# Patient Record
Sex: FEMALE | Race: WHITE | NOT HISPANIC OR LATINO | Employment: OTHER | URBAN - METROPOLITAN AREA
[De-identification: names, ages, dates, MRNs, and addresses within clinical notes are randomized per-mention and may not be internally consistent; named-entity substitution may affect disease eponyms.]

---

## 2020-11-11 ENCOUNTER — HOSPITAL ENCOUNTER (EMERGENCY)
Facility: HOSPITAL | Age: 55
Discharge: HOME | End: 2020-11-11
Attending: EMERGENCY MEDICINE
Payer: MEDICAID

## 2020-11-11 VITALS
TEMPERATURE: 99 F | WEIGHT: 160 LBS | HEART RATE: 94 BPM | HEIGHT: 67 IN | OXYGEN SATURATION: 96 % | SYSTOLIC BLOOD PRESSURE: 117 MMHG | DIASTOLIC BLOOD PRESSURE: 65 MMHG | RESPIRATION RATE: 18 BRPM | BODY MASS INDEX: 25.11 KG/M2

## 2020-11-11 DIAGNOSIS — K26.9 DUODENAL ULCER: Primary | ICD-10-CM

## 2020-11-11 DIAGNOSIS — R11.2 NON-INTRACTABLE VOMITING WITH NAUSEA, UNSPECIFIED VOMITING TYPE: ICD-10-CM

## 2020-11-11 LAB
ALBUMIN SERPL BCP-MCNC: 3.7 G/DL (ref 3.5–5.2)
ALP SERPL-CCNC: 117 U/L (ref 55–135)
ALT SERPL W/O P-5'-P-CCNC: 7 U/L (ref 10–44)
ANION GAP SERPL CALC-SCNC: 12 MMOL/L (ref 8–16)
ANISOCYTOSIS BLD QL SMEAR: SLIGHT
AST SERPL-CCNC: 11 U/L (ref 10–40)
BASOPHILS NFR BLD: 0 % (ref 0–1.9)
BILIRUB SERPL-MCNC: 0.5 MG/DL (ref 0.1–1)
BILIRUB UR QL STRIP: NEGATIVE
BUN SERPL-MCNC: 11 MG/DL (ref 6–20)
CALCIUM SERPL-MCNC: 8.9 MG/DL (ref 8.7–10.5)
CHLORIDE SERPL-SCNC: 98 MMOL/L (ref 95–110)
CLARITY UR: CLEAR
CO2 SERPL-SCNC: 27 MMOL/L (ref 23–29)
COLOR UR: YELLOW
CREAT SERPL-MCNC: 1.4 MG/DL (ref 0.5–1.4)
DIFFERENTIAL METHOD: ABNORMAL
EOSINOPHIL NFR BLD: 0 % (ref 0–8)
ERYTHROCYTE [DISTWIDTH] IN BLOOD BY AUTOMATED COUNT: 15.5 % (ref 11.5–14.5)
EST. GFR  (AFRICAN AMERICAN): 49 ML/MIN/1.73 M^2
EST. GFR  (NON AFRICAN AMERICAN): 43 ML/MIN/1.73 M^2
GIANT PLATELETS BLD QL SMEAR: PRESENT
GLUCOSE SERPL-MCNC: 110 MG/DL (ref 70–110)
GLUCOSE UR QL STRIP: NEGATIVE
HCT VFR BLD AUTO: 41.7 % (ref 37–48.5)
HGB BLD-MCNC: 13.2 G/DL (ref 12–16)
HGB UR QL STRIP: NEGATIVE
HYPOCHROMIA BLD QL SMEAR: ABNORMAL
IMM GRANULOCYTES # BLD AUTO: ABNORMAL K/UL (ref 0–0.04)
IMM GRANULOCYTES NFR BLD AUTO: ABNORMAL % (ref 0–0.5)
KETONES UR QL STRIP: NEGATIVE
LEUKOCYTE ESTERASE UR QL STRIP: NEGATIVE
LIPASE SERPL-CCNC: 9 U/L (ref 4–60)
LYMPHOCYTES NFR BLD: 42 % (ref 18–48)
MCH RBC QN AUTO: 26.7 PG (ref 27–31)
MCHC RBC AUTO-ENTMCNC: 31.7 G/DL (ref 32–36)
MCV RBC AUTO: 84 FL (ref 82–98)
MONOCYTES NFR BLD: 5 % (ref 4–15)
NEUTROPHILS NFR BLD: 53 % (ref 38–73)
NITRITE UR QL STRIP: NEGATIVE
NRBC BLD-RTO: 0 /100 WBC
OVALOCYTES BLD QL SMEAR: ABNORMAL
PH UR STRIP: 8 [PH] (ref 5–8)
PLATELET # BLD AUTO: 439 K/UL (ref 150–350)
PLATELET BLD QL SMEAR: ABNORMAL
PMV BLD AUTO: 8.7 FL (ref 9.2–12.9)
POIKILOCYTOSIS BLD QL SMEAR: SLIGHT
POTASSIUM SERPL-SCNC: 3.7 MMOL/L (ref 3.5–5.1)
PROT SERPL-MCNC: 6.4 G/DL (ref 6–8.4)
PROT UR QL STRIP: NEGATIVE
RBC # BLD AUTO: 4.95 M/UL (ref 4–5.4)
SODIUM SERPL-SCNC: 137 MMOL/L (ref 136–145)
SP GR UR STRIP: 1.01 (ref 1–1.03)
URN SPEC COLLECT METH UR: NORMAL
UROBILINOGEN UR STRIP-ACNC: NEGATIVE EU/DL
WBC # BLD AUTO: 10.06 K/UL (ref 3.9–12.7)

## 2020-11-11 PROCEDURE — 96375 TX/PRO/DX INJ NEW DRUG ADDON: CPT

## 2020-11-11 PROCEDURE — 80053 COMPREHEN METABOLIC PANEL: CPT

## 2020-11-11 PROCEDURE — 96374 THER/PROPH/DIAG INJ IV PUSH: CPT

## 2020-11-11 PROCEDURE — 25000003 PHARM REV CODE 250: Performed by: EMERGENCY MEDICINE

## 2020-11-11 PROCEDURE — 99284 EMERGENCY DEPT VISIT MOD MDM: CPT | Mod: 25

## 2020-11-11 PROCEDURE — 81003 URINALYSIS AUTO W/O SCOPE: CPT

## 2020-11-11 PROCEDURE — 63600175 PHARM REV CODE 636 W HCPCS: Performed by: EMERGENCY MEDICINE

## 2020-11-11 PROCEDURE — 36415 COLL VENOUS BLD VENIPUNCTURE: CPT

## 2020-11-11 PROCEDURE — 83690 ASSAY OF LIPASE: CPT

## 2020-11-11 PROCEDURE — 85027 COMPLETE CBC AUTOMATED: CPT

## 2020-11-11 PROCEDURE — 85007 BL SMEAR W/DIFF WBC COUNT: CPT

## 2020-11-11 RX ORDER — INSULIN LISPRO 100 [IU]/ML
INJECTION, SOLUTION INTRAVENOUS; SUBCUTANEOUS
Status: ON HOLD | COMMUNITY
Start: 2020-05-21 | End: 2020-11-24 | Stop reason: HOSPADM

## 2020-11-11 RX ORDER — PANTOPRAZOLE SODIUM 40 MG/1
40 TABLET, DELAYED RELEASE ORAL DAILY
Qty: 30 TABLET | Refills: 3 | Status: ON HOLD | OUTPATIENT
Start: 2020-11-11 | End: 2020-11-24 | Stop reason: SDUPTHER

## 2020-11-11 RX ORDER — PANTOPRAZOLE SODIUM 40 MG/1
40 TABLET, DELAYED RELEASE ORAL
Status: COMPLETED | OUTPATIENT
Start: 2020-11-11 | End: 2020-11-11

## 2020-11-11 RX ORDER — INSULIN GLARGINE 100 [IU]/ML
INJECTION, SOLUTION SUBCUTANEOUS DAILY
Status: ON HOLD | COMMUNITY
End: 2020-11-24 | Stop reason: HOSPADM

## 2020-11-11 RX ORDER — SUCRALFATE 1 G/10ML
1 SUSPENSION ORAL EVERY 6 HOURS
Status: DISCONTINUED | OUTPATIENT
Start: 2020-11-11 | End: 2020-11-11

## 2020-11-11 RX ORDER — MAG HYDROX/ALUMINUM HYD/SIMETH 200-200-20
30 SUSPENSION, ORAL (FINAL DOSE FORM) ORAL
Status: DISCONTINUED | OUTPATIENT
Start: 2020-11-11 | End: 2020-11-11

## 2020-11-11 RX ORDER — KETOROLAC TROMETHAMINE 30 MG/ML
30 INJECTION, SOLUTION INTRAMUSCULAR; INTRAVENOUS
Status: COMPLETED | OUTPATIENT
Start: 2020-11-11 | End: 2020-11-11

## 2020-11-11 RX ORDER — TALC
3 POWDER (GRAM) TOPICAL NIGHTLY
COMMUNITY
Start: 2020-05-21 | End: 2022-02-24

## 2020-11-11 RX ORDER — FLUTICASONE FUROATE AND VILANTEROL 100; 25 UG/1; UG/1
1 POWDER RESPIRATORY (INHALATION)
COMMUNITY
Start: 2020-05-22 | End: 2022-03-07 | Stop reason: ALTCHOICE

## 2020-11-11 RX ORDER — GLIMEPIRIDE 4 MG/1
4 TABLET ORAL
COMMUNITY
Start: 2020-08-26 | End: 2020-12-09

## 2020-11-11 RX ORDER — INSULIN GLARGINE 100 [IU]/ML
22 INJECTION, SOLUTION SUBCUTANEOUS
COMMUNITY
Start: 2020-05-21 | End: 2020-11-17

## 2020-11-11 RX ORDER — LINAGLIPTIN 5 MG/1
5 TABLET, FILM COATED ORAL DAILY
COMMUNITY
Start: 2020-06-29 | End: 2022-02-23 | Stop reason: SDUPTHER

## 2020-11-11 RX ORDER — ONDANSETRON HYDROCHLORIDE 8 MG/1
8 TABLET, FILM COATED ORAL EVERY 12 HOURS PRN
Qty: 8 TABLET | Refills: 1 | Status: SHIPPED | OUTPATIENT
Start: 2020-11-11 | End: 2020-12-31

## 2020-11-11 RX ORDER — HALOPERIDOL 5 MG/ML
5 INJECTION INTRAMUSCULAR
Status: COMPLETED | OUTPATIENT
Start: 2020-11-11 | End: 2020-11-11

## 2020-11-11 RX ORDER — ATORVASTATIN CALCIUM 40 MG/1
40 TABLET, FILM COATED ORAL DAILY
COMMUNITY
Start: 2020-05-21 | End: 2021-02-08 | Stop reason: SDUPTHER

## 2020-11-11 RX ADMIN — LIDOCAINE HYDROCHLORIDE: 20 SOLUTION ORAL; TOPICAL at 02:11

## 2020-11-11 RX ADMIN — HALOPERIDOL LACTATE 5 MG: 5 INJECTION, SOLUTION INTRAMUSCULAR at 02:11

## 2020-11-11 RX ADMIN — KETOROLAC TROMETHAMINE 30 MG: 30 INJECTION, SOLUTION INTRAMUSCULAR at 02:11

## 2020-11-11 RX ADMIN — PANTOPRAZOLE SODIUM 40 MG: 40 TABLET, DELAYED RELEASE ORAL at 02:11

## 2020-11-11 NOTE — ED NOTES
Pt. Resting with eyes closed, chest rise and fall symmetrical, respirations even and unlabored, NADN, VSS, awakens easily, denies pain, no needs at this time, will continue to monitor.

## 2020-11-11 NOTE — ED PROVIDER NOTES
Encounter Date: 11/11/2020    SCRIBE #1 NOTE: I, Coco Harrell, am scribing for, and in the presence of, Austen Durant MD.       History     Chief Complaint   Patient presents with    Abdominal Pain     Presents with mid abdominal pain x 1 week; Also reports emesis x 4 episodes since this AM       Time seen by provider: 2:05 PM on 11/11/2020    Delores Fox is a 55 y.o. female who presents to the ED with an onset of mid abdominal pain for 1 month worsening 3 days ago.  Patient was recently seen at [facility] on 10/12/2020 for similar Sx and diagnosed with duodenitis with no ulcer noted and findings of intrarenal stones via CT.  She reports that since that encounter she has been nauseous and vomiting every week.  Patient confirms a decrease in oral intake secondary to pain.  She is not on an aspirin regimen and is a smoker of 0.5 ppd.  The patient denies fever, constipation, bloody / tarry stools, an increase in frequency, painful urination or any other symptoms at this time.  Allergies include sulfa drugs and morphine.  She has PMHx of HTN, DM, CAD and HLD.  No PSHx.      The history is provided by the patient.     Review of patient's allergies indicates:   Allergen Reactions    Morphine Nausea And Vomiting    Sulfa (sulfonamide antibiotics) Nausea And Vomiting     Past Medical History:   Diagnosis Date    Coronary artery disease     Diabetes mellitus     High cholesterol     Hypertension      No past surgical history on file.  No family history on file.  Social History     Tobacco Use    Smoking status: Not on file   Substance Use Topics    Alcohol use: Not on file    Drug use: Not on file     Review of Systems   Constitutional: Positive for appetite change. Negative for activity change, chills, fatigue and fever.   Eyes: Negative for visual disturbance.   Respiratory: Negative for apnea and shortness of breath.    Cardiovascular: Negative for chest pain and  palpitations.   Gastrointestinal: Positive for abdominal pain, nausea and vomiting. Negative for abdominal distention, blood in stool and constipation.   Genitourinary: Negative for difficulty urinating, dysuria and frequency.   Musculoskeletal: Negative for neck pain.   Skin: Negative for pallor and rash.   Neurological: Negative for headaches.   Hematological: Does not bruise/bleed easily.   Psychiatric/Behavioral: Negative for agitation.       Physical Exam     Initial Vitals [11/11/20 1228]   BP Pulse Resp Temp SpO2   (!) 171/70 (!) 129 (!) 22 99.3 °F (37.4 °C) 96 %      MAP       --         Physical Exam    Nursing note and vitals reviewed.  Constitutional: She appears well-developed and well-nourished.   HENT:   Head: Normocephalic and atraumatic.   Eyes: Conjunctivae are normal.   Neck: Normal range of motion. Neck supple.   Cardiovascular: Normal rate, regular rhythm and normal heart sounds. Exam reveals no gallop and no friction rub.    No murmur heard.  Pulmonary/Chest: Effort normal and breath sounds normal. No respiratory distress. She has no wheezes. She has no rhonchi. She has no rales.   Abdominal: Soft. She exhibits no distension. There is abdominal tenderness in the epigastric area. There is no rebound and no guarding.   Musculoskeletal: Normal range of motion.   Neurological: She is alert and oriented to person, place, and time.   Skin: Skin is warm and dry. No erythema.   Psychiatric: She has a normal mood and affect.         ED Course   Procedures  Labs Reviewed   CBC W/ AUTO DIFFERENTIAL - Abnormal; Notable for the following components:       Result Value    MCH 26.7 (*)     MCHC 31.7 (*)     RDW 15.5 (*)     Platelets 439 (*)     MPV 8.7 (*)     All other components within normal limits   COMPREHENSIVE METABOLIC PANEL - Abnormal; Notable for the following components:    ALT 7 (*)     eGFR if  49 (*)     eGFR if non  43 (*)     All other components within normal  limits   LIPASE   URINALYSIS, REFLEX TO URINE CULTURE    Narrative:     Specimen Source->Urine          Imaging Results    None          Medical Decision Making:   History:   Old Medical Records: I decided to obtain old medical records.  Clinical Tests:   Lab Tests: Ordered and Reviewed  ED Management:  55-year-old female presents with a 1 month history of epigastric pain.  CT perform 1 month ago suggested duodenitis which most likely is secondary to a duodenal ulcer.  She is complete pain relief with a GI cocktail.  She is placed on Protonix and encouraged to return for worsening symptoms.  There is no evidence of intestinal perforation.            Scribe Attestation:   Scribe #1: I performed the above scribed service and the documentation accurately describes the services I performed. I attest to the accuracy of the note.                      Clinical Impression:     ICD-10-CM ICD-9-CM   1. Duodenal ulcer  K26.9 532.90   2. Non-intractable vomiting with nausea, unspecified vomiting type  R11.2 787.01                          ED Disposition Condition    Discharge Stable        ED Prescriptions     Medication Sig Dispense Start Date End Date Auth. Provider    ondansetron (ZOFRAN) 8 MG tablet Take 1 tablet (8 mg total) by mouth every 12 (twelve) hours as needed for Nausea. 8 tablet 11/11/2020  Austen Durant III, MD    pantoprazole (PROTONIX) 40 MG tablet Take 1 tablet (40 mg total) by mouth once daily. 30 tablet 11/11/2020 11/11/2021 Austen Durant III, MD        Follow-up Information     Follow up With Specialties Details Why Contact Info    Carey Liu MD Internal Medicine In 3 days  [address] [facility] [address]  [phone]                                         Austen Durant III, MD  11/11/20 0270

## 2020-11-21 ENCOUNTER — HOSPITAL ENCOUNTER (INPATIENT)
Facility: HOSPITAL | Age: 55
LOS: 3 days | Discharge: HOME | DRG: 287 | End: 2020-11-24
Attending: EMERGENCY MEDICINE | Admitting: INTERNAL MEDICINE
Payer: MEDICAID

## 2020-11-21 DIAGNOSIS — I42.9 CARDIOMYOPATHY: ICD-10-CM

## 2020-11-21 DIAGNOSIS — E78.2 MIXED HYPERLIPIDEMIA: ICD-10-CM

## 2020-11-21 DIAGNOSIS — I20.89 OTHER FORMS OF ANGINA PECTORIS: ICD-10-CM

## 2020-11-21 DIAGNOSIS — K59.00 CONSTIPATION: ICD-10-CM

## 2020-11-21 DIAGNOSIS — R07.89 OTHER CHEST PAIN: ICD-10-CM

## 2020-11-21 DIAGNOSIS — R10.9 ABDOMINAL PAIN: ICD-10-CM

## 2020-11-21 DIAGNOSIS — E11.00 TYPE 2 DIABETES MELLITUS WITH HYPEROSMOLARITY WITHOUT COMA, WITH LONG-TERM CURRENT USE OF INSULIN: ICD-10-CM

## 2020-11-21 DIAGNOSIS — Z79.4 TYPE 2 DIABETES MELLITUS WITH HYPEROSMOLARITY WITHOUT COMA, WITH LONG-TERM CURRENT USE OF INSULIN: ICD-10-CM

## 2020-11-21 DIAGNOSIS — I42.0 DILATED CARDIOMYOPATHY: ICD-10-CM

## 2020-11-21 DIAGNOSIS — I25.5 ISCHEMIC CARDIOMYOPATHY: Chronic | ICD-10-CM

## 2020-11-21 DIAGNOSIS — R07.9 CHEST PAIN, UNSPECIFIED TYPE: Primary | ICD-10-CM

## 2020-11-21 DIAGNOSIS — I25.10 CAD (CORONARY ARTERY DISEASE): ICD-10-CM

## 2020-11-21 DIAGNOSIS — R07.9 CHEST PAIN: ICD-10-CM

## 2020-11-21 PROBLEM — E11.9 TYPE 2 DIABETES MELLITUS, WITH LONG-TERM CURRENT USE OF INSULIN: Status: ACTIVE | Noted: 2020-11-21

## 2020-11-21 PROBLEM — E87.6 HYPOKALEMIA: Status: ACTIVE | Noted: 2020-11-21

## 2020-11-21 PROBLEM — E78.5 HLD (HYPERLIPIDEMIA): Status: ACTIVE | Noted: 2020-11-21

## 2020-11-21 PROBLEM — K20.90 ESOPHAGITIS: Status: ACTIVE | Noted: 2020-11-21

## 2020-11-21 PROBLEM — I10 ESSENTIAL HYPERTENSION: Status: ACTIVE | Noted: 2020-11-21

## 2020-11-21 LAB
ALBUMIN SERPL BCP-MCNC: 3.8 G/DL (ref 3.5–5.2)
ALP SERPL-CCNC: 103 U/L (ref 55–135)
ALT SERPL W/O P-5'-P-CCNC: 11 U/L (ref 10–44)
AMPHET+METHAMPHET UR QL: NEGATIVE
ANION GAP SERPL CALC-SCNC: 10 MMOL/L (ref 8–16)
APTT PPP: 35.9 SEC (ref 23.6–33.3)
AST SERPL-CCNC: 14 U/L (ref 10–40)
BACTERIA #/AREA URNS HPF: NEGATIVE /HPF
BARBITURATES UR QL SCN>200 NG/ML: NEGATIVE
BASOPHILS # BLD AUTO: 0.03 K/UL (ref 0–0.2)
BASOPHILS NFR BLD: 0.3 % (ref 0–1.9)
BENZODIAZ UR QL SCN>200 NG/ML: NEGATIVE
BILIRUB SERPL-MCNC: 0.7 MG/DL (ref 0.1–1)
BILIRUB UR QL STRIP: NEGATIVE
BNP SERPL-MCNC: 191 PG/ML (ref 0–99)
BUN SERPL-MCNC: 14 MG/DL (ref 6–20)
BZE UR QL SCN: NEGATIVE
CALCIUM SERPL-MCNC: 9 MG/DL (ref 8.7–10.5)
CANNABINOIDS UR QL SCN: NEGATIVE
CHLORIDE SERPL-SCNC: 101 MMOL/L (ref 95–110)
CK SERPL-CCNC: 31 U/L (ref 20–180)
CLARITY UR: CLEAR
CO2 SERPL-SCNC: 28 MMOL/L (ref 23–29)
COLOR UR: YELLOW
CREAT SERPL-MCNC: 1.1 MG/DL (ref 0.5–1.4)
CREAT UR-MCNC: 200 MG/DL (ref 15–325)
DIFFERENTIAL METHOD: ABNORMAL
EOSINOPHIL # BLD AUTO: 0 K/UL (ref 0–0.5)
EOSINOPHIL NFR BLD: 0.2 % (ref 0–8)
ERYTHROCYTE [DISTWIDTH] IN BLOOD BY AUTOMATED COUNT: 15.7 % (ref 11.5–14.5)
EST. GFR  (AFRICAN AMERICAN): >60 ML/MIN/1.73 M^2
EST. GFR  (NON AFRICAN AMERICAN): 57.5 ML/MIN/1.73 M^2
GLUCOSE SERPL-MCNC: 136 MG/DL (ref 70–110)
GLUCOSE SERPL-MCNC: 145 MG/DL (ref 70–110)
GLUCOSE UR QL STRIP: NEGATIVE
HCT VFR BLD AUTO: 43.5 % (ref 37–48.5)
HGB BLD-MCNC: 14 G/DL (ref 12–16)
HGB UR QL STRIP: NEGATIVE
HYALINE CASTS #/AREA URNS LPF: 11 /LPF
IMM GRANULOCYTES # BLD AUTO: 0.03 K/UL (ref 0–0.04)
IMM GRANULOCYTES NFR BLD AUTO: 0.3 % (ref 0–0.5)
INR PPP: 1.3
KETONES UR QL STRIP: NEGATIVE
LEUKOCYTE ESTERASE UR QL STRIP: NEGATIVE
LIPASE SERPL-CCNC: 23 U/L (ref 4–60)
LYMPHOCYTES # BLD AUTO: 4.2 K/UL (ref 1–4.8)
LYMPHOCYTES NFR BLD: 37.8 % (ref 18–48)
MAGNESIUM SERPL-MCNC: 2.1 MG/DL (ref 1.6–2.6)
MCH RBC QN AUTO: 26.6 PG (ref 27–31)
MCHC RBC AUTO-ENTMCNC: 32.2 G/DL (ref 32–36)
MCV RBC AUTO: 83 FL (ref 82–98)
MICROSCOPIC COMMENT: ABNORMAL
MONOCYTES # BLD AUTO: 1 K/UL (ref 0.3–1)
MONOCYTES NFR BLD: 8.5 % (ref 4–15)
NEUTROPHILS # BLD AUTO: 5.9 K/UL (ref 1.8–7.7)
NEUTROPHILS NFR BLD: 52.9 % (ref 38–73)
NITRITE UR QL STRIP: NEGATIVE
NRBC BLD-RTO: 0 /100 WBC
OPIATES UR QL SCN: NEGATIVE
PCP UR QL SCN>25 NG/ML: NEGATIVE
PH UR STRIP: 7 [PH] (ref 5–8)
PLATELET # BLD AUTO: 455 K/UL (ref 150–350)
PMV BLD AUTO: 9.2 FL (ref 9.2–12.9)
POTASSIUM SERPL-SCNC: 3.3 MMOL/L (ref 3.5–5.1)
PROT SERPL-MCNC: 6.4 G/DL (ref 6–8.4)
PROT UR QL STRIP: ABNORMAL
PROTHROMBIN TIME: 15.4 SEC (ref 10.6–14.8)
RBC # BLD AUTO: 5.26 M/UL (ref 4–5.4)
RBC #/AREA URNS HPF: 1 /HPF (ref 0–4)
SARS-COV-2 RDRP RESP QL NAA+PROBE: NEGATIVE
SODIUM SERPL-SCNC: 139 MMOL/L (ref 136–145)
SP GR UR STRIP: 1.02 (ref 1–1.03)
SQUAMOUS #/AREA URNS HPF: 4 /HPF
TOXICOLOGY INFORMATION: NORMAL
TROPONIN I SERPL DL<=0.01 NG/ML-MCNC: <0.03 NG/ML
TROPONIN I SERPL DL<=0.01 NG/ML-MCNC: <0.03 NG/ML
TSH SERPL DL<=0.005 MIU/L-ACNC: 0.96 UIU/ML (ref 0.34–5.6)
URN SPEC COLLECT METH UR: ABNORMAL
UROBILINOGEN UR STRIP-ACNC: ABNORMAL EU/DL
WBC # BLD AUTO: 11.15 K/UL (ref 3.9–12.7)
WBC #/AREA URNS HPF: 2 /HPF (ref 0–5)

## 2020-11-21 PROCEDURE — 93010 ELECTROCARDIOGRAM REPORT: CPT | Mod: ,,, | Performed by: INTERNAL MEDICINE

## 2020-11-21 PROCEDURE — 84484 ASSAY OF TROPONIN QUANT: CPT

## 2020-11-21 PROCEDURE — 25000003 PHARM REV CODE 250: Performed by: INTERNAL MEDICINE

## 2020-11-21 PROCEDURE — G0378 HOSPITAL OBSERVATION PER HR: HCPCS

## 2020-11-21 PROCEDURE — 83735 ASSAY OF MAGNESIUM: CPT

## 2020-11-21 PROCEDURE — C9113 INJ PANTOPRAZOLE SODIUM, VIA: HCPCS | Performed by: EMERGENCY MEDICINE

## 2020-11-21 PROCEDURE — 82550 ASSAY OF CK (CPK): CPT

## 2020-11-21 PROCEDURE — 81001 URINALYSIS AUTO W/SCOPE: CPT

## 2020-11-21 PROCEDURE — 99285 EMERGENCY DEPT VISIT HI MDM: CPT | Mod: 25

## 2020-11-21 PROCEDURE — 96374 THER/PROPH/DIAG INJ IV PUSH: CPT

## 2020-11-21 PROCEDURE — 85730 THROMBOPLASTIN TIME PARTIAL: CPT

## 2020-11-21 PROCEDURE — 63600175 PHARM REV CODE 636 W HCPCS: Performed by: EMERGENCY MEDICINE

## 2020-11-21 PROCEDURE — 93010 EKG 12-LEAD: ICD-10-PCS | Mod: ,,, | Performed by: INTERNAL MEDICINE

## 2020-11-21 PROCEDURE — 80053 COMPREHEN METABOLIC PANEL: CPT

## 2020-11-21 PROCEDURE — 84443 ASSAY THYROID STIM HORMONE: CPT

## 2020-11-21 PROCEDURE — 25000003 PHARM REV CODE 250: Performed by: EMERGENCY MEDICINE

## 2020-11-21 PROCEDURE — 93005 ELECTROCARDIOGRAM TRACING: CPT | Performed by: INTERNAL MEDICINE

## 2020-11-21 PROCEDURE — U0002 COVID-19 LAB TEST NON-CDC: HCPCS

## 2020-11-21 PROCEDURE — 83880 ASSAY OF NATRIURETIC PEPTIDE: CPT

## 2020-11-21 PROCEDURE — 96375 TX/PRO/DX INJ NEW DRUG ADDON: CPT

## 2020-11-21 PROCEDURE — 80307 DRUG TEST PRSMV CHEM ANLYZR: CPT

## 2020-11-21 PROCEDURE — 25000003 PHARM REV CODE 250: Performed by: NURSE PRACTITIONER

## 2020-11-21 PROCEDURE — 36415 COLL VENOUS BLD VENIPUNCTURE: CPT

## 2020-11-21 PROCEDURE — 96361 HYDRATE IV INFUSION ADD-ON: CPT

## 2020-11-21 PROCEDURE — 83690 ASSAY OF LIPASE: CPT

## 2020-11-21 PROCEDURE — 21400001 HC TELEMETRY ROOM

## 2020-11-21 PROCEDURE — 85025 COMPLETE CBC W/AUTO DIFF WBC: CPT

## 2020-11-21 PROCEDURE — 84484 ASSAY OF TROPONIN QUANT: CPT | Mod: 91

## 2020-11-21 PROCEDURE — 25500020 PHARM REV CODE 255: Performed by: EMERGENCY MEDICINE

## 2020-11-21 PROCEDURE — 85610 PROTHROMBIN TIME: CPT

## 2020-11-21 RX ORDER — TALC
6 POWDER (GRAM) TOPICAL NIGHTLY PRN
Status: DISCONTINUED | OUTPATIENT
Start: 2020-11-21 | End: 2020-11-24 | Stop reason: HOSPADM

## 2020-11-21 RX ORDER — FLUTICASONE FUROATE AND VILANTEROL 200; 25 UG/1; UG/1
1 POWDER RESPIRATORY (INHALATION) DAILY
Status: DISCONTINUED | OUTPATIENT
Start: 2020-11-22 | End: 2020-11-21

## 2020-11-21 RX ORDER — MAGNESIUM SULFATE HEPTAHYDRATE 40 MG/ML
2 INJECTION, SOLUTION INTRAVENOUS
Status: DISCONTINUED | OUTPATIENT
Start: 2020-11-21 | End: 2020-11-24 | Stop reason: HOSPADM

## 2020-11-21 RX ORDER — ALBUTEROL SULFATE 0.83 MG/ML
2.5 SOLUTION RESPIRATORY (INHALATION) EVERY 4 HOURS PRN
Status: DISCONTINUED | OUTPATIENT
Start: 2020-11-22 | End: 2020-11-24 | Stop reason: HOSPADM

## 2020-11-21 RX ORDER — POTASSIUM CHLORIDE 7.45 MG/ML
40 INJECTION INTRAVENOUS
Status: DISCONTINUED | OUTPATIENT
Start: 2020-11-21 | End: 2020-11-24 | Stop reason: HOSPADM

## 2020-11-21 RX ORDER — METOPROLOL SUCCINATE 50 MG/1
50 TABLET, EXTENDED RELEASE ORAL DAILY
Status: DISCONTINUED | OUTPATIENT
Start: 2020-11-22 | End: 2020-11-24 | Stop reason: HOSPADM

## 2020-11-21 RX ORDER — MOMETASONE FUROATE AND FORMOTEROL FUMARATE DIHYDRATE 200; 5 UG/1; UG/1
2 AEROSOL RESPIRATORY (INHALATION) 2 TIMES DAILY
COMMUNITY
Start: 2020-08-26 | End: 2020-12-10

## 2020-11-21 RX ORDER — OXYCODONE AND ACETAMINOPHEN 5; 325 MG/1; MG/1
1 TABLET ORAL EVERY 8 HOURS PRN
Status: DISCONTINUED | OUTPATIENT
Start: 2020-11-21 | End: 2020-11-24 | Stop reason: HOSPADM

## 2020-11-21 RX ORDER — POTASSIUM CHLORIDE 20 MEQ/1
40 TABLET, EXTENDED RELEASE ORAL ONCE
Status: COMPLETED | OUTPATIENT
Start: 2020-11-21 | End: 2020-11-21

## 2020-11-21 RX ORDER — PREGABALIN 75 MG/1
75 CAPSULE ORAL 2 TIMES DAILY
Status: DISCONTINUED | OUTPATIENT
Start: 2020-11-21 | End: 2020-11-24 | Stop reason: HOSPADM

## 2020-11-21 RX ORDER — MAGNESIUM SULFATE 1 G/100ML
1 INJECTION INTRAVENOUS
Status: DISCONTINUED | OUTPATIENT
Start: 2020-11-21 | End: 2020-11-24 | Stop reason: HOSPADM

## 2020-11-21 RX ORDER — POTASSIUM CHLORIDE 7.45 MG/ML
20 INJECTION INTRAVENOUS
Status: DISCONTINUED | OUTPATIENT
Start: 2020-11-21 | End: 2020-11-24 | Stop reason: HOSPADM

## 2020-11-21 RX ORDER — PANTOPRAZOLE SODIUM 40 MG/1
40 TABLET, DELAYED RELEASE ORAL DAILY
Status: DISCONTINUED | OUTPATIENT
Start: 2020-11-22 | End: 2020-11-22

## 2020-11-21 RX ORDER — POTASSIUM CHLORIDE 20 MEQ/1
20 TABLET, EXTENDED RELEASE ORAL
Status: DISCONTINUED | OUTPATIENT
Start: 2020-11-21 | End: 2020-11-24 | Stop reason: HOSPADM

## 2020-11-21 RX ORDER — PANTOPRAZOLE SODIUM 40 MG/10ML
40 INJECTION, POWDER, LYOPHILIZED, FOR SOLUTION INTRAVENOUS
Status: COMPLETED | OUTPATIENT
Start: 2020-11-21 | End: 2020-11-21

## 2020-11-21 RX ORDER — ONDANSETRON 2 MG/ML
4 INJECTION INTRAMUSCULAR; INTRAVENOUS EVERY 8 HOURS PRN
Status: DISCONTINUED | OUTPATIENT
Start: 2020-11-21 | End: 2020-11-24 | Stop reason: HOSPADM

## 2020-11-21 RX ORDER — IBUPROFEN 200 MG
16 TABLET ORAL
Status: DISCONTINUED | OUTPATIENT
Start: 2020-11-21 | End: 2020-11-24 | Stop reason: HOSPADM

## 2020-11-21 RX ORDER — POTASSIUM CHLORIDE 20 MEQ/1
40 TABLET, EXTENDED RELEASE ORAL
Status: DISCONTINUED | OUTPATIENT
Start: 2020-11-21 | End: 2020-11-24 | Stop reason: HOSPADM

## 2020-11-21 RX ORDER — ZOLPIDEM TARTRATE 5 MG/1
10 TABLET ORAL NIGHTLY
Status: DISCONTINUED | OUTPATIENT
Start: 2020-11-21 | End: 2020-11-24 | Stop reason: HOSPADM

## 2020-11-21 RX ORDER — PROMETHAZINE HYDROCHLORIDE 25 MG/1
TABLET ORAL
COMMUNITY
Start: 2020-07-20 | End: 2020-12-09

## 2020-11-21 RX ORDER — LANOLIN ALCOHOL/MO/W.PET/CERES
800 CREAM (GRAM) TOPICAL
Status: DISCONTINUED | OUTPATIENT
Start: 2020-11-21 | End: 2020-11-24 | Stop reason: HOSPADM

## 2020-11-21 RX ORDER — PREGABALIN 75 MG/1
75 CAPSULE ORAL 2 TIMES DAILY
COMMUNITY
Start: 2020-05-21 | End: 2022-03-31 | Stop reason: SDUPTHER

## 2020-11-21 RX ORDER — ZOLPIDEM TARTRATE 10 MG/1
10 TABLET ORAL NIGHTLY
COMMUNITY
End: 2022-02-23 | Stop reason: ALTCHOICE

## 2020-11-21 RX ORDER — ATORVASTATIN CALCIUM 40 MG/1
40 TABLET, FILM COATED ORAL DAILY
Status: DISCONTINUED | OUTPATIENT
Start: 2020-11-21 | End: 2020-11-24 | Stop reason: HOSPADM

## 2020-11-21 RX ORDER — AMOXICILLIN 250 MG
1 CAPSULE ORAL 2 TIMES DAILY
Status: DISCONTINUED | OUTPATIENT
Start: 2020-11-21 | End: 2020-11-22

## 2020-11-21 RX ORDER — INSULIN ASPART 100 [IU]/ML
0-5 INJECTION, SOLUTION INTRAVENOUS; SUBCUTANEOUS
Status: DISCONTINUED | OUTPATIENT
Start: 2020-11-21 | End: 2020-11-24 | Stop reason: HOSPADM

## 2020-11-21 RX ORDER — METFORMIN HYDROCHLORIDE 1000 MG/1
1000 TABLET ORAL 2 TIMES DAILY WITH MEALS
COMMUNITY
Start: 2020-09-02 | End: 2020-12-09

## 2020-11-21 RX ORDER — IBUPROFEN 200 MG
24 TABLET ORAL
Status: DISCONTINUED | OUTPATIENT
Start: 2020-11-21 | End: 2020-11-24 | Stop reason: HOSPADM

## 2020-11-21 RX ORDER — ONDANSETRON 2 MG/ML
4 INJECTION INTRAMUSCULAR; INTRAVENOUS
Status: COMPLETED | OUTPATIENT
Start: 2020-11-21 | End: 2020-11-21

## 2020-11-21 RX ORDER — LACTULOSE 10 G/15ML
20 SOLUTION ORAL EVERY 4 HOURS
Status: COMPLETED | OUTPATIENT
Start: 2020-11-21 | End: 2020-11-22

## 2020-11-21 RX ORDER — SODIUM CHLORIDE 0.9 % (FLUSH) 0.9 %
10 SYRINGE (ML) INJECTION
Status: DISCONTINUED | OUTPATIENT
Start: 2020-11-21 | End: 2020-11-24 | Stop reason: HOSPADM

## 2020-11-21 RX ORDER — OXYCODONE AND ACETAMINOPHEN 5; 325 MG/1; MG/1
1 TABLET ORAL EVERY 4 HOURS PRN
Status: ON HOLD | COMMUNITY
End: 2020-11-24 | Stop reason: HOSPADM

## 2020-11-21 RX ORDER — GLUCAGON 1 MG
1 KIT INJECTION
Status: DISCONTINUED | OUTPATIENT
Start: 2020-11-21 | End: 2020-11-24 | Stop reason: HOSPADM

## 2020-11-21 RX ORDER — MAGNESIUM SULFATE HEPTAHYDRATE 40 MG/ML
4 INJECTION, SOLUTION INTRAVENOUS
Status: DISCONTINUED | OUTPATIENT
Start: 2020-11-21 | End: 2020-11-24 | Stop reason: HOSPADM

## 2020-11-21 RX ORDER — ACETAMINOPHEN 500 MG
1000 TABLET ORAL
Status: COMPLETED | OUTPATIENT
Start: 2020-11-21 | End: 2020-11-21

## 2020-11-21 RX ORDER — FLUTICASONE FUROATE AND VILANTEROL 200; 25 UG/1; UG/1
1 POWDER RESPIRATORY (INHALATION) DAILY
Status: DISCONTINUED | OUTPATIENT
Start: 2020-11-22 | End: 2020-11-24 | Stop reason: HOSPADM

## 2020-11-21 RX ORDER — LABETALOL HYDROCHLORIDE 5 MG/ML
10 INJECTION, SOLUTION INTRAVENOUS EVERY 4 HOURS PRN
Status: DISCONTINUED | OUTPATIENT
Start: 2020-11-21 | End: 2020-11-24 | Stop reason: HOSPADM

## 2020-11-21 RX ORDER — AMOXICILLIN 250 MG
1 CAPSULE ORAL
COMMUNITY
Start: 2020-05-21 | End: 2020-12-09

## 2020-11-21 RX ORDER — METOPROLOL SUCCINATE 50 MG/1
50 TABLET, EXTENDED RELEASE ORAL DAILY
Status: ON HOLD | COMMUNITY
End: 2020-11-24 | Stop reason: SDUPTHER

## 2020-11-21 RX ORDER — ACETAMINOPHEN 325 MG/1
650 TABLET ORAL EVERY 4 HOURS PRN
Status: DISCONTINUED | OUTPATIENT
Start: 2020-11-21 | End: 2020-11-24 | Stop reason: HOSPADM

## 2020-11-21 RX ORDER — OXYCODONE AND ACETAMINOPHEN 7.5; 325 MG/1; MG/1
1 TABLET ORAL EVERY 4 HOURS PRN
COMMUNITY
Start: 2020-10-12 | End: 2020-12-09

## 2020-11-21 RX ADMIN — ACETAMINOPHEN 1000 MG: 500 TABLET, FILM COATED ORAL at 01:11

## 2020-11-21 RX ADMIN — ATORVASTATIN CALCIUM 40 MG: 40 TABLET, FILM COATED ORAL at 08:11

## 2020-11-21 RX ADMIN — SENNOSIDES AND DOCUSATE SODIUM 1 TABLET: 8.6; 5 TABLET ORAL at 08:11

## 2020-11-21 RX ADMIN — IOHEXOL 100 ML: 350 INJECTION, SOLUTION INTRAVENOUS at 02:11

## 2020-11-21 RX ADMIN — SODIUM CHLORIDE 500 ML: 0.9 INJECTION, SOLUTION INTRAVENOUS at 01:11

## 2020-11-21 RX ADMIN — OXYCODONE AND ACETAMINOPHEN 1 TABLET: 5; 325 TABLET ORAL at 08:11

## 2020-11-21 RX ADMIN — ZOLPIDEM TARTRATE 10 MG: 5 TABLET, COATED ORAL at 08:11

## 2020-11-21 RX ADMIN — NITROGLYCERIN 0.5 INCH: 20 OINTMENT TOPICAL at 05:11

## 2020-11-21 RX ADMIN — POTASSIUM CHLORIDE 40 MEQ: 20 TABLET, EXTENDED RELEASE ORAL at 05:11

## 2020-11-21 RX ADMIN — PANTOPRAZOLE SODIUM 40 MG: 40 INJECTION, POWDER, FOR SOLUTION INTRAVENOUS at 05:11

## 2020-11-21 RX ADMIN — LACTULOSE 20 G: 20 SOLUTION ORAL at 09:11

## 2020-11-21 RX ADMIN — PREGABALIN 75 MG: 75 CAPSULE ORAL at 08:11

## 2020-11-21 RX ADMIN — ONDANSETRON 4 MG: 2 INJECTION INTRAMUSCULAR; INTRAVENOUS at 01:11

## 2020-11-21 NOTE — Clinical Note
The catheter is inserted in the left ventricle. Angiography performed of the LV. Angiography performed via power injection. Injection was 10 mL contrast at 10 mL/s. Power injection PSI was 700.. 5FR PIGTAIL

## 2020-11-21 NOTE — HPI
Ms. Fox is a 55 year old female with a history of HTN, HLD, chronic hip pain with chronic opioid use, nicotine dependence, DM, and esophagitis who presents today with complaints of epigastric pain. It is moderate. It is associated with N/V and constipation. She denies fever, chills, cough, dizziness, sweats, SOB, or LOC. She describes the pain in her upper abd and radiates to her chest. It is worse with eating and laying flat. It is associated with bilious, nonbloody vomit. She had an EGD for this on Thursday with Dr. Valencia and states she is awaiting the results, but was told she had ulcers. She's had multiple ED visits for this pain, but this is the first time she's complained that it is radiating into her chest. She has never had a stress test.

## 2020-11-21 NOTE — Clinical Note
60 ml injected throughout the case. 30 mL total wasted during the case. 90 mL total used in the case.

## 2020-11-21 NOTE — Clinical Note
The catheter is inserted ostium   right coronary artery. Angiography performed of the right coronary arteries. Angiography performed via power injection. Injection was 6 mL contrast at 3 mL/s. Power injection PSI was 300.. 5FR JR4;       CALCIUM SEEN IN AORTA

## 2020-11-21 NOTE — Clinical Note
The catheter is inserted ostium   left main. Angiography performed of the left coronary arteries in multiple views. Angiography performed via power injection. Injection was 6 mL contrast at 3 mL/s. Power injection PSI was 300.. 5FR JL4

## 2020-11-21 NOTE — ED NOTES
Pt reports to ed with c/o abd pain and constipation x 1 week. nad noted. Denies chest pain and sob. Call light in reach, will monitor.

## 2020-11-21 NOTE — ED PROVIDER NOTES
Encounter Date: 11/21/2020       History     Chief Complaint   Patient presents with    Abdominal Pain     NVx 2 days    Constipation     x 1week     This is a 55-year-old female who presents with multiple complaints.  The patient has been having recurrent issues with abdominal pain over the past several months.  She has had several ED presentations and has undergone a recent EGD with her gastroenterologist Dr. Donaldson in [place] and has been diagnosed with gastritis and stomach ulcers.  She presents complaining of epigastric abdominal pain that has been recurrence and in Calcitrate and in nature.  She states she has not had a bowel movement over the past week.  She is passing gas.  She states she has had multiple episodes of nausea and vomiting.  She denies melena hematochezia or hematemesis.  She has also complained of midsternal nonradiating chest discomfort which has at times been associated with the abdominal pain however at other times has just been a burning discomfort.  She reports shortness of breath but she states that this may be occurring secondary to the vomiting as it does seem to happen prior to the episodes of emesis.  She occasionally has dyspnea with exertion.  She denies lower extremity pain orthopnea or PND.  She denies any urinary problems or changes.  She is on daily narcotics for chronic hip pain.  She denies palpitations syncope or near-syncope.  She denies fever but has had some body aches.  She denies coughing.  She is a smoker.  She has a history of hypertension hyperlipidemia and diabetes.  She denies any other problems or complaints.        Review of patient's allergies indicates:   Allergen Reactions    Morphine Nausea And Vomiting    Sulfa (sulfonamide antibiotics) Nausea And Vomiting     Past Medical History:   Diagnosis Date    Coronary artery disease     Diabetes mellitus     High cholesterol     Hypertension      No past surgical history on file.  No family history on  file.  Social History     Tobacco Use    Smoking status: Not on file   Substance Use Topics    Alcohol use: Not on file    Drug use: Not on file     Review of Systems   Constitutional: Positive for activity change, appetite change and fatigue. Negative for chills and fever.   HENT: Negative.  Negative for congestion, dental problem, ear pain, rhinorrhea, sinus pressure, sinus pain, sore throat and trouble swallowing.    Eyes: Negative.  Negative for photophobia, pain, redness and visual disturbance.   Respiratory: Negative.  Negative for cough, chest tightness, shortness of breath and wheezing.    Cardiovascular: Positive for chest pain. Negative for palpitations and leg swelling.   Gastrointestinal: Positive for abdominal pain, diarrhea, nausea and vomiting. Negative for abdominal distention, anal bleeding, blood in stool and constipation.   Endocrine: Negative.    Genitourinary: Negative.  Negative for decreased urine volume, difficulty urinating, dysuria, flank pain, frequency, hematuria, pelvic pain and urgency.   Musculoskeletal: Positive for arthralgias and myalgias. Negative for back pain, gait problem, joint swelling, neck pain and neck stiffness.        Chronic hip pain on opioid therapy   Skin: Negative.  Negative for color change, pallor and rash.   Neurological: Negative.  Negative for dizziness, tremors, seizures, syncope, facial asymmetry, speech difficulty, weakness, light-headedness, numbness and headaches.   Hematological: Negative.  Does not bruise/bleed easily.   Psychiatric/Behavioral: Negative.  Negative for confusion, dysphoric mood and suicidal ideas. The patient is not nervous/anxious.    All other systems reviewed and are negative.      Physical Exam     Initial Vitals [11/21/20 1154]   BP Pulse Resp Temp SpO2   (!) 153/84 (!) 116 20 97.8 °F (36.6 °C) 96 %      MAP       --         Physical Exam    Nursing note and vitals reviewed.  Constitutional: She is active and cooperative.   Non-toxic appearance. She does not have a sickly appearance. She does not appear ill. No distress.   HENT:   Head: Normocephalic and atraumatic.   Right Ear: Tympanic membrane normal.   Left Ear: Tympanic membrane normal.   Nose: Nose normal.   Mouth/Throat: Uvula is midline, oropharynx is clear and moist and mucous membranes are normal. No oral lesions. No uvula swelling. No oropharyngeal exudate, posterior oropharyngeal edema or posterior oropharyngeal erythema.   Eyes: Conjunctivae, EOM and lids are normal. Pupils are equal, round, and reactive to light. No scleral icterus.   Neck: Trachea normal, normal range of motion, full passive range of motion without pain and phonation normal. Neck supple. No thyroid mass and no thyromegaly present. No stridor present. No spinous process tenderness and no muscular tenderness present. No tracheal deviation, no edema, no erythema and normal range of motion present. No neck rigidity. No JVD present.   Cardiovascular: Normal rate, regular rhythm, normal heart sounds, intact distal pulses and normal pulses. Exam reveals no gallop and no friction rub.    No murmur heard.  Pulmonary/Chest: Effort normal and breath sounds normal. No accessory muscle usage or stridor. No tachypnea. No respiratory distress. She has no wheezes. She has no rhonchi. She has no rales.   Abdominal: Soft. Normal appearance and bowel sounds are normal. She exhibits no distension, no pulsatile midline mass and no mass. There is no hepatosplenomegaly. There is abdominal tenderness in the right upper quadrant, epigastric area and left upper quadrant. There is no rigidity, no guarding and no CVA tenderness. No hernia.   Musculoskeletal: Normal range of motion. No tenderness or edema.      Comments: Pulses are 2+ throughout, cap refill is less than 2 sec throughout, extremities are nontender throughout with full range of motion. There is no spinal tenderness to palpation.   Neurological: She is alert and  oriented to person, place, and time. She has normal strength. She displays normal reflexes. No cranial nerve deficit or sensory deficit.   No focal deficits.   Skin: Skin is warm, dry and intact. Capillary refill takes less than 2 seconds. No ecchymosis, no petechiae and no rash noted. No erythema. No pallor.   Psychiatric: She has a normal mood and affect. Her speech is normal and behavior is normal. Judgment and thought content normal. Cognition and memory are normal.         ED Course   Procedures  Labs Reviewed   CBC W/ AUTO DIFFERENTIAL - Abnormal; Notable for the following components:       Result Value    MCH 26.6 (*)     RDW 15.7 (*)     Platelets 455 (*)     All other components within normal limits    Narrative:     For upper or mid abdominal pain.   COMPREHENSIVE METABOLIC PANEL - Abnormal; Notable for the following components:    Potassium 3.3 (*)     Glucose 136 (*)     eGFR if non  57.5 (*)     All other components within normal limits    Narrative:     For upper or mid abdominal pain.   URINALYSIS, REFLEX TO URINE CULTURE - Abnormal; Notable for the following components:    Protein, UA 1+ (*)     Urobilinogen, UA 2.0-3.0 (*)     All other components within normal limits    Narrative:     In and Out Cath as needed it patient unable to void  Specimen Source->Urine   B-TYPE NATRIURETIC PEPTIDE - Abnormal; Notable for the following components:     (*)     All other components within normal limits   APTT - Abnormal; Notable for the following components:    aPTT 35.9 (*)     All other components within normal limits   PROTIME-INR - Abnormal; Notable for the following components:    PT 15.4 (*)     All other components within normal limits   URINALYSIS MICROSCOPIC - Abnormal; Notable for the following components:    Hyaline Casts, UA 11 (*)     All other components within normal limits    Narrative:     In and Out Cath as needed it patient unable to void  Specimen Source->Urine    LIPASE    Narrative:     For upper or mid abdominal pain.   B-TYPE NATRIURETIC PEPTIDE   DRUG SCREEN PANEL, URINE EMERGENCY    Narrative:     In and Out Cath as needed it patient unable to void  Specimen Source->Urine   LIPASE   MAGNESIUM   TROPONIN I   CK   SARS-COV-2 RNA AMPLIFICATION, QUAL   TSH   APTT   PROTIME-INR   MAGNESIUM   CK   TROPONIN I   TSH        ECG Results          EKG 12-lead (In process)  Result time 11/21/20 13:22:07    In process by Interface, Lab In [facility] (11/21/20 13:22:07)                 Narrative:    Test Reason : R10.9,    Vent. Rate : 099 BPM     Atrial Rate : 099 BPM     P-R Int : 152 ms          QRS Dur : 092 ms      QT Int : 386 ms       P-R-T Axes : 072 019 071 degrees     QTc Int : 495 ms    Normal sinus rhythm  Possible Left atrial enlargement  Prolonged QT  Abnormal ECG  No previous ECGs available    Referred By: [email or web address]   SELF           Confirmed By:                             Imaging Results          CT Abdomen Pelvis With Contrast (Final result)  Result time 11/21/20 14:35:29    Final result by Julián Zee Jr., MD (11/21/20 14:35:29)                 Narrative:    Examination: CT scan of the abdomen and pelvis with contrast    CLINICAL HISTORY: Mid abdominal pain    COMPARISON: None.    Technical factors: Standard cross-sectional evaluation of the abdomen  and pelvis was performed following the bolus infusion of intravenous  contrast 100 cc provided without enteric contrast administered for the  examination. Both coronal and sagittal reconstructions were also  included along with standard data set provided.    FINDINGS: The lung base images are clear. Liver maintains normal size  and overall CT density probably elongation left hepatic lobe coursing  behind the spleen. Postop changes of previous gallbladder resection  with evidence of mild prominence of the central biliary ducts and the  common duct as expected. The stomach is well distended with moderate  thickening  the antrum prepyloric region duodenal sweep shows evidence  of normal enhancement and caliber. Moderate atherosclerotic throughout  the descending abdominal aorta. Scattered nodes throughout the  retroperitoneum all is maintain normal size parameters. Small bowel  loops appear normal caliber. Normal renal perfusion with the left  kidney mild cortical renal scarring about the lateral cortex. The  mesenteric fat and vasculature are normal. No secondary findings of  appendicitis. No free fluid in the abdominal cavity. There is some  strandy changes about the anterior margin the rectus of uncertain  significance. No significant mass or abscess. The pelvic structures  are noted for normal appearance of the urinary bladder although  decompressed slightly. There is beam Spencer artifact arising from the  left hip prosthesis causing image degradation. Right hip shows  advanced osteophytic changes with narrowing of the articular cartilage  space. Diverticular changes throughout the sigmoid portion colon with  moderate pericolonic scarring. Atrophy of the left gluteus is noted.    IMPRESSION:  1. No evidence of acute intra-abdominal findings.  2. Prominent left cortical renal scarring.  3. Nonspecific thickening of the antrum prepyloric region secondary to  duodenitis/antritis.    Electronically Signed by Julián RASHEED on 11/21/2020 3:47 PM                             X-Ray Chest AP Portable (In process)    Procedure changed from X-Ray Chest 1 View                  Medical Decision Making:   Clinical Tests:   Lab Tests: Reviewed  Radiological Study: Reviewed  Medical Tests: Reviewed  ED Management:  Patient presents with recurrent intermittent epigastric discomfort nausea vomiting and constipation.  CT does not demonstrate evidence of bowel obstruction or colitis.  There findings to suggest gastritis with antral thickening noted.  Patient has had a recent diagnosis of likely gastritis by EGD as well.  Patient has also  endorsed chest pain and shortness of breath.  Symptoms could be GI in nature.  She does have cardiac risk factors however and has not had a previous cardiac evaluation.  By review of old charts that does not appear that she has complained of this previously.  As patient does have cardiac risk factors and is endorsing chest pain new in onset will discuss with hospitalist for evaluation and possible admission.  I have deferred aspirin therapy as patient has been recently diagnosed with duodenal ulcer disease.                             Clinical Impression:       ICD-10-CM ICD-9-CM   1. Chest pain, unspecified type  R07.9 786.50   2. Constipation  K59.00 564.00   3. Abdominal pain  R10.9 789.00                          ED Disposition Condition    Admit                             Alfreda Gallegos MD  11/21/20 5781

## 2020-11-22 ENCOUNTER — HOSPITAL ENCOUNTER (OUTPATIENT)
Dept: RADIOLOGY | Facility: HOSPITAL | Age: 55
Discharge: HOME | DRG: 287 | End: 2020-11-22
Payer: MEDICAID

## 2020-11-22 ENCOUNTER — HOSPITAL ENCOUNTER (OUTPATIENT)
Dept: RADIOLOGY | Facility: HOSPITAL | Age: 55
Discharge: HOME | DRG: 287 | End: 2020-11-22
Attending: INTERNAL MEDICINE
Payer: MEDICAID

## 2020-11-22 ENCOUNTER — CLINICAL SUPPORT (OUTPATIENT)
Dept: CARDIOLOGY | Facility: HOSPITAL | Age: 55
DRG: 287 | End: 2020-11-22
Payer: MEDICAID

## 2020-11-22 VITALS — HEIGHT: 67 IN | WEIGHT: 171.06 LBS | BODY MASS INDEX: 26.85 KG/M2

## 2020-11-22 PROBLEM — J44.9 COPD (CHRONIC OBSTRUCTIVE PULMONARY DISEASE): Chronic | Status: ACTIVE | Noted: 2020-11-22

## 2020-11-22 PROBLEM — E87.6 HYPOKALEMIA: Status: RESOLVED | Noted: 2020-11-21 | Resolved: 2020-11-22

## 2020-11-22 PROBLEM — I10 ESSENTIAL HYPERTENSION: Chronic | Status: ACTIVE | Noted: 2020-11-21

## 2020-11-22 PROBLEM — R94.31 QT PROLONGATION: Status: ACTIVE | Noted: 2020-11-22

## 2020-11-22 PROBLEM — I42.9 CARDIOMYOPATHY: Chronic | Status: ACTIVE | Noted: 2020-11-22

## 2020-11-22 PROBLEM — K20.90 ESOPHAGITIS: Chronic | Status: ACTIVE | Noted: 2020-11-21

## 2020-11-22 PROBLEM — Z72.0 TOBACCO ABUSE: Chronic | Status: ACTIVE | Noted: 2020-11-22

## 2020-11-22 LAB
ANION GAP SERPL CALC-SCNC: 8 MMOL/L (ref 8–16)
BASOPHILS # BLD AUTO: 0.02 K/UL (ref 0–0.2)
BASOPHILS NFR BLD: 0.2 % (ref 0–1.9)
BUN SERPL-MCNC: 14 MG/DL (ref 6–20)
CALCIUM SERPL-MCNC: 8.9 MG/DL (ref 8.7–10.5)
CHLORIDE SERPL-SCNC: 103 MMOL/L (ref 95–110)
CO2 SERPL-SCNC: 27 MMOL/L (ref 23–29)
CREAT SERPL-MCNC: 1.2 MG/DL (ref 0.5–1.4)
CV PHARM DOSE: 0.4 MG
CV STRESS BASE HR: 87 BPM
DIASTOLIC BLOOD PRESSURE: 88 MMHG
DIFFERENTIAL METHOD: ABNORMAL
EOSINOPHIL # BLD AUTO: 0.1 K/UL (ref 0–0.5)
EOSINOPHIL NFR BLD: 0.7 % (ref 0–8)
ERYTHROCYTE [DISTWIDTH] IN BLOOD BY AUTOMATED COUNT: 15.9 % (ref 11.5–14.5)
EST. GFR  (AFRICAN AMERICAN): 59.6 ML/MIN/1.73 M^2
EST. GFR  (NON AFRICAN AMERICAN): 51.7 ML/MIN/1.73 M^2
GLUCOSE SERPL-MCNC: 124 MG/DL (ref 70–110)
GLUCOSE SERPL-MCNC: 133 MG/DL (ref 70–110)
GLUCOSE SERPL-MCNC: 135 MG/DL (ref 70–110)
GLUCOSE SERPL-MCNC: 146 MG/DL (ref 70–110)
GLUCOSE SERPL-MCNC: 226 MG/DL (ref 70–110)
HCT VFR BLD AUTO: 39.4 % (ref 37–48.5)
HGB BLD-MCNC: 12.1 G/DL (ref 12–16)
IMM GRANULOCYTES # BLD AUTO: 0.02 K/UL (ref 0–0.04)
IMM GRANULOCYTES NFR BLD AUTO: 0.2 % (ref 0–0.5)
LYMPHOCYTES # BLD AUTO: 3.7 K/UL (ref 1–4.8)
LYMPHOCYTES NFR BLD: 44.8 % (ref 18–48)
MAGNESIUM SERPL-MCNC: 2.1 MG/DL (ref 1.6–2.6)
MCH RBC QN AUTO: 26.2 PG (ref 27–31)
MCHC RBC AUTO-ENTMCNC: 30.7 G/DL (ref 32–36)
MCV RBC AUTO: 85 FL (ref 82–98)
MONOCYTES # BLD AUTO: 0.8 K/UL (ref 0.3–1)
MONOCYTES NFR BLD: 9.1 % (ref 4–15)
NEUTROPHILS # BLD AUTO: 3.7 K/UL (ref 1.8–7.7)
NEUTROPHILS NFR BLD: 45 % (ref 38–73)
NRBC BLD-RTO: 0 /100 WBC
OHS CV CPX 85 PERCENT MAX PREDICTED HEART RATE MALE: 135
OHS CV CPX MAX PREDICTED HEART RATE: 159
OHS CV CPX PATIENT IS FEMALE: 1
OHS CV CPX PATIENT IS MALE: 0
OHS CV CPX PEAK DIASTOLIC BLOOD PRESSURE: 70 MMHG
OHS CV CPX PEAK HEAR RATE: 116 BPM
OHS CV CPX PEAK RATE PRESSURE PRODUCT: 7540
OHS CV CPX PEAK SYSTOLIC BLOOD PRESSURE: 65 MMHG
OHS CV CPX PERCENT MAX PREDICTED HEART RATE ACHIEVED: 73
OHS CV CPX RATE PRESSURE PRODUCT PRESENTING: NORMAL
OHS CV PHARM TIME: 947 MIN
PLATELET # BLD AUTO: 441 K/UL (ref 150–350)
PMV BLD AUTO: 9.5 FL (ref 9.2–12.9)
POTASSIUM SERPL-SCNC: 3.7 MMOL/L (ref 3.5–5.1)
RBC # BLD AUTO: 4.62 M/UL (ref 4–5.4)
SODIUM SERPL-SCNC: 138 MMOL/L (ref 136–145)
SYSTOLIC BLOOD PRESSURE: 181 MMHG
TROPONIN I SERPL DL<=0.01 NG/ML-MCNC: 0.03 NG/ML
WBC # BLD AUTO: 8.2 K/UL (ref 3.9–12.7)

## 2020-11-22 PROCEDURE — 93016 NUCLEAR STRESS TEST (CUPID ONLY): ICD-10-PCS | Mod: ,,, | Performed by: INTERNAL MEDICINE

## 2020-11-22 PROCEDURE — 94761 N-INVAS EAR/PLS OXIMETRY MLT: CPT

## 2020-11-22 PROCEDURE — C9113 INJ PANTOPRAZOLE SODIUM, VIA: HCPCS | Performed by: INTERNAL MEDICINE

## 2020-11-22 PROCEDURE — 93017 CV STRESS TEST TRACING ONLY: CPT

## 2020-11-22 PROCEDURE — 99900035 HC TECH TIME PER 15 MIN (STAT)

## 2020-11-22 PROCEDURE — 93018 NUCLEAR STRESS TEST (CUPID ONLY): ICD-10-PCS | Mod: ,,, | Performed by: INTERNAL MEDICINE

## 2020-11-22 PROCEDURE — S4991 NICOTINE PATCH NONLEGEND: HCPCS | Performed by: INTERNAL MEDICINE

## 2020-11-22 PROCEDURE — 36415 COLL VENOUS BLD VENIPUNCTURE: CPT

## 2020-11-22 PROCEDURE — 99233 SBSQ HOSP IP/OBS HIGH 50: CPT | Mod: 25,,, | Performed by: INTERNAL MEDICINE

## 2020-11-22 PROCEDURE — 80048 BASIC METABOLIC PNL TOTAL CA: CPT

## 2020-11-22 PROCEDURE — G0378 HOSPITAL OBSERVATION PER HR: HCPCS

## 2020-11-22 PROCEDURE — 94640 AIRWAY INHALATION TREATMENT: CPT

## 2020-11-22 PROCEDURE — A9502 TC99M TETROFOSMIN: HCPCS

## 2020-11-22 PROCEDURE — 85025 COMPLETE CBC W/AUTO DIFF WBC: CPT

## 2020-11-22 PROCEDURE — 25000242 PHARM REV CODE 250 ALT 637 W/ HCPCS: Performed by: INTERNAL MEDICINE

## 2020-11-22 PROCEDURE — 63600175 PHARM REV CODE 636 W HCPCS: Performed by: NURSE PRACTITIONER

## 2020-11-22 PROCEDURE — 63600175 PHARM REV CODE 636 W HCPCS: Performed by: INTERNAL MEDICINE

## 2020-11-22 PROCEDURE — 25000003 PHARM REV CODE 250: Performed by: NURSE PRACTITIONER

## 2020-11-22 PROCEDURE — 21400001 HC TELEMETRY ROOM

## 2020-11-22 PROCEDURE — 25000003 PHARM REV CODE 250: Performed by: INTERNAL MEDICINE

## 2020-11-22 PROCEDURE — 99233 PR SUBSEQUENT HOSPITAL CARE,LEVL III: ICD-10-PCS | Mod: 25,,, | Performed by: INTERNAL MEDICINE

## 2020-11-22 PROCEDURE — 93016 CV STRESS TEST SUPVJ ONLY: CPT | Mod: ,,, | Performed by: INTERNAL MEDICINE

## 2020-11-22 PROCEDURE — 93018 CV STRESS TEST I&R ONLY: CPT | Mod: ,,, | Performed by: INTERNAL MEDICINE

## 2020-11-22 PROCEDURE — 82962 GLUCOSE BLOOD TEST: CPT

## 2020-11-22 PROCEDURE — 83735 ASSAY OF MAGNESIUM: CPT

## 2020-11-22 RX ORDER — NAPROXEN SODIUM 220 MG/1
81 TABLET, FILM COATED ORAL DAILY
Status: DISCONTINUED | OUTPATIENT
Start: 2020-11-23 | End: 2020-11-24 | Stop reason: HOSPADM

## 2020-11-22 RX ORDER — DIPHENHYDRAMINE HCL 25 MG
50 CAPSULE ORAL
Status: DISCONTINUED | OUTPATIENT
Start: 2020-11-23 | End: 2020-11-23 | Stop reason: HOSPADM

## 2020-11-22 RX ORDER — SODIUM CHLORIDE 9 MG/ML
INJECTION, SOLUTION INTRAVENOUS ONCE
Status: DISCONTINUED | OUTPATIENT
Start: 2020-11-22 | End: 2020-11-22

## 2020-11-22 RX ORDER — REGADENOSON 0.08 MG/ML
0.4 INJECTION, SOLUTION INTRAVENOUS
Status: DISCONTINUED | OUTPATIENT
Start: 2020-11-22 | End: 2020-11-22

## 2020-11-22 RX ORDER — REGADENOSON 0.08 MG/ML
0.4 INJECTION, SOLUTION INTRAVENOUS
Status: COMPLETED | OUTPATIENT
Start: 2020-11-22 | End: 2020-11-22

## 2020-11-22 RX ORDER — DIPHENHYDRAMINE HCL 25 MG
50 CAPSULE ORAL
Status: DISCONTINUED | OUTPATIENT
Start: 2020-11-22 | End: 2020-11-22

## 2020-11-22 RX ORDER — SODIUM CHLORIDE 9 MG/ML
INJECTION, SOLUTION INTRAVENOUS ONCE
Status: COMPLETED | OUTPATIENT
Start: 2020-11-23 | End: 2020-11-23

## 2020-11-22 RX ORDER — IBUPROFEN 200 MG
1 TABLET ORAL
Status: DISCONTINUED | OUTPATIENT
Start: 2020-11-22 | End: 2020-11-24 | Stop reason: HOSPADM

## 2020-11-22 RX ORDER — SUCRALFATE 1 G/1
1 TABLET ORAL
Status: DISCONTINUED | OUTPATIENT
Start: 2020-11-22 | End: 2020-11-24 | Stop reason: HOSPADM

## 2020-11-22 RX ORDER — PANTOPRAZOLE SODIUM 40 MG/10ML
40 INJECTION, POWDER, LYOPHILIZED, FOR SOLUTION INTRAVENOUS 2 TIMES DAILY
Status: DISCONTINUED | OUTPATIENT
Start: 2020-11-22 | End: 2020-11-24 | Stop reason: HOSPADM

## 2020-11-22 RX ORDER — MAGNESIUM SULFATE 1 G/100ML
1 INJECTION INTRAVENOUS ONCE
Status: COMPLETED | OUTPATIENT
Start: 2020-11-22 | End: 2020-11-22

## 2020-11-22 RX ORDER — FLUTICASONE FUROATE AND VILANTEROL 100; 25 UG/1; UG/1
1 POWDER RESPIRATORY (INHALATION) DAILY
Status: DISCONTINUED | OUTPATIENT
Start: 2020-11-22 | End: 2020-11-22

## 2020-11-22 RX ORDER — SODIUM CHLORIDE 450 MG/100ML
INJECTION, SOLUTION INTRAVENOUS CONTINUOUS
Status: DISCONTINUED | OUTPATIENT
Start: 2020-11-23 | End: 2020-11-22

## 2020-11-22 RX ORDER — SODIUM CHLORIDE 450 MG/100ML
INJECTION, SOLUTION INTRAVENOUS CONTINUOUS
Status: DISCONTINUED | OUTPATIENT
Start: 2020-11-22 | End: 2020-11-23

## 2020-11-22 RX ADMIN — MELATONIN 6 MG: at 08:11

## 2020-11-22 RX ADMIN — REGADENOSON 0.4 MG: 0.08 INJECTION, SOLUTION INTRAVENOUS at 09:11

## 2020-11-22 RX ADMIN — METOPROLOL SUCCINATE 50 MG: 50 TABLET, FILM COATED, EXTENDED RELEASE ORAL at 10:11

## 2020-11-22 RX ADMIN — SUCRALFATE 1 G: 1 TABLET ORAL at 10:11

## 2020-11-22 RX ADMIN — PREGABALIN 75 MG: 75 CAPSULE ORAL at 10:11

## 2020-11-22 RX ADMIN — ATORVASTATIN CALCIUM 40 MG: 40 TABLET, FILM COATED ORAL at 08:11

## 2020-11-22 RX ADMIN — PREGABALIN 75 MG: 75 CAPSULE ORAL at 08:11

## 2020-11-22 RX ADMIN — OXYCODONE AND ACETAMINOPHEN 1 TABLET: 5; 325 TABLET ORAL at 08:11

## 2020-11-22 RX ADMIN — ONDANSETRON 4 MG: 2 INJECTION INTRAMUSCULAR; INTRAVENOUS at 10:11

## 2020-11-22 RX ADMIN — LACTULOSE 20 G: 20 SOLUTION ORAL at 02:11

## 2020-11-22 RX ADMIN — PANTOPRAZOLE SODIUM 40 MG: 40 INJECTION, POWDER, FOR SOLUTION INTRAVENOUS at 09:11

## 2020-11-22 RX ADMIN — MAGNESIUM SULFATE 1 G: 1 INJECTION INTRAVENOUS at 06:11

## 2020-11-22 RX ADMIN — SODIUM CHLORIDE: 0.45 INJECTION, SOLUTION INTRAVENOUS at 08:11

## 2020-11-22 RX ADMIN — POTASSIUM CHLORIDE 20 MEQ: 20 TABLET, EXTENDED RELEASE ORAL at 06:11

## 2020-11-22 RX ADMIN — NITROGLYCERIN 0.5 INCH: 20 OINTMENT TOPICAL at 06:11

## 2020-11-22 RX ADMIN — ZOLPIDEM TARTRATE 10 MG: 5 TABLET, COATED ORAL at 08:11

## 2020-11-22 RX ADMIN — SUCRALFATE 1 G: 1 TABLET ORAL at 06:11

## 2020-11-22 RX ADMIN — SUCRALFATE 1 G: 1 TABLET ORAL at 08:11

## 2020-11-22 RX ADMIN — PANTOPRAZOLE SODIUM 40 MG: 40 TABLET, DELAYED RELEASE ORAL at 06:11

## 2020-11-22 RX ADMIN — FLUTICASONE FUROATE AND VILANTEROL TRIFENATATE 1 PUFF: 200; 25 POWDER RESPIRATORY (INHALATION) at 07:11

## 2020-11-22 RX ADMIN — NICOTINE 1 PATCH: 14 PATCH, EXTENDED RELEASE TRANSDERMAL at 06:11

## 2020-11-22 NOTE — SUBJECTIVE & OBJECTIVE
Past Medical History:   Diagnosis Date    Coronary artery disease     Diabetes mellitus     High cholesterol     Hypertension        No past surgical history on file.    Review of patient's allergies indicates:   Allergen Reactions    Morphine Nausea And Vomiting    Sulfa (sulfonamide antibiotics) Nausea And Vomiting       No current facility-administered medications on file prior to encounter.      Current Outpatient Medications on File Prior to Encounter   Medication Sig    melatonin (MELATIN) 3 mg tablet Take 3 mg by mouth nightly.     mometasone-formoterol (DULERA) 200-5 mcg/actuation inhaler Inhale 2 puffs into the lungs 2 (two) times daily.     ondansetron (ZOFRAN) 8 MG tablet Take 1 tablet (8 mg total) by mouth every 12 (twelve) hours as needed for Nausea.    oxyCODONE-acetaminophen (PERCOCET) 5-325 mg per tablet Take 1 tablet by mouth every 4 (four) hours as needed for Pain.    pantoprazole (PROTONIX) 40 MG tablet Take 1 tablet (40 mg total) by mouth once daily.    pregabalin (LYRICA) 75 MG capsule Take 75 mg by mouth 2 (two) times daily.     promethazine (PHENERGAN) 25 MG tablet TK 1 T PO QD PRN    senna-docusate 8.6-50 mg (PERICOLACE) 8.6-50 mg per tablet Take 1 tablet by mouth.    zolpidem (AMBIEN) 10 mg Tab Take 10 mg by mouth every evening.     atorvastatin (LIPITOR) 40 MG tablet Take 40 mg by mouth once daily.     fluticasone furoate-vilanteroL (BREO) 100-25 mcg/dose diskus inhaler Inhale 1 puff into the lungs.    glimepiride (AMARYL) 4 MG tablet Take 4 mg by mouth daily with breakfast.     insulin glargine (LANTUS) 100 unit/mL injection Inject into the skin once daily.     insulin lispro 100 unit/mL injection Inject into the skin.    linaGLIPtin (TRADJENTA) 5 mg Tab tablet Take 5 mg by mouth once daily.     metFORMIN (GLUCOPHAGE) 1000 MG tablet Take 1,000 mg by mouth 2 (two) times daily with meals.     metoprolol succinate (TOPROL-XL) 50 MG 24 hr tablet Take 50 mg by mouth once  daily.     oxyCODONE-acetaminophen (PERCOCET) 7.5-325 mg per tablet Take 1 tablet by mouth every 4 (four) hours as needed.     Family History     None        Tobacco Use    Smoking status: Not on file   Substance and Sexual Activity    Alcohol use: Not on file    Drug use: Not on file    Sexual activity: Not on file     Review of Systems   Constitutional: Negative for activity change, appetite change, chills, diaphoresis, fatigue, fever and unexpected weight change.   HENT: Negative for congestion, ear pain, facial swelling, hearing loss, sore throat and trouble swallowing.    Eyes: Negative for pain and discharge.   Respiratory: Negative for cough, chest tightness, shortness of breath and wheezing.    Cardiovascular: Positive for chest pain. Negative for palpitations and leg swelling.   Gastrointestinal: Positive for abdominal pain, nausea and vomiting. Negative for blood in stool and diarrhea.   Endocrine: Negative for polydipsia, polyphagia and polyuria.   Genitourinary: Negative for difficulty urinating, dysuria, flank pain, frequency and urgency.   Musculoskeletal: Negative for arthralgias, back pain, joint swelling, neck pain and neck stiffness.   Skin: Negative for rash and wound.   Allergic/Immunologic: Negative for environmental allergies and immunocompromised state.   Neurological: Negative for dizziness, seizures, syncope, speech difficulty, weakness, light-headedness, numbness and headaches.   Hematological: Negative for adenopathy.   Psychiatric/Behavioral: Negative for sleep disturbance and suicidal ideas. The patient is not nervous/anxious.    All other systems reviewed and are negative.    Objective:     Vital Signs (Most Recent):  Temp: 97.8 °F (36.6 °C) (11/21/20 1154)  Pulse: 101 (11/21/20 1845)  Resp: 20 (11/21/20 1154)  BP: (!) 162/82 (11/21/20 1741)  SpO2: 96 % (11/21/20 1845) Vital Signs (24h Range):  Temp:  [97.8 °F (36.6 °C)] 97.8 °F (36.6 °C)  Pulse:  [] 101  Resp:  [20]  20  SpO2:  [96 %-99 %] 96 %  BP: (128-162)/(82-84) 162/82     Weight: 80.3 kg (177 lb)  Body mass index is 27.72 kg/m².    Physical Exam  Vitals signs and nursing note reviewed.   Constitutional:       Comments: Appears older than stated age   HENT:      Head: Normocephalic and atraumatic.      Nose: Nose normal.      Mouth/Throat:      Mouth: Mucous membranes are moist.      Pharynx: Oropharynx is clear.   Eyes:      Extraocular Movements: Extraocular movements intact.      Pupils: Pupils are equal, round, and reactive to light.   Neck:      Musculoskeletal: Normal range of motion and neck supple.   Cardiovascular:      Rate and Rhythm: Normal rate and regular rhythm.      Pulses: Normal pulses.      Heart sounds: Normal heart sounds.   Pulmonary:      Effort: Pulmonary effort is normal.      Breath sounds: Normal breath sounds.   Abdominal:      General: Bowel sounds are normal.      Palpations: Abdomen is soft.   Musculoskeletal: Normal range of motion.   Skin:     General: Skin is warm and dry.      Capillary Refill: Capillary refill takes less than 2 seconds.   Neurological:      General: No focal deficit present.      Mental Status: She is alert and oriented to person, place, and time.   Psychiatric:         Mood and Affect: Mood normal.         Behavior: Behavior normal.           CRANIAL NERVES     CN III, IV, VI   Pupils are equal, round, and reactive to light.       Significant Labs:   CBC:   Recent Labs   Lab 11/21/20  1250   WBC 11.15   HGB 14.0   HCT 43.5   *     CMP:   Recent Labs   Lab 11/21/20  1250      K 3.3*      CO2 28   *   BUN 14   CREATININE 1.1   CALCIUM 9.0   PROT 6.4   ALBUMIN 3.8   BILITOT 0.7   ALKPHOS 103   AST 14   ALT 11   ANIONGAP 10   EGFRNONAA 57.5*     Troponin:   Recent Labs   Lab 11/21/20  1250   TROPONINI <0.030       Significant Imaging: EKG: I have reviewed all pertinent results/findings within the past 24 hours and my personal findings are: NSR, QTc  495 no acute ischemic changes      Ct Abdomen Pelvis With Contrast    Result Date: 11/21/2020  Examination: CT scan of the abdomen and pelvis with contrast CLINICAL HISTORY: Mid abdominal pain COMPARISON: None. Technical factors: Standard cross-sectional evaluation of the abdomen and pelvis was performed following the bolus infusion of intravenous contrast 100 cc provided without enteric contrast administered for the examination. Both coronal and sagittal reconstructions were also included along with standard data set provided. FINDINGS: The lung base images are clear. Liver maintains normal size and overall CT density probably elongation left hepatic lobe coursing behind the spleen. Postop changes of previous gallbladder resection with evidence of mild prominence of the central biliary ducts and the common duct as expected. The stomach is well distended with moderate thickening the antrum prepyloric region duodenal sweep shows evidence of normal enhancement and caliber. Moderate atherosclerotic throughout the descending abdominal aorta. Scattered nodes throughout the retroperitoneum all is maintain normal size parameters. Small bowel loops appear normal caliber. Normal renal perfusion with the left kidney mild cortical renal scarring about the lateral cortex. The mesenteric fat and vasculature are normal. No secondary findings of appendicitis. No free fluid in the abdominal cavity. There is some strandy changes about the anterior margin the rectus of uncertain significance. No significant mass or abscess. The pelvic structures are noted for normal appearance of the urinary bladder although decompressed slightly. There is beam Spencer artifact arising from the left hip prosthesis causing image degradation. Right hip shows advanced osteophytic changes with narrowing of the articular cartilage space. Diverticular changes throughout the sigmoid portion colon with moderate pericolonic scarring. Atrophy of the left  gluteus is noted. IMPRESSION: 1. No evidence of acute intra-abdominal findings. 2. Prominent left cortical renal scarring. 3. Nonspecific thickening of the antrum prepyloric region secondary to duodenitis/antritis. Electronically Signed by Julián RASHEED on 11/21/2020 3:47 PM    X-ray Chest Ap Portable    Result Date: 11/21/2020  CHEST X-RAY SINGLE VIEW HISTORY: Constipation FINDINGS:   A single view of the chest was performed without the benefit of previous comparison. The heart size and pulmonary vascularity are within the range of normal. There is no significant hilar nor mediastinal process. The aerated lungs are well expanded and clear. The right and left CP angles are rather sharp. The osseous structures show nothing unusual. There is moderate dextroconvex alignment of the thoracic spine. IMPRESSION:   Negative chest. Electronically Signed by Julián RASHEED on 11/21/2020 7:28 PM

## 2020-11-22 NOTE — ASSESSMENT & PLAN NOTE
Holding oral hypoglycemics.  Insulin sliding scale with Accu-Cheks and diabetic cardiac diet  As needed hypoglycemic measures

## 2020-11-22 NOTE — H&P
[facility]  History & Physical  DOS:11/21/2020  5:59 PM      Patient Name: Delores Fox  MRN: [id number]  Admission Date: 11/21/2020  Attending Physician: Dr. Jackson  Primary Care Provider: Carey Liu MD         Patient information was obtained from patient and ER records.     Subjective:     Principal Problem:Chest pain    Chief Complaint:   Chief Complaint   Patient presents with    Abdominal Pain     NVx 2 days    Constipation     x 1week        HPI: Ms. Fox is a 55 year old female with a history of HTN, HLD, chronic hip pain with chronic opioid use, nicotine dependence, DM, and esophagitis who presents today with complaints of epigastric pain. It is moderate. It is associated with N/V and constipation. She denies fever, chills, cough, dizziness, sweats, SOB, or LOC. She describes the pain in her upper abd and radiates to her chest. It is worse with eating and laying flat. It is associated with bilious, nonbloody vomit. She had an EGD for this on Thursday with Dr. Valencia and states she is awaiting the results, but was told she had ulcers. She's had multiple ED visits for this pain, but this is the first time she's complained that it is radiating into her chest. She has never had a stress test.      Past Medical History:   Diagnosis Date    Coronary artery disease     Diabetes mellitus     High cholesterol     Hypertension        No past surgical history on file.    Review of patient's allergies indicates:   Allergen Reactions    Morphine Nausea And Vomiting    Sulfa (sulfonamide antibiotics) Nausea And Vomiting       No current facility-administered medications on file prior to encounter.      Current Outpatient Medications on File Prior to Encounter   Medication Sig    melatonin (MELATIN) 3 mg tablet Take 3 mg by mouth nightly.     mometasone-formoterol (DULERA) 200-5 mcg/actuation inhaler Inhale 2 puffs into the lungs 2 (two) times daily.     ondansetron  (ZOFRAN) 8 MG tablet Take 1 tablet (8 mg total) by mouth every 12 (twelve) hours as needed for Nausea.    oxyCODONE-acetaminophen (PERCOCET) 5-325 mg per tablet Take 1 tablet by mouth every 4 (four) hours as needed for Pain.    pantoprazole (PROTONIX) 40 MG tablet Take 1 tablet (40 mg total) by mouth once daily.    pregabalin (LYRICA) 75 MG capsule Take 75 mg by mouth 2 (two) times daily.     promethazine (PHENERGAN) 25 MG tablet TK 1 T PO QD PRN    senna-docusate 8.6-50 mg (PERICOLACE) 8.6-50 mg per tablet Take 1 tablet by mouth.    zolpidem (AMBIEN) 10 mg Tab Take 10 mg by mouth every evening.     atorvastatin (LIPITOR) 40 MG tablet Take 40 mg by mouth once daily.     fluticasone furoate-vilanteroL (BREO) 100-25 mcg/dose diskus inhaler Inhale 1 puff into the lungs.    glimepiride (AMARYL) 4 MG tablet Take 4 mg by mouth daily with breakfast.     insulin glargine (LANTUS) 100 unit/mL injection Inject into the skin once daily.     insulin lispro 100 unit/mL injection Inject into the skin.    linaGLIPtin (TRADJENTA) 5 mg Tab tablet Take 5 mg by mouth once daily.     metFORMIN (GLUCOPHAGE) 1000 MG tablet Take 1,000 mg by mouth 2 (two) times daily with meals.     metoprolol succinate (TOPROL-XL) 50 MG 24 hr tablet Take 50 mg by mouth once daily.     oxyCODONE-acetaminophen (PERCOCET) 7.5-325 mg per tablet Take 1 tablet by mouth every 4 (four) hours as needed.     Family History     None        Tobacco Use    Smoking status: Not on file   Substance and Sexual Activity    Alcohol use: Not on file    Drug use: Not on file    Sexual activity: Not on file     Review of Systems   Constitutional: Negative for activity change, appetite change, chills, diaphoresis, fatigue, fever and unexpected weight change.   HENT: Negative for congestion, ear pain, facial swelling, hearing loss, sore throat and trouble swallowing.    Eyes: Negative for pain and discharge.   Respiratory: Negative for cough, chest tightness,  shortness of breath and wheezing.    Cardiovascular: Positive for chest pain. Negative for palpitations and leg swelling.   Gastrointestinal: Positive for abdominal pain, nausea and vomiting. Negative for blood in stool and diarrhea.   Endocrine: Negative for polydipsia, polyphagia and polyuria.   Genitourinary: Negative for difficulty urinating, dysuria, flank pain, frequency and urgency.   Musculoskeletal: Negative for arthralgias, back pain, joint swelling, neck pain and neck stiffness.   Skin: Negative for rash and wound.   Allergic/Immunologic: Negative for environmental allergies and immunocompromised state.   Neurological: Negative for dizziness, seizures, syncope, speech difficulty, weakness, light-headedness, numbness and headaches.   Hematological: Negative for adenopathy.   Psychiatric/Behavioral: Negative for sleep disturbance and suicidal ideas. The patient is not nervous/anxious.    All other systems reviewed and are negative.    Objective:     Vital Signs (Most Recent):  Temp: 97.8 °F (36.6 °C) (11/21/20 1154)  Pulse: 101 (11/21/20 1845)  Resp: 20 (11/21/20 1154)  BP: (!) 162/82 (11/21/20 1741)  SpO2: 96 % (11/21/20 1845) Vital Signs (24h Range):  Temp:  [97.8 °F (36.6 °C)] 97.8 °F (36.6 °C)  Pulse:  [] 101  Resp:  [20] 20  SpO2:  [96 %-99 %] 96 %  BP: (128-162)/(82-84) 162/82     Weight: 80.3 kg (177 lb)  Body mass index is 27.72 kg/m².    Physical Exam  Vitals signs and nursing note reviewed.   Constitutional:       Comments: Appears older than stated age   HENT:      Head: Normocephalic and atraumatic.      Nose: Nose normal.      Mouth/Throat:      Mouth: Mucous membranes are moist.      Pharynx: Oropharynx is clear.   Eyes:      Extraocular Movements: Extraocular movements intact.      Pupils: Pupils are equal, round, and reactive to light.   Neck:      Musculoskeletal: Normal range of motion and neck supple.   Cardiovascular:      Rate and Rhythm: Normal rate and regular rhythm.       Pulses: Normal pulses.      Heart sounds: Normal heart sounds.   Pulmonary:      Effort: Pulmonary effort is normal.      Breath sounds: Normal breath sounds.   Abdominal:      General: Bowel sounds are normal.      Palpations: Abdomen is soft.   Musculoskeletal: Normal range of motion.   Skin:     General: Skin is warm and dry.      Capillary Refill: Capillary refill takes less than 2 seconds.   Neurological:      General: No focal deficit present.      Mental Status: She is alert and oriented to person, place, and time.   Psychiatric:         Mood and Affect: Mood normal.         Behavior: Behavior normal.           CRANIAL NERVES     CN III, IV, VI   Pupils are equal, round, and reactive to light.       Significant Labs:   CBC:   Recent Labs   Lab 11/21/20  1250   WBC 11.15   HGB 14.0   HCT 43.5   *     CMP:   Recent Labs   Lab 11/21/20  1250      K 3.3*      CO2 28   *   BUN 14   CREATININE 1.1   CALCIUM 9.0   PROT 6.4   ALBUMIN 3.8   BILITOT 0.7   ALKPHOS 103   AST 14   ALT 11   ANIONGAP 10   EGFRNONAA 57.5*     Troponin:   Recent Labs   Lab 11/21/20  1250   TROPONINI <0.030       Significant Imaging: EKG: I have reviewed all pertinent results/findings within the past 24 hours and my personal findings are: NSR, QTc 495 no acute ischemic changes      Ct Abdomen Pelvis With Contrast    Result Date: 11/21/2020  Examination: CT scan of the abdomen and pelvis with contrast CLINICAL HISTORY: Mid abdominal pain COMPARISON: None. Technical factors: Standard cross-sectional evaluation of the abdomen and pelvis was performed following the bolus infusion of intravenous contrast 100 cc provided without enteric contrast administered for the examination. Both coronal and sagittal reconstructions were also included along with standard data set provided. FINDINGS: The lung base images are clear. Liver maintains normal size and overall CT density probably elongation left hepatic lobe coursing behind  the spleen. Postop changes of previous gallbladder resection with evidence of mild prominence of the central biliary ducts and the common duct as expected. The stomach is well distended with moderate thickening the antrum prepyloric region duodenal sweep shows evidence of normal enhancement and caliber. Moderate atherosclerotic throughout the descending abdominal aorta. Scattered nodes throughout the retroperitoneum all is maintain normal size parameters. Small bowel loops appear normal caliber. Normal renal perfusion with the left kidney mild cortical renal scarring about the lateral cortex. The mesenteric fat and vasculature are normal. No secondary findings of appendicitis. No free fluid in the abdominal cavity. There is some strandy changes about the anterior margin the rectus of uncertain significance. No significant mass or abscess. The pelvic structures are noted for normal appearance of the urinary bladder although decompressed slightly. There is beam Spencer artifact arising from the left hip prosthesis causing image degradation. Right hip shows advanced osteophytic changes with narrowing of the articular cartilage space. Diverticular changes throughout the sigmoid portion colon with moderate pericolonic scarring. Atrophy of the left gluteus is noted. IMPRESSION: 1. No evidence of acute intra-abdominal findings. 2. Prominent left cortical renal scarring. 3. Nonspecific thickening of the antrum prepyloric region secondary to duodenitis/antritis. Electronically Signed by Julián RASHEED on 11/21/2020 3:47 PM    X-ray Chest Ap Portable    Result Date: 11/21/2020  CHEST X-RAY SINGLE VIEW HISTORY: Constipation FINDINGS:   A single view of the chest was performed without the benefit of previous comparison. The heart size and pulmonary vascularity are within the range of normal. There is no significant hilar nor mediastinal process. The aerated lungs are well expanded and clear. The right and left CP angles  are rather sharp. The osseous structures show nothing unusual. There is moderate dextroconvex alignment of the thoracic spine. IMPRESSION:   Negative chest. Electronically Signed by Julián RASHEED on 11/21/2020 7:28 PM      Assessment/Plan:     Active Hospital Problems    Diagnosis    *Chest pain    Essential hypertension    Esophagitis    Hypokalemia    HLD (hyperlipidemia)    Type 2 diabetes mellitus, with long-term current use of insulin     PLAN:  Admit to cardiology B   Trend troponins - first is negative  Asa held given recent GI ulcer dx  Pt is currently chest pain free  Continue home meds  NTG SL PRN  Repleted potassium in ED, repeat in AM, further electrolyte replacement per protocol  accuchecks ACHS with low dose ISS    VTE Risk Mitigation (From admission, onward)         Ordered     IP VTE LOW RISK PATIENT  Once      11/21/20 1934     Place sequential compression device  Until discontinued      11/21/20 1934                   Brittany Spangler NP  Department of Hospital Medicine   [facility]

## 2020-11-22 NOTE — ASSESSMENT & PLAN NOTE
On admission .  Now known with EF of 19%.  No arrhythmias on telemetry.  Will give 1 g magnesium.  Avoid multiple QTC prolonging medications.  Repeat EKG in a.m.

## 2020-11-22 NOTE — ASSESSMENT & PLAN NOTE
Stress test with EF of 19% with marked global hypokinesia with some element of reversibility  Denies any prior history of same.  Denies any recreational drug use  No significant arrhythmias noted on telemetry but increased risk with cardiomyopathy  Continue metoprolol  Complete echocardiogram to further evaluate  Keep potassium greater than 4, magnesium greater than 2  Consulting Cardiology  May need case management input, Life Vest depending on cardiac evaluation

## 2020-11-22 NOTE — PLAN OF CARE
11/22/20 0735   Patient Assessment/Suction   Level of Consciousness (AVPU) alert   All Lung Fields Breath Sounds clear;coarse   PRE-TX-O2   O2 Device (Oxygen Therapy) room air   SpO2 97 %   Pulse Oximetry Type Intermittent   $ Pulse Oximetry - Multiple Charge Pulse Oximetry - Multiple   Pulse 94   Resp 15   Inhaler   $ Inhaler Charges MDI (Metered Dose Inahler) Treatment   Daily Review of Necessity (Inhaler) completed   Respiratory Treatment Status (Inhaler) given   Treatment Route (Inhaler) mouthpiece   Patient Position (Inhaler) semi-Bautista's   Post Treatment Assessment (Inhaler) breath sounds unchanged   Signs of Intolerance (Inhaler) none   Respiratory Evaluation   $ Care Plan Tech Time 15 min

## 2020-11-22 NOTE — ASSESSMENT & PLAN NOTE
Presenting with epigastric abdominal pain radiating into chest, characteristics home all suggestive of GI etiology.  However has now abnormal stress test.  Does have significant cardiovascular risk factors including smoking, hypertension, diabetes.  On 11/22 nuclear stress test with marked global hypokinesia with EF of 19% with small matched defect in the mid inferior consistent with prior infarct, small defect in mid anterior septal wall concerning for reversible ischemia.  Patient reports chest pain has resolved  Continue topical nitro  Repeat troponin and 12 lead EKG in a.m.  Consulting Cardiology  Hold NPO midnight in the setting potential further testing

## 2020-11-22 NOTE — HOSPITAL COURSE
Patient admitted with epigastric abdominal pain radiating into her chest associated with nausea, vomiting and constipation.  States she had EGD performed last Thursday, official report pending, was told ulcer and biopsies were sent, states she was taking pantoprazole orally once daily.  Denies any known history of cardiac disease, no prior cardiac testing.  Current smoker half per day.  Given report of abdominal pain radiating into her chest with cardiovascular risk factors, she was admitted for ACS evaluation.  Blood pressure was suboptimal, troponin did increase to 0.032, , CTA abdomen and pelvis with thickening of the antrum with duodenitis/antritis, changed to IV b.i.d. PPI with addition of Carafate.  Stress test performed on 11/22 marked global hypokinesia with EF of 19% with small matched defect in the mid inferior consistent with prior infarct, small defect in mid anterior septal wall concerning for reversible ischemia.  She reports symptoms of improved, had several bowel movements, eager for discharge.  Discussed stress test findings and need for further evaluation.  Cardiology was consulted.  On 11/23 patient underwent right and left heart catheterization, no interventions performed, report as below.  Complete echocardiogram with EF of 30% with grade 1 diastolic dysfunction with mild to moderate mitral regurgitation with global LV hypokinesia.  Cardiology recommended medication optimization including beta-blocker and addition Arb which was done.  Also recommended Life Vest which was arranged by case management prior to discharge.  Patient was counseled on healthy diet and need for smoking cessation.  Increased PPI therapy to twice a day and encourage outpatient follow-up.  Discharge plan including medication changes, follow-up as well as return precautions were reviewed with the patient.  No objection for discharge and eager for discharge.  All questions were addressed.    Discharge  examination  Lying in bed no apparent distress, alert and oriented, regular heart rhythm, not on supplemental oxygen, abdomen soft and nontender, right groin angio access site soft    Assessment:  1. RCA  with left to right collaterals  2. LVEDP 12 mmHg  3. LVEF 25%   4. PA pressure 31/16 mmHg with mean pressure of 22 mmHg  5. PCWP 13 mmHg  6. Cardiac output of 4.4 L/min by thermodilution      Recommendations:  1. Optimize medical management of CAD  2. Aggressive risk factor and lifestyle modifications  3. Routine post cath care and monitoring  4. Outpatient follow-up with Cardiology

## 2020-11-22 NOTE — PROGRESS NOTES
[facility] Medicine  Progress Note    Patient Name: Delores Fox  MRN: [id number]  Patient Class: OP- Observation   Admission Date: 11/21/2020  Length of Stay: 0 days  Attending Physician: Kassandra Mota MD  Primary Care Provider: Carey Liu MD        Subjective:     Principal Problem:Chest pain        HPI:  Ms. Fox is a 55 year old female with a history of HTN, HLD, chronic hip pain with chronic opioid use, nicotine dependence, DM, and esophagitis who presents today with complaints of epigastric pain. It is moderate. It is associated with N/V and constipation. She denies fever, chills, cough, dizziness, sweats, SOB, or LOC. She describes the pain in her upper abd and radiates to her chest. It is worse with eating and laying flat. It is associated with bilious, nonbloody vomit. She had an EGD for this on Thursday with Dr. Valencia and states she is awaiting the results, but was told she had ulcers. She's had multiple ED visits for this pain, but this is the first time she's complained that it is radiating into her chest. She has never had a stress test.      Overview/Hospital Course:  Patient admitted with epigastric abdominal pain radiating into her chest associated with nausea, vomiting and constipation.  States she had EGD performed last Thursday, official report pending, was told ulcer and biopsies were sent, states she was taking pantoprazole orally once daily.  Denies any known history of cardiac disease, no prior cardiac testing.  Current smoker half per day.  Given report of abdominal pain radiating into her chest with cardiovascular risk factors, she was admitted for ACS evaluation.  Blood pressure was suboptimal, troponin did increase to 0.032, BNP 1 9 1, CTA abdomen and pelvis with thickening of the antrum with duodenitis/antritis, changed to IV b.i.d. PPI with addition of Carafate.  Stress test performed on 11/22 marked global hypokinesia with EF of 19% with small  matched defect in the mid inferior consistent with prior infarct, small defect in mid anterior septal wall concerning for reversible ischemia.  She reports symptoms of improved, had several bowel movements, eager for discharge.  Discussed stress test findings in need for further evaluation.  Complete echo ordered, cardiology consulted.      Interval History:  Patient reports epigastric and chest discomfort is resolved.  Had some nausea earlier this morning with stress test however resolved, tolerating oral diet.  Has had few bowel movements since admission.  Denies any known cardiac history of prior testing.  States mother and father did have heart disease.  She is a chronic smoker half pack per day.  Blood pressure elevated and suboptimal.  Labs with , troponin highest at 0.032, BUN/creatinine 14/1.2.  CTA on admission reviewed.  Stress test is grossly positive as outlined.  Plan of care discussed with patient.  Discussed with nursing.  Telemetry with sinus rhythm.      Review of Systems   Constitutional: Negative for chills and fever.   HENT: Negative for congestion, rhinorrhea, sinus pressure, sinus pain, sneezing and trouble swallowing.    Respiratory: Negative for shortness of breath.    Cardiovascular: Negative for chest pain and leg swelling.   Gastrointestinal: Positive for diarrhea and nausea. Negative for abdominal pain, blood in stool and vomiting.   Genitourinary: Negative for difficulty urinating.   Musculoskeletal:        Chronic hip pain   Psychiatric/Behavioral: Negative for confusion.     Objective:     Vital Signs (Most Recent):  Temp: 98.8 °F (37.1 °C) (11/22/20 1500)  Pulse: 88 (11/22/20 1500)  Resp: 18 (11/22/20 1500)  BP: (!) 163/81 (11/22/20 1500)  SpO2: 97 % (11/22/20 1500) Vital Signs (24h Range):  Temp:  [97.6 °F (36.4 °C)-98.8 °F (37.1 °C)] 98.8 °F (37.1 °C)  Pulse:  [] 88  Resp:  [15-18] 18  SpO2:  [95 %-97 %] 97 %  BP: (136-183)/(65-86) 163/81     Weight: 77.6 kg (171 lb 1.2  oz)  Body mass index is 26.79 kg/m².  No intake or output data in the 24 hours ending 11/22/20 1638   Physical Exam  Vitals signs and nursing note reviewed.   Constitutional:       General: She is not in acute distress.     Appearance: Normal appearance. She is normal weight. She is not ill-appearing, toxic-appearing or diaphoretic.   HENT:      Head: Normocephalic and atraumatic.      Mouth/Throat:      Mouth: Mucous membranes are moist.   Eyes:      General:         Right eye: No discharge.         Left eye: No discharge.      Conjunctiva/sclera: Conjunctivae normal.      Pupils: Pupils are equal, round, and reactive to light.   Cardiovascular:      Rate and Rhythm: Normal rate and regular rhythm.      Comments: No lower extremity edema  Pulmonary:      Effort: Pulmonary effort is normal. No respiratory distress.      Breath sounds: Normal breath sounds. No stridor. No wheezing or rhonchi.      Comments: Not on supplemental oxygen  Abdominal:      General: Bowel sounds are normal. There is no distension.      Tenderness: There is no guarding.   Genitourinary:     Comments: No suprapubic fullness or tenderness  Skin:     General: Skin is warm.   Neurological:      Mental Status: She is alert and oriented to person, place, and time. Mental status is at baseline.   Psychiatric:         Mood and Affect: Mood normal.         Significant Labs:   BMP:   Recent Labs   Lab 11/22/20  0447   *      K 3.7      CO2 27   BUN 14   CREATININE 1.2   CALCIUM 8.9   MG 2.1     CBC:   Recent Labs   Lab 11/21/20  1250 11/22/20  0447   WBC 11.15 8.20   HGB 14.0 12.1   HCT 43.5 39.4   * 441*     CMP:   Recent Labs   Lab 11/21/20  1250 11/22/20  0447    138   K 3.3* 3.7    103   CO2 28 27   * 135*   BUN 14 14   CREATININE 1.1 1.2   CALCIUM 9.0 8.9   PROT 6.4  --    ALBUMIN 3.8  --    BILITOT 0.7  --    ALKPHOS 103  --    AST 14  --    ALT 11  --    ANIONGAP 10 8   EGFRNONAA 57.5* 51.7*      Cardiac Markers:   Recent Labs   Lab 11/21/20  1250   *     Lactic Acid: No results for input(s): LACTATE in the last 48 hours.  Magnesium:   Recent Labs   Lab 11/21/20  1250 11/22/20  0447   MG 2.1 2.1     POCT Glucose: No results for input(s): POCTGLUCOSE in the last 48 hours.  Respiratory Culture: No results for input(s): GSRESP, RESPIRATORYC in the last 48 hours.  Troponin:   Recent Labs   Lab 11/21/20  1250 11/21/20  1952 11/21/20  2324   TROPONINI <0.030 <0.030 0.032     TSH:   Recent Labs   Lab 11/21/20  1250   TSH 0.960     Urine Culture: No results for input(s): LABURIN in the last 48 hours.  Urine Studies:   Recent Labs   Lab 11/21/20  1337   COLORU Yellow   APPEARANCEUA Clear   PHUR 7.0   SPECGRAV 1.020   PROTEINUA 1+*   GLUCUA Negative   KETONESU Negative   BILIRUBINUA Negative   OCCULTUA Negative   NITRITE Negative   UROBILINOGEN 2.0-3.0*   LEUKOCYTESUR Negative   RBCUA 1   WBCUA 2   BACTERIA Negative   SQUAMEPITHEL 4   HYALINECASTS 11*     All pertinent labs within the past 24 hours have been reviewed.    Significant Imaging: I have reviewed all pertinent imaging results/findings within the past 24 hours.     Ct Abdomen Pelvis With Contrast    Result Date: 11/21/2020  Examination: CT scan of the abdomen and pelvis with contrast CLINICAL HISTORY: Mid abdominal pain COMPARISON: None. Technical factors: Standard cross-sectional evaluation of the abdomen and pelvis was performed following the bolus infusion of intravenous contrast 100 cc provided without enteric contrast administered for the examination. Both coronal and sagittal reconstructions were also included along with standard data set provided. FINDINGS: The lung base images are clear. Liver maintains normal size and overall CT density probably elongation left hepatic lobe coursing behind the spleen. Postop changes of previous gallbladder resection with evidence of mild prominence of the central biliary ducts and the common duct as  expected. The stomach is well distended with moderate thickening the antrum prepyloric region duodenal sweep shows evidence of normal enhancement and caliber. Moderate atherosclerotic throughout the descending abdominal aorta. Scattered nodes throughout the retroperitoneum all is maintain normal size parameters. Small bowel loops appear normal caliber. Normal renal perfusion with the left kidney mild cortical renal scarring about the lateral cortex. The mesenteric fat and vasculature are normal. No secondary findings of appendicitis. No free fluid in the abdominal cavity. There is some strandy changes about the anterior margin the rectus of uncertain significance. No significant mass or abscess. The pelvic structures are noted for normal appearance of the urinary bladder although decompressed slightly. There is beam Spencer artifact arising from the left hip prosthesis causing image degradation. Right hip shows advanced osteophytic changes with narrowing of the articular cartilage space. Diverticular changes throughout the sigmoid portion colon with moderate pericolonic scarring. Atrophy of the left gluteus is noted. IMPRESSION: 1. No evidence of acute intra-abdominal findings. 2. Prominent left cortical renal scarring. 3. Nonspecific thickening of the antrum prepyloric region secondary to duodenitis/antritis. Electronically Signed by Julián RASHEED on 11/21/2020 3:47 PM    X-ray Chest Ap Portable    Result Date: 11/21/2020  CHEST X-RAY SINGLE VIEW HISTORY: Constipation FINDINGS:   A single view of the chest was performed without the benefit of previous comparison. The heart size and pulmonary vascularity are within the range of normal. There is no significant hilar nor mediastinal process. The aerated lungs are well expanded and clear. The right and left CP angles are rather sharp. The osseous structures show nothing unusual. There is moderate dextroconvex alignment of the thoracic spine. IMPRESSION:    Negative chest. Electronically Signed by Julián RASHEED on 11/21/2020 7:28 PM    Nm Myocardial Perfusion Spect Multi Pharmacologic    Result Date: 11/22/2020  CLINICAL HISTORY: 55 years (1965) Female Chest pain, normal EKG TECHNIQUE: NM MYOCARDIAL PERFUSION SPECT MULTI PHARM. 11 images obtained. A total of 10.6 millicuries was utilized for the rest portion of the examination with 27.8 millicuries for the stress portion of the examination following a 1 day protocol.  The patient was stressed with Lexiscan, 0.4 milligrams intravenously and achieved a maximum heart rate of 116 beats per minute with a blood pressure of 162/67, (Resting heart rate of 87, and blood pressure of 181/88). COMPARISON: Radiograph of the chest from November 21, 2020. FINDINGS: There is a small defect seen in the mid anteroseptal left ventricular myocardial wall on stress imaging but not on rest imaging suggesting a tiny area of reversible ischemia, the Polar map shows this corresponds to approximately 3% of the overall left ventricular volume. There is a matched defect in the mid inferior left ventricular myocardial wall suggesting a remote infarct corresponding to approximately 4% of the overall left ventricular volume on the Polar map. There is marked global hypokinesis with an estimated ejection fraction of 19% (PZS=828 mL, MWE=160 mL, SV=32 mL). IMPRESSION: 1. Marked global hypokinesis with an estimated ejection fraction of 19%. 2. Small matched defect in the mid inferior left ventricular myocardial wall suggesting a prior infarct. 3. Small defect on stress imaging in the mid anteroseptal wall suggesting a tiny focus of reversible ischemia. Electronically Signed by MAIKEL Cm on 11/22/2020 3:39 PM  ECG Results          EKG 12-lead (In process)  Result time 11/21/20 13:22:07    In process by Interface, Lab In [facility] (11/21/20 13:22:07)                 Narrative:    Test Reason : R10.9,    Vent. Rate : 099 BPM      Atrial Rate : 099 BPM     P-R Int : 152 ms          QRS Dur : 092 ms      QT Int : 386 ms       P-R-T Axes : 072 019 071 degrees     QTc Int : 495 ms    Normal sinus rhythm  Possible Left atrial enlargement  Prolonged QT  Abnormal ECG  No previous ECGs available    Referred By: ABAD   SELF           Confirmed By:                                   Assessment/Plan:      * Chest pain with abnormal stress test  Presenting with epigastric abdominal pain radiating into chest, characteristics home all suggestive of GI etiology.  However has now abnormal stress test.  Does have significant cardiovascular risk factors including smoking, hypertension, diabetes.  On 11/22 nuclear stress test with marked global hypokinesia with EF of 19% with small matched defect in the mid inferior consistent with prior infarct, small defect in mid anterior septal wall concerning for reversible ischemia.  Patient reports chest pain has resolved  Continue topical nitro  Repeat troponin and 12 lead EKG in a.m.  Consulting Cardiology  Hold NPO midnight in the setting potential further testing          Cardiomyopathy  Stress test with EF of 19% with marked global hypokinesia with some element of reversibility  Denies any prior history of same.  Denies any recreational drug use  No significant arrhythmias noted on telemetry but increased risk with cardiomyopathy  Continue metoprolol  Complete echocardiogram to further evaluate  Keep potassium greater than 4, magnesium greater than 2  Consulting Cardiology  May need case management input, Life Vest depending on cardiac evaluation      QT prolongation  On admission .  Now known with EF of 19%.  No arrhythmias on telemetry.  Will give 1 g magnesium.  Avoid multiple QTC prolonging medications.  Repeat EKG in a.m.      COPD (chronic obstructive pulmonary disease)  Evidence of acute exacerbation.  Continued home medication      Tobacco abuse  Current smoker half pack per day.  Requesting  nicotine patch.  Ordered.      Type 2 diabetes mellitus, with long-term current use of insulin  Holding oral hypoglycemics.  Insulin sliding scale with Accu-Cheks and diabetic cardiac diet  As needed hypoglycemic measures      HLD (hyperlipidemia)  Chronic medical condition      Antritis/duodenitis with reported ulcer  States she had recent outpatient EGD and was told ulcer.  CT abdomen and pelvis with thickened antrum concerning for antritis and duodenitis.  Changed IV b.i.d. PPI and add p.r.n. Carafate  Will need to follow-up on outpatient biopsy  Outpatient GI follow-up      Essential hypertension, not well controlled  Continue home beta-blocker.  Adding topical nitro.  Monitor and adjust as needed        VTE Risk Mitigation (From admission, onward)         Ordered     IP VTE LOW RISK PATIENT  Once      11/21/20 1934     Place sequential compression device  Until discontinued      11/21/20 1934                Discharge Planning   KEAGAN:      Code Status: Full Code   Is the patient medically ready for discharge?:     Reason for patient still in hospital (select all that apply): Patient trending condition                     Kassandra Mota MD  Department of Hospital Medicine   [facility]

## 2020-11-22 NOTE — SUBJECTIVE & OBJECTIVE
Interval History:  Patient reports epigastric and chest discomfort is resolved.  Had some nausea earlier this morning with stress test however resolved, tolerating oral diet.  Has had few bowel movements since admission.  Denies any known cardiac history of prior testing.  States mother and father did have heart disease.  She is a chronic smoker half pack per day.  Blood pressure elevated and suboptimal.  Labs with , troponin highest at 0.032, BUN/creatinine 14/1.2.  CTA on admission reviewed.  Stress test is grossly positive as outlined.  Plan of care discussed with patient.  Discussed with nursing.  Telemetry with sinus rhythm.      Review of Systems   Constitutional: Negative for chills and fever.   HENT: Negative for congestion, rhinorrhea, sinus pressure, sinus pain, sneezing and trouble swallowing.    Respiratory: Negative for shortness of breath.    Cardiovascular: Negative for chest pain and leg swelling.   Gastrointestinal: Positive for diarrhea and nausea. Negative for abdominal pain, blood in stool and vomiting.   Genitourinary: Negative for difficulty urinating.   Musculoskeletal:        Chronic hip pain   Psychiatric/Behavioral: Negative for confusion.     Objective:     Vital Signs (Most Recent):  Temp: 98.8 °F (37.1 °C) (11/22/20 1500)  Pulse: 88 (11/22/20 1500)  Resp: 18 (11/22/20 1500)  BP: (!) 163/81 (11/22/20 1500)  SpO2: 97 % (11/22/20 1500) Vital Signs (24h Range):  Temp:  [97.6 °F (36.4 °C)-98.8 °F (37.1 °C)] 98.8 °F (37.1 °C)  Pulse:  [] 88  Resp:  [15-18] 18  SpO2:  [95 %-97 %] 97 %  BP: (136-183)/(65-86) 163/81     Weight: 77.6 kg (171 lb 1.2 oz)  Body mass index is 26.79 kg/m².  No intake or output data in the 24 hours ending 11/22/20 1638   Physical Exam  Vitals signs and nursing note reviewed.   Constitutional:       General: She is not in acute distress.     Appearance: Normal appearance. She is normal weight. She is not ill-appearing, toxic-appearing or diaphoretic.   HENT:       Head: Normocephalic and atraumatic.      Mouth/Throat:      Mouth: Mucous membranes are moist.   Eyes:      General:         Right eye: No discharge.         Left eye: No discharge.      Conjunctiva/sclera: Conjunctivae normal.      Pupils: Pupils are equal, round, and reactive to light.   Cardiovascular:      Rate and Rhythm: Normal rate and regular rhythm.      Comments: No lower extremity edema  Pulmonary:      Effort: Pulmonary effort is normal. No respiratory distress.      Breath sounds: Normal breath sounds. No stridor. No wheezing or rhonchi.      Comments: Not on supplemental oxygen  Abdominal:      General: Bowel sounds are normal. There is no distension.      Tenderness: There is no guarding.   Genitourinary:     Comments: No suprapubic fullness or tenderness  Skin:     General: Skin is warm.   Neurological:      Mental Status: She is alert and oriented to person, place, and time. Mental status is at baseline.   Psychiatric:         Mood and Affect: Mood normal.         Significant Labs:   BMP:   Recent Labs   Lab 11/22/20  0447   *      K 3.7      CO2 27   BUN 14   CREATININE 1.2   CALCIUM 8.9   MG 2.1     CBC:   Recent Labs   Lab 11/21/20  1250 11/22/20  0447   WBC 11.15 8.20   HGB 14.0 12.1   HCT 43.5 39.4   * 441*     CMP:   Recent Labs   Lab 11/21/20  1250 11/22/20  0447    138   K 3.3* 3.7    103   CO2 28 27   * 135*   BUN 14 14   CREATININE 1.1 1.2   CALCIUM 9.0 8.9   PROT 6.4  --    ALBUMIN 3.8  --    BILITOT 0.7  --    ALKPHOS 103  --    AST 14  --    ALT 11  --    ANIONGAP 10 8   EGFRNONAA 57.5* 51.7*     Cardiac Markers:   Recent Labs   Lab 11/21/20  1250   *     Lactic Acid: No results for input(s): LACTATE in the last 48 hours.  Magnesium:   Recent Labs   Lab 11/21/20  1250 11/22/20  0447   MG 2.1 2.1     POCT Glucose: No results for input(s): POCTGLUCOSE in the last 48 hours.  Respiratory Culture: No results for input(s): GSRESP,  RESPIRATORYC in the last 48 hours.  Troponin:   Recent Labs   Lab 11/21/20  1250 11/21/20  1952 11/21/20  2324   TROPONINI <0.030 <0.030 0.032     TSH:   Recent Labs   Lab 11/21/20  1250   TSH 0.960     Urine Culture: No results for input(s): LABURIN in the last 48 hours.  Urine Studies:   Recent Labs   Lab 11/21/20  1337   COLORU Yellow   APPEARANCEUA Clear   PHUR 7.0   SPECGRAV 1.020   PROTEINUA 1+*   GLUCUA Negative   KETONESU Negative   BILIRUBINUA Negative   OCCULTUA Negative   NITRITE Negative   UROBILINOGEN 2.0-3.0*   LEUKOCYTESUR Negative   RBCUA 1   WBCUA 2   BACTERIA Negative   SQUAMEPITHEL 4   HYALINECASTS 11*     All pertinent labs within the past 24 hours have been reviewed.    Significant Imaging: I have reviewed all pertinent imaging results/findings within the past 24 hours.     Ct Abdomen Pelvis With Contrast    Result Date: 11/21/2020  Examination: CT scan of the abdomen and pelvis with contrast CLINICAL HISTORY: Mid abdominal pain COMPARISON: None. Technical factors: Standard cross-sectional evaluation of the abdomen and pelvis was performed following the bolus infusion of intravenous contrast 100 cc provided without enteric contrast administered for the examination. Both coronal and sagittal reconstructions were also included along with standard data set provided. FINDINGS: The lung base images are clear. Liver maintains normal size and overall CT density probably elongation left hepatic lobe coursing behind the spleen. Postop changes of previous gallbladder resection with evidence of mild prominence of the central biliary ducts and the common duct as expected. The stomach is well distended with moderate thickening the antrum prepyloric region duodenal sweep shows evidence of normal enhancement and caliber. Moderate atherosclerotic throughout the descending abdominal aorta. Scattered nodes throughout the retroperitoneum all is maintain normal size parameters. Small bowel loops appear normal  caliber. Normal renal perfusion with the left kidney mild cortical renal scarring about the lateral cortex. The mesenteric fat and vasculature are normal. No secondary findings of appendicitis. No free fluid in the abdominal cavity. There is some strandy changes about the anterior margin the rectus of uncertain significance. No significant mass or abscess. The pelvic structures are noted for normal appearance of the urinary bladder although decompressed slightly. There is beam Spencer artifact arising from the left hip prosthesis causing image degradation. Right hip shows advanced osteophytic changes with narrowing of the articular cartilage space. Diverticular changes throughout the sigmoid portion colon with moderate pericolonic scarring. Atrophy of the left gluteus is noted. IMPRESSION: 1. No evidence of acute intra-abdominal findings. 2. Prominent left cortical renal scarring. 3. Nonspecific thickening of the antrum prepyloric region secondary to duodenitis/antritis. Electronically Signed by Julián RASHEED on 11/21/2020 3:47 PM    X-ray Chest Ap Portable    Result Date: 11/21/2020  CHEST X-RAY SINGLE VIEW HISTORY: Constipation FINDINGS:   A single view of the chest was performed without the benefit of previous comparison. The heart size and pulmonary vascularity are within the range of normal. There is no significant hilar nor mediastinal process. The aerated lungs are well expanded and clear. The right and left CP angles are rather sharp. The osseous structures show nothing unusual. There is moderate dextroconvex alignment of the thoracic spine. IMPRESSION:   Negative chest. Electronically Signed by Julián RASHEED on 11/21/2020 7:28 PM    Nm Myocardial Perfusion Spect Multi Pharmacologic    Result Date: 11/22/2020  CLINICAL HISTORY: 55 years (1965) Female Chest pain, normal EKG TECHNIQUE: NM MYOCARDIAL PERFUSION SPECT MULTI PHARM. 11 images obtained. A total of 10.6 millicuries was utilized  for the rest portion of the examination with 27.8 millicuries for the stress portion of the examination following a 1 day protocol.  The patient was stressed with Lexiscan, 0.4 milligrams intravenously and achieved a maximum heart rate of 116 beats per minute with a blood pressure of 162/67, (Resting heart rate of 87, and blood pressure of 181/88). COMPARISON: Radiograph of the chest from November 21, 2020. FINDINGS: There is a small defect seen in the mid anteroseptal left ventricular myocardial wall on stress imaging but not on rest imaging suggesting a tiny area of reversible ischemia, the Polar map shows this corresponds to approximately 3% of the overall left ventricular volume. There is a matched defect in the mid inferior left ventricular myocardial wall suggesting a remote infarct corresponding to approximately 4% of the overall left ventricular volume on the Polar map. There is marked global hypokinesis with an estimated ejection fraction of 19% (LPZ=562 mL, NZZ=440 mL, SV=32 mL). IMPRESSION: 1. Marked global hypokinesis with an estimated ejection fraction of 19%. 2. Small matched defect in the mid inferior left ventricular myocardial wall suggesting a prior infarct. 3. Small defect on stress imaging in the mid anteroseptal wall suggesting a tiny focus of reversible ischemia. Electronically Signed by MAIKEL Cm on 11/22/2020 3:39 PM  ECG Results          EKG 12-lead (In process)  Result time 11/21/20 13:22:07    In process by Interface, Lab In [facility] (11/21/20 13:22:07)                 Narrative:    Test Reason : R10.9,    Vent. Rate : 099 BPM     Atrial Rate : 099 BPM     P-R Int : 152 ms          QRS Dur : 092 ms      QT Int : 386 ms       P-R-T Axes : 072 019 071 degrees     QTc Int : 495 ms    Normal sinus rhythm  Possible Left atrial enlargement  Prolonged QT  Abnormal ECG  No previous ECGs available    Referred By: [email or web address]   SELF           Confirmed By:

## 2020-11-22 NOTE — ASSESSMENT & PLAN NOTE
States she had recent outpatient EGD and was told ulcer.  CT abdomen and pelvis with thickened antrum concerning for antritis and duodenitis.  Changed IV b.i.d. PPI and add p.r.n. Carafate  Will need to follow-up on outpatient biopsy  Outpatient GI follow-up

## 2020-11-23 ENCOUNTER — CLINICAL SUPPORT (OUTPATIENT)
Dept: CARDIOLOGY | Facility: HOSPITAL | Age: 55
DRG: 287 | End: 2020-11-23
Attending: INTERNAL MEDICINE
Payer: MEDICAID

## 2020-11-23 VITALS — BODY MASS INDEX: 26.99 KG/M2 | WEIGHT: 171.94 LBS | HEIGHT: 67 IN

## 2020-11-23 PROBLEM — R94.31 QT PROLONGATION: Status: RESOLVED | Noted: 2020-11-22 | Resolved: 2020-11-23

## 2020-11-23 LAB
ANION GAP SERPL CALC-SCNC: 7 MMOL/L (ref 8–16)
AORTIC ROOT ANNULUS: 2.6 CM
AORTIC VALVE CUSP SEPERATION: 1.56 CM
APTT PPP: 34.3 SEC (ref 23.6–33.3)
AV INDEX (PROSTH): 0.43
AV MEAN GRADIENT: 6 MMHG
AV PEAK GRADIENT: 11 MMHG
AV VALVE AREA: 1.52 CM2
AV VELOCITY RATIO: 44.65
BASOPHILS # BLD AUTO: 0.03 K/UL (ref 0–0.2)
BASOPHILS NFR BLD: 0.3 % (ref 0–1.9)
BSA FOR ECHO PROCEDURE: 1.92 M2
BUN SERPL-MCNC: 17 MG/DL (ref 6–20)
CALCIUM SERPL-MCNC: 8.7 MG/DL (ref 8.7–10.5)
CATH EF QUANTITATIVE: 25 %
CHLORIDE SERPL-SCNC: 104 MMOL/L (ref 95–110)
CO2 SERPL-SCNC: 29 MMOL/L (ref 23–29)
CREAT SERPL-MCNC: 1.3 MG/DL (ref 0.5–1.4)
CV ECHO LV RWT: 0.44 CM
DIFFERENTIAL METHOD: ABNORMAL
DOP CALC AO PEAK VEL: 1.67 M/S
DOP CALC AO VTI: 39.27 CM
DOP CALC LVOT AREA: 3.5 CM2
DOP CALC LVOT DIAMETER: 2.12 CM
DOP CALC LVOT PEAK VEL: 74.56 M/S
DOP CALC LVOT STROKE VOLUME: 59.84 CM3
DOP CALCLVOT PEAK VEL VTI: 16.96 CM
E WAVE DECELERATION TIME: 211.34 MSEC
E/A RATIO: 1.11
E/E' RATIO: 13.5 M/S
ECHO LV POSTERIOR WALL: 1.24 CM (ref 0.6–1.1)
EOSINOPHIL # BLD AUTO: 0.1 K/UL (ref 0–0.5)
EOSINOPHIL NFR BLD: 0.7 % (ref 0–8)
ERYTHROCYTE [DISTWIDTH] IN BLOOD BY AUTOMATED COUNT: 15.8 % (ref 11.5–14.5)
EST. GFR  (AFRICAN AMERICAN): 54.1 ML/MIN/1.73 M^2
EST. GFR  (NON AFRICAN AMERICAN): 47 ML/MIN/1.73 M^2
FRACTIONAL SHORTENING: 16 % (ref 28–44)
GLUCOSE SERPL-MCNC: 110 MG/DL (ref 70–110)
GLUCOSE SERPL-MCNC: 123 MG/DL (ref 70–110)
GLUCOSE SERPL-MCNC: 124 MG/DL (ref 70–110)
GLUCOSE SERPL-MCNC: 185 MG/DL (ref 70–110)
GLUCOSE SERPL-MCNC: 196 MG/DL (ref 70–110)
HCT VFR BLD AUTO: 38.1 % (ref 37–48.5)
HGB BLD-MCNC: 11.6 G/DL (ref 12–16)
IMM GRANULOCYTES # BLD AUTO: 0.02 K/UL (ref 0–0.04)
IMM GRANULOCYTES NFR BLD AUTO: 0.2 % (ref 0–0.5)
INTERVENTRICULAR SEPTUM: 1.24 CM (ref 0.6–1.1)
IVRT: 99.45 MSEC
LEFT ATRIUM SIZE: 4.05 CM
LEFT INTERNAL DIMENSION IN SYSTOLE: 4.74 CM (ref 2.1–4)
LEFT VENTRICLE MASS INDEX: 156 G/M2
LEFT VENTRICULAR INTERNAL DIMENSION IN DIASTOLE: 5.62 CM (ref 3.5–6)
LEFT VENTRICULAR MASS: 295.08 G
LV LATERAL E/E' RATIO: 11.57 M/S
LV SEPTAL E/E' RATIO: 16.2 M/S
LYMPHOCYTES # BLD AUTO: 4.4 K/UL (ref 1–4.8)
LYMPHOCYTES NFR BLD: 40.6 % (ref 18–48)
MAGNESIUM SERPL-MCNC: 2.2 MG/DL (ref 1.6–2.6)
MCH RBC QN AUTO: 26.4 PG (ref 27–31)
MCHC RBC AUTO-ENTMCNC: 30.4 G/DL (ref 32–36)
MCV RBC AUTO: 87 FL (ref 82–98)
MONOCYTES # BLD AUTO: 1.1 K/UL (ref 0.3–1)
MONOCYTES NFR BLD: 9.9 % (ref 4–15)
MV PEAK A VEL: 0.73 M/S
MV PEAK E VEL: 0.81 M/S
NEUTROPHILS # BLD AUTO: 5.3 K/UL (ref 1.8–7.7)
NEUTROPHILS NFR BLD: 48.3 % (ref 38–73)
NRBC BLD-RTO: 0 /100 WBC
PISA TR MAX VEL: 2.46 M/S
PLATELET # BLD AUTO: 422 K/UL (ref 150–350)
PMV BLD AUTO: 9.5 FL (ref 9.2–12.9)
POTASSIUM SERPL-SCNC: 3.7 MMOL/L (ref 3.5–5.1)
PV PEAK VELOCITY: 89.96 CM/S
RA PRESSURE: 8 MMHG
RBC # BLD AUTO: 4.39 M/UL (ref 4–5.4)
RIGHT VENTRICULAR END-DIASTOLIC DIMENSION: 207 CM
SODIUM SERPL-SCNC: 140 MMOL/L (ref 136–145)
TDI LATERAL: 0.07 M/S
TDI SEPTAL: 0.05 M/S
TDI: 0.06 M/S
TR MAX PG: 24 MMHG
TROPONIN I SERPL DL<=0.01 NG/ML-MCNC: <0.03 NG/ML
TV REST PULMONARY ARTERY PRESSURE: 32 MMHG
WBC # BLD AUTO: 10.91 K/UL (ref 3.9–12.7)

## 2020-11-23 PROCEDURE — C1769 GUIDE WIRE: HCPCS | Performed by: INTERNAL MEDICINE

## 2020-11-23 PROCEDURE — 99152 PR MOD CONSCIOUS SEDATION, SAME PHYS, 5+ YRS, FIRST 15 MIN: ICD-10-PCS | Mod: GC,,, | Performed by: INTERNAL MEDICINE

## 2020-11-23 PROCEDURE — C1760 CLOSURE DEV, VASC: HCPCS | Performed by: INTERNAL MEDICINE

## 2020-11-23 PROCEDURE — 25000003 PHARM REV CODE 250: Performed by: INTERNAL MEDICINE

## 2020-11-23 PROCEDURE — 94640 AIRWAY INHALATION TREATMENT: CPT

## 2020-11-23 PROCEDURE — 93010 ELECTROCARDIOGRAM REPORT: CPT | Mod: ,,, | Performed by: INTERNAL MEDICINE

## 2020-11-23 PROCEDURE — S4991 NICOTINE PATCH NONLEGEND: HCPCS | Performed by: INTERNAL MEDICINE

## 2020-11-23 PROCEDURE — 25000003 PHARM REV CODE 250: Performed by: NURSE PRACTITIONER

## 2020-11-23 PROCEDURE — 85730 THROMBOPLASTIN TIME PARTIAL: CPT

## 2020-11-23 PROCEDURE — 94761 N-INVAS EAR/PLS OXIMETRY MLT: CPT

## 2020-11-23 PROCEDURE — C1887 CATHETER, GUIDING: HCPCS | Performed by: INTERNAL MEDICINE

## 2020-11-23 PROCEDURE — 99152 MOD SED SAME PHYS/QHP 5/>YRS: CPT | Mod: GC,,, | Performed by: INTERNAL MEDICINE

## 2020-11-23 PROCEDURE — 63600175 PHARM REV CODE 636 W HCPCS: Performed by: INTERNAL MEDICINE

## 2020-11-23 PROCEDURE — C1751 CATH, INF, PER/CENT/MIDLINE: HCPCS | Performed by: INTERNAL MEDICINE

## 2020-11-23 PROCEDURE — 93306 TTE W/DOPPLER COMPLETE: CPT | Mod: 26,,, | Performed by: INTERNAL MEDICINE

## 2020-11-23 PROCEDURE — 80048 BASIC METABOLIC PNL TOTAL CA: CPT

## 2020-11-23 PROCEDURE — 83735 ASSAY OF MAGNESIUM: CPT

## 2020-11-23 PROCEDURE — C1894 INTRO/SHEATH, NON-LASER: HCPCS | Performed by: INTERNAL MEDICINE

## 2020-11-23 PROCEDURE — 93306 TTE W/DOPPLER COMPLETE: CPT

## 2020-11-23 PROCEDURE — 93010 EKG 12-LEAD: ICD-10-PCS | Mod: ,,, | Performed by: INTERNAL MEDICINE

## 2020-11-23 PROCEDURE — C9113 INJ PANTOPRAZOLE SODIUM, VIA: HCPCS | Performed by: INTERNAL MEDICINE

## 2020-11-23 PROCEDURE — 93306 ECHO (CUPID ONLY): ICD-10-PCS | Mod: 26,,, | Performed by: INTERNAL MEDICINE

## 2020-11-23 PROCEDURE — 93460 R&L HRT ART/VENTRICLE ANGIO: CPT | Performed by: INTERNAL MEDICINE

## 2020-11-23 PROCEDURE — 93005 ELECTROCARDIOGRAM TRACING: CPT | Performed by: INTERNAL MEDICINE

## 2020-11-23 PROCEDURE — 85025 COMPLETE CBC W/AUTO DIFF WBC: CPT

## 2020-11-23 PROCEDURE — 21400001 HC TELEMETRY ROOM

## 2020-11-23 PROCEDURE — 25500020 PHARM REV CODE 255: Performed by: INTERNAL MEDICINE

## 2020-11-23 PROCEDURE — 82962 GLUCOSE BLOOD TEST: CPT

## 2020-11-23 PROCEDURE — 93460 PR CATH PLACE/CORON ANGIO, IMG SUPER/INTERP,R&L HRT CATH, L HRT VENTRIC: ICD-10-PCS | Mod: 26,GC,, | Performed by: INTERNAL MEDICINE

## 2020-11-23 PROCEDURE — 36415 COLL VENOUS BLD VENIPUNCTURE: CPT

## 2020-11-23 PROCEDURE — 93460 R&L HRT ART/VENTRICLE ANGIO: CPT | Mod: 26,GC,, | Performed by: INTERNAL MEDICINE

## 2020-11-23 PROCEDURE — 99900035 HC TECH TIME PER 15 MIN (STAT)

## 2020-11-23 PROCEDURE — 84484 ASSAY OF TROPONIN QUANT: CPT

## 2020-11-23 DEVICE — ANGIO-SEAL VIP VASCULAR CLOSURE DEVICE
Type: IMPLANTABLE DEVICE | Site: GROIN | Status: FUNCTIONAL
Brand: ANGIO-SEAL

## 2020-11-23 DEVICE — IMPLANTABLE DEVICE: Type: IMPLANTABLE DEVICE | Site: GROIN | Status: FUNCTIONAL

## 2020-11-23 RX ORDER — LOSARTAN POTASSIUM 25 MG/1
25 TABLET ORAL NIGHTLY
Status: DISCONTINUED | OUTPATIENT
Start: 2020-11-23 | End: 2020-11-24 | Stop reason: HOSPADM

## 2020-11-23 RX ORDER — FENTANYL CITRATE 50 UG/ML
INJECTION, SOLUTION INTRAMUSCULAR; INTRAVENOUS
Status: DISCONTINUED | OUTPATIENT
Start: 2020-11-23 | End: 2020-11-23 | Stop reason: HOSPADM

## 2020-11-23 RX ORDER — DIPHENHYDRAMINE HCL 25 MG
25 CAPSULE ORAL EVERY 6 HOURS PRN
Status: DISCONTINUED | OUTPATIENT
Start: 2020-11-23 | End: 2020-11-24 | Stop reason: HOSPADM

## 2020-11-23 RX ORDER — LIDOCAINE HYDROCHLORIDE 10 MG/ML
INJECTION, SOLUTION EPIDURAL; INFILTRATION; INTRACAUDAL; PERINEURAL
Status: DISCONTINUED | OUTPATIENT
Start: 2020-11-23 | End: 2020-11-23 | Stop reason: HOSPADM

## 2020-11-23 RX ORDER — ONDANSETRON 4 MG/1
8 TABLET, ORALLY DISINTEGRATING ORAL EVERY 8 HOURS PRN
Status: DISCONTINUED | OUTPATIENT
Start: 2020-11-23 | End: 2020-11-24 | Stop reason: HOSPADM

## 2020-11-23 RX ORDER — IODIXANOL 320 MG/ML
INJECTION, SOLUTION INTRAVASCULAR
Status: DISCONTINUED | OUTPATIENT
Start: 2020-11-23 | End: 2020-11-23 | Stop reason: HOSPADM

## 2020-11-23 RX ORDER — MIDAZOLAM HYDROCHLORIDE 1 MG/ML
INJECTION INTRAMUSCULAR; INTRAVENOUS
Status: DISCONTINUED | OUTPATIENT
Start: 2020-11-23 | End: 2020-11-23 | Stop reason: HOSPADM

## 2020-11-23 RX ADMIN — OXYCODONE AND ACETAMINOPHEN 1 TABLET: 5; 325 TABLET ORAL at 05:11

## 2020-11-23 RX ADMIN — PREGABALIN 75 MG: 75 CAPSULE ORAL at 09:11

## 2020-11-23 RX ADMIN — PANTOPRAZOLE SODIUM 40 MG: 40 INJECTION, POWDER, FOR SOLUTION INTRAVENOUS at 09:11

## 2020-11-23 RX ADMIN — POTASSIUM CHLORIDE 20 MEQ: 20 TABLET, EXTENDED RELEASE ORAL at 06:11

## 2020-11-23 RX ADMIN — ASPIRIN 81 MG: 81 TABLET, CHEWABLE ORAL at 09:11

## 2020-11-23 RX ADMIN — DIPHENHYDRAMINE HYDROCHLORIDE 25 MG: 25 CAPSULE ORAL at 04:11

## 2020-11-23 RX ADMIN — SODIUM CHLORIDE: 0.9 INJECTION, SOLUTION INTRAVENOUS at 06:11

## 2020-11-23 RX ADMIN — SUCRALFATE 1 G: 1 TABLET ORAL at 09:11

## 2020-11-23 RX ADMIN — ATORVASTATIN CALCIUM 40 MG: 40 TABLET, FILM COATED ORAL at 09:11

## 2020-11-23 RX ADMIN — METOPROLOL SUCCINATE 50 MG: 50 TABLET, FILM COATED, EXTENDED RELEASE ORAL at 09:11

## 2020-11-23 RX ADMIN — SUCRALFATE 1 G: 1 TABLET ORAL at 05:11

## 2020-11-23 RX ADMIN — ZOLPIDEM TARTRATE 10 MG: 5 TABLET, COATED ORAL at 09:11

## 2020-11-23 RX ADMIN — LOSARTAN POTASSIUM 25 MG: 25 TABLET, FILM COATED ORAL at 09:11

## 2020-11-23 RX ADMIN — FLUTICASONE FUROATE AND VILANTEROL TRIFENATATE 1 PUFF: 200; 25 POWDER RESPIRATORY (INHALATION) at 08:11

## 2020-11-23 RX ADMIN — NICOTINE 1 PATCH: 14 PATCH, EXTENDED RELEASE TRANSDERMAL at 05:11

## 2020-11-23 RX ADMIN — SUCRALFATE 1 G: 1 TABLET ORAL at 06:11

## 2020-11-23 NOTE — ASSESSMENT & PLAN NOTE
Newly diagnosed cardiomyopathy with markedly low ejection fraction.  In view of multiple risk factors further delineation of anatomy of her coronary arteries is important at this point.  Coronary angiogram in walls of risk of potential complications.  These include risk of heart attack stroke and even death in all, patient is undergoing the procedure and access complications including bleeding and rarely damage to the artery can occur.  Meantime optimize her preload and afterload reducing agents as tolerated.  Recommend to use guideline mediated therapy

## 2020-11-23 NOTE — CONSULTS
[facility]  Department of Cardiology  Consult Note      PATIENT NAME: Delores Fox  MRN: [id number]  TODAY'S DATE: 11/22/2020  ADMIT DATE: 11/21/2020                          CONSULT REQUESTED BY: Kassandra Mota MD    SUBJECTIVE     PRINCIPAL PROBLEM: Chest pain      REASON FOR CONSULT:        HPI:   55-year-old lady with history of hypertension hyperlipidemia and chronic pain and opiate usage with type 2 diabetes and esophagitis was admitted with nausea and vomiting.  She was noted to have persistent pain and marginal changes and a troponin was 0.032 and subsequently scheduled for a Lexiscan Myoview which was completed today.  The stress EKG had abnormal baseline but did not change much from there and myocardial perfusion study is not reported to be abnormal with ejection fraction estimated to be 19% with areas of decreased up uptake consistent with attenuation.  Consult is now called to evaluate the same  Patient denies having chest discomfort arm neck or jaw pain some cough and congestion is persisting.  She has taken some nebulizer treatments for the same with some relief.            HPI:  Ms. Fox is a 55 year old female with a history of HTN, HLD, chronic hip pain with chronic opioid use, nicotine dependence, DM, and esophagitis who presents today with complaints of epigastric pain. It is moderate. It is associated with N/V and constipation. She denies fever, chills, cough, dizziness, sweats, SOB, or LOC. She describes the pain in her upper abd and radiates to her chest. It is worse with eating and laying flat. It is associated with bilious, nonbloody vomit. She had an EGD for this on Thursday with Dr. Valencia and states she is awaiting the results, but was told she had ulcers. She's had multiple ED visits for this pain, but this is the first time she's complained that it is radiating into her chest. She has never had a stress test.        Overview/Hospital Course:  Patient admitted  with epigastric abdominal pain radiating into her chest associated with nausea, vomiting and constipation.  States she had EGD performed last Thursday, official report pending, was told ulcer and biopsies were sent, states she was taking pantoprazole orally once daily.  Denies any known history of cardiac disease, no prior cardiac testing.  Current smoker half per day.  Given report of abdominal pain radiating into her chest with cardiovascular risk factors, she was admitted for ACS evaluation.  Blood pressure was suboptimal, troponin did increase to 0.032, BNP 1 9 1, CTA abdomen and pelvis with thickening of the antrum with duodenitis/antritis, changed to IV b.i.d. PPI with addition of Carafate.  Stress test performed on 11/22 marked global hypokinesia with EF of 19% with small matched defect in the mid inferior consistent with prior infarct, small defect in mid anterior septal wall concerning for reversible ischemia.  She reports symptoms of improved, had several bowel movements, eager for discharge.  Discussed stress test findings in need for further evaluation.  Complete echo ordered, cardiology consulted.        Interval History:  Patient reports epigastric and chest discomfort is resolved.  Had some nausea earlier this morning with stress test however resolved, tolerating oral diet.  Has had few bowel movements since admission.  Denies any known cardiac history of prior testing.  States mother and father did have heart disease.  She is a chronic smoker half pack per day.  Blood pressure elevated and suboptimal.  Labs with , troponin highest at 0.032, BUN/creatinine 14/1.2.  CTA on admission reviewed.  Stress test is grossly positive as outlined.  Plan of care discussed with patient.  Discussed with nursing.  Telemetry with sinus rhythm.           Review of patient's allergies indicates:   Allergen Reactions    Morphine Nausea And Vomiting    Sulfa (sulfonamide antibiotics) Nausea And Vomiting        Past Medical History:   Diagnosis Date    Coronary artery disease     Diabetes mellitus     High cholesterol     Hypertension      No past surgical history on file.  Social History     Tobacco Use    Smoking status: Not on file   Substance Use Topics    Alcohol use: Not on file    Drug use: Not on file        REVIEW OF SYSTEMS  CONSTITUTIONAL: Negative for chills, fatigue and fever.   EYES: No double vision, No blurred vision  NEURO: No headaches, No dizziness  RESPIRATORY: Negative for cough, shortness of breath and wheezing.    CARDIOVASCULAR: Negative for chest pain. Negative for palpitations and leg swelling.   GI: Negative for abdominal pain, No melena, diarrhea, nausea and vomiting.   : Negative for dysuria and frequency, Negative for hematuria  SKIN: Negative for bruising, Negative for edema or discoloration noted.   ENDOCRINE: Negative for polyphagia, Negative for heat intolerance, Negative for cold intolerance  PSYCHIATRIC: Negative for depression, Negative for anxiety, Negative for memory loss  MUSCULOSKELETAL: Negative for neck pain, Negative for muscle weakness, Negative for back pain     OBJECTIVE     VITAL SIGNS (Most Recent)  Temp: 98.8 °F (37.1 °C) (11/22/20 1500)  Pulse: 88 (11/22/20 1500)  Resp: 18 (11/22/20 1500)  BP: (!) 163/81 (11/22/20 1500)  SpO2: 97 % (11/22/20 1500)    VENTILATION STATUS  Resp: 18 (11/22/20 1500)  SpO2: 97 % (11/22/20 1500)       I & O (Last 24H):No intake or output data in the 24 hours ending 11/22/20 1815    WEIGHTS  Wt Readings from Last 1 Encounters:   11/21/20 1950 77.6 kg (171 lb 1.2 oz)   11/21/20 1154 80.3 kg (177 lb)       PHYSICAL EXAM  GENERAL: well built, well nourished, well-developed in no apparent distress alert and oriented.   HEENT: Normocephalic. Pupils normal and conjunctivae normal.  Mucous membranes normal, no cyanosis or icterus, trachea central,no pallor or icterus is noted..   NECK: No JVD. No bruit..   THYROID: Thyroid not enlarged. No  nodules present..   CARDIAC: Regular rate and rhythm. S1 is normal.S2 is normal.No gallops, clicks or murmurs noted at this time.  CHEST ANATOMY: normal.   LUNGS: Clear to auscultation. No wheezing or rhonchi..   ABDOMEN: Soft no masses or organomegaly.  No abdomen pulsations or bruits.  Normal bowel sounds. No pulsations and no masses felt, No guarding or rebound.   URINARY: No england catheter   EXTREMITIES: No cyanosis, clubbing or edema noted at this time., no calf tenderness bilaterally.   PERIPHERAL VASCULAR SYSTEM: Good palpable distal pulses.   CENTRAL NERVOUS SYSTEM: No focal motor or sensory deficits noted.   SKIN: Skin without lesions, moist, well perfused.   MUSCLE STRENGTH & TONE: No noteable weakness, atrophy or abnormal movement.     HOME MEDICATIONS:  No current facility-administered medications on file prior to encounter.      Current Outpatient Medications on File Prior to Encounter   Medication Sig Dispense Refill    melatonin (MELATIN) 3 mg tablet Take 3 mg by mouth nightly.       mometasone-formoterol (DULERA) 200-5 mcg/actuation inhaler Inhale 2 puffs into the lungs 2 (two) times daily.       ondansetron (ZOFRAN) 8 MG tablet Take 1 tablet (8 mg total) by mouth every 12 (twelve) hours as needed for Nausea. 8 tablet 1    oxyCODONE-acetaminophen (PERCOCET) 5-325 mg per tablet Take 1 tablet by mouth every 4 (four) hours as needed for Pain.      pantoprazole (PROTONIX) 40 MG tablet Take 1 tablet (40 mg total) by mouth once daily. 30 tablet 3    pregabalin (LYRICA) 75 MG capsule Take 75 mg by mouth 2 (two) times daily.       promethazine (PHENERGAN) 25 MG tablet TK 1 T PO QD PRN      senna-docusate 8.6-50 mg (PERICOLACE) 8.6-50 mg per tablet Take 1 tablet by mouth.      zolpidem (AMBIEN) 10 mg Tab Take 10 mg by mouth every evening.       atorvastatin (LIPITOR) 40 MG tablet Take 40 mg by mouth once daily.       fluticasone furoate-vilanteroL (BREO) 100-25 mcg/dose diskus inhaler Inhale 1 puff  into the lungs.      glimepiride (AMARYL) 4 MG tablet Take 4 mg by mouth daily with breakfast.       insulin glargine (LANTUS) 100 unit/mL injection Inject into the skin once daily.       insulin lispro 100 unit/mL injection Inject into the skin.      linaGLIPtin (TRADJENTA) 5 mg Tab tablet Take 5 mg by mouth once daily.       metFORMIN (GLUCOPHAGE) 1000 MG tablet Take 1,000 mg by mouth 2 (two) times daily with meals.       metoprolol succinate (TOPROL-XL) 50 MG 24 hr tablet Take 50 mg by mouth once daily.       oxyCODONE-acetaminophen (PERCOCET) 7.5-325 mg per tablet Take 1 tablet by mouth every 4 (four) hours as needed.         SCHEDULED MEDS:   atorvastatin  40 mg Oral Daily    fluticasone furoate-vilanteroL  1 puff Inhalation Daily    magnesium sulfate IVPB  1 g Intravenous Once    metoprolol succinate  50 mg Oral Daily    nicotine  1 patch Transdermal Q24H    nitroGLYCERIN 2% TD oint  0.5 inch Topical (Top) ED 1 Time    pantoprazole  40 mg Intravenous BID    pregabalin  75 mg Oral BID    sucralfate  1 g Oral QID (AC & HS)    zolpidem  10 mg Oral QHS       CONTINUOUS INFUSIONS:    PRN MEDS:acetaminophen, albuterol sulfate, calcium chloride IVPB, calcium chloride IVPB, calcium chloride IVPB, dextrose 50%, dextrose 50%, glucagon (human recombinant), glucose, glucose, insulin aspart U-100, labetalol, magnesium oxide, magnesium sulfate IVPB, magnesium sulfate IVPB, magnesium sulfate IVPB, magnesium sulfate IVPB, melatonin, ondansetron, oxyCODONE-acetaminophen, potassium chloride in water, potassium chloride in water, potassium chloride in water, potassium chloride in water, potassium chloride, potassium chloride, potassium chloride, potassium chloride, sodium chloride 0.9%, sodium phosphate IVPB, sodium phosphate IVPB, sodium phosphate IVPB, sodium phosphate IVPB, sodium phosphate IVPB    LABS AND DIAGNOSTICS     CBC LAST 3 DAYS  Recent Labs   Lab 11/21/20  1250 11/22/20  0447   WBC 11.15 8.20   RBC  5.26 4.62   HGB 14.0 12.1   HCT 43.5 39.4   MCV 83 85   MCH 26.6* 26.2*   MCHC 32.2 30.7*   RDW 15.7* 15.9*   * 441*   MPV 9.2 9.5   GRAN 52.9  5.9 45.0  3.7   LYMPH 37.8  4.2 44.8  3.7   MONO 8.5  1.0 9.1  0.8   BASO 0.03 0.02   NRBC 0 0       COAGULATION LAST 3 DAYS  Recent Labs   Lab 11/21/20  1250   LABPT 15.4*   INR 1.3   APTT 35.9*       CHEMISTRY LAST 3 DAYS  Recent Labs   Lab 11/21/20  1250 11/22/20  0447    138   K 3.3* 3.7    103   CO2 28 27   ANIONGAP 10 8   BUN 14 14   CREATININE 1.1 1.2   * 135*   CALCIUM 9.0 8.9   MG 2.1 2.1   ALBUMIN 3.8  --    PROT 6.4  --    ALKPHOS 103  --    ALT 11  --    AST 14  --    BILITOT 0.7  --        CARDIAC PROFILE LAST 3 DAYS  Recent Labs   Lab 11/21/20  1250 11/21/20  1952 11/21/20  2324   *  --   --    CPK 31  --   --    TROPONINI <0.030 <0.030 0.032       ENDOCRINE LAST 3 DAYS  Recent Labs   Lab 11/21/20  1250   TSH 0.960       LAST ARTERIAL BLOOD GAS  ABG  No results for input(s): PH, PO2, PCO2, HCO3, BE in the last 168 hours.    LAST 7 DAYS MICROBIOLOGY   Microbiology Results (last 7 days)     ** No results found for the last 168 hours. **          MOST RECENT IMAGING  NM Myocardial Perfusion Spect Multi Pharmacologic  CLINICAL HISTORY:  55 years (1965) Female Chest pain, normal EKG    TECHNIQUE:  NM MYOCARDIAL PERFUSION SPECT MULTI PHARM. 11 images obtained. A total  of 10.6 millicuries was utilized for the rest portion of the  examination with 27.8 millicuries for the stress portion of the  examination following a 1 day protocol.  The patient was stressed with  Lexiscan, 0.4 milligrams intravenously and achieved a maximum heart  rate of 116 beats per minute with a blood pressure of 162/67, (Resting  heart rate of 87, and blood pressure of 181/88).    COMPARISON:  Radiograph of the chest from November 21, 2020.    FINDINGS:  There is a small defect seen in the mid anteroseptal left ventricular  myocardial wall on stress  imaging but not on rest imaging suggesting a  tiny area of reversible ischemia, the Polar map shows this corresponds  to approximately 3% of the overall left ventricular volume. There is a  matched defect in the mid inferior left ventricular myocardial wall  suggesting a remote infarct corresponding to approximately 4% of the  overall left ventricular volume on the Polar map.    There is marked global hypokinesis with an estimated ejection fraction  of 19% (MUJ=100 mL, SBL=290 mL, SV=32 mL).    IMPRESSION:  1. Marked global hypokinesis with an estimated ejection fraction of  19%.  2. Small matched defect in the mid inferior left ventricular  myocardial wall suggesting a prior infarct.  3. Small defect on stress imaging in the mid anteroseptal wall  suggesting a tiny focus of reversible ischemia.    Electronically Signed by MAIKEL Cm on 11/22/2020 3:39 PM  Nuclear Stress Test    The EKG portion of this study is negative for ischemia.    The patient reported no chest pain during the stress test.    There were no arrhythmias during stress.    ECG Stress Nuclear portion of this study will be reported separately.      ECHOCARDIOGRAM RESULTS (last 5)  No results found for this or any previous visit.    CURRENT/PREVIOUS VISIT EKG  Results for orders placed or performed during the hospital encounter of 11/21/20   EKG 12-lead    Collection Time: 11/21/20  1:12 PM    Narrative    Test Reason : R10.9,    Vent. Rate : 099 BPM     Atrial Rate : 099 BPM     P-R Int : 152 ms          QRS Dur : 092 ms      QT Int : 386 ms       P-R-T Axes : 072 019 071 degrees     QTc Int : 495 ms    Normal sinus rhythm  Possible Left atrial enlargement  Prolonged QT  Abnormal ECG  No previous ECGs available  Confirmed by Doug Kendall MD ([id number]) on 11/22/2020 5:19:58 PM    Referred By: ABAD   SELF           Confirmed By:Doug Kendall MD           ASSESSMENT/PLAN:     Active Hospital Problems    Diagnosis    *Chest pain  with abnormal stress test    Cardiomyopathy    Tobacco abuse    COPD (chronic obstructive pulmonary disease)    QT prolongation    Essential hypertension, not well controlled    Antritis/duodenitis with reported ulcer    HLD (hyperlipidemia)    Type 2 diabetes mellitus, with long-term current use of insulin       ASSESSMENT & PLAN:     Chest pain with abnormal stress test  Presenting with epigastric abdominal pain radiating into chest, characteristics home all suggestive of GI etiology.  However has now abnormal stress test.  Does have significant cardiovascular risk factors including smoking, hypertension, diabetes.  On 11/22 nuclear stress test with marked global hypokinesia with EF of 19% with small matched defect in the mid inferior consistent with prior infarct, small defect in mid anterior septal wall concerning for reversible ischemia.  Patient reports chest pain has resolved  Continue topical nitro  Repeat troponin and 12 lead EKG in a.m.  Plan angiographic assessment for further diagnosis.            Antritis/duodenitis with reported ulcer  Per hospitalist  States she had recent outpatient EGD and was told ulcer.  CT abdomen and pelvis with thickened antrum concerning for antritis and duodenitis.  Changed IV b.i.d. PPI and add p.r.n. Carafate  Will need to follow-up on outpatient biopsy  Outpatient GI follow-up          QT prolongation  On admission .  Now known with EF of 19%.  No arrhythmias on telemetry.  Will give 1 g magnesium.  Avoid multiple QTC prolonging medications.  Repeat EKG in a.m.      Essential hypertension, not well controlled  Blood pressure is suboptimally controlled.  She needs aggressive management in the presence of abnormal myocardial perfusion study.  Recommend to use angiotensin receptor blockers to optimize the therapy.  Losartan can be started at 50 mg a day and this could be increased arm to 100 mg daily as tolerated or 50 mg b.i.d..  The goal is to keep her blood  pressure below 130 at all times.    Cardiomyopathy  Newly diagnosed cardiomyopathy with markedly low ejection fraction.  In view of multiple risk factors further delineation of anatomy of her coronary arteries is important at this point.  Coronary angiogram in walls of risk of potential complications.  These include risk of heart attack stroke and even death in all, patient is undergoing the procedure and access complications including bleeding and rarely damage to the artery can occur.  Meantime optimize her preload and afterload reducing agents as tolerated.  Recommend to use guideline mediated therapy may need a LifeVest upon discharge if LV dysfunction is confirmed on other modalities..    HLD (hyperlipidemia)  Needs aggressive lipid management the goal is to keep her LDL cholesterol 70 or less and non-HDL cholesterol below 100.  Optimize medical therapy as well as dietary control.    Type 2 diabetes mellitus, with long-term current use of insulin  Optimize is a done diabetic management this puts her at high risk for multiple complications.  Management per hospitalist the primary goal is to keep her hemoglobin A1c 6.5     Tobacco abuse  Strongly encouraged tobacco cessation may offer alternate therapies while she is attempt to stop tobacco usage.    Antritis/duodenitis with reported ulcer  Management per hospitalist    COPD (chronic obstructive pulmonary disease)  Optimize her pulmonary management.  Continue present        RECOMMENDATIONS:  Coronary angiography for more definite diagnosis.  Discussed with patient the risks and benefits of the procedure including, but not limited to, the following 1:1000 risk of Heart attack, stroke and death with 3-5% Risk of bleeding, vessel damage, and the need for emergent CABG Surgery.  All questions have been answered to patient's satisfaction.  Informed consent obtained  Will keep her nothing by mouth post midnight and proceed with the coronary angiography possible PCI in  the morning.        Doug Kendall MD  [facility]  Department of Cardiology  Date of Service: 11/22/2020  6:15 PM

## 2020-11-23 NOTE — ASSESSMENT & PLAN NOTE
Strongly encouraged tobacco cessation may offer alternate therapies while she is attempt to stop tobacco usage.

## 2020-11-23 NOTE — ASSESSMENT & PLAN NOTE
Stress test with EF of 19% with marked global hypokinesia with some element of reversibility  Denies any prior history of same.  Denies any recreational drug use  No significant arrhythmias noted on telemetry but increased risk with cardiomyopathy  Continue metoprolol  Follow-up on complete echocardiogram  Keep potassium greater than 4, magnesium greater than 2  Medication optimization as per Cardiology recommendations

## 2020-11-23 NOTE — ASSESSMENT & PLAN NOTE
Needs aggressive lipid management the goal is to keep her LDL cholesterol 70 or less and non-HDL cholesterol below 100.  Optimize medical therapy as well as dietary control.

## 2020-11-23 NOTE — ASSESSMENT & PLAN NOTE
Optimize is a done diabetic management this puts her at high risk for multiple complications.  Management per hospitalist the primary goal is to keep her hemoglobin A1c 6.5

## 2020-11-23 NOTE — ASSESSMENT & PLAN NOTE
States she had recent outpatient EGD and was told ulcer.  CT abdomen and pelvis with thickened antrum concerning for antritis and duodenitis.  Continue PPI therapy and Carafate  Will need to follow-up on outpatient biopsy  Outpatient GI follow-up

## 2020-11-23 NOTE — PROGRESS NOTES
[facility] Medicine  Progress Note    Patient Name: Delores Fox  MRN: [id number]  Patient Class: IP- Inpatient   Admission Date: 11/21/2020  Length of Stay: 0 days  Attending Physician: Kassandra Mota MD  Primary Care Provider: Carey Liu MD        Subjective:     Principal Problem:Chest pain        HPI:  Ms. Fox is a 55 year old female with a history of HTN, HLD, chronic hip pain with chronic opioid use, nicotine dependence, DM, and esophagitis who presents today with complaints of epigastric pain. It is moderate. It is associated with N/V and constipation. She denies fever, chills, cough, dizziness, sweats, SOB, or LOC. She describes the pain in her upper abd and radiates to her chest. It is worse with eating and laying flat. It is associated with bilious, nonbloody vomit. She had an EGD for this on Thursday with Dr. Valencia and states she is awaiting the results, but was told she had ulcers. She's had multiple ED visits for this pain, but this is the first time she's complained that it is radiating into her chest. She has never had a stress test.      Overview/Hospital Course:  Patient admitted with epigastric abdominal pain radiating into her chest associated with nausea, vomiting and constipation.  States she had EGD performed last Thursday, official report pending, was told ulcer and biopsies were sent, states she was taking pantoprazole orally once daily.  Denies any known history of cardiac disease, no prior cardiac testing.  Current smoker half per day.  Given report of abdominal pain radiating into her chest with cardiovascular risk factors, she was admitted for ACS evaluation.  Blood pressure was suboptimal, troponin did increase to 0.032, BNP 1 9 1, CTA abdomen and pelvis with thickening of the antrum with duodenitis/antritis, changed to IV b.i.d. PPI with addition of Carafate.  Stress test performed on 11/22 marked global hypokinesia with EF of 19% with small  matched defect in the mid inferior consistent with prior infarct, small defect in mid anterior septal wall concerning for reversible ischemia.  She reports symptoms of improved, had several bowel movements, eager for discharge.  Discussed stress test findings in need for further evaluation.  Complete echo ordered, cardiology consulted.  On 11/23 patient underwent right and left heart catheterization, no interventions performed, report as below.    Assessment:  1. RCA  with left to right collaterals  2. LVEDP 12 mmHg  3. LVEF 25%   4. PA pressure 31/16 mmHg with mean pressure of 22 mmHg  5. PCWP 13 mmHg  6. Cardiac output of 4.4 L/min by thermodilution      Recommendations:  1. Optimize medical management of CAD  2. Aggressive risk factor and lifestyle modifications  3. Routine post cath care and monitoring  4. Outpatient follow-up with Cardiology      Interval History:  Patient is seen after left and right heart catheterization.  No interventions performed.  Elevated pressures with recommendations for medical management.  Denies any chest pain, shortness of breath, abdominal pain.  Denies any pain over right groin angio access site.  Vital stable.  Patient has just eaten Darren[employer].  Discussed with nursing.  Patient is eager for discharge tomorrow.      Review of Systems   Constitutional: Negative for chills and fever.   Respiratory: Negative for shortness of breath.    Cardiovascular: Negative for chest pain.   Gastrointestinal: Negative for abdominal pain, nausea and vomiting.   Genitourinary: Negative for difficulty urinating.   Neurological: Negative for headaches.     Objective:     Vital Signs (Most Recent):  Temp: 97.8 °F (36.6 °C) (11/23/20 1119)  Pulse: (!) 58 (11/23/20 1500)  Resp: 18 (11/23/20 1500)  BP: (!) 128/59 (11/23/20 1500)  SpO2: 100 % (11/23/20 1500) Vital Signs (24h Range):  Temp:  [97.7 °F (36.5 °C)-99.1 °F (37.3 °C)] 97.8 °F (36.6 °C)  Pulse:  [55-88] 58  Resp:  [12-20] 18  SpO2:  [94 %-100  %] 100 %  BP: (105-153)/(59-78) 128/59     Weight: 78 kg (171 lb 15.3 oz)  Body mass index is 26.93 kg/m².    Intake/Output Summary (Last 24 hours) at 11/23/2020 1551  Last data filed at 11/23/2020 0921  Gross per 24 hour   Intake 1500 ml   Output 200 ml   Net 1300 ml      Physical Exam  Vitals signs and nursing note reviewed.   Constitutional:       General: She is not in acute distress.     Appearance: Normal appearance. She is normal weight. She is not ill-appearing, toxic-appearing or diaphoretic.   HENT:      Head: Normocephalic and atraumatic.      Mouth/Throat:      Mouth: Mucous membranes are moist.   Eyes:      General:         Right eye: No discharge.         Left eye: No discharge.      Conjunctiva/sclera: Conjunctivae normal.      Pupils: Pupils are equal, round, and reactive to light.   Cardiovascular:      Rate and Rhythm: Normal rate and regular rhythm.      Comments: No lower extremity edema  Pulmonary:      Effort: Pulmonary effort is normal. No respiratory distress.      Breath sounds: Normal breath sounds. No stridor. No wheezing or rhonchi.      Comments: On supplemental oxygen  Abdominal:      General: Bowel sounds are normal. There is no distension.      Tenderness: There is no guarding.   Genitourinary:     Comments: No suprapubic fullness or tenderness  Skin:     General: Skin is warm.      Comments: R groin angio access site with clean dry intact dressing, soft   Neurological:      Mental Status: She is alert and oriented to person, place, and time. Mental status is at baseline.   Psychiatric:         Mood and Affect: Mood normal.         Significant Labs:   BMP:   Recent Labs   Lab 11/23/20 0425   *      K 3.7      CO2 29   BUN 17   CREATININE 1.3   CALCIUM 8.7   MG 2.2     CBC:   Recent Labs   Lab 11/22/20 0447 11/23/20 0425   WBC 8.20 10.91   HGB 12.1 11.6*   HCT 39.4 38.1   * 422*     CMP:   Recent Labs   Lab 11/22/20 0447 11/23/20 0425    140   K 3.7  3.7    104   CO2 27 29   * 123*   BUN 14 17   CREATININE 1.2 1.3   CALCIUM 8.9 8.7   ANIONGAP 8 7*   EGFRNONAA 51.7* 47.0*     Magnesium:   Recent Labs   Lab 11/22/20  0447 11/23/20  0425   MG 2.1 2.2     POCT Glucose: No results for input(s): POCTGLUCOSE in the last 48 hours.  Troponin:   Recent Labs   Lab 11/21/20 1952 11/21/20  2324 11/23/20  0425   TROPONINI <0.030 0.032 <0.030     TSH:   Recent Labs   Lab 11/21/20  1250   TSH 0.960     Urine Culture: No results for input(s): LABURIN in the last 48 hours.  Urine Studies: No results for input(s): COLORU, APPEARANCEUA, PHUR, SPECGRAV, PROTEINUA, GLUCUA, KETONESU, BILIRUBINUA, OCCULTUA, NITRITE, UROBILINOGEN, LEUKOCYTESUR, RBCUA, WBCUA, BACTERIA, SQUAMEPITHEL, HYALINECASTS in the last 48 hours.    Invalid input(s): WRIGHTSUR  All pertinent labs within the past 24 hours have been reviewed.    Significant Imaging: I have reviewed all pertinent imaging results/findings within the past 24 hours.      Assessment/Plan:      * Chest pain with abnormal stress test  Presenting with epigastric abdominal pain radiating into chest, characteristics home all suggestive of GI etiology.  However has now abnormal stress test.  Does have significant cardiovascular risk factors including smoking, hypertension, diabetes.  On 11/22 nuclear stress test with marked global hypokinesia with EF of 19% with small matched defect in the mid inferior consistent with prior infarct, small defect in mid anterior septal wall concerning for reversible ischemia.  On 11/23 underwent right and left heart catheterization as outlined below without any interventions  Routine post left heart catheterization monitoring  Medication optimization  A.m. labs  Counseling on heart healthy diet  Appreciate cardiology input    Assessment:  1. RCA  with left to right collaterals  2. LVEDP 12 mmHg  3. LVEF 25%   4. PA pressure 31/16 mmHg with mean pressure of 22 mmHg  5. PCWP 13 mmHg  6. Cardiac  output of 4.4 L/min by thermodilution      Recommendations:  1. Optimize medical management of CAD  2. Aggressive risk factor and lifestyle modifications  3. Routine post cath care and monitoring  4. Outpatient follow-up with Cardiology          Cardiomyopathy  Stress test with EF of 19% with marked global hypokinesia with some element of reversibility  Denies any prior history of same.  Denies any recreational drug use  No significant arrhythmias noted on telemetry but increased risk with cardiomyopathy  Continue metoprolol  Follow-up on complete echocardiogram  Keep potassium greater than 4, magnesium greater than 2  Medication optimization as per Cardiology recommendations      COPD (chronic obstructive pulmonary disease)  No evidence of acute exacerbation.  Continued home medication      Tobacco abuse  Current smoker half pack per day.  Smoking cessation counseling    Type 2 diabetes mellitus, with long-term current use of insulin  Holding oral hypoglycemics.  Insulin sliding scale with Accu-Cheks and diabetic cardiac diet  As needed hypoglycemic measures      HLD (hyperlipidemia)  Chronic medical condition      Antritis/duodenitis with reported ulcer  States she had recent outpatient EGD and was told ulcer.  CT abdomen and pelvis with thickened antrum concerning for antritis and duodenitis.  Continue PPI therapy and Carafate  Will need to follow-up on outpatient biopsy  Outpatient GI follow-up      Essential hypertension  Continue home beta-blocker.  Monitor and adjust as needed        VTE Risk Mitigation (From admission, onward)         Ordered     IP VTE LOW RISK PATIENT  Once      11/21/20 1934     Place sequential compression device  Until discontinued      11/21/20 1934                Discharge Planning   KEAGAN:      Code Status: Full Code   Is the patient medically ready for discharge?:     Reason for patient still in hospital (select all that apply): Patient trending condition                     Kassandra DUVALL  MD Svetlana  Department of Hospital Medicine   [facility]

## 2020-11-23 NOTE — SUBJECTIVE & OBJECTIVE
Interval History:  Patient is seen after left and right heart catheterization.  No interventions performed.  Elevated pressures with recommendations for medical management.  Denies any chest pain, shortness of breath, abdominal pain.  Denies any pain over right groin angio access site.  Vital stable.  Patient has just eaten [employer].  Discussed with nursing.  Patient is eager for discharge tomorrow.      Review of Systems   Constitutional: Negative for chills and fever.   Respiratory: Negative for shortness of breath.    Cardiovascular: Negative for chest pain.   Gastrointestinal: Negative for abdominal pain, nausea and vomiting.   Genitourinary: Negative for difficulty urinating.   Neurological: Negative for headaches.     Objective:     Vital Signs (Most Recent):  Temp: 97.8 °F (36.6 °C) (11/23/20 1119)  Pulse: (!) 58 (11/23/20 1500)  Resp: 18 (11/23/20 1500)  BP: (!) 128/59 (11/23/20 1500)  SpO2: 100 % (11/23/20 1500) Vital Signs (24h Range):  Temp:  [97.7 °F (36.5 °C)-99.1 °F (37.3 °C)] 97.8 °F (36.6 °C)  Pulse:  [55-88] 58  Resp:  [12-20] 18  SpO2:  [94 %-100 %] 100 %  BP: (105-153)/(59-78) 128/59     Weight: 78 kg (171 lb 15.3 oz)  Body mass index is 26.93 kg/m².    Intake/Output Summary (Last 24 hours) at 11/23/2020 1551  Last data filed at 11/23/2020 0921  Gross per 24 hour   Intake 1500 ml   Output 200 ml   Net 1300 ml      Physical Exam  Vitals signs and nursing note reviewed.   Constitutional:       General: She is not in acute distress.     Appearance: Normal appearance. She is normal weight. She is not ill-appearing, toxic-appearing or diaphoretic.   HENT:      Head: Normocephalic and atraumatic.      Mouth/Throat:      Mouth: Mucous membranes are moist.   Eyes:      General:         Right eye: No discharge.         Left eye: No discharge.      Conjunctiva/sclera: Conjunctivae normal.      Pupils: Pupils are equal, round, and reactive to light.   Cardiovascular:      Rate and Rhythm: Normal rate and  regular rhythm.      Comments: No lower extremity edema  Pulmonary:      Effort: Pulmonary effort is normal. No respiratory distress.      Breath sounds: Normal breath sounds. No stridor. No wheezing or rhonchi.      Comments: On supplemental oxygen  Abdominal:      General: Bowel sounds are normal. There is no distension.      Tenderness: There is no guarding.   Genitourinary:     Comments: No suprapubic fullness or tenderness  Skin:     General: Skin is warm.      Comments: R groin angio access site with clean dry intact dressing, soft   Neurological:      Mental Status: She is alert and oriented to person, place, and time. Mental status is at baseline.   Psychiatric:         Mood and Affect: Mood normal.         Significant Labs:   BMP:   Recent Labs   Lab 11/23/20 0425   *      K 3.7      CO2 29   BUN 17   CREATININE 1.3   CALCIUM 8.7   MG 2.2     CBC:   Recent Labs   Lab 11/22/20 0447 11/23/20 0425   WBC 8.20 10.91   HGB 12.1 11.6*   HCT 39.4 38.1   * 422*     CMP:   Recent Labs   Lab 11/22/20 0447 11/23/20  0425    140   K 3.7 3.7    104   CO2 27 29   * 123*   BUN 14 17   CREATININE 1.2 1.3   CALCIUM 8.9 8.7   ANIONGAP 8 7*   EGFRNONAA 51.7* 47.0*     Magnesium:   Recent Labs   Lab 11/22/20 0447 11/23/20  0425   MG 2.1 2.2     POCT Glucose: No results for input(s): POCTGLUCOSE in the last 48 hours.  Troponin:   Recent Labs   Lab 11/21/20 1952 11/21/20  2324 11/23/20 0425   TROPONINI <0.030 0.032 <0.030     TSH:   Recent Labs   Lab 11/21/20  1250   TSH 0.960     Urine Culture: No results for input(s): LABURIN in the last 48 hours.  Urine Studies: No results for input(s): COLORU, APPEARANCEUA, PHUR, SPECGRAV, PROTEINUA, GLUCUA, KETONESU, BILIRUBINUA, OCCULTUA, NITRITE, UROBILINOGEN, LEUKOCYTESUR, RBCUA, WBCUA, BACTERIA, SQUAMEPITHEL, HYALINECASTS in the last 48 hours.    Invalid input(s): WRIGHTSUR  All pertinent labs within the past 24 hours have been  reviewed.    Significant Imaging: I have reviewed all pertinent imaging results/findings within the past 24 hours.

## 2020-11-23 NOTE — PLAN OF CARE
11/23/20 0857   Patient Assessment/Suction   Level of Consciousness (AVPU) alert   Respiratory Effort Normal;Unlabored   All Lung Fields Breath Sounds clear   PRE-TX-O2   O2 Device (Oxygen Therapy) room air   SpO2 98 %   Pulse Oximetry Type Intermittent   $ Pulse Oximetry - Multiple Charge Pulse Oximetry - Multiple   Pulse 65   Resp 18   Aerosol Therapy   $ Aerosol Therapy Charges PRN treatment not required   Respiratory Treatment Status (SVN) PRN treatment not required   Inhaler   $ Inhaler Charges MDI (Metered Dose Inahler) Treatment   Daily Review of Necessity (Inhaler) completed   Respiratory Treatment Status (Inhaler) given   Treatment Route (Inhaler) mouthpiece   Patient Position (Inhaler) HOB elevated   Post Treatment Assessment (Inhaler) breath sounds improved   Signs of Intolerance (Inhaler) none   Respiratory Evaluation   $ Care Plan Tech Time 15 min

## 2020-11-23 NOTE — PLAN OF CARE
11/22/20 2101   Patient Assessment/Suction   Level of Consciousness (AVPU) alert   Respiratory Effort Normal;Unlabored   Expansion/Accessory Muscles/Retractions no use of accessory muscles   PRE-TX-O2   O2 Device (Oxygen Therapy) room air   SpO2 99 %   Pulse Oximetry Type Intermittent   $ Pulse Oximetry - Multiple Charge Pulse Oximetry - Multiple   Pulse 88   Resp 12   Aerosol Therapy   $ Aerosol Therapy Charges PRN treatment not required   Daily Review of Necessity (SVN) completed   Respiratory Treatment Status (SVN) PRN treatment not required   Respiratory Evaluation   $ Care Plan Tech Time 15 min   Evaluation For   (care plan)   continue tx. As ordered

## 2020-11-23 NOTE — ASSESSMENT & PLAN NOTE
Blood pressure is suboptimally controlled.  She needs aggressive management in the presence of abnormal myocardial perfusion study.  Recommend to use angiotensin receptor blockers to optimize the therapy.  Losartan can be started at 50 mg a day and this could be increased arm to 100 mg daily as tolerated or 50 mg b.i.d..  The goal is to keep her blood pressure below 130 at all times.

## 2020-11-23 NOTE — ASSESSMENT & PLAN NOTE
Presenting with epigastric abdominal pain radiating into chest, characteristics home all suggestive of GI etiology.  However has now abnormal stress test.  Does have significant cardiovascular risk factors including smoking, hypertension, diabetes.  On 11/22 nuclear stress test with marked global hypokinesia with EF of 19% with small matched defect in the mid inferior consistent with prior infarct, small defect in mid anterior septal wall concerning for reversible ischemia.  On 11/23 underwent right and left heart catheterization as outlined below without any interventions  Routine post left heart catheterization monitoring  Medication optimization  A.m. labs  Counseling on heart healthy diet  Appreciate cardiology input    Assessment:  1. RCA  with left to right collaterals  2. LVEDP 12 mmHg  3. LVEF 25%   4. PA pressure 31/16 mmHg with mean pressure of 22 mmHg  5. PCWP 13 mmHg  6. Cardiac output of 4.4 L/min by thermodilution      Recommendations:  1. Optimize medical management of CAD  2. Aggressive risk factor and lifestyle modifications  3. Routine post cath care and monitoring  4. Outpatient follow-up with Cardiology

## 2020-11-24 VITALS
OXYGEN SATURATION: 98 % | SYSTOLIC BLOOD PRESSURE: 154 MMHG | RESPIRATION RATE: 18 BRPM | DIASTOLIC BLOOD PRESSURE: 70 MMHG | TEMPERATURE: 98 F | BODY MASS INDEX: 26.47 KG/M2 | HEIGHT: 67 IN | HEART RATE: 70 BPM | WEIGHT: 168.63 LBS

## 2020-11-24 PROBLEM — R07.9 CHEST PAIN: Status: RESOLVED | Noted: 2020-11-21 | Resolved: 2020-11-24

## 2020-11-24 PROBLEM — I51.9 LV DYSFUNCTION: Chronic | Status: ACTIVE | Noted: 2020-11-24

## 2020-11-24 LAB
ANION GAP SERPL CALC-SCNC: 8 MMOL/L (ref 8–16)
BASOPHILS # BLD AUTO: 0.03 K/UL (ref 0–0.2)
BASOPHILS NFR BLD: 0.3 % (ref 0–1.9)
BUN SERPL-MCNC: 14 MG/DL (ref 6–20)
CALCIUM SERPL-MCNC: 8.5 MG/DL (ref 8.7–10.5)
CHLORIDE SERPL-SCNC: 106 MMOL/L (ref 95–110)
CO2 SERPL-SCNC: 27 MMOL/L (ref 23–29)
CREAT SERPL-MCNC: 1.1 MG/DL (ref 0.5–1.4)
DIFFERENTIAL METHOD: ABNORMAL
EOSINOPHIL # BLD AUTO: 0.1 K/UL (ref 0–0.5)
EOSINOPHIL NFR BLD: 0.9 % (ref 0–8)
ERYTHROCYTE [DISTWIDTH] IN BLOOD BY AUTOMATED COUNT: 15.7 % (ref 11.5–14.5)
EST. GFR  (AFRICAN AMERICAN): >60 ML/MIN/1.73 M^2
EST. GFR  (NON AFRICAN AMERICAN): 57.5 ML/MIN/1.73 M^2
GLUCOSE SERPL-MCNC: 110 MG/DL (ref 70–110)
GLUCOSE SERPL-MCNC: 120 MG/DL (ref 70–110)
HCT VFR BLD AUTO: 37 % (ref 37–48.5)
HGB BLD-MCNC: 11.5 G/DL (ref 12–16)
IMM GRANULOCYTES # BLD AUTO: 0.01 K/UL (ref 0–0.04)
IMM GRANULOCYTES NFR BLD AUTO: 0.1 % (ref 0–0.5)
LYMPHOCYTES # BLD AUTO: 4.1 K/UL (ref 1–4.8)
LYMPHOCYTES NFR BLD: 42.7 % (ref 18–48)
MAGNESIUM SERPL-MCNC: 1.9 MG/DL (ref 1.6–2.6)
MCH RBC QN AUTO: 26.7 PG (ref 27–31)
MCHC RBC AUTO-ENTMCNC: 31.1 G/DL (ref 32–36)
MCV RBC AUTO: 86 FL (ref 82–98)
MONOCYTES # BLD AUTO: 0.7 K/UL (ref 0.3–1)
MONOCYTES NFR BLD: 7.4 % (ref 4–15)
NEUTROPHILS # BLD AUTO: 4.6 K/UL (ref 1.8–7.7)
NEUTROPHILS NFR BLD: 48.6 % (ref 38–73)
NRBC BLD-RTO: 0 /100 WBC
PLATELET # BLD AUTO: 375 K/UL (ref 150–350)
PMV BLD AUTO: 9.3 FL (ref 9.2–12.9)
POTASSIUM SERPL-SCNC: 4 MMOL/L (ref 3.5–5.1)
RBC # BLD AUTO: 4.31 M/UL (ref 4–5.4)
SODIUM SERPL-SCNC: 141 MMOL/L (ref 136–145)
WBC # BLD AUTO: 9.56 K/UL (ref 3.9–12.7)

## 2020-11-24 PROCEDURE — 94761 N-INVAS EAR/PLS OXIMETRY MLT: CPT

## 2020-11-24 PROCEDURE — 83735 ASSAY OF MAGNESIUM: CPT

## 2020-11-24 PROCEDURE — 25000003 PHARM REV CODE 250: Performed by: INTERNAL MEDICINE

## 2020-11-24 PROCEDURE — 63600175 PHARM REV CODE 636 W HCPCS: Performed by: INTERNAL MEDICINE

## 2020-11-24 PROCEDURE — 82962 GLUCOSE BLOOD TEST: CPT

## 2020-11-24 PROCEDURE — 99900035 HC TECH TIME PER 15 MIN (STAT)

## 2020-11-24 PROCEDURE — 36415 COLL VENOUS BLD VENIPUNCTURE: CPT

## 2020-11-24 PROCEDURE — 80048 BASIC METABOLIC PNL TOTAL CA: CPT

## 2020-11-24 PROCEDURE — 94640 AIRWAY INHALATION TREATMENT: CPT

## 2020-11-24 PROCEDURE — 85025 COMPLETE CBC W/AUTO DIFF WBC: CPT

## 2020-11-24 PROCEDURE — C9113 INJ PANTOPRAZOLE SODIUM, VIA: HCPCS | Performed by: INTERNAL MEDICINE

## 2020-11-24 RX ORDER — METOPROLOL SUCCINATE 50 MG/1
50 TABLET, EXTENDED RELEASE ORAL DAILY
Qty: 30 TABLET | Refills: 0 | Status: SHIPPED | OUTPATIENT
Start: 2020-11-24 | End: 2021-02-08 | Stop reason: SDUPTHER

## 2020-11-24 RX ORDER — NAPROXEN SODIUM 220 MG/1
81 TABLET, FILM COATED ORAL DAILY
Qty: 30 TABLET | Refills: 0 | Status: ON HOLD | OUTPATIENT
Start: 2020-11-25 | End: 2023-03-02 | Stop reason: SDUPTHER

## 2020-11-24 RX ORDER — PANTOPRAZOLE SODIUM 40 MG/1
40 TABLET, DELAYED RELEASE ORAL 2 TIMES DAILY
Qty: 30 TABLET | Refills: 3 | Status: SHIPPED | OUTPATIENT
Start: 2020-11-24 | End: 2021-02-08 | Stop reason: SDUPTHER

## 2020-11-24 RX ORDER — LOSARTAN POTASSIUM 25 MG/1
25 TABLET ORAL DAILY
Qty: 30 TABLET | Refills: 0 | Status: SHIPPED | OUTPATIENT
Start: 2020-11-24 | End: 2020-12-09

## 2020-11-24 RX ADMIN — PREGABALIN 75 MG: 75 CAPSULE ORAL at 09:11

## 2020-11-24 RX ADMIN — FLUTICASONE FUROATE AND VILANTEROL TRIFENATATE 1 PUFF: 200; 25 POWDER RESPIRATORY (INHALATION) at 08:11

## 2020-11-24 RX ADMIN — METOPROLOL SUCCINATE 50 MG: 50 TABLET, FILM COATED, EXTENDED RELEASE ORAL at 09:11

## 2020-11-24 RX ADMIN — OXYCODONE AND ACETAMINOPHEN 1 TABLET: 5; 325 TABLET ORAL at 04:11

## 2020-11-24 RX ADMIN — SUCRALFATE 1 G: 1 TABLET ORAL at 06:11

## 2020-11-24 RX ADMIN — DIPHENHYDRAMINE HYDROCHLORIDE 25 MG: 25 CAPSULE ORAL at 11:11

## 2020-11-24 RX ADMIN — ASPIRIN 81 MG: 81 TABLET, CHEWABLE ORAL at 09:11

## 2020-11-24 RX ADMIN — PANTOPRAZOLE SODIUM 40 MG: 40 INJECTION, POWDER, FOR SOLUTION INTRAVENOUS at 09:11

## 2020-11-24 NOTE — PROGRESS NOTES
"[facility]  Adult Nutrition   Progress Note (Nutrition Education)     SUMMARY     Recommendations/Interventions:    1. Provided diet education for heart healthy nutrition therapy.   Goals: 1. Pt expressed understanding of diet recommendations.  Goal met    Interval History:  · Pt seen 2' to consult for diet education. Provided written diet education (heart healthy nutrition therapy, heart healthy salt substitutions and salt free seasonings, heart healthy label reading tips). RD contact card was given to patient. Pt listened to education, expect fair compliance.     Reason for Assessment    Reason For Assessment: consult, other (see comments)(Education)  Diagnosis: other (see comments)(Chest pain)  Relevant Medical History: Essential hypertesnion, antritis/duodenitis with repored ulcer, cardiomyopathy, COPD, LV dysfunction, HLD, Type 2 diabetes millitus wit long term current use of insulin  Interdisciplinary Rounds: did not attend    Nutrition Risk Screen    Nutrition Risk Screen: no indicators present    Nutrition/Diet History    Food Allergies: NKFA    Anthropometrics    Temp: 98.4 °F (36.9 °C)  Height Method: Stated  Height: 5' 7" (170.2 cm)  Height (inches): 67 in  Weight Method: Standard Scale  Weight: 76.5 kg (168 lb 10.4 oz)  Weight (lb): 168.65 lb  Ideal Body Weight (IBW), Female: 135 lb  % Ideal Body Weight, Female (lb): 126.73 %  BMI (Calculated): 26.4       Weight History:  Wt Readings from Last 10 Encounters:   11/24/20 76.5 kg (168 lb 10.4 oz)   11/23/20 78 kg (171 lb 15.3 oz)   11/22/20 77.6 kg (171 lb 1.2 oz)   11/11/20 72.6 kg (160 lb)   11/09/20 72.6 kg (160 lb)     Lab/Procedures/Meds: Pertinent Labs Reviewed     11/22/2020 04:47 11/23/2020 04:25 11/24/2020 04:29   Sodium 138 140 141   Potassium 3.7 3.7 4.0   Chloride 103 104 106   CO2 27 29 27   Anion Gap 8 7 (L) 8   BUN 14 17 14   Creatinine 1.2 1.3 1.1   eGFR if non  51.7 (A) 47.0 (A) 57.5 (A)   eGFR if African " American 59.6 (A) 54.1 (A) >60.0   Glucose 135 (H) 123 (H) 110   Calcium 8.9 8.7 8.5 (L)   Magnesium 2.1 2.2 1.9       Medications:Pertinent Medications Reviewed  Scheduled Meds:   aspirin  81 mg Oral Daily    atorvastatin  40 mg Oral Daily    fluticasone furoate-vilanteroL  1 puff Inhalation Daily    losartan  25 mg Oral QHS    metoprolol succinate  50 mg Oral Daily    nicotine  1 patch Transdermal Q24H    pantoprazole  40 mg Intravenous BID    pregabalin  75 mg Oral BID    sucralfate  1 g Oral QID (AC & HS)    zolpidem  10 mg Oral QHS     Continuous Infusions:  PRN Meds:.acetaminophen, albuterol sulfate, calcium chloride IVPB, calcium chloride IVPB, calcium chloride IVPB, dextrose 50%, dextrose 50%, diphenhydrAMINE, glucagon (human recombinant), glucose, glucose, insulin aspart U-100, labetalol, magnesium oxide, magnesium sulfate IVPB, magnesium sulfate IVPB, magnesium sulfate IVPB, magnesium sulfate IVPB, melatonin, ondansetron, ondansetron, oxyCODONE-acetaminophen, potassium chloride in water, potassium chloride in water, potassium chloride in water, potassium chloride in water, potassium chloride, potassium chloride, potassium chloride, potassium chloride, sodium chloride 0.9%, sodium phosphate IVPB, sodium phosphate IVPB, sodium phosphate IVPB, sodium phosphate IVPB, sodium phosphate IVPB    Estimated/Assessed Needs    Weight Used For Calorie Calculations: 76.5 kg (168 lb 10.4 oz)  Energy Calorie Requirements (kcal): 2686-4046 kcal/day (25-30 kcal/kg)  Energy Need Method: Kcal/kg  Protein Requirements: 61-77 g/day (0.8-1 g/kg)  Weight Used For Protein Calculations: 76.5 kg (168 lb 10.4 oz)  Fluid Requirements (mL): 4402-7164 mL  Estimated Fluid Requirement Method: RDA Method    Nutrition Prescription Ordered    Current Diet Order: Diabetic diet, 1800 calorie    Evaluation of Received Nutrient/Fluid Intake    Energy Calories Required: other (see comments)(didn't fully assess due to discharge)  Protein  Required: other (see comments)(didn't fully assess due to discharge)  Fluid Required: other (see comments)(didn't fully assess due to discharge)  % Intake of Estimated Energy Needs: Other pt was not fully assessed due to discharge  % Meal Intake: Other: pt was not fully assessed due to discharge  No intake or output data in the 24 hours ending 11/24/20 2622   Nutrition Risk    Level of Risk/Frequency of Follow-up: moderate     Monitor and Evaluation    Food and Nutrient Intake: food and beverage intake  Food and Nutrient Adminstration: diet order  Knowledge/Beliefs/Attitudes: beliefs and attitudes, food and nutrition knowledge/skill  Physical Activity and Function: nutrition-related ADLs and IADLs, factors affecting access to physical activity  Anthropometric Measurements: weight, body mass index, weight change  Biochemical Data, Medical Tests and Procedures: electrolyte and renal panel, gastrointestinal profile, inflammatory profile, glucose/endocrine profile, lipid profile  Nutrition-Focused Physical Findings: overall appearance     Nutrition Follow-Up    RD Follow-up?: Gabi Arriaza 11/24/20204:36 PM

## 2020-11-24 NOTE — PLAN OF CARE
11/24/20 0941   Discharge Reassessment   Assessment Type Discharge Planning Reassessment   Anticipated Discharge Disposition Home   Post-Acute Status   Post-Acute Authorization HME   HME Status Referrals Sent   Discharge Delays None known at this time     SW received consult for [employer] [facility].  Faxed order packet to [employer] once formal order was placed by Dr. Mota.  Contacted Myriam with [employer] ([phone]) and advised her that referral was sent.  Will await response.     Gertrude Cespedes LCSW  Case Management  Ext. [phone]

## 2020-11-24 NOTE — PLAN OF CARE
11/24/20 0828   Patient Assessment/Suction   Level of Consciousness (AVPU) alert   Respiratory Effort Normal;Unlabored   All Lung Fields Breath Sounds clear   PRE-TX-O2   O2 Device (Oxygen Therapy) room air   SpO2 97 %   Pulse Oximetry Type Intermittent   $ Pulse Oximetry - Multiple Charge Pulse Oximetry - Multiple   Pulse 82   Resp 18   Aerosol Therapy   $ Aerosol Therapy Charges PRN treatment not required   Respiratory Treatment Status (SVN) PRN treatment not required   Inhaler   $ Inhaler Charges MDI (Metered Dose Inahler) Treatment   Daily Review of Necessity (Inhaler) completed   Respiratory Treatment Status (Inhaler) given;mouth rinsed post treatment   Treatment Route (Inhaler) mouthpiece   Patient Position (Inhaler) HOB elevated   Post Treatment Assessment (Inhaler) breath sounds improved   Signs of Intolerance (Inhaler) none   Respiratory Evaluation   $ Care Plan Tech Time 15 min

## 2020-11-24 NOTE — PLAN OF CARE
11/24/20 1405   Final Note   Assessment Type Final Discharge Note   Anticipated Discharge Disposition Home   Post-Acute Status   Post-Acute Authorization HME   HME Status Set-up Complete   Discharge Delays None known at this time     SW received notification that Pt's Life Vest has been approved.  Spoke to Myriam with [employer], who asked that Pt remain at [facility] until it can be delivered to room within the hour.  No further needs addressed at this time.  Pt to discharge home with [employer] Life Vest.    Gertrude Cespedes LCSW  Case Management  Ext. 1938

## 2020-11-24 NOTE — DISCHARGE SUMMARY
[facility] Medicine  Discharge Summary      Patient Name: Delores Fox  MRN: [id number]  Admission Date: 11/21/2020  Hospital Length of Stay: 1 days  Discharge Date and Time:  11/24/2020 9:43 AM  Attending Physician: Kassandra Mota MD   Discharging Provider: Kassandra Mota MD  Primary Care Provider: Carey Liu MD      HPI:   Ms. Fox is a 55 year old female with a history of HTN, HLD, chronic hip pain with chronic opioid use, nicotine dependence, DM, and esophagitis who presents today with complaints of epigastric pain. It is moderate. It is associated with N/V and constipation. She denies fever, chills, cough, dizziness, sweats, SOB, or LOC. She describes the pain in her upper abd and radiates to her chest. It is worse with eating and laying flat. It is associated with bilious, nonbloody vomit. She had an EGD for this on Thursday with Dr. Valencia and states she is awaiting the results, but was told she had ulcers. She's had multiple ED visits for this pain, but this is the first time she's complained that it is radiating into her chest. She has never had a stress test.      Procedure(s) (LRB):  CATHETERIZATION, HEART, BOTH LEFT AND RIGHT (N/A)      Hospital Course:   Patient admitted with epigastric abdominal pain radiating into her chest associated with nausea, vomiting and constipation.  States she had EGD performed last Thursday, official report pending, was told ulcer and biopsies were sent, states she was taking pantoprazole orally once daily.  Denies any known history of cardiac disease, no prior cardiac testing.  Current smoker half per day.  Given report of abdominal pain radiating into her chest with cardiovascular risk factors, she was admitted for ACS evaluation.  Blood pressure was suboptimal, troponin did increase to 0.032, , CTA abdomen and pelvis with thickening of the antrum with duodenitis/antritis, changed to IV b.i.d. PPI with addition of  Carafate.  Stress test performed on 11/22 marked global hypokinesia with EF of 19% with small matched defect in the mid inferior consistent with prior infarct, small defect in mid anterior septal wall concerning for reversible ischemia.  She reports symptoms of improved, had several bowel movements, eager for discharge.  Discussed stress test findings and need for further evaluation.  Cardiology was consulted.  On 11/23 patient underwent right and left heart catheterization, no interventions performed, report as below.  Complete echocardiogram with EF of 30% with grade 1 diastolic dysfunction with mild to moderate mitral regurgitation with global LV hypokinesia.  Cardiology recommended medication optimization including beta-blocker and addition Arb which was done.  Also recommended Life Vest which was arranged by case management prior to discharge.  Patient was counseled on healthy diet and need for smoking cessation.  Increased PPI therapy to twice a day and encourage outpatient follow-up.  Discharge plan including medication changes, follow-up as well as return precautions were reviewed with the patient.  No objection for discharge and eager for discharge.  All questions were addressed.    Discharge examination  Lying in bed no apparent distress, alert and oriented, regular heart rhythm, not on supplemental oxygen, abdomen soft and nontender, right groin angio access site soft    Assessment:  1. RCA  with left to right collaterals  2. LVEDP 12 mmHg  3. LVEF 25%   4. PA pressure 31/16 mmHg with mean pressure of 22 mmHg  5. PCWP 13 mmHg  6. Cardiac output of 4.4 L/min by thermodilution      Recommendations:  1. Optimize medical management of CAD  2. Aggressive risk factor and lifestyle modifications  3. Routine post cath care and monitoring  4. Outpatient follow-up with Cardiology       Consults:   Consults (From admission, onward)        Status Ordering Provider     Inpatient consult to Cardiology  Once      Provider:  Griselda Martin MD    Completed YANIRA SALDIVAR     Inpatient consult to Registered Dietitian/Nutritionist  Once     Provider:  (Not yet assigned)    Acknowledged VINICIO RODRIGUEZ     Inpatient consult to Social Work/Case Management  Once     Provider:  (Not yet assigned)    Acknowledged GRISELDA MARTIN     Inpatient consult to Social Work/Case Management  Once     Provider:  (Not yet assigned)    Acknowledged VINICIO RODRIGUEZ     Inpatient consult to Social Work/Case Management  Once     Provider:  (Not yet assigned)    Ordered VINICIO RODRIGUEZ          No new Assessment & Plan notes have been filed under this hospital service since the last note was generated.  Service: Hospital Medicine    Final Active Diagnoses:    Diagnosis Date Noted POA    PRINCIPAL PROBLEM:  Chest pain with abnormal stress test [R07.9] 11/21/2020 Yes    Cardiomyopathy [I42.9] 11/22/2020 Yes     Chronic    LV dysfunction [I51.9] 11/24/2020 Yes     Chronic    Tobacco abuse [Z72.0] 11/22/2020 Yes     Chronic    COPD (chronic obstructive pulmonary disease) [J44.9] 11/22/2020 Yes     Chronic    Essential hypertension [I10] 11/21/2020 Yes     Chronic    Antritis/duodenitis with reported ulcer [K20.90] 11/21/2020 Yes     Chronic    HLD (hyperlipidemia) [E78.5] 11/21/2020 Yes    Type 2 diabetes mellitus, with long-term current use of insulin [E11.9, Z79.4] 11/21/2020 Not Applicable      Problems Resolved During this Admission:    Diagnosis Date Noted Date Resolved POA    QT prolongation [R94.31] 11/22/2020 11/23/2020 Yes    Hypokalemia [E87.6] 11/21/2020 11/22/2020 Yes       Discharged Condition: good    Disposition: Home or Self Care    Follow Up:  Follow-up Information     Carey Liu MD. Schedule an appointment as soon as possible for a visit in 1 week.    Specialty: Internal Medicine  Why: Post hospital follow-up  Contact information:  [address] [id number] [phone]             Griselda  MD Enoch. Schedule an appointment as soon as possible for a visit in 2 weeks.    Specialties: INTERVENTIONAL CARDIOLOGY, Cardiology  Why: Post hospital follow-up  Contact information:  [address]  [place] [facility]  CARDIOLOGY [facility] [id number] [phone]                 Patient Instructions:      Diet diabetic     Notify your health care provider if you experience any of the following:  temperature >100.4     Notify your health care provider if you experience any of the following:  severe uncontrolled pain     Notify your health care provider if you experience any of the following:  increased confusion or weakness     Notify your health care provider if you experience any of the following:  persistent dizziness, light-headedness, or visual disturbances     Notify your health care provider if you experience any of the following:  difficulty breathing or increased cough     Activity as tolerated       Significant Diagnostic Studies: Labs:   BMP:   Recent Labs   Lab 11/23/20 0425 11/24/20 0429   * 110    141   K 3.7 4.0    106   CO2 29 27   BUN 17 14   CREATININE 1.3 1.1   CALCIUM 8.7 8.5*   MG 2.2 1.9   , CMP   Recent Labs   Lab 11/23/20 0425 11/24/20 0429    141   K 3.7 4.0    106   CO2 29 27   * 110   BUN 17 14   CREATININE 1.3 1.1   CALCIUM 8.7 8.5*   ANIONGAP 7* 8   ESTGFRAFRICA 54.1* >60.0   EGFRNONAA 47.0* 57.5*   , CBC   Recent Labs   Lab 11/23/20 0425 11/24/20 0429   WBC 10.91 9.56   HGB 11.6* 11.5*   HCT 38.1 37.0   * 375*   , INR   Lab Results   Component Value Date    INR 1.3 11/21/2020   , Lipid Panel No results found for: CHOL, HDL, LDLCALC, TRIG, CHOLHDL, Troponin   Recent Labs   Lab 11/21/20 1952 11/21/20 2324 11/23/20 0425   TROPONINI <0.030 0.032 <0.030   , A1C: No results for input(s): HGBA1C in the last 4320 hours. and All labs within the past 24 hours have been reviewed    Pending Diagnostic Studies:     Procedure Component Value  Units Date/Time    EKG 12-LEAD on arrival to floor [[id number]]     Order Status: Sent Lab Status: No result        Ct Abdomen Pelvis With Contrast    Result Date: 11/21/2020  Examination: CT scan of the abdomen and pelvis with contrast CLINICAL HISTORY: Mid abdominal pain COMPARISON: None. Technical factors: Standard cross-sectional evaluation of the abdomen and pelvis was performed following the bolus infusion of intravenous contrast 100 cc provided without enteric contrast administered for the examination. Both coronal and sagittal reconstructions were also included along with standard data set provided. FINDINGS: The lung base images are clear. Liver maintains normal size and overall CT density probably elongation left hepatic lobe coursing behind the spleen. Postop changes of previous gallbladder resection with evidence of mild prominence of the central biliary ducts and the common duct as expected. The stomach is well distended with moderate thickening the antrum prepyloric region duodenal sweep shows evidence of normal enhancement and caliber. Moderate atherosclerotic throughout the descending abdominal aorta. Scattered nodes throughout the retroperitoneum all is maintain normal size parameters. Small bowel loops appear normal caliber. Normal renal perfusion with the left kidney mild cortical renal scarring about the lateral cortex. The mesenteric fat and vasculature are normal. No secondary findings of appendicitis. No free fluid in the abdominal cavity. There is some strandy changes about the anterior margin the rectus of uncertain significance. No significant mass or abscess. The pelvic structures are noted for normal appearance of the urinary bladder although decompressed slightly. There is beam Spencer artifact arising from the left hip prosthesis causing image degradation. Right hip shows advanced osteophytic changes with narrowing of the articular cartilage space. Diverticular changes throughout the  sigmoid portion colon with moderate pericolonic scarring. Atrophy of the left gluteus is noted. IMPRESSION: 1. No evidence of acute intra-abdominal findings. 2. Prominent left cortical renal scarring. 3. Nonspecific thickening of the antrum prepyloric region secondary to duodenitis/antritis. Electronically Signed by Julián RASHEED on 11/21/2020 3:47 PM    X-ray Chest Ap Portable    Result Date: 11/21/2020  CHEST X-RAY SINGLE VIEW HISTORY: Constipation FINDINGS:   A single view of the chest was performed without the benefit of previous comparison. The heart size and pulmonary vascularity are within the range of normal. There is no significant hilar nor mediastinal process. The aerated lungs are well expanded and clear. The right and left CP angles are rather sharp. The osseous structures show nothing unusual. There is moderate dextroconvex alignment of the thoracic spine. IMPRESSION:   Negative chest. Electronically Signed by Julián RASHEED on 11/21/2020 7:28 PM    Nm Myocardial Perfusion Spect Multi Pharmacologic    Result Date: 11/22/2020  CLINICAL HISTORY: 55 years (1965) Female Chest pain, normal EKG TECHNIQUE: NM MYOCARDIAL PERFUSION SPECT MULTI PHARM. 11 images obtained. A total of 10.6 millicuries was utilized for the rest portion of the examination with 27.8 millicuries for the stress portion of the examination following a 1 day protocol.  The patient was stressed with Lexiscan, 0.4 milligrams intravenously and achieved a maximum heart rate of 116 beats per minute with a blood pressure of 162/67, (Resting heart rate of 87, and blood pressure of 181/88). COMPARISON: Radiograph of the chest from November 21, 2020. FINDINGS: There is a small defect seen in the mid anteroseptal left ventricular myocardial wall on stress imaging but not on rest imaging suggesting a tiny area of reversible ischemia, the Polar map shows this corresponds to approximately 3% of the overall left ventricular volume.  There is a matched defect in the mid inferior left ventricular myocardial wall suggesting a remote infarct corresponding to approximately 4% of the overall left ventricular volume on the Polar map. There is marked global hypokinesis with an estimated ejection fraction of 19% (EDQ=701 mL, CHC=121 mL, SV=32 mL). IMPRESSION: 1. Marked global hypokinesis with an estimated ejection fraction of 19%. 2. Small matched defect in the mid inferior left ventricular myocardial wall suggesting a prior infarct. 3. Small defect on stress imaging in the mid anteroseptal wall suggesting a tiny focus of reversible ischemia. Electronically Signed by MAIKEL Cm on 11/22/2020 3:39 PM    Medications:  Reconciled Home Medications:      Medication List      START taking these medications    aspirin 81 MG Chew  Take 1 tablet (81 mg total) by mouth once daily.  Start taking on: November 25, 2020     losartan 25 MG tablet  Commonly known as: COZAAR  Take 1 tablet (25 mg total) by mouth once daily.        CHANGE how you take these medications    oxyCODONE-acetaminophen 7.5-325 mg per tablet  Commonly known as: PERCOCET  Take 1 tablet by mouth every 4 (four) hours as needed.  What changed: Another medication with the same name was removed. Continue taking this medication, and follow the directions you see here.     pantoprazole 40 MG tablet  Commonly known as: PROTONIX  Take 1 tablet (40 mg total) by mouth 2 (two) times daily.  What changed: when to take this        CONTINUE taking these medications    atorvastatin 40 MG tablet  Commonly known as: LIPITOR  Take 40 mg by mouth once daily.     DULERA 200-5 mcg/actuation inhaler  Generic drug: mometasone-formoterol  Inhale 2 puffs into the lungs 2 (two) times daily.     fluticasone furoate-vilanteroL 100-25 mcg/dose diskus inhaler  Commonly known as: BREO  Inhale 1 puff into the lungs.     glimepiride 4 MG tablet  Commonly known as: AMARYL  Take 4 mg by mouth daily with breakfast.      melatonin 3 mg tablet  Commonly known as: MELATIN  Take 3 mg by mouth nightly.     metFORMIN 1000 MG tablet  Commonly known as: GLUCOPHAGE  Take 1,000 mg by mouth 2 (two) times daily with meals.     metoprolol succinate 50 MG 24 hr tablet  Commonly known as: TOPROL-XL  Take 1 tablet (50 mg total) by mouth once daily.     ondansetron 8 MG tablet  Commonly known as: ZOFRAN  Take 1 tablet (8 mg total) by mouth every 12 (twelve) hours as needed for Nausea.     pregabalin 75 MG capsule  Commonly known as: LYRICA  Take 75 mg by mouth 2 (two) times daily.     promethazine 25 MG tablet  Commonly known as: PHENERGAN  TK 1 T PO QD PRN     senna-docusate 8.6-50 mg 8.6-50 mg per tablet  Commonly known as: PERICOLACE  Take 1 tablet by mouth.     TRADJENTA 5 mg Tab tablet  Generic drug: linaGLIPtin  Take 5 mg by mouth once daily.     zolpidem 10 mg Tab  Commonly known as: AMBIEN  Take 10 mg by mouth every evening.        STOP taking these medications    insulin glargine 100 unit/mL injection  Commonly known as: LANTUS     insulin lispro 100 unit/mL injection            Indwelling Lines/Drains at time of discharge:   Lines/Drains/Airways     None                 Time spent on the discharge of patient: 33 minutes  Patient was seen and examined on the date of discharge and determined to be suitable for discharge.         Kassandra Mota MD  Department of Hospital Medicine  [facility]

## 2020-11-24 NOTE — NURSING
Pt discharged with all belongings. Pt given education on post cath care, new medications and follow up instructions. Pt and sister verbalized understanding. Iv's removed and telemetry discontinued,   
Received from [facility] via stretcher. AAO x3. NAD noted. Rt groin site no hematoma or bleeding. Denies numbness or tingling to rt leg. Dsg C/D/I. Instructed on bedrest till 1730 with rt leg straight and immobile. Pt states understands.  
Spoke with Dr Kendall regarding continuing of IV fluids. No orders to restart fluids.  
To cath lab via stretcher for angiogram.  
Detail Level: Simple
Detail Level: Zone

## 2020-11-24 NOTE — RESPIRATORY THERAPY
11/23/20 2050   Patient Assessment/Suction   Respiratory Effort Unlabored   All Lung Fields Breath Sounds clear   Rhythm/Pattern, Respiratory pattern regular   PRE-TX-O2   O2 Device (Oxygen Therapy) room air   SpO2 96 %   Pulse 76   Resp 16   Aerosol Therapy   $ Aerosol Therapy Charges PRN treatment not required   Respiratory Evaluation   $ Care Plan Tech Time 15 min   Evaluation For New Orders   Admitting Diagnosis CHEST PAIN

## 2020-11-24 NOTE — PROGRESS NOTES
Assessment completed at bedside with Pt.  She is Single, has no children, and has not addressed POA / AD.  Her PCP is Dr. Liu , and she uses [facility].  She confirmed insurance, and has Medicaid. Plan for discharge at this time is home with assistance from family as needed.  Case Management to continue to follow.    BELA Finley Student   Case Management  Ext. 1936        11/24/20 0954   Discharge Assessment   Assessment Type Discharge Planning Assessment   Confirmed/corrected address and phone number on facesheet? Yes  (Pt reports she has a new address but couldnt get in touch with sister to get it)   Assessment information obtained from? Patient;Medical Record   Communicated expected length of stay with patient/caregiver no   Prior to hospitilization cognitive status: Alert/Oriented   Prior to hospitalization functional status: Independent   Current cognitive status: Alert/Oriented   Current Functional Status: Independent   Facility Arrived From: Home   Lives With sibling(s)   Able to Return to Prior Arrangements yes   Is patient able to care for self after discharge? Yes   Who are your caregiver(s) and their phone number(s)? (Sister) Curtis Gorman [phone]   Patient's perception of discharge disposition home or selfcare   Readmission Within the Last 30 Days no previous admission in last 30 days   Patient currently being followed by outpatient case management? No   Patient currently receives any other outside agency services? No   Equipment Currently Used at Home walker, standard;wheelchair;cane, straight   Part D Coverage N/A   Do you have any problems affording any of your prescribed medications? No   Is the patient taking medications as prescribed? yes   Does the patient have transportation home? Yes   Transportation Anticipated family or friend will provide   Dialysis Name and Scheduled days N/A   Does the patient receive services at the [facility]mad[facility] Clinic? No   Discharge Plan A  Home with family   Discharge Plan B Home with family   DME Needed Upon Discharge    (Pt is in the process of getting a life vest)   Patient/Family in Agreement with Plan yes

## 2020-11-24 NOTE — PROGRESS NOTES
Patient had complete cardiac workup she is noted to have severe LV dysfunction  Optimize medical therapy  Recommend LifeVest on discharge  Recommend to start on guideline mediated therapy for LV dysfunction and congestive heart failure  To in this is to include Toprol XL at low doses and add losartan at 25 mg daily  She has been advised to maintain a low-salt diet.  Dietary consultation          Conclusion    · The estimated PA systolic pressure is 32 mmHg.  · There is mild left ventricular concentric hypertrophy.  · The left ventricle is mildly enlarged with moderately decreased systolic function. The estimated ejection fraction is 30%.  · Grade I diastolic dysfunction.  · There is left ventricular global hypokinesis.  · Normal right ventricular size with normal right ventricular systolic function.  · Mild left atrial enlargement.  · Mild-to-moderate mitral regurgitation.  · Intermediate central venous pressure (8 mmHg).        Coronary Findings    Diagnostic  Dominance: Right    Left Main   The vessel was visualized by angiography, is moderate in size and is angiographically normal. Short left main   Left Anterior Descending   The vessel is moderate in size. Exhibits minimal luminal irregularities. LAD arises from the left main, wraps around the apex, has minimal luminal irregularities, and provides collaterals to right PDA/PL branches.   First Diagonal Branch   The vessel is small. The vessel exhibits minimal luminal irregularities.   Left Circumflex   First Obtuse Marginal Branch   The vessel is moderate in size and is angiographically normal.   Second Obtuse Marginal Branch   The vessel is small. The vessel exhibits minimal luminal irregularities.   Third Obtuse Marginal Branch   The vessel is small. The vessel exhibits minimal luminal irregularities.   Right Coronary Artery   The vessel is small. Diffuse disease. RCA has a proximal  with left to right collaterals from LAD and left circumflex. RCA is small  with diffuse disease.   Collaterals   Dist RCA filled by collaterals from Dist Cx.      Ost RCA to Prox RCA lesion is 99% stenosed. This is not the culprit lesion. Lesion shape is concentric. Lesion contour is irregular.   Prox RCA lesion is 100% stenosed.   Right Ventricular Branch   Collaterals   RV Branch filled by collaterals from Mid Cx.      Right Posterior Descending Artery   The vessel is small. There is moderate diffuse disease throughout the vessel.   Collaterals   RPDA filled by collaterals from Dist LAD.      First Right Posterolateral Branch   Collaterals   1st RPL filled by collaterals from Dist LAD.      Intervention    No interventions have been documented.  Left Heart Findings    Left Ventricle    There is severe left ventricular systolic dysfunction. LV systolic pressure is normal. The ejection fraction is calculated to be 25%.   LVEDP (Pre):12 mmHGThere are wall motion abnormalities in the left ventricle. Ao: 140/64 mmHg  LV: 140 mmHg  LVEDP 12 mmHg   Right Heart Findings    Right Heart Pressures    RA: 11/ 10/ 9 RV: 33/ 10 PA: 31/ 16/ 22 PWP: 13/ 16/ 13 .   Cardiac output was 4.4  by thermodilution.   Summary       · The Prox RCA lesion was 100% stenosed.  · The ejection fraction was calculated to be 25%.  · There was severe left ventricular systolic dysfunction.     Assessment:  1. RCA  with left to right collaterals  2. LVEDP 12 mmHg  3. LVEF 25%   4. PA pressure 31/16 mmHg with mean pressure of 22 mmHg  5. PCWP 13 mmHg  6. Cardiac output of 4.4 L/min by thermodilution      Recommendations:  1. Optimize medical management of CAD  2. Aggressive risk factor and lifestyle modifications  3. Routine post cath care and monitoring  4. Outpatient follow-up with Cardiology

## 2020-11-30 ENCOUNTER — TELEPHONE (OUTPATIENT)
Dept: CARDIOLOGY | Facility: CLINIC | Age: 55
End: 2020-11-30

## 2020-11-30 NOTE — TELEPHONE ENCOUNTER
----- Message from Raymond Aggarwal sent at 11/30/2020  8:36 AM CST -----  Regarding: angio fu  Pt had cath procedure says she needs appt this week said she depends on a ride needs around lunch       [phone]

## 2020-12-10 ENCOUNTER — ANESTHESIA EVENT (OUTPATIENT)
Dept: SURGERY | Facility: AMBULARY SURGERY CENTER | Age: 55
End: 2020-12-10
Payer: MEDICAID

## 2020-12-10 ENCOUNTER — OFFICE VISIT (OUTPATIENT)
Dept: CARDIOLOGY | Facility: CLINIC | Age: 55
End: 2020-12-10
Payer: MEDICAID

## 2020-12-10 VITALS
SYSTOLIC BLOOD PRESSURE: 136 MMHG | RESPIRATION RATE: 16 BRPM | DIASTOLIC BLOOD PRESSURE: 82 MMHG | OXYGEN SATURATION: 96 % | HEIGHT: 67 IN | HEART RATE: 105 BPM | BODY MASS INDEX: 26.84 KG/M2 | WEIGHT: 171 LBS

## 2020-12-10 DIAGNOSIS — I10 ESSENTIAL HYPERTENSION: Chronic | ICD-10-CM

## 2020-12-10 DIAGNOSIS — I51.9 LV DYSFUNCTION: Chronic | ICD-10-CM

## 2020-12-10 DIAGNOSIS — R11.2 NAUSEA AND VOMITING, INTRACTABILITY OF VOMITING NOT SPECIFIED, UNSPECIFIED VOMITING TYPE: ICD-10-CM

## 2020-12-10 DIAGNOSIS — J44.9 CHRONIC OBSTRUCTIVE PULMONARY DISEASE, UNSPECIFIED COPD TYPE: Primary | Chronic | ICD-10-CM

## 2020-12-10 DIAGNOSIS — E78.2 MIXED HYPERLIPIDEMIA: ICD-10-CM

## 2020-12-10 DIAGNOSIS — Z72.0 TOBACCO ABUSE: Chronic | ICD-10-CM

## 2020-12-10 DIAGNOSIS — I25.10 CORONARY ARTERY DISEASE INVOLVING NATIVE HEART, ANGINA PRESENCE UNSPECIFIED, UNSPECIFIED VESSEL OR LESION TYPE: ICD-10-CM

## 2020-12-10 DIAGNOSIS — I10 HTN (HYPERTENSION): ICD-10-CM

## 2020-12-10 PROCEDURE — 93000 EKG 12-LEAD: ICD-10-PCS | Mod: S$GLB,,, | Performed by: INTERNAL MEDICINE

## 2020-12-10 PROCEDURE — 99214 OFFICE O/P EST MOD 30 MIN: CPT | Mod: S$GLB,,, | Performed by: INTERNAL MEDICINE

## 2020-12-10 PROCEDURE — 93000 ELECTROCARDIOGRAM COMPLETE: CPT | Mod: S$GLB,,, | Performed by: INTERNAL MEDICINE

## 2020-12-10 PROCEDURE — 99214 PR OFFICE/OUTPT VISIT, EST, LEVL IV, 30-39 MIN: ICD-10-PCS | Mod: S$GLB,,, | Performed by: INTERNAL MEDICINE

## 2020-12-10 RX ORDER — LOSARTAN POTASSIUM 25 MG/1
25 TABLET ORAL DAILY
Qty: 90 TABLET | Refills: 3 | Status: SHIPPED | OUTPATIENT
Start: 2020-12-10 | End: 2021-02-08 | Stop reason: SDUPTHER

## 2020-12-10 NOTE — ASSESSMENT & PLAN NOTE
Severe LV Dysfunction  EF 25%  Wearing a [employer] Life Vest currently  Continue Metoprolol succinate 50 mg daily

## 2020-12-10 NOTE — PROGRESS NOTES
Subjective:    Patient ID:  Delores Fox is a 55 y.o. female patient here for evaluation Hyperlipidemia, Cardiomyopathy, Hypertension, COPD, Diabetes, and Arthritis      History of Present Illness:       Ms. Fox is here today for her follow up visit. She was seen in the hospital and underwent angiographic evaluation that showed  of the RCA. She had collateral circulation from the Left. She denies chest pain. She denies SOB. No recent fever or chills. No arm neck or jaw pain.       Review of patient's allergies indicates:   Allergen Reactions    Morphine Nausea And Vomiting    Sulfa (sulfonamide antibiotics) Nausea And Vomiting       Past Medical History:   Diagnosis Date    Acid reflux     COPD (chronic obstructive pulmonary disease)     Coronary artery disease     Diabetes mellitus     High cholesterol     Hypertension      Past Surgical History:   Procedure Laterality Date    APPENDECTOMY      CATHETERIZATION OF BOTH LEFT AND RIGHT HEART N/A 11/23/2020    Procedure: CATHETERIZATION, HEART, BOTH LEFT AND RIGHT;  Surgeon: Doug Kendall MD;  Location: [facility] C[facility]EP LAB;  Service: Cardiology;  Laterality: N/A;    JOINT REPLACEMENT Left     left hip    MASTOIDECTOMY Right      Social History     Tobacco Use    Smoking status: Current Every Day Smoker     Packs/day: 0.50     Years: 13.00     Pack years: 6.50    Smokeless tobacco: Never Used   Substance Use Topics    Alcohol use: Not Currently    Drug use: Never        Review of Systems:    As noted in HPI in addition      REVIEW OF SYSTEMS  CARDIOVASCULAR: No recent chest pain, palpitations, arm, neck, or jaw pain  RESPIRATORY: No recent fever, cough chills, SOB or congestion  : No blood in the urine  GI: No Nausea, vomiting, constipation, diarrhea, blood, or reflux.  MUSCULOSKELETAL: No myalgias  NEURO: No lightheadedness or dizziness  EYES: No Double vision, blurry, vision or headache              Objective        Vitals:    12/10/20  1619   BP: 136/82   Pulse: 105   Resp: 16       LIPIDS - LAST 2   No results found for: CHOL, HDL, LDLCALC, TRIG, CHOLHDL    CBC - LAST 2  Lab Results   Component Value Date    WBC 9.56 11/24/2020    WBC 10.91 11/23/2020    RBC 4.31 11/24/2020    RBC 4.39 11/23/2020    HGB 11.5 (L) 11/24/2020    HGB 11.6 (L) 11/23/2020    HCT 37.0 11/24/2020    HCT 38.1 11/23/2020    MCV 86 11/24/2020    MCV 87 11/23/2020    MCH 26.7 (L) 11/24/2020    MCH 26.4 (L) 11/23/2020    MCHC 31.1 (L) 11/24/2020    MCHC 30.4 (L) 11/23/2020    RDW 15.7 (H) 11/24/2020    RDW 15.8 (H) 11/23/2020     (H) 11/24/2020     (H) 11/23/2020    MPV 9.3 11/24/2020    MPV 9.5 11/23/2020    GRAN 4.6 11/24/2020    GRAN 48.6 11/24/2020    LYMPH 4.1 11/24/2020    LYMPH 42.7 11/24/2020    MONO 0.7 11/24/2020    MONO 7.4 11/24/2020    BASO 0.03 11/24/2020    BASO 0.03 11/23/2020    NRBC 0 11/24/2020    NRBC 0 11/23/2020       CHEMISTRY & LIVER FUNCTION - LAST 2  Lab Results   Component Value Date     11/24/2020     11/23/2020    K 4.0 11/24/2020    K 3.7 11/23/2020     11/24/2020     11/23/2020    CO2 27 11/24/2020    CO2 29 11/23/2020    ANIONGAP 8 11/24/2020    ANIONGAP 7 (L) 11/23/2020    BUN 14 11/24/2020    BUN 17 11/23/2020    CREATININE 1.1 11/24/2020    CREATININE 1.3 11/23/2020     11/24/2020     (H) 11/23/2020    CALCIUM 8.5 (L) 11/24/2020    CALCIUM 8.7 11/23/2020    MG 1.9 11/24/2020    MG 2.2 11/23/2020    ALBUMIN 3.8 11/21/2020    ALBUMIN 3.7 11/11/2020    PROT 6.4 11/21/2020    PROT 6.4 11/11/2020    ALKPHOS 103 11/21/2020    ALKPHOS 117 11/11/2020    ALT 11 11/21/2020    ALT 7 (L) 11/11/2020    AST 14 11/21/2020    AST 11 11/11/2020    BILITOT 0.7 11/21/2020    BILITOT 0.5 11/11/2020        CARDIAC PROFILE - LAST 2  Lab Results   Component Value Date     (H) 11/21/2020    CPK 31 11/21/2020    TROPONINI <0.030 11/23/2020    TROPONINI 0.032 11/21/2020        COAGULATION - LAST 2  Lab  Results   Component Value Date    LABPT 15.4 (H) 11/21/2020    INR 1.3 11/21/2020    APTT 34.3 (H) 11/23/2020    APTT 35.9 (H) 11/21/2020       ENDOCRINE & PSA - LAST 2  Lab Results   Component Value Date    HGBA1C 6.3 04/30/2020    TSH 0.960 11/21/2020        ECHOCARDIOGRAM RESULTS  Results for orders placed during the hospital encounter of 11/21/20   Echo Color Flow Doppler? Yes    Narrative · The estimated PA systolic pressure is 32 mmHg.  · There is mild left ventricular concentric hypertrophy.  · The left ventricle is mildly enlarged with moderately decreased systolic   function. The estimated ejection fraction is 30%.  · Grade I diastolic dysfunction.  · There is left ventricular global hypokinesis.  · Normal right ventricular size with normal right ventricular systolic   function.  · Mild left atrial enlargement.  · Mild-to-moderate mitral regurgitation.  · Intermediate central venous pressure (8 mmHg).          CURRENT/PREVIOUS VISIT EKG  Results for orders placed or performed during the hospital encounter of 11/21/20   EKG 12-lead    Collection Time: 11/23/20  6:30 AM    Narrative    Test Reason : R07.89,    Vent. Rate : 062 BPM     Atrial Rate : 062 BPM     P-R Int : 166 ms          QRS Dur : 100 ms      QT Int : 468 ms       P-R-T Axes : 031 024 067 degrees     QTc Int : 475 ms    Normal sinus rhythm  Nonspecific T wave abnormality  Prolonged QT  Abnormal ECG  When compared with ECG of 21-NOV-2020 13:12,  Vent. rate has decreased BY  37 BPM  Confirmed by Michelle Frances MD ([id number]) on 11/24/2020 4:54:14 PM    Referred By: [email or web address]   SELF           Confirmed By:Michelle Frances MD     No procedure found.   Results for orders placed during the hospital encounter of 11/21/20   Nuclear Stress Test    Narrative   The EKG portion of this study is negative for ischemia.    The patient reported no chest pain during the stress test.    There were no arrhythmias during stress.    ECG Stress Nuclear portion  of this study will be reported separately.         Left Main   The vessel was visualized by angiography, is moderate in size and is angiographically normal. Short left main   Left Anterior Descending   The vessel is moderate in size. Exhibits minimal luminal irregularities. LAD arises from the left main, wraps around the apex, has minimal luminal irregularities, and provides collaterals to right PDA/PL branches.   First Diagonal Branch   The vessel is small. The vessel exhibits minimal luminal irregularities.   Left Circumflex   First Obtuse Marginal Branch   The vessel is moderate in size and is angiographically normal.   Second Obtuse Marginal Branch   The vessel is small. The vessel exhibits minimal luminal irregularities.   Third Obtuse Marginal Branch   The vessel is small. The vessel exhibits minimal luminal irregularities.   Right Coronary Artery   The vessel is small. Diffuse disease. RCA has a proximal  with left to right collaterals from LAD and left circumflex. RCA is small with diffuse disease.   Collaterals   Dist RCA filled by collaterals from Dist Cx.      Ost RCA to Prox RCA lesion is 99% stenosed. This is not the culprit lesion. Lesion shape is concentric. Lesion contour is irregular.   Prox RCA lesion is 100% stenosed.   Right Ventricular Branch   Collaterals   RV Branch filled by collaterals from Mid Cx.      Right Posterior Descending Artery   The vessel is small. There is moderate diffuse disease throughout the vessel.   Collaterals   RPDA filled by collaterals from Dist LAD.      First Right Posterolateral Branch   Collaterals   1st RPL filled by collaterals from Dist LAD.      Intervention    No interventions have been documented.  Left Heart Findings    Left Ventricle    There is severe left ventricular systolic dysfunction. LV systolic pressure is normal. The ejection fraction is calculated to be 25%.   LVEDP (Pre):12 mmHGThere are wall motion abnormalities in the left ventricle. Ao:  140/64 mmHg  LV: 140 mmHg  LVEDP 12 mmHg   Right Heart Findings    Right Heart Pressures    RA: 11/ 10/ 9 RV: 33/ 10 PA: 31/ 16/ 22 PWP: 13/ 16/ 13 .   Cardiac output was 4.4  by thermodilution.   Summary       · The Prox RCA lesion was 100% stenosed.  · The ejection fraction was calculated to be 25%.  · There was severe left ventricular systolic dysfunction.     Assessment:  1. RCA  with left to right collaterals  2. LVEDP 12 mmHg  3. LVEF 25%   4. PA pressure 31/16 mmHg with mean pressure of 22 mmHg  5. PCWP 13 mmHg  6. Cardiac output of 4.4 L/min by thermodilution      Recommendations:  1. Optimize medical management of CAD  2. Aggressive risk factor and lifestyle modifications  3. Routine post cath care and monitoring  4. Outpatient follow-up with Cardiology            PHYSICAL EXAM  CONSTITUTIONAL: Well built, well nourished in no apparent distress  NECK: no carotid bruit, no JVD  LUNGS: CTA  CHEST WALL: no tenderness  HEART: regular rate and rhythm, S1, S2 normal, no murmur, click, rub or gallop   ABDOMEN: soft, non-tender; bowel sounds normal; no masses,  no organomegaly  EXTREMITIES: Extremities normal, no edema, no calf tenderness noted  NEURO: AAO X 3    I HAVE REVIEWED :    The vital signs, nurses notes, and all the pertinent radiology and labs.        Current Outpatient Medications   Medication Instructions    aspirin 81 mg, Oral, Daily    atorvastatin (LIPITOR) 40 mg, Oral, Daily    fluticasone furoate-vilanteroL (BREO) 100-25 mcg/dose diskus inhaler 1 puff, Inhalation    melatonin (MELATIN) 3 mg, Oral, Nightly    metoprolol succinate (TOPROL-XL) 50 mg, Oral, Daily    ondansetron (ZOFRAN) 8 mg, Oral, Every 12 hours PRN    pantoprazole (PROTONIX) 40 mg, Oral, 2 times daily    pregabalin (LYRICA) 75 mg, Oral, 2 times daily    TRADJENTA 5 mg, Oral, Daily    zolpidem (AMBIEN) 10 mg, Oral, Nightly          Assessment & Plan     LV dysfunction  Severe LV Dysfunction  EF 25%  Wearing a Zoll Life  Krysten currently  Continue Metoprolol succinate 50 mg daily    Essential hypertension  Stable currently  Continue current regimen  Consider adding low dose losartan 25 mg daily     HLD (hyperlipidemia)  Continue Lipitor 40 mg daily    Tobacco abuse  Encourage Cessation    CAD (coronary artery disease)  Right Total Occlusion-    No anginal symptoms  Continue ASA 81 mg daily  Continue Statin Therapy    Nausea & vomiting  Constipation with N/V also  She is following up with GI doctor          No follow-ups on file.

## 2020-12-11 ENCOUNTER — HOSPITAL ENCOUNTER (OUTPATIENT)
Facility: AMBULARY SURGERY CENTER | Age: 55
Discharge: HOME | End: 2020-12-11
Attending: OPHTHALMOLOGY | Admitting: OPHTHALMOLOGY
Payer: MEDICAID

## 2020-12-11 ENCOUNTER — ANESTHESIA (OUTPATIENT)
Dept: SURGERY | Facility: AMBULARY SURGERY CENTER | Age: 55
End: 2020-12-11
Payer: MEDICAID

## 2020-12-11 DIAGNOSIS — H02.823 EPIDERMAL INCLUSION CYST OF EYELID, RIGHT: ICD-10-CM

## 2020-12-11 LAB — POCT GLUCOSE: 124 MG/DL (ref 70–110)

## 2020-12-11 PROCEDURE — D9220A PRA ANESTHESIA: ICD-10-PCS | Mod: ANES,,, | Performed by: ANESTHESIOLOGY

## 2020-12-11 PROCEDURE — D9220A PRA ANESTHESIA: Mod: ANES,,, | Performed by: ANESTHESIOLOGY

## 2020-12-11 PROCEDURE — 11440 EXC FACE-MM B9+MARG 0.5 CM/<: CPT | Performed by: OPHTHALMOLOGY

## 2020-12-11 PROCEDURE — D9220A PRA ANESTHESIA: ICD-10-PCS | Mod: CRNA,,, | Performed by: NURSE ANESTHETIST, CERTIFIED REGISTERED

## 2020-12-11 PROCEDURE — 88304 PR  SURG PATH,LEVEL III: ICD-10-PCS | Mod: 26,,, | Performed by: STUDENT IN AN ORGANIZED HEALTH CARE EDUCATION/TRAINING PROGRAM

## 2020-12-11 PROCEDURE — 88304 TISSUE EXAM BY PATHOLOGIST: CPT | Mod: 26,,, | Performed by: STUDENT IN AN ORGANIZED HEALTH CARE EDUCATION/TRAINING PROGRAM

## 2020-12-11 PROCEDURE — D9220A PRA ANESTHESIA: Mod: CRNA,,, | Performed by: NURSE ANESTHETIST, CERTIFIED REGISTERED

## 2020-12-11 PROCEDURE — 88304 TISSUE EXAM BY PATHOLOGIST: CPT | Performed by: STUDENT IN AN ORGANIZED HEALTH CARE EDUCATION/TRAINING PROGRAM

## 2020-12-11 PROCEDURE — 11441 EXC FACE-MM B9+MARG 0.6-1 CM: CPT | Performed by: OPHTHALMOLOGY

## 2020-12-11 RX ORDER — LIDOCAINE HYDROCHLORIDE AND EPINEPHRINE 10; 10 MG/ML; UG/ML
INJECTION, SOLUTION INFILTRATION; PERINEURAL
Status: DISCONTINUED
Start: 2020-12-11 | End: 2020-12-11 | Stop reason: HOSPADM

## 2020-12-11 RX ORDER — SODIUM CHLORIDE, SODIUM LACTATE, POTASSIUM CHLORIDE, CALCIUM CHLORIDE 600; 310; 30; 20 MG/100ML; MG/100ML; MG/100ML; MG/100ML
500 INJECTION, SOLUTION INTRAVENOUS ONCE
Status: COMPLETED | OUTPATIENT
Start: 2020-12-11 | End: 2020-12-11

## 2020-12-11 RX ORDER — SODIUM CHLORIDE 0.9 % (FLUSH) 0.9 %
3 SYRINGE (ML) INJECTION EVERY 8 HOURS
Status: ACTIVE | OUTPATIENT
Start: 2020-12-11

## 2020-12-11 RX ORDER — ONDANSETRON 2 MG/ML
INJECTION INTRAMUSCULAR; INTRAVENOUS
Status: DISCONTINUED | OUTPATIENT
Start: 2020-12-11 | End: 2020-12-11

## 2020-12-11 RX ORDER — LIDOCAINE HYDROCHLORIDE 10 MG/ML
1 INJECTION, SOLUTION EPIDURAL; INFILTRATION; INTRACAUDAL; PERINEURAL ONCE
Status: ACTIVE | OUTPATIENT
Start: 2020-12-11

## 2020-12-11 RX ORDER — LIDOCAINE HYDROCHLORIDE AND EPINEPHRINE 10; 10 MG/ML; UG/ML
INJECTION, SOLUTION INFILTRATION; PERINEURAL
Status: DISCONTINUED | OUTPATIENT
Start: 2020-12-11 | End: 2020-12-11 | Stop reason: HOSPADM

## 2020-12-11 RX ORDER — MIDAZOLAM HYDROCHLORIDE 1 MG/ML
INJECTION, SOLUTION INTRAMUSCULAR; INTRAVENOUS
Status: DISCONTINUED | OUTPATIENT
Start: 2020-12-11 | End: 2020-12-11

## 2020-12-11 RX ADMIN — MIDAZOLAM HYDROCHLORIDE 2 MG: 1 INJECTION, SOLUTION INTRAMUSCULAR; INTRAVENOUS at 10:12

## 2020-12-11 RX ADMIN — ONDANSETRON 4 MG: 2 INJECTION INTRAMUSCULAR; INTRAVENOUS at 10:12

## 2020-12-11 RX ADMIN — SODIUM CHLORIDE, SODIUM LACTATE, POTASSIUM CHLORIDE, CALCIUM CHLORIDE 500 ML: 600; 310; 30; 20 INJECTION, SOLUTION INTRAVENOUS at 10:12

## 2020-12-11 NOTE — ANESTHESIA POSTPROCEDURE EVALUATION
Anesthesia Post Evaluation    Patient: Delores Fox    Procedure(s) Performed: Procedure(s) (LRB):  EXCISION, LESION, EYELID (Right)    Final Anesthesia Type: MAC    Patient location during evaluation: PACU  Patient participation: Yes- Able to Participate  Level of consciousness: awake and alert and oriented  Post-procedure vital signs: reviewed and stable  Pain management: adequate  Airway patency: patent    PONV status at discharge: No PONV  Anesthetic complications: no      Cardiovascular status: blood pressure returned to baseline  Respiratory status: unassisted, spontaneous ventilation and room air  Hydration status: euvolemic  Follow-up not needed.          Vitals Value Taken Time   /63 12/11/20 1139   Temp 36.5 °C (97.7 °F) 12/11/20 1127   Pulse 85 12/11/20 1140   Resp 16 12/11/20 1127   SpO2 98 % 12/11/20 1140   Vitals shown include unvalidated device data.      No case tracking events are documented in the log.      Pain/Geri Score: No data recorded

## 2020-12-11 NOTE — PLAN OF CARE
Stable states ready to go home, patricia po fluids, denies pain, eye shield in place, external defibrillator on. Ambulated to car with RN to sister

## 2020-12-11 NOTE — TRANSFER OF CARE
"Anesthesia Transfer of Care Note    Patient: Delores Fox    Procedure(s) Performed: Procedure(s) (LRB):  EXCISION, LESION, EYELID (Right)    Patient location: PACU    Anesthesia Type: MAC    Transport from OR: Transported from OR on 2-3 L/min O2 by NC with adequate spontaneous ventilation    Post pain: adequate analgesia    Post assessment: no apparent anesthetic complications    Post vital signs: stable    Level of consciousness: awake    Nausea/Vomiting: no nausea/vomiting    Complications: none    Transfer of care protocol was followed      Last vitals:   Visit Vitals  BP (!) 157/79 (BP Location: Right arm, Patient Position: Sitting)   Pulse 96   Temp 36.6 °C (97.9 °F) (Skin)   Resp 18   Ht 5' 7" (1.702 m)   Wt 76.2 kg (168 lb)   SpO2 98%   Breastfeeding No   BMI 26.31 kg/m²     "

## 2020-12-11 NOTE — DISCHARGE SUMMARY
[facility]  Discharge Note  Short Stay    Procedure(s) (LRB):  EXCISION, LESION, EYELID (Right)    OUTCOME: Patient tolerated treatment/procedure well without complication and is now ready for discharge.    DISPOSITION: Home or Self Care    FINAL DIAGNOSIS:  <principal problem not specified>    FOLLOWUP: In clinic    DISCHARGE INSTRUCTIONS:  No discharge procedures on file.

## 2020-12-11 NOTE — OP NOTE
[facility]  Brief Operative Note    Surgery Date: 12/11/2020     Surgeon(s) and Role:     * Malachi Rodriguez MD - Primary    Assisting Surgeon: None    Pre-op Diagnosis:  Cyst of right eyelid [H02.823]    Post-op Diagnosis:  Post-Op Diagnosis Codes:     * Cyst of right eyelid [H02.823]    Procedure(s) (LRB):  EXCISION, LESION, EYELID (Right) lower lid (>1cm)    Anesthesia: Monitor Anesthesia Care    Description of the findings of the procedure(s): Right Lower Lid Cyst (greater than 1 cm)    Estimated Blood Loss: 0 mL         Specimens:   Specimen (12h ago, onward)    None            Discharge Note    OUTCOME: Patient tolerated treatment/procedure well without complication and is now ready for discharge.    DISPOSITION: Home or Self Care    FINAL DIAGNOSIS:  <principal problem not specified>    FOLLOWUP: In clinic    DISCHARGE INSTRUCTIONS:  No discharge procedures on file.

## 2020-12-11 NOTE — DISCHARGE INSTRUCTIONS
Before leaving, please make sure you have all your personal belongings such as glasses, purses, wallets, keys, cell phones, jewelry, jackets etc   Instructions per Dr Rodriguez:  Leave Eye dressing in place until tomorrow.  Begin tomorrow 12/12/2020: Tobradex opthalmic ointment to lesion, apply tiny amount using Q tip  to surgical lid incision twice a day: in AM and at bedtime for 30 days.  Do not use eye make up till seen by MD for follow up appointment(  In 2 weeks)  Keep water soap and shampoo out of eye till seen by MD  For follow up appointment( in 2 weeks)

## 2020-12-11 NOTE — ANESTHESIA PREPROCEDURE EVALUATION
12/11/2020  Delores Fox is a 55 y.o., female.    Anesthesia Evaluation    I have reviewed the Patient Summary Reports.    I have reviewed the Nursing Notes.       Review of Systems  Anesthesia Hx:  No problems with previous Anesthesia    Cardiovascular:   Hypertension CAD      Pulmonary:   COPD    Hepatic/GI:   GERD    Endocrine:   Diabetes        Physical Exam  General:  Well nourished    Airway/Jaw/Neck:  Airway Findings: Mouth Opening: Normal Tongue: Normal  Mallampati: II  TM Distance: Normal, at least 6 cm  Jaw/Neck Findings:  Neck ROM: Normal ROM     Eyes/Ears/Nose:  Eyes/Ears/Nose Findings:    Dental:  Dental Findings:   Chest/Lungs:  Chest/Lungs Findings: Normal Respiratory Rate     Heart/Vascular:  Heart Findings: Rate: Normal  Rhythm: Regular Rhythm        Mental Status:  Mental Status Findings:  Cooperative, Alert and Oriented         Anesthesia Plan  Type of Anesthesia, risks & benefits discussed:  Anesthesia Type:  MAC  Patient's Preference: MAC  Intra-op Monitoring Plan: standard ASA monitors  Intra-op Monitoring Plan Comments:   Post Op Pain Control Plan:   Post Op Pain Control Plan Comments:   Induction:    Beta Blocker:  Patient is on a Beta-Blocker and has received one dose within the past 24 hours (No further documentation required).       Informed Consent: Patient understands risks and agrees with Anesthesia plan.  Questions answered. Anesthesia consent signed with patient.  ASA Score: 4     Day of Surgery Review of History & Physical:    H&P update referred to the surgeon.         Ready For Surgery From Anesthesia Perspective.

## 2020-12-11 NOTE — BRIEF OP NOTE
Date of Service: 12/11/2020     Surgeon:  Malachi Rodriguez M.D.    Pre-op Diagnosis: Right Lower Lid Cyst Right Eye    Post-op Diagnosis: Right Lower Lid Cyst Right Eye    Procedure:  Right Lower Lid Cyst Removal    Estimated Blood Loss: none    Complications: None    Specimens: Right Lower Lid Cyst    Type of Anesthesia: Topical, MAC    Prosthetic: NONE    Op Note:     Patient was taken to the operating room and prepped and draped in a sterile fashion. 1cc of lidocaine with 1% epinephrine was injected into the right lower lid at the perimeter of the lid mass. A #11 blade with .12 forceps were used to isolate the cyst and remove it in total without complication or removal of muscle. The mass was placed in formaldehyde for pathologic analysis. Pressure was used to achieve hemostasis without the use of cautery. TobraDex ointment was placed on the eyelid lesion with a light pressure patch.    Patient was discharged to home. Will follow up in 2 weeks at the office. Patient will have no physical restrictions. Patient may resume normal diet. Patient to start post operative drops after office visit today.

## 2020-12-14 VITALS
OXYGEN SATURATION: 94 % | BODY MASS INDEX: 26.37 KG/M2 | DIASTOLIC BLOOD PRESSURE: 74 MMHG | RESPIRATION RATE: 20 BRPM | SYSTOLIC BLOOD PRESSURE: 152 MMHG | TEMPERATURE: 98 F | HEART RATE: 92 BPM | WEIGHT: 168 LBS | HEIGHT: 67 IN

## 2020-12-16 LAB
FINAL PATHOLOGIC DIAGNOSIS: NORMAL
GROSS: NORMAL
Lab: NORMAL
MICROSCOPIC EXAM: NORMAL

## 2020-12-31 ENCOUNTER — HOSPITAL ENCOUNTER (EMERGENCY)
Facility: HOSPITAL | Age: 55
Discharge: HOME | End: 2020-12-31
Attending: EMERGENCY MEDICINE
Payer: MEDICAID

## 2020-12-31 VITALS
RESPIRATION RATE: 18 BRPM | SYSTOLIC BLOOD PRESSURE: 150 MMHG | WEIGHT: 171 LBS | HEIGHT: 67 IN | TEMPERATURE: 99 F | BODY MASS INDEX: 26.84 KG/M2 | DIASTOLIC BLOOD PRESSURE: 74 MMHG | OXYGEN SATURATION: 93 % | HEART RATE: 91 BPM

## 2020-12-31 DIAGNOSIS — R10.13 EPIGASTRIC ABDOMINAL PAIN: ICD-10-CM

## 2020-12-31 DIAGNOSIS — R11.11 NON-INTRACTABLE VOMITING WITHOUT NAUSEA, UNSPECIFIED VOMITING TYPE: Primary | ICD-10-CM

## 2020-12-31 LAB
ALBUMIN SERPL BCP-MCNC: 4.4 G/DL (ref 3.5–5.2)
ALBUMIN SERPL BCP-MCNC: 4.4 G/DL (ref 3.5–5.2)
ALP SERPL-CCNC: 123 U/L (ref 55–135)
ALP SERPL-CCNC: 123 U/L (ref 55–135)
ALT SERPL W/O P-5'-P-CCNC: 9 U/L (ref 10–44)
ALT SERPL W/O P-5'-P-CCNC: 9 U/L (ref 10–44)
ANION GAP SERPL CALC-SCNC: 14 MMOL/L (ref 8–16)
ANION GAP SERPL CALC-SCNC: 14 MMOL/L (ref 8–16)
AST SERPL-CCNC: 13 U/L (ref 10–40)
AST SERPL-CCNC: 13 U/L (ref 10–40)
BASOPHILS # BLD AUTO: 0.04 K/UL (ref 0–0.2)
BASOPHILS NFR BLD: 0.3 % (ref 0–1.9)
BILIRUB SERPL-MCNC: 0.8 MG/DL (ref 0.1–1)
BILIRUB SERPL-MCNC: 0.8 MG/DL (ref 0.1–1)
BUN SERPL-MCNC: 21 MG/DL (ref 6–20)
BUN SERPL-MCNC: 21 MG/DL (ref 6–20)
CALCIUM SERPL-MCNC: 9.6 MG/DL (ref 8.7–10.5)
CALCIUM SERPL-MCNC: 9.6 MG/DL (ref 8.7–10.5)
CHLORIDE SERPL-SCNC: 98 MMOL/L (ref 95–110)
CHLORIDE SERPL-SCNC: 98 MMOL/L (ref 95–110)
CO2 SERPL-SCNC: 25 MMOL/L (ref 23–29)
CO2 SERPL-SCNC: 25 MMOL/L (ref 23–29)
CREAT SERPL-MCNC: 1.4 MG/DL (ref 0.5–1.4)
CREAT SERPL-MCNC: 1.4 MG/DL (ref 0.5–1.4)
DIFFERENTIAL METHOD: ABNORMAL
EOSINOPHIL # BLD AUTO: 0 K/UL (ref 0–0.5)
EOSINOPHIL NFR BLD: 0.2 % (ref 0–8)
ERYTHROCYTE [DISTWIDTH] IN BLOOD BY AUTOMATED COUNT: 15.2 % (ref 11.5–14.5)
EST. GFR  (AFRICAN AMERICAN): 49.5 ML/MIN/1.73 M^2
EST. GFR  (AFRICAN AMERICAN): 49.5 ML/MIN/1.73 M^2
EST. GFR  (NON AFRICAN AMERICAN): 42.9 ML/MIN/1.73 M^2
EST. GFR  (NON AFRICAN AMERICAN): 42.9 ML/MIN/1.73 M^2
GLUCOSE SERPL-MCNC: 127 MG/DL (ref 70–110)
GLUCOSE SERPL-MCNC: 127 MG/DL (ref 70–110)
HCT VFR BLD AUTO: 47.3 % (ref 37–48.5)
HGB BLD-MCNC: 15.4 G/DL (ref 12–16)
IMM GRANULOCYTES # BLD AUTO: 0.04 K/UL (ref 0–0.04)
IMM GRANULOCYTES NFR BLD AUTO: 0.3 % (ref 0–0.5)
INR PPP: 1.1
LIPASE SERPL-CCNC: 23 U/L (ref 4–60)
LYMPHOCYTES # BLD AUTO: 4.4 K/UL (ref 1–4.8)
LYMPHOCYTES NFR BLD: 33.9 % (ref 18–48)
MAGNESIUM SERPL-MCNC: 2.1 MG/DL (ref 1.6–2.6)
MCH RBC QN AUTO: 27.8 PG (ref 27–31)
MCHC RBC AUTO-ENTMCNC: 32.6 G/DL (ref 32–36)
MCV RBC AUTO: 86 FL (ref 82–98)
MONOCYTES # BLD AUTO: 0.9 K/UL (ref 0.3–1)
MONOCYTES NFR BLD: 7.2 % (ref 4–15)
NEUTROPHILS # BLD AUTO: 7.6 K/UL (ref 1.8–7.7)
NEUTROPHILS NFR BLD: 58.1 % (ref 38–73)
NRBC BLD-RTO: 0 /100 WBC
PLATELET # BLD AUTO: 591 K/UL (ref 150–350)
PMV BLD AUTO: 9 FL (ref 9.2–12.9)
POTASSIUM SERPL-SCNC: 3.6 MMOL/L (ref 3.5–5.1)
POTASSIUM SERPL-SCNC: 3.6 MMOL/L (ref 3.5–5.1)
PROT SERPL-MCNC: 7.5 G/DL (ref 6–8.4)
PROT SERPL-MCNC: 7.5 G/DL (ref 6–8.4)
PROTHROMBIN TIME: 13.3 SEC (ref 10.6–14.8)
RBC # BLD AUTO: 5.53 M/UL (ref 4–5.4)
SODIUM SERPL-SCNC: 137 MMOL/L (ref 136–145)
SODIUM SERPL-SCNC: 137 MMOL/L (ref 136–145)
TROPONIN I SERPL DL<=0.01 NG/ML-MCNC: 0.04 NG/ML
WBC # BLD AUTO: 13.11 K/UL (ref 3.9–12.7)

## 2020-12-31 PROCEDURE — 96374 THER/PROPH/DIAG INJ IV PUSH: CPT

## 2020-12-31 PROCEDURE — 99285 EMERGENCY DEPT VISIT HI MDM: CPT | Mod: 25

## 2020-12-31 PROCEDURE — 63600175 PHARM REV CODE 636 W HCPCS: Performed by: EMERGENCY MEDICINE

## 2020-12-31 PROCEDURE — 25000003 PHARM REV CODE 250: Performed by: EMERGENCY MEDICINE

## 2020-12-31 PROCEDURE — 83735 ASSAY OF MAGNESIUM: CPT

## 2020-12-31 PROCEDURE — 96372 THER/PROPH/DIAG INJ SC/IM: CPT | Mod: 59

## 2020-12-31 PROCEDURE — 84484 ASSAY OF TROPONIN QUANT: CPT

## 2020-12-31 PROCEDURE — 93005 ELECTROCARDIOGRAM TRACING: CPT | Performed by: INTERNAL MEDICINE

## 2020-12-31 PROCEDURE — 85610 PROTHROMBIN TIME: CPT

## 2020-12-31 PROCEDURE — 80053 COMPREHEN METABOLIC PANEL: CPT

## 2020-12-31 PROCEDURE — 93010 EKG 12-LEAD: ICD-10-PCS | Mod: ,,, | Performed by: INTERNAL MEDICINE

## 2020-12-31 PROCEDURE — 93010 ELECTROCARDIOGRAM REPORT: CPT | Mod: ,,, | Performed by: INTERNAL MEDICINE

## 2020-12-31 PROCEDURE — 96375 TX/PRO/DX INJ NEW DRUG ADDON: CPT

## 2020-12-31 PROCEDURE — 85025 COMPLETE CBC W/AUTO DIFF WBC: CPT

## 2020-12-31 PROCEDURE — 83690 ASSAY OF LIPASE: CPT

## 2020-12-31 RX ORDER — SODIUM CHLORIDE 9 MG/ML
INJECTION, SOLUTION INTRAVENOUS
Status: COMPLETED | OUTPATIENT
Start: 2020-12-31 | End: 2020-12-31

## 2020-12-31 RX ORDER — ASPIRIN 325 MG
325 TABLET ORAL
Status: DISCONTINUED | OUTPATIENT
Start: 2020-12-31 | End: 2020-12-31

## 2020-12-31 RX ORDER — HYDROMORPHONE HYDROCHLORIDE 1 MG/ML
1 INJECTION, SOLUTION INTRAMUSCULAR; INTRAVENOUS; SUBCUTANEOUS
Status: COMPLETED | OUTPATIENT
Start: 2020-12-31 | End: 2020-12-31

## 2020-12-31 RX ORDER — DICYCLOMINE HYDROCHLORIDE 10 MG/ML
20 INJECTION INTRAMUSCULAR
Status: COMPLETED | OUTPATIENT
Start: 2020-12-31 | End: 2020-12-31

## 2020-12-31 RX ORDER — ONDANSETRON 4 MG/1
4 TABLET, FILM COATED ORAL EVERY 8 HOURS PRN
Qty: 15 TABLET | Refills: 0 | Status: SHIPPED | OUTPATIENT
Start: 2020-12-31 | End: 2021-03-26 | Stop reason: SDUPTHER

## 2020-12-31 RX ORDER — ONDANSETRON 2 MG/ML
4 INJECTION INTRAMUSCULAR; INTRAVENOUS
Status: COMPLETED | OUTPATIENT
Start: 2020-12-31 | End: 2020-12-31

## 2020-12-31 RX ORDER — DICYCLOMINE HYDROCHLORIDE 20 MG/1
20 TABLET ORAL 2 TIMES DAILY PRN
Qty: 20 TABLET | Refills: 0 | Status: SHIPPED | OUTPATIENT
Start: 2020-12-31 | End: 2021-01-10

## 2020-12-31 RX ADMIN — SODIUM CHLORIDE 1000 ML/HR: 0.9 INJECTION, SOLUTION INTRAVENOUS at 05:12

## 2020-12-31 RX ADMIN — DICYCLOMINE HYDROCHLORIDE 20 MG: 10 INJECTION INTRAMUSCULAR at 08:12

## 2020-12-31 RX ADMIN — ONDANSETRON 4 MG: 2 INJECTION INTRAMUSCULAR; INTRAVENOUS at 05:12

## 2020-12-31 RX ADMIN — HYDROMORPHONE HYDROCHLORIDE 1 MG: 1 INJECTION, SOLUTION INTRAMUSCULAR; INTRAVENOUS; SUBCUTANEOUS at 05:12

## 2021-01-08 NOTE — DISCHARGE SUMMARY
[facility]  Brief Operative Note    Surgery Date: 12/11/2020     Surgeon(s) and Role:     * Malachi Rodriguez MD - Primary    Assisting Surgeon: None    Pre-op Diagnosis:  Cyst of right eyelid [H02.823]    Post-op Diagnosis:  Post-Op Diagnosis Codes:     * Cyst of right eyelid [H02.823]    Procedure(s) (LRB):  EXCISION, LESION, EYELID (Right)    Anesthesia: Monitor Anesthesia Care    Description of the findings of the procedure(s): excision cyst    Estimated Blood Loss: 0 mL         Specimens:   Specimen (12h ago, onward)    None            Discharge Note    OUTCOME: Patient tolerated treatment/procedure well without complication and is now ready for discharge.    DISPOSITION: Home or Self Care    FINAL DIAGNOSIS:  Epidermal inclusion cyst of eyelid, right    FOLLOWUP: In clinic    DISCHARGE INSTRUCTIONS:    Discharge Procedure Orders   Diet Adult Regular   Order Comments: Return to usual diet     Lifting restrictions     Leave dressing on - Keep it clean, dry, and intact until clinic visit     Activity as tolerated

## 2021-02-08 ENCOUNTER — OFFICE VISIT (OUTPATIENT)
Dept: CARDIOLOGY | Facility: CLINIC | Age: 56
End: 2021-02-08
Payer: MEDICAID

## 2021-02-08 VITALS
SYSTOLIC BLOOD PRESSURE: 130 MMHG | HEIGHT: 67 IN | HEART RATE: 98 BPM | OXYGEN SATURATION: 99 % | BODY MASS INDEX: 30.13 KG/M2 | RESPIRATION RATE: 16 BRPM | WEIGHT: 192 LBS | DIASTOLIC BLOOD PRESSURE: 80 MMHG

## 2021-02-08 DIAGNOSIS — E78.2 MIXED HYPERLIPIDEMIA: ICD-10-CM

## 2021-02-08 DIAGNOSIS — Z79.4 TYPE 2 DIABETES MELLITUS WITH STAGE 1 CHRONIC KIDNEY DISEASE, WITH LONG-TERM CURRENT USE OF INSULIN: ICD-10-CM

## 2021-02-08 DIAGNOSIS — N18.1 TYPE 2 DIABETES MELLITUS WITH STAGE 1 CHRONIC KIDNEY DISEASE, WITH LONG-TERM CURRENT USE OF INSULIN: ICD-10-CM

## 2021-02-08 DIAGNOSIS — I10 ESSENTIAL HYPERTENSION: Primary | Chronic | ICD-10-CM

## 2021-02-08 DIAGNOSIS — I25.10 CORONARY ARTERY DISEASE INVOLVING NATIVE CORONARY ARTERY OF NATIVE HEART WITHOUT ANGINA PECTORIS: ICD-10-CM

## 2021-02-08 DIAGNOSIS — E11.22 TYPE 2 DIABETES MELLITUS WITH STAGE 1 CHRONIC KIDNEY DISEASE, WITH LONG-TERM CURRENT USE OF INSULIN: ICD-10-CM

## 2021-02-08 DIAGNOSIS — I42.0 DILATED CARDIOMYOPATHY: Chronic | ICD-10-CM

## 2021-02-08 PROCEDURE — 99214 PR OFFICE/OUTPT VISIT, EST, LEVL IV, 30-39 MIN: ICD-10-PCS | Mod: S$GLB,,, | Performed by: INTERNAL MEDICINE

## 2021-02-08 PROCEDURE — 99214 OFFICE O/P EST MOD 30 MIN: CPT | Mod: S$GLB,,, | Performed by: INTERNAL MEDICINE

## 2021-02-08 RX ORDER — ATORVASTATIN CALCIUM 40 MG/1
40 TABLET, FILM COATED ORAL DAILY
Qty: 90 TABLET | Refills: 3 | Status: SHIPPED | OUTPATIENT
Start: 2021-02-08 | End: 2022-02-16 | Stop reason: SDUPTHER

## 2021-02-08 RX ORDER — LOSARTAN POTASSIUM 25 MG/1
25 TABLET ORAL 2 TIMES DAILY
Qty: 180 TABLET | Refills: 3 | Status: SHIPPED | OUTPATIENT
Start: 2021-02-08 | End: 2022-02-16 | Stop reason: SDUPTHER

## 2021-02-08 RX ORDER — PANTOPRAZOLE SODIUM 40 MG/1
40 TABLET, DELAYED RELEASE ORAL 2 TIMES DAILY
Qty: 90 TABLET | Refills: 3 | Status: SHIPPED | OUTPATIENT
Start: 2021-02-08 | End: 2022-02-16 | Stop reason: SDUPTHER

## 2021-02-08 RX ORDER — METOPROLOL SUCCINATE 50 MG/1
50 TABLET, EXTENDED RELEASE ORAL DAILY
Qty: 90 TABLET | Refills: 3 | Status: SHIPPED | OUTPATIENT
Start: 2021-02-08 | End: 2022-02-16 | Stop reason: SDUPTHER

## 2021-03-03 ENCOUNTER — HOSPITAL ENCOUNTER (OUTPATIENT)
Dept: CARDIOLOGY | Facility: CLINIC | Age: 56
Discharge: HOME | End: 2021-03-03
Attending: INTERNAL MEDICINE
Payer: MEDICAID

## 2021-03-03 DIAGNOSIS — I42.0 DILATED CARDIOMYOPATHY: ICD-10-CM

## 2021-03-03 PROCEDURE — 93306 TTE W/DOPPLER COMPLETE: CPT | Mod: S$GLB,,, | Performed by: INTERNAL MEDICINE

## 2021-03-03 PROCEDURE — 93306 ECHO (CUPID ONLY): ICD-10-PCS | Mod: S$GLB,,, | Performed by: INTERNAL MEDICINE

## 2021-03-08 LAB
AORTIC ROOT ANNULUS: 2.9 CM
AORTIC VALVE CUSP SEPERATION: 1.6 CM
AV INDEX (PROSTH): 0.61
AV MEAN GRADIENT: 5 MMHG
AV PEAK GRADIENT: 10 MMHG
AV VALVE AREA: 2.31 CM2
AV VELOCITY RATIO: 0.51
CV ECHO LV RWT: 0.44 CM
DOP CALC AO PEAK VEL: 1.56 M/S
DOP CALC AO VTI: 27.1 CM
DOP CALC LVOT AREA: 3.8 CM2
DOP CALC LVOT DIAMETER: 2.2 CM
DOP CALC LVOT PEAK VEL: 0.8 M/S
DOP CALC LVOT STROKE VOLUME: 62.69 CM3
DOP CALCLVOT PEAK VEL VTI: 16.5 CM
E WAVE DECELERATION TIME: 158 MS
E/A RATIO: 0.83
E/E' RATIO: 9.47 M/S
ECHO LV POSTERIOR WALL: 1.22 CM (ref 0.6–1.1)
FRACTIONAL SHORTENING: 15 % (ref 28–44)
INTERVENTRICULAR SEPTUM: 1.25 CM (ref 0.6–1.1)
IVRT: 90 MS
LA MAJOR: 4 CM
LEFT ATRIUM SIZE: 4.2 CM
LEFT INTERNAL DIMENSION IN SYSTOLE: 4.7 CM (ref 2.1–4)
LEFT VENTRICLE DIASTOLIC VOLUME: 113 CM3
LEFT VENTRICLE SYSTOLIC VOLUME: 68 CM3
LEFT VENTRICULAR INTERNAL DIMENSION IN DIASTOLE: 5.51 CM (ref 3.5–6)
LEFT VENTRICULAR MASS: 284.22 G
LV LATERAL E/E' RATIO: 7.1 M/S
LV SEPTAL E/E' RATIO: 14.2 M/S
MV PEAK A VEL: 0.86 M/S
MV PEAK E VEL: 0.71 M/S
RA PRESSURE: 3 MMHG
RIGHT VENTRICULAR END-DIASTOLIC DIMENSION: 1.93 CM
TDI LATERAL: 0.1 M/S
TDI SEPTAL: 0.05 M/S
TDI: 0.08 M/S

## 2021-03-09 ENCOUNTER — TELEPHONE (OUTPATIENT)
Dept: CARDIOLOGY | Facility: CLINIC | Age: 56
End: 2021-03-09

## 2021-03-09 ENCOUNTER — IMMUNIZATION (OUTPATIENT)
Dept: PRIMARY CARE CLINIC | Facility: CLINIC | Age: 56
End: 2021-03-09
Payer: MEDICAID

## 2021-03-09 DIAGNOSIS — Z23 NEED FOR VACCINATION: Primary | ICD-10-CM

## 2021-03-09 PROCEDURE — 0001A COVID-19, MRNA, LNP-S, PF, 30 MCG/0.3 ML DOSE VACCINE: ICD-10-PCS | Mod: CV19,S$GLB,, | Performed by: FAMILY MEDICINE

## 2021-03-09 PROCEDURE — 91300 COVID-19, MRNA, LNP-S, PF, 30 MCG/0.3 ML DOSE VACCINE: CPT | Mod: S$GLB,,, | Performed by: FAMILY MEDICINE

## 2021-03-09 PROCEDURE — 0001A COVID-19, MRNA, LNP-S, PF, 30 MCG/0.3 ML DOSE VACCINE: CPT | Mod: CV19,S$GLB,, | Performed by: FAMILY MEDICINE

## 2021-03-09 PROCEDURE — 91300 COVID-19, MRNA, LNP-S, PF, 30 MCG/0.3 ML DOSE VACCINE: ICD-10-PCS | Mod: S$GLB,,, | Performed by: FAMILY MEDICINE

## 2021-03-25 ENCOUNTER — HOSPITAL ENCOUNTER (EMERGENCY)
Facility: HOSPITAL | Age: 56
Discharge: HOME | End: 2021-03-26
Attending: EMERGENCY MEDICINE
Payer: MEDICAID

## 2021-03-25 DIAGNOSIS — R10.13 EPIGASTRIC ABDOMINAL PAIN: ICD-10-CM

## 2021-03-25 DIAGNOSIS — I10 UNCONTROLLED HYPERTENSION: ICD-10-CM

## 2021-03-25 DIAGNOSIS — R11.2 NON-INTRACTABLE VOMITING WITH NAUSEA, UNSPECIFIED VOMITING TYPE: Primary | ICD-10-CM

## 2021-03-25 DIAGNOSIS — R10.9 ABDOMINAL PAIN: ICD-10-CM

## 2021-03-25 LAB
ALBUMIN SERPL BCP-MCNC: 4.3 G/DL (ref 3.5–5.2)
ALP SERPL-CCNC: 125 U/L (ref 55–135)
ALT SERPL W/O P-5'-P-CCNC: 14 U/L (ref 10–44)
ANION GAP SERPL CALC-SCNC: 11 MMOL/L (ref 8–16)
AST SERPL-CCNC: 15 U/L (ref 10–40)
BASOPHILS # BLD AUTO: 0.04 K/UL (ref 0–0.2)
BASOPHILS NFR BLD: 0.3 % (ref 0–1.9)
BILIRUB SERPL-MCNC: 0.7 MG/DL (ref 0.1–1)
BILIRUB UR QL STRIP: NEGATIVE
BNP SERPL-MCNC: 73 PG/ML (ref 0–99)
BUN SERPL-MCNC: 21 MG/DL (ref 6–20)
CALCIUM SERPL-MCNC: 9.4 MG/DL (ref 8.7–10.5)
CHLORIDE SERPL-SCNC: 100 MMOL/L (ref 95–110)
CLARITY UR: CLEAR
CO2 SERPL-SCNC: 25 MMOL/L (ref 23–29)
COLOR UR: YELLOW
CREAT SERPL-MCNC: 1.1 MG/DL (ref 0.5–1.4)
DIFFERENTIAL METHOD: ABNORMAL
EOSINOPHIL # BLD AUTO: 0 K/UL (ref 0–0.5)
EOSINOPHIL NFR BLD: 0.2 % (ref 0–8)
ERYTHROCYTE [DISTWIDTH] IN BLOOD BY AUTOMATED COUNT: 13.9 % (ref 11.5–14.5)
EST. GFR  (AFRICAN AMERICAN): >60 ML/MIN/1.73 M^2
EST. GFR  (NON AFRICAN AMERICAN): 57.5 ML/MIN/1.73 M^2
GLUCOSE SERPL-MCNC: 180 MG/DL (ref 70–110)
GLUCOSE UR QL STRIP: ABNORMAL
HCT VFR BLD AUTO: 47.4 % (ref 37–48.5)
HGB BLD-MCNC: 16.1 G/DL (ref 12–16)
HGB UR QL STRIP: NEGATIVE
IMM GRANULOCYTES # BLD AUTO: 0.05 K/UL (ref 0–0.04)
IMM GRANULOCYTES NFR BLD AUTO: 0.4 % (ref 0–0.5)
KETONES UR QL STRIP: NEGATIVE
LEUKOCYTE ESTERASE UR QL STRIP: NEGATIVE
LIPASE SERPL-CCNC: 30 U/L (ref 4–60)
LYMPHOCYTES # BLD AUTO: 3 K/UL (ref 1–4.8)
LYMPHOCYTES NFR BLD: 23.1 % (ref 18–48)
MCH RBC QN AUTO: 30.5 PG (ref 27–31)
MCHC RBC AUTO-ENTMCNC: 34 G/DL (ref 32–36)
MCV RBC AUTO: 90 FL (ref 82–98)
MONOCYTES # BLD AUTO: 0.8 K/UL (ref 0.3–1)
MONOCYTES NFR BLD: 6.4 % (ref 4–15)
NEUTROPHILS # BLD AUTO: 9 K/UL (ref 1.8–7.7)
NEUTROPHILS NFR BLD: 69.6 % (ref 38–73)
NITRITE UR QL STRIP: NEGATIVE
NRBC BLD-RTO: 0 /100 WBC
PH UR STRIP: 7 [PH] (ref 5–8)
PLATELET # BLD AUTO: 471 K/UL (ref 150–350)
PMV BLD AUTO: 9.1 FL (ref 9.2–12.9)
POTASSIUM SERPL-SCNC: 4.4 MMOL/L (ref 3.5–5.1)
PROT SERPL-MCNC: 7.4 G/DL (ref 6–8.4)
PROT UR QL STRIP: ABNORMAL
RBC # BLD AUTO: 5.28 M/UL (ref 4–5.4)
SARS-COV-2 RDRP RESP QL NAA+PROBE: NEGATIVE
SODIUM SERPL-SCNC: 136 MMOL/L (ref 136–145)
SP GR UR STRIP: >1.03 (ref 1–1.03)
TROPONIN I SERPL DL<=0.01 NG/ML-MCNC: <0.03 NG/ML
URN SPEC COLLECT METH UR: ABNORMAL
UROBILINOGEN UR STRIP-ACNC: NEGATIVE EU/DL
WBC # BLD AUTO: 12.89 K/UL (ref 3.9–12.7)

## 2021-03-25 PROCEDURE — 84484 ASSAY OF TROPONIN QUANT: CPT | Performed by: EMERGENCY MEDICINE

## 2021-03-25 PROCEDURE — 83880 ASSAY OF NATRIURETIC PEPTIDE: CPT | Performed by: EMERGENCY MEDICINE

## 2021-03-25 PROCEDURE — 85025 COMPLETE CBC W/AUTO DIFF WBC: CPT | Performed by: EMERGENCY MEDICINE

## 2021-03-25 PROCEDURE — 93010 ELECTROCARDIOGRAM REPORT: CPT | Mod: ,,, | Performed by: INTERNAL MEDICINE

## 2021-03-25 PROCEDURE — 25500020 PHARM REV CODE 255: Performed by: EMERGENCY MEDICINE

## 2021-03-25 PROCEDURE — 93005 ELECTROCARDIOGRAM TRACING: CPT | Performed by: INTERNAL MEDICINE

## 2021-03-25 PROCEDURE — 63600175 PHARM REV CODE 636 W HCPCS: Performed by: EMERGENCY MEDICINE

## 2021-03-25 PROCEDURE — 81003 URINALYSIS AUTO W/O SCOPE: CPT | Performed by: EMERGENCY MEDICINE

## 2021-03-25 PROCEDURE — 99285 EMERGENCY DEPT VISIT HI MDM: CPT | Mod: 25

## 2021-03-25 PROCEDURE — 80053 COMPREHEN METABOLIC PANEL: CPT | Performed by: EMERGENCY MEDICINE

## 2021-03-25 PROCEDURE — 93010 EKG 12-LEAD: ICD-10-PCS | Mod: ,,, | Performed by: INTERNAL MEDICINE

## 2021-03-25 PROCEDURE — 96361 HYDRATE IV INFUSION ADD-ON: CPT

## 2021-03-25 PROCEDURE — U0002 COVID-19 LAB TEST NON-CDC: HCPCS | Performed by: EMERGENCY MEDICINE

## 2021-03-25 PROCEDURE — 25000003 PHARM REV CODE 250: Performed by: EMERGENCY MEDICINE

## 2021-03-25 PROCEDURE — 96376 TX/PRO/DX INJ SAME DRUG ADON: CPT

## 2021-03-25 PROCEDURE — 96375 TX/PRO/DX INJ NEW DRUG ADDON: CPT

## 2021-03-25 PROCEDURE — 83690 ASSAY OF LIPASE: CPT | Performed by: EMERGENCY MEDICINE

## 2021-03-25 RX ORDER — HYDROMORPHONE HYDROCHLORIDE 1 MG/ML
0.5 INJECTION, SOLUTION INTRAMUSCULAR; INTRAVENOUS; SUBCUTANEOUS
Status: COMPLETED | OUTPATIENT
Start: 2021-03-25 | End: 2021-03-25

## 2021-03-25 RX ORDER — FAMOTIDINE 10 MG/ML
20 INJECTION INTRAVENOUS
Status: COMPLETED | OUTPATIENT
Start: 2021-03-25 | End: 2021-03-25

## 2021-03-25 RX ORDER — METOPROLOL TARTRATE 1 MG/ML
5 INJECTION, SOLUTION INTRAVENOUS
Status: COMPLETED | OUTPATIENT
Start: 2021-03-25 | End: 2021-03-25

## 2021-03-25 RX ORDER — ONDANSETRON 2 MG/ML
8 INJECTION INTRAMUSCULAR; INTRAVENOUS
Status: COMPLETED | OUTPATIENT
Start: 2021-03-25 | End: 2021-03-25

## 2021-03-25 RX ADMIN — METOPROLOL TARTRATE 5 MG: 5 INJECTION, SOLUTION INTRAVENOUS at 10:03

## 2021-03-25 RX ADMIN — HYDROMORPHONE HYDROCHLORIDE 0.5 MG: 1 INJECTION, SOLUTION INTRAMUSCULAR; INTRAVENOUS; SUBCUTANEOUS at 08:03

## 2021-03-25 RX ADMIN — SODIUM CHLORIDE, SODIUM LACTATE, POTASSIUM CHLORIDE, AND CALCIUM CHLORIDE 250 ML: .6; .31; .03; .02 INJECTION, SOLUTION INTRAVENOUS at 10:03

## 2021-03-25 RX ADMIN — IOHEXOL 100 ML: 350 INJECTION, SOLUTION INTRAVENOUS at 09:03

## 2021-03-25 RX ADMIN — HYDROMORPHONE HYDROCHLORIDE 0.5 MG: 1 INJECTION, SOLUTION INTRAMUSCULAR; INTRAVENOUS; SUBCUTANEOUS at 11:03

## 2021-03-25 RX ADMIN — ONDANSETRON 8 MG: 2 INJECTION INTRAMUSCULAR; INTRAVENOUS at 08:03

## 2021-03-25 RX ADMIN — FAMOTIDINE 20 MG: 10 INJECTION INTRAVENOUS at 08:03

## 2021-03-26 VITALS
RESPIRATION RATE: 17 BRPM | SYSTOLIC BLOOD PRESSURE: 180 MMHG | WEIGHT: 170 LBS | HEIGHT: 67 IN | DIASTOLIC BLOOD PRESSURE: 90 MMHG | BODY MASS INDEX: 26.68 KG/M2 | OXYGEN SATURATION: 95 % | HEART RATE: 90 BPM | TEMPERATURE: 98 F

## 2021-03-26 PROCEDURE — 96365 THER/PROPH/DIAG IV INF INIT: CPT

## 2021-03-26 PROCEDURE — 25000003 PHARM REV CODE 250: Performed by: EMERGENCY MEDICINE

## 2021-03-26 PROCEDURE — 63600175 PHARM REV CODE 636 W HCPCS: Performed by: EMERGENCY MEDICINE

## 2021-03-26 RX ORDER — ONDANSETRON 4 MG/1
4 TABLET, FILM COATED ORAL EVERY 8 HOURS PRN
Qty: 12 TABLET | Refills: 0 | Status: SHIPPED | OUTPATIENT
Start: 2021-03-26 | End: 2023-03-06

## 2021-03-26 RX ADMIN — PROMETHAZINE HYDROCHLORIDE 12.5 MG: 25 INJECTION INTRAMUSCULAR; INTRAVENOUS at 12:03

## 2021-03-30 ENCOUNTER — IMMUNIZATION (OUTPATIENT)
Dept: PRIMARY CARE CLINIC | Facility: CLINIC | Age: 56
End: 2021-03-30
Payer: MEDICAID

## 2021-03-30 DIAGNOSIS — Z23 NEED FOR VACCINATION: Primary | ICD-10-CM

## 2021-03-30 PROCEDURE — 91300 COVID-19, MRNA, LNP-S, PF, 30 MCG/0.3 ML DOSE VACCINE: ICD-10-PCS | Mod: S$GLB,,, | Performed by: FAMILY MEDICINE

## 2021-03-30 PROCEDURE — 0002A COVID-19, MRNA, LNP-S, PF, 30 MCG/0.3 ML DOSE VACCINE: CPT | Mod: CV19,S$GLB,, | Performed by: FAMILY MEDICINE

## 2021-03-30 PROCEDURE — 91300 COVID-19, MRNA, LNP-S, PF, 30 MCG/0.3 ML DOSE VACCINE: CPT | Mod: S$GLB,,, | Performed by: FAMILY MEDICINE

## 2021-03-30 PROCEDURE — 0002A COVID-19, MRNA, LNP-S, PF, 30 MCG/0.3 ML DOSE VACCINE: ICD-10-PCS | Mod: CV19,S$GLB,, | Performed by: FAMILY MEDICINE

## 2021-10-28 ENCOUNTER — TELEPHONE (OUTPATIENT)
Dept: CARDIOLOGY | Facility: CLINIC | Age: 56
End: 2021-10-28
Payer: MEDICAID

## 2021-11-02 ENCOUNTER — TELEPHONE (OUTPATIENT)
Dept: CARDIOLOGY | Facility: CLINIC | Age: 56
End: 2021-11-02
Payer: MEDICAID

## 2021-11-02 NOTE — LETTER
2021    Delores Fox  [address] [place] [address]             [facility] [place]  [address], [address]  [place] [address]  Phone: [phone]  Fax: [phone] Patient: Delores Fox  : 1965  Referring Doctor: Dr. Burciaga  Procedure: Colonoscopy    Current Outpatient Medications   Medication Sig    aspirin 81 MG Chew Take 1 tablet (81 mg total) by mouth once daily.    atorvastatin (LIPITOR) 40 MG tablet Take 1 tablet (40 mg total) by mouth once daily.    fluticasone furoate-vilanteroL (BREO) 100-25 mcg/dose diskus inhaler Inhale 1 puff into the lungs.    linaGLIPtin (TRADJENTA) 5 mg Tab tablet Take 5 mg by mouth once daily.     losartan (COZAAR) 25 MG tablet Take 1 tablet (25 mg total) by mouth 2 (two) times a day.    melatonin (MELATIN) 3 mg tablet Take 3 mg by mouth nightly.     metoprolol succinate (TOPROL-XL) 50 MG 24 hr tablet Take 1 tablet (50 mg total) by mouth once daily.    ondansetron (ZOFRAN) 4 MG tablet Take 1 tablet (4 mg total) by mouth every 8 (eight) hours as needed for Nausea.    pantoprazole (PROTONIX) 40 MG tablet Take 1 tablet (40 mg total) by mouth 2 (two) times daily.    pregabalin (LYRICA) 75 MG capsule Take 75 mg by mouth 2 (two) times daily.     zolpidem (AMBIEN) 10 mg Tab Take 10 mg by mouth every evening.      No current facility-administered medications for this visit.     Facility-Administered Medications Ordered in Other Visits   Medication    electrolyte-S (ISOLYTE)    lidocaine (PF) 10 mg/ml (1%) injection 10 mg    sodium chloride 0.9% flush 3 mL       This patient has been assessed for risk factors for clearance of surgery with the following stipulations:    X___ No contraindications  X___ Recommendations for Aspirin, hold x 5__ days  _X__ Cleared for surgery with moderate risks      If you have any questions regarding the above, please contact my office at [phone].    Sincerely,    Dorota  Jaspreet, NP

## 2021-11-04 LAB
LEFT EYE DM RETINOPATHY: POSITIVE
RIGHT EYE DM RETINOPATHY: POSITIVE

## 2021-12-21 ENCOUNTER — IMMUNIZATION (OUTPATIENT)
Dept: PRIMARY CARE CLINIC | Facility: CLINIC | Age: 56
End: 2021-12-21
Payer: MEDICAID

## 2021-12-21 DIAGNOSIS — Z23 NEED FOR VACCINATION: Primary | ICD-10-CM

## 2021-12-21 PROCEDURE — 91300 COVID-19, MRNA, LNP-S, PF, 30 MCG/0.3 ML DOSE VACCINE: CPT | Mod: S$GLB,,, | Performed by: FAMILY MEDICINE

## 2021-12-21 PROCEDURE — 91300 COVID-19, MRNA, LNP-S, PF, 30 MCG/0.3 ML DOSE VACCINE: ICD-10-PCS | Mod: S$GLB,,, | Performed by: FAMILY MEDICINE

## 2021-12-21 PROCEDURE — 0003A COVID-19, MRNA, LNP-S, PF, 30 MCG/0.3 ML DOSE VACCINE: CPT | Mod: S$GLB,,, | Performed by: FAMILY MEDICINE

## 2021-12-21 PROCEDURE — 0003A COVID-19, MRNA, LNP-S, PF, 30 MCG/0.3 ML DOSE VACCINE: ICD-10-PCS | Mod: S$GLB,,, | Performed by: FAMILY MEDICINE

## 2022-02-12 ENCOUNTER — CLINICAL SUPPORT (OUTPATIENT)
Dept: CARDIOLOGY | Facility: HOSPITAL | Age: 57
DRG: 280 | End: 2022-02-12
Attending: INTERNAL MEDICINE
Payer: MEDICAID

## 2022-02-12 ENCOUNTER — HOSPITAL ENCOUNTER (INPATIENT)
Facility: HOSPITAL | Age: 57
LOS: 2 days | Discharge: HOME HEALTH | DRG: 280 | End: 2022-02-14
Attending: EMERGENCY MEDICINE | Admitting: INTERNAL MEDICINE
Payer: MEDICAID

## 2022-02-12 VITALS — BODY MASS INDEX: 37.54 KG/M2 | WEIGHT: 239.19 LBS | HEIGHT: 67 IN

## 2022-02-12 DIAGNOSIS — J44.1 COPD EXACERBATION: ICD-10-CM

## 2022-02-12 DIAGNOSIS — J96.01 ACUTE RESPIRATORY FAILURE WITH HYPOXIA AND HYPERCARBIA: ICD-10-CM

## 2022-02-12 DIAGNOSIS — I50.9 CHF (CONGESTIVE HEART FAILURE): ICD-10-CM

## 2022-02-12 DIAGNOSIS — J96.02 ACUTE RESPIRATORY FAILURE WITH HYPOXIA AND HYPERCARBIA: ICD-10-CM

## 2022-02-12 DIAGNOSIS — R07.9 CHEST PAIN: ICD-10-CM

## 2022-02-12 DIAGNOSIS — R06.02 SHORTNESS OF BREATH: ICD-10-CM

## 2022-02-12 DIAGNOSIS — I50.9 CONGESTIVE HEART FAILURE, UNSPECIFIED HF CHRONICITY, UNSPECIFIED HEART FAILURE TYPE: Primary | ICD-10-CM

## 2022-02-12 DIAGNOSIS — R06.03 RESPIRATORY DISTRESS: ICD-10-CM

## 2022-02-12 PROBLEM — I50.43 SYSTOLIC AND DIASTOLIC CHF, ACUTE ON CHRONIC: Status: ACTIVE | Noted: 2022-02-12

## 2022-02-12 PROBLEM — B37.0 THRUSH: Status: ACTIVE | Noted: 2022-02-12

## 2022-02-12 PROBLEM — J96.00 COPD WITH RESPIRATORY FAILURE, ACUTE: Status: ACTIVE | Noted: 2022-02-12

## 2022-02-12 PROBLEM — J81.0 PULMONARY EDEMA, ACUTE: Status: ACTIVE | Noted: 2022-02-12

## 2022-02-12 PROBLEM — J18.9 COMMUNITY ACQUIRED PNEUMONIA: Status: ACTIVE | Noted: 2022-02-12

## 2022-02-12 LAB
ALBUMIN SERPL BCP-MCNC: 3.8 G/DL (ref 3.5–5.2)
ALLENS TEST: ABNORMAL
ALP SERPL-CCNC: 128 U/L (ref 55–135)
ALT SERPL W/O P-5'-P-CCNC: 15 U/L (ref 10–44)
ANION GAP SERPL CALC-SCNC: 11 MMOL/L (ref 8–16)
AORTIC ROOT ANNULUS: 2.87 CM
AORTIC VALVE CUSP SEPERATION: 1.55 CM
AST SERPL-CCNC: 17 U/L (ref 10–40)
AV INDEX (PROSTH): 0.52
AV MEAN GRADIENT: 9 MMHG
AV PEAK GRADIENT: 15 MMHG
AV VALVE AREA: 2.08 CM2
AV VELOCITY RATIO: 53.42
BASOPHILS # BLD AUTO: 0.04 K/UL (ref 0–0.2)
BASOPHILS NFR BLD: 0.2 % (ref 0–1.9)
BILIRUB SERPL-MCNC: 0.6 MG/DL (ref 0.1–1)
BNP SERPL-MCNC: 240 PG/ML (ref 0–99)
BSA FOR ECHO PROCEDURE: 2.26 M2
BUN SERPL-MCNC: 27 MG/DL (ref 6–20)
CALCIUM SERPL-MCNC: 8.2 MG/DL (ref 8.7–10.5)
CHLORIDE SERPL-SCNC: 100 MMOL/L (ref 95–110)
CO2 SERPL-SCNC: 24 MMOL/L (ref 23–29)
CREAT SERPL-MCNC: 1.7 MG/DL (ref 0.5–1.4)
CV ECHO LV RWT: 0.33 CM
DELSYS: ABNORMAL
DIFFERENTIAL METHOD: ABNORMAL
DOP CALC AO PEAK VEL: 1.94 M/S
DOP CALC AO VTI: 41.87 CM
DOP CALC LVOT AREA: 4 CM2
DOP CALC LVOT DIAMETER: 2.26 CM
DOP CALC LVOT PEAK VEL: 103.64 M/S
DOP CALC LVOT STROKE VOLUME: 87.21 CM3
DOP CALCLVOT PEAK VEL VTI: 21.75 CM
E WAVE DECELERATION TIME: 121.09 MSEC
E/A RATIO: 0.9
E/E' RATIO: 13 M/S
ECHO LV POSTERIOR WALL: 0.96 CM (ref 0.6–1.1)
EJECTION FRACTION: 35 %
EOSINOPHIL # BLD AUTO: 0.1 K/UL (ref 0–0.5)
EOSINOPHIL NFR BLD: 0.7 % (ref 0–8)
EP: 6
ERYTHROCYTE [DISTWIDTH] IN BLOOD BY AUTOMATED COUNT: 12.4 % (ref 11.5–14.5)
ERYTHROCYTE [SEDIMENTATION RATE] IN BLOOD BY WESTERGREN METHOD: 16 MM/H
EST. GFR  (AFRICAN AMERICAN): 38.9 ML/MIN/1.73 M^2
EST. GFR  (NON AFRICAN AMERICAN): 33.7 ML/MIN/1.73 M^2
FIO2: 60
FRACTIONAL SHORTENING: 16 % (ref 28–44)
GLUCOSE SERPL-MCNC: 167 MG/DL (ref 70–110)
GLUCOSE SERPL-MCNC: 329 MG/DL (ref 70–110)
GLUCOSE SERPL-MCNC: 330 MG/DL (ref 70–110)
GLUCOSE SERPL-MCNC: 342 MG/DL (ref 70–110)
HCO3 UR-SCNC: 28.1 MMOL/L (ref 24–28)
HCT VFR BLD AUTO: 43.3 % (ref 37–48.5)
HCT VFR BLD CALC: 44 %PCV (ref 36–54)
HGB BLD-MCNC: 14.1 G/DL (ref 12–16)
IMM GRANULOCYTES # BLD AUTO: 0.14 K/UL (ref 0–0.04)
IMM GRANULOCYTES NFR BLD AUTO: 0.8 % (ref 0–0.5)
INTERVENTRICULAR SEPTUM: 0.95 CM (ref 0.6–1.1)
IP: 14
IVRT: 70.45 MSEC
LACTATE SERPL-SCNC: 1.6 MMOL/L (ref 0.5–1.9)
LEFT ATRIUM SIZE: 3.55 CM
LEFT INTERNAL DIMENSION IN SYSTOLE: 4.88 CM (ref 2.1–4)
LEFT VENTRICLE MASS INDEX: 101 G/M2
LEFT VENTRICULAR INTERNAL DIMENSION IN DIASTOLE: 5.82 CM (ref 3.5–6)
LEFT VENTRICULAR MASS: 220.87 G
LV LATERAL E/E' RATIO: 13 M/S
LV SEPTAL E/E' RATIO: 13 M/S
LYMPHOCYTES # BLD AUTO: 4 K/UL (ref 1–4.8)
LYMPHOCYTES NFR BLD: 21.8 % (ref 18–48)
MCH RBC QN AUTO: 30.6 PG (ref 27–31)
MCHC RBC AUTO-ENTMCNC: 32.6 G/DL (ref 32–36)
MCV RBC AUTO: 94 FL (ref 82–98)
MIN VOL: 7.3
MODE: ABNORMAL
MONOCYTES # BLD AUTO: 1.4 K/UL (ref 0.3–1)
MONOCYTES NFR BLD: 7.4 % (ref 4–15)
MV PEAK A VEL: 1.44 M/S
MV PEAK E VEL: 1.3 M/S
NEUTROPHILS # BLD AUTO: 12.7 K/UL (ref 1.8–7.7)
NEUTROPHILS NFR BLD: 69.1 % (ref 38–73)
NRBC BLD-RTO: 0 /100 WBC
PCO2 BLDA: 66.3 MMHG (ref 35–45)
PH SMN: 7.24 [PH] (ref 7.35–7.45)
PISA TR MAX VEL: 2.43 M/S
PLATELET # BLD AUTO: 410 K/UL (ref 150–450)
PMV BLD AUTO: 9.6 FL (ref 9.2–12.9)
PO2 BLDA: 87 MMHG (ref 80–100)
POC BE: 1 MMOL/L
POC IONIZED CALCIUM: 1.23 MMOL/L (ref 1.06–1.42)
POC PCO2 TEMP: 66.3 MMHG
POC PH TEMP: 7.24
POC PO2 TEMP: 87 MMHG
POC SATURATED O2: 94 % (ref 95–100)
POC TCO2: 30 MMOL/L (ref 23–27)
POC TEMPERATURE: ABNORMAL
POTASSIUM BLD-SCNC: 3.7 MMOL/L (ref 3.5–5.1)
POTASSIUM SERPL-SCNC: 3.8 MMOL/L (ref 3.5–5.1)
PROT SERPL-MCNC: 6.9 G/DL (ref 6–8.4)
PV PEAK VELOCITY: 111.63 CM/S
RA PRESSURE: 3 MMHG
RBC # BLD AUTO: 4.61 M/UL (ref 4–5.4)
RIGHT VENTRICULAR END-DIASTOLIC DIMENSION: 228 CM
SAMPLE: ABNORMAL
SARS-COV-2 RDRP RESP QL NAA+PROBE: NEGATIVE
SITE: ABNORMAL
SODIUM BLD-SCNC: 136 MMOL/L (ref 136–145)
SODIUM SERPL-SCNC: 135 MMOL/L (ref 136–145)
SP02: 95
SPONT RATE: 22
TDI LATERAL: 0.1 M/S
TDI SEPTAL: 0.1 M/S
TDI: 0.1 M/S
TR MAX PG: 24 MMHG
TROPONIN I SERPL DL<=0.01 NG/ML-MCNC: 0.08 NG/ML
TROPONIN I SERPL DL<=0.01 NG/ML-MCNC: 0.22 NG/ML
TV REST PULMONARY ARTERY PRESSURE: 27 MMHG
WBC # BLD AUTO: 18.39 K/UL (ref 3.9–12.7)

## 2022-02-12 PROCEDURE — 27000221 HC OXYGEN, UP TO 24 HOURS

## 2022-02-12 PROCEDURE — 82962 GLUCOSE BLOOD TEST: CPT

## 2022-02-12 PROCEDURE — 94761 N-INVAS EAR/PLS OXIMETRY MLT: CPT

## 2022-02-12 PROCEDURE — S4991 NICOTINE PATCH NONLEGEND: HCPCS | Performed by: STUDENT IN AN ORGANIZED HEALTH CARE EDUCATION/TRAINING PROGRAM

## 2022-02-12 PROCEDURE — 99900031 HC PATIENT EDUCATION (STAT)

## 2022-02-12 PROCEDURE — 84484 ASSAY OF TROPONIN QUANT: CPT | Mod: 91 | Performed by: INTERNAL MEDICINE

## 2022-02-12 PROCEDURE — 93306 TTE W/DOPPLER COMPLETE: CPT | Mod: 26,,, | Performed by: INTERNAL MEDICINE

## 2022-02-12 PROCEDURE — 93010 ELECTROCARDIOGRAM REPORT: CPT | Mod: ,,, | Performed by: SPECIALIST

## 2022-02-12 PROCEDURE — 84132 ASSAY OF SERUM POTASSIUM: CPT

## 2022-02-12 PROCEDURE — 99900035 HC TECH TIME PER 15 MIN (STAT)

## 2022-02-12 PROCEDURE — 25000003 PHARM REV CODE 250: Performed by: INTERNAL MEDICINE

## 2022-02-12 PROCEDURE — 94660 CPAP INITIATION&MGMT: CPT

## 2022-02-12 PROCEDURE — 99223 PR INITIAL HOSPITAL CARE,LEVL III: ICD-10-PCS | Mod: 25,,, | Performed by: INTERNAL MEDICINE

## 2022-02-12 PROCEDURE — 94640 AIRWAY INHALATION TREATMENT: CPT

## 2022-02-12 PROCEDURE — 97116 GAIT TRAINING THERAPY: CPT

## 2022-02-12 PROCEDURE — 36415 COLL VENOUS BLD VENIPUNCTURE: CPT | Performed by: INTERNAL MEDICINE

## 2022-02-12 PROCEDURE — 99291 CRITICAL CARE FIRST HOUR: CPT | Mod: ,,, | Performed by: INTERNAL MEDICINE

## 2022-02-12 PROCEDURE — 96365 THER/PROPH/DIAG IV INF INIT: CPT | Mod: 59

## 2022-02-12 PROCEDURE — 93306 TTE W/DOPPLER COMPLETE: CPT

## 2022-02-12 PROCEDURE — 63600175 PHARM REV CODE 636 W HCPCS: Performed by: INTERNAL MEDICINE

## 2022-02-12 PROCEDURE — 84295 ASSAY OF SERUM SODIUM: CPT

## 2022-02-12 PROCEDURE — 93010 EKG 12-LEAD: ICD-10-PCS | Mod: ,,, | Performed by: SPECIALIST

## 2022-02-12 PROCEDURE — 36600 WITHDRAWAL OF ARTERIAL BLOOD: CPT

## 2022-02-12 PROCEDURE — 25000242 PHARM REV CODE 250 ALT 637 W/ HCPCS: Performed by: INTERNAL MEDICINE

## 2022-02-12 PROCEDURE — 83880 ASSAY OF NATRIURETIC PEPTIDE: CPT | Performed by: STUDENT IN AN ORGANIZED HEALTH CARE EDUCATION/TRAINING PROGRAM

## 2022-02-12 PROCEDURE — 63600175 PHARM REV CODE 636 W HCPCS: Performed by: STUDENT IN AN ORGANIZED HEALTH CARE EDUCATION/TRAINING PROGRAM

## 2022-02-12 PROCEDURE — 85014 HEMATOCRIT: CPT

## 2022-02-12 PROCEDURE — U0002 COVID-19 LAB TEST NON-CDC: HCPCS | Performed by: STUDENT IN AN ORGANIZED HEALTH CARE EDUCATION/TRAINING PROGRAM

## 2022-02-12 PROCEDURE — 96375 TX/PRO/DX INJ NEW DRUG ADDON: CPT

## 2022-02-12 PROCEDURE — 82330 ASSAY OF CALCIUM: CPT

## 2022-02-12 PROCEDURE — 80053 COMPREHEN METABOLIC PANEL: CPT | Performed by: STUDENT IN AN ORGANIZED HEALTH CARE EDUCATION/TRAINING PROGRAM

## 2022-02-12 PROCEDURE — 99223 1ST HOSP IP/OBS HIGH 75: CPT | Mod: 25,,, | Performed by: INTERNAL MEDICINE

## 2022-02-12 PROCEDURE — 25000003 PHARM REV CODE 250: Performed by: STUDENT IN AN ORGANIZED HEALTH CARE EDUCATION/TRAINING PROGRAM

## 2022-02-12 PROCEDURE — 85025 COMPLETE CBC W/AUTO DIFF WBC: CPT | Performed by: STUDENT IN AN ORGANIZED HEALTH CARE EDUCATION/TRAINING PROGRAM

## 2022-02-12 PROCEDURE — 97161 PT EVAL LOW COMPLEX 20 MIN: CPT

## 2022-02-12 PROCEDURE — 87040 BLOOD CULTURE FOR BACTERIA: CPT | Mod: 59 | Performed by: STUDENT IN AN ORGANIZED HEALTH CARE EDUCATION/TRAINING PROGRAM

## 2022-02-12 PROCEDURE — 99285 EMERGENCY DEPT VISIT HI MDM: CPT | Mod: 25

## 2022-02-12 PROCEDURE — 20000000 HC ICU ROOM

## 2022-02-12 PROCEDURE — 25000242 PHARM REV CODE 250 ALT 637 W/ HCPCS: Performed by: STUDENT IN AN ORGANIZED HEALTH CARE EDUCATION/TRAINING PROGRAM

## 2022-02-12 PROCEDURE — 82803 BLOOD GASES ANY COMBINATION: CPT

## 2022-02-12 PROCEDURE — 93306 ECHO (CUPID ONLY): ICD-10-PCS | Mod: 26,,, | Performed by: INTERNAL MEDICINE

## 2022-02-12 PROCEDURE — 51701 INSERT BLADDER CATHETER: CPT

## 2022-02-12 PROCEDURE — 93005 ELECTROCARDIOGRAM TRACING: CPT | Performed by: SPECIALIST

## 2022-02-12 PROCEDURE — 84484 ASSAY OF TROPONIN QUANT: CPT | Performed by: STUDENT IN AN ORGANIZED HEALTH CARE EDUCATION/TRAINING PROGRAM

## 2022-02-12 PROCEDURE — 83605 ASSAY OF LACTIC ACID: CPT | Performed by: STUDENT IN AN ORGANIZED HEALTH CARE EDUCATION/TRAINING PROGRAM

## 2022-02-12 PROCEDURE — 99291 PR CRITICAL CARE, E/M 30-74 MINUTES: ICD-10-PCS | Mod: ,,, | Performed by: INTERNAL MEDICINE

## 2022-02-12 RX ORDER — SODIUM CHLORIDE 0.9 % (FLUSH) 0.9 %
10 SYRINGE (ML) INJECTION EVERY 6 HOURS PRN
Status: DISCONTINUED | OUTPATIENT
Start: 2022-02-12 | End: 2022-02-14 | Stop reason: HOSPADM

## 2022-02-12 RX ORDER — NALOXONE HCL 0.4 MG/ML
0.02 VIAL (ML) INJECTION
Status: DISCONTINUED | OUTPATIENT
Start: 2022-02-12 | End: 2022-02-14 | Stop reason: HOSPADM

## 2022-02-12 RX ORDER — FUROSEMIDE 10 MG/ML
120 INJECTION INTRAMUSCULAR; INTRAVENOUS
Status: COMPLETED | OUTPATIENT
Start: 2022-02-12 | End: 2022-02-12

## 2022-02-12 RX ORDER — GLUCAGON 1 MG
1 KIT INJECTION
Status: DISCONTINUED | OUTPATIENT
Start: 2022-02-12 | End: 2022-02-14 | Stop reason: HOSPADM

## 2022-02-12 RX ORDER — LOSARTAN POTASSIUM 50 MG/1
50 TABLET ORAL 2 TIMES DAILY
Status: DISCONTINUED | OUTPATIENT
Start: 2022-02-12 | End: 2022-02-13

## 2022-02-12 RX ORDER — IBUPROFEN 200 MG
24 TABLET ORAL
Status: DISCONTINUED | OUTPATIENT
Start: 2022-02-12 | End: 2022-02-14 | Stop reason: HOSPADM

## 2022-02-12 RX ORDER — TALC
9 POWDER (GRAM) TOPICAL NIGHTLY PRN
Status: DISCONTINUED | OUTPATIENT
Start: 2022-02-12 | End: 2022-02-14 | Stop reason: HOSPADM

## 2022-02-12 RX ORDER — FLUTICASONE FUROATE AND VILANTEROL 200; 25 UG/1; UG/1
1 POWDER RESPIRATORY (INHALATION) DAILY
Status: DISCONTINUED | OUTPATIENT
Start: 2022-02-12 | End: 2022-02-14 | Stop reason: HOSPADM

## 2022-02-12 RX ORDER — MORPHINE SULFATE 4 MG/ML
4 INJECTION, SOLUTION INTRAMUSCULAR; INTRAVENOUS EVERY 4 HOURS PRN
Status: DISCONTINUED | OUTPATIENT
Start: 2022-02-12 | End: 2022-02-14 | Stop reason: HOSPADM

## 2022-02-12 RX ORDER — NYSTATIN 100000 [USP'U]/ML
500000 SUSPENSION ORAL 4 TIMES DAILY
Status: DISCONTINUED | OUTPATIENT
Start: 2022-02-12 | End: 2022-02-14 | Stop reason: HOSPADM

## 2022-02-12 RX ORDER — LOSARTAN POTASSIUM 25 MG/1
25 TABLET ORAL 2 TIMES DAILY
Status: DISCONTINUED | OUTPATIENT
Start: 2022-02-12 | End: 2022-02-12

## 2022-02-12 RX ORDER — HYDROCODONE BITARTRATE AND ACETAMINOPHEN 5; 325 MG/1; MG/1
1 TABLET ORAL EVERY 6 HOURS PRN
Status: DISCONTINUED | OUTPATIENT
Start: 2022-02-12 | End: 2022-02-14 | Stop reason: HOSPADM

## 2022-02-12 RX ORDER — NYSTATIN 100000 [USP'U]/ML
500000 SUSPENSION ORAL 4 TIMES DAILY
Status: DISCONTINUED | OUTPATIENT
Start: 2022-02-12 | End: 2022-02-12

## 2022-02-12 RX ORDER — IBUPROFEN 200 MG
1 TABLET ORAL DAILY
Status: DISCONTINUED | OUTPATIENT
Start: 2022-02-12 | End: 2022-02-14 | Stop reason: HOSPADM

## 2022-02-12 RX ORDER — IPRATROPIUM BROMIDE AND ALBUTEROL SULFATE 2.5; .5 MG/3ML; MG/3ML
3 SOLUTION RESPIRATORY (INHALATION) EVERY 4 HOURS
Status: DISCONTINUED | OUTPATIENT
Start: 2022-02-12 | End: 2022-02-14 | Stop reason: HOSPADM

## 2022-02-12 RX ORDER — PREGABALIN 75 MG/1
75 CAPSULE ORAL 2 TIMES DAILY
Status: DISCONTINUED | OUTPATIENT
Start: 2022-02-12 | End: 2022-02-14 | Stop reason: HOSPADM

## 2022-02-12 RX ORDER — FUROSEMIDE 10 MG/ML
40 INJECTION INTRAMUSCULAR; INTRAVENOUS
Status: DISCONTINUED | OUTPATIENT
Start: 2022-02-12 | End: 2022-02-13

## 2022-02-12 RX ORDER — ACETAMINOPHEN 325 MG/1
650 TABLET ORAL EVERY 4 HOURS PRN
Status: DISCONTINUED | OUTPATIENT
Start: 2022-02-12 | End: 2022-02-14 | Stop reason: HOSPADM

## 2022-02-12 RX ORDER — ENOXAPARIN SODIUM 100 MG/ML
40 INJECTION SUBCUTANEOUS EVERY 24 HOURS
Status: DISCONTINUED | OUTPATIENT
Start: 2022-02-12 | End: 2022-02-14 | Stop reason: HOSPADM

## 2022-02-12 RX ORDER — IBUPROFEN 200 MG
16 TABLET ORAL
Status: DISCONTINUED | OUTPATIENT
Start: 2022-02-12 | End: 2022-02-14 | Stop reason: HOSPADM

## 2022-02-12 RX ORDER — ASPIRIN 325 MG
325 TABLET ORAL
Status: DISPENSED | OUTPATIENT
Start: 2022-02-12 | End: 2022-02-12

## 2022-02-12 RX ORDER — IPRATROPIUM BROMIDE AND ALBUTEROL SULFATE 2.5; .5 MG/3ML; MG/3ML
9 SOLUTION RESPIRATORY (INHALATION) CONTINUOUS
Status: DISCONTINUED | OUTPATIENT
Start: 2022-02-12 | End: 2022-02-12

## 2022-02-12 RX ORDER — NAPROXEN SODIUM 220 MG/1
81 TABLET, FILM COATED ORAL DAILY
Status: DISCONTINUED | OUTPATIENT
Start: 2022-02-13 | End: 2022-02-14 | Stop reason: HOSPADM

## 2022-02-12 RX ORDER — ACETAMINOPHEN 500 MG
1000 TABLET ORAL EVERY 8 HOURS PRN
Status: DISCONTINUED | OUTPATIENT
Start: 2022-02-12 | End: 2022-02-14 | Stop reason: HOSPADM

## 2022-02-12 RX ORDER — ALBUTEROL SULFATE 0.83 MG/ML
2.5 SOLUTION RESPIRATORY (INHALATION) EVERY 4 HOURS PRN
Status: DISCONTINUED | OUTPATIENT
Start: 2022-02-12 | End: 2022-02-14 | Stop reason: HOSPADM

## 2022-02-12 RX ORDER — TALC
8 POWDER (GRAM) TOPICAL NIGHTLY PRN
Status: DISCONTINUED | OUTPATIENT
Start: 2022-02-12 | End: 2022-02-12

## 2022-02-12 RX ORDER — METOPROLOL SUCCINATE 50 MG/1
50 TABLET, EXTENDED RELEASE ORAL DAILY
Status: DISCONTINUED | OUTPATIENT
Start: 2022-02-12 | End: 2022-02-14 | Stop reason: HOSPADM

## 2022-02-12 RX ORDER — POLYETHYLENE GLYCOL 3350 17 G/17G
17 POWDER, FOR SOLUTION ORAL DAILY
Status: DISCONTINUED | OUTPATIENT
Start: 2022-02-12 | End: 2022-02-14 | Stop reason: HOSPADM

## 2022-02-12 RX ORDER — ATORVASTATIN CALCIUM 40 MG/1
40 TABLET, FILM COATED ORAL NIGHTLY
Status: DISCONTINUED | OUTPATIENT
Start: 2022-02-12 | End: 2022-02-14 | Stop reason: HOSPADM

## 2022-02-12 RX ADMIN — HUMAN INSULIN 12 UNITS: 100 INJECTION, SOLUTION SUBCUTANEOUS at 11:02

## 2022-02-12 RX ADMIN — ENOXAPARIN SODIUM 40 MG: 40 INJECTION SUBCUTANEOUS at 05:02

## 2022-02-12 RX ADMIN — METOPROLOL SUCCINATE 50 MG: 50 TABLET, EXTENDED RELEASE ORAL at 11:02

## 2022-02-12 RX ADMIN — IPRATROPIUM BROMIDE AND ALBUTEROL SULFATE 9 ML: .5; 3 SOLUTION RESPIRATORY (INHALATION) at 01:02

## 2022-02-12 RX ADMIN — HYDROCODONE BITARTRATE AND ACETAMINOPHEN 1 TABLET: 5; 325 TABLET ORAL at 08:02

## 2022-02-12 RX ADMIN — NYSTATIN 500000 UNITS: 100000 SUSPENSION ORAL at 02:02

## 2022-02-12 RX ADMIN — AZITHROMYCIN 500 MG: 500 INJECTION, POWDER, LYOPHILIZED, FOR SOLUTION INTRAVENOUS at 03:02

## 2022-02-12 RX ADMIN — IPRATROPIUM BROMIDE AND ALBUTEROL SULFATE 3 ML: .5; 3 SOLUTION RESPIRATORY (INHALATION) at 11:02

## 2022-02-12 RX ADMIN — LOSARTAN POTASSIUM 50 MG: 50 TABLET, FILM COATED ORAL at 08:02

## 2022-02-12 RX ADMIN — FLUCONAZOLE 150 MG: 100 TABLET ORAL at 02:02

## 2022-02-12 RX ADMIN — LOSARTAN POTASSIUM 50 MG: 50 TABLET, FILM COATED ORAL at 11:02

## 2022-02-12 RX ADMIN — NYSTATIN 500000 UNITS: 100000 SUSPENSION ORAL at 08:02

## 2022-02-12 RX ADMIN — CEFTRIAXONE 1 G: 1 INJECTION, SOLUTION INTRAVENOUS at 03:02

## 2022-02-12 RX ADMIN — NICOTINE 1 PATCH: 21 PATCH, EXTENDED RELEASE TRANSDERMAL at 02:02

## 2022-02-12 RX ADMIN — PREGABALIN 75 MG: 75 CAPSULE ORAL at 08:02

## 2022-02-12 RX ADMIN — FUROSEMIDE 120 MG: 10 INJECTION, SOLUTION INTRAMUSCULAR; INTRAVENOUS at 02:02

## 2022-02-12 RX ADMIN — ATORVASTATIN CALCIUM 40 MG: 40 TABLET, FILM COATED ORAL at 08:02

## 2022-02-12 RX ADMIN — IPRATROPIUM BROMIDE AND ALBUTEROL SULFATE 3 ML: .5; 3 SOLUTION RESPIRATORY (INHALATION) at 03:02

## 2022-02-12 RX ADMIN — FUROSEMIDE 40 MG: 10 INJECTION, SOLUTION INTRAVENOUS at 05:02

## 2022-02-12 RX ADMIN — NYSTATIN 500000 UNITS: 100000 SUSPENSION ORAL at 05:02

## 2022-02-12 RX ADMIN — METHYLPREDNISOLONE SODIUM SUCCINATE 40 MG: 40 INJECTION, POWDER, FOR SOLUTION INTRAMUSCULAR; INTRAVENOUS at 08:02

## 2022-02-12 RX ADMIN — HYDROCODONE BITARTRATE AND ACETAMINOPHEN 1 TABLET: 5; 325 TABLET ORAL at 01:02

## 2022-02-12 RX ADMIN — IPRATROPIUM BROMIDE AND ALBUTEROL SULFATE 3 ML: .5; 3 SOLUTION RESPIRATORY (INHALATION) at 07:02

## 2022-02-12 RX ADMIN — MELATONIN TAB 3 MG 9 MG: 3 TAB at 08:02

## 2022-02-12 NOTE — PT/OT/SLP PROGRESS
Occupational Therapy      Patient Name:  Delores Fox   MRN:  [id number]    Patient not seen today secondary to Other (Comment) (On 1st attempt, pt transferring to ICU from ED; On 2nd attempt, pt having echo). Will follow-up next service date.    2/12/2022

## 2022-02-12 NOTE — PT/OT/SLP EVAL
Physical Therapy Evaluation and Discharge Note    Patient Name:  Delores Fox   MRN:  [id number]    Recommendations:     Discharge Recommendations:  home (Resume prior svcs)   Discharge Equipment Recommendations: none   Barriers to discharge: None    Assessment:     Delores Fox is a 56 y.o. female admitted with a medical diagnosis of Acute respiratory failure with hypoxia and hypercarbia. At this time, patient is functioning at their prior level of function and does not require further acute PT services.     Recent Surgery: * No surgery found *      Plan:     During this hospitalization, patient does not require further acute PT services.  Please re-consult if situation changes.      Subjective     Chief Complaint: mild SOB /c activity  Patient/Family Comments/goals: Go Home  Pain/Comfort:  · Pain Rating 1: 5/10  · Location 1: back  · Pain Addressed 1: Nurse notified,Pre-medicate for activity    Patients cultural, spiritual, Yazidi conflicts given the current situation:      Living Environment:  Pt lives /c family members in an apartment on the ground floor  Prior to admission, patients level of function was Claudia<>set-up.  Equipment used at home: walker, rolling,wheelchair,shower chair,grab bar.  DME owned (not currently used): none.  Upon discharge, patient will have assistance from family as needed.    Objective:     Communicated with nurse prior to session.  Patient found supine with blood pressure cuff,oxygen,PureWick,peripheral IV,pulse ox (continuous),telemetry upon PT entry to room.    General Precautions: Standard,     Orthopedic Precautions:    Braces: N/A   Respiratory Status: Nasal cannula, flow 3 L/min    Exams:  · Cognitive Exam:  Patient is oriented to Person, Place, Time and Situation  · Gross Motor Coordination:  WFL  · Sensation:    · -       Impaired  light/touch RLE and sharp/dull RLE  · RLE ROM: WFL  · RLE Strength: WFL  · LLE ROM: WFL  · LLE Strength: WFL    Functional Mobility:  · Bed  Mobility:     · Rolling Left:  independence  · Rolling Right: independence  · Supine to Sit: independence  · Sit to Supine: independence  · Transfers:     · Sit to Stand:  modified independence with rolling walker  · Gait: Supervision with rolling walker 45ft  · Balance: standing supported dynamic: Good-    AM-PAC 6 CLICK MOBILITY  Total Score:21       Therapeutic Activities and Exercises:   PT edu pt on PLB techniques and gait safety.    AM-PAC 6 CLICK MOBILITY  Total Score:21     Patient left supine with all lines intact, call button in reach, bed alarm on and nurse notified.    GOALS:   Multidisciplinary Problems     Physical Therapy Goals     Not on file                History:     Past Medical History:   Diagnosis Date    Acid reflux     COPD (chronic obstructive pulmonary disease)     Coronary artery disease     Diabetes mellitus     High cholesterol     Hypertension        Past Surgical History:   Procedure Laterality Date    APPENDECTOMY      CATHETERIZATION OF BOTH LEFT AND RIGHT HEART N/A 11/23/2020    Procedure: CATHETERIZATION, HEART, BOTH LEFT AND RIGHT;  Surgeon: Doug Kendall MD;  Location: [facility] CATH[facility]EP LAB;  Service: Cardiology;  Laterality: N/A;    CHOLECYSTECTOMY      EXCISION OF LESION OF EYELID Right 12/11/2020    Procedure: EXCISION, LESION, EYELID;  Surgeon: Malachi Rodriguez MD;  Location: [facility] OR;  Service: Ophthalmology;  Laterality: Right;  Inclusion Cyst removal right lower lid    JOINT REPLACEMENT Left     left hip    MASTOIDECTOMY Right        Time Tracking:     PT Received On: 02/12/22  PT Start Time: 1352     PT Stop Time: 1415  PT Total Time (min): 23 min     Billable Minutes: Evaluation 13 and Gait Training 10      02/12/2022

## 2022-02-12 NOTE — PLAN OF CARE
Plan of care discussed with patient.  Continue to monitor respiratory status, and wean oxygen as tolerated.  Patient verbalized understanding of all education.

## 2022-02-12 NOTE — ED PROVIDER NOTES
Encounter Date: 2/12/2022       History     Chief Complaint   Patient presents with    Shortness of Breath     EMS reports pt lethargic, cyanotic, sats in 60's.  Arrival on CPAP     HPI   56-year-old woman past medical history of COPD, CHF, hyperlipidemia, hypertension presenting to emergency department via EMS for respiratory distress.  Her sister called the ambulance because she was significantly short of breath.  When EMS got there the patient was lethargic, cyanotic, satting in the 60s.  They placed her on CPAP gave her 125 of Solu-Medrol and 2 breathing treatments.  History is somewhat limited secondary to the patient's respiratory distress but on yes or no question she denies any chest pain, notes shortness of breath, lower extremity swelling.  Denies any nausea, vomiting.  Has no previous history of intubation.  Review of patient's allergies indicates:   Allergen Reactions    Morphine Nausea And Vomiting    Sulfa (sulfonamide antibiotics) Nausea And Vomiting     Past Medical History:   Diagnosis Date    Acid reflux     COPD (chronic obstructive pulmonary disease)     Coronary artery disease     Diabetes mellitus     High cholesterol     Hypertension      Past Surgical History:   Procedure Laterality Date    APPENDECTOMY      CATHETERIZATION OF BOTH LEFT AND RIGHT HEART N/A 11/23/2020    Procedure: CATHETERIZATION, HEART, BOTH LEFT AND RIGHT;  Surgeon: Doug Kendall MD;  Location: [facility] CATH/EP LAB;  Service: Cardiology;  Laterality: N/A;    CHOLECYSTECTOMY      EXCISION OF LESION OF EYELID Right 12/11/2020    Procedure: EXCISION, LESION, EYELID;  Surgeon: Malachi Rodriguez MD;  Location: [facility] OR;  Service: Ophthalmology;  Laterality: Right;  Inclusion Cyst removal right lower lid    JOINT REPLACEMENT Left     left hip    MASTOIDECTOMY Right      History reviewed. No pertinent family history.  Social History     Tobacco Use    Smoking status: Current Every Day Smoker     Packs/day: 0.50      Years: 13.00     Pack years: 6.50    Smokeless tobacco: Never Used   Substance Use Topics    Alcohol use: Not Currently    Drug use: Not Currently     Review of Systems   Unable to perform ROS: Severe respiratory distress   Constitutional: Negative for chills and fever.   HENT: Negative for congestion and drooling.    Respiratory: Positive for cough and shortness of breath. Negative for chest tightness.    Cardiovascular: Positive for leg swelling. Negative for chest pain.   Gastrointestinal: Negative for abdominal distention and abdominal pain.   Genitourinary: Negative for dysuria.   Skin: Negative for rash and wound.   All other systems reviewed and are negative.      Physical Exam     Initial Vitals [02/12/22 0121]   BP Pulse Resp Temp SpO2   (!) 168/99 (!) 120 (!) 25 97.8 °F (36.6 °C) (!) 93 %      MAP       --         Physical Exam    Vitals reviewed.  Constitutional: Vital signs are normal. She appears well-developed and well-nourished. She is not diaphoretic. She appears distressed.   HENT:   Head: Normocephalic and atraumatic.   Eyes: Conjunctivae and EOM are normal.   Neck: Trachea normal and phonation normal.   Cardiovascular: Regular rhythm.   Sinus tachycardia on the monitor   Pulmonary/Chest:   Severe respiratory distress, on CPAP, poor air movement throughout   Abdominal: Abdomen is soft. Normal appearance. She exhibits no distension. There is no abdominal tenderness. There is no rebound.   Musculoskeletal:         General: Edema present.      Comments: Bilateral lower extremity pitting edema     Neurological: She is alert and oriented to person, place, and time.   Skin: Skin is warm and dry.         ED Course   Procedures  Labs Reviewed   CBC W/ AUTO DIFFERENTIAL - Abnormal; Notable for the following components:       Result Value    WBC 18.39 (*)     Immature Granulocytes 0.8 (*)     Gran # (ANC) 12.7 (*)     Immature Grans (Abs) 0.14 (*)     Mono # 1.4 (*)     All other components within normal  limits   COMPREHENSIVE METABOLIC PANEL - Abnormal; Notable for the following components:    Sodium 135 (*)     Glucose 329 (*)     BUN 27 (*)     Creatinine 1.7 (*)     Calcium 8.2 (*)     eGFR if  38.9 (*)     eGFR if non  33.7 (*)     All other components within normal limits   TROPONIN I - Abnormal; Notable for the following components:    Troponin I 0.082 (*)     All other components within normal limits   B-TYPE NATRIURETIC PEPTIDE - Abnormal; Notable for the following components:     (*)     All other components within normal limits   ISTAT PROCEDURE - Abnormal; Notable for the following components:    POC PH 7.235 (*)     POC PCO2 66.3 (*)     POC HCO3 28.1 (*)     POC SATURATED O2 94 (*)     POC Glucose 342 (*)     POC TCO2 30 (*)     All other components within normal limits   CULTURE, BLOOD   CULTURE, BLOOD   SARS-COV-2 RNA AMPLIFICATION, QUAL   LACTIC ACID, PLASMA   POCT GLUCOSE MONITORING CONTINUOUS        ECG Results          EKG 12-lead (In process)  Result time 02/12/22 11:23:02    In process by Interface, Lab In [facility] (02/12/22 11:23:02)                 Narrative:    Test Reason : R06.02,    Vent. Rate : 123 BPM     Atrial Rate : 123 BPM     P-R Int : 154 ms          QRS Dur : 096 ms      QT Int : 304 ms       P-R-T Axes : 061 042 057 degrees     QTc Int : 435 ms    Sinus tachycardia  Possible Left atrial enlargement  Nonspecific ST and T wave abnormality  Abnormal ECG  When compared with ECG of 25-MAR-2021 21:57,  ST now depressed in Inferior leads  Nonspecific T wave abnormality now evident in Inferior leads  Nonspecific T wave abnormality, worse in Anterior-lateral leads    Referred By: [email or web address]   SELF           Confirmed By:                             Imaging Results          X-Ray Chest AP Portable (Final result)  Result time 02/12/22 05:51:45   Procedure changed from X-Ray Chest 1 View     Final result by Julián You MD (02/12/22 05:51:45)                  Narrative:    CLINICAL HISTORY:  56 years (1965) Female shortness of breath sob    TECHNIQUE:  Portable AP radiograph the chest.    COMPARISON:  Most recent radiograph from March 25, 2021    FINDINGS:  Mildly increased central hilar interstitial opacities are seen bilaterally suggestive of either mild edema, atypical infection/pneumonia or bronchitis in the appropriate clinical setting. No consolidative pneumonia is seen. There is blunting of both costophrenic angles consistent with trace pleural effusions and adjacent atelectasis. No pneumothorax is identified. The heart is top normal in size. Degenerative changes are seen with an ACDF in the lower cervical spine. The visualized upper abdomen is unremarkable.    IMPRESSION:  Findings suggestive of mild interstitial pulmonary edema (or possibly mild atypical infection/pneumonia or bronchitis in the appropriate clinical setting).                  .            Electronically signed by:  Julián You MD  2/12/2022 5:51 AM [facility] Workstation: [id number]                               Medications   aspirin tablet 325 mg (0 mg Oral Hold 2/12/22 0200)   nitroGLYCERIN 2% TD oint ointment 0.5 inch (0.5 inches Topical (Top) Not Given 2/12/22 0230)   aspirin chewable tablet 81 mg (has no administration in time range)   atorvastatin tablet 40 mg (has no administration in time range)   metoprolol succinate (TOPROL-XL) 24 hr tablet 50 mg (50 mg Oral Given 2/12/22 1130)   sodium chloride 0.9% flush 10 mL (has no administration in time range)   acetaminophen tablet 650 mg (has no administration in time range)   HYDROcodone-acetaminophen 5-325 mg per tablet 1 tablet (1 tablet Oral Given 2/12/22 1314)   morphine injection 4 mg (has no administration in time range)   polyethylene glycol packet 17 g (17 g Oral Not Given 2/12/22 1030)   glucose chewable tablet 16 g (has no administration in time range)   glucose chewable tablet 24 g (has no administration in time  range)   dextrose 50% injection 12.5 g (has no administration in time range)   dextrose 50% injection 25 g (has no administration in time range)   glucagon (human recombinant) injection 1 mg (has no administration in time range)   acetaminophen tablet 1,000 mg (has no administration in time range)   naloxone 0.4 mg/mL injection 0.02 mg (has no administration in time range)   enoxaparin injection 40 mg (40 mg Subcutaneous Given 2/12/22 1723)   insulin regular injection 1-18 Units (12 Units Subcutaneous Given 2/12/22 1137)   dextrose 50% injection 25 g (has no administration in time range)   dextrose 50% injection 12.5 g (has no administration in time range)   azithromycin (ZITHROMAX) 250 mg in dextrose 5 % 250 mL IVPB (has no administration in time range)   furosemide injection 40 mg (40 mg Intravenous Given 2/12/22 1723)   albuterol-ipratropium 2.5 mg-0.5 mg/3 mL nebulizer solution 3 mL (3 mLs Nebulization Given 2/12/22 1513)   melatonin tablet 9 mg (has no administration in time range)   cefTRIAXone (ROCEPHIN) 1 g/50 mL D5W IVPB (has no administration in time range)   losartan tablet 50 mg (50 mg Oral Given 2/12/22 1130)   fluticasone furoate-vilanteroL 200-25 mcg/dose diskus inhaler 1 puff (has no administration in time range)   albuterol nebulizer solution 2.5 mg (has no administration in time range)   nystatin 100,000 unit/mL suspension 500,000 Units (500,000 Units Mouth/Throat Given 2/12/22 1723)   fluconazole tablet 150 mg (150 mg Oral Given 2/12/22 1405)   nicotine 21 mg/24 hr 1 patch (1 patch Transdermal Patch Applied 2/12/22 1431)   pregabalin capsule 75 mg (has no administration in time range)   furosemide injection 120 mg (120 mg Intravenous Given 2/12/22 0203)   cefTRIAXone (ROCEPHIN) 1 g/50 mL D5W IVPB (0 g Intravenous Stopped 2/12/22 0333)   azithromycin 500 mg in dextrose 5 % 250 mL IVPB (ready to mix system) (0 mg Intravenous Stopped 2/12/22 0403)           APC / Resident Notes:   56-year-old female  past medical history of COPD, CHF, hypertension, hyperlipidemia presenting to the emergency department in respiratory distress the AMS.  Patient on CPAP, was switched over to BiPAP.  She was somewhat somnolent on examination but was able to open her eyes and answer to yes and no questioning.  She had poor air movement throughout, she is initially satting in the low 90s became a on the BiPAP.  She has got bilateral lower extremity pitting edema.  Differential diagnosis includes COVID-19, CHF exacerbation, COPD, pneumonia, pneumothorax, cardiac.  Point of care ABG notable for respiratory acidosis with a pH of 7.235, pCO2 of 66.3, O2 sat of 87.  Patient already received steroids and 2 DuoNeb treatments with EMS will give her a 1 hour continuous DuoNeb treatments, 120 of IV Lasix.  I think that she will be able to tolerate the BiPAP will monitor closely to see if she needs to be intubated.  Disposition pending workup.  Plan for admission.      Oralia Sparkspb  [facility] Emergency Medicine, PGY-4   2/12/2022 1:49 AM      EKG notable for sinus tachycardia, rate of 123 beats per minute, narrow QRS, normal QTC of 435, she has flattened T were in the high lateral leads.  No acute ischemic changes     Update:  Patient awakens to voice on BiPAP, improvement in her overall status.  Her blood pressures improved without any nitroglycerin.  She still tachycardic she is oxygenating well and is on minimal oxygen on the BiPAP.  I do not believe she needs to be intubated.  Suspect this is a COPD and possible CHF combined picture.  Her CBC was notable for a white count of 18.39.  CMP notable for a glucose of 329, she does not have an anion gap, she has got a mild STEPHEN with a creatinine of 1.7, baseline is closer to 1.1.  COVID test was negative.  She has good elevation or troponin 2.0 2. Her EKG was nonischemic.  She was given aspirin for cardioprotective benefits.  She does not have a baseline elevated troponin.  Her lactic was normal.   Blood cultures have been sent.  Family updated.  Plan for admission.      Oralia Vallejo  [facility] Emergency Medicine, PGY-4   2/12/2022 3:37 AM         Attending Attestation:   Physician Attestation Statement for Resident:  As the supervising MD   Physician Attestation Statement: I have personally seen and examined this patient.   I agree with the above history. -:   As the supervising MD I agree with the above PE.    As the supervising MD I agree with the above treatment, course, plan, and disposition.  I have reviewed and agree with the residents interpretation of the following: lab data, x-rays and EKG.                         Clinical Impression:   Final diagnoses:  [R06.02] Shortness of breath  [R06.03] Respiratory distress (Primary)  [J44.1] COPD exacerbation  [I50.9] Congestive heart failure, unspecified HF chronicity, unspecified heart failure type          ED Disposition Condition    Admit               Oralia Vallejo MD  Resident  02/12/22 0337       Oralia Vallejo MD  Resident  02/12/22 0412       Tim Sung MD  02/12/22 [id number]

## 2022-02-12 NOTE — H&P
[facility] Medicine History & Physical Examination   Patient Name: Delores Fox  MRN: [id number]  Patient Class: IP- Inpatient   Admission Date: 2/12/2022  1:18 AM  Length of Stay: 0  Attending Physician: Татьяна Cobb MD  Primary Care Provider: Carey Liu MD  Face-to-Face encounter date: 02/12/2022  Code Status: Full Code  Chief Complaint: Shortness of Breath      HISTORY OF PRESENT ILLNESS:   Delores Fox is a 56 y.o. White female with known history of chronic congestive heart failure who presented with a primary complaint of shortness of breath. She was in her usual state of health when her breathing got worst and family had to call EMS. EMS found her very lethargic and cyanotic. She required BIPAP. En route she was given steroids and bronchodilators. In the ER, CBC showed leukocytosis, CMP showed STEPHEN likely due to cardiorenal syndrome,  and her CXR consistent with fluid load. Hospital medicine consulted for admission. During my encounter, patient lethargic and on BIPAP. No family members present to confirm the history.   REVIEW OF SYSTEMS:   ROS unobtainable    PAST MEDICAL HISTORY:     Past Medical History:   Diagnosis Date    Acid reflux     COPD (chronic obstructive pulmonary disease)     Coronary artery disease     Diabetes mellitus     High cholesterol     Hypertension        PAST SURGICAL HISTORY:     Past Surgical History:   Procedure Laterality Date    APPENDECTOMY      CATHETERIZATION OF BOTH LEFT AND RIGHT HEART N/A 11/23/2020    Procedure: CATHETERIZATION, HEART, BOTH LEFT AND RIGHT;  Surgeon: Doug Kendall MD;  Location: [facility] CATH/EP LAB;  Service: Cardiology;  Laterality: N/A;    CHOLECYSTECTOMY      EXCISION OF LESION OF EYELID Right 12/11/2020    Procedure: EXCISION, LESION, EYELID;  Surgeon: Malachi Rodriguez MD;  Location: [facility] OR;  Service: Ophthalmology;  Laterality: Right;  Inclusion Cyst removal right lower lid    JOINT  REPLACEMENT Left     left hip    MASTOIDECTOMY Right        ALLERGIES:   Morphine and Sulfa (sulfonamide antibiotics)    FAMILY HISTORY:   History reviewed. No pertinent family history.    SOCIAL HISTORY:     Social History     Tobacco Use    Smoking status: Current Every Day Smoker     Packs/day: 0.50     Years: 13.00     Pack years: 6.50    Smokeless tobacco: Never Used   Substance Use Topics    Alcohol use: Not Currently        Social History     Substance and Sexual Activity   Sexual Activity Not on file        HOME MEDICATIONS:     Prior to Admission medications    Medication Sig Start Date End Date Taking? Authorizing Provider   aspirin 81 MG Chew Take 1 tablet (81 mg total) by mouth once daily. 11/25/20   Kassandra Mota MD   atorvastatin (LIPITOR) 40 MG tablet Take 1 tablet (40 mg total) by mouth once daily. 2/8/21 2/8/22  Doug Kendall MD   fluticasone furoate-vilanteroL (BREO) 100-25 mcg/dose diskus inhaler Inhale 1 puff into the lungs. 5/22/20 5/22/21  Historical Provider   linaGLIPtin (TRADJENTA) 5 mg Tab tablet Take 5 mg by mouth once daily.  6/29/20   Historical Provider   losartan (COZAAR) 25 MG tablet Take 1 tablet (25 mg total) by mouth 2 (two) times a day. 2/8/21 2/8/22  Doug Kendall MD   melatonin (MELATIN) 3 mg tablet Take 3 mg by mouth nightly.  5/21/20   Historical Provider   metoprolol succinate (TOPROL-XL) 50 MG 24 hr tablet Take 1 tablet (50 mg total) by mouth once daily. 2/8/21 3/25/21  Doug Kendall MD   ondansetron (ZOFRAN) 4 MG tablet Take 1 tablet (4 mg total) by mouth every 8 (eight) hours as needed for Nausea. 3/26/21   Demetria Bravo MD   pantoprazole (PROTONIX) 40 MG tablet Take 1 tablet (40 mg total) by mouth 2 (two) times daily. 2/8/21 3/25/21  Doug Kendall MD   pregabalin (LYRICA) 75 MG capsule Take 75 mg by mouth 2 (two) times daily.  5/21/20 5/21/21  Historical Provider   zolpidem (AMBIEN) 10 mg Tab Take 10 mg by mouth every evening.     Historical  "Provider         PHYSICAL EXAM:   BP (!) 132/91   Pulse (!) 121   Temp 97.8 °F (36.6 °C) (Axillary)   Resp (!) 21   Ht 5' 7" (1.702 m)   Wt 104.3 kg (230 lb)   SpO2 95%   BMI 36.02 kg/m²   Vitals Reviewed  General appearance: acutely ill-appearingWhite female in no apparent distress.  Skin: No Rash.   Neuro: Motor and sensory exams grossly intact.   HENT: Atraumatic head. Moist mucous membranes of oral cavity.  Eyes: Normal extraocular movements.   Neck: Supple. No evidence of lymphadenopathy. No thyroidomegaly.  Lungs: no air entry bilaterally,   Heart: Regular rate and rhythm. S1 and S2 present with no murmurs/gallop/rub. No pedal edema. No JVD present.   Abdomen: Soft, non-distended, non-tender. No rebound tenderness/guarding. No masses or organomegaly. Bowel sounds are normal. Bladder is not palpable.   Extremities: No cyanosis, clubbing.  Psych/mental status: very lethargic and confused   EMERGENCY DEPARTMENT LABS AND IMAGING:     Labs Reviewed   CBC W/ AUTO DIFFERENTIAL - Abnormal; Notable for the following components:       Result Value    WBC 18.39 (*)     Immature Granulocytes 0.8 (*)     Gran # (ANC) 12.7 (*)     Immature Grans (Abs) 0.14 (*)     Mono # 1.4 (*)     All other components within normal limits   COMPREHENSIVE METABOLIC PANEL - Abnormal; Notable for the following components:    Sodium 135 (*)     Glucose 329 (*)     BUN 27 (*)     Creatinine 1.7 (*)     Calcium 8.2 (*)     eGFR if  38.9 (*)     eGFR if non  33.7 (*)     All other components within normal limits   TROPONIN I - Abnormal; Notable for the following components:    Troponin I 0.082 (*)     All other components within normal limits   B-TYPE NATRIURETIC PEPTIDE - Abnormal; Notable for the following components:     (*)     All other components within normal limits   ISTAT PROCEDURE - Abnormal; Notable for the following components:    POC PH 7.235 (*)     POC PCO2 66.3 (*)     POC HCO3 28.1 " (*)     POC SATURATED O2 94 (*)     POC Glucose 342 (*)     POC TCO2 30 (*)     All other components within normal limits   CULTURE, BLOOD   CULTURE, BLOOD   SARS-COV-2 RNA AMPLIFICATION, QUAL   LACTIC ACID, PLASMA       X-Ray Chest AP Portable    (Results Pending)       ASSESSMENT & PLAN:   Delores Fox is a 56 y.o. female admitted for    Acute hypoxic and hyper capneic respiratory failure  Acute on chronic combined systolic and diastolic congestive heart failure  COPD exacerbation  Metabolic encephalopathy   Hyperlipidemia  Essential hypertension   DM type II  Chronic tobacco abuse     Plan  Admit to ICU, Cardiac Monitoring, Oxygen Supplementation, Echocardiography, IV antibiotics including Ceftriaxone and Azithromycin, respiratory cultures, IV Lasix 40 mg BID, Strict I&O, Daily weight, Low Sodium Diet, Fluid restriction, Nutrition consultation for CHF education, Pulmonary consultation, Cardiology consultation, A1c and Lipid panel for Risk factor Modification, PT/OT evaluation, Insulin Sliding Scale, Supportive Care with  Anti-emetics, Replacement of Electrolytes, resume home medications and holding sedatives including benzodiazepines, opoids  Diet: Cardiac    DVT Prophylaxis: low molecular weight heparin and Encourage ambulation. OOB as tolerated.     Discharge Planning and Disposition:  Home with physical therapy    Expected LOS: 2-3 days    This patient is high risk for life-threatening deterioration and death secondary to above comorbidities and need for IV treatment. This patient meets inpatient criteria.   ________________________________________________________________________________    Face-to-Face encounter date: 02/12/2022  Encounter included review of the medical records, interviewing and examining the patient face-to-face, discussion with family and other health care providers including emergency medicine physician, admission orders, interpreting lab/test results and formulating a plan of care.    Medical Decision Making during this encounter was High Complexity due to respiratory failure   ________________________________________________________________________________    INPATIENT LIST OF MEDICATIONS     Current Facility-Administered Medications:     albuterol-ipratropium 2.5 mg-0.5 mg/3 mL nebulizer solution 9 mL, 9 mL, Nebulization, Continuous, Oralia Vallejo MD, 9 mL at 02/12/22 0150    aspirin tablet 325 mg, 325 mg, Oral, ED 1 Time, Oralia Vallejo MD    nitroGLYCERIN 2% TD oint ointment 0.5 inch, 0.5 inch, Topical (Top), ED 1 Time, Oralia Vallejo MD    Current Outpatient Medications:     aspirin 81 MG Chew, Take 1 tablet (81 mg total) by mouth once daily., Disp: 30 tablet, Rfl: 0    atorvastatin (LIPITOR) 40 MG tablet, Take 1 tablet (40 mg total) by mouth once daily., Disp: 90 tablet, Rfl: 3    fluticasone furoate-vilanteroL (BREO) 100-25 mcg/dose diskus inhaler, Inhale 1 puff into the lungs., Disp: , Rfl:     linaGLIPtin (TRADJENTA) 5 mg Tab tablet, Take 5 mg by mouth once daily. , Disp: , Rfl:     losartan (COZAAR) 25 MG tablet, Take 1 tablet (25 mg total) by mouth 2 (two) times a day., Disp: 180 tablet, Rfl: 3    melatonin (MELATIN) 3 mg tablet, Take 3 mg by mouth nightly. , Disp: , Rfl:     metoprolol succinate (TOPROL-XL) 50 MG 24 hr tablet, Take 1 tablet (50 mg total) by mouth once daily., Disp: 90 tablet, Rfl: 3    ondansetron (ZOFRAN) 4 MG tablet, Take 1 tablet (4 mg total) by mouth every 8 (eight) hours as needed for Nausea., Disp: 12 tablet, Rfl: 0    pantoprazole (PROTONIX) 40 MG tablet, Take 1 tablet (40 mg total) by mouth 2 (two) times daily., Disp: 90 tablet, Rfl: 3    pregabalin (LYRICA) 75 MG capsule, Take 75 mg by mouth 2 (two) times daily. , Disp: , Rfl:     zolpidem (AMBIEN) 10 mg Tab, Take 10 mg by mouth every evening. , Disp: , Rfl:     Facility-Administered Medications Ordered in Other Encounters:     electrolyte-S (ISOLYTE), , Intravenous,  Continuous, Madi Herr MD, New Bag at 12/11/20 1041    lidocaine (PF) 10 mg/ml (1%) injection 10 mg, 1 mL, Intradermal, Once, Madi Herr MD    sodium chloride 0.9% flush 3 mL, 3 mL, Intravenous, Q8H, Madi Herr MD      Scheduled Meds:   aspirin  325 mg Oral ED 1 Time    nitroGLYCERIN 2% TD oint  0.5 inch Topical (Top) ED 1 Time     Continuous Infusions:   albuterol-ipratropium       PRN Meds:.      Татьяна Cobb  [facility] Hospitalist  02/12/2022

## 2022-02-12 NOTE — CONSULTS
[facility]  Cardiology  Consult Note    Patient Name: Delores Fox  MRN: [id number]  Admission Date: 2/12/2022  Hospital Length of Stay: 0 days  Code Status: Full Code   Attending Provider: Jose Bates MD   Consulting Provider: Manish Adames MD  Primary Care Physician: Carey Liu MD  Principal Problem:<principal problem not specified>    Patient information was obtained from patient and ER records.     Inpatient consult to Cardiology  Consult performed by: Manish Adames MD  Consult ordered by: Татьяна Cobb MD  Reason for consult: HFrEF        Subjective:     56-year-old female with history of RCA  with left-to-left collaterals, cardiomyopathy with EF of 40%, hypertension admitted with worsening shortness of breath and hypoxemia which chest x-ray showing diffuse pulmonary congestion consistent with decompensated heart failure, patient hypertensive.    Past Medical History:   Diagnosis Date    Acid reflux     COPD (chronic obstructive pulmonary disease)     Coronary artery disease     Diabetes mellitus     High cholesterol     Hypertension        Past Surgical History:   Procedure Laterality Date    APPENDECTOMY      CATHETERIZATION OF BOTH LEFT AND RIGHT HEART N/A 11/23/2020    Procedure: CATHETERIZATION, HEART, BOTH LEFT AND RIGHT;  Surgeon: Doug Kendall MD;  Location: [facility] CATH/EP LAB;  Service: Cardiology;  Laterality: N/A;    CHOLECYSTECTOMY      EXCISION OF LESION OF EYELID Right 12/11/2020    Procedure: EXCISION, LESION, EYELID;  Surgeon: Malachi Rodriguez MD;  Location: [facility] OR;  Service: Ophthalmology;  Laterality: Right;  Inclusion Cyst removal right lower lid    JOINT REPLACEMENT Left     left hip    MASTOIDECTOMY Right        Review of patient's allergies indicates:   Allergen Reactions    Morphine Nausea And Vomiting    Sulfa (sulfonamide antibiotics) Nausea And Vomiting       Current Facility-Administered Medications on File Prior to Encounter    Medication    electrolyte-S (ISOLYTE)    lidocaine (PF) 10 mg/ml (1%) injection 10 mg    sodium chloride 0.9% flush 3 mL     Current Outpatient Medications on File Prior to Encounter   Medication Sig    aspirin 81 MG Chew Take 1 tablet (81 mg total) by mouth once daily.    atorvastatin (LIPITOR) 40 MG tablet Take 1 tablet (40 mg total) by mouth once daily.    fluticasone furoate-vilanteroL (BREO) 100-25 mcg/dose diskus inhaler Inhale 1 puff into the lungs.    linaGLIPtin (TRADJENTA) 5 mg Tab tablet Take 5 mg by mouth once daily.     losartan (COZAAR) 25 MG tablet Take 1 tablet (25 mg total) by mouth 2 (two) times a day.    melatonin (MELATIN) 3 mg tablet Take 3 mg by mouth nightly.     metoprolol succinate (TOPROL-XL) 50 MG 24 hr tablet Take 1 tablet (50 mg total) by mouth once daily.    ondansetron (ZOFRAN) 4 MG tablet Take 1 tablet (4 mg total) by mouth every 8 (eight) hours as needed for Nausea.    pantoprazole (PROTONIX) 40 MG tablet Take 1 tablet (40 mg total) by mouth 2 (two) times daily.    pregabalin (LYRICA) 75 MG capsule Take 75 mg by mouth 2 (two) times daily.     zolpidem (AMBIEN) 10 mg Tab Take 10 mg by mouth every evening.      Family History    None       Tobacco Use    Smoking status: Current Every Day Smoker     Packs/day: 0.50     Years: 13.00     Pack years: 6.50    Smokeless tobacco: Never Used   Substance and Sexual Activity    Alcohol use: Not Currently    Drug use: Not Currently    Sexual activity: Not on file     Review of Systems   All other systems reviewed and are negative.    Objective:     Vital Signs (Most Recent):  Temp: 98.3 °F (36.8 °C) (02/12/22 0900)  Pulse: 110 (02/12/22 0900)  Resp: (!) 46 (02/12/22 0900)  BP: 130/63 (02/12/22 0900)  SpO2: (!) 91 % (02/12/22 0900) Vital Signs (24h Range):  Temp:  [97.8 °F (36.6 °C)-98.3 °F (36.8 °C)] 98.3 °F (36.8 °C)  Pulse:  [108-121] 110  Resp:  [16-46] 46  SpO2:  [90 %-97 %] 91 %  BP: (130-184)/() 130/63     Weight:  108.5 kg (239 lb 3.2 oz)  Body mass index is 37.46 kg/m².    SpO2: (!) 91 %  O2 Device (Oxygen Therapy): BiPAP      Intake/Output Summary (Last 24 hours) at 2/12/2022 1004  Last data filed at 2/12/2022 0843  Gross per 24 hour   Intake 300 ml   Output 2200 ml   Net -1900 ml       Lines/Drains/Airways     Peripheral Intravenous Line                 Peripheral IV - Single Lumen 02/12/22 0130 18 G Distal;Posterior;Right Forearm <1 day         Peripheral IV - Single Lumen 02/12/22 0145 20 G Left Shoulder <1 day                Physical Exam  Vitals reviewed.   Constitutional:       Appearance: Normal appearance.   HENT:      Mouth/Throat:      Mouth: Mucous membranes are moist.   Eyes:      Pupils: Pupils are equal, round, and reactive to light.   Cardiovascular:      Rate and Rhythm: Normal rate and regular rhythm.      Heart sounds: No murmur heard.  No friction rub. No gallop.    Pulmonary:      Effort: Pulmonary effort is normal.      Comments: Bilateral crackles with poor air entry  Abdominal:      General: Abdomen is flat.      Palpations: Abdomen is soft.   Musculoskeletal:         General: Normal range of motion.   Skin:     General: Skin is warm and dry.   Neurological:      General: No focal deficit present.      Mental Status: She is alert and oriented to person, place, and time.   Psychiatric:         Mood and Affect: Mood normal.         Significant Labs:   BMP:   Recent Labs   Lab 02/12/22 0136   *   *   K 3.8      CO2 24   BUN 27*   CREATININE 1.7*   CALCIUM 8.2*    and CBC   Recent Labs   Lab 02/12/22 0136   WBC 18.39*   HGB 14.1   HCT 43.3          Significant Imaging: Echocardiogram:   Transthoracic echo (TTE) complete (Cupid Only):   Results for orders placed or performed during the hospital encounter of 03/03/21   Echo Color Flow Doppler? Yes   Result Value Ref Range    LV Diastolic Volume 113.00 cm3    LV Systolic Volume 68.00 cm3    IVRT 90.00 ms    IVS 1.25 (A) 0.6 - 1.1  cm    LVIDd 5.51 3.5 - 6.0 cm    LVIDs 4.70 (A) 2.1 - 4.0 cm    LVOT diameter 2.20 cm    LVOT peak VTI 16.50 cm    LVOT peak steven 0.80 m/s    Posterior Wall 1.22 (A) 0.6 - 1.1 cm    AORTIC VALVE CUSP SEPERATION 1.60 cm    AV mean gradient 5 mmHg    Ao VTI 27.10 cm    Ao peak steven 1.56 m/s    AV peak gradient 10 mmHg    Ao root annulus 2.90 cm    Left Atrium Major Axis 4.00 cm    LA size 4.20 cm    E wave deceleration time 158.00 ms    MV Peak A Steven 0.86 m/s    MV Peak E Steven 0.71 m/s    RVDD 1.93 cm    TDI SEPTAL 0.05 m/s    LV LATERAL E/E' RATIO 7.10 m/s    LV SEPTAL E/E' RATIO 14.20 m/s    TDI LATERAL 0.10 m/s    FS 15 28 - 44 %    LV mass 284.22 g    Left Ventricle Relative Wall Thickness 0.44 cm    AV valve area 2.31 cm2    AV Velocity Ratio 0.51     AV index (prosthetic) 0.61     E/A ratio 0.83     Mean e' 0.08 m/s    LVOT area 3.8 cm2    LVOT stroke volume 62.69 cm3    E/E' ratio 9.47 m/s    Right Atrial Pressure (from IVC) 3 mmHg    Narrative    · The left ventricle is mildly enlarged with mild concentric hypertrophy   andThe estimated ejection fraction is 40%  · Grade I left ventricular diastolic dysfunction.  · There is moderate left ventricular global hypokinesis.  · Normal right ventricular size with normal right ventricular systolic   function.  · Mild left atrial enlargement.  · Mild mitral regurgitation.  · Normal central venous pressure (3 mmHg).        Assessment and Plan:     # decompensated heart failure with reduced ejection fraction, last EF 40%  # coronary artery disease,  of RCA with left-to-right collaterals  # uncontrolled severe hypertension  # type 2 MI likely    Plan:  - continue with aspirin, statin  - continue with IV diuresis.  Strict intake and output.  Continue with losartan, will increase it to 50 twice a day.  Continue metoprolol.  - avoid steroids as they worsening pulmonary congestion and hypertension  - trend troponins until they trend down    Thank you for your consult. I will  follow-up with patient. Please contact us if you have any additional questions.    Manish Adames MD  Cardiology   [facility]

## 2022-02-12 NOTE — PROGRESS NOTES
[facility] Medicine  Progress Note    Patient name: Delores Fox  MRN: [id number]  Admit Date: 2/12/2022   LOS: 0 days     SUBJECTIVE:     Principal problem: Acute respiratory failure with hypoxia and hypercarbia  Chief Complaint   Patient presents with    Shortness of Breath     EMS reports pt lethargic, cyanotic, sats in 60's.  Arrival on CPAP       Interval History:      2/12: Admitted early this morning with acute hypoxemic respiratory failure and respiratory distress by EMS. She says she has had a cough for the past several days and then last night she suddenly could not breathe and her family member had to giver her mouth to mouth. EMS brought her to the ED and had placed her on bipap.     Scheduled Meds:   albuterol-ipratropium  3 mL Nebulization Q4H    [START ON 2/13/2022] aspirin  81 mg Oral Daily    aspirin  325 mg Oral ED 1 Time    atorvastatin  40 mg Oral QHS    [START ON 2/13/2022] azithromycin  250 mg Intravenous Q24H    [START ON 2/13/2022] cefTRIAXone (ROCEPHIN) IVPB  1 g Intravenous Q24H    enoxaparin  40 mg Subcutaneous Daily    fluticasone furoate-vilanteroL  1 puff Inhalation Daily    furosemide (LASIX) injection  40 mg Intravenous Q12H    losartan  50 mg Oral BID    metoprolol succinate  50 mg Oral Daily    nitroGLYCERIN 2% TD oint  0.5 inch Topical (Top) ED 1 Time    polyethylene glycol  17 g Oral Daily     Continuous Infusions:  PRN Meds:acetaminophen, acetaminophen, albuterol sulfate, dextrose 50%, dextrose 50%, dextrose 50%, dextrose 50%, glucagon (human recombinant), glucose, glucose, HYDROcodone-acetaminophen, insulin regular, melatonin, morphine, naloxone, sodium chloride 0.9%    Review of patient's allergies indicates:   Allergen Reactions    Morphine Nausea And Vomiting    Sulfa (sulfonamide antibiotics) Nausea And Vomiting       Review of Systems: As per interval history    OBJECTIVE:     Vital Signs (Most Recent)  Temp: 98.3 °F (36.8 °C)  (02/12/22 0900)  Pulse: (!) 111 (02/12/22 1101)  Resp: (!) 21 (02/12/22 1101)  BP: 130/63 (02/12/22 0900)  SpO2: (!) 94 % (02/12/22 1101)    Vital Signs Range (Last 24H):  Temp:  [97.8 °F (36.6 °C)-98.3 °F (36.8 °C)]   Pulse:  [108-121]   Resp:  [16-46]   BP: (130-184)/()   SpO2:  [90 %-97 %]     I & O (Last 24H):    Intake/Output Summary (Last 24 hours) at 2/12/2022 1117  Last data filed at 2/12/2022 0843  Gross per 24 hour   Intake 300 ml   Output 2200 ml   Net -1900 ml       Physical Exam:  General: Patient resting comfortably in no acute distress. Appears as stated age. Calm  Eyes: No conjunctival injection. No scleral icterus.  ENT: Hearing grossly intact. No discharge from ears. No nasal discharge.   Neck: Supple, trachea midline. No JVD  CVS: RRR. No LE edema BL   Lungs: CTA BL, no wheezing few crackles. Good breath sounds. No accessory muscle use. On 3L NC  Abdomen:  Soft, nontender and nondistended.  No organomegaly  Neuro: AOx3. Moves all extremities. Follows commands. Responds appropriately   Skin:  No rash or erythema noted    Laboratory:  I have reviewed all pertinent lab results within the past 24 hours.  CBC:   Recent Labs   Lab 02/12/22 0136   WBC 18.39*   RBC 4.61   HGB 14.1   HCT 43.3      MCV 94   MCH 30.6   MCHC 32.6     BMP:   Recent Labs   Lab 02/12/22 0136   *   *   K 3.8      CO2 24   BUN 27*   CREATININE 1.7*   CALCIUM 8.2*     ABGs:   Recent Labs   Lab 02/12/22 0127   PH 7.235*   PCO2 66.3*   PO2 87   HCO3 28.1*   POCSATURATED 94*   BE 1     Microbiology Results (last 7 days)     Procedure Component Value Units Date/Time    Blood culture #1 [[id number]] Collected: 02/12/22 0136    Order Status: Completed Specimen: Blood from Peripheral, Forearm, Right Updated: 02/12/22 1649     Blood Culture, Routine No Growth to date    Blood culture #2 [[id number]] Collected: 02/12/22 0137    Order Status: Completed Specimen: Blood from Peripheral, Upper Arm, Left Updated:  02/12/22 0758     Blood Culture, Routine No Growth to date          Diagnostic Results:  Labs: Reviewed  ECG: Reviewed  X-Ray: Reviewed  CT: Reviewed    ASSESSMENT/PLAN:     57 y/o F with hx of COPD, CHFrEF, HTN, HLD, DMII, She presents with acute hypoxemic respiratory failure and respiratory distress in the setting of acute CHF exacerbation and likely community acquired pneumonia.     Active Hospital Problems    Diagnosis  POA    *Acute respiratory failure with hypoxia and hypercarbia [J96.01, J96.02]  Yes    Pulmonary edema, acute [J81.0]  Yes    Systolic and diastolic CHF, acute on chronic [I50.43]  Yes    COPD with respiratory failure, acute [J44.9, J96.00]  Yes    Community acquired pneumonia [J18.9]  Yes    Thrush [B37.0]  Unknown    Cardiomyopathy [I42.9]  Yes     Chronic    Type 2 diabetes mellitus, with long-term current use of insulin [E11.9, Z79.4]  Not Applicable      Resolved Hospital Problems   No resolved problems to display.         Plan:   - hold steroids for now, no wheezing.   - continue diuresis  - continue scheduled nebs  - EKG.   - Rocephin / Azithromycin for CAP  - check procalcitonin  - trend troponin  - repeat echocardiogram.   - continue oxygen supplementation.   - ? Whether she may have had laryngospasm will monitor and consider DL if symptoms persist or worsen.   - oral nystatin for thrush.         VTE Risk Mitigation (From admission, onward)         Ordered     enoxaparin injection 40 mg  Daily         02/12/22 0908     IP VTE HIGH RISK PATIENT  Once         02/12/22 0908     Place sequential compression device  Until discontinued         02/12/22 0908                  Department Hospital Medicine  [facility] JIMY Bates MD  Date of service: 02/12/2022

## 2022-02-12 NOTE — PLAN OF CARE
[facility]  Initial Discharge Assessment       Primary Care Provider: Carey Liu MD    Admission Diagnosis: Respiratory distress [R06.03]    Admission Date: 2/12/2022  Expected Discharge Date:          Payor: MEDICAID / Plan: [facility] CONNECT / Product Type: Managed Medicaid /     Extended Emergency Contact Information  Primary Emergency Contact: Vita Arellano  Address: [address] [place] [id number] [facility]  Home Phone: [phone]  Mobile Phone: [phone]  Relation: Sister  Preferred language: English   needed? No    Discharge Plan A: Home  Discharge Plan B: Home      [id number] - [place], [place] - [address]  [address]  [facility] [address]  Phone: [phone] Fax: [phone]      Initial Assessment (most recent)     Adult Discharge Assessment - 02/12/22 1326        Discharge Assessment    Assessment Type Discharge Planning Assessment     Confirmed/corrected address, phone number and insurance Yes     Confirmed Demographics Correct on Facesheet     Source of Information patient;health record     When was your last doctors appointment? 12/10/21     Does patient/caregiver understand observation status Yes     Reason For Admission Acute respiratory failure with hypoxia and hypercarbia     Lives With other relative(s)   Niece lives with her    Facility Arrived From: home     Do you expect to return to your current living situation? Yes     Do you have help at home or someone to help you manage your care at home? No     Prior to hospitilization cognitive status: Alert/Oriented     Current cognitive status: Alert/Oriented     Walking or Climbing Stairs Difficulty none     Dressing/Bathing Difficulty none     Equipment Currently Used at Home shower chair     Readmission within 30 days? No     Patient currently being followed by outpatient case management? No     Do you currently  have service(s) that help you manage your care at home? No     Do you take prescription medications? Yes     Do you have prescription coverage? Yes     Coverage [facility] connections     Do you have any problems affording any of your prescribed medications? No     Is the patient taking medications as prescribed? yes     Who is going to help you get home at discharge? Curtis Bryan Vita (Sister)   [phone] (Mobile)     How do you get to doctors appointments? family or friend will provide     Are you on dialysis? No     Do you take coumadin? No     Discharge Plan A Home     Discharge Plan B Home     DME Needed Upon Discharge  none     Discharge Plan discussed with: Patient

## 2022-02-12 NOTE — CONSULTS
02/12/2022      Admit Date: 2/12/2022  Delores Fox  New Patient Consult    Chief Complaint   Patient presents with    Shortness of Breath     EMS reports pt lethargic, cyanotic, sats in 60's.  Arrival on CPAP       History of Present Illness:  Pt from [facility], sees pcp there, on dulera and spiriva and smokes.  Was admitted last yr.  No home ox, has had hoarseness.  Had abrupt sob and poor recall of events, needed bipap and now off,  Denies h/o asthma, worked as sitter.  Was visiting niece in [facility]. Lax with diabetes rx.      From Dr Cobb hpi 2/12/2022:Delores Fox is a 56 y.o. White female with known history of chronic congestive heart failure who presented with a primary complaint of shortness of breath. She was in her usual state of health when her breathing got worst and family had to call EMS. EMS found her very lethargic and cyanotic. She required BIPAP. En route she was given steroids and bronchodilators. In the ER, CBC showed leukocytosis, CMP showed STEPHEN likely due to cardiorenal syndrome,  and her CXR consistent with fluid load. Hospital medicine consulted for admission. During my encounter, patient lethargic and on BIPAP. No family members present to confirm the history.     PFSH:  Past Medical History:   Diagnosis Date    Acid reflux     COPD (chronic obstructive pulmonary disease)     Coronary artery disease     Diabetes mellitus     High cholesterol     Hypertension      Past Surgical History:   Procedure Laterality Date    APPENDECTOMY      CATHETERIZATION OF BOTH LEFT AND RIGHT HEART N/A 11/23/2020    Procedure: CATHETERIZATION, HEART, BOTH LEFT AND RIGHT;  Surgeon: Doug Kendall MD;  Location: [facility] CATH/EP LAB;  Service: Cardiology;  Laterality: N/A;    CHOLECYSTECTOMY      EXCISION OF LESION OF EYELID Right 12/11/2020    Procedure: EXCISION, LESION, EYELID;  Surgeon: Malachi Rodriguez MD;  Location: [facility] OR;  Service: Ophthalmology;  Laterality: Right;   "Inclusion Cyst removal right lower lid    JOINT REPLACEMENT Left     left hip    MASTOIDECTOMY Right      Social History     Tobacco Use    Smoking status: Current Every Day Smoker     Packs/day: 0.50     Years: 13.00     Pack years: 6.50    Smokeless tobacco: Never Used   Substance Use Topics    Alcohol use: Not Currently    Drug use: Not Currently     History reviewed. No pertinent family history.  Review of patient's allergies indicates:   Allergen Reactions    Morphine Nausea And Vomiting    Sulfa (sulfonamide antibiotics) Nausea And Vomiting       Performance Status:Performance Status:The patient's activity level is functions out of house.    Review of Systems:  a review of eleven systems covering constitutional, Psych, Eye, HEENT, Respiratory, Cardiac, GI, , Musculoskeletal, Endocrine, Dermatologicwas negative except the above mentioned abnormalities and for any pertinent findings as listed below:  pertinent positive as above, rest is good         Exam:Comprehensive exam done. /63 (BP Location: Left arm, Patient Position: Lying)   Pulse 110   Temp 98.3 °F (36.8 °C) (Oral)   Resp (!) 46   Ht 5' 7" (1.702 m)   Wt 108.5 kg (239 lb 3.2 oz)   SpO2 (!) 91%   BMI 37.46 kg/m²   Exam included Vitals as listed, and patient's appearance and affect and alertness and mood, oral exam for yeast and hygiene and pharynx lesions and Mallapatti (M) score, neck with inspection for jvd and masses and thyroid abnormalities and lymph nodes (supraclavicular and infraclavicular nodes also examined and noted if abn), chest exam included symmetry and effort and fremitus and percussion and auscultation, cardiac exam included rhythm and gallops and murmur and rubs and jvd and edema, abdominal exam for mass and hepatosplenomegaly and tenderness and hernias and bowel sounds, Musculoskeletal exam with muscle tone and posture and mobility/gait and  strenght, and skin for rashes and cyanosis and pallor and turgor, " extremity for clubbing.  Findings were normal except as listed below:  M3, oral yeast, faint wheezes, no edema , rest good.          Radiographs reviewed: view by direct vision    No results found for this or any previous visit.  ]    Labs     Recent Labs   Lab 02/12/22 0136   WBC 18.39*   HGB 14.1   HCT 43.3        Recent Labs   Lab 02/12/22 0136   *   K 3.8      CO2 24   BUN 27*   CREATININE 1.7*   *   CALCIUM 8.2*   AST 17   ALT 15   ALKPHOS 128   BILITOT 0.6   PROT 6.9   ALBUMIN 3.8   LACTATE 1.6   TROPONINI 0.082*   *     Recent Labs   Lab 02/12/22 0127   PH 7.235*   PCO2 66.3*   PO2 87   HCO3 28.1*     Microbiology Results (last 7 days)     Procedure Component Value Units Date/Time    Blood culture #1 [[id number]] Collected: 02/12/22 0136    Order Status: Completed Specimen: Blood from Peripheral, Forearm, Right Updated: 02/12/22 0758     Blood Culture, Routine No Growth to date    Blood culture #2 [[id number]] Collected: 02/12/22 0137    Order Status: Completed Specimen: Blood from Peripheral, Upper Arm, Left Updated: 02/12/22 0758     Blood Culture, Routine No Growth to date          Impression:  Active Hospital Problems    Diagnosis  POA    Pulmonary edema, acute [J81.0]  Yes    Acute respiratory failure with hypoxia and hypercarbia [J96.01, J96.02]  Yes    Systolic and diastolic CHF, acute on chronic [I50.43]  Yes    COPD with respiratory failure, acute [J44.9, J96.00]  Yes    Cardiomyopathy [I42.9]  Yes     Chronic    Type 2 diabetes mellitus, with long-term current use of insulin [E11.9, Z79.4]  Not Applicable      Resolved Hospital Problems   No resolved problems to display.               Plan:   No fever, vss- tachy to 110, duo neb q 4, azithro, rocephin, lsix 40 iv bid, I/o neg 700 yesterday with 1200 output this am.  \  4lpm, was on bipap,   bnp 240, glucose 330,     abg earlier 7.23 with co2 66,     cxr pulm edema c/w 3/2021 cxr clear.    Echo 3/3/2021 ef 40%  with global hypokinesia, diastolic dys.    With hoarseness and acute presentation -- consider aspiration.  Would dose diflucan.    The following were evaluated and adjusted as needed: mechanical ventilator settings and weaning status, intravenous fluids and nutritional status, sedation and neurologic status, antibiotics, hemodynamics, support tubes and access lines and invasive monitoring, acid base balance and oxygenation needs and input and output and renal status       Critical Care  - THE PATIENT HAS A HIGH PROBABILITY OF IMMINENT OR LIFE THREATENING DETERIORATION.  Over 50%time of encounter was in direct care at bedside.  Time was 30 to 74 minutes required for patient care.  Time needed for all of the above totaled 35 minutes.

## 2022-02-12 NOTE — CARE UPDATE
02/12/22 1101   Patient Assessment/Suction   Level of Consciousness (AVPU) alert   Respiratory Effort Normal   Expansion/Accessory Muscles/Retractions no use of accessory muscles   MAINOR Breath Sounds clear;diminished   Rhythm/Pattern, Respiratory pattern regular   PRE-TX-O2   O2 Device (Oxygen Therapy) nasal cannula   $ Is the patient on Low Flow Oxygen? Yes   Flow (L/min) 3   SpO2 (!) 94 %   Pulse Oximetry Type Continuous   $ Pulse Oximetry - Multiple Charge Pulse Oximetry - Multiple   Pulse (!) 111   Resp (!) 21   Positioning HOB elevated 30 degrees   Aerosol Therapy   $ Aerosol Therapy Charges Aerosol Treatment   Daily Review of Necessity (SVN) completed   Respiratory Treatment Status (SVN) given   Treatment Route (SVN) mask   Patient Position (SVN) HOB elevated   Post Treatment Assessment (SVN) breath sounds unchanged   Signs of Intolerance (SVN) none   Education   $ Education Bronchodilator;15 min   Respiratory Evaluation   $ Care Plan Tech Time 15 min

## 2022-02-13 LAB
ALBUMIN SERPL BCP-MCNC: 3.6 G/DL (ref 3.5–5.2)
ALP SERPL-CCNC: 115 U/L (ref 55–135)
ALT SERPL W/O P-5'-P-CCNC: 18 U/L (ref 10–44)
ANION GAP SERPL CALC-SCNC: 11 MMOL/L (ref 8–16)
AST SERPL-CCNC: 16 U/L (ref 10–40)
BASOPHILS # BLD AUTO: 0.02 K/UL (ref 0–0.2)
BASOPHILS NFR BLD: 0.1 % (ref 0–1.9)
BILIRUB SERPL-MCNC: 0.7 MG/DL (ref 0.1–1)
BUN SERPL-MCNC: 59 MG/DL (ref 6–20)
CALCIUM SERPL-MCNC: 7.6 MG/DL (ref 8.7–10.5)
CHLORIDE SERPL-SCNC: 91 MMOL/L (ref 95–110)
CO2 SERPL-SCNC: 26 MMOL/L (ref 23–29)
CREAT SERPL-MCNC: 1.8 MG/DL (ref 0.5–1.4)
DIFFERENTIAL METHOD: ABNORMAL
EOSINOPHIL # BLD AUTO: 0 K/UL (ref 0–0.5)
EOSINOPHIL NFR BLD: 0 % (ref 0–8)
ERYTHROCYTE [DISTWIDTH] IN BLOOD BY AUTOMATED COUNT: 12.5 % (ref 11.5–14.5)
EST. GFR  (AFRICAN AMERICAN): 36.3 ML/MIN/1.73 M^2
EST. GFR  (NON AFRICAN AMERICAN): 31.5 ML/MIN/1.73 M^2
GLUCOSE SERPL-MCNC: 217 MG/DL (ref 70–110)
GLUCOSE SERPL-MCNC: 366 MG/DL (ref 70–110)
HCT VFR BLD AUTO: 40.9 % (ref 37–48.5)
HGB BLD-MCNC: 13 G/DL (ref 12–16)
IMM GRANULOCYTES # BLD AUTO: 0.12 K/UL (ref 0–0.04)
IMM GRANULOCYTES NFR BLD AUTO: 0.8 % (ref 0–0.5)
LYMPHOCYTES # BLD AUTO: 1.5 K/UL (ref 1–4.8)
LYMPHOCYTES NFR BLD: 9.8 % (ref 18–48)
MAGNESIUM SERPL-MCNC: 2.3 MG/DL (ref 1.6–2.6)
MCH RBC QN AUTO: 30.4 PG (ref 27–31)
MCHC RBC AUTO-ENTMCNC: 31.8 G/DL (ref 32–36)
MCV RBC AUTO: 96 FL (ref 82–98)
MONOCYTES # BLD AUTO: 1 K/UL (ref 0.3–1)
MONOCYTES NFR BLD: 6.1 % (ref 4–15)
NEUTROPHILS # BLD AUTO: 12.9 K/UL (ref 1.8–7.7)
NEUTROPHILS NFR BLD: 83.2 % (ref 38–73)
NRBC BLD-RTO: 0 /100 WBC
PHOSPHATE SERPL-MCNC: 5.4 MG/DL (ref 2.7–4.5)
PLATELET # BLD AUTO: 381 K/UL (ref 150–450)
PMV BLD AUTO: 10.2 FL (ref 9.2–12.9)
POTASSIUM SERPL-SCNC: 4.3 MMOL/L (ref 3.5–5.1)
PROT SERPL-MCNC: 6.5 G/DL (ref 6–8.4)
RBC # BLD AUTO: 4.28 M/UL (ref 4–5.4)
SODIUM SERPL-SCNC: 128 MMOL/L (ref 136–145)
TROPONIN I SERPL DL<=0.01 NG/ML-MCNC: 0.18 NG/ML
WBC # BLD AUTO: 15.48 K/UL (ref 3.9–12.7)

## 2022-02-13 PROCEDURE — 99900035 HC TECH TIME PER 15 MIN (STAT)

## 2022-02-13 PROCEDURE — 36415 COLL VENOUS BLD VENIPUNCTURE: CPT | Performed by: INTERNAL MEDICINE

## 2022-02-13 PROCEDURE — 94640 AIRWAY INHALATION TREATMENT: CPT

## 2022-02-13 PROCEDURE — 99232 SBSQ HOSP IP/OBS MODERATE 35: CPT | Mod: ,,, | Performed by: INTERNAL MEDICINE

## 2022-02-13 PROCEDURE — 25000003 PHARM REV CODE 250: Performed by: INTERNAL MEDICINE

## 2022-02-13 PROCEDURE — 12000002 HC ACUTE/MED SURGE SEMI-PRIVATE ROOM

## 2022-02-13 PROCEDURE — 99900031 HC PATIENT EDUCATION (STAT)

## 2022-02-13 PROCEDURE — 85025 COMPLETE CBC W/AUTO DIFF WBC: CPT | Performed by: INTERNAL MEDICINE

## 2022-02-13 PROCEDURE — 25000003 PHARM REV CODE 250: Performed by: STUDENT IN AN ORGANIZED HEALTH CARE EDUCATION/TRAINING PROGRAM

## 2022-02-13 PROCEDURE — 97535 SELF CARE MNGMENT TRAINING: CPT

## 2022-02-13 PROCEDURE — 25000242 PHARM REV CODE 250 ALT 637 W/ HCPCS: Performed by: STUDENT IN AN ORGANIZED HEALTH CARE EDUCATION/TRAINING PROGRAM

## 2022-02-13 PROCEDURE — 63600175 PHARM REV CODE 636 W HCPCS: Performed by: STUDENT IN AN ORGANIZED HEALTH CARE EDUCATION/TRAINING PROGRAM

## 2022-02-13 PROCEDURE — 25000242 PHARM REV CODE 250 ALT 637 W/ HCPCS: Performed by: INTERNAL MEDICINE

## 2022-02-13 PROCEDURE — S4991 NICOTINE PATCH NONLEGEND: HCPCS | Performed by: STUDENT IN AN ORGANIZED HEALTH CARE EDUCATION/TRAINING PROGRAM

## 2022-02-13 PROCEDURE — 80053 COMPREHEN METABOLIC PANEL: CPT | Performed by: INTERNAL MEDICINE

## 2022-02-13 PROCEDURE — 94660 CPAP INITIATION&MGMT: CPT

## 2022-02-13 PROCEDURE — 84100 ASSAY OF PHOSPHORUS: CPT | Performed by: INTERNAL MEDICINE

## 2022-02-13 PROCEDURE — 84484 ASSAY OF TROPONIN QUANT: CPT | Performed by: STUDENT IN AN ORGANIZED HEALTH CARE EDUCATION/TRAINING PROGRAM

## 2022-02-13 PROCEDURE — 99233 PR SUBSEQUENT HOSPITAL CARE,LEVL III: ICD-10-PCS | Mod: ,,, | Performed by: INTERNAL MEDICINE

## 2022-02-13 PROCEDURE — 94618 PULMONARY STRESS TESTING: CPT

## 2022-02-13 PROCEDURE — 63600175 PHARM REV CODE 636 W HCPCS: Performed by: INTERNAL MEDICINE

## 2022-02-13 PROCEDURE — 83735 ASSAY OF MAGNESIUM: CPT | Performed by: INTERNAL MEDICINE

## 2022-02-13 PROCEDURE — 97165 OT EVAL LOW COMPLEX 30 MIN: CPT

## 2022-02-13 PROCEDURE — 36415 COLL VENOUS BLD VENIPUNCTURE: CPT | Performed by: STUDENT IN AN ORGANIZED HEALTH CARE EDUCATION/TRAINING PROGRAM

## 2022-02-13 PROCEDURE — 94761 N-INVAS EAR/PLS OXIMETRY MLT: CPT

## 2022-02-13 PROCEDURE — 99233 SBSQ HOSP IP/OBS HIGH 50: CPT | Mod: ,,, | Performed by: INTERNAL MEDICINE

## 2022-02-13 PROCEDURE — 99232 PR SUBSEQUENT HOSPITAL CARE,LEVL II: ICD-10-PCS | Mod: ,,, | Performed by: INTERNAL MEDICINE

## 2022-02-13 PROCEDURE — 27000221 HC OXYGEN, UP TO 24 HOURS

## 2022-02-13 RX ORDER — LORAZEPAM 0.5 MG/1
0.5 TABLET ORAL EVERY 6 HOURS PRN
Status: DISCONTINUED | OUTPATIENT
Start: 2022-02-13 | End: 2022-02-14 | Stop reason: HOSPADM

## 2022-02-13 RX ORDER — LOSARTAN POTASSIUM 50 MG/1
50 TABLET ORAL DAILY
Status: DISCONTINUED | OUTPATIENT
Start: 2022-02-14 | End: 2022-02-14 | Stop reason: HOSPADM

## 2022-02-13 RX ADMIN — NYSTATIN 500000 UNITS: 100000 SUSPENSION ORAL at 08:02

## 2022-02-13 RX ADMIN — HUMAN INSULIN 6 UNITS: 100 INJECTION, SOLUTION SUBCUTANEOUS at 01:02

## 2022-02-13 RX ADMIN — LOSARTAN POTASSIUM 50 MG: 50 TABLET, FILM COATED ORAL at 08:02

## 2022-02-13 RX ADMIN — ATORVASTATIN CALCIUM 40 MG: 40 TABLET, FILM COATED ORAL at 08:02

## 2022-02-13 RX ADMIN — ENOXAPARIN SODIUM 40 MG: 40 INJECTION SUBCUTANEOUS at 05:02

## 2022-02-13 RX ADMIN — MELATONIN TAB 3 MG 9 MG: 3 TAB at 08:02

## 2022-02-13 RX ADMIN — IPRATROPIUM BROMIDE AND ALBUTEROL SULFATE 3 ML: .5; 3 SOLUTION RESPIRATORY (INHALATION) at 03:02

## 2022-02-13 RX ADMIN — IPRATROPIUM BROMIDE AND ALBUTEROL SULFATE 3 ML: .5; 3 SOLUTION RESPIRATORY (INHALATION) at 11:02

## 2022-02-13 RX ADMIN — FLUTICASONE FUROATE AND VILANTEROL TRIFENATATE 1 PUFF: 200; 25 POWDER RESPIRATORY (INHALATION) at 07:02

## 2022-02-13 RX ADMIN — FUROSEMIDE 40 MG: 10 INJECTION, SOLUTION INTRAVENOUS at 05:02

## 2022-02-13 RX ADMIN — NICOTINE 1 PATCH: 21 PATCH, EXTENDED RELEASE TRANSDERMAL at 10:02

## 2022-02-13 RX ADMIN — HUMAN INSULIN 15 UNITS: 100 INJECTION, SOLUTION SUBCUTANEOUS at 08:02

## 2022-02-13 RX ADMIN — CEFTRIAXONE SODIUM 1 G: 1 INJECTION, POWDER, FOR SOLUTION INTRAMUSCULAR; INTRAVENOUS at 02:02

## 2022-02-13 RX ADMIN — HYDROCODONE BITARTRATE AND ACETAMINOPHEN 1 TABLET: 5; 325 TABLET ORAL at 02:02

## 2022-02-13 RX ADMIN — FLUCONAZOLE 150 MG: 100 TABLET ORAL at 08:02

## 2022-02-13 RX ADMIN — PREGABALIN 75 MG: 75 CAPSULE ORAL at 08:02

## 2022-02-13 RX ADMIN — NYSTATIN 500000 UNITS: 100000 SUSPENSION ORAL at 01:02

## 2022-02-13 RX ADMIN — METOPROLOL SUCCINATE 50 MG: 50 TABLET, EXTENDED RELEASE ORAL at 08:02

## 2022-02-13 RX ADMIN — AZITHROMYCIN 250 MG: 500 INJECTION, POWDER, LYOPHILIZED, FOR SOLUTION INTRAVENOUS at 04:02

## 2022-02-13 RX ADMIN — IPRATROPIUM BROMIDE AND ALBUTEROL SULFATE 3 ML: .5; 3 SOLUTION RESPIRATORY (INHALATION) at 07:02

## 2022-02-13 RX ADMIN — LORAZEPAM 0.5 MG: 0.5 TABLET ORAL at 07:02

## 2022-02-13 RX ADMIN — IPRATROPIUM BROMIDE AND ALBUTEROL SULFATE 3 ML: .5; 3 SOLUTION RESPIRATORY (INHALATION) at 08:02

## 2022-02-13 RX ADMIN — HYDROCODONE BITARTRATE AND ACETAMINOPHEN 1 TABLET: 5; 325 TABLET ORAL at 08:02

## 2022-02-13 RX ADMIN — HYDROCODONE BITARTRATE AND ACETAMINOPHEN 1 TABLET: 5; 325 TABLET ORAL at 04:02

## 2022-02-13 RX ADMIN — ACETAMINOPHEN 1000 MG: 500 TABLET, FILM COATED ORAL at 01:02

## 2022-02-13 RX ADMIN — ASPIRIN 81 MG 81 MG: 81 TABLET ORAL at 08:02

## 2022-02-13 RX ADMIN — NYSTATIN 500000 UNITS: 100000 SUSPENSION ORAL at 05:02

## 2022-02-13 NOTE — NURSING TRANSFER
Nursing Transfer Note      2/13/2022     Reason patient is being transferred:No longer needs ICU    Transfer To: 1207    Transfer via wheelchair    Transfer with O2    Transported by HECTOR Heaton RN    Medicines sent: IVPB Antibiotics    Any special needs or follow-up needed: Walking O2 test for home O2 before discharge    Chart send with patient: Yes    Notified: Patient notified family of transfer    Patient reassessed at: 1110 02/13/2022    Upon arrival to floor: patient oriented to room, call bell in reach and bed in lowest position.  O2 at 2L.

## 2022-02-13 NOTE — CARE UPDATE
02/13/22 0739   Patient Assessment/Suction   Level of Consciousness (AVPU) alert   Respiratory Effort Normal   Expansion/Accessory Muscles/Retractions no use of accessory muscles   All Lung Fields Breath Sounds wheezes, inspiratory   Rhythm/Pattern, Respiratory unlabored   PRE-TX-O2   O2 Device (Oxygen Therapy) nasal cannula   $ Is the patient on Low Flow Oxygen? Yes   Flow (L/min) 2   SpO2 97 %   Pulse Oximetry Type Continuous   $ Pulse Oximetry - Multiple Charge Pulse Oximetry - Multiple   Pulse 81   Resp (!) 21   Aerosol Therapy   $ Aerosol Therapy Charges Aerosol Treatment   Daily Review of Necessity (SVN) completed   Respiratory Treatment Status (SVN) given   Treatment Route (SVN) mask   Patient Position (SVN) sitting on edge of bed   Post Treatment Assessment (SVN) breath sounds unchanged   Signs of Intolerance (SVN) none   Inhaler   $ Inhaler Charges MDI (Metered Dose Inahler) Treatment;Mouth rinsed post treatment   Daily Review of Necessity (Inhaler) completed   Respiratory Treatment Status (Inhaler) given;mouth rinsed post treatment;self-administered   Treatment Route (Inhaler) mouthpiece   Patient Position (Inhaler) sitting on edge of bed   Post Treatment Assessment (Inhaler) breath sounds improved   Signs of Intolerance (Inhaler) none   Breath Sounds Post-Respiratory Treatment   Throughout All Fields Post-Treatment All Fields   Throughout All Fields Post-Treatment aeration increased   Post-treatment Heart Rate (beats/min) 80   Post-treatment Resp Rate (breaths/min) 20   Skin Integrity   $ Wound Care Tech Time 15 min   Area Observed Bridge of nose   Skin Appearance without discoloration   Preset CPAP/BiPAP Settings   $ CPAP/BiPAP Daily Charge BiPAP/CPAP Daily   Education   $ Education Bronchodilator;15 min   Respiratory Evaluation   $ Care Plan Tech Time 15 min

## 2022-02-13 NOTE — NURSING
Patient reports BiPAP is too uncomfortable to sleep with. Wants the nasal canula to be put back on. Patient taken off BiPAP and place on 3 liters nasal canula O2.

## 2022-02-13 NOTE — PLAN OF CARE
Problem: Adult Inpatient Plan of Care  Goal: Plan of Care Review  Outcome: Ongoing, Progressing  Goal: Patient-Specific Goal (Individualized)  Outcome: Ongoing, Progressing  Goal: Absence of Hospital-Acquired Illness or Injury  Outcome: Ongoing, Progressing  Goal: Optimal Comfort and Wellbeing  Outcome: Ongoing, Progressing  Goal: Readiness for Transition of Care  Outcome: Ongoing, Progressing     Problem: Diabetes Comorbidity  Goal: Blood Glucose Level Within Targeted Range  Outcome: Ongoing, Progressing     Problem: Fluid Imbalance (Pneumonia)  Goal: Fluid Balance  Outcome: Ongoing, Progressing     Problem: Respiratory Compromise (Pneumonia)  Goal: Effective Oxygenation and Ventilation  Outcome: Ongoing, Progressing     Problem: Adjustment to Illness COPD (Chronic Obstructive Pulmonary Disease)  Goal: Optimal Chronic Illness Coping  Outcome: Ongoing, Progressing     Problem: Infection COPD (Chronic Obstructive Pulmonary Disease)  Goal: Absence of Infection Signs and Symptoms  Outcome: Ongoing, Progressing

## 2022-02-13 NOTE — CARE UPDATE
02/13/22 0845   Home Oxygen Qualification   $ Home O2 Qualification Pulmonary Stress Test/6 min walk;Tech time 15 minutes   Room Air SpO2 At Rest (!) 87 %   Room Air SpO2 During Ambulation (!) 86 %   SpO2 During Ambulation on O2 93 %   Heart Rate on O2 119 bpm   Ambulation O2 LPM 2 LPM   SpO2 Post Ambulation 97 %   Post Ambulation Heart Rate 106 bpm   Post Ambulation O2 LPM 1 LPM   Home O2 Eval Comments pt qualifies for home O2

## 2022-02-13 NOTE — PROGRESS NOTES
Critical access hospital  Cardiology  Progress Note    Patient Name: Delores Fox  MRN: [id number]  Admission Date: 2/12/2022  Hospital Length of Stay: 1 days  Code Status: Full Code   Attending Physician: Jose Bates MD   Primary Care Physician: Carey Liu MD  Expected Discharge Date:   Principal Problem:Acute respiratory failure with hypoxia and hypercarbia    Subjective:     No acute event overnight. Reports doing well. Diuresis held due to worsening of BUN.    Review of Systems   All other systems reviewed and are negative.    Objective:     Vital Signs (Most Recent):  Temp: 97.6 °F (36.4 °C) (02/13/22 1500)  Pulse: 84 (02/13/22 1545)  Resp: 18 (02/13/22 1648)  BP: (!) 96/58 (took a second time and was 87/56) (02/13/22 1500)  SpO2: 97 % (02/13/22 1545) Vital Signs (24h Range):  Temp:  [97.6 °F (36.4 °C)-98.4 °F (36.9 °C)] 97.6 °F (36.4 °C)  Pulse:  [67-92] 84  Resp:  [15-47] 18  SpO2:  [91 %-100 %] 97 %  BP: ()/(51-86) 96/58     Weight: 109.9 kg (242 lb 4.6 oz)  Body mass index is 37.95 kg/m².    SpO2: 97 %  O2 Device (Oxygen Therapy): nasal cannula      Intake/Output Summary (Last 24 hours) at 2/13/2022 1658  Last data filed at 2/13/2022 0710  Gross per 24 hour   Intake 1100 ml   Output 750 ml   Net 350 ml       Lines/Drains/Airways     Peripheral Intravenous Line                 Peripheral IV - Single Lumen 02/12/22 0130 18 G Distal;Posterior;Right Forearm 1 day         Peripheral IV - Single Lumen 02/12/22 0145 20 G Left Shoulder 1 day                Physical Exam  Vitals reviewed.   Constitutional:       Appearance: Normal appearance.   HENT:      Mouth/Throat:      Mouth: Mucous membranes are moist.   Eyes:      Pupils: Pupils are equal, round, and reactive to light.   Cardiovascular:      Rate and Rhythm: Normal rate and regular rhythm.      Heart sounds: No murmur heard.  No friction rub. No gallop.    Pulmonary:      Effort: Pulmonary effort is normal.      Breath sounds: Normal  breath sounds.   Abdominal:      General: Abdomen is flat.      Palpations: Abdomen is soft.   Musculoskeletal:         General: Normal range of motion.   Skin:     General: Skin is warm and dry.   Neurological:      General: No focal deficit present.      Mental Status: She is alert and oriented to person, place, and time.   Psychiatric:         Mood and Affect: Mood normal.         Significant Labs:   BMP:   Recent Labs   Lab 02/12/22 0136 02/13/22  0607   * 366*   * 128*   K 3.8 4.3    91*   CO2 24 26   BUN 27* 59*   CREATININE 1.7* 1.8*   CALCIUM 8.2* 7.6*   MG  --  2.3    and CBC   Recent Labs   Lab 02/12/22 0136 02/12/22 0136 02/13/22  0607   WBC 18.39*  --  15.48*   HGB 14.1  --  13.0   HCT 43.3   < > 40.9     --  381    < > = values in this interval not displayed.       Significant Imaging: Echocardiogram:   Transthoracic echo (TTE) complete (Cupid Only):   Results for orders placed or performed during the hospital encounter of 02/12/22   Echo   Result Value Ref Range    BSA 2.26 m2    TDI SEPTAL 0.10 m/s    LV LATERAL E/E' RATIO 13.00 m/s    LV SEPTAL E/E' RATIO 13.00 m/s    Right Atrial Pressure (from IVC) 3 mmHg    AORTIC VALVE CUSP SEPERATION 1.55 cm    TDI LATERAL 0.10 m/s    PV PEAK VELOCITY 111.63 cm/s    LVIDd 5.82 3.5 - 6.0 cm    IVS 0.95 0.6 - 1.1 cm    Posterior Wall 0.96 0.6 - 1.1 cm    Ao root annulus 2.87 cm    LVIDs 4.88 (A) 2.1 - 4.0 cm    FS 16 28 - 44 %    LV mass 220.87 g    LA size 3.55 cm    RVDD 228.00 cm    Left Ventricle Relative Wall Thickness 0.33 cm    AV mean gradient 9 mmHg    AV valve area 2.08 cm2    AV Velocity Ratio 53.42     AV index (prosthetic) 0.52     E/A ratio 0.90     Mean e' 0.10 m/s    E wave deceleration time 121.09 msec    IVRT 70.45 msec    LVOT diameter 2.26 cm    LVOT area 4.0 cm2    LVOT peak meagan 103.64 m/s    LVOT peak VTI 21.75 cm    Ao peak meagan 1.94 m/s    Ao VTI 41.87 cm    LVOT stroke volume 87.21 cm3    AV peak gradient 15  mmHg    TV rest pulmonary artery pressure 27 mmHg    E/E' ratio 13.00 m/s    MV Peak E Steven 1.30 m/s    TR Max Steven 2.43 m/s    MV Peak A Steven 1.44 m/s    LV Mass Index 101 g/m2    Triscuspid Valve Regurgitation Peak Gradient 24 mmHg    EF 35 %    Narrative    · The estimated PA systolic pressure is 27 mmHg.  · The left ventricle is mildly enlarged with mild concentric hypertrophy   and moderately decreased systolic function.  · The estimated ejection fraction is 35%.  · Grade I left ventricular diastolic dysfunction.  · There is moderate left ventricular global hypokinesis.  · Normal right ventricular size with normal right ventricular systolic   function.  · Mild left atrial enlargement.  · Mild tricuspid regurgitation.  · Normal central venous pressure (3 mmHg).        Assessment and Plan:     # decompensated heart failure with reduced ejection fraction, last EF 40%  # coronary artery disease,  of RCA with left-to-right collaterals  # uncontrolled severe hypertension  # type 2 MI likely     Plan:  - continue with aspirin, statin  - Hold IV diuresis, Recheck BMP, BNP and chest XR in AM  - c/w GDMT for HF     Thank you for your consult. I will follow-up with patient. Please contact us if you have any additional questions.    Manish Adames MD  Cardiology  [facility]

## 2022-02-13 NOTE — PT/OT/SLP EVAL
Occupational Therapy   Evaluation and tx    Name: Delores Fox  MRN: [id number]  Admitting Diagnosis:  Acute respiratory failure with hypoxia and hypercarbia  Recent Surgery: * No surgery found *    The primary encounter diagnosis was Respiratory distress. Diagnoses of Shortness of breath, COPD exacerbation, Congestive heart failure, unspecified HF chronicity, unspecified heart failure type, CHF (congestive heart failure), Chest pain, and Acute respiratory failure with hypoxia and hypercarbia were also pertinent to this visit.    Recommendations:     Discharge Recommendations: home  Discharge Equipment Recommendations:  grab bar  Barriers to discharge:  None    Assessment:     Delores Fox is a 56 y.o. female with a medical diagnosis of Acute respiratory failure with hypoxia and hypercarbia.  She presents with Performance deficits affecting function: impaired endurance,impaired functional mobilty,impaired self care skills,impaired balance,gait instability,decreased safety awareness,impaired cardiopulmonary response to activity.      Rehab Prognosis: Good; patient would benefit from acute skilled OT services to address these deficits and reach maximum level of function.       Plan:     Patient to be seen 3 x/week to address the above listed problems via self-care/home management,therapeutic activities,therapeutic exercises  · Plan of Care Expires: 03/13/22  · Plan of Care Reviewed with: patient    Subjective     Chief Complaint: none  Patient/Family Comments/goals: pt. says she needs a grab bar for shower, says she has fallen 1 x this month.    Occupational Profile:  Living Environment: Pt. Lives with niece and sister in [facility] with no steps; WIS with shower chair.  Previous level of function: Indep-Claudia ADLS, ambulated Indep without a device, home alone but says her niece will be home with her when she is dc'd because she will be working from home. Pt on SSI. Family does all IADLS, family lopez most of the driving, pt  says she drives sometimes. Pt. uses electric cart at [employer] for grocery shopping.  Roles and Routines: active selfcare tasks  Equipment Used at Home:  shower chair (pt hs rollator and wc she does not currently use)  Assistance upon Discharge: niece and sister    Pain/Comfort:  Pain Rating 1: 10/10  Location - Orientation 1: generalized  Location 1: head  Pain Addressed 1: Pre-medicate for activity,Reposition,Distraction  Pain Rating Post-Intervention 1: 10/10  Pain Rating 2: 8/10  Location - Side 2: Left  Location - Orientation 2: generalized  Location 2: hip  Pain Addressed 2: Pre-medicate for activity,Reposition,Distraction  Pain Rating Post-Intervention 2: 8/10    Patients cultural, spiritual, Presybeterian conflicts given the current situation: no    Objective:     Communicated with: nurse prior to session.  Patient found HOB elevated with oxygen,peripheral IV upon OT entry to room.    General Precautions: Standard, fall,respiratory,diabetic   Orthopedic Precautions:N/A   Braces: N/A  Respiratory Status: Nasal cannula, flow 1 L/min    Occupational Performance:    Bed Mobility:    · Patient completed Rolling/Turning to Left with  modified independence  · Patient completed Scooting/Bridging with modified independence  · Patient completed Supine to Sit with modified independence    Functional Mobility/Transfers:  · Patient completed Sit <> Stand Transfer with supervision  with  no assistive device   · Patient completed Toilet Transfer Step Transfer technique with supervision with  BSC  · Functional Mobility: ambulated supervision to sink and BSC , NO LOB but mild gait instability noted, mild SOB; O2 sats 88-89% on RA, increased to 90+ with PLB tech; replaced 1 LO2 after.    Activities of Daily Living:  · Feeding:  independence .  · Grooming: supervision   Standing at sink  · Upper Body Dressing: independence .  · Lower Body Dressing: independence slippers  · Toileting: supervision simulated    Cognitive/Visual  Perceptual:  Cognitive/Psychosocial Skills:     -       Oriented to: Person, Place, Time and Situation   -       Follows Commands/attention:Follows multistep  commands  -       Communication: clear/fluent  -       Memory: No Deficits noted  -       Safety awareness/insight to disability: impaired   -       Mood/Affect/Coping skills/emotional control: Cooperative  Visual/Perceptual:      -Intact .    Physical Exam:  Balance:    -       good sitting   standing; good  static. dynmaic; fair plus  Postural examination/scapula alignment:    -       Rounded shoulders  Dominant hand:    -       right  Upper Extremity Range of Motion:     -       Right Upper Extremity: WFL  -       Left Upper Extremity: WFL  Upper Extremity Strength:    -       Right Upper Extremity: WFL  -       Left Upper Extremity: WFL   Strength:    -       Right Upper Extremity: WFL  -       Left Upper Extremity: WFL    AMPAC 6 Click ADL:  AMPAC Total Score: 24    Treatment & Education:  Purpose of OT visit, POC, Impt of OOB activity, energy conservation tech for selfcare, PLB tech, safety-slow pace with turning to sit and slowly  Sit down in chair, don't rush to do tasks to avoid getting SOB.  Education:    Patient left up in chair with all lines intact, call button in reach and nurse notified    GOALS:   Multidisciplinary Problems     Occupational Therapy Goals        Problem: Occupational Therapy Goal    Goal Priority Disciplines Outcome Interventions   Occupational Therapy Goal     OT, PT/OT Ongoing, Progressing    Description: Goals to be met by: discharge    Patient will increase functional independence with ADLs by performing:    Grooming while standing at sink with Modified [place].  Toileting from toilet with Modified [place] for hygiene and clothing management.   Toilet transfer to toilet with Modified [place].  Upper extremity exercise program x10 reps per handout, with independence.  Demonstrate Indep with use of ECT as  neded with ADLS, fx mobility                     History:     Past Medical History:   Diagnosis Date    Acid reflux     COPD (chronic obstructive pulmonary disease)     Coronary artery disease     Diabetes mellitus     High cholesterol     Hypertension          Past Surgical History:   Procedure Laterality Date    APPENDECTOMY      CATHETERIZATION OF BOTH LEFT AND RIGHT HEART N/A 11/23/2020    Procedure: CATHETERIZATION, HEART, BOTH LEFT AND RIGHT;  Surgeon: Doug Kendall MD;  Location: [facility] CATH/EP LAB;  Service: Cardiology;  Laterality: N/A;    CHOLECYSTECTOMY      EXCISION OF LESION OF EYELID Right 12/11/2020    Procedure: EXCISION, LESION, EYELID;  Surgeon: Malachi Rodriguez MD;  Location: [facility] OR;  Service: Ophthalmology;  Laterality: Right;  Inclusion Cyst removal right lower lid    JOINT REPLACEMENT Left     left hip    MASTOIDECTOMY Right        Time Tracking:     OT Date of Treatment: 02/13/22  OT Start Time: 1358  OT Stop Time: 1420  OT Total Time (min): 22 min    Billable Minutes:Evaluation 10  Self Care/Home Management 12  Total Time 22    2/13/2022

## 2022-02-13 NOTE — CARE UPDATE
02/12/22 1930   Patient Assessment/Suction   Level of Consciousness (AVPU) alert   Respiratory Effort Normal   Expansion/Accessory Muscles/Retractions no use of accessory muscles   All Lung Fields Breath Sounds wheezes, expiratory   PRE-TX-O2   O2 Device (Oxygen Therapy) nasal cannula w/ humidification   $ Is the patient on Low Flow Oxygen? Yes   Flow (L/min) 3   SpO2 95 %   Pulse Oximetry Type Continuous   $ Pulse Oximetry - Multiple Charge Pulse Oximetry - Multiple   Pulse 78   Resp 19   Aerosol Therapy   $ Aerosol Therapy Charges Aerosol Treatment   Daily Review of Necessity (SVN) completed   Respiratory Treatment Status (SVN) given   Treatment Route (SVN) mask   Patient Position (SVN) semi-Bautista's   Post Treatment Assessment (SVN) breath sounds unchanged;vital signs unchanged   Signs of Intolerance (SVN) none   Skin Integrity   $ Wound Care Tech Time 15 min   Area Observed Bridge of nose   Skin Appearance without discoloration   Barrier used? Gel Cushion   Education   $ Education Bronchodilator;15 min   Respiratory Evaluation   $ Care Plan Tech Time 15 min

## 2022-02-13 NOTE — PLAN OF CARE
Problem: Occupational Therapy Goal  Goal: Occupational Therapy Goal  Description: Goals to be met by: discharge    Patient will increase functional independence with ADLs by performing:    Grooming while standing at sink with Modified [place].  Toileting from toilet with Modified [place] for hygiene and clothing management.   Toilet transfer to toilet with Modified [place].  Upper extremity exercise program x10 reps per handout, with independence.  Demonstrate Indep with use of ECT as neded with ADLS, fx mobility    Outcome: Ongoing, Progressing   OT initial eval completed, tx initiated, continue with OT POC.

## 2022-02-13 NOTE — PROGRESS NOTES
[facility]  Progress Note    Patient name: Delores Fox  MRN: [id number]  Admit Date: 2/12/2022   LOS: 1 day     SUBJECTIVE:     Principal problem: Acute respiratory failure with hypoxia and hypercarbia  Chief Complaint   Patient presents with    Shortness of Breath     EMS reports pt lethargic, cyanotic, sats in 60's.  Arrival on CPAP       Interval History:      2/12: Admitted early this morning with acute hypoxemic respiratory failure and respiratory distress by EMS. She says she has had a cough for the past several days and then last night she suddenly could not breathe and her family member had to giver her mouth to mouth. EMS brought her to the ED and had placed her on bipap.     2/13: She is feeling much better today with diuresis and continued mgmt of her COPD exacerbation. She has no complaints otherwise. She remains on 2L of oxygen.    Scheduled Meds:   albuterol-ipratropium  3 mL Nebulization Q4H    aspirin  81 mg Oral Daily    atorvastatin  40 mg Oral QHS    azithromycin  250 mg Intravenous Q24H    cefTRIAXone (ROCEPHIN) IVPB  1 g Intravenous Q24H    enoxaparin  40 mg Subcutaneous Daily    fluconazole  150 mg Oral Daily    fluticasone furoate-vilanteroL  1 puff Inhalation Daily    [START ON 2/14/2022] losartan  50 mg Oral Daily    metoprolol succinate  50 mg Oral Daily    nicotine  1 patch Transdermal Daily    nystatin  500,000 Units Mouth/Throat QID    polyethylene glycol  17 g Oral Daily    pregabalin  75 mg Oral BID     Continuous Infusions:  PRN Meds:acetaminophen, acetaminophen, albuterol sulfate, dextrose 50%, dextrose 50%, dextrose 50%, dextrose 50%, glucagon (human recombinant), glucose, glucose, HYDROcodone-acetaminophen, insulin regular, melatonin, morphine, naloxone, sodium chloride 0.9%    Review of patient's allergies indicates:   Allergen Reactions    Morphine Nausea And Vomiting    Sulfa (sulfonamide antibiotics) Nausea And Vomiting        Review of Systems: As per interval history    OBJECTIVE:     Vital Signs (Most Recent)  Temp: 98.2 °F (36.8 °C) (02/13/22 0800)  Pulse: 69 (02/13/22 1110)  Resp: 17 (02/13/22 1110)  BP: (!) 127/58 (02/13/22 0800)  SpO2: 100 % (02/13/22 1110)    Vital Signs Range (Last 24H):  Temp:  [98.1 °F (36.7 °C)-98.4 °F (36.9 °C)]   Pulse:  [68-92]   Resp:  [16-47]   BP: ()/(51-86)   SpO2:  [91 %-100 %]     I & O (Last 24H):    Intake/Output Summary (Last 24 hours) at 2/13/2022 1419  Last data filed at 2/13/2022 0710  Gross per 24 hour   Intake 1100 ml   Output 750 ml   Net 350 ml       Physical Exam:  General: Patient resting comfortably in no acute distress. Appears as stated age. Calm  Eyes: No conjunctival injection. No scleral icterus.  ENT: Hearing grossly intact. No discharge from ears. No nasal discharge.   Neck: Supple, trachea midline. No JVD  CVS: RRR. No LE edema BL   Lungs: CTA BL, no wheezing few crackles. Good breath sounds. No accessory muscle use. On 3L NC  Abdomen:  Soft, nontender and nondistended.  No organomegaly  Neuro: AOx3. Moves all extremities. Follows commands. Responds appropriately   Skin:  No rash or erythema noted    Laboratory:  I have reviewed all pertinent lab results within the past 24 hours.  CBC:   Recent Labs   Lab 02/13/22  0607   WBC 15.48*   RBC 4.28   HGB 13.0   HCT 40.9      MCV 96   MCH 30.4   MCHC 31.8*     BMP:   Recent Labs   Lab 02/13/22  0607   *   *   K 4.3   CL 91*   CO2 26   BUN 59*   CREATININE 1.8*   CALCIUM 7.6*   MG 2.3     ABGs:   Recent Labs   Lab 02/12/22  0127   PH 7.235*   PCO2 66.3*   PO2 87   HCO3 28.1*   POCSATURATED 94*   BE 1     Microbiology Results (last 7 days)     Procedure Component Value Units Date/Time    Blood culture #1 [[id number]] Collected: 02/12/22 0136    Order Status: Completed Specimen: Blood from Peripheral, Forearm, Right Updated: 02/13/22 0232     Blood Culture, Routine No Growth to date      No Growth to date     Blood culture #2 [[id number]] Collected: 02/12/22 0137    Order Status: Completed Specimen: Blood from Peripheral, Upper Arm, Left Updated: 02/13/22 0232     Blood Culture, Routine No Growth to date      No Growth to date          Diagnostic Results:  Labs: Reviewed  ECG: Reviewed  X-Ray: Reviewed  CT: Reviewed    ASSESSMENT/PLAN:     55 y/o F with hx of COPD, CHFrEF, HTN, HLD, DMII, She presents with acute hypoxemic respiratory failure and respiratory distress in the setting of acute CHF exacerbation and likely community acquired pneumonia.     Active Hospital Problems    Diagnosis  POA    *Acute respiratory failure with hypoxia and hypercarbia [J96.01, J96.02]  Yes    Pulmonary edema, acute [J81.0]  Yes    Systolic and diastolic CHF, acute on chronic [I50.43]  Yes    COPD with respiratory failure, acute [J44.9, J96.00]  Yes    Community acquired pneumonia [J18.9]  Yes    Thrush [B37.0]  Yes    Cardiomyopathy [I42.9]  Yes     Chronic    Type 2 diabetes mellitus, with long-term current use of insulin [E11.9, Z79.4]  Not Applicable      Resolved Hospital Problems   No resolved problems to display.         Plan:   - hold steroids for now, no wheezing.   - continue diuresis  - continue scheduled nebs  - EKG.   - Rocephin / Azithromycin for CAP  - trend troponin  - repeat echocardiogram. > worsening EF to 35%.   - continue metoprolol, lasix, losartan.   - continue oxygen supplementation.   - ? Whether she may have had laryngospasm will monitor and consider DL if symptoms persist or worsen.   - oral nystatin for thrush.         VTE Risk Mitigation (From admission, onward)         Ordered     enoxaparin injection 40 mg  Daily         02/12/22 0908     IP VTE HIGH RISK PATIENT  Once         02/12/22 0908     Place sequential compression device  Until discontinued         02/12/22 0908                  Department Hospital Medicine  [facility]  Jose Bates MD  Date of service: 02/13/2022

## 2022-02-13 NOTE — PROGRESS NOTES
02/13/2022      Admit Date: 2/12/2022  Delores Fox  New Patient Consult    Chief Complaint   Patient presents with    Shortness of Breath     EMS reports pt lethargic, cyanotic, sats in 60's.  Arrival on CPAP       History of Present Illness:  Pt from [facility], sees pcp there, on dulera and spiriva and smokes.  Was admitted last yr.  No home ox, has had hoarseness.  Had abrupt sob and poor recall of events, needed bipap and now off,  Denies h/o asthma, worked as sitter.  Was visiting niece in [place]. Lax with diabetes rx.      From Dr Cobb hpi 2/12/2022:Delores Fox is a 56 y.o. White female with known history of chronic congestive heart failure who presented with a primary complaint of shortness of breath. She was in her usual state of health when her breathing got worst and family had to call EMS. EMS found her very lethargic and cyanotic. She required BIPAP. En route she was given steroids and bronchodilators. In the ER, CBC showed leukocytosis, CMP showed STEPHEN likely due to cardiorenal syndrome,  and her CXR consistent with fluid load. Hospital medicine consulted for admission. During my encounter, patient lethargic and on BIPAP. No family members present to confirm the history.     Progress Note  PULMONARY    Admit Date: 2/12/2022 02/13/2022      SUBJECTIVE:     2/13 no c/o, feels better and wishes to go home.      PFSH and Allergies reviewed.    OBJECTIVE:     Vitals (Most recent):  Vitals:    02/13/22 0901   BP:    Pulse: 81   Resp:    Temp:        Vitals (24 hour range):  Temp:  [98 °F (36.7 °C)-98.4 °F (36.9 °C)]   Pulse:  []   Resp:  [16-47]   BP: ()/()   SpO2:  [90 %-99 %]       Intake/Output Summary (Last 24 hours) at 2/13/2022 0948  Last data filed at 2/13/2022 0710  Gross per 24 hour   Intake 1700 ml   Output 1550 ml   Net 150 ml          Physical Exam:  The patient's neuro status (alertness,orientation,cognitive function,motor skills,), pharyngeal exam (oral  lesions, hygiene, abn dentition,), Neck (jvd,mass,thyroid,nodes in neck and above/below clavicle),RESPIRATORY(symmetry,effort,fremitus,percussion,auscultation),  Cor(rhythm,heart tones including gallops,perfusion,edema)ABD(distention,hepatic&splenomegaly,tenderness,masses), Skin(rash,cyanosis),Psyc(affect,judgement,).  Exam negative except for these pertinent findings:    Alert, voice off, good bs, rest good.  Oral yeast gone    Radiographs reviewed: view by direct vision    No results found for this or any previous visit.  ]    Labs     Recent Labs   Lab 02/13/22  0607   WBC 15.48*   HGB 13.0   HCT 40.9        Recent Labs   Lab 02/12/22  1111 02/13/22  0607   NA  --  128*   K  --  4.3   CL  --  91*   CO2  --  26   BUN  --  59*   CREATININE  --  1.8*   GLU  --  366*   CALCIUM  --  7.6*   MG  --  2.3   PHOS  --  5.4*   AST  --  16   ALT  --  18   ALKPHOS  --  115   BILITOT  --  0.7   PROT  --  6.5   ALBUMIN  --  3.6   TROPONINI 0.218*  --    No results for input(s): PH, PCO2, PO2, HCO3 in the last 24 hours.  Microbiology Results (last 7 days)     Procedure Component Value Units Date/Time    Blood culture #1 [[id number]] Collected: 02/12/22 0136    Order Status: Completed Specimen: Blood from Peripheral, Forearm, Right Updated: 02/13/22 0232     Blood Culture, Routine No Growth to date      No Growth to date    Blood culture #2 [[id number]] Collected: 02/12/22 0137    Order Status: Completed Specimen: Blood from Peripheral, Upper Arm, Left Updated: 02/13/22 0232     Blood Culture, Routine No Growth to date      No Growth to date          Impression:  Active Hospital Problems    Diagnosis  POA    *Acute respiratory failure with hypoxia and hypercarbia [J96.01, J96.02]  Yes    Pulmonary edema, acute [J81.0]  Yes    Systolic and diastolic CHF, acute on chronic [I50.43]  Yes    COPD with respiratory failure, acute [J44.9, J96.00]  Yes    Community acquired pneumonia [J18.9]  Yes    Thrush [B37.0]  Yes     Cardiomyopathy [I42.9]  Yes     Chronic    Type 2 diabetes mellitus, with long-term current use of insulin [E11.9, Z79.4]  Not Applicable      Resolved Hospital Problems   No resolved problems to display.              .  Plan:   2/12 No fever, vss- tachy to 110, duo neb q 4, azithro, rocephin, lsix 40 iv bid, I/o neg 700 yesterday with 1200 output this am.  \  4lpm, was on bipap,   bnp 240, glucose 330,     abg earlier 7.23 with co2 66,     cxr pulm edema c/w 3/2021 cxr clear.    Echo 3/3/2021 ef 40% with global hypokinesia, diastolic dys.    With hoarseness and acute presentation -- consider aspiration.  Would dose diflucan.     2/13 no fever ,vss, I/o neg 1470 , 3 lpm now,   F/u cxr now clear,     Consider transfer to floor.  Monitor-- copd and chf apparent -- seems better but was not on oxygen pta.

## 2022-02-14 VITALS
SYSTOLIC BLOOD PRESSURE: 106 MMHG | HEART RATE: 65 BPM | HEIGHT: 67 IN | DIASTOLIC BLOOD PRESSURE: 72 MMHG | TEMPERATURE: 97 F | BODY MASS INDEX: 37.82 KG/M2 | RESPIRATION RATE: 18 BRPM | WEIGHT: 240.94 LBS | OXYGEN SATURATION: 94 %

## 2022-02-14 LAB
ALBUMIN SERPL BCP-MCNC: 3.6 G/DL (ref 3.5–5.2)
ALP SERPL-CCNC: 100 U/L (ref 55–135)
ALT SERPL W/O P-5'-P-CCNC: 18 U/L (ref 10–44)
ANION GAP SERPL CALC-SCNC: 9 MMOL/L (ref 8–16)
AST SERPL-CCNC: 18 U/L (ref 10–40)
BASOPHILS # BLD AUTO: 0.02 K/UL (ref 0–0.2)
BASOPHILS NFR BLD: 0.1 % (ref 0–1.9)
BILIRUB SERPL-MCNC: 0.6 MG/DL (ref 0.1–1)
BNP SERPL-MCNC: 122 PG/ML (ref 0–99)
BUN SERPL-MCNC: 68 MG/DL (ref 6–20)
CALCIUM SERPL-MCNC: 7.7 MG/DL (ref 8.7–10.5)
CHLORIDE SERPL-SCNC: 94 MMOL/L (ref 95–110)
CO2 SERPL-SCNC: 29 MMOL/L (ref 23–29)
CREAT SERPL-MCNC: 1.9 MG/DL (ref 0.5–1.4)
DIFFERENTIAL METHOD: ABNORMAL
EOSINOPHIL # BLD AUTO: 0 K/UL (ref 0–0.5)
EOSINOPHIL NFR BLD: 0.1 % (ref 0–8)
ERYTHROCYTE [DISTWIDTH] IN BLOOD BY AUTOMATED COUNT: 12.6 % (ref 11.5–14.5)
EST. GFR  (AFRICAN AMERICAN): 34 ML/MIN/1.73 M^2
EST. GFR  (NON AFRICAN AMERICAN): 29.5 ML/MIN/1.73 M^2
GLUCOSE SERPL-MCNC: 197 MG/DL (ref 70–110)
GLUCOSE SERPL-MCNC: 220 MG/DL (ref 70–110)
GLUCOSE SERPL-MCNC: 235 MG/DL (ref 70–110)
HCT VFR BLD AUTO: 42.1 % (ref 37–48.5)
HGB BLD-MCNC: 13.2 G/DL (ref 12–16)
IMM GRANULOCYTES # BLD AUTO: 0.09 K/UL (ref 0–0.04)
IMM GRANULOCYTES NFR BLD AUTO: 0.6 % (ref 0–0.5)
LYMPHOCYTES # BLD AUTO: 4.1 K/UL (ref 1–4.8)
LYMPHOCYTES NFR BLD: 28 % (ref 18–48)
MAGNESIUM SERPL-MCNC: 2.6 MG/DL (ref 1.6–2.6)
MCH RBC QN AUTO: 30.4 PG (ref 27–31)
MCHC RBC AUTO-ENTMCNC: 31.4 G/DL (ref 32–36)
MCV RBC AUTO: 97 FL (ref 82–98)
MONOCYTES # BLD AUTO: 1.3 K/UL (ref 0.3–1)
MONOCYTES NFR BLD: 8.8 % (ref 4–15)
NEUTROPHILS # BLD AUTO: 9.1 K/UL (ref 1.8–7.7)
NEUTROPHILS NFR BLD: 62.4 % (ref 38–73)
NRBC BLD-RTO: 0 /100 WBC
PHOSPHATE SERPL-MCNC: 4.8 MG/DL (ref 2.7–4.5)
PLATELET # BLD AUTO: 385 K/UL (ref 150–450)
PMV BLD AUTO: 9.6 FL (ref 9.2–12.9)
POTASSIUM SERPL-SCNC: 4.2 MMOL/L (ref 3.5–5.1)
PROT SERPL-MCNC: 6.7 G/DL (ref 6–8.4)
RBC # BLD AUTO: 4.34 M/UL (ref 4–5.4)
SODIUM SERPL-SCNC: 132 MMOL/L (ref 136–145)
WBC # BLD AUTO: 14.63 K/UL (ref 3.9–12.7)

## 2022-02-14 PROCEDURE — 80053 COMPREHEN METABOLIC PANEL: CPT | Performed by: INTERNAL MEDICINE

## 2022-02-14 PROCEDURE — 83735 ASSAY OF MAGNESIUM: CPT | Performed by: INTERNAL MEDICINE

## 2022-02-14 PROCEDURE — 97535 SELF CARE MNGMENT TRAINING: CPT

## 2022-02-14 PROCEDURE — 63600175 PHARM REV CODE 636 W HCPCS: Performed by: INTERNAL MEDICINE

## 2022-02-14 PROCEDURE — 63700000 PHARM REV CODE 250 ALT 637 W/O HCPCS: Performed by: INTERNAL MEDICINE

## 2022-02-14 PROCEDURE — 94761 N-INVAS EAR/PLS OXIMETRY MLT: CPT

## 2022-02-14 PROCEDURE — 99900031 HC PATIENT EDUCATION (STAT)

## 2022-02-14 PROCEDURE — 25000003 PHARM REV CODE 250: Performed by: STUDENT IN AN ORGANIZED HEALTH CARE EDUCATION/TRAINING PROGRAM

## 2022-02-14 PROCEDURE — 63600175 PHARM REV CODE 636 W HCPCS: Performed by: STUDENT IN AN ORGANIZED HEALTH CARE EDUCATION/TRAINING PROGRAM

## 2022-02-14 PROCEDURE — 94640 AIRWAY INHALATION TREATMENT: CPT

## 2022-02-14 PROCEDURE — 25000003 PHARM REV CODE 250: Performed by: INTERNAL MEDICINE

## 2022-02-14 PROCEDURE — 99232 SBSQ HOSP IP/OBS MODERATE 35: CPT | Mod: ,,, | Performed by: INTERNAL MEDICINE

## 2022-02-14 PROCEDURE — S4991 NICOTINE PATCH NONLEGEND: HCPCS | Performed by: STUDENT IN AN ORGANIZED HEALTH CARE EDUCATION/TRAINING PROGRAM

## 2022-02-14 PROCEDURE — 85025 COMPLETE CBC W/AUTO DIFF WBC: CPT | Performed by: INTERNAL MEDICINE

## 2022-02-14 PROCEDURE — 99232 PR SUBSEQUENT HOSPITAL CARE,LEVL II: ICD-10-PCS | Mod: ,,, | Performed by: INTERNAL MEDICINE

## 2022-02-14 PROCEDURE — 84100 ASSAY OF PHOSPHORUS: CPT | Performed by: INTERNAL MEDICINE

## 2022-02-14 PROCEDURE — 83880 ASSAY OF NATRIURETIC PEPTIDE: CPT | Performed by: INTERNAL MEDICINE

## 2022-02-14 PROCEDURE — 25000242 PHARM REV CODE 250 ALT 637 W/ HCPCS: Performed by: INTERNAL MEDICINE

## 2022-02-14 PROCEDURE — 99900035 HC TECH TIME PER 15 MIN (STAT)

## 2022-02-14 PROCEDURE — 36415 COLL VENOUS BLD VENIPUNCTURE: CPT | Performed by: INTERNAL MEDICINE

## 2022-02-14 PROCEDURE — 27000221 HC OXYGEN, UP TO 24 HOURS

## 2022-02-14 RX ORDER — FLUCONAZOLE 150 MG/1
150 TABLET ORAL DAILY
Qty: 4 TABLET | Refills: 0 | Status: SHIPPED | OUTPATIENT
Start: 2022-02-15 | End: 2022-02-18 | Stop reason: ALTCHOICE

## 2022-02-14 RX ORDER — AZITHROMYCIN 250 MG/1
250 TABLET, FILM COATED ORAL DAILY
Qty: 4 TABLET | Refills: 0 | Status: SHIPPED | OUTPATIENT
Start: 2022-02-14 | End: 2022-02-18 | Stop reason: ALTCHOICE

## 2022-02-14 RX ORDER — FUROSEMIDE 20 MG/1
20 TABLET ORAL DAILY
Qty: 90 TABLET | Refills: 0 | Status: SHIPPED | OUTPATIENT
Start: 2022-02-14 | End: 2022-03-24

## 2022-02-14 RX ORDER — LORAZEPAM 0.5 MG/1
0.5 TABLET ORAL EVERY 6 HOURS PRN
Qty: 12 TABLET | Refills: 0 | Status: SHIPPED | OUTPATIENT
Start: 2022-02-14 | End: 2023-03-06

## 2022-02-14 RX ADMIN — HYDROCODONE BITARTRATE AND ACETAMINOPHEN 1 TABLET: 5; 325 TABLET ORAL at 09:02

## 2022-02-14 RX ADMIN — IPRATROPIUM BROMIDE AND ALBUTEROL SULFATE 3 ML: .5; 3 SOLUTION RESPIRATORY (INHALATION) at 04:02

## 2022-02-14 RX ADMIN — NICOTINE 1 PATCH: 21 PATCH, EXTENDED RELEASE TRANSDERMAL at 09:02

## 2022-02-14 RX ADMIN — IPRATROPIUM BROMIDE AND ALBUTEROL SULFATE 3 ML: .5; 3 SOLUTION RESPIRATORY (INHALATION) at 11:02

## 2022-02-14 RX ADMIN — HYDROCODONE BITARTRATE AND ACETAMINOPHEN 1 TABLET: 5; 325 TABLET ORAL at 02:02

## 2022-02-14 RX ADMIN — CEFTRIAXONE SODIUM 1 G: 1 INJECTION, POWDER, FOR SOLUTION INTRAMUSCULAR; INTRAVENOUS at 02:02

## 2022-02-14 RX ADMIN — PREGABALIN 75 MG: 75 CAPSULE ORAL at 09:02

## 2022-02-14 RX ADMIN — LORAZEPAM 0.5 MG: 0.5 TABLET ORAL at 02:02

## 2022-02-14 RX ADMIN — IPRATROPIUM BROMIDE AND ALBUTEROL SULFATE 3 ML: .5; 3 SOLUTION RESPIRATORY (INHALATION) at 09:02

## 2022-02-14 RX ADMIN — LORAZEPAM 0.5 MG: 0.5 TABLET ORAL at 09:02

## 2022-02-14 RX ADMIN — ASPIRIN 81 MG 81 MG: 81 TABLET ORAL at 09:02

## 2022-02-14 RX ADMIN — LOSARTAN POTASSIUM 50 MG: 50 TABLET, FILM COATED ORAL at 09:02

## 2022-02-14 RX ADMIN — FLUCONAZOLE 150 MG: 100 TABLET ORAL at 09:02

## 2022-02-14 RX ADMIN — AZITHROMYCIN 250 MG: 500 INJECTION, POWDER, LYOPHILIZED, FOR SOLUTION INTRAVENOUS at 04:02

## 2022-02-14 RX ADMIN — NYSTATIN 500000 UNITS: 100000 SUSPENSION ORAL at 09:02

## 2022-02-14 RX ADMIN — FLUTICASONE FUROATE AND VILANTEROL TRIFENATATE 1 PUFF: 200; 25 POWDER RESPIRATORY (INHALATION) at 09:02

## 2022-02-14 RX ADMIN — NYSTATIN 500000 UNITS: 100000 SUSPENSION ORAL at 01:02

## 2022-02-14 RX ADMIN — METOPROLOL SUCCINATE 50 MG: 50 TABLET, EXTENDED RELEASE ORAL at 09:02

## 2022-02-14 NOTE — PT/OT/SLP PROGRESS
Occupational Therapy   Treatment    Name: Delores Fox  MRN: [id number]  Admitting Diagnosis:  Acute respiratory failure with hypoxia and hypercarbia       Recommendations:     Discharge Recommendations: home  Discharge Equipment Recommendations:  grab bar  Barriers to discharge:  None    Assessment:     Delores Fox is a 56 y.o. female with a medical diagnosis of Acute respiratory failure with hypoxia and hypercarbia.  She presents with nausea and not feeling good. Patient participated in bed mobility, unsupported sitting and bedside commode transfer this session. Performance deficits affecting function are weakness,impaired endurance,impaired self care skills,impaired functional mobilty,gait instability,impaired balance,decreased safety awareness,impaired cardiopulmonary response to activity.     Rehab Prognosis:  Fair; patient would benefit from acute skilled OT services to address these deficits and reach maximum level of function.       Plan:     Patient to be seen 3 x/week to address the above listed problems via self-care/home management,therapeutic activities,therapeutic exercises  · Plan of Care Expires: 03/13/22  · Plan of Care Reviewed with: patient    Subjective     Pain/Comfort:  · Pain Rating 1: 0/10  · Pain Rating Post-Intervention 1: 0/10    Objective:     Communicated with: nurse prior to session.  Patient found HOB elevated with telemetry,peripheral IV,oxygen upon OT entry to room.    General Precautions: Standard, fall   Orthopedic Precautions:N/A   Braces: N/A  Respiratory Status: Nasal cannula, flow 2 L/min     Occupational Performance:     Bed Mobility:    · Patient completed Scooting/Bridging with stand by assistance  · Patient completed Supine to Sit with stand by assistance  · Patient completed Sit to Supine with stand by assistance     Functional Mobility/Transfers:  · Patient completed Toilet Transfer Step Transfer technique with stand by assistance with  no AD    Activities of Daily  Living:  · Lower Body Dressing: stand by assistance to don/doff slippers sitting EOB.      [facility] 6 Click ADL:      Treatment & Education:  Patient reported feeling nauseated and not feeling very well.     Patient left HOB elevated with all lines intact and call button in reachEducation:      GOALS:   Multidisciplinary Problems     Occupational Therapy Goals        Problem: Occupational Therapy Goal    Goal Priority Disciplines Outcome Interventions   Occupational Therapy Goal     OT, PT/OT Ongoing, Progressing    Description: Goals to be met by: discharge    Patient will increase functional independence with ADLs by performing:    Grooming while standing at sink with Modified [place].  Toileting from toilet with Modified [place] for hygiene and clothing management.   Toilet transfer to toilet with Modified [place].  Upper extremity exercise program x10 reps per handout, with independence.  Demonstrate Indep with use of ECT as neded with ADLS, fx mobility                     Time Tracking:     OT Date of Treatment: 02/14/22  OT Start Time: 1015  OT Stop Time: 1026  OT Total Time (min): 11 min    Billable Minutes:Self Care/Home Management 11    OT/AJIT: OT          2/14/2022

## 2022-02-14 NOTE — PLAN OF CARE
02/13/22 2325   Patient Assessment/Suction   Level of Consciousness (AVPU) alert   Respiratory Effort Unlabored   Expansion/Accessory Muscles/Retractions no use of accessory muscles   All Lung Fields Breath Sounds Anterior:;clear   Rhythm/Pattern, Respiratory unlabored   Cough Frequency infrequent   PRE-TX-O2   O2 Device (Oxygen Therapy) nasal cannula   $ Is the patient on Low Flow Oxygen? Yes   Flow (L/min) 1.5   SpO2 96 %   Pulse Oximetry Type Continuous   $ Pulse Oximetry - Multiple Charge Pulse Oximetry - Multiple   Pulse 66   Resp 18   Aerosol Therapy   $ Aerosol Therapy Charges Aerosol Treatment   Daily Review of Necessity (SVN) completed   Respiratory Treatment Status (SVN) given   Treatment Route (SVN) oxygen;mask   Patient Position (SVN) HOB elevated   Post Treatment Assessment (SVN) increased aeration   Signs of Intolerance (SVN) none   Breath Sounds Post-Respiratory Treatment   Post-treatment Heart Rate (beats/min) 86   Post-treatment Resp Rate (breaths/min) 18   Education   $ Education Bronchodilator;15 min   Respiratory Evaluation   $ Care Plan Tech Time 15 min

## 2022-02-14 NOTE — DISCHARGE SUMMARY
[facility]  Discharge Summary  Patient Name: Delores Fox MRN: [id number]   Patient Class: IP- Inpatient  Length of Stay: 2   Admission Date: 2/12/2022  1:18 AM Attending Physician: Jose Bates MD   Primary Care Provider: Carey Liu MD Face-to-Face encounter date: 02/14/2022   Chief Complaint: Shortness of Breath (EMS reports pt lethargic, cyanotic, sats in 60's.  Arrival on CPAP)    Date of Discharge: 2/14/2022  Discharge Disposition: Home or Self Care  Condition: Stable       Reason for Hospitalization     Acute hypoxemic respiratory failure  CHF Exacerbation  Thrush    Patient Active Problem List   Diagnosis    Essential hypertension    Antritis/duodenitis with reported ulcer    HLD (hyperlipidemia)    Type 2 diabetes mellitus, with long-term current use of insulin    Cardiomyopathy    Tobacco abuse    COPD (chronic obstructive pulmonary disease)    LV dysfunction    CAD (coronary artery disease)    Nausea & vomiting    Epidermal inclusion cyst of eyelid, right    Pulmonary edema, acute    Acute respiratory failure with hypoxia and hypercarbia    Systolic and diastolic CHF, acute on chronic    COPD with respiratory failure, acute    Community acquired pneumonia    Thrush       Brief History of Present Illness   Delores Fox is a 56 y.o. White female with known history of chronic congestive heart failure who presented with a primary complaint of shortness of breath. She was in her usual state of health when her breathing got worst and family had to call EMS. EMS found her very lethargic and cyanotic. She required BIPAP. En route she was given steroids and bronchodilators. In the ER, CBC showed leukocytosis, CMP showed STEPHEN likely due to cardiorenal syndrome,  and her CXR consistent with fluid load. Hospital medicine consulted for admission. During my encounter, patient lethargic and on BIPAP. No family members present to confirm the history.     For the full HPI  "please refer to the History & Physical from this admission.    Hospital Course By Problem with Pertinent Findings     # Acute Hypoxemic Respiratory Failure  # Acute CHF Exacerbation  # Mild COPD exacerbation  # Panic Attacks  - the patient presented with sudden onset shortness of breath. HPI as above. She was placed on bipap with rapid improvement in her symptoms in the ICU. She was on Nasal Canula on my initial evaluation. She required IV diuresis with additional improvement. She was transferred to the floor. Her steroids were held as she did not have any wheezing and concern was for fluid retention. She has CHFrEF with EF of 35%. Cardiology evaluated her and recommended diuresis. She had a mild troponin elevation due to demand ischemia. She was continued on GDMT for her CHF. She had mild increase in her Cr with rising BUN as she was diuresed. Diuresis was held. She required a small amount of oxygen and was felt safe for discharge with oral diuresis and ongoing management of her CHF as an outpatient. She will continue azithromycin empirically for CHF exacerbation along with albuterol inhaler and steroid inhaler as well as an outpatient. She will need to follow up with Cardiology and pulmonary. There was some question of whether anxiety is playing a role in her shortness of breath. She was given a small amount of ativan for anxiolysis at discharge with plans for close follow up with her pcp. She was feeling well at discharge and safe and stable for outpatient follow up for ongoing management.     # Thrush  - she will complete a course of fluconazole for thrush.       Patient was seen and examined on the date of discharge and determined to be suitable for discharge.    Physical Exam  /83 (BP Location: Left arm, Patient Position: Lying)   Pulse 88   Temp 97.8 °F (36.6 °C) (Oral)   Resp 20   Ht 5' 7" (1.702 m)   Wt 109.3 kg (240 lb 15.4 oz)   SpO2 100%   BMI 37.74 kg/m²   Vitals " reviewed.    Constitutional: No distress.   HENT: Atraumatic.   Cardiovascular: Normal rate, regular rhythm and normal heart sounds.   Pulmonary/Chest: Effort normal. Clear to auscultation bilaterally. No wheezes.   Abdominal: Soft. Bowel sounds are normal. Exhibits no distension and no mass. No tenderness  Neurological: Alert.   Skin: Skin is warm and dry.     Following labs were Reviewed   Recent Labs   Lab 02/14/22  0544   WBC 14.63*   HGB 13.2   HCT 42.1      CALCIUM 7.7*   ALBUMIN 3.6   PROT 6.7   *   K 4.2   CO2 29   CL 94*   BUN 68*   CREATININE 1.9*   ALKPHOS 100   ALT 18   AST 18   BILITOT 0.6     No results found for: POCTGLUCOSE     BMP:   Recent Labs   Lab 02/13/22  0607 02/14/22  0544   * 220*   * 132*   K 4.3 4.2   CL 91* 94*   CO2 26 29   BUN 59* 68*   CREATININE 1.8* 1.9*   CALCIUM 7.6* 7.7*   MG 2.3 2.6   , CBC   Recent Labs   Lab 02/13/22 0607 02/13/22  0607 02/14/22  0544   WBC 15.48*  --  14.63*   HGB 13.0  --  13.2   HCT 40.9   < > 42.1     --  385    < > = values in this interval not displayed.    and All labs within the past 24 hours have been reviewed    Microbiology Results (last 7 days)     Procedure Component Value Units Date/Time    Blood culture #1 [[id number]] Collected: 02/12/22 0136    Order Status: Completed Specimen: Blood from Peripheral, Forearm, Right Updated: 02/14/22 0232     Blood Culture, Routine No Growth to date      No Growth to date      No Growth to date    Blood culture #2 [[id number]] Collected: 02/12/22 0137    Order Status: Completed Specimen: Blood from Peripheral, Upper Arm, Left Updated: 02/14/22 0232     Blood Culture, Routine No Growth to date      No Growth to date      No Growth to date        X-Ray Chest AP Portable   Final Result      X-Ray Chest AP Portable   Final Result      X-Ray Chest AP Portable   Final Result          No results found for this or any previous visit.      Consultants and Procedures    Consultants:  Cardiology  Pulmonary    Procedures:   None    Discharge Information:   Diet:  Resume regular diet/Cardiac diet/Diabetic Diet    Physical Activity:  Activity as tolerated    Instructions:  1. Take all medications as prescribed  2. Keep all follow-up appointments  3. Return to the hospital or call your primary care physicians if any worsening symptoms  occur.    Follow-Up Appointments:  1. Please call your primary care physician to schedule an appointment in 1 week time.  2. Follow up with Cardiology in 1-2 weeks.   3. Follow up with Pulmonology in 2 weeks.     Pending laboratory work/Tests to be performed/followed by the Primary care Physician:    The patient was discharged in the care of her parents//wife/family/caregiver, with discharge instructions were reviewed in written and verbal form. All pertinent questions were discussed and prescriptions were provided. The importance of making follow up appointments and compliance of medications has been stressed repeatedly. The patient will follow up in 1 week or sooner as needed with the PCP, and the patient is on board with the plan. Upon discharge, patient needs to be on following medications.    Discharge Medications:     Medication List      START taking these medications    azithromycin 250 MG tablet  Commonly known as: Z-PATRICK  Take 1 tablet (250 mg total) by mouth once daily. for 4 days     fluconazole 150 MG Tab  Commonly known as: DIFLUCAN  Take 1 tablet (150 mg total) by mouth once daily. for 4 days  Start taking on: February 15, 2022     furosemide 20 MG tablet  Commonly known as: LASIX  Take 1 tablet (20 mg total) by mouth once daily.     ipratropium-albuteroL  mcg/actuation inhaler  Commonly known as: CombiVENT  Inhale 1 puff into the lungs every 4 (four) hours as needed for Wheezing or Shortness of Breath. Rescue     LORazepam 0.5 MG tablet  Commonly known as: ATIVAN  Take 1 tablet (0.5 mg total) by mouth every 6 (six) hours as  needed for Anxiety.        CONTINUE taking these medications    aspirin 81 MG Chew  Take 1 tablet (81 mg total) by mouth once daily.     atorvastatin 40 MG tablet  Commonly known as: LIPITOR  Take 1 tablet (40 mg total) by mouth once daily.     fluticasone furoate-vilanteroL 100-25 mcg/dose diskus inhaler  Commonly known as: BREO     losartan 25 MG tablet  Commonly known as: COZAAR  Take 1 tablet (25 mg total) by mouth 2 (two) times a day.     melatonin 3 mg tablet  Commonly known as: MELATIN     metoprolol succinate 50 MG 24 hr tablet  Commonly known as: TOPROL-XL  Take 1 tablet (50 mg total) by mouth once daily.     ondansetron 4 MG tablet  Commonly known as: ZOFRAN  Take 1 tablet (4 mg total) by mouth every 8 (eight) hours as needed for Nausea.     pantoprazole 40 MG tablet  Commonly known as: PROTONIX  Take 1 tablet (40 mg total) by mouth 2 (two) times daily.     pregabalin 75 MG capsule  Commonly known as: LYRICA     TRADJENTA 5 mg Tab tablet  Generic drug: linaGLIPtin     zolpidem 10 mg Tab  Commonly known as: AMBIEN           Where to Get Your Medications      These medications were sent to [id number] - [place], [place] - [address] [facility]  [address] [facility] [place] [address]    Phone: [phone]   · azithromycin 250 MG tablet  · fluconazole 150 MG Tab  · furosemide 20 MG tablet  · ipratropium-albuteroL  mcg/actuation inhaler  · LORazepam 0.5 MG tablet           I spent 62 minutes preparing the discharge including reviewing records from previous encounters, preparation of discharge summary, assessing and final examination of the patient, discharge medicine reconciliation, discussing plan of care, follow up and education and prescriptions.       Jose Bates  [facility] Hospitalist  02/14/2022

## 2022-02-14 NOTE — PLAN OF CARE
02/13/22 2013   Patient Assessment/Suction   Level of Consciousness (AVPU) alert   Respiratory Effort Normal;Unlabored   Expansion/Accessory Muscles/Retractions no use of accessory muscles   All Lung Fields Breath Sounds Anterior:;clear   Rhythm/Pattern, Respiratory unlabored   Cough Frequency infrequent   PRE-TX-O2   O2 Device (Oxygen Therapy) nasal cannula   $ Is the patient on Low Flow Oxygen? Yes   Flow (L/min) 1.5   SpO2 95 %   Pulse Oximetry Type Continuous   $ Pulse Oximetry - Multiple Charge Pulse Oximetry - Multiple   Pulse 63   Resp 18   Aerosol Therapy   $ Aerosol Therapy Charges Aerosol Treatment   Daily Review of Necessity (SVN) completed   Respiratory Treatment Status (SVN) given   Treatment Route (SVN) mask;oxygen   Patient Position (SVN) HOB elevated   Post Treatment Assessment (SVN) increased aeration   Signs of Intolerance (SVN) none   Breath Sounds Post-Respiratory Treatment   Post-treatment Heart Rate (beats/min) 86   Post-treatment Resp Rate (breaths/min) 18   Education   $ Education Bronchodilator;15 min   Respiratory Evaluation   $ Care Plan Tech Time 15 min

## 2022-02-14 NOTE — PLAN OF CARE
02/14/22 1438   Final Note   Assessment Type Final Discharge Note   Anticipated Discharge Disposition Home-Health   Post-Acute Status   Post-Acute Authorization Home Health;E   HME Status Set-up Complete/Auth obtained  (Home oxygen)   Home Health Status Set-up Complete/Auth obtained  ([facility])   Discharge Delays None known at this time   CM spoke to Kiera at [facility].  Pt is accepted and will be seen tomorrow on 02/15/2022.  Request for home O2 sent to Caio at [facility] via Secure-chat .  Approved to pull e-tank set-up.  E-tank set-up approved and pulled from [facility] closet.  E-tank set up to bedside.  Delivery ticket and rental agreement reviewed and signed by pt.  Education paperwork left with pt at bedside.  Respiratory notified to come to bedside for pt education prior to pt discharge.  Discharge orders reviewed.  No further case management needs noted.

## 2022-02-14 NOTE — DISCHARGE INSTRUCTIONS
Patient Education       Heart Failure Discharge Instructions, Adult   About this topic   Heart failure is a condition where your heart does not pump well. Your heart has trouble pumping the right amount of blood throughout your body. Because of this, fluid can back up in your body and your organs may not get as much blood as they need.  Heart failure is a long-term problem and will get worse over time. Your doctor will work hard to treat your heart failure and to keep you as healthy as possible.     What care is needed at home?   · Ask your doctor what you need to do when you go home. Make sure you ask questions if you do not understand what the doctor says. This way you will know what you need to do.  · Get lots of rest. Sleep when you feel tired. Avoid doing tiring activities which can make you more short of breath.  · Ask your doctor how much exercise you should do every day, such as taking a walk.  · Limit how much beer, wine, and mixed drinks (alcohol) you drink.  · Your doctor may ask you to limit the amount of salt in your diet. You may also be asked to limit how much fluid you drink.  · Try to lose weight if you are overweight. Your doctor or nurse can help.  · If you smoke, try to quit. Your doctor or nurse can help.  · Let your doctor know if you get more tired while you do an activity or you are not able to do as much activity as you did before.  · Be careful that you take your drugs each day as ordered by your doctor.  ? If you cannot afford your drugs, talk to your doctor.  ? Do not stop your drugs if you have side effects. Talk to your doctor about them.  ? Take your drugs even if you feel well.  · Check your weight each morning and write down your weight in a notebook. This will tell you if you are building up too much fluid. Weigh in the morning after you have passed urine. Weigh yourself with clothes on or off, but do it the same way each day. Make sure your scale is on a hard surface, not on  carpet. Your doctor will tell you when you should call based on how much weight you gain in a day or over a week. Take your notebook to your doctor on your next visit.  What follow-up care is needed?   · Your condition needs to be watched closely. Your doctor may ask you to make visits to the office to check on your progress. Be sure to keep these visits. You may need to have other tests. Your doctor's nurse may also call on a regular basis to ask you about your signs and whether your weight has increased.  · Your doctor may order a heart rehab program for you after you leave the hospital. This is an important part of your care. Share your discharge information with the rehab staff so they can plan a program to help you recover. Let your doctor know if you need help finding a program.  · You may see a dietitian to help you understand your diet. Some doctors and hospitals have classes to help you learn more about your heart failure and how to manage your symptoms.  What drugs may be needed?   Often patients will need to take more than one drug. Together, they will help the heart work as well as it can. Do not take any other prescription drugs, over-the-counter (OTC) drugs, herbals, or diet aids without asking your doctor.  The doctor may order drugs to:  · Help relax blood vessels. This makes it easier for the heart to work and may also lower your blood pressure. These are ACE inhibitors, ARBs, and calcium channel blockers.  · Slow down the heart rate so that it doesn't have to work as hard. These are beta-blockers.  · Help the heart beat stronger and better. This is digoxin.  · Get rid of extra salt and water in the body. These are water pills or diuretics.  Will physical activity be limited?   · Talk to your doctor about when you may go back to your normal activities like work, driving, or sex.  · Unless your doctor tells you to limit your activities, try to be active. Walk, garden, or do something active for 30  minutes or more on most days of the week. Stop activity if you have symptoms like chest pain, shortness of breath, or feel dizzy.  What changes to diet are needed?   · Ask your doctor or dietician what diet is best for you. The doctor may tell you to limit your salt and fat or how much fluid you drink.  · The DASH diet may be helpful. The DASH diet helps to lower blood pressure. This diet includes lots of fruits, vegetables, low-fat dairy foods, and foods that are low in saturated fat, total fat, and cholesterol. Using the DASH diet with a low salt diet may lower blood pressure even more.  · You will learn to read labels to see how much salt or sodium is in foods. Lowering your salt intake will help control some of your symptoms.  When do I need to call the doctor?   Activate the emergency medical system right away if you have signs of a heart attack. Call 911 in the [place] or [place]. The sooner treatment begins, the better your chances for recovery. Call for emergency help right away if you have:  · Signs of heart attack, which may include:  ? Severe chest pain, pressure, or discomfort with:  § Breathing trouble; sweating; upset stomach; or cold, clammy skin.  § Pain in your arms, back, or jaw.  § Worse pain with activity like walking up stairs.  ? Fast or irregular heartbeat.  ? Feeling dizzy, faint, or weak.     Call your doctor if:  · You have so much trouble breathing that you can only say one or two words at a time.  · You need to sit upright at all times to be able to breathe and/or cannot lie down.  · You are very tired from working to catch your breath or you are sweating from trying to breathe.  · You have trouble breathing when talking, sitting still, or with activity.  · You have wheezing or chest tightness when you are resting.  · You wake up at night because you cant breathe well.  · You become very confused or you faint.  · You have a very fast or irregular heartbeat.  · You cough more than  normal or cough up frothy or pink saliva.  · You feel very weak, dizzy, or more tired than normal.  · You need more pillows than normal to sleep.  · You gain 2 to 3 pounds (0.9 to 1.4 kg) overnight or more than 5 pounds (2.3 kg) in 1 week.  · You have more swelling than usual, especially in your feet and ankles or in your belly.  · You feel like your heart is beating very fast.  Teach Back: Helping You Understand   The Teach Back Method helps you understand the information we are giving you. After you talk with the staff, tell them in your own words what you learned. This helps to make sure the staff has described each thing clearly. It also helps to explain things that may have been confusing. Before going home, make sure you can do these:  · I can tell you about my condition.  · I can tell you why I need to weigh myself each day.  · I can tell you what I will do if I have signs of a heart attack or stroke.  Where can I learn more?   American Heart Association  http://www.heart.org/HEARTORG/Conditions/HeartFailure/AboutHeartFailure/About-Heart-Failure_UCM_002044_Article.jsp   Better Health Channel  https://www.betterhealth.radha.gov.au/health/conditionsandtreatments/congestive-heart-failure-chf   NHS Choices  http://www.nhs.uk/conditions/heart-failure/pages/introduction.aspx   Last Reviewed Date   2021-06-03  Consumer Information Use and Disclaimer   This information is not specific medical advice and does not replace information you receive from your health care provider. This is only a brief summary of general information. It does NOT include all information about conditions, illnesses, injuries, tests, procedures, treatments, therapies, discharge instructions or life-style choices that may apply to you. You must talk with your health care provider for complete information about your health and treatment options. This information should not be used to decide whether or not to accept your health care providers  advice, instructions or recommendations. Only your health care provider has the knowledge and training to provide advice that is right for you.  Copyright   Copyright © 2021 [employer] and its affiliates and/or licensors. All rights reserved.

## 2022-02-14 NOTE — PLAN OF CARE
02/14/22 0907   Patient Assessment/Suction   Level of Consciousness (AVPU) alert   Respiratory Effort Normal;Unlabored   Expansion/Accessory Muscles/Retractions no retractions;no use of accessory muscles   All Lung Fields Breath Sounds Anterior:;diminished;clear   Rhythm/Pattern, Respiratory unlabored   Cough Frequency infrequent   Cough Type good   PRE-TX-O2   O2 Device (Oxygen Therapy) nasal cannula   Flow (L/min) 5   SpO2 100 %   Pulse Oximetry Type Intermittent   $ Pulse Oximetry - Multiple Charge Pulse Oximetry - Multiple   Pulse 85   Resp 19   Aerosol Therapy   $ Aerosol Therapy Charges Aerosol Treatment   Daily Review of Necessity (SVN) completed   Respiratory Treatment Status (SVN) given   Treatment Route (SVN) mask;oxygen   Post Treatment Assessment (SVN) increased aeration   Signs of Intolerance (SVN) none   Inhaler   $ Inhaler Charges MDI (Metered Dose Inahler) Treatment   Daily Review of Necessity (Inhaler) completed   Respiratory Treatment Status (Inhaler) given;mouth rinsed post treatment   Treatment Route (Inhaler) mouthpiece   Post Treatment Assessment (Inhaler) breath sounds improved   Signs of Intolerance (Inhaler) none   Education   $ Education Bronchodilator;15 min   Respiratory Evaluation   $ Care Plan Tech Time 15 min

## 2022-02-14 NOTE — PROGRESS NOTES
02/14/2022      Admit Date: 2/12/2022  Delores Fox  New Patient Consult    Chief Complaint   Patient presents with    Shortness of Breath     EMS reports pt lethargic, cyanotic, sats in 60's.  Arrival on CPAP       History of Present Illness:  Pt from [facility], sees pcp there, on dulera and spiriva and smokes.  Was admitted last yr.  No home ox, has had hoarseness.  Had abrupt sob and poor recall of events, needed bipap and now off,  Denies h/o asthma, worked as sitter.  Was visiting niece in [facility]. Lax with diabetes rx.      From Dr Cobb hpi 2/12/2022:Delores Fox is a 56 y.o. White female with known history of chronic congestive heart failure who presented with a primary complaint of shortness of breath. She was in her usual state of health when her breathing got worst and family had to call EMS. EMS found her very lethargic and cyanotic. She required BIPAP. En route she was given steroids and bronchodilators. In the ER, CBC showed leukocytosis, CMP showed STEPHEN likely due to cardiorenal syndrome,  and her CXR consistent with fluid load. Hospital medicine consulted for admission. During my encounter, patient lethargic and on BIPAP. No family members present to confirm the history.     Progress Note  PULMONARY    Admit Date: 2/12/2022 02/14/2022      SUBJECTIVE:     2/13 no c/o, feels better and wishes to go home.    2/14/2022 - Sleeping and difficult to arouse when I saw her, no distress.  Has what appears to be SUZIE while sleeping.  BUn, Cr have increased some, CXR shows improved pulmonary edema    Review of Systems   Unable to perform ROS: Other (sleeping and difficult to arouse)           PFSH and Allergies reviewed.    OBJECTIVE:     Vitals (Most recent):  Vitals:    02/14/22 0543   BP: 111/79   Pulse:    Resp:    Temp:        Vitals (24 hour range):  Temp:  [97.6 °F (36.4 °C)-98.5 °F (36.9 °C)]   Pulse:  [63-84]   Resp:  [15-24]   BP: ()/(51-79)   SpO2:  [94 %-100 %]        Intake/Output Summary (Last 24 hours) at 2/14/2022 0800  Last data filed at 2/14/2022 0600  Gross per 24 hour   Intake 350 ml   Output --   Net 350 ml      Physical Exam  Vitals and nursing note reviewed.   Constitutional:       General: She is not in acute distress.     Appearance: Normal appearance. She is obese. She is not ill-appearing, toxic-appearing or diaphoretic.      Comments: Sleeping and difficult to arouse   HENT:      Head: Normocephalic and atraumatic.      Right Ear: External ear normal.      Left Ear: External ear normal.      Nose: Nose normal. No congestion or rhinorrhea.      Mouth/Throat:      Mouth: Mucous membranes are moist.      Pharynx: Oropharynx is clear. No oropharyngeal exudate or posterior oropharyngeal erythema.   Eyes:      General: No scleral icterus.        Right eye: No discharge.         Left eye: No discharge.      Extraocular Movements: Extraocular movements intact.      Conjunctiva/sclera: Conjunctivae normal.      Pupils: Pupils are equal, round, and reactive to light.   Neck:      Vascular: No carotid bruit.   Cardiovascular:      Rate and Rhythm: Normal rate and regular rhythm.      Pulses: Normal pulses.      Heart sounds: No murmur heard.  No friction rub. No gallop.    Pulmonary:      Effort: Pulmonary effort is normal. No respiratory distress.      Breath sounds: No stridor. Wheezing and rales present. No rhonchi.   Chest:      Chest wall: No tenderness.   Abdominal:      General: Bowel sounds are normal. There is no distension.      Tenderness: There is no abdominal tenderness. There is no guarding.      Comments: obese   Musculoskeletal:         General: No swelling. Normal range of motion.      Cervical back: Normal range of motion and neck supple. No rigidity or tenderness.      Right lower leg: No edema.      Left lower leg: No edema.   Lymphadenopathy:      Cervical: No cervical adenopathy.   Skin:     General: Skin is warm and dry.      Capillary Refill:  Capillary refill takes less than 2 seconds.      Coloration: Skin is not jaundiced.      Findings: No bruising.   Neurological:      General: No focal deficit present.      Mental Status: She is alert and oriented to person, place, and time. Mental status is at baseline.      Cranial Nerves: No cranial nerve deficit.      Sensory: No sensory deficit.      Motor: No weakness.   Psychiatric:         Mood and Affect: Mood normal.         Behavior: Behavior normal.         Thought Content: Thought content normal.         Judgment: Judgment normal.               Labs     Recent Labs   Lab 02/14/22  0544   WBC 14.63*   HGB 13.2   HCT 42.1        Recent Labs   Lab 02/13/22  1644 02/14/22  0544   NA  --  132*   K  --  4.2   CL  --  94*   CO2  --  29   BUN  --  68*   CREATININE  --  1.9*   GLU  --  220*   CALCIUM  --  7.7*   MG  --  2.6   PHOS  --  4.8*   AST  --  18   ALT  --  18   ALKPHOS  --  100   BILITOT  --  0.6   PROT  --  6.7   ALBUMIN  --  3.6   TROPONINI 0.179*  --    BNP  --  122*   No results for input(s): PH, PCO2, PO2, HCO3 in the last 24 hours.  Microbiology Results (last 7 days)     Procedure Component Value Units Date/Time    Blood culture #1 [[id number]] Collected: 02/12/22 0136    Order Status: Completed Specimen: Blood from Peripheral, Forearm, Right Updated: 02/14/22 0232     Blood Culture, Routine No Growth to date      No Growth to date      No Growth to date    Blood culture #2 [[id number]] Collected: 02/12/22 0137    Order Status: Completed Specimen: Blood from Peripheral, Upper Arm, Left Updated: 02/14/22 0232     Blood Culture, Routine No Growth to date      No Growth to date      No Growth to date          Impression:  Active Hospital Problems    Diagnosis  POA    *Acute respiratory failure with hypoxia and hypercarbia [J96.01, J96.02]  Yes    Pulmonary edema, acute [J81.0]  Yes    Systolic and diastolic CHF, acute on chronic [I50.43]  Yes    COPD with respiratory failure, acute [J44.9,  J96.00]  Yes    Community acquired pneumonia [J18.9]  Yes    Thrush [B37.0]  Yes    Cardiomyopathy [I42.9]  Yes     Chronic    Type 2 diabetes mellitus, with long-term current use of insulin [E11.9, Z79.4]  Not Applicable      Resolved Hospital Problems   No resolved problems to display.              .  Plan:     · Wean off O2 as able  · Continue current respiratory meds  · Will need O2 needs checked prior to DC  · After stabilization she should have evaluation for SUZIE  · Change to po antibiotics  · NO SMOKING  · CHF treatment per cardiology  · Increase activity today  · ? Home soon  · She will need outpt pulmonary and cardiology followup  · High risk for recurrent admissions      Cholo Meredith MD  [facility] Pulmonary/Critical Care  02/14/2022

## 2022-02-14 NOTE — NURSING
Patient discharged in stable condition; patient educated on home 02; patient educated on discharge instructions and medications.

## 2022-02-15 ENCOUNTER — TELEPHONE (OUTPATIENT)
Dept: CARDIOLOGY | Facility: CLINIC | Age: 57
End: 2022-02-15
Payer: MEDICAID

## 2022-02-15 ENCOUNTER — PATIENT MESSAGE (OUTPATIENT)
Dept: PULMONOLOGY | Facility: CLINIC | Age: 57
End: 2022-02-15
Payer: MEDICAID

## 2022-02-15 NOTE — PT/OT/SLP DISCHARGE
Occupational Therapy Discharge Summary    Delores Fox  MRN: [id number]   Principal Problem: Acute respiratory failure with hypoxia and hypercarbia      Patient Discharged from acute Occupational Therapy on 2/14/2022.  Please refer to prior OT note dated 2/14/2022 for functional status.    Assessment:      Patient has not met goals.    Objective:     GOALS:   Multidisciplinary Problems     Occupational Therapy Goals     Not on file          Multidisciplinary Problems (Resolved)        Problem: Occupational Therapy Goal    Goal Priority Disciplines Outcome Interventions   Occupational Therapy Goal   (Resolved)     OT, PT/OT Met    Description: Goals to be met by: discharge    Patient will increase functional independence with ADLs by performing:    Grooming while standing at sink with Modified [place].  Toileting from toilet with Modified [place] for hygiene and clothing management.   Toilet transfer to toilet with Modified [place].  Upper extremity exercise program x10 reps per handout, with independence.  Demonstrate Indep with use of ECT as neded with ADLS, fx mobility                     Reasons for Discontinuation of Therapy Services  Patient d/c home.      Plan:     Patient Discharged to: Home no OT services needed    2/15/2022

## 2022-02-15 NOTE — TELEPHONE ENCOUNTER
----- Message from Hanh Szymanski sent at 2/15/2022 10:28 AM CST -----  Contact: Patient    Type:  Sooner Apoointment Request    Caller is requesting a sooner appointment.  Caller declined first available appointment listed below.  Caller will not accept being placed on the waitlist and is requesting a message be sent to doctor.    Name of Caller: Patient     When is the first available appointment?     Symptoms: Hospital follow up     Would the patient rather a call back or a response via [employer]?  Call    Best Call Back Number: [phone] (home)     Additional Information:

## 2022-02-16 RX ORDER — PANTOPRAZOLE SODIUM 40 MG/1
40 TABLET, DELAYED RELEASE ORAL 2 TIMES DAILY
Qty: 90 TABLET | Refills: 0 | Status: SHIPPED | OUTPATIENT
Start: 2022-02-16 | End: 2022-03-24 | Stop reason: SDUPTHER

## 2022-02-16 RX ORDER — METOPROLOL SUCCINATE 50 MG/1
50 TABLET, EXTENDED RELEASE ORAL DAILY
Qty: 90 TABLET | Refills: 0 | Status: SHIPPED | OUTPATIENT
Start: 2022-02-16 | End: 2022-05-17

## 2022-02-16 RX ORDER — LOSARTAN POTASSIUM 25 MG/1
25 TABLET ORAL 2 TIMES DAILY
Qty: 180 TABLET | Refills: 0 | Status: SHIPPED | OUTPATIENT
Start: 2022-02-16 | End: 2022-05-17

## 2022-02-16 RX ORDER — ATORVASTATIN CALCIUM 40 MG/1
40 TABLET, FILM COATED ORAL DAILY
Qty: 90 TABLET | Refills: 0 | Status: SHIPPED | OUTPATIENT
Start: 2022-02-16 | End: 2022-07-30 | Stop reason: SDUPTHER

## 2022-02-16 NOTE — TELEPHONE ENCOUNTER
Patient missed her virtual appointment for hosp f/u. Rescheduled to see you Friday at 11 AM.   Needs refills now for atorvastatin, losartan, metoprolol and pantoprazole.. Please send to [facility] on    patient also requested a refill of Page. States she has had 3 hip surgeries and has chronic back pain. Advised that you would not be able to write medications for pain medications. Patient verbalized understanding.

## 2022-02-17 LAB
BACTERIA BLD CULT: NORMAL
BACTERIA BLD CULT: NORMAL

## 2022-02-18 ENCOUNTER — HOSPITAL ENCOUNTER (OUTPATIENT)
Dept: RADIOLOGY | Facility: HOSPITAL | Age: 57
Discharge: HOME | End: 2022-02-18
Attending: PHYSICIAN ASSISTANT
Payer: MEDICAID

## 2022-02-18 ENCOUNTER — OFFICE VISIT (OUTPATIENT)
Dept: FAMILY MEDICINE | Facility: CLINIC | Age: 57
End: 2022-02-18
Payer: MEDICAID

## 2022-02-18 ENCOUNTER — PATIENT OUTREACH (OUTPATIENT)
Dept: ADMINISTRATIVE | Facility: HOSPITAL | Age: 57
End: 2022-02-18
Payer: MEDICAID

## 2022-02-18 ENCOUNTER — TELEPHONE (OUTPATIENT)
Dept: HEMATOLOGY/ONCOLOGY | Facility: CLINIC | Age: 57
End: 2022-02-18
Payer: MEDICAID

## 2022-02-18 VITALS
OXYGEN SATURATION: 96 % | HEIGHT: 67 IN | WEIGHT: 232.13 LBS | RESPIRATION RATE: 18 BRPM | TEMPERATURE: 99 F | BODY MASS INDEX: 36.43 KG/M2 | DIASTOLIC BLOOD PRESSURE: 72 MMHG | HEART RATE: 90 BPM | SYSTOLIC BLOOD PRESSURE: 126 MMHG

## 2022-02-18 DIAGNOSIS — E11.22 CKD STAGE 3 DUE TO TYPE 2 DIABETES MELLITUS: ICD-10-CM

## 2022-02-18 DIAGNOSIS — M79.602 LEFT ARM PAIN: ICD-10-CM

## 2022-02-18 DIAGNOSIS — N18.30 CKD STAGE 3 DUE TO TYPE 2 DIABETES MELLITUS: ICD-10-CM
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
## 2022-02-18 DIAGNOSIS — I50.43 SYSTOLIC AND DIASTOLIC CHF, ACUTE ON CHRONIC: ICD-10-CM

## 2022-02-18 DIAGNOSIS — N18.32 TYPE 2 DIABETES MELLITUS WITH STAGE 3B CHRONIC KIDNEY DISEASE, WITH LONG-TERM CURRENT USE OF INSULIN: ICD-10-CM

## 2022-02-18 DIAGNOSIS — Z79.4 TYPE 2 DIABETES MELLITUS WITH STAGE 3B CHRONIC KIDNEY DISEASE, WITH LONG-TERM CURRENT USE OF INSULIN: ICD-10-CM

## 2022-02-18 DIAGNOSIS — Z11.4 SCREENING FOR HIV WITHOUT PRESENCE OF RISK FACTORS: ICD-10-CM

## 2022-02-18 DIAGNOSIS — J44.9 CHRONIC OBSTRUCTIVE PULMONARY DISEASE, UNSPECIFIED COPD TYPE: Chronic | ICD-10-CM

## 2022-02-18 DIAGNOSIS — I25.10 CORONARY ARTERY DISEASE INVOLVING NATIVE CORONARY ARTERY OF NATIVE HEART WITHOUT ANGINA PECTORIS: ICD-10-CM

## 2022-02-18 DIAGNOSIS — Z72.0 TOBACCO ABUSE: Chronic | ICD-10-CM

## 2022-02-18 DIAGNOSIS — I10 ESSENTIAL HYPERTENSION: Primary | Chronic | ICD-10-CM

## 2022-02-18 DIAGNOSIS — J18.9 PNEUMONIA OF RIGHT LUNG DUE TO INFECTIOUS ORGANISM, UNSPECIFIED PART OF LUNG: ICD-10-CM

## 2022-02-18 DIAGNOSIS — E78.2 MIXED HYPERLIPIDEMIA: ICD-10-CM

## 2022-02-18 DIAGNOSIS — J44.9 CHRONIC OBSTRUCTIVE PULMONARY DISEASE, UNSPECIFIED COPD TYPE: ICD-10-CM

## 2022-02-18 DIAGNOSIS — E11.22 TYPE 2 DIABETES MELLITUS WITH STAGE 3B CHRONIC KIDNEY DISEASE, WITH LONG-TERM CURRENT USE OF INSULIN: ICD-10-CM

## 2022-02-18 DIAGNOSIS — Z11.59 ENCOUNTER FOR HEPATITIS C SCREENING TEST FOR LOW RISK PATIENT: ICD-10-CM

## 2022-02-18 PROCEDURE — 3074F SYST BP LT 130 MM HG: CPT | Mod: CPTII,,, | Performed by: PHYSICIAN ASSISTANT

## 2022-02-18 PROCEDURE — 1160F PR REVIEW ALL MEDS BY PRESCRIBER/CLIN PHARMACIST DOCUMENTED: ICD-10-PCS | Mod: CPTII,,, | Performed by: PHYSICIAN ASSISTANT

## 2022-02-18 PROCEDURE — 73060 XR HUMERUS 2 VIEW LEFT: ICD-10-PCS | Mod: 26,LT,, | Performed by: RADIOLOGY

## 2022-02-18 PROCEDURE — 99204 PR OFFICE/OUTPT VISIT, NEW, LEVL IV, 45-59 MIN: ICD-10-PCS | Mod: S$PBB,,, | Performed by: PHYSICIAN ASSISTANT

## 2022-02-18 PROCEDURE — 73030 X-RAY EXAM OF SHOULDER: CPT | Mod: TC,FY,LT

## 2022-02-18 PROCEDURE — 4010F PR ACE/ARB THEARPY RXD/TAKEN: ICD-10-PCS | Mod: CPTII,,, | Performed by: PHYSICIAN ASSISTANT

## 2022-02-18 PROCEDURE — 73090 X-RAY EXAM OF FOREARM: CPT | Mod: TC,FY,LT

## 2022-02-18 PROCEDURE — 1111F DSCHRG MED/CURRENT MED MERGE: CPT | Mod: CPTII,,, | Performed by: PHYSICIAN ASSISTANT

## 2022-02-18 PROCEDURE — 99999 PR PBB SHADOW E&M-EST. PATIENT-LVL V: ICD-10-PCS | Mod: PBBFAC,,, | Performed by: PHYSICIAN ASSISTANT

## 2022-02-18 PROCEDURE — 71046 X-RAY EXAM CHEST 2 VIEWS: CPT | Mod: TC,FY

## 2022-02-18 PROCEDURE — 3074F PR MOST RECENT SYSTOLIC BLOOD PRESSURE < 130 MM HG: ICD-10-PCS | Mod: CPTII,,, | Performed by: PHYSICIAN ASSISTANT

## 2022-02-18 PROCEDURE — 3078F DIAST BP <80 MM HG: CPT | Mod: CPTII,,, | Performed by: PHYSICIAN ASSISTANT

## 2022-02-18 PROCEDURE — 1160F RVW MEDS BY RX/DR IN RCRD: CPT | Mod: CPTII,,, | Performed by: PHYSICIAN ASSISTANT

## 2022-02-18 PROCEDURE — 71046 XR CHEST PA AND LATERAL: ICD-10-PCS | Mod: 26,,, | Performed by: RADIOLOGY

## 2022-02-18 PROCEDURE — 73030 X-RAY EXAM OF SHOULDER: CPT | Mod: 26,LT,, | Performed by: RADIOLOGY

## 2022-02-18 PROCEDURE — 99215 OFFICE O/P EST HI 40 MIN: CPT | Mod: PBBFAC,PO,25 | Performed by: PHYSICIAN ASSISTANT

## 2022-02-18 PROCEDURE — 73090 X-RAY EXAM OF FOREARM: CPT | Mod: 26,LT,, | Performed by: RADIOLOGY

## 2022-02-18 PROCEDURE — 3078F PR MOST RECENT DIASTOLIC BLOOD PRESSURE < 80 MM HG: ICD-10-PCS | Mod: CPTII,,, | Performed by: PHYSICIAN ASSISTANT

## 2022-02-18 PROCEDURE — 71046 X-RAY EXAM CHEST 2 VIEWS: CPT | Mod: 26,,, | Performed by: RADIOLOGY

## 2022-02-18 PROCEDURE — 99999 PR PBB SHADOW E&M-EST. PATIENT-LVL V: CPT | Mod: PBBFAC,,, | Performed by: PHYSICIAN ASSISTANT

## 2022-02-18 PROCEDURE — 3008F PR BODY MASS INDEX (BMI) DOCUMENTED: ICD-10-PCS | Mod: CPTII,,, | Performed by: PHYSICIAN ASSISTANT

## 2022-02-18 PROCEDURE — 1159F PR MEDICATION LIST DOCUMENTED IN MEDICAL RECORD: ICD-10-PCS | Mod: CPTII,,, | Performed by: PHYSICIAN ASSISTANT

## 2022-02-18 PROCEDURE — 4010F ACE/ARB THERAPY RXD/TAKEN: CPT | Mod: CPTII,,, | Performed by: PHYSICIAN ASSISTANT

## 2022-02-18 PROCEDURE — 3008F BODY MASS INDEX DOCD: CPT | Mod: CPTII,,, | Performed by: PHYSICIAN ASSISTANT

## 2022-02-18 PROCEDURE — 73060 X-RAY EXAM OF HUMERUS: CPT | Mod: 26,LT,, | Performed by: RADIOLOGY

## 2022-02-18 PROCEDURE — 1159F MED LIST DOCD IN RCRD: CPT | Mod: CPTII,,, | Performed by: PHYSICIAN ASSISTANT

## 2022-02-18 PROCEDURE — 99204 OFFICE O/P NEW MOD 45 MIN: CPT | Mod: S$PBB,,, | Performed by: PHYSICIAN ASSISTANT

## 2022-02-18 PROCEDURE — 1111F PR DISCHARGE MEDS RECONCILED W/ CURRENT OUTPATIENT MED LIST: ICD-10-PCS | Mod: CPTII,,, | Performed by: PHYSICIAN ASSISTANT

## 2022-02-18 PROCEDURE — 73090 XR FOREARM LEFT: ICD-10-PCS | Mod: 26,LT,, | Performed by: RADIOLOGY

## 2022-02-18 PROCEDURE — 73060 X-RAY EXAM OF HUMERUS: CPT | Mod: TC,FY,LT

## 2022-02-18 PROCEDURE — 73030 XR SHOULDER COMPLETE 2 OR MORE VIEWS LEFT: ICD-10-PCS | Mod: 26,LT,, | Performed by: RADIOLOGY

## 2022-02-18 RX ORDER — DOXYCYCLINE HYCLATE 100 MG
100 TABLET ORAL 2 TIMES DAILY
Qty: 20 TABLET | Refills: 0 | Status: SHIPPED | OUTPATIENT
Start: 2022-02-18 | End: 2022-02-28

## 2022-02-18 RX ORDER — LANCETS
EACH MISCELLANEOUS
Qty: 200 EACH | Refills: 1 | Status: SHIPPED | OUTPATIENT
Start: 2022-02-18 | End: 2022-07-30 | Stop reason: SDUPTHER

## 2022-02-18 RX ORDER — AMOXICILLIN AND CLAVULANATE POTASSIUM 875; 125 MG/1; MG/1
1 TABLET, FILM COATED ORAL 2 TIMES DAILY
Qty: 20 TABLET | Refills: 0 | Status: SHIPPED | OUTPATIENT
Start: 2022-02-18 | End: 2022-02-28

## 2022-02-18 RX ORDER — INSULIN PUMP SYRINGE, 3 ML
EACH MISCELLANEOUS
Qty: 1 EACH | Refills: 0 | Status: SHIPPED | OUTPATIENT
Start: 2022-02-18 | End: 2022-07-30 | Stop reason: SDUPTHER

## 2022-02-18 NOTE — PROGRESS NOTES
Subjective:       Patient ID: Delores Fox is a 56 y.o. female.    Chief Complaint: Hospital Follow Up and Fall (Left shoulder and arm pain and swelling )    Transitional Care Note  Admit date: 02/12/22  Discharge date: 02/14/22  Date of interactive contact (2 business days post D/C): NA  Hospitalized at: [facility]  Discharge diagnoses: CHF exacerbation, COPD exacerbation, respiratory failgure    Family and/or Caretaker present at visit?  No.  Diagnostic tests reviewed/disposition: I have reviewed all completed as well as pending diagnostic tests at the time of discharge.  Disease/illness education: patient encouraged to stop smoking. Daily weights, low salt diet, close follow up with physician  Medication changes:Pt discharged on azithromycin, lasix, combivent  Home health/community services discussion/referrals: Patient does not have home health established from hospital visit.  They do not need home health.  If needed, we will set up home health for the patient.   Establishment or re-establishment of referral orders for community resources: No other necessary community resources.   Discussion with other health care providers: No discussion with other health care providers necessary.       Ms. Fox is a 56 year old female with DM, COPD, and HTN. She was admitted to the hospital on 2/12 due to acutely worsening shortness of breath. She was found to have COPD exacerbation and CHF exacerbation. She arrived by EMS. The patient was admitted and cardiology consulted. The patient was diuresed and discharged on lasix, azithromycin, and inhaler. The patient reports she is feeling much better. She does admit to falling yesterday, after her leg gave out on her. She hit her left arm. She is having pain and decreased mobility in the left arm.  The patient is scheduled to follow up with cardiology and pulmonology in regard to recent hospitalization.     See discharge summary below:      [facility]  Discharge  Summary  Patient Name: Delores Fox MRN: [id number]  Patient Class: IP- Inpatient  Length of Stay: 2  Admission Date: 2/12/2022  1:18 AM Attending Physician: Jose Bates MD  Primary Care Provider: Carey Liu MD Face-to-Face encounter date: 02/14/2022  Chief Complaint: Shortness of Breath (EMS reports pt lethargic, cyanotic, sats in 60's.  Arrival on CPAP)     Date of Discharge: 2/14/2022  Discharge Disposition: Home or Self Care  Condition: Stable        Reason for Hospitalization     Acute hypoxemic respiratory failure  CHF Exacerbation  Thrush     Patient Active Problem List  Diagnosis   Essential hypertension   Antritis/duodenitis with reported ulcer   HLD (hyperlipidemia)   Type 2 diabetes mellitus, with long-term current use of insulin   Cardiomyopathy   Tobacco abuse   COPD (chronic obstructive pulmonary disease)   LV dysfunction   CAD (coronary artery disease)   Nausea & vomiting   Epidermal inclusion cyst of eyelid, right   Pulmonary edema, acute   Acute respiratory failure with hypoxia and hypercarbia   Systolic and diastolic CHF, acute on chronic   COPD with respiratory failure, acute   Community acquired pneumonia   Thrush        Brief History of Present Illness  Delores Fox is a 56 y.o. White female with known history of chronic congestive heart failure who presented with a primary complaint of shortness of breath. She was in her usual state of health when her breathing got worst and family had to call EMS. EMS found her very lethargic and cyanotic. She required BIPAP. En route she was given steroids and bronchodilators. In the ER, CBC showed leukocytosis, CMP showed STEPHEN likely due to cardiorenal syndrome,  and her CXR consistent with fluid load. Hospital medicine consulted for admission. During my encounter, patient lethargic and on BIPAP. No family members present to confirm the history.      For the full HPI please refer to the History & Physical from this  "admission.     Hospital Course By Problem with Pertinent Findings     # Acute Hypoxemic Respiratory Failure  # Acute CHF Exacerbation  # Mild COPD exacerbation  # Panic Attacks  - the patient presented with sudden onset shortness of breath. HPI as above. She was placed on bipap with rapid improvement in her symptoms in the ICU. She was on Nasal Canula on my initial evaluation. She required IV diuresis with additional improvement. She was transferred to the floor. Her steroids were held as she did not have any wheezing and concern was for fluid retention. She has CHFrEF with EF of 35%. Cardiology evaluated her and recommended diuresis. She had a mild troponin elevation due to demand ischemia. She was continued on GDMT for her CHF. She had mild increase in her Cr with rising BUN as she was diuresed. Diuresis was held. She required a small amount of oxygen and was felt safe for discharge with oral diuresis and ongoing management of her CHF as an outpatient. She will continue azithromycin empirically for CHF exacerbation along with albuterol inhaler and steroid inhaler as well as an outpatient. She will need to follow up with Cardiology and pulmonary. There was some question of whether anxiety is playing a role in her shortness of breath. She was given a small amount of ativan for anxiolysis at discharge with plans for close follow up with her pcp. She was feeling well at discharge and safe and stable for outpatient follow up for ongoing management.      # Thrush  - she will complete a course of fluconazole for thrush.         Patient was seen and examined on the date of discharge and determined to be suitable for discharge.     Physical Exam  /83 (BP Location: Left arm, Patient Position: Lying)   Pulse 88   Temp 97.8 °F (36.6 °C) (Oral)   Resp 20   Ht 5' 7" (1.702 m)   Wt 109.3 kg (240 lb 15.4 oz)   SpO2 100%   BMI 37.74 kg/m²   Vitals reviewed.     Constitutional: No distress.   HENT: Atraumatic. "   Cardiovascular: Normal rate, regular rhythm and normal heart sounds.   Pulmonary/Chest: Effort normal. Clear to auscultation bilaterally. No wheezes.   Abdominal: Soft. Bowel sounds are normal. Exhibits no distension and no mass. No tenderness  Neurological: Alert.   Skin: Skin is warm and dry.      Following labs were Reviewed   Recent Labs  Lab 02/14/22  0544  WBC 14.63*  HGB 13.2  HCT 42.1    CALCIUM 7.7*  ALBUMIN 3.6  PROT 6.7  *  K 4.2  CO2 29  CL 94*  BUN 68*  CREATININE 1.9*  ALKPHOS 100  ALT 18  AST 18  BILITOT 0.6     No results found for: POCTGLUCOSE      BMP:   Recent Labs  Lab 02/13/22  0607 02/14/22  0544  * 220*  * 132*  K 4.3 4.2  CL 91* 94*  CO2 26 29  BUN 59* 68*  CREATININE 1.8* 1.9*  CALCIUM 7.6* 7.7*  MG 2.3 2.6  , CBC   Recent Labs  Lab 02/13/22 0607 02/13/22  0607 02/14/22  0544  WBC 15.48*  --  14.63*  HGB 13.0  --  13.2  HCT 40.9   < > 42.1    --  385   < > = values in this interval not displayed.   and All labs within the past 24 hours have been reviewed     Microbiology Results (last 7 days)     Procedure Component Value Units Date/Time    Blood culture #1 ([id number]) Collected: 02/12/22 0136    Order Status: Completed Specimen: Blood from Peripheral, Forearm, Right Updated: 02/14/22 0232      Blood Culture, Routine No Growth to date        No Growth to date        No Growth to date    Blood culture #2 ([id number]) Collected: 02/12/22 0137    Order Status: Completed Specimen: Blood from Peripheral, Upper Arm, Left Updated: 02/14/22 0232      Blood Culture, Routine No Growth to date        No Growth to date        No Growth to date        X-Ray Chest AP Portable  Final Result     X-Ray Chest AP Portable  Final Result     X-Ray Chest AP Portable  Final Result           No results found for this or any previous visit.        Consultants and Procedures  Consultants:  Cardiology  Pulmonary     Procedures:   None     Discharge Information:  Diet:  Resume regular  diet/Cardiac diet/Diabetic Diet     Physical Activity:  Activity as tolerated     Instructions:  1. Take all medications as prescribed  2. Keep all follow-up appointments  3. Return to the hospital or call your primary care physicians if any worsening symptoms  occur.     Follow-Up Appointments:  1. Please call your primary care physician to schedule an appointment in 1 week time.  2. Follow up with Cardiology in 1-2 weeks.   3. Follow up with Pulmonology in 2 weeks.      Pending laboratory work/Tests to be performed/followed by the Primary care Physician:     The patient was discharged in the care of her parents//wife/family/caregiver, with discharge instructions were reviewed in written and verbal form. All pertinent questions were discussed and prescriptions were provided. The importance of making follow up appointments and compliance of medications has been stressed repeatedly. The patient will follow up in 1 week or sooner as needed with the PCP, and the patient is on board with the plan. Upon discharge, patient needs to be on following medications.     Discharge Medications:     Medication List     START taking these medications   azithromycin 250 MG tablet  Commonly known as: Z-PATRICK  Take 1 tablet (250 mg total) by mouth once daily. for 4 days     fluconazole 150 MG Tab  Commonly known as: DIFLUCAN  Take 1 tablet (150 mg total) by mouth once daily. for 4 days  Start taking on: February 15, 2022     furosemide 20 MG tablet  Commonly known as: LASIX  Take 1 tablet (20 mg total) by mouth once daily.     ipratropium-albuteroL  mcg/actuation inhaler  Commonly known as: CombiVENT  Inhale 1 puff into the lungs every 4 (four) hours as needed for Wheezing or Shortness of Breath. Rescue     LORazepam 0.5 MG tablet  Commonly known as: ATIVAN  Take 1 tablet (0.5 mg total) by mouth every 6 (six) hours as needed for Anxiety.        CONTINUE taking these medications   aspirin 81 MG Chew  Take 1 tablet (81 mg  total) by mouth once daily.     atorvastatin 40 MG tablet  Commonly known as: LIPITOR  Take 1 tablet (40 mg total) by mouth once daily.     fluticasone furoate-vilanteroL 100-25 mcg/dose diskus inhaler  Commonly known as: BREO     losartan 25 MG tablet  Commonly known as: COZAAR  Take 1 tablet (25 mg total) by mouth 2 (two) times a day.     melatonin 3 mg tablet  Commonly known as: MELATIN     metoprolol succinate 50 MG 24 hr tablet  Commonly known as: TOPROL-XL  Take 1 tablet (50 mg total) by mouth once daily.     ondansetron 4 MG tablet  Commonly known as: ZOFRAN  Take 1 tablet (4 mg total) by mouth every 8 (eight) hours as needed for Nausea.     pantoprazole 40 MG tablet  Commonly known as: PROTONIX  Take 1 tablet (40 mg total) by mouth 2 (two) times daily.     pregabalin 75 MG capsule  Commonly known as: LYRICA     TRADJENTA 5 mg Tab tablet  Generic drug: linaGLIPtin     zolpidem 10 mg Tab  Commonly known as: AMBIEN              Review of patient's allergies indicates:   Allergen Reactions    Morphine Nausea And Vomiting    Sulfa (sulfonamide antibiotics) Nausea And Vomiting         Current Outpatient Medications:     aspirin 81 MG Chew, Take 1 tablet (81 mg total) by mouth once daily., Disp: 30 tablet, Rfl: 0    atorvastatin (LIPITOR) 40 MG tablet, Take 1 tablet (40 mg total) by mouth once daily., Disp: 90 tablet, Rfl: 0    furosemide (LASIX) 20 MG tablet, Take 1 tablet (20 mg total) by mouth once daily., Disp: 90 tablet, Rfl: 0    ipratropium-albuteroL (COMBIVENT)  mcg/actuation inhaler, Inhale 1 puff into the lungs every 4 (four) hours as needed for Wheezing or Shortness of Breath. Rescue, Disp: 4 g, Rfl: 0    linaGLIPtin (TRADJENTA) 5 mg Tab tablet, Take 5 mg by mouth once daily. , Disp: , Rfl:     losartan (COZAAR) 25 MG tablet, Take 1 tablet (25 mg total) by mouth 2 (two) times a day., Disp: 180 tablet, Rfl: 0    melatonin (MELATIN) 3 mg tablet, Take 3 mg by mouth nightly. , Disp: , Rfl:      metoprolol succinate (TOPROL-XL) 50 MG 24 hr tablet, Take 1 tablet (50 mg total) by mouth once daily., Disp: 90 tablet, Rfl: 0    ondansetron (ZOFRAN) 4 MG tablet, Take 1 tablet (4 mg total) by mouth every 8 (eight) hours as needed for Nausea., Disp: 12 tablet, Rfl: 0    pantoprazole (PROTONIX) 40 MG tablet, Take 1 tablet (40 mg total) by mouth 2 (two) times daily., Disp: 90 tablet, Rfl: 0    zolpidem (AMBIEN) 10 mg Tab, Take 10 mg by mouth every evening. , Disp: , Rfl:     fluticasone furoate-vilanteroL (BREO) 100-25 mcg/dose diskus inhaler, Inhale 1 puff into the lungs., Disp: , Rfl:     LORazepam (ATIVAN) 0.5 MG tablet, Take 1 tablet (0.5 mg total) by mouth every 6 (six) hours as needed for Anxiety., Disp: 12 tablet, Rfl: 0    pregabalin (LYRICA) 75 MG capsule, Take 75 mg by mouth 2 (two) times daily. , Disp: , Rfl:   No current facility-administered medications for this visit.    Facility-Administered Medications Ordered in Other Visits:     electrolyte-S (ISOLYTE), , Intravenous, Continuous, Madi Herr MD, New Bag at 12/11/20 1041    lidocaine (PF) 10 mg/ml (1%) injection 10 mg, 1 mL, Intradermal, Once, Madi Herr MD    sodium chloride 0.9% flush 3 mL, 3 mL, Intravenous, Q8H, Madi Herr MD    Lab Results   Component Value Date    WBC 14.63 (H) 02/14/2022    HGB 13.2 02/14/2022    HCT 42.1 02/14/2022     02/14/2022    ALT 18 02/14/2022    AST 18 02/14/2022     (L) 02/14/2022    K 4.2 02/14/2022    CL 94 (L) 02/14/2022    CREATININE 1.9 (H) 02/14/2022    BUN 68 (H) 02/14/2022    CO2 29 02/14/2022    TSH 0.960 11/21/2020    INR 1.1 12/31/2020    HGBA1C 6.3 04/30/2020       Review of Systems   Constitutional: Negative for activity change, appetite change and fever.   HENT: Negative for postnasal drip, rhinorrhea and sinus pressure.    Eyes: Negative for visual disturbance.   Respiratory: Negative for cough and shortness of breath.    Cardiovascular: Negative for  chest pain.   Gastrointestinal: Negative for abdominal distention and abdominal pain.   Genitourinary: Negative for difficulty urinating and dysuria.   Musculoskeletal: Positive for arthralgias. Negative for myalgias.        Chronic pain. New pain from fall- left arm and shoulder   Neurological: Negative for headaches.   Hematological: Negative for adenopathy.   Psychiatric/Behavioral: Positive for sleep disturbance. The patient is nervous/anxious.         Chronic anxiety and insomnia       Objective:      Physical Exam  Constitutional:       Appearance: Normal appearance. She is obese.   HENT:      Head: Normocephalic and atraumatic.   Cardiovascular:      Rate and Rhythm: Normal rate and regular rhythm.   Pulmonary:      Effort: Pulmonary effort is normal. No respiratory distress.      Breath sounds: Normal breath sounds. No wheezing.   Abdominal:      General: There is no distension.      Palpations: There is no mass.      Tenderness: There is no abdominal tenderness.   Musculoskeletal:      Right lower leg: No edema.      Left lower leg: No edema.      Comments: Left upper extremity TTP- decreased ROM in left shoulder. No ecchymosis or significant swelling appreciated.    Skin:     Findings: No erythema.   Neurological:      Mental Status: She is alert and oriented to person, place, and time.   Psychiatric:         Behavior: Behavior normal.         Assessment:       1. Essential hypertension    2. Tobacco abuse    3. Chronic obstructive pulmonary disease, unspecified COPD type    4. Type 2 diabetes mellitus with stage 3b chronic kidney disease, with long-term current use of insulin    5. Mixed hyperlipidemia    6. Coronary artery disease involving native coronary artery of native heart without angina pectoris    7. Systolic and diastolic CHF, acute on chronic    8. Fall, initial encounter    9. Left arm pain    10. CKD stage 3 due to type 2 diabetes mellitus    11. Encounter for hepatitis C screening test for  low risk patient    12. Screening for HIV without presence of risk factors        Plan:       Delores was seen today for hospital follow up and fall.    Diagnoses and all orders for this visit:    Essential hypertension  Controlled  Low salt diet  Continue current medication  Tobacco abuse  -     Ambulatory referral/consult to Smoking Cessation Program; Future    Chronic obstructive pulmonary disease, unspecified COPD type  -     NEBULIZER FOR HOME USE  -     X-Ray Chest PA And Lateral; Future    Type 2 diabetes mellitus with stage 3b chronic kidney disease, with long-term current use of insulin  -     blood-glucose meter kit; To check BG 2 times daily, to use with insurance preferred meter  -     lancets Misc; To check BG 2 times daily, to use with insurance preferred meter  -     blood sugar diagnostic Strp; To check BG 2 times daily, to use with insurance preferred meter  -     Hemoglobin A1C; Future  -     Comprehensive Metabolic Panel; Future  -     Lipid Panel; Future  -     PHOSPHORUS; Future  -     Microalbumin/Creatinine Ratio, Urine; Future  Eye exam up to date with [employer]  Mixed hyperlipidemia  High fiber diet  Continue current medication  Coronary artery disease involving native coronary artery of native heart without angina pectoris  Continue current medication  Follow up with cardiology  Systolic and diastolic CHF, acute on chronic  -     NEBULIZER FOR HOME USE  -     BNP; Future  -     X-Ray Chest PA And Lateral; Future  Continue current medication  Follow up with cardiology  Fall, initial encounter  -     X-Ray Humerus 2 View Left; Future  -     X-Ray Shoulder 2 or More Views Left; Future  -     X-Ray Forearm Left; Future    Left arm pain  -     X-Ray Humerus 2 View Left; Future  -     X-Ray Shoulder 2 or More Views Left; Future  -     X-Ray Forearm Left; Future    CKD stage 3 due to type 2 diabetes mellitus  -     Hemoglobin A1C; Future  -     Comprehensive Metabolic Panel; Future  -     CBC  Auto Differential; Future  -     PHOSPHORUS; Future  -     Microalbumin/Creatinine Ratio, Urine; Future    Encounter for hepatitis C screening test for low risk patient  -     Hepatitis C Antibody; Future    Screening for HIV without presence of risk factors  -     HIV 1/2 Ag/Ab (4th Gen); Future      Patient to have labs and xrays today. Patient advised to contact her insurance company regarding who she can see for pain management and psychiatry for refills of pain medication, ativan, and ambien.

## 2022-02-18 NOTE — TELEPHONE ENCOUNTER
Called patient regarding referral to Hematology no answer no voicemail setup option unable to leave message will call back

## 2022-02-18 NOTE — LETTER
AUTHORIZATION FOR RELEASE OF   CONFIDENTIAL INFORMATION    Dear Dr. Albrecht,    We are seeing Delores Fox, date of birth 1965, in the clinic at [facility]. Ying Ro PA-C is the patient's PCP. Delores Fox has an outstanding lab/procedure at the time we reviewed her chart. In order to help keep her health information updated, she has authorized us to request the following medical record(s):             (                                         ( X )  EYE EXAM                 Please fax records to [facility], Emily A Bassett, PA-C, [phone]    Thank you in advance,  Jenifer Veloz LPN, Clinical Care Coordinator  [address] [address]  P: [phone]  F: [phone]               Patient Name: Delores Fox  : 1965  Patient Phone #: [phone]

## 2022-02-21 ENCOUNTER — TELEPHONE (OUTPATIENT)
Dept: HEMATOLOGY/ONCOLOGY | Facility: CLINIC | Age: 57
End: 2022-02-21
Payer: MEDICAID

## 2022-02-21 ENCOUNTER — PATIENT MESSAGE (OUTPATIENT)
Dept: FAMILY MEDICINE | Facility: CLINIC | Age: 57
End: 2022-02-21
Payer: MEDICAID

## 2022-02-22 ENCOUNTER — TELEPHONE (OUTPATIENT)
Dept: HEMATOLOGY/ONCOLOGY | Facility: CLINIC | Age: 57
End: 2022-02-22
Payer: MEDICAID

## 2022-02-22 ENCOUNTER — PATIENT MESSAGE (OUTPATIENT)
Dept: FAMILY MEDICINE | Facility: CLINIC | Age: 57
End: 2022-02-22
Payer: MEDICAID

## 2022-02-23 ENCOUNTER — LAB VISIT (OUTPATIENT)
Dept: LAB | Facility: HOSPITAL | Age: 57
End: 2022-02-23
Attending: PHYSICIAN ASSISTANT
Payer: MEDICAID

## 2022-02-23 ENCOUNTER — PATIENT MESSAGE (OUTPATIENT)
Dept: FAMILY MEDICINE | Facility: CLINIC | Age: 57
End: 2022-02-23

## 2022-02-23 ENCOUNTER — OFFICE VISIT (OUTPATIENT)
Dept: FAMILY MEDICINE | Facility: CLINIC | Age: 57
End: 2022-02-23
Payer: MEDICAID

## 2022-02-23 ENCOUNTER — PATIENT OUTREACH (OUTPATIENT)
Dept: ADMINISTRATIVE | Facility: HOSPITAL | Age: 57
End: 2022-02-23
Payer: MEDICAID

## 2022-02-23 ENCOUNTER — TELEPHONE (OUTPATIENT)
Dept: FAMILY MEDICINE | Facility: CLINIC | Age: 57
End: 2022-02-23

## 2022-02-23 VITALS
WEIGHT: 224.44 LBS | TEMPERATURE: 99 F | RESPIRATION RATE: 18 BRPM | HEIGHT: 67 IN | SYSTOLIC BLOOD PRESSURE: 122 MMHG | DIASTOLIC BLOOD PRESSURE: 70 MMHG | HEART RATE: 103 BPM | BODY MASS INDEX: 35.22 KG/M2 | OXYGEN SATURATION: 95 %

## 2022-02-23 DIAGNOSIS — D72.829 LEUKOCYTOSIS, UNSPECIFIED TYPE: ICD-10-CM

## 2022-02-23 DIAGNOSIS — Z12.39 ENCOUNTER FOR SCREENING FOR MALIGNANT NEOPLASM OF BREAST, UNSPECIFIED SCREENING MODALITY: ICD-10-CM

## 2022-02-23 DIAGNOSIS — E87.5 HYPERKALEMIA: ICD-10-CM

## 2022-02-23 DIAGNOSIS — I25.10 CORONARY ARTERY DISEASE INVOLVING NATIVE CORONARY ARTERY OF NATIVE HEART WITHOUT ANGINA PECTORIS: ICD-10-CM

## 2022-02-23 DIAGNOSIS — J44.9 CHRONIC OBSTRUCTIVE PULMONARY DISEASE, UNSPECIFIED COPD TYPE: Chronic | ICD-10-CM

## 2022-02-23 DIAGNOSIS — G47.00 INSOMNIA, UNSPECIFIED TYPE: ICD-10-CM

## 2022-02-23 DIAGNOSIS — E11.65 TYPE 2 DIABETES MELLITUS WITH HYPERGLYCEMIA, WITHOUT LONG-TERM CURRENT USE OF INSULIN: ICD-10-CM

## 2022-02-23 DIAGNOSIS — I10 ESSENTIAL HYPERTENSION: Primary | Chronic | ICD-10-CM

## 2022-02-23 DIAGNOSIS — Z72.0 TOBACCO ABUSE: Chronic | ICD-10-CM

## 2022-02-23 DIAGNOSIS — E11.42 DIABETIC POLYNEUROPATHY ASSOCIATED WITH TYPE 2 DIABETES MELLITUS: ICD-10-CM

## 2022-02-23 DIAGNOSIS — R93.89 ABNORMAL CXR: ICD-10-CM

## 2022-02-23 LAB
ANION GAP SERPL CALC-SCNC: 16 MMOL/L (ref 8–16)
BASOPHILS # BLD AUTO: 0.06 K/UL (ref 0–0.2)
BASOPHILS NFR BLD: 0.4 % (ref 0–1.9)
BUN SERPL-MCNC: 32 MG/DL (ref 6–20)
CALCIUM SERPL-MCNC: 9.6 MG/DL (ref 8.7–10.5)
CHLORIDE SERPL-SCNC: 94 MMOL/L (ref 95–110)
CO2 SERPL-SCNC: 26 MMOL/L (ref 23–29)
CREAT SERPL-MCNC: 1.8 MG/DL (ref 0.5–1.4)
DIFFERENTIAL METHOD: ABNORMAL
EOSINOPHIL # BLD AUTO: 0.2 K/UL (ref 0–0.5)
EOSINOPHIL NFR BLD: 1.5 % (ref 0–8)
ERYTHROCYTE [DISTWIDTH] IN BLOOD BY AUTOMATED COUNT: 12.1 % (ref 11.5–14.5)
EST. GFR  (AFRICAN AMERICAN): 36.3 ML/MIN/1.73 M^2
EST. GFR  (NON AFRICAN AMERICAN): 31.5 ML/MIN/1.73 M^2
GLUCOSE SERPL-MCNC: 163 MG/DL (ref 70–110)
HCT VFR BLD AUTO: 46.9 % (ref 37–48.5)
HGB BLD-MCNC: 15.4 G/DL (ref 12–16)
IMM GRANULOCYTES # BLD AUTO: 0.12 K/UL (ref 0–0.04)
IMM GRANULOCYTES NFR BLD AUTO: 0.8 % (ref 0–0.5)
LYMPHOCYTES # BLD AUTO: 4.9 K/UL (ref 1–4.8)
LYMPHOCYTES NFR BLD: 33.6 % (ref 18–48)
MCH RBC QN AUTO: 30.9 PG (ref 27–31)
MCHC RBC AUTO-ENTMCNC: 32.8 G/DL (ref 32–36)
MCV RBC AUTO: 94 FL (ref 82–98)
MONOCYTES # BLD AUTO: 1.5 K/UL (ref 0.3–1)
MONOCYTES NFR BLD: 10.1 % (ref 4–15)
NEUTROPHILS # BLD AUTO: 7.8 K/UL (ref 1.8–7.7)
NEUTROPHILS NFR BLD: 53.6 % (ref 38–73)
NRBC BLD-RTO: 0 /100 WBC
PLATELET # BLD AUTO: 433 K/UL (ref 150–450)
PMV BLD AUTO: 9.4 FL (ref 9.2–12.9)
POTASSIUM SERPL-SCNC: 3.8 MMOL/L (ref 3.5–5.1)
RBC # BLD AUTO: 4.98 M/UL (ref 4–5.4)
SODIUM SERPL-SCNC: 136 MMOL/L (ref 136–145)
WBC # BLD AUTO: 14.48 K/UL (ref 3.9–12.7)

## 2022-02-23 PROCEDURE — 1111F DSCHRG MED/CURRENT MED MERGE: CPT | Mod: CPTII,,, | Performed by: PHYSICIAN ASSISTANT

## 2022-02-23 PROCEDURE — 1160F PR REVIEW ALL MEDS BY PRESCRIBER/CLIN PHARMACIST DOCUMENTED: ICD-10-PCS | Mod: CPTII,,, | Performed by: PHYSICIAN ASSISTANT

## 2022-02-23 PROCEDURE — 3074F SYST BP LT 130 MM HG: CPT | Mod: CPTII,,, | Performed by: PHYSICIAN ASSISTANT

## 2022-02-23 PROCEDURE — 99999 PR PBB SHADOW E&M-EST. PATIENT-LVL V: CPT | Mod: PBBFAC,,, | Performed by: PHYSICIAN ASSISTANT

## 2022-02-23 PROCEDURE — 3008F PR BODY MASS INDEX (BMI) DOCUMENTED: ICD-10-PCS | Mod: CPTII,,, | Performed by: PHYSICIAN ASSISTANT

## 2022-02-23 PROCEDURE — 99215 OFFICE O/P EST HI 40 MIN: CPT | Mod: PBBFAC,PO | Performed by: PHYSICIAN ASSISTANT

## 2022-02-23 PROCEDURE — 80048 BASIC METABOLIC PNL TOTAL CA: CPT | Performed by: PHYSICIAN ASSISTANT

## 2022-02-23 PROCEDURE — 36415 COLL VENOUS BLD VENIPUNCTURE: CPT | Mod: PO | Performed by: PHYSICIAN ASSISTANT

## 2022-02-23 PROCEDURE — 3008F BODY MASS INDEX DOCD: CPT | Mod: CPTII,,, | Performed by: PHYSICIAN ASSISTANT

## 2022-02-23 PROCEDURE — 4010F ACE/ARB THERAPY RXD/TAKEN: CPT | Mod: CPTII,,, | Performed by: PHYSICIAN ASSISTANT

## 2022-02-23 PROCEDURE — 4010F PR ACE/ARB THEARPY RXD/TAKEN: ICD-10-PCS | Mod: CPTII,,, | Performed by: PHYSICIAN ASSISTANT

## 2022-02-23 PROCEDURE — 99215 PR OFFICE/OUTPT VISIT, EST, LEVL V, 40-54 MIN: ICD-10-PCS | Mod: S$PBB,,, | Performed by: PHYSICIAN ASSISTANT

## 2022-02-23 PROCEDURE — 3074F PR MOST RECENT SYSTOLIC BLOOD PRESSURE < 130 MM HG: ICD-10-PCS | Mod: CPTII,,, | Performed by: PHYSICIAN ASSISTANT

## 2022-02-23 PROCEDURE — 99999 PR PBB SHADOW E&M-EST. PATIENT-LVL V: ICD-10-PCS | Mod: PBBFAC,,, | Performed by: PHYSICIAN ASSISTANT

## 2022-02-23 PROCEDURE — 85025 COMPLETE CBC W/AUTO DIFF WBC: CPT | Performed by: PHYSICIAN ASSISTANT

## 2022-02-23 PROCEDURE — 1159F MED LIST DOCD IN RCRD: CPT | Mod: CPTII,,, | Performed by: PHYSICIAN ASSISTANT

## 2022-02-23 PROCEDURE — 3078F DIAST BP <80 MM HG: CPT | Mod: CPTII,,, | Performed by: PHYSICIAN ASSISTANT

## 2022-02-23 PROCEDURE — 1159F PR MEDICATION LIST DOCUMENTED IN MEDICAL RECORD: ICD-10-PCS | Mod: CPTII,,, | Performed by: PHYSICIAN ASSISTANT

## 2022-02-23 PROCEDURE — 3051F PR MOST RECENT HEMOGLOBIN A1C LEVEL 7.0 - < 8.0%: ICD-10-PCS | Mod: CPTII,,, | Performed by: PHYSICIAN ASSISTANT

## 2022-02-23 PROCEDURE — 99215 OFFICE O/P EST HI 40 MIN: CPT | Mod: S$PBB,,, | Performed by: PHYSICIAN ASSISTANT

## 2022-02-23 PROCEDURE — 3051F HG A1C>EQUAL 7.0%<8.0%: CPT | Mod: CPTII,,, | Performed by: PHYSICIAN ASSISTANT

## 2022-02-23 PROCEDURE — 3078F PR MOST RECENT DIASTOLIC BLOOD PRESSURE < 80 MM HG: ICD-10-PCS | Mod: CPTII,,, | Performed by: PHYSICIAN ASSISTANT

## 2022-02-23 PROCEDURE — 1111F PR DISCHARGE MEDS RECONCILED W/ CURRENT OUTPATIENT MED LIST: ICD-10-PCS | Mod: CPTII,,, | Performed by: PHYSICIAN ASSISTANT

## 2022-02-23 PROCEDURE — 1160F RVW MEDS BY RX/DR IN RCRD: CPT | Mod: CPTII,,, | Performed by: PHYSICIAN ASSISTANT

## 2022-02-23 RX ORDER — METFORMIN HYDROCHLORIDE 500 MG/1
500 TABLET, EXTENDED RELEASE ORAL
Qty: 90 TABLET | Refills: 0 | Status: SHIPPED | OUTPATIENT
Start: 2022-02-23 | End: 2022-07-30 | Stop reason: SDUPTHER

## 2022-02-23 RX ORDER — ALBUTEROL SULFATE 1.25 MG/3ML
1.25 SOLUTION RESPIRATORY (INHALATION) EVERY 6 HOURS PRN
Qty: 75 ML | Refills: 1 | Status: SHIPPED | OUTPATIENT
Start: 2022-02-23 | End: 2022-04-04 | Stop reason: SDUPTHER

## 2022-02-23 RX ORDER — ZOLPIDEM TARTRATE 10 MG/1
10 TABLET ORAL NIGHTLY PRN
COMMUNITY
End: 2022-03-31 | Stop reason: SDUPTHER

## 2022-02-23 NOTE — TELEPHONE ENCOUNTER
Spoke with pharmacy regarding blood glucose meter and stated insurance denied coverage. Meter was attached to a discount card and could be picked up by patient for $16.52

## 2022-02-23 NOTE — TELEPHONE ENCOUNTER
Patient seen today. She never received nebulizer machine (sent to DME). She also did not receive a meter from pharmacy along with other diabetic supplies. Please call DME and pharmacy to inquire.

## 2022-02-23 NOTE — PATIENT INSTRUCTIONS
If you are due for any health screening(s) below please notify me so we can arrange them to be ordered and scheduled to maintain your health.     Health Maintenance   Topic Date Due    Foot Exam  Never done    Eye Exam  Never done    TETANUS VACCINE  Never done    Mammogram  Never done    Hemoglobin A1c  08/18/2022    Lipid Panel  02/18/2023    High Dose Statin  02/23/2023    Hepatitis C Screening  Completed       Breast Cancer Screening    Breast cancer is the second most common cancer in women after skin cancer, and the second leading cause of death from cancer after lung cancer. Mammograms can detect breast cancer early, which significantly increases the chances of curing the cancer.      A screening mammogram is an x-ray image of the breasts used for early breast cancer detection. It can help reduce the number of deaths from breast cancer among women. To get a clear image, the breast is placed between two plastic plates to make it flat. How often a mammogram is needed depends on your age and your breast cancer risk.    Although you are still overdue for this important screening, due to the COVID-19 pandemic, we recommend scheduling it in the near future.  Colon Cancer Screening    Of cancers affecting both men and women, colorectal cancer is the third leading cancer killer in the [place]. But it doesnt have to be. Screening can prevent colorectal cancer or find it at an early stage when treatment often leads to a cure.    A colonoscopy is the preferred test for detecting colon cancer. It is needed only once every 10 years if results are negative. While sedated, a flexible, lighted tube with a tiny camera is inserted into the rectum and advanced through the colon to look for cancers. An alternative screening test that is used at home and returned to the lab may also be used. It detects hidden blood in bowel movements which could indicate cancer in the colon. If results are positive, you will need a  colonoscopy to determine if the blood is a sign of cancer. This type of follow up (diagnostic) colonoscopy usually requires additional copays as required by your insurance provider. Please contact your PCP if you have any questions.    Although you are still overdue for this important screening, due to the COVID-19 pandemic, we recommend scheduling it in the near future.       Diabetic Retinal Eye Exam    Diabetes is the #1 cause of blindness in the [place] - early detection before signs or symptoms develop can prevent debilitating blindness.    Once-a-year screening is recommended for all diabetic patients. This exam can prevent and treat diabetes complications in the eye before developing symptoms. This can be done with a special camera is used to take photographs of the back of your eye without having to dilate them, or you can see an eye doctor for a full dilated exam.    Although you are still overdue for this important screening, due to the COVID-19 pandemic, we recommend scheduling it in the near future.

## 2022-02-23 NOTE — PROGRESS NOTES
Subjective:       Patient ID: Delores Fox is a 56 y.o. female.    Chief Complaint: Follow-up (Results )    Ms. Fox is a 56 year old female with DM, HTN, COPD, and CAD who presents to clinic for follow up after labs. The patient's recent labs revealed elevated potassium, elevated WBC, and increase in blood sugar. Patient's CHEST X- RAY was also suspicious for infiltrate vs atelectasis. The patient was prescribed antibiotics in the setting of her leukocytosis and other comorbidities. The patient is scheduled to follow up with hematology tomorrow. She is also scheduled to follow up with cardiology and pulmonology soon.     Review of patient's allergies indicates:   Allergen Reactions    Morphine Nausea And Vomiting    Sulfa (sulfonamide antibiotics) Nausea And Vomiting         Current Outpatient Medications:     amoxicillin-clavulanate 875-125mg (AUGMENTIN) 875-125 mg per tablet, Take 1 tablet by mouth 2 (two) times daily. for 10 days, Disp: 20 tablet, Rfl: 0    aspirin 81 MG Chew, Take 1 tablet (81 mg total) by mouth once daily., Disp: 30 tablet, Rfl: 0    atorvastatin (LIPITOR) 40 MG tablet, Take 1 tablet (40 mg total) by mouth once daily., Disp: 90 tablet, Rfl: 0    blood sugar diagnostic Strp, To check BG 2 times daily, to use with insurance preferred meter, Disp: 200 each, Rfl: 0    blood-glucose meter kit, To check BG 2 times daily, to use with insurance preferred meter, Disp: 1 each, Rfl: 0    doxycycline (VIBRA-TABS) 100 MG tablet, Take 1 tablet (100 mg total) by mouth 2 (two) times daily. for 10 days, Disp: 20 tablet, Rfl: 0    fluticasone furoate-vilanteroL (BREO) 100-25 mcg/dose diskus inhaler, Inhale 1 puff into the lungs., Disp: , Rfl:     furosemide (LASIX) 20 MG tablet, Take 1 tablet (20 mg total) by mouth once daily., Disp: 90 tablet, Rfl: 0    ipratropium-albuteroL (COMBIVENT)  mcg/actuation inhaler, Inhale 1 puff into the lungs every 4 (four) hours as needed for Wheezing or  Shortness of Breath. Rescue, Disp: 4 g, Rfl: 0    lancets Misc, To check BG 2 times daily, to use with insurance preferred meter, Disp: 200 each, Rfl: 1    linaGLIPtin (TRADJENTA) 5 mg Tab tablet, Take 5 mg by mouth once daily. , Disp: , Rfl:     LORazepam (ATIVAN) 0.5 MG tablet, Take 1 tablet (0.5 mg total) by mouth every 6 (six) hours as needed for Anxiety., Disp: 12 tablet, Rfl: 0    losartan (COZAAR) 25 MG tablet, Take 1 tablet (25 mg total) by mouth 2 (two) times a day., Disp: 180 tablet, Rfl: 0    melatonin (MELATIN) 3 mg tablet, Take 3 mg by mouth nightly. , Disp: , Rfl:     metoprolol succinate (TOPROL-XL) 50 MG 24 hr tablet, Take 1 tablet (50 mg total) by mouth once daily., Disp: 90 tablet, Rfl: 0    ondansetron (ZOFRAN) 4 MG tablet, Take 1 tablet (4 mg total) by mouth every 8 (eight) hours as needed for Nausea., Disp: 12 tablet, Rfl: 0    pantoprazole (PROTONIX) 40 MG tablet, Take 1 tablet (40 mg total) by mouth 2 (two) times daily., Disp: 90 tablet, Rfl: 0    pregabalin (LYRICA) 75 MG capsule, Take 75 mg by mouth 2 (two) times daily. , Disp: , Rfl:     zolpidem (AMBIEN) 10 mg Tab, Take 10 mg by mouth every evening. , Disp: , Rfl:   No current facility-administered medications for this visit.    Facility-Administered Medications Ordered in Other Visits:     electrolyte-S (ISOLYTE), , Intravenous, Continuous, Madi Herr MD, New Bag at 12/11/20 1041    lidocaine (PF) 10 mg/ml (1%) injection 10 mg, 1 mL, Intradermal, Once, Madi Herr MD    sodium chloride 0.9% flush 3 mL, 3 mL, Intravenous, Q8H, Madi Herr MD    Lab Results   Component Value Date    WBC 15.49 (H) 02/18/2022    HGB 14.6 02/18/2022    HCT 45.2 02/18/2022     02/18/2022    CHOL 183 02/18/2022    TRIG 156 (H) 02/18/2022    HDL 36 (L) 02/18/2022    ALT 18 02/18/2022    AST 14 02/18/2022     02/18/2022    K 5.4 (H) 02/18/2022    CL 99 02/18/2022    CREATININE 1.2 02/18/2022    BUN 26 (H)  02/18/2022    CO2 26 02/18/2022    TSH 0.960 11/21/2020    INR 1.1 12/31/2020    HGBA1C 7.5 (H) 02/18/2022       Review of Systems   Constitutional: Negative for activity change, appetite change and fever.   HENT: Negative for postnasal drip, rhinorrhea and sinus pressure.    Eyes: Negative for visual disturbance.   Respiratory: Negative for cough and shortness of breath.    Cardiovascular: Negative for chest pain.   Gastrointestinal: Negative for abdominal distention and abdominal pain.   Genitourinary: Negative for difficulty urinating and dysuria.   Musculoskeletal: Negative for myalgias.   Neurological: Negative for headaches.   Hematological: Negative for adenopathy.   Psychiatric/Behavioral: Positive for sleep disturbance. The patient is nervous/anxious.         Chronic anxiety and insomnia       Objective:      Physical Exam  Constitutional:       Appearance: Normal appearance.   HENT:      Head: Normocephalic and atraumatic.   Eyes:      Conjunctiva/sclera: Conjunctivae normal.   Cardiovascular:      Rate and Rhythm: Normal rate and regular rhythm.      Pulses:           Dorsalis pedis pulses are 1+ on the right side and 1+ on the left side.        Posterior tibial pulses are 1+ on the right side and 1+ on the left side.   Pulmonary:      Effort: Pulmonary effort is normal. No respiratory distress.      Breath sounds: Normal breath sounds. No wheezing.   Abdominal:      General: There is no distension.      Palpations: There is no mass.      Tenderness: There is no abdominal tenderness.   Musculoskeletal:      Right lower leg: No edema.      Left lower leg: No edema.      Right foot: Bunion present.      Left foot: Bunion present.   Feet:      Right foot:      Protective Sensation: 8 sites tested. 3 sites sensed.      Skin integrity: Callus present. No ulcer or blister.      Toenail Condition: Right toenails are normal.      Left foot:      Protective Sensation: 8 sites tested. 3 sites sensed.      Skin  integrity: Callus present. No ulcer or blister.      Toenail Condition: Left toenails are normal.   Lymphadenopathy:      Cervical: No cervical adenopathy.   Skin:     Findings: No erythema.   Neurological:      Mental Status: She is alert and oriented to person, place, and time.   Psychiatric:         Behavior: Behavior normal.         Assessment:       1. Essential hypertension    2. Tobacco abuse    3. Coronary artery disease involving native coronary artery of native heart without angina pectoris    4. Leukocytosis, unspecified type    5. Abnormal CXR    6. Chronic obstructive pulmonary disease, unspecified COPD type    7. Hyperkalemia    8. Encounter for screening for malignant neoplasm of breast, unspecified screening modality        Plan:   Delores was seen today for follow-up.    Diagnoses and all orders for this visit:    Essential hypertension  Controlled  Low salt diet   Continue current medication  Tobacco abuse  Patient has been referred to smoking cessation program and has upcoming appt  Coronary artery disease involving native coronary artery of native heart without angina pectoris  Continue follow up with cardiology as scheduled  Leukocytosis, unspecified type  -     CBC Auto Differential; Future  Hematology appt tomorrow  Abnormal CXR  -     X-Ray Chest PA And Lateral; Future    Chronic obstructive pulmonary disease, unspecified COPD type  Follow up regarding nebulizer  Patient given nebulizer from office. Solution also sent to pharmacy   Hyperkalemia  -     Basic Metabolic Panel; Future    Encounter for screening for malignant neoplasm of breast, unspecified screening modality  -     Mammo Digital Screening Bilat w/ Aníbal; Future    Type 2 diabetes mellitus with hyperglycemia, without long-term current use of insulin  -     metFORMIN (GLUCOPHAGE-XR) 500 MG ER 24hr tablet; Take 1 tablet (500 mg total) by mouth daily with breakfast.  -     Cancel: Ambulatory referral/consult to Optometry; Future  Eye  exam up to date with eye care 20/20    Diabetic polyneuropathy associated with type 2 diabetes mellitus  -     Ambulatory referral/consult to Podiatry; Future    Insomnia, unspecified type  Patient currently on ambien. Patient advised that she is currently prescribed is  Higher than the recommended dose.  Patient advised that ambien is not intended for long term use and is associated with a high addiction and dependence potential. Patient verbalized understanding and understands that new PCP may not continue her ambien prescription but, instead choose a different sleep aid

## 2022-02-24 ENCOUNTER — OFFICE VISIT (OUTPATIENT)
Dept: HEMATOLOGY/ONCOLOGY | Facility: CLINIC | Age: 57
End: 2022-02-24
Payer: MEDICAID

## 2022-02-24 VITALS
DIASTOLIC BLOOD PRESSURE: 53 MMHG | SYSTOLIC BLOOD PRESSURE: 119 MMHG | HEIGHT: 67 IN | OXYGEN SATURATION: 96 % | WEIGHT: 227.31 LBS | RESPIRATION RATE: 16 BRPM | TEMPERATURE: 98 F | HEART RATE: 100 BPM | BODY MASS INDEX: 35.68 KG/M2

## 2022-02-24 DIAGNOSIS — D72.829 LEUKOCYTOSIS, UNSPECIFIED TYPE: ICD-10-CM

## 2022-02-24 PROCEDURE — 3078F DIAST BP <80 MM HG: CPT | Mod: CPTII,,, | Performed by: INTERNAL MEDICINE

## 2022-02-24 PROCEDURE — 4010F ACE/ARB THERAPY RXD/TAKEN: CPT | Mod: CPTII,,, | Performed by: INTERNAL MEDICINE

## 2022-02-24 PROCEDURE — 99215 OFFICE O/P EST HI 40 MIN: CPT | Mod: PBBFAC,PO | Performed by: INTERNAL MEDICINE

## 2022-02-24 PROCEDURE — 99999 PR PBB SHADOW E&M-EST. PATIENT-LVL V: CPT | Mod: PBBFAC,,, | Performed by: INTERNAL MEDICINE

## 2022-02-24 PROCEDURE — 3051F PR MOST RECENT HEMOGLOBIN A1C LEVEL 7.0 - < 8.0%: ICD-10-PCS | Mod: CPTII,,, | Performed by: INTERNAL MEDICINE

## 2022-02-24 PROCEDURE — 1111F DSCHRG MED/CURRENT MED MERGE: CPT | Mod: CPTII,,, | Performed by: INTERNAL MEDICINE

## 2022-02-24 PROCEDURE — 3008F PR BODY MASS INDEX (BMI) DOCUMENTED: ICD-10-PCS | Mod: CPTII,,, | Performed by: INTERNAL MEDICINE

## 2022-02-24 PROCEDURE — 1159F PR MEDICATION LIST DOCUMENTED IN MEDICAL RECORD: ICD-10-PCS | Mod: CPTII,,, | Performed by: INTERNAL MEDICINE

## 2022-02-24 PROCEDURE — 4010F PR ACE/ARB THEARPY RXD/TAKEN: ICD-10-PCS | Mod: CPTII,,, | Performed by: INTERNAL MEDICINE

## 2022-02-24 PROCEDURE — 3074F SYST BP LT 130 MM HG: CPT | Mod: CPTII,,, | Performed by: INTERNAL MEDICINE

## 2022-02-24 PROCEDURE — 1159F MED LIST DOCD IN RCRD: CPT | Mod: CPTII,,, | Performed by: INTERNAL MEDICINE

## 2022-02-24 PROCEDURE — 3078F PR MOST RECENT DIASTOLIC BLOOD PRESSURE < 80 MM HG: ICD-10-PCS | Mod: CPTII,,, | Performed by: INTERNAL MEDICINE

## 2022-02-24 PROCEDURE — 99999 PR PBB SHADOW E&M-EST. PATIENT-LVL V: ICD-10-PCS | Mod: PBBFAC,,, | Performed by: INTERNAL MEDICINE

## 2022-02-24 PROCEDURE — 99204 PR OFFICE/OUTPT VISIT, NEW, LEVL IV, 45-59 MIN: ICD-10-PCS | Mod: S$PBB,,, | Performed by: INTERNAL MEDICINE

## 2022-02-24 PROCEDURE — 3051F HG A1C>EQUAL 7.0%<8.0%: CPT | Mod: CPTII,,, | Performed by: INTERNAL MEDICINE

## 2022-02-24 PROCEDURE — 99204 OFFICE O/P NEW MOD 45 MIN: CPT | Mod: S$PBB,,, | Performed by: INTERNAL MEDICINE

## 2022-02-24 PROCEDURE — 3074F PR MOST RECENT SYSTOLIC BLOOD PRESSURE < 130 MM HG: ICD-10-PCS | Mod: CPTII,,, | Performed by: INTERNAL MEDICINE

## 2022-02-24 PROCEDURE — 1111F PR DISCHARGE MEDS RECONCILED W/ CURRENT OUTPATIENT MED LIST: ICD-10-PCS | Mod: CPTII,,, | Performed by: INTERNAL MEDICINE

## 2022-02-24 PROCEDURE — 3008F BODY MASS INDEX DOCD: CPT | Mod: CPTII,,, | Performed by: INTERNAL MEDICINE

## 2022-02-24 RX ORDER — LINAGLIPTIN 5 MG/1
5 TABLET, FILM COATED ORAL DAILY
Qty: 90 TABLET | Refills: 3 | Status: SHIPPED | OUTPATIENT
Start: 2022-02-24 | End: 2022-03-24

## 2022-02-24 NOTE — PROGRESS NOTES
HPI    56 years old female referred to Hematology Clinic for evaluation of chronic leukocytosis.  Patient has history of COPD coronary artery disease diabetes type 2 high cholesterol hypertension.      She is a heavy smoker.  Approximately 2 pack per week for many years.  There is no reported weight lost drenching night sweats.  Recently patient had pneumonia 2 weeks prior to this visit which was hospitalized.  Since discharge from hospital patient stop smoking.  She is still on antibiotics at the time of this visit.    Patient denies any skin rash fever or chills.      Past Medical History:   Diagnosis Date    Acid reflux     COPD (chronic obstructive pulmonary disease)     Coronary artery disease     Diabetes mellitus     High cholesterol     Hypertension      Social History     Socioeconomic History    Marital status: Single   Tobacco Use    Smoking status: Current Every Day Smoker     Packs/day: 0.50     Years: 13.00     Pack years: 6.50    Smokeless tobacco: Never Used   Substance and Sexual Activity    Alcohol use: Not Currently    Drug use: Not Currently     Social Determinants of Health     Financial Resource Strain: Low Risk     Difficulty of Paying Living Expenses: Not very hard   Food Insecurity: Food Insecurity Present    Worried About Running Out of Food in the Last Year: Sometimes true    Ran Out of Food in the Last Year: Sometimes true   Transportation Needs: Unmet Transportation Needs    Lack of Transportation (Medical): Yes    Lack of Transportation (Non-Medical): Yes   Physical Activity: Inactive    Days of Exercise per Week: 0 days    Minutes of Exercise per Session: 0 min   Stress: Stress Concern Present    Feeling of Stress : To some extent   Social Connections: Unknown    Frequency of Communication with Friends and Family: Once a week    Frequency of Social Gatherings with Friends and Family: More than three times a week    Active Member of Clubs or Organizations: No     Attends Club or Organization Meetings: Never    Marital Status: Never    Housing Stability: Unknown    Unable to Pay for Housing in the Last Year: No    Unstable Housing in the Last Year: No         Subjective      Review of Systems   Constitutional: Negative for appetite change, fatigue and unexpected weight change.   HENT: Negative for mouth sores.   Eyes: Negative for visual disturbance.   Respiratory: Negative for cough and shortness of breath.   Cardiovascular: Negative for chest pain.   Gastrointestinal: Negative for diarrhea.   Genitourinary: Negative for frequency.   Musculoskeletal: Negative for back pain.   Skin: Negative for rash.   Neurological: Negative for headaches.   Hematological: Negative for adenopathy.   Psychiatric/Behavioral: The patient is not nervous/anxious.   All other systems reviewed and are negative.     Objective    Physical Exam   Vitals:    02/24/22 0812   BP: (!) 119/53   Pulse: 100   Resp: 16   Temp: 97.7 °F (36.5 °C)     Awake alert no acute distress  Normocephalic atraumatic  No rash no lesion  Nonfocal  Normal respiratory effort  Normal rate    CMP  Sodium   Date Value Ref Range Status   02/23/2022 136 136 - 145 mmol/L Final     Potassium   Date Value Ref Range Status   02/23/2022 3.8 3.5 - 5.1 mmol/L Final     Chloride   Date Value Ref Range Status   02/23/2022 94 (L) 95 - 110 mmol/L Final     CO2   Date Value Ref Range Status   02/23/2022 26 23 - 29 mmol/L Final     Glucose   Date Value Ref Range Status   02/23/2022 163 (H) 70 - 110 mg/dL Final     BUN   Date Value Ref Range Status   02/23/2022 32 (H) 6 - 20 mg/dL Final     Creatinine   Date Value Ref Range Status   02/23/2022 1.8 (H) 0.5 - 1.4 mg/dL Final     Calcium   Date Value Ref Range Status   02/23/2022 9.6 8.7 - 10.5 mg/dL Final     Total Protein   Date Value Ref Range Status   02/18/2022 7.0 6.0 - 8.4 g/dL Final     Albumin   Date Value Ref Range Status   02/18/2022 3.6 3.5 - 5.2 g/dL Final     Total  Bilirubin   Date Value Ref Range Status   02/18/2022 0.6 0.1 - 1.0 mg/dL Final     Comment:     For infants and newborns, interpretation of results should be based  on gestational age, weight and in agreement with clinical  observations.    Premature Infant recommended reference ranges:  Up to 24 hours.............<8.0 mg/dL  Up to 48 hours............<12.0 mg/dL  3-5 days..................<15.0 mg/dL  6-29 days.................<15.0 mg/dL       Alkaline Phosphatase   Date Value Ref Range Status   02/18/2022 134 55 - 135 U/L Final     AST   Date Value Ref Range Status   02/18/2022 14 10 - 40 U/L Final     ALT   Date Value Ref Range Status   02/18/2022 18 10 - 44 U/L Final     Anion Gap   Date Value Ref Range Status   02/23/2022 16 8 - 16 mmol/L Final     eGFR if    Date Value Ref Range Status   02/23/2022 36.3 (A) >60 mL/min/1.73 m^2 Final     eGFR if non    Date Value Ref Range Status   02/23/2022 31.5 (A) >60 mL/min/1.73 m^2 Final     Comment:     Calculation used to obtain the estimated glomerular filtration  rate (eGFR) is the CKD-EPI equation.        Lab Results   Component Value Date    WBC 14.48 (H) 02/23/2022    HGB 15.4 02/23/2022    HCT 46.9 02/23/2022    MCV 94 02/23/2022     02/23/2022           Assessment    Persistent elevated leukocytosis    Chronic smoker    Diabetes    Hypertension    High cholesterol    Recent pneumonia hospitalized 2 weeks ago.  On oral antibiotics currently.    Plan    Check CBC CMP inflammatory marker.  Check peripheral flow cytometry.  RTC 4 weeks.      Leukocytosis, unspecified type  -     Ambulatory referral/consult to Hematology / Oncology

## 2022-02-28 ENCOUNTER — PATIENT OUTREACH (OUTPATIENT)
Dept: ADMINISTRATIVE | Facility: HOSPITAL | Age: 57
End: 2022-02-28
Payer: MEDICAID

## 2022-02-28 DIAGNOSIS — E11.9 DIABETES MELLITUS WITHOUT COMPLICATION: Primary | ICD-10-CM

## 2022-02-28 NOTE — PROGRESS NOTES
Urine microalbumin scheduled same day and location as previously scheduled labs.  Immunizations reconciled. Care team updated.

## 2022-03-02 ENCOUNTER — OFFICE VISIT (OUTPATIENT)
Dept: CARDIOLOGY | Facility: CLINIC | Age: 57
End: 2022-03-02
Payer: MEDICAID

## 2022-03-02 VITALS
OXYGEN SATURATION: 97 % | SYSTOLIC BLOOD PRESSURE: 120 MMHG | HEART RATE: 100 BPM | RESPIRATION RATE: 16 BRPM | HEIGHT: 67 IN | WEIGHT: 236 LBS | BODY MASS INDEX: 37.04 KG/M2 | DIASTOLIC BLOOD PRESSURE: 74 MMHG

## 2022-03-02 DIAGNOSIS — Z72.0 TOBACCO ABUSE: Chronic | ICD-10-CM

## 2022-03-02 DIAGNOSIS — I10 ESSENTIAL HYPERTENSION: Chronic | ICD-10-CM

## 2022-03-02 DIAGNOSIS — E11.65 TYPE 2 DIABETES MELLITUS WITH HYPERGLYCEMIA, WITHOUT LONG-TERM CURRENT USE OF INSULIN: ICD-10-CM

## 2022-03-02 DIAGNOSIS — E78.2 MIXED HYPERLIPIDEMIA: ICD-10-CM

## 2022-03-02 DIAGNOSIS — I50.43 SYSTOLIC AND DIASTOLIC CHF, ACUTE ON CHRONIC: ICD-10-CM

## 2022-03-02 DIAGNOSIS — I25.10 CORONARY ARTERY DISEASE INVOLVING NATIVE CORONARY ARTERY OF NATIVE HEART WITHOUT ANGINA PECTORIS: ICD-10-CM

## 2022-03-02 DIAGNOSIS — M54.2 NECK PAIN: Primary | ICD-10-CM

## 2022-03-02 PROCEDURE — 1159F MED LIST DOCD IN RCRD: CPT | Mod: CPTII,S$GLB,,

## 2022-03-02 PROCEDURE — 4010F PR ACE/ARB THEARPY RXD/TAKEN: ICD-10-PCS | Mod: CPTII,S$GLB,,

## 2022-03-02 PROCEDURE — 1159F PR MEDICATION LIST DOCUMENTED IN MEDICAL RECORD: ICD-10-PCS | Mod: CPTII,S$GLB,,

## 2022-03-02 PROCEDURE — 3051F HG A1C>EQUAL 7.0%<8.0%: CPT | Mod: CPTII,S$GLB,,

## 2022-03-02 PROCEDURE — 99214 OFFICE O/P EST MOD 30 MIN: CPT | Mod: S$GLB,,,

## 2022-03-02 PROCEDURE — 3078F PR MOST RECENT DIASTOLIC BLOOD PRESSURE < 80 MM HG: ICD-10-PCS | Mod: CPTII,S$GLB,,

## 2022-03-02 PROCEDURE — 3074F PR MOST RECENT SYSTOLIC BLOOD PRESSURE < 130 MM HG: ICD-10-PCS | Mod: CPTII,S$GLB,,

## 2022-03-02 PROCEDURE — 3051F PR MOST RECENT HEMOGLOBIN A1C LEVEL 7.0 - < 8.0%: ICD-10-PCS | Mod: CPTII,S$GLB,,

## 2022-03-02 PROCEDURE — 3008F PR BODY MASS INDEX (BMI) DOCUMENTED: ICD-10-PCS | Mod: CPTII,S$GLB,,

## 2022-03-02 PROCEDURE — 3078F DIAST BP <80 MM HG: CPT | Mod: CPTII,S$GLB,,

## 2022-03-02 PROCEDURE — 1111F DSCHRG MED/CURRENT MED MERGE: CPT | Mod: CPTII,S$GLB,,

## 2022-03-02 PROCEDURE — 99214 PR OFFICE/OUTPT VISIT, EST, LEVL IV, 30-39 MIN: ICD-10-PCS | Mod: S$GLB,,,

## 2022-03-02 PROCEDURE — 3008F BODY MASS INDEX DOCD: CPT | Mod: CPTII,S$GLB,,

## 2022-03-02 PROCEDURE — 1111F PR DISCHARGE MEDS RECONCILED W/ CURRENT OUTPATIENT MED LIST: ICD-10-PCS | Mod: CPTII,S$GLB,,

## 2022-03-02 PROCEDURE — 4010F ACE/ARB THERAPY RXD/TAKEN: CPT | Mod: CPTII,S$GLB,,

## 2022-03-02 PROCEDURE — 3074F SYST BP LT 130 MM HG: CPT | Mod: CPTII,S$GLB,,

## 2022-03-02 NOTE — ASSESSMENT & PLAN NOTE
Patient is identified as having Combined Systolic and Diastolic heart failure that is Chronic. CHF is currently controlled. Latest ECHO performed and demonstrates- Results for orders placed during the hospital encounter of 02/12/22    Echo    Interpretation Summary  · The estimated PA systolic pressure is 27 mmHg.  · The left ventricle is mildly enlarged with mild concentric hypertrophy and moderately decreased systolic function.  · The estimated ejection fraction is 35%.  · Grade I left ventricular diastolic dysfunction.  · There is moderate left ventricular global hypokinesis.  · Normal right ventricular size with normal right ventricular systolic function.  · Mild left atrial enlargement.  · Mild tricuspid regurgitation.  · Normal central venous pressure (3 mmHg).  . Continue Beta Blocker, ACE/ARB and Furosemide and monitor clinical status closely. Monitor on telemetry. Patient is on CHF pathway.  Monitor strict Is&Os and daily weights.  Place on fluid restriction of 2 L. Continue to stress to patient importance of self efficacy and  on diet for CHF. Last BNP reviewed- and noted below 40.

## 2022-03-02 NOTE — ASSESSMENT & PLAN NOTE
/74   Reports great pressures at home   Cont lasix, losartan 50 mg daily, toprol 50 mg daily  Low Na diet

## 2022-03-02 NOTE — PROGRESS NOTES
Subjective:    Patient ID:  Delores Fox is a 56 y.o. female patient here for evaluation Hospital Follow Up      History of Present Illness:   Patient is here today for a hospital follow up. She has been feeling much better besides her neck pain- which radiates to her jaw. She knows that she needs neck surgery. I will send in a referral as she does not know who accepts her insurance. Her BP has been great at home. Please see ROS.     Discharge Summary:   the patient presented with sudden onset shortness of breath. HPI as above. She was placed on bipap with rapid improvement in her symptoms in the ICU. She was on Nasal Canula on my initial evaluation. She required IV diuresis with additional improvement. She was transferred to the floor. Her steroids were held as she did not have any wheezing and concern was for fluid retention. She has CHFrEF with EF of 35%. Cardiology evaluated her and recommended diuresis. She had a mild troponin elevation due to demand ischemia. She was continued on GDMT for her CHF. She had mild increase in her Cr with rising BUN as she was diuresed. Diuresis was held. She required a small amount of oxygen and was felt safe for discharge with oral diuresis and ongoing management of her CHF as an outpatient. She will continue azithromycin empirically for CHF exacerbation along with albuterol inhaler and steroid inhaler as well as an outpatient. She will need to follow up with Cardiology and pulmonary. There was some question of whether anxiety is playing a role in her shortness of breath. She was given a small amount of ativan for anxiolysis at discharge with plans for close follow up with her pcp. She was feeling well at discharge and safe and stable for outpatient follow up for ongoing management.        Review of patient's allergies indicates:   Allergen Reactions    Morphine Nausea And Vomiting    Sulfa (sulfonamide antibiotics) Nausea And Vomiting       Past Medical History:    Diagnosis Date    Acid reflux     COPD (chronic obstructive pulmonary disease)     Coronary artery disease     Diabetes mellitus     High cholesterol     Hypertension      Past Surgical History:   Procedure Laterality Date    APPENDECTOMY      CATHETERIZATION OF BOTH LEFT AND RIGHT HEART N/A 11/23/2020    Procedure: CATHETERIZATION, HEART, BOTH LEFT AND RIGHT;  Surgeon: Doug Kendall MD;  Location: [facility] CATH/EP LAB;  Service: Cardiology;  Laterality: N/A;    CHOLECYSTECTOMY      EXCISION OF LESION OF EYELID Right 12/11/2020    Procedure: EXCISION, LESION, EYELID;  Surgeon: Malachi Rodriguez MD;  Location: [facility] OR;  Service: Ophthalmology;  Laterality: Right;  Inclusion Cyst removal right lower lid    JOINT REPLACEMENT Left     left hip    MASTOIDECTOMY Right      Social History     Tobacco Use    Smoking status: Current Every Day Smoker     Packs/day: 0.50     Years: 13.00     Pack years: 6.50    Smokeless tobacco: Never Used   Substance Use Topics    Alcohol use: Not Currently    Drug use: Not Currently        Review of Systems:    As noted in HPI in addition      REVIEW OF SYSTEMS  CARDIOVASCULAR: No recent chest pain, palpitations, arm, neck, or jaw pain  RESPIRATORY: No recent fever, cough chills, SOB or congestion  : No blood in the urine  GI: No Nausea, vomiting, constipation, diarrhea, blood, or reflux.  MUSCULOSKELETAL: + neck pain. No myalgias  NEURO: No lightheadedness or dizziness  EYES: No Double vision, blurry, vision or headache            Objective        Vitals:    03/02/22 1411   BP: 120/74   Pulse: 100   Resp: 16       LIPIDS - LAST 2   Lab Results   Component Value Date    CHOL 183 02/18/2022    HDL 36 (L) 02/18/2022    LDLCALC 115.8 02/18/2022    TRIG 156 (H) 02/18/2022    CHOLHDL 19.7 (L) 02/18/2022       CBC - LAST 2  Lab Results   Component Value Date    WBC 14.48 (H) 02/23/2022    WBC 15.49 (H) 02/18/2022    RBC 4.98 02/23/2022    RBC 4.74 02/18/2022    HGB 15.4  02/23/2022    HGB 14.6 02/18/2022    HCT 46.9 02/23/2022    HCT 45.2 02/18/2022    MCV 94 02/23/2022    MCV 95 02/18/2022    MCH 30.9 02/23/2022    MCH 30.8 02/18/2022    MCHC 32.8 02/23/2022    MCHC 32.3 02/18/2022    RDW 12.1 02/23/2022    RDW 11.9 02/18/2022     02/23/2022     02/18/2022    MPV 9.4 02/23/2022    MPV 9.6 02/18/2022    GRAN 7.8 (H) 02/23/2022    GRAN 53.6 02/23/2022    LYMPH 4.9 (H) 02/23/2022    LYMPH 33.6 02/23/2022    MONO 1.5 (H) 02/23/2022    MONO 10.1 02/23/2022    BASO 0.06 02/23/2022    BASO 0.02 02/18/2022    NRBC 0 02/23/2022    NRBC 0 02/18/2022       CHEMISTRY & LIVER FUNCTION - LAST 2  Lab Results   Component Value Date     02/23/2022     02/18/2022    K 3.8 02/23/2022    K 5.4 (H) 02/18/2022    CL 94 (L) 02/23/2022    CL 99 02/18/2022    CO2 26 02/23/2022    CO2 26 02/18/2022    ANIONGAP 16 02/23/2022    ANIONGAP 11 02/18/2022    BUN 32 (H) 02/23/2022    BUN 26 (H) 02/18/2022    CREATININE 1.8 (H) 02/23/2022    CREATININE 1.2 02/18/2022     (H) 02/23/2022     (H) 02/18/2022    CALCIUM 9.6 02/23/2022    CALCIUM 8.9 02/18/2022    PH 7.235 (LL) 02/12/2022    MG 2.6 02/14/2022    MG 2.3 02/13/2022    ALBUMIN 3.6 02/18/2022    ALBUMIN 3.6 02/14/2022    PROT 7.0 02/18/2022    PROT 6.7 02/14/2022    ALKPHOS 134 02/18/2022    ALKPHOS 100 02/14/2022    ALT 18 02/18/2022    ALT 18 02/14/2022    AST 14 02/18/2022    AST 18 02/14/2022    BILITOT 0.6 02/18/2022    BILITOT 0.6 02/14/2022        CARDIAC PROFILE - LAST 2  Lab Results   Component Value Date    BNP 40 02/18/2022     (H) 02/14/2022    CPK 31 11/21/2020    TROPONINI 0.179 (H) 02/13/2022    TROPONINI 0.218 (H) 02/12/2022        COAGULATION - LAST 2  Lab Results   Component Value Date    LABPT 13.3 12/31/2020    LABPT 15.4 (H) 11/21/2020    INR 1.1 12/31/2020    INR 1.3 11/21/2020    APTT 34.3 (H) 11/23/2020    APTT 35.9 (H) 11/21/2020       ENDOCRINE & PSA - LAST 2  Lab Results   Component  Value Date    HGBA1C 7.5 (H) 02/18/2022    HGBA1C 6.3 04/30/2020    TSH 0.960 11/21/2020        ECHOCARDIOGRAM RESULTS  Results for orders placed during the hospital encounter of 02/12/22    Echo    Interpretation Summary  · The estimated PA systolic pressure is 27 mmHg.  · The left ventricle is mildly enlarged with mild concentric hypertrophy and moderately decreased systolic function.  · The estimated ejection fraction is 35%.  · Grade I left ventricular diastolic dysfunction.  · There is moderate left ventricular global hypokinesis.  · Normal right ventricular size with normal right ventricular systolic function.  · Mild left atrial enlargement.  · Mild tricuspid regurgitation.  · Normal central venous pressure (3 mmHg).      CURRENT/PREVIOUS VISIT EKG  Results for orders placed or performed during the hospital encounter of 02/12/22   EKG 12-lead    Collection Time: 02/12/22  1:27 AM    Narrative    Test Reason : R06.02,    Vent. Rate : 123 BPM     Atrial Rate : 123 BPM     P-R Int : 154 ms          QRS Dur : 096 ms      QT Int : 304 ms       P-R-T Axes : 061 042 057 degrees     QTc Int : 435 ms    Sinus tachycardia  Possible Left atrial enlargement  Nonspecific ST and T wave abnormality  Abnormal ECG  When compared with ECG of 25-MAR-2021 21:57,  ST now depressed in Inferior leads  Nonspecific T wave abnormality now evident in Inferior leads  Nonspecific T wave abnormality, worse in Anterior-lateral leads  Confirmed by Kel MASTERS, Shahid MCKINNEY ([id number]) on 2/18/2022 8:38:29 PM    Referred By: [email or web address]   SELF           Confirmed By:Shahid Begum MD     No valid procedures specified.   Results for orders placed during the hospital encounter of 11/21/20    Nuclear Stress Test    Interpretation Summary    The EKG portion of this study is negative for ischemia.    The patient reported no chest pain during the stress test.    There were no arrhythmias during stress.    ECG Stress Nuclear portion of this study will be  reported separately.    No valid procedures specified.    PHYSICAL EXAM  CONSTITUTIONAL: Well built, well nourished in no apparent distress  NECK: no carotid bruit, no JVD  LUNGS: CTA  CHEST WALL: no tenderness  HEART: regular rate and rhythm, S1, S2 normal, faint systolic murmur present    ABDOMEN: soft, non-tender; bowel sounds normal; no masses,  no organomegaly  EXTREMITIES: Extremities normal, no edema, no calf tenderness noted  NEURO: AAO X 3    I HAVE REVIEWED :    The vital signs, nurses notes, and all the pertinent radiology and labs.      Current Outpatient Medications   Medication Instructions    albuterol (ACCUNEB) 1.25 mg, Nebulization, Every 6 hours PRN, Rescue    aspirin 81 mg, Oral, Daily    atorvastatin (LIPITOR) 40 mg, Oral, Daily    blood sugar diagnostic Strp To check BG 2 times daily, to use with insurance preferred meter    blood-glucose meter kit To check BG 2 times daily, to use with insurance preferred meter    fluticasone furoate-vilanteroL (BREO) 100-25 mcg/dose diskus inhaler 1 puff, Inhalation    furosemide (LASIX) 20 mg, Oral, Daily    ipratropium-albuteroL (COMBIVENT)  mcg/actuation inhaler 1 puff, Inhalation, Every 4 hours PRN, Rescue    lancets Misc To check BG 2 times daily, to use with insurance preferred meter    LORazepam (ATIVAN) 0.5 mg, Oral, Every 6 hours PRN    losartan (COZAAR) 25 mg, Oral, 2 times daily    metFORMIN (GLUCOPHAGE-XR) 500 mg, Oral, With breakfast    metoprolol succinate (TOPROL-XL) 50 mg, Oral, Daily    ondansetron (ZOFRAN) 4 mg, Oral, Every 8 hours PRN    pantoprazole (PROTONIX) 40 mg, Oral, 2 times daily    pregabalin (LYRICA) 75 mg, Oral, 2 times daily    TRADJENTA 5 mg, Oral, Daily    zolpidem (AMBIEN) 10 mg, Oral, Nightly PRN          Assessment & Plan     CAD (coronary artery disease)  Clinically stable with no angial equivalents   Cont asa and statin       HLD (hyperlipidemia)     Cont lipitor daily for now   Repeat in 6  months   Low fat low cholesterol diet     Essential hypertension  /74   Reports great pressures at home   Cont lasix, losartan 50 mg daily, toprol 50 mg daily  Low Na diet     Systolic and diastolic CHF, acute on chronic  Patient is identified as having Combined Systolic and Diastolic heart failure that is Chronic. CHF is currently controlled. Latest ECHO performed and demonstrates- Results for orders placed during the hospital encounter of 02/12/22    Echo    Interpretation Summary  · The estimated PA systolic pressure is 27 mmHg.  · The left ventricle is mildly enlarged with mild concentric hypertrophy and moderately decreased systolic function.  · The estimated ejection fraction is 35%.  · Grade I left ventricular diastolic dysfunction.  · There is moderate left ventricular global hypokinesis.  · Normal right ventricular size with normal right ventricular systolic function.  · Mild left atrial enlargement.  · Mild tricuspid regurgitation.  · Normal central venous pressure (3 mmHg).  . Continue Beta Blocker, ACE/ARB and Furosemide and monitor clinical status closely. Monitor on telemetry. Patient is on CHF pathway.  Monitor strict Is&Os and daily weights.  Place on fluid restriction of 2 L. Continue to stress to patient importance of self efficacy and  on diet for CHF. Last BNP reviewed- and noted below 40.       Type 2 diabetes mellitus with hyperglycemia, without long-term current use of insulin  Last A1C 7.5   On metformin and tradjenta   Per PCP     Tobacco abuse  Going to tobacco cessation course tomorrow           Follow up in about 6 months (around 9/2/2022).

## 2022-03-03 ENCOUNTER — CLINICAL SUPPORT (OUTPATIENT)
Dept: SMOKING CESSATION | Facility: CLINIC | Age: 57
End: 2022-03-03
Payer: COMMERCIAL

## 2022-03-03 ENCOUNTER — OFFICE VISIT (OUTPATIENT)
Dept: PODIATRY | Facility: CLINIC | Age: 57
End: 2022-03-03
Payer: MEDICAID

## 2022-03-03 ENCOUNTER — DOCUMENTATION ONLY (OUTPATIENT)
Dept: FAMILY MEDICINE | Facility: CLINIC | Age: 57
End: 2022-03-03
Payer: MEDICAID

## 2022-03-03 VITALS
OXYGEN SATURATION: 98 % | HEIGHT: 67 IN | RESPIRATION RATE: 18 BRPM | HEART RATE: 95 BPM | WEIGHT: 236 LBS | BODY MASS INDEX: 37.04 KG/M2

## 2022-03-03 DIAGNOSIS — M21.70 LOWER LIMB LENGTH DIFFERENCE: ICD-10-CM

## 2022-03-03 DIAGNOSIS — E11.9 ENCOUNTER FOR DIABETIC FOOT EXAM: ICD-10-CM

## 2022-03-03 DIAGNOSIS — M21.621 TAILOR'S BUNION OF BOTH FEET: ICD-10-CM

## 2022-03-03 DIAGNOSIS — M21.622 TAILOR'S BUNION OF BOTH FEET: ICD-10-CM

## 2022-03-03 DIAGNOSIS — E11.42 DIABETIC POLYNEUROPATHY ASSOCIATED WITH TYPE 2 DIABETES MELLITUS: Primary | ICD-10-CM

## 2022-03-03 DIAGNOSIS — M20.12 HALLUX VALGUS, BILATERAL: ICD-10-CM

## 2022-03-03 DIAGNOSIS — M20.11 HALLUX VALGUS, BILATERAL: ICD-10-CM

## 2022-03-03 DIAGNOSIS — F17.200 NICOTINE DEPENDENCE: Primary | ICD-10-CM

## 2022-03-03 PROCEDURE — 99999 PR PBB SHADOW E&M-EST. PATIENT-LVL II: ICD-10-PCS | Mod: PBBFAC,,,

## 2022-03-03 PROCEDURE — 3008F PR BODY MASS INDEX (BMI) DOCUMENTED: ICD-10-PCS | Mod: CPTII,S$GLB,, | Performed by: PODIATRIST

## 2022-03-03 PROCEDURE — 99204 OFFICE O/P NEW MOD 45 MIN: CPT | Mod: S$GLB,,, | Performed by: PODIATRIST

## 2022-03-03 PROCEDURE — 4010F ACE/ARB THERAPY RXD/TAKEN: CPT | Mod: CPTII,S$GLB,, | Performed by: PODIATRIST

## 2022-03-03 PROCEDURE — 3051F HG A1C>EQUAL 7.0%<8.0%: CPT | Mod: CPTII,S$GLB,, | Performed by: PODIATRIST

## 2022-03-03 PROCEDURE — 1160F RVW MEDS BY RX/DR IN RCRD: CPT | Mod: CPTII,S$GLB,, | Performed by: PODIATRIST

## 2022-03-03 PROCEDURE — 3051F PR MOST RECENT HEMOGLOBIN A1C LEVEL 7.0 - < 8.0%: ICD-10-PCS | Mod: CPTII,S$GLB,, | Performed by: PODIATRIST

## 2022-03-03 PROCEDURE — 99404 PREV MED CNSL INDIV APPRX 60: CPT | Mod: S$GLB,,,

## 2022-03-03 PROCEDURE — 99204 PR OFFICE/OUTPT VISIT, NEW, LEVL IV, 45-59 MIN: ICD-10-PCS | Mod: S$GLB,,, | Performed by: PODIATRIST

## 2022-03-03 PROCEDURE — 3008F BODY MASS INDEX DOCD: CPT | Mod: CPTII,S$GLB,, | Performed by: PODIATRIST

## 2022-03-03 PROCEDURE — 1159F PR MEDICATION LIST DOCUMENTED IN MEDICAL RECORD: ICD-10-PCS | Mod: CPTII,S$GLB,, | Performed by: PODIATRIST

## 2022-03-03 PROCEDURE — 1160F PR REVIEW ALL MEDS BY PRESCRIBER/CLIN PHARMACIST DOCUMENTED: ICD-10-PCS | Mod: CPTII,S$GLB,, | Performed by: PODIATRIST

## 2022-03-03 PROCEDURE — 4010F PR ACE/ARB THEARPY RXD/TAKEN: ICD-10-PCS | Mod: CPTII,S$GLB,, | Performed by: PODIATRIST

## 2022-03-03 PROCEDURE — 99999 PR PBB SHADOW E&M-EST. PATIENT-LVL II: CPT | Mod: PBBFAC,,,

## 2022-03-03 PROCEDURE — 1111F PR DISCHARGE MEDS RECONCILED W/ CURRENT OUTPATIENT MED LIST: ICD-10-PCS | Mod: CPTII,S$GLB,, | Performed by: PODIATRIST

## 2022-03-03 PROCEDURE — 99404 PR PREVENT COUNSEL,INDIV,60 MIN: ICD-10-PCS | Mod: S$GLB,,,

## 2022-03-03 PROCEDURE — 1111F DSCHRG MED/CURRENT MED MERGE: CPT | Mod: CPTII,S$GLB,, | Performed by: PODIATRIST

## 2022-03-03 PROCEDURE — 1159F MED LIST DOCD IN RCRD: CPT | Mod: CPTII,S$GLB,, | Performed by: PODIATRIST

## 2022-03-03 RX ORDER — VARENICLINE TARTRATE 1 MG/1
1 TABLET, FILM COATED ORAL 2 TIMES DAILY
Qty: 56 TABLET | Refills: 0 | Status: SHIPPED | OUTPATIENT
Start: 2022-03-03 | End: 2022-04-01

## 2022-03-03 NOTE — PROGRESS NOTES
"  [address] [facility] [address] [place] [address]  Phone: [phone]   [phone]    Patient's PCP:Tomy Rios MD  Referring Provider: Ying Ro    Subjective:      Chief Complaint:: Diabetic Foot Exam (Yearly foot exam)    MADHAV Fox is a 56 y.o. female who presents to the clinic for a diabetic foot exam.   Pt has seen Ying Ro NP on 02/23/2022 who treats them for their diabetes.  Pt has been a diabetic for eleven years.  Taking Metformin and Tradjenta to treat diabetes.  Patient complains of pain to both for feet secondary to her bunions and diabetic neuropathy.  She states that she takes Lyrica for her neuropathy.    Blood sugar: 162  Hemoglobin A1C: 7.5 2/18/2022    Vitals:    03/03/22 1605   Pulse: 95   Resp: 18   SpO2: 98%   Weight: 107 kg (236 lb)   Height: 5' 7" (1.702 m)   PainSc:   9      Shoe Size: 9-9.5    Past Surgical History:   Procedure Laterality Date    APPENDECTOMY      CATHETERIZATION OF BOTH LEFT AND RIGHT HEART N/A 11/23/2020    Procedure: CATHETERIZATION, HEART, BOTH LEFT AND RIGHT;  Surgeon: Doug Kendall MD;  Location: [facility] CATH/EP LAB;  Service: Cardiology;  Laterality: N/A;    CHOLECYSTECTOMY      EXCISION OF LESION OF EYELID Right 12/11/2020    Procedure: EXCISION, LESION, EYELID;  Surgeon: Malachi Rodriguez MD;  Location: [facility] OR;  Service: Ophthalmology;  Laterality: Right;  Inclusion Cyst removal right lower lid    JOINT REPLACEMENT Left     left hip    MASTOIDECTOMY Right      Past Medical History:   Diagnosis Date    Acid reflux     COPD (chronic obstructive pulmonary disease)     Coronary artery disease     Diabetes mellitus     High cholesterol     Hypertension      History reviewed. No pertinent family history.     Social History:   Marital Status: Single  Alcohol History:  reports previous alcohol use.  Tobacco History:  reports that she has been smoking. She has a 6.50 pack-year smoking history. She has never used " smokeless tobacco.  Drug History:  reports previous drug use.    Review of patient's allergies indicates:   Allergen Reactions    Morphine Nausea And Vomiting    Sulfa (sulfonamide antibiotics) Nausea And Vomiting       Current Outpatient Medications   Medication Sig Dispense Refill    albuterol (ACCUNEB) 1.25 mg/3 mL Nebu Take 3 mLs (1.25 mg total) by nebulization every 6 (six) hours as needed (wheezing). Rescue 75 mL 1    aspirin 81 MG Chew Take 1 tablet (81 mg total) by mouth once daily. 30 tablet 0    atorvastatin (LIPITOR) 40 MG tablet Take 1 tablet (40 mg total) by mouth once daily. 90 tablet 0    blood sugar diagnostic Strp To check BG 2 times daily, to use with insurance preferred meter 200 each 0    blood-glucose meter kit To check BG 2 times daily, to use with insurance preferred meter 1 each 0    fluticasone furoate-vilanteroL (BREO) 100-25 mcg/dose diskus inhaler Inhale 1 puff into the lungs.      furosemide (LASIX) 20 MG tablet Take 1 tablet (20 mg total) by mouth once daily. 90 tablet 0    ipratropium-albuteroL (COMBIVENT)  mcg/actuation inhaler Inhale 1 puff into the lungs every 4 (four) hours as needed for Wheezing or Shortness of Breath. Rescue 4 g 0    lancets Misc To check BG 2 times daily, to use with insurance preferred meter 200 each 1    linaGLIPtin (TRADJENTA) 5 mg Tab tablet Take 1 tablet (5 mg total) by mouth once daily. 90 tablet 3    LORazepam (ATIVAN) 0.5 MG tablet Take 1 tablet (0.5 mg total) by mouth every 6 (six) hours as needed for Anxiety. 12 tablet 0    losartan (COZAAR) 25 MG tablet Take 1 tablet (25 mg total) by mouth 2 (two) times a day. 180 tablet 0    metFORMIN (GLUCOPHAGE-XR) 500 MG ER 24hr tablet Take 1 tablet (500 mg total) by mouth daily with breakfast. 90 tablet 0    metoprolol succinate (TOPROL-XL) 50 MG 24 hr tablet Take 1 tablet (50 mg total) by mouth once daily. 90 tablet 0    ondansetron (ZOFRAN) 4 MG tablet Take 1 tablet (4 mg total) by mouth  every 8 (eight) hours as needed for Nausea. 12 tablet 0    pantoprazole (PROTONIX) 40 MG tablet Take 1 tablet (40 mg total) by mouth 2 (two) times daily. 90 tablet 0    pregabalin (LYRICA) 75 MG capsule Take 75 mg by mouth 2 (two) times daily.       varenicline (CHANTIX) 1 mg Tab Take 1 tablet (1 mg total) by mouth 2 (two) times daily. 56 tablet 0    zolpidem (AMBIEN) 10 mg Tab Take 10 mg by mouth nightly as needed.       No current facility-administered medications for this visit.     Facility-Administered Medications Ordered in Other Visits   Medication Dose Route Frequency Provider Last Rate Last Admin    electrolyte-S (ISOLYTE)   Intravenous Continuous Madi Herr MD   New Bag at 12/11/20 1041    lidocaine (PF) 10 mg/ml (1%) injection 10 mg  1 mL Intradermal Once Madi Herr MD        sodium chloride 0.9% flush 3 mL  3 mL Intravenous Q8H Madi Herr MD           Review of Systems   Constitutional: Negative for chills, fatigue, fever and unexpected weight change.   HENT: Negative for hearing loss and trouble swallowing.    Eyes: Negative for photophobia and visual disturbance.   Respiratory: Positive for cough. Negative for shortness of breath and wheezing.    Cardiovascular: Negative for chest pain, palpitations and leg swelling.   Gastrointestinal: Negative for abdominal pain and nausea.   Genitourinary: Negative for dysuria and frequency.   Musculoskeletal: Positive for gait problem. Negative for arthralgias, back pain, joint swelling and myalgias.   Skin: Negative for rash and wound.   Neurological: Positive for numbness. Negative for tremors, seizures, weakness and headaches.   Hematological: Does not bruise/bleed easily.         Objective:        Physical Exam:   Foot Exam    General  General Appearance: appears stated age and healthy   Orientation: alert and oriented to person, place, and time   Affect: appropriate   Gait: unimpaired       Right Foot/Ankle     Inspection  and Palpation  Ecchymosis: none  Tenderness: great toe metatarsophalangeal joint (Generalized tenderness the foot)  Swelling: none   Arch: pes planus  Hammertoes: absent  Hallux valgus: yes  Skin Exam: skin intact; no drainage, no ulcer and no erythema   Neurovascular  Dorsalis pedis: 1+  Posterior tibial: 1+  Capillary Refill: 2+  Varicose veins: not present  Saphenous nerve sensation: diminished  Tibial nerve sensation: diminished  Superficial peroneal nerve sensation: diminished  Deep peroneal nerve sensation: diminished  Sural nerve sensation: diminished    Muscle Strength  Ankle dorsiflexion: 5  Ankle plantar flexion: 5  Ankle inversion: 5  Ankle eversion: 5  Great toe extension: 5  Great toe flexion: 5    Range of Motion    Normal right ankle ROM    Tests  Anterior drawer: negative   Talar tilt: negative   PT Tinel's sign: negative    Paresthesia: negative  Comments  Moderate bunion deformity is present.  Great toe is laterally deviated abutting the 2nd toe.  Mildly tender to palpation.    Mild tailor's bunion deformity.    Left Foot/Ankle      Inspection and Palpation  Ecchymosis: none  Tenderness: great toe metatarsophalangeal joint (Generalized tenderness the foot)  Swelling: none   Arch: pes planus  Hammertoes: absent  Hallux valgus: yes  Skin Exam: skin intact; no drainage, no ulcer and no erythema   Neurovascular  Dorsalis pedis: 1+  Posterior tibial: 1+  Capillary refill: 2+  Varicose veins: not present  Saphenous nerve sensation: diminished  Tibial nerve sensation: diminished  Superficial peroneal nerve sensation: diminished  Deep peroneal nerve sensation: diminished  Sural nerve sensation: diminished    Muscle Strength  Ankle dorsiflexion: 5  Ankle plantar flexion: 5  Ankle inversion: 5  Ankle eversion: 5  Great toe extension: 5  Great toe flexion: 5    Range of Motion    Normal left ankle ROM    Tests  Anterior drawer: negative   Talar tilt: negative   PT Tinel's sign: negative  Paresthesia:  negative  Comments  Moderate bunion deformity is present.  Great toe is laterally deviated abutting the 2nd toe.  Mildly tender to palpation.    Mild tailor's bunion deformity.    Physical Exam  Cardiovascular:      Pulses:           Dorsalis pedis pulses are 1+ on the right side and 1+ on the left side.        Posterior tibial pulses are 1+ on the right side and 1+ on the left side.   Musculoskeletal:      Right foot: Bunion present.      Left foot: Bunion present.   Feet:      Right foot:      Skin integrity: No ulcer or erythema.      Left foot:      Skin integrity: No ulcer or erythema.         Imaging: none            Assessment:       1. Diabetic polyneuropathy associated with type 2 diabetes mellitus    2. Hallux valgus, bilateral    3. Tailor's bunion of both feet    4. Lower limb length difference    5. Encounter for diabetic foot exam      Plan:   Diabetic polyneuropathy associated with type 2 diabetes mellitus  -     Ambulatory referral/consult to Podiatry  -      DIABETES FOOT EXAM  -     Cancel: DIABETIC SHOES FOR HOME USE  -     DIABETIC SHOES FOR HOME USE    Hallux valgus, bilateral  -     Cancel: DIABETIC SHOES FOR HOME USE  -     DIABETIC SHOES FOR HOME USE    Tailor's bunion of both feet  -     Cancel: DIABETIC SHOES FOR HOME USE  -     DIABETIC SHOES FOR HOME USE    Lower limb length difference  -     DIABETIC SHOES FOR HOME USE    Encounter for diabetic foot exam  -      DIABETES FOOT EXAM      Follow up in about 1 year (around 3/3/2023), or if symptoms worsen or fail to improve.    Procedures        I recommend daily B complex vitamin supplement to help with her nerve pain.    I am ordering diabetic shoes for the patient.  Patient stated that she had previous hip surgery that her legs are un equal lengths.  I am ordering custom inserts to accommodate for her limb length inequality.    Counseling:     I provided patient education verbally regarding:   Patient diagnosis, treatment options, as  well as alternatives, risks, and benefits.     Counseled patient on the aspects of diabetes and how it pertains to the feet.  I explained the importance of proper diabetic foot care and how it is essential for the health of their feet.    I discussed the importance of knowing their HGA1c and that the level needs to be as close to 6 as possible.  I discussed the increase complications of high blood sugar including stroke, blindness, heart attack, kidney failure and loss of limb secondary to neuropathy and PVD.    Patient  was made aware of inspecting their feet.  Patient was told to be aware of any breaks in the skin or redness.  With neuropathy, these areas are not recognized early due to the numbness.  I discussed the lab test and NCV EMG test and also the role of the neurologist in evaluating the patient.  I discussed Diabetes, lower back issues, metabolic disorders, systemic causes, chemotherapy, viatmain defiencey, heavy metal exposure, as some of the causes.  I also explained that as much as 40% of the time we can not find a cause.  I discussed different treatments available to control the symptoms but whcih may not cure the problem.      Shoe inspection. Patient instructed on proper foot hygeine. We discussed wearing proper shoe gear, daily foot inspections, never walking without protective shoe gear, never putting sharp instruments to feet, routine podiatric nail visits every 2-3 months.      I provided patient education regarding: Bunion deformity and causes. I discussed conservative treatment with shoe modification, pads, treating symptoms with OTC NSAIDs, pads between toes, inserts and pads over the bunion. I explained that conservative treatment is non-curative but may help with pain and slow down the progression of the deformity.     Tailor's bunion: I discussed the deformity of the 5th mpj and 5th metatarsal with curvature of the little toe, prominence of the 5th metatarsal head and widening of the  forefoot.  I discussed conservative treatment of wider shoes, NSAIDs, soaks, ice, heat, pads and wider shoes.  I discussed surgical treatment iwth either partial 5th metatarsal head resection, or osteotomy of 5th metatarsal with follow up and possible complications.      This note was created using Dragon voice recognition software that occasionally misinterpreted phrases or words.

## 2022-03-03 NOTE — PATIENT INSTRUCTIONS
Your Diabetes Foot Care Program    Every day you depend on your feet to keep you moving. But when you have diabetes, your feet need special care. Even a small foot problem can become very serious. So dont take your feet for granted. By working with your diabetes healthcare team, you can learn how to protect your feet and keep them healthy.  Evaluating your feet  An evaluation helps your healthcare provider check the condition of your feet. The evaluation includes a review of your diabetes history and overall health. It may also include a foot exam, X-rays, or other tests. These can help show problems beneath the skin that you cant see or feel.  Medical history  You will be asked about your overall health and any history of foot problems. Youll also discuss your diabetes history, such as whether your blood sugar level has changed over time. It also includes questions about sensations of pain, tingling, pins and needles, or numbness. Your healthcare provider will also want to know if you have high blood pressure and heart disease, or if you smoke. Be sure to mention any medicines (including over-the-counter), supplements, or herbal remedies you take.  Foot exam  A foot exam checks the condition of different parts of your foot. First, your skin and nails are examined for any signs of infection. Blood flow is checked by feeling for the pulses in each foot. You may also have tests to study the nerves in the foot. These include using a small filament (wire) to see how sensitive your feet are. In certain cases, you will be asked to walk a short distance to check for bone, joint, and muscle problems.  Diagnostic tests  If needed, your healthcare provider will suggest certain tests to learn more about your feet. These include:  Doppler tests to measure blood flow in the feet and lower leg.  X-rays, which can show bone or joint problems.  Other imaging tests, such as an MRI (magnetic resonance imaging), bone scan, and CT  (computed tomography) scan. These can help show bone infections.  Other tests, such as vascular tests, which study the blood flow in your feet and legs. You may also have nerve studies to learn how sensitive your feet are.  Creating a foot care program  Based on the evaluation, your healthcare provider will create a foot care program for you. Your program may be as simple as starting a daily self-care routine and changing the types of shoes your wear. It may also involve treating minor foot problems, such as a corn or blister. In some cases, surgery will be needed to treat an infection or mechanical problems, such as hammer toes.  Preventing problems  When you have diabetes, its easier to prevent problems than to treat them later on. So see your healthcare team for regular checkups and foot care. Your healthcare team can also help you learn more about caring for your feet at home. For example, you may be told to avoid walking barefoot. Or you may be told that special footwear is needed to protect your feet.  Have regular checkups  Foot problems can develop quickly. So be sure to follow your healthcare teams schedule for regular checkups. During office visits, take off your shoes and socks as soon as you get in the exam room. Ask your healthcare provider to examine your feet for problems. This will make it easier to find and treat small skin irritations before they get worse. Regular checkups can also help keep track of the blood flow and feeling in your feet. If you have neuropathy (lack of feeling in your feet), you will need to have checkups more often.  Learn about self-care  The more you know about diabetes and your feet, the easier it will be to prevent problems. Members of your healthcare team can teach you how to inspect your feet and teach you to look for warning signs. They can also give you other foot care tips. During office visits, be sure to ask any questions you have.  Date Last Reviewed: 7/1/2016  ©  4482-2226 [employer]. [address] [place] [address], [place] [address]. All rights reserved. This information is not intended as a substitute for professional medical care. Always follow your healthcare professional's instructions.       Bunion    You have a bunion. This is a bony bump at the base of your big toe, along the inside edge of your foot. As the bump gets bigger, it can become red, swollen, and painful with shoe wear.  Bunions may occur if you wear shoes that are too tight and pinch your toes together. High heels may make this worse. In some cases a bunion is due to poor alignment of the foot and ankle. This puts extra weight on the instep of each foot.  Once a bunion forms, it changes the way weight is spread all across your foot. This causes the bunion to get worse over time. The big toe will bend more and more toward the other toes.  A minor bunion can be treated by:  Wearing properly fitting shoes  Using bunion pads  Wearing shoe inserts, called orthotics, to better align the foot and ankle  Physical therapy with ultrasound or whirlpool baths can ease pain, redness, and swelling. Severe cases may require surgery. If you dont treat what is causing the bunion, it may get larger and more painful.  Home care  Limit high heels. These shoes force your foot forward, crowding the toes together.  Switch to comfortable shoes with a wide toe area. Or have your existing shoes stretched by a shoe repair shop.  Avoid shoes that are tight, narrow, or pointed.  If you are flat-footed, using arch supports may help prevent further deformity. The best shoe inserts are the ones custom made by a foot specialist, called a podiatrist, or other healthcare provider.  Put a bunion pad over the bunion to ease pressure of your shoe against the bunion. You can buy these pads at most pharmacies without a prescription  To reduce pain and swelling, apply an ice pack over the injured area for 15 to 20 minutes. Do this every  1 to 2 hours the first day. Keep using ice 3 to 4 times a day until the pain and swelling goes away.  To make an ice pack, put ice cubes in a sealed zip-lock plastic bag. Wrap the bag in a clean, thin towel or cloth. Never put ice or an ice pack directly on the skin.  You may use over-the-counter pain medicine to control pain, unless another medicine was prescribed. Talk with your provider before using these medicines if you have chronic liver or kidney disease, or ever had a stomach ulcer or GI (gastrointestinal) bleeding.  Follow-up care  Follow up with a podiatrist or foot doctor, or as advised.  If X-rays were taken, you will be notified of any new findings that may affect your care.  When to seek medical care  Contact your healthcare provider if any of the following occur:  Increasing pain or redness around the base of the big toe  Painful ingrown toenail, with redness and swelling or pus around the nail  Date Last Reviewed: 11/21/2015  © 1332-0632 [employer]. [address] [place] [address], [place] [address]. All rights reserved. This information is not intended as a substitute for professional medical care. Always follow your healthcare professional's instructions.

## 2022-03-03 NOTE — Clinical Note
Patient was seen in clinic for tobacco cessation intake. She is smoking up to 30 cigarettes per day. She is ready to quit. We discussed and reviewed: self awareness, triggers, tracking usage, treatmen tplan options, & follow up. She will begin her tobacco cessation regimen of Varenicline as directed. She will follow up in 2-3 weeks.

## 2022-03-07 ENCOUNTER — PATIENT OUTREACH (OUTPATIENT)
Dept: ADMINISTRATIVE | Facility: OTHER | Age: 57
End: 2022-03-07
Payer: MEDICAID

## 2022-03-07 ENCOUNTER — OFFICE VISIT (OUTPATIENT)
Dept: PULMONOLOGY | Facility: CLINIC | Age: 57
End: 2022-03-07
Payer: MEDICAID

## 2022-03-07 VITALS
HEART RATE: 96 BPM | BODY MASS INDEX: 37.82 KG/M2 | DIASTOLIC BLOOD PRESSURE: 79 MMHG | OXYGEN SATURATION: 93 % | HEIGHT: 67 IN | WEIGHT: 240.94 LBS | SYSTOLIC BLOOD PRESSURE: 136 MMHG

## 2022-03-07 DIAGNOSIS — J96.02 ACUTE RESPIRATORY FAILURE WITH HYPOXIA AND HYPERCARBIA: ICD-10-CM

## 2022-03-07 DIAGNOSIS — I50.42 CHRONIC COMBINED SYSTOLIC AND DIASTOLIC CONGESTIVE HEART FAILURE: ICD-10-CM

## 2022-03-07 DIAGNOSIS — E11.65 TYPE 2 DIABETES MELLITUS WITH HYPERGLYCEMIA, WITHOUT LONG-TERM CURRENT USE OF INSULIN: Primary | ICD-10-CM

## 2022-03-07 DIAGNOSIS — G47.33 OSA (OBSTRUCTIVE SLEEP APNEA): ICD-10-CM

## 2022-03-07 DIAGNOSIS — J44.9 CHRONIC OBSTRUCTIVE PULMONARY DISEASE, UNSPECIFIED COPD TYPE: Primary | ICD-10-CM

## 2022-03-07 DIAGNOSIS — J96.01 ACUTE RESPIRATORY FAILURE WITH HYPOXIA AND HYPERCARBIA: ICD-10-CM

## 2022-03-07 DIAGNOSIS — G89.29 OTHER CHRONIC PAIN: ICD-10-CM

## 2022-03-07 PROCEDURE — 99999 PR PBB SHADOW E&M-EST. PATIENT-LVL IV: ICD-10-PCS | Mod: PBBFAC,,, | Performed by: NURSE PRACTITIONER

## 2022-03-07 PROCEDURE — 99214 OFFICE O/P EST MOD 30 MIN: CPT | Mod: PBBFAC,PO | Performed by: NURSE PRACTITIONER

## 2022-03-07 PROCEDURE — 3008F BODY MASS INDEX DOCD: CPT | Mod: CPTII,,, | Performed by: NURSE PRACTITIONER

## 2022-03-07 PROCEDURE — 4010F PR ACE/ARB THEARPY RXD/TAKEN: ICD-10-PCS | Mod: CPTII,,, | Performed by: NURSE PRACTITIONER

## 2022-03-07 PROCEDURE — 1111F PR DISCHARGE MEDS RECONCILED W/ CURRENT OUTPATIENT MED LIST: ICD-10-PCS | Mod: CPTII,,, | Performed by: NURSE PRACTITIONER

## 2022-03-07 PROCEDURE — 1159F PR MEDICATION LIST DOCUMENTED IN MEDICAL RECORD: ICD-10-PCS | Mod: CPTII,,, | Performed by: NURSE PRACTITIONER

## 2022-03-07 PROCEDURE — 99999 PR PBB SHADOW E&M-EST. PATIENT-LVL IV: CPT | Mod: PBBFAC,,, | Performed by: NURSE PRACTITIONER

## 2022-03-07 PROCEDURE — 99214 PR OFFICE/OUTPT VISIT, EST, LEVL IV, 30-39 MIN: ICD-10-PCS | Mod: S$PBB,,, | Performed by: NURSE PRACTITIONER

## 2022-03-07 PROCEDURE — 99214 OFFICE O/P EST MOD 30 MIN: CPT | Mod: S$PBB,,, | Performed by: NURSE PRACTITIONER

## 2022-03-07 PROCEDURE — 1159F MED LIST DOCD IN RCRD: CPT | Mod: CPTII,,, | Performed by: NURSE PRACTITIONER

## 2022-03-07 PROCEDURE — 3078F DIAST BP <80 MM HG: CPT | Mod: CPTII,,, | Performed by: NURSE PRACTITIONER

## 2022-03-07 PROCEDURE — 1111F DSCHRG MED/CURRENT MED MERGE: CPT | Mod: CPTII,,, | Performed by: NURSE PRACTITIONER

## 2022-03-07 PROCEDURE — 3078F PR MOST RECENT DIASTOLIC BLOOD PRESSURE < 80 MM HG: ICD-10-PCS | Mod: CPTII,,, | Performed by: NURSE PRACTITIONER

## 2022-03-07 PROCEDURE — 4010F ACE/ARB THERAPY RXD/TAKEN: CPT | Mod: CPTII,,, | Performed by: NURSE PRACTITIONER

## 2022-03-07 PROCEDURE — 3008F PR BODY MASS INDEX (BMI) DOCUMENTED: ICD-10-PCS | Mod: CPTII,,, | Performed by: NURSE PRACTITIONER

## 2022-03-07 PROCEDURE — 3051F HG A1C>EQUAL 7.0%<8.0%: CPT | Mod: CPTII,,, | Performed by: NURSE PRACTITIONER

## 2022-03-07 PROCEDURE — 3075F SYST BP GE 130 - 139MM HG: CPT | Mod: CPTII,,, | Performed by: NURSE PRACTITIONER

## 2022-03-07 PROCEDURE — 3051F PR MOST RECENT HEMOGLOBIN A1C LEVEL 7.0 - < 8.0%: ICD-10-PCS | Mod: CPTII,,, | Performed by: NURSE PRACTITIONER

## 2022-03-07 PROCEDURE — 3075F PR MOST RECENT SYSTOLIC BLOOD PRESS GE 130-139MM HG: ICD-10-PCS | Mod: CPTII,,, | Performed by: NURSE PRACTITIONER

## 2022-03-07 RX ORDER — ALBUTEROL SULFATE 90 UG/1
2 AEROSOL, METERED RESPIRATORY (INHALATION) EVERY 6 HOURS PRN
Qty: 18 G | Refills: 11 | Status: SHIPPED | OUTPATIENT
Start: 2022-03-07 | End: 2022-04-04 | Stop reason: SDUPTHER

## 2022-03-07 RX ORDER — FLUTICASONE FUROATE, UMECLIDINIUM BROMIDE AND VILANTEROL TRIFENATATE 200; 62.5; 25 UG/1; UG/1; UG/1
1 POWDER RESPIRATORY (INHALATION) DAILY
Qty: 60 EACH | Refills: 11 | Status: SHIPPED | OUTPATIENT
Start: 2022-03-07 | End: 2022-07-05 | Stop reason: SDUPTHER

## 2022-03-07 NOTE — PROGRESS NOTES
"3/7/2022    Delores Fox  New Patient Consult    Chief Complaint   Patient presents with    hsopital f/u        HPI:  3/7/2022: In office today alone.  Recent Hospitalization at [facility] from 2/12 to 2/14 for acute shorntess of breath, CHF exacerbation with BNP in 200s, Questionable pnx, COPD exacerbation.   Patient was discharged home with supplemental oxygen - Wearing supplemental oxygen at night time, 2L via [place] with benefit - also discharged with Rx for Lasix 20 mg. Follows with Dr. Kendall, cardiologist.   While in the hospital had what appeared to be signs of SUZIE. Endorses nocturnal arousals, daytime fatigue, morning headaches, snoring. Denies depression.  Diagnosed with COPD in the past - Currently using Arnuity and Combivent without benefit. Using Albuterol rescue inhaler approx 2x per day. Using Albuterol nebulizer treatments 2x per day as needed.  Denies cough, mucous production.   Quit smoking when discharged from the hospital, enrolled in smoking cessation program. On Chantix with benefit.       Per review of EMR:  "History of Present Illness:  Pt from [facility], sees pcp there, on dulera and spiriva and smokes.  Was admitted last yr.  No home ox, has had hoarseness.  Had abrupt sob and poor recall of events, needed bipap and now off,  Denies h/o asthma, worked as sitter.  Was visiting niece in [facility]. Lax with diabetes rx.     From Dr Cobb hpi 2/12/2022:Delores Fox is a 56 y.o. White female with known history of chronic congestive heart failure who presented with a primary complaint of shortness of breath. She was in her usual state of health when her breathing got worst and family had to call EMS. EMS found her very lethargic and cyanotic. She required BIPAP. En route she was given steroids and bronchodilators. In the ER, CBC showed leukocytosis, CMP showed STEPHEN likely due to cardiorenal syndrome,  and her CXR consistent with fluid load. Hospital medicine consulted for " "admission. During my encounter, patient lethargic and on BIPAP. No family members present to confirm the history."    SLEEP ROUTINE:    Activity the hour prior to sleep: Watching TV  Bed partner: Sister  Time to bed: 2100  Lights On/Off: Off  Sleep onset latency: 5 minutes      Disruptions or awakenings: Nightly, once per night  Wakeup time: 1200  Perceived sleep quality: Good  Daytime naps: Yes     Weekend sleep routine: Sleep less on the weekends   Caffeine use: one cup coffee per day, also drinks SF soda  Exercise habit: No     EPWORTH SLEEPINESS SCALE 3/7/2022: 18      Social Hx: Lives with sister - three dogs in the home. 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 No Asbestosis exposure, Smoking Hx: Former smoker, quit 4 weeks ago, started at age 17, smoked 1.5 ppd  Family Hx: Brother with Lung Cancer, Brother COPD, No Asthma  Medical Hx: Previous pneumonia (did not require intubation) ; No previous shoulder/chest surgery      The chief compliant  problem is new to me  PFSH:  Past Medical History:   Diagnosis Date    Acid reflux     COPD (chronic obstructive pulmonary disease)     Coronary artery disease     Diabetes mellitus     High cholesterol     Hypertension          Past Surgical History:   Procedure Laterality Date    APPENDECTOMY      CATHETERIZATION OF BOTH LEFT AND RIGHT HEART N/A 11/23/2020    Procedure: CATHETERIZATION, HEART, BOTH LEFT AND RIGHT;  Surgeon: Doug Kendall MD;  Location: [facility] CATH/EP LAB;  Service: Cardiology;  Laterality: N/A;    CHOLECYSTECTOMY      EXCISION OF LESION OF EYELID Right 12/11/2020    Procedure: EXCISION, LESION, EYELID;  Surgeon: Malachi Rodriguez MD;  Location: [facility] OR;  Service: Ophthalmology;  Laterality: Right;  Inclusion Cyst removal right lower lid    JOINT REPLACEMENT Left     left hip    MASTOIDECTOMY Right      Social History     Tobacco Use    Smoking status: Current Every Day Smoker     Packs/day: 0.50     Years: 13.00     Pack years: 6.50    " "Smokeless tobacco: Never Used   Substance Use Topics    Alcohol use: Not Currently    Drug use: Not Currently     No family history on file.  Review of patient's allergies indicates:   Allergen Reactions    Morphine Nausea And Vomiting    Sulfa (sulfonamide antibiotics) Nausea And Vomiting     I have reviewed past medical, family, and social history. I have reviewed previous nurse notes.    Performance Status:The patient's activity level is functions out of house.        Review of Systems   Constitutional: Positive for activity change and fatigue. Negative for appetite change, chills, diaphoresis, fever and unexpected weight change.   HENT: Negative for congestion, postnasal drip, rhinorrhea, sinus pressure, sinus pain, sneezing, sore throat and trouble swallowing.    Eyes: Negative for photophobia and visual disturbance.   Respiratory: Negative for cough, choking, chest tightness, shortness of breath and wheezing.    Cardiovascular: Positive for leg swelling. Negative for chest pain and palpitations.   Gastrointestinal: Positive for nausea and vomiting. Negative for abdominal distention, abdominal pain, blood in stool, constipation and diarrhea.   Genitourinary: Negative for difficulty urinating, dysuria, flank pain and hematuria.   Musculoskeletal: Positive for back pain and neck pain. Negative for gait problem and joint swelling.   Skin: Negative for rash and wound.   Allergic/Immunologic: Negative for environmental allergies and food allergies.   Neurological: Positive for dizziness, light-headedness and headaches. Negative for seizures, syncope, weakness and numbness.   Hematological: Does not bruise/bleed easily.   Psychiatric/Behavioral: Positive for sleep disturbance. Negative for confusion. The patient is not nervous/anxious.          Exam:Comprehensive exam done. /79 (BP Location: Left arm, Patient Position: Sitting, BP Method: Medium (Automatic))   Pulse 96   Ht 5' 7" (1.702 m)   Wt 109.3 kg " (240 lb 15.4 oz)   SpO2 (!) 93% Comment: on room air at rest  BMI 37.74 kg/m²   Exam included Vitals as listed  Constitutional: She is oriented to person, place, and time. She appears well-developed. No distress.   Nose: Nose normal.   Mouth/Throat: Uvula is midline, oropharynx is clear and moist and mucous membranes are normal. No dental caries. No oropharyngeal exudate, posterior oropharyngeal edema, posterior oropharyngeal erythema or tonsillar abscesses.  Mallapatti (M) score 4  Eyes: Pupils are equal, round, and reactive to light.   Neck: No JVD present. No thyromegaly present.   Cardiovascular: Normal rate, regular rhythm and normal heart sounds. Exam reveals no gallop and no friction rub.   Murmur heard.  Pulmonary/Chest: Effort normal and breath sounds normal. No accessory muscle usage or stridor. No apnea and no tachypnea. No respiratory distress, decreased breath sounds, wheezes, rhonchi, rales, or tenderness. On room air, in no acute distress. Clear breath sounds.  Abdominal: Soft. She exhibits no mass. There is no tenderness. No hepatosplenomegaly, hernias and normoactive bowel sounds  Musculoskeletal: Normal range of motion. exhibits no edema.   Neurological:  alert and oriented to person, place, and time. not disoriented.   Skin: Skin is warm and dry. Capillary refill takes less 2 sec. No cyanosis or erythema. No pallor. Nails show no clubbing.   Psychiatric: normal mood and affect. behavior is normal. Judgment and thought content normal.       Radiographs (ct chest and cxr) reviewed: view by direct vision   X-Ray Chest PA And Lateral  2/18/2022   Impression:   There is right basilar atelectasis versus infiltrate.         Labs reviewed    Lab Results   Component Value Date    WBC 14.48 (H) 02/23/2022    RBC 4.98 02/23/2022    HGB 15.4 02/23/2022    HCT 46.9 02/23/2022    MCV 94 02/23/2022    MCH 30.9 02/23/2022    MCHC 32.8 02/23/2022    RDW 12.1 02/23/2022     02/23/2022    MPV 9.4 02/23/2022     GRAN 7.8 (H) 02/23/2022    GRAN 53.6 02/23/2022    LYMPH 4.9 (H) 02/23/2022    LYMPH 33.6 02/23/2022    MONO 1.5 (H) 02/23/2022    MONO 10.1 02/23/2022    EOS 0.2 02/23/2022    BASO 0.06 02/23/2022    EOSINOPHIL 1.5 02/23/2022    BASOPHIL 0.4 02/23/2022       PFT will be done and results to be reviewed  Pulmonary Functions Testing Results:    2 minute ambulation test done in office on 3/7/2022 - Desaturation noted to 95%.      Plan:  Clinical impression is resonably certain and repeated evaluation prn +/- follow up will be needed as below.    [facility]    Diagnoses and all orders for this visit:    Chronic obstructive pulmonary disease, unspecified COPD type  -     Complete PFT with bronchodilator; Future  -     albuterol (PROVENTIL/VENTOLIN HFA) 90 mcg/actuation inhaler; Inhale 2 puffs into the lungs every 6 (six) hours as needed for Wheezing or Shortness of Breath. Rescue  -     fluticasone-umeclidin-vilanter (TRELEGY ELLIPTA) 200-62.5-25 mcg inhaler; Inhale 1 puff into the lungs once daily.    Acute respiratory failure with hypoxia and hypercarbia    SUZIE (obstructive sleep apnea)    Chronic combined systolic and diastolic congestive heart failure    Other chronic pain  -     Ambulatory referral/consult to Pain Clinic; Future    At home sleep study.    Follow up in about 3 months (around 6/7/2022), or if symptoms worsen or fail to improve.    Discussed with patient above for education the following:      Patient Instructions   This inhaler Trelegy is your new daily inhaler. It contains an inhaled steroid component. Rinse mouth after each use due to risk for thrush development. If mouth or tongue develops white sores please contact the clinic and I will order a prescription mouth wash.       Use Albuterol rescue inhaler as needed. Carry this one with you.    I have ordered an at home sleep study to evaluate for sleep apnea. If test is positive, I will order a CPAP machine. Please  notify my clinic when you receive the machine to set up a 31 day compliance appointment. You must use the machine for a least 4 hours every night to avoid insurance denial.     Continue supplemental oxygen as needed.    Decrease intake of salt for congestive heart failure.    Stay off of cigarettes. You're doing great!    Continue current medication regiment. Keep follow up appointment as scheduled. Please call the office if you have any questions or concerns.

## 2022-03-07 NOTE — PATIENT INSTRUCTIONS
This inhaler Trelegy is your new daily inhaler. It contains an inhaled steroid component. Rinse mouth after each use due to risk for thrush development. If mouth or tongue develops white sores please contact the clinic and I will order a prescription mouth wash.       Use Albuterol rescue inhaler as needed. Carry this one with you.    I have ordered an at home sleep study to evaluate for sleep apnea. If test is positive, I will order a CPAP machine. Please notify my clinic when you receive the machine to set up a 31 day compliance appointment. You must use the machine for a least 4 hours every night to avoid insurance denial.     Continue supplemental oxygen as needed.    Decrease intake of salt for congestive heart failure.    Stay off of cigarettes. You're doing great!    I ordered a Lung Function Test to evaluate lung strength. Please complete prior to next appointment.       Continue current medication regiment. Keep follow up appointment as scheduled. Please call the office if you have any questions or concerns.

## 2022-03-10 ENCOUNTER — LAB VISIT (OUTPATIENT)
Dept: LAB | Facility: HOSPITAL | Age: 57
End: 2022-03-10
Attending: INTERNAL MEDICINE
Payer: MEDICAID

## 2022-03-10 ENCOUNTER — PATIENT MESSAGE (OUTPATIENT)
Dept: HEMATOLOGY/ONCOLOGY | Facility: CLINIC | Age: 57
End: 2022-03-10
Payer: MEDICAID

## 2022-03-10 ENCOUNTER — TELEPHONE (OUTPATIENT)
Dept: PULMONOLOGY | Facility: CLINIC | Age: 57
End: 2022-03-10
Payer: MEDICAID

## 2022-03-10 DIAGNOSIS — D72.829 LEUKOCYTOSIS, UNSPECIFIED TYPE: ICD-10-CM

## 2022-03-10 LAB
ALBUMIN SERPL BCP-MCNC: 3.4 G/DL (ref 3.5–5.2)
ALP SERPL-CCNC: 118 U/L (ref 55–135)
ALT SERPL W/O P-5'-P-CCNC: 14 U/L (ref 10–44)
ANION GAP SERPL CALC-SCNC: 10 MMOL/L (ref 8–16)
AST SERPL-CCNC: 12 U/L (ref 10–40)
BASOPHILS NFR BLD: 0 % (ref 0–1.9)
BILIRUB SERPL-MCNC: 0.4 MG/DL (ref 0.1–1)
BUN SERPL-MCNC: 14 MG/DL (ref 6–20)
CALCIUM SERPL-MCNC: 8.7 MG/DL (ref 8.7–10.5)
CHLORIDE SERPL-SCNC: 98 MMOL/L (ref 95–110)
CO2 SERPL-SCNC: 30 MMOL/L (ref 23–29)
CREAT SERPL-MCNC: 1.5 MG/DL (ref 0.5–1.4)
CRP SERPL-MCNC: 15.2 MG/L (ref 0–8.2)
DIFFERENTIAL METHOD: ABNORMAL
EOSINOPHIL NFR BLD: 1 % (ref 0–8)
ERYTHROCYTE [DISTWIDTH] IN BLOOD BY AUTOMATED COUNT: 12.1 % (ref 11.5–14.5)
ERYTHROCYTE [SEDIMENTATION RATE] IN BLOOD BY WESTERGREN METHOD: 27 MM/HR (ref 0–20)
EST. GFR  (AFRICAN AMERICAN): 45 ML/MIN/1.73 M^2
EST. GFR  (NON AFRICAN AMERICAN): 39 ML/MIN/1.73 M^2
GLUCOSE SERPL-MCNC: 209 MG/DL (ref 70–110)
HCT VFR BLD AUTO: 43.3 % (ref 37–48.5)
HGB BLD-MCNC: 13.7 G/DL (ref 12–16)
HYPOCHROMIA BLD QL SMEAR: ABNORMAL
IMM GRANULOCYTES # BLD AUTO: ABNORMAL K/UL (ref 0–0.04)
IMM GRANULOCYTES NFR BLD AUTO: ABNORMAL % (ref 0–0.5)
LDH SERPL L TO P-CCNC: 185 U/L (ref 110–260)
LYMPHOCYTES NFR BLD: 29 % (ref 18–48)
MCH RBC QN AUTO: 30.6 PG (ref 27–31)
MCHC RBC AUTO-ENTMCNC: 31.6 G/DL (ref 32–36)
MCV RBC AUTO: 97 FL (ref 82–98)
MONOCYTES NFR BLD: 11 % (ref 4–15)
NEUTROPHILS NFR BLD: 59 % (ref 38–73)
NRBC BLD-RTO: 0 /100 WBC
PATH REV BLD -IMP: NORMAL
PLATELET # BLD AUTO: 359 K/UL (ref 150–450)
PLATELET BLD QL SMEAR: ABNORMAL
PMV BLD AUTO: 9.5 FL (ref 9.2–12.9)
POTASSIUM SERPL-SCNC: 4.6 MMOL/L (ref 3.5–5.1)
PROT SERPL-MCNC: 6.5 G/DL (ref 6–8.4)
RBC # BLD AUTO: 4.47 M/UL (ref 4–5.4)
SODIUM SERPL-SCNC: 138 MMOL/L (ref 136–145)
WBC # BLD AUTO: 10.79 K/UL (ref 3.9–12.7)

## 2022-03-10 PROCEDURE — 85060 PATHOLOGIST REVIEW: ICD-10-PCS | Mod: ,,, | Performed by: PATHOLOGY

## 2022-03-10 PROCEDURE — 86140 C-REACTIVE PROTEIN: CPT | Performed by: INTERNAL MEDICINE

## 2022-03-10 PROCEDURE — 85060 BLOOD SMEAR INTERPRETATION: CPT | Mod: ,,, | Performed by: PATHOLOGY

## 2022-03-10 PROCEDURE — 83615 LACTATE (LD) (LDH) ENZYME: CPT | Performed by: INTERNAL MEDICINE

## 2022-03-10 PROCEDURE — 80053 COMPREHEN METABOLIC PANEL: CPT | Performed by: INTERNAL MEDICINE

## 2022-03-10 PROCEDURE — 85007 BL SMEAR W/DIFF WBC COUNT: CPT | Performed by: INTERNAL MEDICINE

## 2022-03-10 PROCEDURE — 36415 COLL VENOUS BLD VENIPUNCTURE: CPT | Performed by: INTERNAL MEDICINE

## 2022-03-10 PROCEDURE — 85651 RBC SED RATE NONAUTOMATED: CPT | Performed by: INTERNAL MEDICINE

## 2022-03-10 PROCEDURE — 85027 COMPLETE CBC AUTOMATED: CPT | Performed by: INTERNAL MEDICINE

## 2022-03-11 ENCOUNTER — HOSPITAL ENCOUNTER (OUTPATIENT)
Dept: PULMONOLOGY | Facility: HOSPITAL | Age: 57
Discharge: HOME | End: 2022-03-11
Attending: NURSE PRACTITIONER
Payer: MEDICAID

## 2022-03-11 DIAGNOSIS — J44.9 CHRONIC OBSTRUCTIVE PULMONARY DISEASE, UNSPECIFIED COPD TYPE: ICD-10-CM

## 2022-03-11 LAB — PATH REV BLD -IMP: NORMAL

## 2022-03-11 PROCEDURE — 94727 GAS DIL/WSHOT DETER LNG VOL: CPT | Mod: 26,,, | Performed by: INTERNAL MEDICINE

## 2022-03-11 PROCEDURE — 94727 PR PULM FUNCTION TEST BY GAS: ICD-10-PCS | Mod: 26,,, | Performed by: INTERNAL MEDICINE

## 2022-03-11 PROCEDURE — 94729 DIFFUSING CAPACITY: CPT | Mod: 26,,, | Performed by: INTERNAL MEDICINE

## 2022-03-11 PROCEDURE — 94060 EVALUATION OF WHEEZING: CPT

## 2022-03-11 PROCEDURE — 94060 EVALUATION OF WHEEZING: CPT | Mod: 26,,, | Performed by: INTERNAL MEDICINE

## 2022-03-11 PROCEDURE — 94729 PR C02/MEMBANE DIFFUSE CAPACITY: ICD-10-PCS | Mod: 26,,, | Performed by: INTERNAL MEDICINE

## 2022-03-11 PROCEDURE — 94727 GAS DIL/WSHOT DETER LNG VOL: CPT

## 2022-03-11 PROCEDURE — 94060 PR EVAL OF BRONCHOSPASM: ICD-10-PCS | Mod: 26,,, | Performed by: INTERNAL MEDICINE

## 2022-03-11 PROCEDURE — 94729 DIFFUSING CAPACITY: CPT

## 2022-03-15 ENCOUNTER — TELEPHONE (OUTPATIENT)
Dept: SMOKING CESSATION | Facility: CLINIC | Age: 57
End: 2022-03-15
Payer: MEDICAID

## 2022-03-15 LAB
BRPFT: NORMAL
DLCO ADJ PRE: 14.3 ML/(MIN*MMHG)
DLCO SINGLE BREATH LLN: 19.71
DLCO SINGLE BREATH PRE REF: 56.2 %
DLCO SINGLE BREATH REF: 25.44
DLCOC SBVA LLN: 3.33
DLCOC SBVA PRE REF: 78.3 %
DLCOC SBVA REF: 4.69
DLCOC SINGLE BREATH LLN: 19.71
DLCOC SINGLE BREATH PRE REF: 56.2 %
DLCOC SINGLE BREATH REF: 25.44
DLCOVA LLN: 3.33
DLCOVA PRE REF: 78.3 %
DLCOVA PRE: 3.67 ML/(MIN*MMHG*L)
DLCOVA REF: 4.69
DLVAADJ PRE: 3.67 ML/(MIN*MMHG*L)
ERVN2 LLN: -16449.08
ERVN2 PRE REF: 38.9 %
ERVN2 PRE: 0.36 L
ERVN2 REF: 0.92
FEF 25 75 CHG: 2.6 %
FEF 25 75 LLN: 1.45
FEF 25 75 POST REF: 100.4 %
FEF 25 75 PRE REF: 97.8 %
FEF 25 75 REF: 2.69
FET100 CHG: -2.4 %
FEV1 CHG: 4.6 %
FEV1 FVC CHG: -0.8 %
FEV1 FVC LLN: 68
FEV1 FVC POST REF: 105.7 %
FEV1 FVC PRE REF: 106.5 %
FEV1 FVC REF: 80
FEV1 LLN: 2.25
FEV1 POST REF: 81.9 %
FEV1 PRE REF: 78.4 %
FEV1 REF: 2.92
FRCN2 LLN: 2.04
FRCN2 PRE REF: 45.4 %
FRCN2 REF: 2.86
FVC CHG: 5.4 %
FVC LLN: 2.85
FVC POST REF: 77 %
FVC PRE REF: 73 %
FVC REF: 3.69
IVC PRE: 2.23 L
IVC SINGLE BREATH LLN: 2.85
IVC SINGLE BREATH PRE REF: 60.4 %
IVC SINGLE BREATH REF: 3.69
MVV LLN: 94
MVV PRE REF: 50.6 %
MVV REF: 111
PEF CHG: 25.5 %
PEF LLN: 5.17
PEF POST REF: 73.2 %
PEF PRE REF: 58.3 %
PEF REF: 7.04
POST FEF 25 75: 2.7 L/S
POST FET 100: 3.22 SEC
POST FEV1 FVC: 84.12 %
POST FEV1: 2.39 L
POST FVC: 2.84 L
POST PEF: 5.16 L/S
PRE DLCO: 14.3 ML/(MIN*MMHG)
PRE FEF 25 75: 2.63 L/S
PRE FET 100: 3.3 SEC
PRE FEV1 FVC: 84.79 %
PRE FEV1: 2.29 L
PRE FRC N2: 1.3 L
PRE FVC: 2.69 L
PRE MVV: 56 L/MIN
PRE PEF: 4.11 L/S
RVN2 LLN: 1.37
RVN2 PRE REF: 45 %
RVN2 PRE: 0.87 L
RVN2 REF: 1.94
RVN2TLCN2 LLN: 27.73
RVN2TLCN2 PRE REF: 61.5 %
RVN2TLCN2 PRE: 22.97 %
RVN2TLCN2 REF: 37.32
TLCN2 LLN: 4.44
TLCN2 PRE REF: 70 %
TLCN2 PRE: 3.8 L
TLCN2 REF: 5.43
VA PRE: 3.91 L
VA SINGLE BREATH LLN: 5.28
VA SINGLE BREATH PRE REF: 74 %
VA SINGLE BREATH REF: 5.28
VCMAXN2 LLN: 2.85
VCMAXN2 PRE REF: 79.3 %
VCMAXN2 PRE: 2.93 L
VCMAXN2 REF: 3.69

## 2022-03-15 NOTE — TELEPHONE ENCOUNTER
I called patient for scheduled tobacco cessation follow up and I was unable to reach her at this time due to no answer and no voice mailbox set up yet.

## 2022-03-17 ENCOUNTER — PATIENT MESSAGE (OUTPATIENT)
Dept: PULMONOLOGY | Facility: CLINIC | Age: 57
End: 2022-03-17
Payer: MEDICAID

## 2022-03-22 ENCOUNTER — CLINICAL SUPPORT (OUTPATIENT)
Dept: SMOKING CESSATION | Facility: CLINIC | Age: 57
End: 2022-03-22
Payer: COMMERCIAL

## 2022-03-22 DIAGNOSIS — F17.200 NICOTINE DEPENDENCE: Primary | ICD-10-CM

## 2022-03-22 PROCEDURE — 99407 BEHAV CHNG SMOKING > 10 MIN: CPT | Mod: S$GLB,,,

## 2022-03-22 PROCEDURE — 99407 PR TOBACCO USE CESSATION INTENSIVE >10 MINUTES: ICD-10-PCS | Mod: S$GLB,,,

## 2022-03-22 PROCEDURE — 99999 PR PBB SHADOW E&M-EST. PATIENT-LVL I: ICD-10-PCS | Mod: PBBFAC,,,

## 2022-03-22 PROCEDURE — 99999 PR PBB SHADOW E&M-EST. PATIENT-LVL I: CPT | Mod: PBBFAC,,,

## 2022-03-23 RX ORDER — PRUCALOPRIDE 2 MG/1
1 TABLET, FILM COATED ORAL NIGHTLY
Status: ON HOLD | COMMUNITY
Start: 2022-03-09 | End: 2023-03-02 | Stop reason: HOSPADM

## 2022-03-23 RX ORDER — UMECLIDINIUM BROMIDE AND VILANTEROL TRIFENATATE 62.5; 25 UG/1; UG/1
1 POWDER RESPIRATORY (INHALATION) DAILY
COMMUNITY
Start: 2022-03-09 | End: 2022-08-01

## 2022-03-24 ENCOUNTER — OFFICE VISIT (OUTPATIENT)
Dept: FAMILY MEDICINE | Facility: CLINIC | Age: 57
End: 2022-03-24
Payer: MEDICAID

## 2022-03-24 ENCOUNTER — PATIENT OUTREACH (OUTPATIENT)
Dept: ADMINISTRATIVE | Facility: HOSPITAL | Age: 57
End: 2022-03-24
Payer: MEDICAID

## 2022-03-24 ENCOUNTER — HOSPITAL ENCOUNTER (OUTPATIENT)
Dept: RADIOLOGY | Facility: CLINIC | Age: 57
Discharge: HOME | End: 2022-03-24
Attending: PHYSICIAN ASSISTANT
Payer: MEDICAID

## 2022-03-24 VITALS
SYSTOLIC BLOOD PRESSURE: 114 MMHG | TEMPERATURE: 99 F | DIASTOLIC BLOOD PRESSURE: 64 MMHG | HEIGHT: 67 IN | WEIGHT: 243.38 LBS | BODY MASS INDEX: 38.2 KG/M2 | OXYGEN SATURATION: 96 % | RESPIRATION RATE: 16 BRPM | HEART RATE: 105 BPM

## 2022-03-24 DIAGNOSIS — E78.5 HYPERLIPIDEMIA ASSOCIATED WITH TYPE 2 DIABETES MELLITUS: ICD-10-CM

## 2022-03-24 DIAGNOSIS — J44.9 CHRONIC OBSTRUCTIVE PULMONARY DISEASE, UNSPECIFIED COPD TYPE: ICD-10-CM

## 2022-03-24 DIAGNOSIS — G47.00 INSOMNIA, UNSPECIFIED TYPE: ICD-10-CM

## 2022-03-24 DIAGNOSIS — K21.9 GASTROESOPHAGEAL REFLUX DISEASE, UNSPECIFIED WHETHER ESOPHAGITIS PRESENT: ICD-10-CM

## 2022-03-24 DIAGNOSIS — R93.89 ABNORMAL CXR: ICD-10-CM

## 2022-03-24 DIAGNOSIS — J18.9 PNEUMONIA DUE TO INFECTIOUS ORGANISM, UNSPECIFIED LATERALITY, UNSPECIFIED PART OF LUNG: ICD-10-CM

## 2022-03-24 DIAGNOSIS — E11.69 HYPERLIPIDEMIA ASSOCIATED WITH TYPE 2 DIABETES MELLITUS: ICD-10-CM

## 2022-03-24 DIAGNOSIS — F17.219 NICOTINE DEPENDENCE, CIGARETTES, WITH UNSPECIFIED NICOTINE-INDUCED DISORDERS: ICD-10-CM

## 2022-03-24 DIAGNOSIS — Z00.00 HEALTHCARE MAINTENANCE: Primary | ICD-10-CM

## 2022-03-24 DIAGNOSIS — Z12.31 ENCOUNTER FOR SCREENING MAMMOGRAM FOR MALIGNANT NEOPLASM OF BREAST: ICD-10-CM

## 2022-03-24 DIAGNOSIS — I13.0 HYPERTENSIVE HEART AND RENAL DISEASE WITH CONGESTIVE HEART FAILURE: ICD-10-CM

## 2022-03-24 DIAGNOSIS — I10 ESSENTIAL HYPERTENSION: ICD-10-CM

## 2022-03-24 DIAGNOSIS — E11.65 TYPE 2 DIABETES MELLITUS WITH HYPERGLYCEMIA, WITHOUT LONG-TERM CURRENT USE OF INSULIN: ICD-10-CM

## 2022-03-24 DIAGNOSIS — E11.42 DIABETIC POLYNEUROPATHY ASSOCIATED WITH TYPE 2 DIABETES MELLITUS: ICD-10-CM

## 2022-03-24 DIAGNOSIS — I25.10 CORONARY ARTERY DISEASE INVOLVING NATIVE CORONARY ARTERY OF NATIVE HEART WITHOUT ANGINA PECTORIS: ICD-10-CM

## 2022-03-24 DIAGNOSIS — I70.0 ATHEROSCLEROSIS OF AORTA: ICD-10-CM

## 2022-03-24 LAB
ALBUMIN/CREAT UR: NORMAL UG/MG (ref 0–30)
CREAT UR-MCNC: 24 MG/DL (ref 15–325)
MICROALBUMIN UR DL<=1MG/L-MCNC: <5 UG/ML

## 2022-03-24 PROCEDURE — 3078F DIAST BP <80 MM HG: CPT | Mod: CPTII,,, | Performed by: STUDENT IN AN ORGANIZED HEALTH CARE EDUCATION/TRAINING PROGRAM

## 2022-03-24 PROCEDURE — 3008F BODY MASS INDEX DOCD: CPT | Mod: CPTII,,, | Performed by: STUDENT IN AN ORGANIZED HEALTH CARE EDUCATION/TRAINING PROGRAM

## 2022-03-24 PROCEDURE — 82570 ASSAY OF URINE CREATININE: CPT | Performed by: STUDENT IN AN ORGANIZED HEALTH CARE EDUCATION/TRAINING PROGRAM

## 2022-03-24 PROCEDURE — 4010F PR ACE/ARB THEARPY RXD/TAKEN: ICD-10-PCS | Mod: CPTII,,, | Performed by: STUDENT IN AN ORGANIZED HEALTH CARE EDUCATION/TRAINING PROGRAM

## 2022-03-24 PROCEDURE — 3008F PR BODY MASS INDEX (BMI) DOCUMENTED: ICD-10-PCS | Mod: CPTII,,, | Performed by: STUDENT IN AN ORGANIZED HEALTH CARE EDUCATION/TRAINING PROGRAM

## 2022-03-24 PROCEDURE — 3074F SYST BP LT 130 MM HG: CPT | Mod: CPTII,,, | Performed by: STUDENT IN AN ORGANIZED HEALTH CARE EDUCATION/TRAINING PROGRAM

## 2022-03-24 PROCEDURE — 3051F PR MOST RECENT HEMOGLOBIN A1C LEVEL 7.0 - < 8.0%: ICD-10-PCS | Mod: CPTII,,, | Performed by: STUDENT IN AN ORGANIZED HEALTH CARE EDUCATION/TRAINING PROGRAM

## 2022-03-24 PROCEDURE — 99214 OFFICE O/P EST MOD 30 MIN: CPT | Mod: S$PBB,,, | Performed by: STUDENT IN AN ORGANIZED HEALTH CARE EDUCATION/TRAINING PROGRAM

## 2022-03-24 PROCEDURE — 99214 PR OFFICE/OUTPT VISIT, EST, LEVL IV, 30-39 MIN: ICD-10-PCS | Mod: S$PBB,,, | Performed by: STUDENT IN AN ORGANIZED HEALTH CARE EDUCATION/TRAINING PROGRAM

## 2022-03-24 PROCEDURE — 1159F PR MEDICATION LIST DOCUMENTED IN MEDICAL RECORD: ICD-10-PCS | Mod: CPTII,,, | Performed by: STUDENT IN AN ORGANIZED HEALTH CARE EDUCATION/TRAINING PROGRAM

## 2022-03-24 PROCEDURE — 4010F ACE/ARB THERAPY RXD/TAKEN: CPT | Mod: CPTII,,, | Performed by: STUDENT IN AN ORGANIZED HEALTH CARE EDUCATION/TRAINING PROGRAM

## 2022-03-24 PROCEDURE — 3051F HG A1C>EQUAL 7.0%<8.0%: CPT | Mod: CPTII,,, | Performed by: STUDENT IN AN ORGANIZED HEALTH CARE EDUCATION/TRAINING PROGRAM

## 2022-03-24 PROCEDURE — 99215 OFFICE O/P EST HI 40 MIN: CPT | Mod: PBBFAC,25,PO | Performed by: STUDENT IN AN ORGANIZED HEALTH CARE EDUCATION/TRAINING PROGRAM

## 2022-03-24 PROCEDURE — 3078F PR MOST RECENT DIASTOLIC BLOOD PRESSURE < 80 MM HG: ICD-10-PCS | Mod: CPTII,,, | Performed by: STUDENT IN AN ORGANIZED HEALTH CARE EDUCATION/TRAINING PROGRAM

## 2022-03-24 PROCEDURE — 1159F MED LIST DOCD IN RCRD: CPT | Mod: CPTII,,, | Performed by: STUDENT IN AN ORGANIZED HEALTH CARE EDUCATION/TRAINING PROGRAM

## 2022-03-24 PROCEDURE — 99999 PR PBB SHADOW E&M-EST. PATIENT-LVL V: ICD-10-PCS | Mod: PBBFAC,,, | Performed by: STUDENT IN AN ORGANIZED HEALTH CARE EDUCATION/TRAINING PROGRAM

## 2022-03-24 PROCEDURE — 71046 XR CHEST PA AND LATERAL: ICD-10-PCS | Mod: 26,,, | Performed by: RADIOLOGY

## 2022-03-24 PROCEDURE — 71046 X-RAY EXAM CHEST 2 VIEWS: CPT | Mod: TC,FY,PO

## 2022-03-24 PROCEDURE — 3074F PR MOST RECENT SYSTOLIC BLOOD PRESSURE < 130 MM HG: ICD-10-PCS | Mod: CPTII,,, | Performed by: STUDENT IN AN ORGANIZED HEALTH CARE EDUCATION/TRAINING PROGRAM

## 2022-03-24 PROCEDURE — 99999 PR PBB SHADOW E&M-EST. PATIENT-LVL V: CPT | Mod: PBBFAC,,, | Performed by: STUDENT IN AN ORGANIZED HEALTH CARE EDUCATION/TRAINING PROGRAM

## 2022-03-24 PROCEDURE — 71046 X-RAY EXAM CHEST 2 VIEWS: CPT | Mod: 26,,, | Performed by: RADIOLOGY

## 2022-03-24 RX ORDER — TORSEMIDE 20 MG/1
20 TABLET ORAL DAILY
Qty: 90 TABLET | Refills: 0 | Status: SHIPPED | OUTPATIENT
Start: 2022-03-24 | End: 2022-05-17 | Stop reason: SDUPTHER

## 2022-03-24 RX ORDER — AZITHROMYCIN 250 MG/1
TABLET, FILM COATED ORAL
Qty: 6 TABLET | Refills: 0 | Status: ON HOLD | OUTPATIENT
Start: 2022-03-24 | End: 2022-09-18 | Stop reason: HOSPADM

## 2022-03-24 RX ORDER — PANTOPRAZOLE SODIUM 40 MG/1
40 TABLET, DELAYED RELEASE ORAL 2 TIMES DAILY
Qty: 90 TABLET | Refills: 0 | Status: SHIPPED | OUTPATIENT
Start: 2022-03-24 | End: 2022-05-17 | Stop reason: SDUPTHER

## 2022-03-24 RX ORDER — CEFDINIR 300 MG/1
300 CAPSULE ORAL 2 TIMES DAILY
Qty: 10 CAPSULE | Refills: 0 | Status: SHIPPED | OUTPATIENT
Start: 2022-03-24 | End: 2022-03-29

## 2022-03-24 NOTE — LETTER
AUTHORIZATION FOR RELEASE OF   CONFIDENTIAL INFORMATION    [facility] ,    We are seeing Delores Fox, date of birth 1965, in the clinic at No Department Specified. Primary Doctor Gabi is the patient's PCP. Delores Fox has an outstanding lab/procedure at the time we reviewed her chart. In order to help keep her health information updated, she has authorized us to request the following medical record(s):        (  )  MAMMOGRAM                                      (  )  COLONOSCOPY      (  )  PAP SMEAR                                          (  )  OUTSIDE LAB RESULTS     (  )  DEXA SCAN                                          ( X )  EYE EXAM            (  )  FOOT EXAM                                          (  )  ENTIRE RECORD     (  )  OUTSIDE IMMUNIZATIONS                 (  )  _______________        Please fax records to [facility], [phone]  Michael Ellison  [phone]            Patient Name: Delores Fox  : 1965  Patient Phone #: [phone]

## 2022-03-24 NOTE — PROGRESS NOTES
"[facility] Note    Subjective:    The HPI and pertinent ROS is included in the Diagnostic Impression Remarks section at the end of the note. Please see below for further details. Chief complaint is at end of note.     Data reviewed 274}  Previous medical records reviewed and summarized in plan section at end of note.      The following portions of the patient's history were reviewed and updated as appropriate: allergies, current medications, past family history, past medical history, past social history, past surgical history and problem list.    She  has a past medical history of Acid reflux, COPD (chronic obstructive pulmonary disease), Coronary artery disease, Diabetes mellitus, High cholesterol, and Hypertension.  She  has a past surgical history that includes Catheterization of both left and right heart (N/A, 11/23/2020); Appendectomy; Joint replacement (Left); Mastoidectomy (Right); Excision of lesion of eyelid (Right, 12/11/2020); and Cholecystectomy.    She  reports that she has been smoking. She has a 6.50 pack-year smoking history. She has never used smokeless tobacco. She reports previous alcohol use. She reports previous drug use.  She family history is not on file.    Review of patient's allergies indicates:   Allergen Reactions    Morphine Nausea And Vomiting    Sulfa (sulfonamide antibiotics) Nausea And Vomiting       Tobacco Use: High Risk    Smoking Tobacco Use: Current Every Day Smoker    Smokeless Tobacco Use: Never Used     Physical Examination  Wt Readings from Last 3 Encounters:   03/24/22 110.4 kg (243 lb 6.2 oz)   03/07/22 109.3 kg (240 lb 15.4 oz)   03/03/22 107 kg (236 lb)                Estimated body mass index is 38.12 kg/m² as calculated from the following:    Height as of this encounter: 5' 7" (1.702 m).    Weight as of this encounter: 110.4 kg (243 lb 6.2 oz).     General appearance: alert, cooperative, no distress  Neck: no thyromegaly, no neck stiffness  Lungs: " "clear to auscultation, no wheezes, rales or rhonchi, symmetric air entry  Heart: normal rate, regular rhythm, normal S1, S2, no murmurs, rubs, clicks or gallops  Abdomen: soft, nontender, nondistended, no rigidity, rebound, or guarding.   Back: no point tenderness over spine  Extremities: peripheral pulses normal, no unilateral leg swelling or calf tenderness   Neurological:alert, oriented, normal speech, no new focal findings or movement disorder noted from baseline    BP Readings from Last 3 Encounters:   03/24/22 114/64   03/07/22 136/79   03/02/22 120/74     /64 (BP Location: Right arm, Patient Position: Sitting, BP Method: Large (Manual))   Pulse 105   Temp 98.6 °F (37 °C) (Oral)   Resp 16   Ht 5' 7" (1.702 m)   Wt 110.4 kg (243 lb 6.2 oz)   SpO2 96%   BMI 38.12 kg/m²    274}  Laboratory: I have reviewed old labs below:    274}    Lab Results   Component Value Date    WBC 10.79 03/10/2022    HGB 13.7 03/10/2022    HCT 43.3 03/10/2022    MCV 97 03/10/2022     03/10/2022     03/10/2022    K 4.6 03/10/2022    CL 98 03/10/2022    CALCIUM 8.7 03/10/2022    PHOS 2.9 02/18/2022    CO2 30 (H) 03/10/2022     (H) 03/10/2022    BUN 14 03/10/2022    CREATININE 1.5 (H) 03/10/2022    ANIONGAP 10 03/10/2022    ESTGFRAFRICA 45 (A) 03/10/2022    EGFRNONAA 39 (A) 03/10/2022    PROT 6.5 03/10/2022    ALBUMIN 3.4 (L) 03/10/2022    BILITOT 0.4 03/10/2022    ALKPHOS 118 03/10/2022    ALT 14 03/10/2022    AST 12 03/10/2022    INR 1.1 12/31/2020    CHOL 183 02/18/2022    TRIG 156 (H) 02/18/2022    HDL 36 (L) 02/18/2022    LDLCALC 115.8 02/18/2022    TSH 0.960 11/21/2020    HGBA1C 7.5 (H) 02/18/2022     Lab reviewed by me: Particular labs of significance that I will monitor, workup, or treat to improve are mentioned below in diagnostic impression remarks.    Imaging/EKG: I have reviewed the pertinent results and my findings are noted in remarks.  274}    Chief Complaint:   Chief Complaint   Patient " "presents with    Hypertension    fluid retention        274}    Assessment/Plan  Delores Fox is a 56 y.o. female who presents to clinic with:  1. Healthcare maintenance    2. Essential hypertension    3. Chronic obstructive pulmonary disease, unspecified COPD type    4. Coronary artery disease involving native coronary artery of native heart without angina pectoris    5. Nicotine dependence, cigarettes, with unspecified nicotine-induced disorders    6. Pneumonia due to infectious organism, unspecified laterality, unspecified part of lung    7. Insomnia, unspecified type    8. Diabetic polyneuropathy associated with type 2 diabetes mellitus    9. Encounter for screening mammogram for malignant neoplasm of breast    10. Atherosclerosis of aorta    11. Gastroesophageal reflux disease, unspecified whether esophagitis present    12. Hypertensive heart and renal disease with congestive heart failure    13. Type 2 diabetes mellitus with hyperglycemia, without long-term current use of insulin    14. Hyperlipidemia associated with type 2 diabetes mellitus        Diagnostic Impression Remarks + HPI   274}  Documentation entered by me for this encounter may have been done in part using speech-recognition technology. Although I have made an effort to ensure accuracy, "sound like" errors may exist and should be interpreted in context.      Diabetes-needs improvement will replace DP P for which G LP 1 agonist recommend continue metformin healthy diet will get eye exam   Hypertension-stable continue current medications   CHF-needs improvement still has some bilateral leg edema sleeps on 3 pillows wants stronger diuretic will start torsemide instead of Lasix due to better by availability will monitor for improvement continue beta-blocker will start G LP 1 agonist as well consider SGLT 2 but kidney functions on the lower end will let Nephrology decide   CKD-needs improvement will start G LP 1 agonist continue Arb but her " Nephrology referral   Nicotine dependence-needs improvement is taking Chantix needs to cut down will get CT scan lungs look for any nodules or cancer   Pneumonia-patient has productive cough no fevers no shortness of breath currently will send in antibiotic no unilateral leg swelling\   Atherosclerosis of aorta- stable continue statin and low fat diet monitor follow lipid levels    Hyperlipidemia-stable continue statin   GERD-needs improvement went over that PPI as can worsen kidney disease she reports she cannot tolerate without PPI thus after discussing risks and benefits in shared decision making she will continue with recommended follow-up with GI for scope though and talk with Nephrology to   Health maintenance-patient needs colon cancer screening will put in referral but she does have some reflux symptoms and is a smoker this might benefit from an EGD thus will put in referral for GI to discuss also needs mammogram as well    This is the extent of the patient's complaints at this present time. She denies chest pain upon exertion, dyspnea, nausea, vomiting, diaphoresis, and syncope. No pleuritic chest pain, unilateral leg swelling, calf tenderness, or calf pain.     Delores will return to clinic in a few months for further workup and reassessment or sooner as needed. She was instructed to call the clinic or go to the emergency department if her symptoms do not improve, worsens, or if new symptoms develop. As we discussed that symptoms could worsen over the next 24 hours she was advised that if any increased swelling, pain, or numbness arise to go immediately to the ED. Patient knows to call any time if an emergency arises. Shared decision making occurred and she verbalized understanding in agreement with this plan.     I discussed imaging findings, diagnosis, possibilities, treatment options, medications, risks, and benefits. She had many questions regarding the options and long-term effects. All questions  were answered. She expressed understanding after counseling regarding the diagnosis and recommendations. She was capable and demonstrated competence with understanding of these options. Shared decision making was performed resulting in her choosing the current treatment plan. Patient handout was given with instructions and recommendations. Advised the patient that if they become pregnant to alert us immediately to assess for medication changes.     I also discussed the importance of close follow up to discuss labs, change or modify her medications if needed, monitor side effects, and further evaluation of medical problems.     Additional workup planned: see labs ordered below.    See below for labs and meds ordered with associated diagnosis      1. Healthcare maintenance    2. Essential hypertension  - Ambulatory referral/consult to Nephrology; Future    3. Chronic obstructive pulmonary disease, unspecified COPD type    4. Coronary artery disease involving native coronary artery of native heart without angina pectoris    5. Nicotine dependence, cigarettes, with unspecified nicotine-induced disorders  - CT Chest Lung Screening Low Dose; Future    6. Pneumonia due to infectious organism, unspecified laterality, unspecified part of lung  - cefdinir (OMNICEF) 300 MG capsule; Take 1 capsule (300 mg total) by mouth 2 (two) times daily. for 5 days  Dispense: 10 capsule; Refill: 0  - azithromycin (Z-PATRICK) 250 MG tablet; Take 2 tablets by mouth on day 1; Take 1 tablet by mouth on days 2-5  Dispense: 6 tablet; Refill: 0    7. Insomnia, unspecified type    8. Diabetic polyneuropathy associated with type 2 diabetes mellitus  - MICROALBUMIN / CREATININE RATIO URINE    9. Encounter for screening mammogram for malignant neoplasm of breast  - Mammo Digital Screening Bilat; Future    10. Atherosclerosis of aorta    11. Gastroesophageal reflux disease, unspecified whether esophagitis present  - Ambulatory referral/consult to  Gastroenterology; Future  - pantoprazole (PROTONIX) 40 MG tablet; Take 1 tablet (40 mg total) by mouth 2 (two) times daily.  Dispense: 90 tablet; Refill: 0    12. Hypertensive heart and renal disease with congestive heart failure    13. Type 2 diabetes mellitus with hyperglycemia, without long-term current use of insulin  - semaglutide (RYBELSUS) 3 mg tablet; Take 1 tablet (3 mg total) by mouth once daily.  Dispense: 30 tablet; Refill: 0  - semaglutide (RYBELSUS) 7 mg tablet; Take 1 tablet (7 mg total) by mouth once daily.  Dispense: 30 tablet; Refill: 2  - Diabetic Eye Screening Photo; Future    14. Hyperlipidemia associated with type 2 diabetes mellitus    Medication List with Changes/Refills   New Medications    AZITHROMYCIN (Z-PATRICK) 250 MG TABLET    Take 2 tablets by mouth on day 1; Take 1 tablet by mouth on days 2-5    CEFDINIR (OMNICEF) 300 MG CAPSULE    Take 1 capsule (300 mg total) by mouth 2 (two) times daily. for 5 days    SEMAGLUTIDE (RYBELSUS) 3 MG TABLET    Take 1 tablet (3 mg total) by mouth once daily.    SEMAGLUTIDE (RYBELSUS) 7 MG TABLET    Take 1 tablet (7 mg total) by mouth once daily.    TORSEMIDE (DEMADEX) 20 MG TAB    Take 1 tablet (20 mg total) by mouth once daily.   Current Medications    ALBUTEROL (ACCUNEB) 1.25 MG/3 ML NEBU    Take 3 mLs (1.25 mg total) by nebulization every 6 (six) hours as needed (wheezing). Rescue    ALBUTEROL (PROVENTIL/VENTOLIN HFA) 90 MCG/ACTUATION INHALER    Inhale 2 puffs into the lungs every 6 (six) hours as needed for Wheezing or Shortness of Breath. Rescue    ANORO ELLIPTA 62.5-25 MCG/ACTUATION DSDV    Inhale 1 puff into the lungs once daily.    ASPIRIN 81 MG CHEW    Take 1 tablet (81 mg total) by mouth once daily.    ATORVASTATIN (LIPITOR) 40 MG TABLET    Take 1 tablet (40 mg total) by mouth once daily.    BLOOD SUGAR DIAGNOSTIC STRP    To check BG 2 times daily, to use with insurance preferred meter    BLOOD-GLUCOSE METER KIT    To check BG 2 times daily, to use  with insurance preferred meter    FLUTICASONE-UMECLIDIN-VILANTER (TRELEGY ELLIPTA) 200-62.5-25 MCG INHALER    Inhale 1 puff into the lungs once daily.    LANCETS MISC    To check BG 2 times daily, to use with insurance preferred meter    LORAZEPAM (ATIVAN) 0.5 MG TABLET    Take 1 tablet (0.5 mg total) by mouth every 6 (six) hours as needed for Anxiety.    LOSARTAN (COZAAR) 25 MG TABLET    Take 1 tablet (25 mg total) by mouth 2 (two) times a day.    METFORMIN (GLUCOPHAGE-XR) 500 MG ER 24HR TABLET    Take 1 tablet (500 mg total) by mouth daily with breakfast.    METOPROLOL SUCCINATE (TOPROL-XL) 50 MG 24 HR TABLET    Take 1 tablet (50 mg total) by mouth once daily.    MOTEGRITY 2 MG TAB    Take 1 tablet by mouth nightly.    ONDANSETRON (ZOFRAN) 4 MG TABLET    Take 1 tablet (4 mg total) by mouth every 8 (eight) hours as needed for Nausea.    PREGABALIN (LYRICA) 75 MG CAPSULE    Take 75 mg by mouth 2 (two) times daily.     VARENICLINE (CHANTIX) 1 MG TAB    Take 1 tablet (1 mg total) by mouth 2 (two) times daily.    ZOLPIDEM (AMBIEN) 10 MG TAB    Take 10 mg by mouth nightly as needed.   Changed and/or Refilled Medications    Modified Medication Previous Medication    PANTOPRAZOLE (PROTONIX) 40 MG TABLET pantoprazole (PROTONIX) 40 MG tablet       Take 1 tablet (40 mg total) by mouth 2 (two) times daily.    Take 1 tablet (40 mg total) by mouth 2 (two) times daily.   Discontinued Medications    FUROSEMIDE (LASIX) 20 MG TABLET    Take 1 tablet (20 mg total) by mouth once daily.    LINAGLIPTIN (TRADJENTA) 5 MG TAB TABLET    Take 1 tablet (5 mg total) by mouth once daily.     Modified Medications    Modified Medication Previous Medication    PANTOPRAZOLE (PROTONIX) 40 MG TABLET pantoprazole (PROTONIX) 40 MG tablet       Take 1 tablet (40 mg total) by mouth 2 (two) times daily.    Take 1 tablet (40 mg total) by mouth 2 (two) times daily.       Tomy Rios MD   274}  03/24/2022     This note was completed with dictation  software and grammatical errors may exist.    If you are due for any health screening(s) below please notify me so we can arrange them to be ordered and scheduled to maintain your health.     Health Maintenance Due   Topic Date Due    Diabetes Urine Screening  Never done    Pneumococcal Vaccines (Age 0-64) (1 of 2 - PPSV23) Never done    TETANUS VACCINE  Never done    Mammogram  Never done    Colorectal Cancer Screening  Never done    Shingles Vaccine (1 of 2) Never done    Influenza Vaccine (1) Never done

## 2022-03-27 DIAGNOSIS — R93.89 ABNORMAL CXR: Primary | ICD-10-CM

## 2022-03-27 DIAGNOSIS — J98.4 PNEUMONITIS: ICD-10-CM

## 2022-03-27 DIAGNOSIS — J44.9 CHRONIC OBSTRUCTIVE PULMONARY DISEASE, UNSPECIFIED COPD TYPE: ICD-10-CM

## 2022-03-30 ENCOUNTER — TELEPHONE (OUTPATIENT)
Dept: SMOKING CESSATION | Facility: CLINIC | Age: 57
End: 2022-03-30
Payer: MEDICAID

## 2022-03-30 NOTE — TELEPHONE ENCOUNTER
Patient was scheduled for a virtual follow up for tobacco cessation today. She was not present for appointment so I called her to offer her an opportunity to reschedule. I was unable to reach her at this time, as she has no voice mail set up yet.

## 2022-03-31 NOTE — TELEPHONE ENCOUNTER
----- Message from Kerrie Toribio sent at 3/31/2022  2:41 PM CDT -----  Contact: pt  Type:  RX Refill Request    Who Called:  PT    Refill or New Rx:  refill  RX Name and Strength:  zolpidem (AMBIEN) 10 mg Tab  pregabalin (LYRICA) 75 MG capsule ()   How is the patient currently taking it? (ex. 1XDay):  As Directed  Is this a 30 day or 90 day RX:  as Directed  Preferred Pharmacy with phone number:    [id number] - [place], [place] - [address] [facility]  [address]  [place] [address]  Phone: [phone] Fax: [phone]      Best Call Back Number:  [phone]    Additional Information:  Please Advise ---Thank you

## 2022-04-01 ENCOUNTER — TELEPHONE (OUTPATIENT)
Dept: PODIATRY | Facility: CLINIC | Age: 57
End: 2022-04-01
Payer: MEDICAID

## 2022-04-01 RX ORDER — ZOLPIDEM TARTRATE 10 MG/1
10 TABLET ORAL NIGHTLY PRN
Qty: 90 TABLET | Refills: 0 | Status: SHIPPED | OUTPATIENT
Start: 2022-04-01 | End: 2022-04-27 | Stop reason: SDUPTHER

## 2022-04-01 RX ORDER — PREGABALIN 75 MG/1
75 CAPSULE ORAL 2 TIMES DAILY
Qty: 60 CAPSULE | Refills: 2 | Status: SHIPPED | OUTPATIENT
Start: 2022-04-01 | End: 2022-05-17 | Stop reason: SDUPTHER

## 2022-04-01 NOTE — TELEPHONE ENCOUNTER
Attempted to contact Lore trinh/ [facility] back.  Reached the .  [facility] requesting a callback.

## 2022-04-01 NOTE — TELEPHONE ENCOUNTER
----- Message from Heather Richardson sent at 4/1/2022 10:59 AM CDT -----  Lore santos is calling you regarding the above patient. Her call back # is [phone]. She didn't leave any other info other then if you could call her back.    Thanks  Chantell

## 2022-04-04 ENCOUNTER — HOSPITAL ENCOUNTER (EMERGENCY)
Facility: HOSPITAL | Age: 57
Discharge: HOME | End: 2022-04-04
Attending: EMERGENCY MEDICINE
Payer: MEDICAID

## 2022-04-04 ENCOUNTER — PATIENT OUTREACH (OUTPATIENT)
Dept: ADMINISTRATIVE | Facility: HOSPITAL | Age: 57
End: 2022-04-04
Payer: MEDICAID

## 2022-04-04 VITALS
BODY MASS INDEX: 37.67 KG/M2 | SYSTOLIC BLOOD PRESSURE: 141 MMHG | DIASTOLIC BLOOD PRESSURE: 71 MMHG | OXYGEN SATURATION: 94 % | HEART RATE: 109 BPM | WEIGHT: 240 LBS | TEMPERATURE: 99 F | HEIGHT: 67 IN | RESPIRATION RATE: 23 BRPM

## 2022-04-04 DIAGNOSIS — J44.9 CHRONIC OBSTRUCTIVE PULMONARY DISEASE, UNSPECIFIED COPD TYPE: Chronic | ICD-10-CM

## 2022-04-04 DIAGNOSIS — R06.02 SOB (SHORTNESS OF BREATH): ICD-10-CM

## 2022-04-04 LAB
ALBUMIN SERPL BCP-MCNC: 4.2 G/DL (ref 3.5–5.2)
ALP SERPL-CCNC: 155 U/L (ref 55–135)
ALT SERPL W/O P-5'-P-CCNC: 17 U/L (ref 10–44)
ANION GAP SERPL CALC-SCNC: 11 MMOL/L (ref 8–16)
AST SERPL-CCNC: 17 U/L (ref 10–40)
BASOPHILS # BLD AUTO: 0.04 K/UL (ref 0–0.2)
BASOPHILS NFR BLD: 0.4 % (ref 0–1.9)
BILIRUB SERPL-MCNC: 0.5 MG/DL (ref 0.1–1)
BNP SERPL-MCNC: 201 PG/ML (ref 0–99)
BUN SERPL-MCNC: 20 MG/DL (ref 6–20)
CALCIUM SERPL-MCNC: 8.8 MG/DL (ref 8.7–10.5)
CHLORIDE SERPL-SCNC: 97 MMOL/L (ref 95–110)
CO2 SERPL-SCNC: 27 MMOL/L (ref 23–29)
CREAT SERPL-MCNC: 1.3 MG/DL (ref 0.5–1.4)
DIFFERENTIAL METHOD: NORMAL
EOSINOPHIL # BLD AUTO: 0.2 K/UL (ref 0–0.5)
EOSINOPHIL NFR BLD: 1.9 % (ref 0–8)
ERYTHROCYTE [DISTWIDTH] IN BLOOD BY AUTOMATED COUNT: 12.5 % (ref 11.5–14.5)
EST. GFR  (AFRICAN AMERICAN): 53.7 ML/MIN/1.73 M^2
EST. GFR  (NON AFRICAN AMERICAN): 46.6 ML/MIN/1.73 M^2
GLUCOSE SERPL-MCNC: 280 MG/DL (ref 70–110)
HCT VFR BLD AUTO: 43.3 % (ref 37–48.5)
HGB BLD-MCNC: 14.5 G/DL (ref 12–16)
IMM GRANULOCYTES # BLD AUTO: 0.04 K/UL (ref 0–0.04)
IMM GRANULOCYTES NFR BLD AUTO: 0.4 % (ref 0–0.5)
INFLUENZA A, MOLECULAR: NEGATIVE
INFLUENZA B, MOLECULAR: NEGATIVE
INR PPP: 1
LYMPHOCYTES # BLD AUTO: 3.2 K/UL (ref 1–4.8)
LYMPHOCYTES NFR BLD: 34.1 % (ref 18–48)
MAGNESIUM SERPL-MCNC: 2.2 MG/DL (ref 1.6–2.6)
MCH RBC QN AUTO: 30 PG (ref 27–31)
MCHC RBC AUTO-ENTMCNC: 33.5 G/DL (ref 32–36)
MCV RBC AUTO: 90 FL (ref 82–98)
MONOCYTES # BLD AUTO: 1 K/UL (ref 0.3–1)
MONOCYTES NFR BLD: 10.9 % (ref 4–15)
NEUTROPHILS # BLD AUTO: 4.9 K/UL (ref 1.8–7.7)
NEUTROPHILS NFR BLD: 52.3 % (ref 38–73)
NRBC BLD-RTO: 0 /100 WBC
PLATELET # BLD AUTO: 346 K/UL (ref 150–450)
PMV BLD AUTO: 9.5 FL (ref 9.2–12.9)
POTASSIUM SERPL-SCNC: 3.8 MMOL/L (ref 3.5–5.1)
PROT SERPL-MCNC: 7.1 G/DL (ref 6–8.4)
PROTHROMBIN TIME: 12.7 SEC (ref 11.4–13.7)
RBC # BLD AUTO: 4.83 M/UL (ref 4–5.4)
SARS-COV-2 RDRP RESP QL NAA+PROBE: NEGATIVE
SODIUM SERPL-SCNC: 135 MMOL/L (ref 136–145)
SPECIMEN SOURCE: NORMAL
TROPONIN I SERPL DL<=0.01 NG/ML-MCNC: <0.03 NG/ML
TROPONIN I SERPL DL<=0.01 NG/ML-MCNC: <0.03 NG/ML
WBC # BLD AUTO: 9.39 K/UL (ref 3.9–12.7)

## 2022-04-04 PROCEDURE — 84484 ASSAY OF TROPONIN QUANT: CPT | Performed by: EMERGENCY MEDICINE

## 2022-04-04 PROCEDURE — 25000242 PHARM REV CODE 250 ALT 637 W/ HCPCS: Performed by: EMERGENCY MEDICINE

## 2022-04-04 PROCEDURE — 99900031 HC PATIENT EDUCATION (STAT)

## 2022-04-04 PROCEDURE — 93010 ELECTROCARDIOGRAM REPORT: CPT | Mod: ,,, | Performed by: GENERAL PRACTICE

## 2022-04-04 PROCEDURE — 94640 AIRWAY INHALATION TREATMENT: CPT

## 2022-04-04 PROCEDURE — 83880 ASSAY OF NATRIURETIC PEPTIDE: CPT | Performed by: NURSE PRACTITIONER

## 2022-04-04 PROCEDURE — 93005 ELECTROCARDIOGRAM TRACING: CPT | Performed by: GENERAL PRACTICE

## 2022-04-04 PROCEDURE — 85025 COMPLETE CBC W/AUTO DIFF WBC: CPT | Performed by: NURSE PRACTITIONER

## 2022-04-04 PROCEDURE — 93010 EKG 12-LEAD: ICD-10-PCS | Mod: ,,, | Performed by: GENERAL PRACTICE

## 2022-04-04 PROCEDURE — 63600175 PHARM REV CODE 636 W HCPCS: Performed by: EMERGENCY MEDICINE

## 2022-04-04 PROCEDURE — 80053 COMPREHEN METABOLIC PANEL: CPT | Performed by: NURSE PRACTITIONER

## 2022-04-04 PROCEDURE — 85610 PROTHROMBIN TIME: CPT | Performed by: NURSE PRACTITIONER

## 2022-04-04 PROCEDURE — 99284 EMERGENCY DEPT VISIT MOD MDM: CPT | Mod: 25

## 2022-04-04 PROCEDURE — 93010 ELECTROCARDIOGRAM REPORT: CPT | Mod: 76,59,, | Performed by: GENERAL PRACTICE

## 2022-04-04 PROCEDURE — 87502 INFLUENZA DNA AMP PROBE: CPT | Performed by: NURSE PRACTITIONER

## 2022-04-04 PROCEDURE — 83735 ASSAY OF MAGNESIUM: CPT | Performed by: NURSE PRACTITIONER

## 2022-04-04 PROCEDURE — 84484 ASSAY OF TROPONIN QUANT: CPT | Mod: 91 | Performed by: NURSE PRACTITIONER

## 2022-04-04 PROCEDURE — 99900035 HC TECH TIME PER 15 MIN (STAT)

## 2022-04-04 PROCEDURE — U0002 COVID-19 LAB TEST NON-CDC: HCPCS | Performed by: NURSE PRACTITIONER

## 2022-04-04 RX ORDER — PREDNISONE 20 MG/1
40 TABLET ORAL
Status: COMPLETED | OUTPATIENT
Start: 2022-04-04 | End: 2022-04-04

## 2022-04-04 RX ORDER — IPRATROPIUM BROMIDE AND ALBUTEROL SULFATE 2.5; .5 MG/3ML; MG/3ML
3 SOLUTION RESPIRATORY (INHALATION)
Status: DISCONTINUED | OUTPATIENT
Start: 2022-04-04 | End: 2022-04-05 | Stop reason: HOSPADM

## 2022-04-04 RX ORDER — PREDNISONE 20 MG/1
40 TABLET ORAL DAILY
Qty: 8 TABLET | Refills: 0 | Status: SHIPPED | OUTPATIENT
Start: 2022-04-04 | End: 2022-04-08

## 2022-04-04 RX ORDER — BENZONATATE 100 MG/1
100 CAPSULE ORAL 3 TIMES DAILY PRN
Qty: 20 CAPSULE | Refills: 0 | Status: SHIPPED | OUTPATIENT
Start: 2022-04-04 | End: 2022-04-14

## 2022-04-04 RX ORDER — ALBUTEROL SULFATE 90 UG/1
2 AEROSOL, METERED RESPIRATORY (INHALATION) EVERY 6 HOURS PRN
Qty: 18 G | Refills: 2 | Status: SHIPPED | OUTPATIENT
Start: 2022-04-04 | End: 2022-09-27 | Stop reason: SDUPTHER

## 2022-04-04 RX ORDER — ALBUTEROL SULFATE 1.25 MG/3ML
1.25 SOLUTION RESPIRATORY (INHALATION) EVERY 6 HOURS PRN
Qty: 75 ML | Refills: 1 | Status: SHIPPED | OUTPATIENT
Start: 2022-04-04 | End: 2023-03-13

## 2022-04-04 RX ADMIN — PREDNISONE 40 MG: 20 TABLET ORAL at 10:04

## 2022-04-04 RX ADMIN — IPRATROPIUM BROMIDE AND ALBUTEROL SULFATE 3 ML: .5; 3 SOLUTION RESPIRATORY (INHALATION) at 08:04

## 2022-04-04 NOTE — FIRST PROVIDER EVALUATION
Emergency Department TeleTriage Encounter Note      CHIEF COMPLAINT    Chief Complaint   Patient presents with    Shortness of Breath     X 1 DAY    Cough       VITAL SIGNS   Initial Vitals [04/04/22 1742]   BP Pulse Resp Temp SpO2   (!) 151/88 (!) 123 (!) 26 98.8 °F (37.1 °C) 95 %      MAP       --            ALLERGIES    Review of patient's allergies indicates:   Allergen Reactions    Morphine Nausea And Vomiting    Sulfa (sulfonamide antibiotics) Nausea And Vomiting       PROVIDER TRIAGE NOTE  This is a teletriage evaluation of a 56 y.o. female presenting to the ED complaining of cough and SOB for one day.  Reports generalized body aches.     Initial orders will be placed and care will be transferred to an alternate provider when patient is roomed for a full evaluation. Any additional orders and the final disposition will be determined by that provider.           ORDERS  Labs Reviewed   CBC W/ AUTO DIFFERENTIAL   COMPREHENSIVE METABOLIC PANEL   TROPONIN I   B-TYPE NATRIURETIC PEPTIDE   PROTIME-INR   MAGNESIUM   SARS-COV-2 RNA AMPLIFICATION, QUAL       ED Orders (720h ago, onward)    Start Ordered     Status Ordering Provider    04/04/22 1751 04/04/22 1750  Airborne and Contact and Droplet Isolation Status  Continuous         Ordered ALEJANDRINA [place]    04/04/22 1745 04/04/22 1744  Magnesium  STAT         Ordered NIGHATDASADE HASSAN    04/04/22 1745 04/04/22 1744  COVID-19 Rapid Screening  STAT         Ordered NIGHATDASADE HASSAN    04/04/22 1744 04/04/22 1744  Saline lock IV  Once         Ordered NIGHATDASADE HASSAN    04/04/22 1744 04/04/22 1744  CBC auto differential  STAT         Ordered SADE BAEZ    04/04/22 1744 04/04/22 1744  Comprehensive metabolic panel  STAT         Ordered NIGHATDARSADE AArleen    04/04/22 1744 04/04/22 1744  Troponin I  STAT         Ordered SADE BAEZ    04/04/22 1744 04/04/22 1744  Brain natriuretic peptide  STAT         Ordered NIGHATDARSADE.    04/04/22 1744  04/04/22 1744  Protime-INR  STAT         Ordered SADE BAEZ.    04/04/22 1744 04/04/22 1744  EKG 12-lead  Once         Ordered SADE BAEZ.    04/04/22 1744 04/04/22 1744  X-Ray Chest AP Portable  1 time imaging         Ordered SADE BAEZ.    04/04/22 1744 04/04/22 1744  Pulse Oximetry Once  Once         Ordered SADE BAEZ.    04/04/22 1744 04/04/22 1744  Cardiac Monitoring - Adult  Continuous        Comments: Notify Physician If:    Ordered SADE BAEZ            Virtual Visit Note: The provider triage portion of this emergency department evaluation and documentation was performed via Lok[employer], a HIPAA-compliant telemedicine application, in concert with a tele-presenter in the room. A face to face patient evaluation with one of my colleagues will occur once the patient is placed in an emergency department room.      DISCLAIMER: This note was prepared with [employer] voice recognition transcription software. Garbled syntax, mangled pronouns, and other bizarre constructions may be attributed to that software system.

## 2022-04-05 NOTE — CARE UPDATE
04/04/22 2040   Patient Assessment/Suction   Level of Consciousness (AVPU) alert   Respiratory Effort Unlabored;Normal   Expansion/Accessory Muscles/Retractions no use of accessory muscles   All Lung Fields Breath Sounds diminished   Cough Frequency frequent   Cough Type nonproductive   PRE-TX-O2   O2 Device (Oxygen Therapy) room air   SpO2 99 %   Pulse 106   Resp 19   Aerosol Therapy   $ Aerosol Therapy Charges Aerosol Treatment   Respiratory Treatment Status (SVN) given   Treatment Route (SVN) mask;oxygen   Post Treatment Assessment (SVN) breath sounds unchanged   Signs of Intolerance (SVN) none   Breath Sounds Post-Respiratory Treatment   Throughout All Fields Post-Treatment All Fields   Throughout All Fields Post-Treatment no change   Education   $ Education 15 min   Respiratory Evaluation   $ Care Plan Tech Time 15 min

## 2022-04-05 NOTE — TELEPHONE ENCOUNTER
Attempted to contact Lore trinh/ [facility].      S/w Milton, stated he will have Lore call me back tomorrow when she is back in the office.

## 2022-04-07 ENCOUNTER — TELEPHONE (OUTPATIENT)
Dept: EMERGENCY MEDICINE | Facility: HOSPITAL | Age: 57
End: 2022-04-07
Payer: MEDICAID

## 2022-04-07 RX ORDER — TORSEMIDE 20 MG/1
40 TABLET ORAL DAILY
Qty: 6 TABLET | Refills: 0 | Status: SHIPPED | OUTPATIENT
Start: 2022-04-07 | End: 2022-04-10

## 2022-04-07 RX ORDER — DOXYCYCLINE 100 MG/1
100 CAPSULE ORAL 2 TIMES DAILY
Qty: 20 CAPSULE | Refills: 0 | Status: SHIPPED | OUTPATIENT
Start: 2022-04-07 | End: 2022-04-17

## 2022-04-07 NOTE — ED PROVIDER NOTES
Encounter Date: 4/4/2022       History     Chief Complaint   Patient presents with    Shortness of Breath     X 1 DAY    Cough     HPI     Seen  presents with chief complaint of short of breath cough for 1 day.  Patient has a history of reactive airway disease.  She has no associated chest pain this complaint.  She denies any alleviating factors and has no associated fever.SHe notes cough associated with her SOB. She also notes hx of CHF last echo ef 35 %. On torsemide. Hx of COPD as well.        Review of patient's allergies indicates:   Allergen Reactions    Morphine Nausea And Vomiting    Sulfa (sulfonamide antibiotics) Nausea And Vomiting     Past Medical History:   Diagnosis Date    Acid reflux     COPD (chronic obstructive pulmonary disease)     Coronary artery disease     COVID-19     Diabetes mellitus     High cholesterol     Hypertension     Obese body habitus      Past Surgical History:   Procedure Laterality Date    APPENDECTOMY      CATHETERIZATION OF BOTH LEFT AND RIGHT HEART N/A 11/23/2020    Procedure: CATHETERIZATION, HEART, BOTH LEFT AND RIGHT;  Surgeon: Doug Kendall MD;  [facility] CATH/EP LAB;  Service: Cardiology;  Laterality: N/A;    CHOLECYSTECTOMY      EXCISION OF LESION OF EYELID Right 12/11/2020    Procedure: EXCISION, LESION, EYELID;  Surgeon: Malachi Rodriguez MD;  Location: [facility] OR;  Service: Ophthalmology;  Laterality: Right;  Inclusion Cyst removal right lower lid    JOINT REPLACEMENT Left     left hip    MASTOIDECTOMY Right      No family history on file.  Social History     Tobacco Use    Smoking status: Current Every Day Smoker     Packs/day: 0.50     Years: 13.00     Pack years: 6.50    Smokeless tobacco: Never Used   Substance Use Topics    Alcohol use: Not Currently    Drug use: Not Currently     Review of Systems   Constitutional: Negative for fever.   HENT: Negative for sore throat.    Respiratory: Positive for shortness of breath.     Cardiovascular: Negative for chest pain.   Gastrointestinal: Negative for nausea.   Genitourinary: Negative for dysuria.   Musculoskeletal: Negative for back pain.   Skin: Negative for rash.   Neurological: Negative for weakness.   Hematological: Does not bruise/bleed easily.       Physical Exam     Initial Vitals [04/04/22 1742]   BP Pulse Resp Temp SpO2   (!) 151/88 (!) 123 (!) 26 98.8 °F (37.1 °C) 95 %      MAP       --         Physical Exam    Nursing note and vitals reviewed.  Constitutional: She appears well-developed and well-nourished.   HENT:   Head: Normocephalic and atraumatic.   Eyes: Conjunctivae are normal.   Cardiovascular: Normal rate and regular rhythm.   Pulmonary/Chest: She has wheezes.   Abdominal: Abdomen is soft.   Musculoskeletal:         General: Normal range of motion.     Neurological: She is alert and oriented to person, place, and time.   Skin: Skin is warm and dry.   Psychiatric: She has a normal mood and affect. Her speech is normal.         ED Course   Procedures  Labs Reviewed   COMPREHENSIVE METABOLIC PANEL - Abnormal; Notable for the following components:       Result Value    Sodium 135 (*)     Glucose 280 (*)     Alkaline Phosphatase 155 (*)     eGFR if  53.7 (*)     eGFR if non  46.6 (*)     All other components within normal limits   B-TYPE NATRIURETIC PEPTIDE - Abnormal; Notable for the following components:     (*)     All other components within normal limits   CBC W/ AUTO DIFFERENTIAL   TROPONIN I   PROTIME-INR   MAGNESIUM   SARS-COV-2 RNA AMPLIFICATION, QUAL   INFLUENZA A AND B ANTIGEN    Narrative:     Specimen Source->Nasopharyngeal Swab   TROPONIN I        ECG Results          EKG 12-lead (In process)  Result time 04/05/22 05:20:35    In process by Interface, Lab In [facility] (04/05/22 05:20:35)                 Narrative:    Test Reason : R06.02,    Vent. Rate : 105 BPM     Atrial Rate : 105 BPM     P-R Int : 152 ms          QRS Dur  : 092 ms      QT Int : 352 ms       P-R-T Axes : 075 066 221 degrees     QTc Int : 465 ms    Sinus tachycardia  T wave abnormality, consider inferolateral ischemia  Abnormal ECG  When compared with ECG of 04-APR-2022 17:53,  No significant change was found    Referred By: ABAD   SELF           Confirmed By:                              EKG 12-lead (In process)  Result time 04/05/22 05:20:33    In process by Interface, Lab In [facility] (04/05/22 05:20:33)                 Narrative:    Test Reason : R06.02,    Vent. Rate : 121 BPM     Atrial Rate : 121 BPM     P-R Int : 148 ms          QRS Dur : 092 ms      QT Int : 324 ms       P-R-T Axes : 077 066 -78 degrees     QTc Int : 460 ms    Sinus tachycardia  Right atrial enlargement  ST and T wave abnormality, consider inferolateral ischemia  Abnormal ECG  When compared with ECG of 12-FEB-2022 01:27,  Inverted T waves have replaced nonspecific T wave abnormality in Inferior  leads  Inverted T waves have replaced nonspecific T wave abnormality in Lateral  leads    Referred By: ABAD   SELF           Confirmed By:                             Imaging Results          X-Ray Chest AP Portable (Final result)  Result time 04/04/22 18:24:59    Final result by Julián You MD (04/04/22 18:24:59)                 Narrative:    CLINICAL HISTORY:  56 years (1965) Female SOB Shortness of Breath (X 1 DAY); Cough, hx of COPD, HTN    TECHNIQUE:  Portable AP radiograph the chest.    COMPARISON:  Most recent radiograph from February 14, 2022    FINDINGS:  The lungs are clear. Costophrenic angles are seen without effusion. No pneumothorax is identified. The heart is normal in size. The mediastinum is within normal limits. Degenerative changes are seen with an ACDF in the lower cervical spine. The visualized upper abdomen is unremarkable.    IMPRESSION:  No acute cardiac or pulmonary process.                  .            Electronically signed by:  Julián You MD   4/4/2022 6:24 PM CDT Workstation: [id number]                               Medications   predniSONE tablet 40 mg (40 mg Oral Given 4/4/22 3927)     Medical Decision Making:   Initial Assessment:   Seen and examined.  Presented with a chief complaint of shortness of breath.  Symptomatically improved with DuoNeb treatment.  Started on steroid.  Refilled albuterol.  Troponins negative x2.  Was tachycardic but this improved during stay.  Likely COPD flare versus CHF flare versus combination of both.  New cough has had some sputum.  Favors COPD however BNP is elevated but has been elevated before.  Currently on torsemide.  Will discharge in cough or follow-up.    Update:  Culture follow-up.  Will add doxycycline to regimen.  Additionally discussed with her primary Cardiology Service, will double dose of torsemide for 3 days, have follow-up basic metabolic profile to ensure kidney function is stable, will repeat brain atretic peptide is well.  Will discuss with primary care tomorrow.  Recommended to patient to fill scripts and follow-up with cardiology for repeat lab draw.    Arron Reid MD MPH  04/07/2022 8:44 PM                          Clinical Impression:   Final diagnoses:  [R06.02] SOB (shortness of breath)          ED Disposition Condition    Discharge Stable        ED Prescriptions     Medication Sig Dispense Start Date End Date Auth. Provider    benzonatate (TESSALON) 100 MG capsule Take 1 capsule (100 mg total) by mouth 3 (three) times daily as needed for Cough. 20 capsule 4/4/2022 4/14/2022 Arron Reid Jr., MD    albuterol (ACCUNEB) 1.25 mg/3 mL Nebu Take 3 mLs (1.25 mg total) by nebulization every 6 (six) hours as needed (wheezing). Rescue 75 mL 4/4/2022 4/4/2023 Arron Reid Jr., MD    albuterol (PROVENTIL/VENTOLIN HFA) 90 mcg/actuation inhaler Inhale 2 puffs into the lungs every 6 (six) hours as needed for Wheezing or Shortness of Breath. Rescue 18 g 4/4/2022  Arron Reid Jr., MD    predniSONE  (DELTASONE) 20 MG tablet Take 2 tablets (40 mg total) by mouth once daily. for 4 days 8 tablet 4/4/2022 4/8/2022 Arron Reid Jr., MD        Follow-up Information    None          Arron Reid Jr., MD  04/07/22 2044

## 2022-04-07 NOTE — TELEPHONE ENCOUNTER
Contacted Arleen Lore ziggy/ [facility].    Had her come in on 03/17/2022, will fax over LMN needed signature by doctor.

## 2022-04-08 NOTE — TELEPHONE ENCOUNTER
Called for follow up. Some improvement. Aware of of  Steroids affecting sugar. Will double toresmide and have call cards office to schedule bmp and bnp to monitor fluid and kidney status. Will call in doxy as has green sputum over 2 days with cough in setting of copd. Remains chest pain free. Encouraged to return for change in symptoms or chest pain.     Arron Reid MD MPH  04/07/2022 7:44 PM

## 2022-04-12 ENCOUNTER — PATIENT MESSAGE (OUTPATIENT)
Dept: FAMILY MEDICINE | Facility: CLINIC | Age: 57
End: 2022-04-12
Payer: MEDICAID

## 2022-04-13 ENCOUNTER — TELEPHONE (OUTPATIENT)
Dept: FAMILY MEDICINE | Facility: CLINIC | Age: 57
End: 2022-04-13
Payer: MEDICAID

## 2022-04-13 ENCOUNTER — CLINICAL SUPPORT (OUTPATIENT)
Dept: SMOKING CESSATION | Facility: CLINIC | Age: 57
End: 2022-04-13
Payer: COMMERCIAL

## 2022-04-13 ENCOUNTER — OFFICE VISIT (OUTPATIENT)
Dept: FAMILY MEDICINE | Facility: CLINIC | Age: 57
End: 2022-04-13
Payer: MEDICAID

## 2022-04-13 DIAGNOSIS — B37.31 VAGINAL YEAST INFECTION: Primary | ICD-10-CM

## 2022-04-13 DIAGNOSIS — F17.200 NICOTINE DEPENDENCE: Primary | ICD-10-CM

## 2022-04-13 PROCEDURE — 4010F ACE/ARB THERAPY RXD/TAKEN: CPT | Mod: CPTII,95,, | Performed by: FAMILY MEDICINE

## 2022-04-13 PROCEDURE — 3066F NEPHROPATHY DOC TX: CPT | Mod: CPTII,95,, | Performed by: FAMILY MEDICINE

## 2022-04-13 PROCEDURE — 99402 PR PREVENT COUNSEL,INDIV,30 MIN: ICD-10-PCS | Mod: S$GLB,,,

## 2022-04-13 PROCEDURE — 3051F PR MOST RECENT HEMOGLOBIN A1C LEVEL 7.0 - < 8.0%: ICD-10-PCS | Mod: CPTII,95,, | Performed by: FAMILY MEDICINE

## 2022-04-13 PROCEDURE — 1159F MED LIST DOCD IN RCRD: CPT | Mod: CPTII,95,, | Performed by: FAMILY MEDICINE

## 2022-04-13 PROCEDURE — 3066F PR DOCUMENTATION OF TREATMENT FOR NEPHROPATHY: ICD-10-PCS | Mod: CPTII,95,, | Performed by: FAMILY MEDICINE

## 2022-04-13 PROCEDURE — 99213 PR OFFICE/OUTPT VISIT, EST, LEVL III, 20-29 MIN: ICD-10-PCS | Mod: 95,,, | Performed by: FAMILY MEDICINE

## 2022-04-13 PROCEDURE — 1160F RVW MEDS BY RX/DR IN RCRD: CPT | Mod: CPTII,95,, | Performed by: FAMILY MEDICINE

## 2022-04-13 PROCEDURE — 99213 OFFICE O/P EST LOW 20 MIN: CPT | Mod: 95,,, | Performed by: FAMILY MEDICINE

## 2022-04-13 PROCEDURE — 4010F PR ACE/ARB THEARPY RXD/TAKEN: ICD-10-PCS | Mod: CPTII,95,, | Performed by: FAMILY MEDICINE

## 2022-04-13 PROCEDURE — 99402 PREV MED CNSL INDIV APPRX 30: CPT | Mod: S$GLB,,,

## 2022-04-13 PROCEDURE — 99999 PR PBB SHADOW E&M-EST. PATIENT-LVL I: ICD-10-PCS | Mod: PBBFAC,,,

## 2022-04-13 PROCEDURE — 1159F PR MEDICATION LIST DOCUMENTED IN MEDICAL RECORD: ICD-10-PCS | Mod: CPTII,95,, | Performed by: FAMILY MEDICINE

## 2022-04-13 PROCEDURE — 1160F PR REVIEW ALL MEDS BY PRESCRIBER/CLIN PHARMACIST DOCUMENTED: ICD-10-PCS | Mod: CPTII,95,, | Performed by: FAMILY MEDICINE

## 2022-04-13 PROCEDURE — 99999 PR PBB SHADOW E&M-EST. PATIENT-LVL I: CPT | Mod: PBBFAC,,,

## 2022-04-13 PROCEDURE — 3051F HG A1C>EQUAL 7.0%<8.0%: CPT | Mod: CPTII,95,, | Performed by: FAMILY MEDICINE

## 2022-04-13 PROCEDURE — 3061F PR NEG MICROALBUMINURIA RESULT DOCUMENTED/REVIEW: ICD-10-PCS | Mod: CPTII,95,, | Performed by: FAMILY MEDICINE

## 2022-04-13 PROCEDURE — 3061F NEG MICROALBUMINURIA REV: CPT | Mod: CPTII,95,, | Performed by: FAMILY MEDICINE

## 2022-04-13 RX ORDER — IBUPROFEN 200 MG
1 TABLET ORAL DAILY
Qty: 28 PATCH | Refills: 0 | Status: SHIPPED | OUTPATIENT
Start: 2022-04-13 | End: 2022-05-11 | Stop reason: SDUPTHER

## 2022-04-13 RX ORDER — CLOTRIMAZOLE AND BETAMETHASONE DIPROPIONATE 10; .64 MG/G; MG/G
CREAM TOPICAL 2 TIMES DAILY
Qty: 15 G | Refills: 0 | Status: SHIPPED | OUTPATIENT
Start: 2022-04-13 | End: 2022-07-30 | Stop reason: SDUPTHER

## 2022-04-13 RX ORDER — VARENICLINE TARTRATE 1 MG/1
1 TABLET, FILM COATED ORAL 2 TIMES DAILY
Qty: 56 TABLET | Refills: 0 | Status: SHIPPED | OUTPATIENT
Start: 2022-04-13 | End: 2022-05-11 | Stop reason: SDUPTHER

## 2022-04-13 RX ORDER — FLUCONAZOLE 150 MG/1
TABLET ORAL
Qty: 2 TABLET | Refills: 0 | Status: SHIPPED | OUTPATIENT
Start: 2022-04-13 | End: 2022-07-30 | Stop reason: SDUPTHER

## 2022-04-13 NOTE — PROGRESS NOTES
The patient location is: [place]  The chief complaint leading to consultation is: yeast infection    Visit type: audiovisual    Face to Face time with patient: Difficulties with [employer] platform there converted to audio visit  6 minutes of total time spent on the encounter, which includes face to face time and non-face to face time preparing to see the patient (eg, review of tests), Obtaining and/or reviewing separately obtained history, Documenting clinical information in the electronic or other health record, Independently interpreting results (not separately reported) and communicating results to the patient/family/caregiver, or Care coordination (not separately reported).         Each patient to whom he or she provides medical services by telemedicine is:  (1) informed of the relationship between the physician and patient and the respective role of any other health care provider with respect to management of the patient; and (2) notified that he or she may decline to receive medical services by telemedicine and may withdraw from such care at any time.    Notes:  Subjective:       Patient ID: Delores Fox is a 56 y.o. female.    Chief Complaint: Vaginitis      Ms. Leon is calling today because she has a yeast infection. She has been on several antibiotics. She has had to have diflucan in the past.     She is concerned she should use something topical too. She would like to have a topical cream    Review of Systems   Constitutional: Negative for activity change.   HENT: Negative for hearing loss and trouble swallowing.    Eyes: Negative for discharge.   Respiratory: Negative for chest tightness and wheezing.    Cardiovascular: Negative for chest pain and palpitations.   Gastrointestinal: Negative for constipation, diarrhea and vomiting.   Genitourinary: Negative for difficulty urinating and hematuria.   Neurological: Negative for headaches.   Psychiatric/Behavioral: Negative for dysphoric mood.          Objective:      Physical Exam  Pulmonary:      Effort: Pulmonary effort is normal.   Neurological:      Mental Status: She is alert.   Psychiatric:         Mood and Affect: Mood normal.         Thought Content: Thought content normal.         Judgment: Judgment normal.         Assessment:       1. Vaginal yeast infection        Plan:       Problem List Items Addressed This Visit    None     Visit Diagnoses     Vaginal yeast infection    -  Primary    Relevant Medications    fluconazole (DIFLUCAN) 150 MG Tab

## 2022-04-13 NOTE — TELEPHONE ENCOUNTER
Called and spoke with pt home health nurse. Nurse states pt has a yeast infection and would like Diflucan called into pt pharmacy. Pt does have a virtual appt today at 5:20 with Rodri, but would like something called in earlier. Please advise.    Home health nurse also says that pt has been sating 98%-99% on room air. Pt hasnt been using her oxygen because she doesn't need it. Can they get a order to D/C pt oxygen?

## 2022-04-13 NOTE — PROGRESS NOTES
Individual Follow-Up Form    4/13/2022    Quit Date:     Clinical Status of Patient: Outpatient via telephone     Length of Service: 30 minutes    Continuing Medication: yes  Chantix or Patches    Other Medications:      Target Symptoms: Withdrawal and medication side effects. The following were rated moderate (3) to severe (4) on TCRS:  · Moderate (3): Desire or Crave - discussed withdrawal & habit   · Severe (4): None    Comments: I spoke to patient via telephone for tobacco cessation follow up. She states that she is smoking up to 20 cigarettes per day. We discussed and reviewed: self awareness, triggers, strategies of delay, distract, move it, and change of routine, learned addiction model, treatment plan options, reduction action plan, and follow up. The patient remains on the prescribed tobacco cessation medication regimen of 1 mg Varenicline BID without any negative side effects at this time. She will add a 21 mg nicotine patch QD to further aid her quit and she will follow up via virtual visit on 4/27/2022.       Diagnosis: F17.200    Next Visit: 2 weeks

## 2022-04-13 NOTE — TELEPHONE ENCOUNTER
----- Message from Flako Saldivar sent at 4/13/2022 10:56 AM CDT -----  Contact: Kerrie home health nurse  .Type:  Needs Medical Advice    Who Called: Home Health nurse Kerrie   Symptoms (please be specific): yeast infection  How long has patient had these symptoms:    Pharmacy name and phone #:  [id number] - [place], [place] - [address] AT [facility]   Phone: [phone]  Fax:  [phone]  Would the patient rather a call back or a response via [facility]? Call back  Best Call Back Number [phone]       Additional Information: Home health nurse states that she has a yeast infection and would like robbycon calling in for the pt  Prior to her appt. On tomorrow

## 2022-04-13 NOTE — Clinical Note
I spoke to patient via telephone for tobacco cessation follow up. She states that she is smoking up to 20 cigarettes per day. We discussed and reviewed: self awareness, triggers, strategies of delay, distract, move it, and change of routine, learned addiction model, treatment plan options, reduction action plan, and follow up. The patient remains on the prescribed tobacco cessation medication regimen of 1 mg Varenicline BID without any negative side effects at this time. She will add a 21 mg nicotine patch QD to further aid her quit and she will follow up via virtual visit on 4/27/2022.

## 2022-04-27 ENCOUNTER — CLINICAL SUPPORT (OUTPATIENT)
Dept: SMOKING CESSATION | Facility: CLINIC | Age: 57
End: 2022-04-27
Payer: COMMERCIAL

## 2022-04-27 DIAGNOSIS — F17.200 NICOTINE DEPENDENCE: Primary | ICD-10-CM

## 2022-04-27 PROCEDURE — 99404 PR PREVENT COUNSEL,INDIV,60 MIN: ICD-10-PCS | Mod: 95,,,

## 2022-04-27 PROCEDURE — 99404 PREV MED CNSL INDIV APPRX 60: CPT | Mod: 95,,,

## 2022-04-27 NOTE — Clinical Note
Patient was seen via virtual today for tobacco cessation follow up. She reports feeling depressed the last 2 weeks and she is smoking up to 20 cigarettes per day again. We discussed and reviewed: mindset & motivation, triggers, daily challenges, overcoming fear, planning a course of action from week to week , achieving it one day at a time, use of support system (her sister), negative health effects of continued use, use of strategies, treatment plan options, and follow up. The patient remains on the prescribed tobacco cessation medication regimen of 1 mg Varenicline BID, 21 mg nicotine patch QD, without any negative side effects at this time. She is scheduled for a virtual follow up in 2 weeks.

## 2022-04-27 NOTE — PROGRESS NOTES
Individual Follow-Up Form    4/27/2022    Quit Date:     Clinical Status of Patient: Outpatient via virtual     Length of Service: 60 minutes    Continuing Medication: yes  Chantix or Patches    Other Medications:      Target Symptoms: Withdrawal and medication side effects. The following were rated moderate (3) to severe (4) on TCRS:  · Moderate (3):   · Severe (4):     Comments: Patient was seen via virtual today for tobacco cessation follow up. She reports feeling depressed the last 2 weeks and she is smoking up to 20 cigarettes per day again. We discussed and reviewed: mindset & motivation, triggers, daily challenges, overcoming fear, planning a course of action from week to week , achieving it one day at a time, use of support system (her sister), negative health effects of continued use, use of strategies, treatment plan options, and follow up. The patient remains on the prescribed tobacco cessation medication regimen of 1 mg Varenicline BID, 21 mg nicotine patch QD, without any negative side effects at this time. She is scheduled for a virtual follow up in 2 weeks.     Diagnosis: F17.200    Next Visit: 2 weeks

## 2022-04-28 ENCOUNTER — HOSPITAL ENCOUNTER (OUTPATIENT)
Dept: RADIOLOGY | Facility: CLINIC | Age: 57
Discharge: HOME | End: 2022-04-28
Attending: STUDENT IN AN ORGANIZED HEALTH CARE EDUCATION/TRAINING PROGRAM
Payer: MEDICAID

## 2022-04-28 DIAGNOSIS — Z12.31 ENCOUNTER FOR SCREENING MAMMOGRAM FOR MALIGNANT NEOPLASM OF BREAST: ICD-10-CM

## 2022-04-28 PROCEDURE — 77067 SCR MAMMO BI INCL CAD: CPT | Mod: TC,PO

## 2022-04-28 PROCEDURE — 77063 BREAST TOMOSYNTHESIS BI: CPT | Mod: 26,,, | Performed by: RADIOLOGY

## 2022-04-28 PROCEDURE — 77067 MAMMO DIGITAL SCREENING BILAT WITH TOMO: ICD-10-PCS | Mod: 26,,, | Performed by: RADIOLOGY

## 2022-04-28 PROCEDURE — 77063 BREAST TOMOSYNTHESIS BI: CPT | Mod: TC,PO

## 2022-04-28 PROCEDURE — 77063 MAMMO DIGITAL SCREENING BILAT WITH TOMO: ICD-10-PCS | Mod: 26,,, | Performed by: RADIOLOGY

## 2022-04-28 PROCEDURE — 77067 SCR MAMMO BI INCL CAD: CPT | Mod: 26,,, | Performed by: RADIOLOGY

## 2022-05-11 ENCOUNTER — CLINICAL SUPPORT (OUTPATIENT)
Dept: SMOKING CESSATION | Facility: CLINIC | Age: 57
End: 2022-05-11
Payer: MEDICAID

## 2022-05-11 DIAGNOSIS — F17.200 NICOTINE DEPENDENCE: Primary | ICD-10-CM

## 2022-05-11 PROCEDURE — 99403 PR PREVENT COUNSEL,INDIV,45 MIN: ICD-10-PCS | Mod: 95,,,

## 2022-05-11 PROCEDURE — 99403 PREV MED CNSL INDIV APPRX 45: CPT | Mod: 95,,,

## 2022-05-11 RX ORDER — IBUPROFEN 200 MG
1 TABLET ORAL DAILY
Qty: 28 PATCH | Refills: 0 | Status: SHIPPED | OUTPATIENT
Start: 2022-05-11 | End: 2022-07-30 | Stop reason: SDUPTHER

## 2022-05-11 RX ORDER — VARENICLINE TARTRATE 1 MG/1
1 TABLET, FILM COATED ORAL 2 TIMES DAILY
Qty: 56 TABLET | Refills: 0 | Status: SHIPPED | OUTPATIENT
Start: 2022-05-11 | End: 2022-06-13

## 2022-05-11 NOTE — Clinical Note
Patient was seen via virtual visit today. She reports that she has made some progress with reduction and she is down to 15 cigarettes per day. We discussed and reviewed: treatment plan, triggers, self evaluation, tracking usage, weekly goal, and interventions for hand to mouth habit & appetite increase.Introduced the negative impact of tobacco on health, the health advantages of discontinuing the use of tobacco, time line improved health changes after a quit, withdrawal issues to expect from nicotine and habit, and ways to achieve the goal of a quit. The patient remains on the prescribed tobacco cessation medication regimen of 1 mg Varenicline BID & 21 mg nicotine patch QD (taking off at night) without any negative side effects at this time.

## 2022-05-11 NOTE — PROGRESS NOTES
Individual Follow-Up Form    5/11/2022    Quit Date:     Clinical Status of Patient: Outpatient via virtual     Length of Service: 45 minutes     Continuing Medication: yes  Chantix or Patches    Other Medications:      Target Symptoms: Withdrawal and medication side effects. The following were rated moderate (3) to severe (4) on TCRS:  · Moderate (3): Desire or Crave - discussed habit 7 withdrawal   · Severe (4): None     Comments: Patient was seen via virtual visit today. She reports that she has made some progress with reduction and she is down to 15 cigarettes per day. We discussed and reviewed: treatment plan, triggers, self evaluation, tracking usage, weekly goal, and interventions for hand to mouth habit & appetite increase.Introduced the negative impact of tobacco on health, the health advantages of discontinuing the use of tobacco, time line improved health changes after a quit, withdrawal issues to expect from nicotine and habit, and ways to achieve the goal of a quit. The patient remains on the prescribed tobacco cessation medication regimen of 1 mg Varenicline BID & 21 mg nicotine patch QD (taking off at night) without any negative side effects at this time.      Diagnosis: F17.200    Next Visit: 2 weeks

## 2022-05-16 PROBLEM — J81.0 PULMONARY EDEMA, ACUTE: Status: RESOLVED | Noted: 2022-02-12 | Resolved: 2022-05-16

## 2022-05-17 DIAGNOSIS — K21.9 GASTROESOPHAGEAL REFLUX DISEASE, UNSPECIFIED WHETHER ESOPHAGITIS PRESENT: ICD-10-CM

## 2022-05-17 RX ORDER — ZOLPIDEM TARTRATE 10 MG/1
10 TABLET ORAL NIGHTLY PRN
Qty: 90 TABLET | Refills: 0 | Status: SHIPPED | OUTPATIENT
Start: 2022-05-17 | End: 2022-07-30 | Stop reason: SDUPTHER

## 2022-05-17 RX ORDER — PREGABALIN 75 MG/1
75 CAPSULE ORAL 2 TIMES DAILY
Qty: 60 CAPSULE | Refills: 2 | Status: SHIPPED | OUTPATIENT
Start: 2022-05-17 | End: 2022-07-02 | Stop reason: SDUPTHER

## 2022-05-17 RX ORDER — PANTOPRAZOLE SODIUM 40 MG/1
40 TABLET, DELAYED RELEASE ORAL 2 TIMES DAILY
Qty: 90 TABLET | Refills: 0 | Status: SHIPPED | OUTPATIENT
Start: 2022-05-17 | End: 2022-06-15 | Stop reason: SDUPTHER

## 2022-05-17 RX ORDER — TORSEMIDE 20 MG/1
20 TABLET ORAL DAILY
Qty: 90 TABLET | Refills: 0 | Status: SHIPPED | OUTPATIENT
Start: 2022-05-17 | End: 2022-06-15 | Stop reason: SDUPTHER

## 2022-05-17 NOTE — TELEPHONE ENCOUNTER
No new care gaps identified.  [facility] Embedded Care Gaps. Reference number: [id number]. 5/17/2022   8:29:10 AM CDT

## 2022-05-31 ENCOUNTER — PATIENT MESSAGE (OUTPATIENT)
Dept: ADMINISTRATIVE | Facility: HOSPITAL | Age: 57
End: 2022-05-31
Payer: MEDICAID

## 2022-06-01 ENCOUNTER — TELEPHONE (OUTPATIENT)
Dept: SMOKING CESSATION | Facility: CLINIC | Age: 57
End: 2022-06-01
Payer: MEDICAID

## 2022-06-01 NOTE — TELEPHONE ENCOUNTER
I called patient for tobacco cessation follow up , as she has not yet logged in for scheduled virtual visit. The message stated that there was no voicemail box set up yet. I was unable to leave a message at this time.

## 2022-07-05 DIAGNOSIS — J44.9 CHRONIC OBSTRUCTIVE PULMONARY DISEASE, UNSPECIFIED COPD TYPE: ICD-10-CM

## 2022-07-05 RX ORDER — FLUTICASONE FUROATE, UMECLIDINIUM BROMIDE AND VILANTEROL TRIFENATATE 200; 62.5; 25 UG/1; UG/1; UG/1
1 POWDER RESPIRATORY (INHALATION) DAILY
Qty: 60 EACH | Refills: 11 | Status: SHIPPED | OUTPATIENT
Start: 2022-07-05 | End: 2022-08-01 | Stop reason: SDUPTHER

## 2022-07-13 ENCOUNTER — TELEPHONE (OUTPATIENT)
Dept: FAMILY MEDICINE | Facility: CLINIC | Age: 57
End: 2022-07-13
Payer: MEDICAID

## 2022-07-13 NOTE — TELEPHONE ENCOUNTER
----- Message from Tomy Rios MD sent at 7/12/2022  1:20 PM CDT -----  Regarding: Please call patient to recommend sending back FIT kit  Patient is due for colon cancer screening. Please call patient to recommend that they send back FIT kit for colon cancer screening. Thanks!

## 2022-07-13 NOTE — LETTER
July 13, 2022        Delores Ruddy  [address]  [place] [facility] [address]       Dear Delores:    Your [facility] Team wants to make sure we help you prevent illness and make the healthiest choices possible.    Our records show you are due for colon cancer screening.  If you have already had your screening, or you have made an appointment for your screening, congratulations!  Youre on the road to good health. If you havent signed up for a colorectal screening please accept this invitation to be screened.      According to the American Cancer Society, colon cancer is the third most common cancer for people in the [place].  A simple screening test Fit Kit - done in your own home - can help find colon cancer at an early stage when it can be treated, even before any signs or symptoms develop.     Testing for blood in your stool (feces or bowel movement) is the first step. If you have an upcoming visit with your Primary Care Physician you can  a Fit Kit during your visit, or you can  a Fit Kit at your Primary Care Clinic prior to your visit.    The Fit Kit includes:     Instructions on how to perform the test   (1) Sheet of tissue paper   (1) Small Absorption pad   (1) Bottle to hold the sample and a small probe to help you take the sample   (1) Small plastic bio-hazard bag   (1) Postage-paid return envelope    Please do your test (the instructions will tell you how) and then return your sample in the postage-paid return envelope within 24 hours of collection.     If your test results are negative, you wont need testing again for another year.  If results show you need more testing, we will call you with next steps.    Regular colorectal cancer screening is one of the most powerful weapons for preventing colon cancer.  The website https://www.cancer.org/cancer/colon-rectal-cancer.html can answer many of your questions about this cancer and its prevention.  Just search for  colorectal cancer.    I wish you continued good health!    Sincerely,    Tomy Rios MD

## 2022-07-30 DIAGNOSIS — K21.9 GASTROESOPHAGEAL REFLUX DISEASE, UNSPECIFIED WHETHER ESOPHAGITIS PRESENT: ICD-10-CM

## 2022-07-30 DIAGNOSIS — J18.9 PNEUMONIA DUE TO INFECTIOUS ORGANISM, UNSPECIFIED LATERALITY, UNSPECIFIED PART OF LUNG: ICD-10-CM

## 2022-07-30 RX ORDER — AZITHROMYCIN 250 MG/1
TABLET, FILM COATED ORAL
Qty: 6 TABLET | Refills: 0 | Status: CANCELLED | OUTPATIENT
Start: 2022-07-30

## 2022-07-30 NOTE — TELEPHONE ENCOUNTER
No new care gaps identified.  [facility] Embedded Care Gaps. Reference number: [id number]. 7/30/2022   4:23:22 PM CDT

## 2022-08-01 DIAGNOSIS — J44.9 CHRONIC OBSTRUCTIVE PULMONARY DISEASE, UNSPECIFIED COPD TYPE: ICD-10-CM

## 2022-08-01 RX ORDER — PREGABALIN 75 MG/1
75 CAPSULE ORAL 2 TIMES DAILY
Qty: 60 CAPSULE | Refills: 2 | Status: SHIPPED | OUTPATIENT
Start: 2022-08-01 | End: 2022-08-24 | Stop reason: SDUPTHER

## 2022-08-01 RX ORDER — FLUTICASONE FUROATE, UMECLIDINIUM BROMIDE AND VILANTEROL TRIFENATATE 200; 62.5; 25 UG/1; UG/1; UG/1
1 POWDER RESPIRATORY (INHALATION) DAILY
Qty: 60 EACH | Refills: 11 | Status: SHIPPED | OUTPATIENT
Start: 2022-08-01 | End: 2022-08-24 | Stop reason: SDUPTHER

## 2022-08-01 RX ORDER — TORSEMIDE 20 MG/1
20 TABLET ORAL DAILY
Qty: 90 TABLET | Refills: 0 | Status: SHIPPED | OUTPATIENT
Start: 2022-08-01 | End: 2022-08-24 | Stop reason: SDUPTHER

## 2022-08-01 RX ORDER — PANTOPRAZOLE SODIUM 40 MG/1
40 TABLET, DELAYED RELEASE ORAL 2 TIMES DAILY
Qty: 90 TABLET | Refills: 0 | Status: SHIPPED | OUTPATIENT
Start: 2022-08-01 | End: 2022-08-24 | Stop reason: SDUPTHER

## 2022-08-01 RX ORDER — ZOLPIDEM TARTRATE 10 MG/1
10 TABLET ORAL NIGHTLY PRN
Qty: 90 TABLET | Refills: 0 | Status: SHIPPED | OUTPATIENT
Start: 2022-08-01 | End: 2022-08-24 | Stop reason: SDUPTHER

## 2022-08-01 RX ORDER — LOSARTAN POTASSIUM 25 MG/1
25 TABLET ORAL 2 TIMES DAILY
Qty: 180 TABLET | Refills: 0 | Status: ON HOLD | OUTPATIENT
Start: 2022-08-01 | End: 2022-09-18 | Stop reason: SDUPTHER

## 2022-08-01 RX ORDER — METOPROLOL SUCCINATE 50 MG/1
50 TABLET, EXTENDED RELEASE ORAL DAILY
Qty: 90 TABLET | Refills: 0 | Status: SHIPPED | OUTPATIENT
Start: 2022-08-01 | End: 2022-08-24 | Stop reason: SDUPTHER

## 2022-08-24 DIAGNOSIS — K21.9 GASTROESOPHAGEAL REFLUX DISEASE, UNSPECIFIED WHETHER ESOPHAGITIS PRESENT: ICD-10-CM

## 2022-08-24 DIAGNOSIS — Z79.4 TYPE 2 DIABETES MELLITUS WITH STAGE 3B CHRONIC KIDNEY DISEASE, WITH LONG-TERM CURRENT USE OF INSULIN: ICD-10-CM

## 2022-08-24 DIAGNOSIS — B37.31 VAGINAL YEAST INFECTION: ICD-10-CM

## 2022-08-24 DIAGNOSIS — E11.22 TYPE 2 DIABETES MELLITUS WITH STAGE 3B CHRONIC KIDNEY DISEASE, WITH LONG-TERM CURRENT USE OF INSULIN: ICD-10-CM

## 2022-08-24 DIAGNOSIS — E11.65 TYPE 2 DIABETES MELLITUS WITH HYPERGLYCEMIA, WITHOUT LONG-TERM CURRENT USE OF INSULIN: ICD-10-CM

## 2022-08-24 DIAGNOSIS — F17.200 NICOTINE DEPENDENCE: ICD-10-CM

## 2022-08-24 DIAGNOSIS — J18.9 PNEUMONIA DUE TO INFECTIOUS ORGANISM, UNSPECIFIED LATERALITY, UNSPECIFIED PART OF LUNG: ICD-10-CM

## 2022-08-24 DIAGNOSIS — N18.32 TYPE 2 DIABETES MELLITUS WITH STAGE 3B CHRONIC KIDNEY DISEASE, WITH LONG-TERM CURRENT USE OF INSULIN: ICD-10-CM

## 2022-08-25 ENCOUNTER — PATIENT MESSAGE (OUTPATIENT)
Dept: CARDIOLOGY | Facility: CLINIC | Age: 57
End: 2022-08-25
Payer: MEDICAID

## 2022-08-25 RX ORDER — AZITHROMYCIN 250 MG/1
TABLET, FILM COATED ORAL
Qty: 6 TABLET | Refills: 0 | Status: CANCELLED | OUTPATIENT
Start: 2022-08-25

## 2022-08-25 RX ORDER — PREGABALIN 75 MG/1
75 CAPSULE ORAL 2 TIMES DAILY
Qty: 60 CAPSULE | Refills: 2 | Status: SHIPPED | OUTPATIENT
Start: 2022-08-25 | End: 2022-09-27

## 2022-08-25 RX ORDER — ATORVASTATIN CALCIUM 40 MG/1
40 TABLET, FILM COATED ORAL DAILY
Qty: 90 TABLET | Refills: 0 | Status: SHIPPED | OUTPATIENT
Start: 2022-08-25 | End: 2022-10-03 | Stop reason: SDUPTHER

## 2022-08-25 RX ORDER — FLUCONAZOLE 150 MG/1
TABLET ORAL
Qty: 2 TABLET | Refills: 0 | Status: ON HOLD | OUTPATIENT
Start: 2022-08-25 | End: 2022-09-18 | Stop reason: HOSPADM

## 2022-08-25 RX ORDER — TORSEMIDE 20 MG/1
20 TABLET ORAL DAILY
Qty: 90 TABLET | Refills: 0 | Status: SHIPPED | OUTPATIENT
Start: 2022-08-25 | End: 2022-10-03 | Stop reason: SDUPTHER

## 2022-08-25 RX ORDER — PANTOPRAZOLE SODIUM 40 MG/1
40 TABLET, DELAYED RELEASE ORAL 2 TIMES DAILY
Qty: 90 TABLET | Refills: 0 | Status: SHIPPED | OUTPATIENT
Start: 2022-08-25 | End: 2022-09-27 | Stop reason: SDUPTHER

## 2022-08-25 RX ORDER — LANCETS
EACH MISCELLANEOUS
Qty: 200 EACH | Refills: 1 | Status: SHIPPED | OUTPATIENT
Start: 2022-08-25 | End: 2023-02-26 | Stop reason: SDUPTHER

## 2022-08-25 RX ORDER — METOPROLOL SUCCINATE 50 MG/1
50 TABLET, EXTENDED RELEASE ORAL DAILY
Qty: 90 TABLET | Refills: 0 | Status: ON HOLD | OUTPATIENT
Start: 2022-08-25 | End: 2022-09-18 | Stop reason: SDUPTHER

## 2022-08-25 RX ORDER — IBUPROFEN 200 MG
1 TABLET ORAL DAILY
Qty: 28 PATCH | Refills: 2 | Status: SHIPPED | OUTPATIENT
Start: 2022-08-25 | End: 2022-09-27

## 2022-08-25 RX ORDER — METFORMIN HYDROCHLORIDE 500 MG/1
500 TABLET, EXTENDED RELEASE ORAL
Qty: 90 TABLET | Refills: 0 | Status: SHIPPED | OUTPATIENT
Start: 2022-08-25 | End: 2022-09-27 | Stop reason: SDUPTHER

## 2022-08-25 RX ORDER — CLOTRIMAZOLE AND BETAMETHASONE DIPROPIONATE 10; .64 MG/G; MG/G
CREAM TOPICAL 2 TIMES DAILY
Qty: 15 G | Refills: 0 | Status: SHIPPED | OUTPATIENT
Start: 2022-08-25 | End: 2023-03-16 | Stop reason: ALTCHOICE

## 2022-08-25 RX ORDER — INSULIN PUMP SYRINGE, 3 ML
EACH MISCELLANEOUS
Qty: 1 EACH | Refills: 0 | Status: SHIPPED | OUTPATIENT
Start: 2022-08-25 | End: 2023-08-22

## 2022-08-25 RX ORDER — ZOLPIDEM TARTRATE 10 MG/1
10 TABLET ORAL NIGHTLY PRN
Qty: 90 TABLET | Refills: 0 | Status: SHIPPED | OUTPATIENT
Start: 2022-08-25 | End: 2022-10-03 | Stop reason: SDUPTHER

## 2022-08-25 NOTE — TELEPHONE ENCOUNTER
Care Due:                  Date            Visit Type   Department     Provider  --------------------------------------------------------------------------------                                EP -                              PRIMARY      [facility]  Last Visit: 03-      CARE ([facility])   COLLEEN Rios                              EP -                              PRIMARY      [facility]  Next Visit: 09-      CARE ([facility])   MEDICINE       Tomy Rios                                                            Last  Test          Frequency    Reason                     Performed    Due Date  --------------------------------------------------------------------------------    HBA1C.......  6 months...  metFORMIN................  02- 08-    [employer] Embedded Care Gaps. Reference number: [id number]. 8/24/2022   8:05:55 PM CDT

## 2022-08-31 DIAGNOSIS — E11.9 TYPE 2 DIABETES MELLITUS WITHOUT COMPLICATION: ICD-10-CM

## 2022-09-02 ENCOUNTER — TELEPHONE (OUTPATIENT)
Dept: PULMONOLOGY | Facility: CLINIC | Age: 57
End: 2022-09-02
Payer: MEDICAID

## 2022-09-06 ENCOUNTER — PATIENT MESSAGE (OUTPATIENT)
Dept: ADMINISTRATIVE | Facility: HOSPITAL | Age: 57
End: 2022-09-06
Payer: MEDICAID

## 2022-09-13 ENCOUNTER — PATIENT MESSAGE (OUTPATIENT)
Dept: FAMILY MEDICINE | Facility: CLINIC | Age: 57
End: 2022-09-13
Payer: MEDICAID

## 2022-09-16 ENCOUNTER — HOSPITAL ENCOUNTER (INPATIENT)
Facility: HOSPITAL | Age: 57
LOS: 2 days | Discharge: HOME | DRG: 066 | End: 2022-09-18
Attending: STUDENT IN AN ORGANIZED HEALTH CARE EDUCATION/TRAINING PROGRAM | Admitting: STUDENT IN AN ORGANIZED HEALTH CARE EDUCATION/TRAINING PROGRAM
Payer: MEDICAID

## 2022-09-16 DIAGNOSIS — I63.9 STROKE: ICD-10-CM

## 2022-09-16 DIAGNOSIS — I63.9 CVA (CEREBRAL VASCULAR ACCIDENT): ICD-10-CM

## 2022-09-16 DIAGNOSIS — R07.9 CHEST PAIN: ICD-10-CM

## 2022-09-16 LAB
CHOLEST SERPL-MCNC: 189 MG/DL (ref 120–199)
CHOLEST/HDLC SERPL: 5.7 {RATIO} (ref 2–5)
ESTIMATED AVG GLUCOSE: 237 MG/DL (ref 68–131)
GLUCOSE SERPL-MCNC: 203 MG/DL (ref 70–110)
GLUCOSE SERPL-MCNC: 216 MG/DL (ref 70–110)
GLUCOSE SERPL-MCNC: 228 MG/DL (ref 70–110)
GLUCOSE SERPL-MCNC: 231 MG/DL (ref 70–110)
HBA1C MFR BLD: 9.9 % (ref 4.5–6.2)
HDLC SERPL-MCNC: 33 MG/DL (ref 40–75)
HDLC SERPL: 17.5 % (ref 20–50)
LDLC SERPL CALC-MCNC: 130.6 MG/DL (ref 63–159)
NONHDLC SERPL-MCNC: 156 MG/DL
TRIGL SERPL-MCNC: 127 MG/DL (ref 30–150)
TSH SERPL DL<=0.005 MIU/L-ACNC: 3.6 UIU/ML (ref 0.34–5.6)

## 2022-09-16 PROCEDURE — 94640 AIRWAY INHALATION TREATMENT: CPT

## 2022-09-16 PROCEDURE — 94761 N-INVAS EAR/PLS OXIMETRY MLT: CPT

## 2022-09-16 PROCEDURE — 25000242 PHARM REV CODE 250 ALT 637 W/ HCPCS: Performed by: STUDENT IN AN ORGANIZED HEALTH CARE EDUCATION/TRAINING PROGRAM

## 2022-09-16 PROCEDURE — 63600175 PHARM REV CODE 636 W HCPCS: Performed by: STUDENT IN AN ORGANIZED HEALTH CARE EDUCATION/TRAINING PROGRAM

## 2022-09-16 PROCEDURE — 84443 ASSAY THYROID STIM HORMONE: CPT | Performed by: STUDENT IN AN ORGANIZED HEALTH CARE EDUCATION/TRAINING PROGRAM

## 2022-09-16 PROCEDURE — 25500020 PHARM REV CODE 255: Performed by: STUDENT IN AN ORGANIZED HEALTH CARE EDUCATION/TRAINING PROGRAM

## 2022-09-16 PROCEDURE — A9585 GADOBUTROL INJECTION: HCPCS | Performed by: STUDENT IN AN ORGANIZED HEALTH CARE EDUCATION/TRAINING PROGRAM

## 2022-09-16 PROCEDURE — 83036 HEMOGLOBIN GLYCOSYLATED A1C: CPT | Performed by: STUDENT IN AN ORGANIZED HEALTH CARE EDUCATION/TRAINING PROGRAM

## 2022-09-16 PROCEDURE — 99900031 HC PATIENT EDUCATION (STAT)

## 2022-09-16 PROCEDURE — 21400001 HC TELEMETRY ROOM

## 2022-09-16 PROCEDURE — 36415 COLL VENOUS BLD VENIPUNCTURE: CPT | Performed by: STUDENT IN AN ORGANIZED HEALTH CARE EDUCATION/TRAINING PROGRAM

## 2022-09-16 PROCEDURE — 97161 PT EVAL LOW COMPLEX 20 MIN: CPT

## 2022-09-16 PROCEDURE — 99900035 HC TECH TIME PER 15 MIN (STAT)

## 2022-09-16 PROCEDURE — 25000003 PHARM REV CODE 250: Performed by: STUDENT IN AN ORGANIZED HEALTH CARE EDUCATION/TRAINING PROGRAM

## 2022-09-16 PROCEDURE — 80061 LIPID PANEL: CPT | Performed by: STUDENT IN AN ORGANIZED HEALTH CARE EDUCATION/TRAINING PROGRAM

## 2022-09-16 PROCEDURE — 92610 EVALUATE SWALLOWING FUNCTION: CPT

## 2022-09-16 PROCEDURE — 97166 OT EVAL MOD COMPLEX 45 MIN: CPT

## 2022-09-16 PROCEDURE — 97530 THERAPEUTIC ACTIVITIES: CPT

## 2022-09-16 RX ORDER — ACETAMINOPHEN 325 MG/1
650 TABLET ORAL EVERY 8 HOURS PRN
Status: DISCONTINUED | OUTPATIENT
Start: 2022-09-16 | End: 2022-09-18 | Stop reason: HOSPADM

## 2022-09-16 RX ORDER — NAPROXEN SODIUM 220 MG/1
81 TABLET, FILM COATED ORAL DAILY
Status: DISCONTINUED | OUTPATIENT
Start: 2022-09-16 | End: 2022-09-18 | Stop reason: HOSPADM

## 2022-09-16 RX ORDER — METOPROLOL SUCCINATE 50 MG/1
50 TABLET, EXTENDED RELEASE ORAL DAILY
Status: DISCONTINUED | OUTPATIENT
Start: 2022-09-16 | End: 2022-09-16

## 2022-09-16 RX ORDER — SODIUM CHLORIDE 0.9 % (FLUSH) 0.9 %
10 SYRINGE (ML) INJECTION EVERY 12 HOURS PRN
Status: DISCONTINUED | OUTPATIENT
Start: 2022-09-16 | End: 2022-09-18 | Stop reason: HOSPADM

## 2022-09-16 RX ORDER — ATORVASTATIN CALCIUM 40 MG/1
40 TABLET, FILM COATED ORAL DAILY
Status: DISCONTINUED | OUTPATIENT
Start: 2022-09-16 | End: 2022-09-18 | Stop reason: HOSPADM

## 2022-09-16 RX ORDER — IBUPROFEN 200 MG
16 TABLET ORAL
Status: DISCONTINUED | OUTPATIENT
Start: 2022-09-16 | End: 2022-09-18 | Stop reason: HOSPADM

## 2022-09-16 RX ORDER — BUDESONIDE 0.5 MG/2ML
1 INHALANT ORAL EVERY 12 HOURS
Status: DISCONTINUED | OUTPATIENT
Start: 2022-09-16 | End: 2022-09-18 | Stop reason: HOSPADM

## 2022-09-16 RX ORDER — NALOXONE HCL 0.4 MG/ML
0.02 VIAL (ML) INJECTION
Status: DISCONTINUED | OUTPATIENT
Start: 2022-09-16 | End: 2022-09-18 | Stop reason: HOSPADM

## 2022-09-16 RX ORDER — PREGABALIN 75 MG/1
75 CAPSULE ORAL 2 TIMES DAILY
Status: DISCONTINUED | OUTPATIENT
Start: 2022-09-16 | End: 2022-09-18 | Stop reason: HOSPADM

## 2022-09-16 RX ORDER — TALC
6 POWDER (GRAM) TOPICAL NIGHTLY PRN
Status: DISCONTINUED | OUTPATIENT
Start: 2022-09-16 | End: 2022-09-18 | Stop reason: HOSPADM

## 2022-09-16 RX ORDER — CLOPIDOGREL BISULFATE 75 MG/1
75 TABLET ORAL DAILY
Status: DISCONTINUED | OUTPATIENT
Start: 2022-09-16 | End: 2022-09-18

## 2022-09-16 RX ORDER — INSULIN ASPART 100 [IU]/ML
1-10 INJECTION, SOLUTION INTRAVENOUS; SUBCUTANEOUS
Status: DISCONTINUED | OUTPATIENT
Start: 2022-09-16 | End: 2022-09-18 | Stop reason: HOSPADM

## 2022-09-16 RX ORDER — ACETAMINOPHEN 325 MG/1
650 TABLET ORAL EVERY 4 HOURS PRN
Status: DISCONTINUED | OUTPATIENT
Start: 2022-09-16 | End: 2022-09-18 | Stop reason: HOSPADM

## 2022-09-16 RX ORDER — ONDANSETRON 2 MG/ML
4 INJECTION INTRAMUSCULAR; INTRAVENOUS EVERY 8 HOURS PRN
Status: DISCONTINUED | OUTPATIENT
Start: 2022-09-16 | End: 2022-09-18 | Stop reason: HOSPADM

## 2022-09-16 RX ORDER — ARFORMOTEROL TARTRATE 15 UG/2ML
15 SOLUTION RESPIRATORY (INHALATION) 2 TIMES DAILY
Status: DISCONTINUED | OUTPATIENT
Start: 2022-09-16 | End: 2022-09-18 | Stop reason: HOSPADM

## 2022-09-16 RX ORDER — HYDROCODONE BITARTRATE AND ACETAMINOPHEN 5; 325 MG/1; MG/1
1 TABLET ORAL EVERY 6 HOURS PRN
Status: DISCONTINUED | OUTPATIENT
Start: 2022-09-16 | End: 2022-09-18 | Stop reason: HOSPADM

## 2022-09-16 RX ORDER — LOSARTAN POTASSIUM 25 MG/1
25 TABLET ORAL 2 TIMES DAILY
Status: DISCONTINUED | OUTPATIENT
Start: 2022-09-16 | End: 2022-09-16

## 2022-09-16 RX ORDER — GLUCAGON 1 MG
1 KIT INJECTION
Status: DISCONTINUED | OUTPATIENT
Start: 2022-09-16 | End: 2022-09-18 | Stop reason: HOSPADM

## 2022-09-16 RX ORDER — IBUPROFEN 200 MG
24 TABLET ORAL
Status: DISCONTINUED | OUTPATIENT
Start: 2022-09-16 | End: 2022-09-18 | Stop reason: HOSPADM

## 2022-09-16 RX ORDER — GADOBUTROL 604.72 MG/ML
10 INJECTION INTRAVENOUS
Status: COMPLETED | OUTPATIENT
Start: 2022-09-16 | End: 2022-09-16

## 2022-09-16 RX ORDER — IPRATROPIUM BROMIDE 0.5 MG/2.5ML
0.5 SOLUTION RESPIRATORY (INHALATION)
Status: DISCONTINUED | OUTPATIENT
Start: 2022-09-16 | End: 2022-09-18 | Stop reason: HOSPADM

## 2022-09-16 RX ORDER — POLYETHYLENE GLYCOL 3350 17 G/17G
17 POWDER, FOR SOLUTION ORAL DAILY
Status: DISCONTINUED | OUTPATIENT
Start: 2022-09-16 | End: 2022-09-18 | Stop reason: HOSPADM

## 2022-09-16 RX ORDER — IPRATROPIUM BROMIDE 0.5 MG/2.5ML
0.5 SOLUTION RESPIRATORY (INHALATION) EVERY 6 HOURS
Status: DISCONTINUED | OUTPATIENT
Start: 2022-09-16 | End: 2022-09-16

## 2022-09-16 RX ADMIN — HYDROCODONE BITARTRATE AND ACETAMINOPHEN 1 TABLET: 5; 325 TABLET ORAL at 08:09

## 2022-09-16 RX ADMIN — ONDANSETRON 4 MG: 2 INJECTION INTRAMUSCULAR; INTRAVENOUS at 08:09

## 2022-09-16 RX ADMIN — BUDESONIDE 1 MG: 0.5 INHALANT RESPIRATORY (INHALATION) at 08:09

## 2022-09-16 RX ADMIN — ATORVASTATIN CALCIUM 40 MG: 40 TABLET, FILM COATED ORAL at 08:09

## 2022-09-16 RX ADMIN — CLOPIDOGREL BISULFATE 75 MG: 75 TABLET, FILM COATED ORAL at 06:09

## 2022-09-16 RX ADMIN — Medication 6 MG: at 08:09

## 2022-09-16 RX ADMIN — HYDROCODONE BITARTRATE AND ACETAMINOPHEN 1 TABLET: 5; 325 TABLET ORAL at 07:09

## 2022-09-16 RX ADMIN — PREGABALIN 75 MG: 75 CAPSULE ORAL at 08:09

## 2022-09-16 RX ADMIN — IPRATROPIUM BROMIDE 0.5 MG: 0.5 SOLUTION RESPIRATORY (INHALATION) at 08:09

## 2022-09-16 RX ADMIN — ARFORMOTEROL TARTRATE 15 MCG: 15 SOLUTION RESPIRATORY (INHALATION) at 08:09

## 2022-09-16 RX ADMIN — ASPIRIN 81 MG 81 MG: 81 TABLET ORAL at 06:09

## 2022-09-16 RX ADMIN — GADOBUTROL 10 ML: 604.72 INJECTION INTRAVENOUS at 07:09

## 2022-09-16 RX ADMIN — IOHEXOL 75 ML: 350 INJECTION, SOLUTION INTRAVENOUS at 04:09

## 2022-09-16 RX ADMIN — INSULIN ASPART 4 UNITS: 100 INJECTION, SOLUTION INTRAVENOUS; SUBCUTANEOUS at 05:09

## 2022-09-16 NOTE — NURSING
Pt arrived to unit via EMS in stretcher. AAOx3. NAD noted. Admission documentation, assessment, and med rec completed. Pt currently requesting pain medication. Dr. Tavera notified of pt's arrival and en route.

## 2022-09-16 NOTE — H&P
[facility] History & Physical Examination   Patient Name: Delores Fox  MRN: [id number]  Patient Class: IP- Inpatient   Admission Date: 9/16/2022  2:47 AM  Length of Stay: 0  Attending Physician: Steve Tavera MD  Primary Care Provider: Tomy Rios MD  Face-to-Face encounter date: 09/16/2022  Code Status: Full Code  MPOA:  Chief Complaint: right arm weakness      HISTORY OF PRESENT ILLNESS:   Delores Fox is a 56 y.o.  female who has a past medical history of Acid reflux, COPD (chronic obstructive pulmonary disease), Coronary artery disease, COVID-19, Diabetes mellitus, High cholesterol, Hypertension, and Obese body habitus presented to OSH with right arm weakness that had been present for greater than 24 hrs. Around 2 or 3 pm on Wednesday patient noticed right arm weakness and numbness tingling.  She noticed when she was unable to maintain arm raised when reaching for something and her hand was very weak.  She presented to OSH ER on Thursday around 4 pm.  Head CT was negative and she was transferred to [facility] for neurology consult and further workup. On my exam, the numbness and tingling of right arm has resolved.  She also had slurred speech with the onset of weakness. She has had previous similar episodes that resolved spontaneously and she did not seek medical care. Patient denies change in vision, hearing, fever, cough, congestion, runny nose, chest pain, shortness of breath, palpitations, abdominal pain, diarrhea, constipation, vomiting, dysuria, hematuria, joint pain and back pain.     REVIEW OF SYSTEMS:   10 Point Review of System was performed and was found to be negative except for that mentioned already in the HPI above.     PAST MEDICAL HISTORY:     Past Medical History:   Diagnosis Date    Acid reflux     COPD (chronic obstructive pulmonary disease)     Coronary artery disease     COVID-19     Diabetes mellitus     High cholesterol     Hypertension     Obese  body habitus        PAST SURGICAL HISTORY:     Past Surgical History:   Procedure Laterality Date    APPENDECTOMY      CATHETERIZATION OF BOTH LEFT AND RIGHT HEART N/A 11/23/2020    Procedure: CATHETERIZATION, HEART, BOTH LEFT AND RIGHT;  Surgeon: Doug Kendall MD;  Location: [facility] CATH/EP LAB;  Service: Cardiology;  Laterality: N/A;    CHOLECYSTECTOMY      EXCISION OF LESION OF EYELID Right 12/11/2020    Procedure: EXCISION, LESION, EYELID;  Surgeon: Malachi Rodriguez MD;  Location: [facility] OR;  Service: Ophthalmology;  Laterality: Right;  Inclusion Cyst removal right lower lid    HYSTERECTOMY      JOINT REPLACEMENT Left     left hip    MASTOIDECTOMY Right        ALLERGIES:   Morphine and Sulfa (sulfonamide antibiotics)    FAMILY HISTORY:   No family history on file.    SOCIAL HISTORY:     Social History     Tobacco Use    Smoking status: Every Day     Packs/day: 0.50     Years: 13.00     Pack years: 6.50     Types: Cigarettes    Smokeless tobacco: Never   Substance Use Topics    Alcohol use: Not Currently        Social History     Substance and Sexual Activity   Sexual Activity Not Currently        HOME MEDICATIONS:     Prior to Admission medications    Medication Sig Start Date End Date Taking? Authorizing Provider   albuterol (ACCUNEB) 1.25 mg/3 mL Nebu Take 3 mLs (1.25 mg total) by nebulization every 6 (six) hours as needed (wheezing). Rescue 4/4/22 4/4/23 Yes Arron Reid Jr., MD   albuterol (PROVENTIL/VENTOLIN HFA) 90 mcg/actuation inhaler Inhale 2 puffs into the lungs every 6 (six) hours as needed for Wheezing or Shortness of Breath. Rescue 4/4/22  Yes Arron Reid Jr., MD   aspirin 81 MG Chew Take 1 tablet (81 mg total) by mouth once daily. 11/25/20  Yes Kassandra Mota MD   atorvastatin (LIPITOR) 40 MG tablet Take 1 tablet (40 mg total) by mouth once daily. 8/25/22 8/25/23 Yes Tomy Rios MD   blood sugar diagnostic Strp To check BG 2 times daily, to use with insurance preferred meter  8/25/22  Yes Tomy Rios MD   blood-glucose meter kit To check BG 2 times daily, to use with insurance preferred meter 8/25/22 8/22/23 Yes Tomy Rios MD   fluticasone-umeclidin-vilanter (TRELEGY ELLIPTA) 200-62.5-25 mcg inhaler Inhale 1 puff into the lungs once daily. 8/25/22  Yes Gayla Mclean, MATI   lancets Misc To check BG 2 times daily, to use with insurance preferred meter 8/25/22  Yes Tomy Rios MD   losartan (COZAAR) 25 MG tablet Take 1 tablet (25 mg total) by mouth 2 (two) times a day. 8/1/22  Yes Tomy Rios MD   metFORMIN (GLUCOPHAGE-XR) 500 MG ER 24hr tablet Take 1 tablet (500 mg total) by mouth daily with breakfast. 8/25/22 8/25/23 Yes Tomy Rios MD   metoprolol succinate (TOPROL-XL) 50 MG 24 hr tablet Take 1 tablet (50 mg total) by mouth once daily. 8/25/22  Yes Tomy Rios MD   nicotine (NICODERM CQ) 21 mg/24 hr Place 1 patch onto the skin once daily. 8/25/22  Yes Tomy Rios MD   pantoprazole (PROTONIX) 40 MG tablet Take 1 tablet (40 mg total) by mouth 2 (two) times daily. 8/25/22 9/24/22 Yes Tomy Rios MD   pregabalin (LYRICA) 75 MG capsule Take 1 capsule (75 mg total) by mouth 2 (two) times daily. 8/25/22 11/23/22 Yes Tomy Rios MD   torsemide (DEMADEX) 20 MG Tab Take 1 tablet (20 mg total) by mouth once daily. 8/25/22 11/23/22 Yes Tomy Rios MD   zolpidem (AMBIEN) 10 mg Tab Take 1 tablet (10 mg total) by mouth nightly as needed (insomnia). 8/25/22  Yes Tomy Rios MD   azithromycin (Z-PATRICK) 250 MG tablet Take 2 tablets by mouth on day 1; Take 1 tablet by mouth on days 2-5 3/24/22   Tomy Rios MD   clotrimazole-betamethasone 1-0.05% (LOTRISONE) cream Apply topically 2 (two) times daily. 8/25/22   Tomy Rios MD   fluconazole (DIFLUCAN) 150 MG Tab Take one tablet (150mg) by mouth once; May repeat in 72 hours if symptoms persist. 8/25/22   Tomy Rios MD   LORazepam (ATIVAN)  "0.5 MG tablet Take 1 tablet (0.5 mg total) by mouth every 6 (six) hours as needed for Anxiety. 2/14/22 2/17/22  Jose Bates MD   MOTEGRITY 2 mg Tab Take 1 tablet by mouth nightly. 3/9/22   Historical Provider   ondansetron (ZOFRAN) 4 MG tablet Take 1 tablet (4 mg total) by mouth every 8 (eight) hours as needed for Nausea. 3/26/21   Demetria Bravo MD         PHYSICAL EXAM:   /66 (BP Location: Right arm, Patient Position: Lying)   Pulse 93   Temp 97.9 °F (36.6 °C) (Oral)   Resp 18   Ht 5' 7" (1.702 m)   Wt 108 kg (238 lb)   SpO2 (!) 93%   BMI 37.28 kg/m²   Vitals Reviewed  General appearance:  no apparent distress.  Skin: No Rash.   Neuro:  RUE shoulder 4/5, bicep 3/5, and hand  2/5.  Unable to spread finges.  Sensation intact  All other extremities 5/5 and intact sensation.   CN II-XII intact  HENT: Atraumatic head. Moist mucous membranes of oral cavity.  Eyes: Normal extraocular movements.   Neck: Supple. No evidence of lymphadenopathy. No thyroidomegaly.  Lungs: Clear to auscultation bilaterally. No wheezing present.   Heart: Regular rate and rhythm. S1 and S2 present with no murmurs/gallop/rub. No pedal edema. No JVD present.   Abdomen: Soft, non-distended, non-tender. No rebound tenderness/guarding. No masses or organomegaly. Bowel sounds are normal. Bladder is not palpable.   Extremities: No cyanosis, clubbing.  Psych/mental status: Alert and oriented. Cooperative. Responds appropriately to questions.     EMERGENCY DEPARTMENT LABS AND IMAGING:     Labs Reviewed   TSH   LIPID PANEL   HEMOGLOBIN A1C   POCT GLUCOSE MONITORING CONTINUOUS       MRI Brain W WO Contrast    (Results Pending)   CTA Head and Neck (xpd)    (Results Pending)       ASSESSMENT & PLAN:   Delores Fox is a 56 y.o. female admitted for new onset right arm weakness, here for further neurological work-up        Plan    Right arm weakness  Presented over 24 hrs after symptom onset, not tPA candidate.  Head CT at OSH " negative for acute findings. Here for neurology consult  -MRI brain w/wo  -Head and neck CTA  -PT/OT/ST  -check a1c, lipid panel, tsh  -holding antiplatelets and any anticoagulation pending neuro eval  -continue statin  -optimize blood pressure and glucose control  -neurology consult    HTN  -normotensive on arrival  -out of the window for permissive HTN, but will hold BP meds to avoid hypotension.  Last /66  -restart losartan and metoprolol as needed    COPD  Currently not in exacerbation, no wheezes or sob  -resume home regimen  -smoking cessation    HLD  -continue atorvastatin    CHF, not exacerbated  -restart metoprolol, losartan and diuretics when BP permits    DM2  -check a1c  -hold home PO meds  -SSI while inpt  -add basal and prandial as needed    Diet: diabetic/cardiac    DVT Prophylaxis: lovenox when ok with neuro, and Encourage ambulation. OOB as tolerated.     Discharge Planning and Disposition:  likely home with outpt PT/OT    Expected LOS: 2 nights    ________________________________________________________________________________    Face-to-Face encounter date: 09/16/2022  Encounter included review of the medical records, interviewing and examining the patient face-to-face, discussion with family and other health care providers including emergency medicine physician, admission orders, interpreting lab/test results and formulating a plan of care.     ________________________________________________________________________________    INPATIENT LIST OF MEDICATIONS     Current Facility-Administered Medications:     acetaminophen tablet 650 mg, 650 mg, Oral, Q4H PRN, Jacoby Tavera MD    acetaminophen tablet 650 mg, 650 mg, Oral, Q8H PRN, Jacoby Tavera MD    arformoteroL nebulizer solution 15 mcg, 15 mcg, Nebulization, BID, Jacoby Tavera MD    atorvastatin tablet 40 mg, 40 mg, Oral, Daily, Jacoby Tavera MD    budesonide nebulizer solution 1 mg, 1 mg,  Nebulization, Q12H, Jacoby Tavera MD    dextrose 10% bolus 125 mL, 12.5 g, Intravenous, PRN, Jacoby Tavera MD    dextrose 10% bolus 250 mL, 25 g, Intravenous, PRN, Jacoby Tavera MD    glucagon (human recombinant) injection 1 mg, 1 mg, Intramuscular, PRN, Jacoby Tavera MD    glucose chewable tablet 16 g, 16 g, Oral, PRN, Jacoby Tavera MD    glucose chewable tablet 24 g, 24 g, Oral, PRN, Jacoby Tavera MD    HYDROcodone-acetaminophen 5-325 mg per tablet 1 tablet, 1 tablet, Oral, Q6H PRN, Jacoby Tavera MD    insulin aspart U-100 pen 1-10 Units, 1-10 Units, Subcutaneous, QID (AC + HS) PRN, Jacoby Tavera MD    ipratropium 0.02 % nebulizer solution 0.5 mg, 0.5 mg, Nebulization, Q6H, Jacoby Tavera MD    losartan tablet 25 mg, 25 mg, Oral, BID, Jacoby Tavera MD    melatonin tablet 6 mg, 6 mg, Oral, Nightly PRN, Jacoby Tavera MD    metoprolol succinate (TOPROL-XL) 24 hr tablet 50 mg, 50 mg, Oral, Daily, Jacoby Tavera MD    naloxone 0.4 mg/mL injection 0.02 mg, 0.02 mg, Intravenous, PRN, Jacoby Tavera MD    ondansetron injection 4 mg, 4 mg, Intravenous, Q8H PRN, Jacoby Tavera MD    polyethylene glycol packet 17 g, 17 g, Oral, Daily, Jacoby Tavera MD    pregabalin capsule 75 mg, 75 mg, Oral, BID, Jacoby Tavera MD    sodium chloride 0.9% flush 10 mL, 10 mL, Intravenous, Q12H PRN, Jacoby Tavear MD    Facility-Administered Medications Ordered in Other Encounters:     electrolyte-S (ISOLYTE), , Intravenous, Continuous, Madi Herr MD, New Bag at 12/11/20 1041    lidocaine (PF) 10 mg/ml (1%) injection 10 mg, 1 mL, Intradermal, Once, Madi Herr MD    sodium chloride 0.9% flush 3 mL, 3 mL, Intravenous, Q8H, Madi Herr MD      Scheduled Meds:   arformoteroL  15 mcg Nebulization BID    atorvastatin  40 mg Oral Daily    budesonide  1 mg  Nebulization Q12H    ipratropium  0.5 mg Nebulization Q6H    losartan  25 mg Oral BID    metoprolol succinate  50 mg Oral Daily    polyethylene glycol  17 g Oral Daily    pregabalin  75 mg Oral BID     Continuous Infusions:  PRN Meds:.acetaminophen, acetaminophen, dextrose 10%, dextrose 10%, glucagon (human recombinant), glucose, glucose, HYDROcodone-acetaminophen, insulin aspart U-100, melatonin, naloxone, ondansetron, sodium chloride 0.9%      Jacoby Tavera  [facility] Hospitalist  09/16/2022

## 2022-09-16 NOTE — PT/OT/SLP EVAL
Occupational Therapy   Evaluation    Name: Delores Fox  MRN: [id number]  Admitting Diagnosis:  <principal problem not specified>  Recent Surgery: * No surgery found *      Recommendations:     Discharge Recommendations: outpatient OT  Discharge Equipment Recommendations:  none  Barriers to discharge:  None    Assessment:     Delores Fox is a 56 y.o. female with a medical diagnosis of <principal problem not specified>.  Performance deficits affecting function: weakness, impaired self care skills, decreased upper extremity function, decreased coordination, impaired fine motor.      Pt presents with weakness and fine motor deficits in her dominant right hand.  These deficits will be best addressed in an outpatient OT setting.    Rehab Prognosis: Good; patient would benefit from acute skilled OT services to address these deficits and reach maximum level of function.       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  Patient found  sitting on the couch  with telemetry upon OT entry to room.    General Precautions: Standard, fall   Orthopedic Precautions:N/A   Braces: N/A  Respiratory Status: Room air    Occupational Performance:    Activities of Daily Living:  Grooming: minimum assistance and set-up assistance; pt needed assist with fine motor aspects  of tasks     Cognitive/Visual Perceptual:  Cognitive/Psychosocial Skills:     -       Oriented to: Person, Place, Time, and Situation   -       Follows Commands/attention:Follows multistep  commands    Physical Exam:  Dominant hand:    -       Right  Upper Extremity Range of Motion:     -       Right Upper Extremity: WFL  -       Left Upper Extremity: WFL  Upper Extremity Strength:    -       Right Upper Extremity: Deficits: at wrist/hand; 3/5 at wrist and hand  -       Left Upper Extremity: WNL   Strength:    -       Right Upper Extremity: poor   -       Left Upper Extremity: good   Fine Motor Coordination:    -       Impaired  Right hand thumb/finger opposition skills  , Right hand, manipulation of objects  , Right hand, graphomotor skills  , and Right hand, diadochokinesis skill    Gross motor coordination:   WFL    AMPAC 6 Click ADL:  AMPAC Total Score: 22    Treatment & Education:  Pt provided with built-up handle to assist with R hand  on utensils, toothbrush, etc.; pt provided with  strengthening cube; pt provided with light resistance clothespin activity to work to increase pinch strength in right hand; pt educated on use and she demonstrated understanding  Pt educated on OT role, POC, d/c recommendations    Patient left  seated on couch  with all lines intact and call button in reach    GOALS:   Multidisciplinary Problems       Occupational Therapy Goals          Problem: Occupational Therapy    Goal Priority Disciplines Outcome Interventions   Occupational Therapy Goal     OT, PT/OT     Description: Goals to be met by: 10/15/22     Patient will increase functional independence with ADLs by performing:    Feeding with Modified [place].  Grooming while standing at sink with Modified [place].                         History:     Past Medical History:   Diagnosis Date    Acid reflux     COPD (chronic obstructive pulmonary disease)     Coronary artery disease     COVID-19      Diabetes mellitus     High cholesterol     Hypertension     Obese body habitus          Past Surgical History:   Procedure Laterality Date    APPENDECTOMY      CATHETERIZATION OF BOTH LEFT AND RIGHT HEART N/A 11/23/2020    Procedure: CATHETERIZATION, HEART, BOTH LEFT AND RIGHT;  Surgeon: Doug Kendall MD;  Location: [facility] CATH/EP LAB;  Service: Cardiology;  Laterality: N/A;    CHOLECYSTECTOMY      EXCISION OF LESION OF EYELID Right 12/11/2020    Procedure: EXCISION, LESION, EYELID;  Surgeon: Malachi Rodriguez MD;  Location: [facility] OR;  Service: Ophthalmology;  Laterality: Right;  Inclusion Cyst removal right lower lid    HYSTERECTOMY      JOINT REPLACEMENT Left     left hip    MASTOIDECTOMY Right        Time Tracking:     OT Date of Treatment: 09/16/22  OT Start Time: 1119  OT Stop Time: 1137  OT Total Time (min): 18 min    Billable Minutes:Evaluation 10  Therapeutic Activity 08 9/16/2022

## 2022-09-16 NOTE — CONSULTS
Critical access hospital  Neurology Consult    Patient Name: Delores Fox   MRN: [id number]  : 1965  TODAY'S DATE: 2022  ADMIT DATE: 2022  2:47 AM                                          CONSULTED PROVIDER: Bela Stone MD, Neurologist. Cellphone: [phone]  CONSULT REQUESTED BY: Jacoby Tavera, *     No chief complaint on file.       HPI per EMR:  Delores Fox is a 56 y.o.  female who has a past medical history of Acid reflux, COPD (chronic obstructive pulmonary disease), Coronary artery disease, COVID-19, Diabetes mellitus, High cholesterol, Hypertension, and Obese body habitus presented to OSH with right arm weakness that had been present for greater than 24 hrs. Around 2 or 3 pm on Wednesday patient noticed right arm weakness and numbness tingling.  She noticed when she was unable to maintain arm raised when reaching for something and her hand was very weak.  She presented to OSH ER on Thursday around 4 pm.  Head CT was negative and she was transferred to [facility] for neurology consult and further workup. On my exam, the numbness and tingling of right arm has resolved.  She also had slurred speech with the onset of weakness. She has had previous similar episodes that resolved spontaneously and she did not seek medical care. Patient denies change in vision, hearing, fever, cough, congestion, runny nose, chest pain, shortness of breath, palpitations, abdominal pain, diarrhea, constipation, vomiting, dysuria, hematuria, joint pain and back pain.     Neurology Consult:  Patient was seen and examined by me today.  She is a 56-year-old woman with history of COPD, tobacco abuse, coronary artery disease, diabetes, hyperlipidemia, hypertension presented with right sided weakness and numbness to outside facility and was then transferred to our facility for higher level of care.  She had been experiencing the symptoms for more than 24 hours when she arrived to outside facility, so she  was not a candidate for tPA administration.  CT brain was negative for bleeding, but CTA did show left cervical carotid occlusion, but reconstituted distally and no intracranial vessel occlusion.  MRI brain demonstrated a left MCA territory stroke, so patient was admitted for management and treatment of stroke.  She also reported some slurred speech at the onset, denies vision loss, left-sided weakness or numbness, chest pain, shortness of breath, gait imbalance or falls.    Review of Systems:    A comprehensive ROS was performed and all systems were negative except as noted above in HPI.    Past Medical History:  has a past medical history of Acid reflux, COPD (chronic obstructive pulmonary disease), Coronary artery disease, COVID-19, Diabetes mellitus, High cholesterol, Hypertension, and Obese body habitus.    Past Surgical History:  has a past surgical history that includes Catheterization of both left and right heart (N/A, 11/23/2020); Appendectomy; Joint replacement (Left); Mastoidectomy (Right); Excision of lesion of eyelid (Right, 12/11/2020); Cholecystectomy; and Hysterectomy.    Family Medical History: family history is not on file.    Social History:  reports that she has been smoking. She has a 6.50 pack-year smoking history. She has never used smokeless tobacco. She reports that she does not currently use alcohol. She reports that she does not currently use drugs.    PCP: Tomy Rios MD    Allergies:   Review of patient's allergies indicates:   Allergen Reactions    Morphine Nausea And Vomiting    Sulfa (sulfonamide antibiotics) Nausea And Vomiting       Medications:   Current Facility-Administered Medications on File Prior to Encounter   Medication Dose Route Frequency Provider Last Rate Last Admin    electrolyte-S (ISOLYTE)   Intravenous Continuous Madi Herr MD   New Bag at 12/11/20 1041    lidocaine (PF) 10 mg/ml (1%) injection 10 mg  1 mL Intradermal Once Madi Herr MD         sodium chloride 0.9% flush 3 mL  3 mL Intravenous Q8H Madi Herr MD         Current Outpatient Medications on File Prior to Encounter   Medication Sig Dispense Refill    albuterol (ACCUNEB) 1.25 mg/3 mL Nebu Take 3 mLs (1.25 mg total) by nebulization every 6 (six) hours as needed (wheezing). Rescue 75 mL 1    albuterol (PROVENTIL/VENTOLIN HFA) 90 mcg/actuation inhaler Inhale 2 puffs into the lungs every 6 (six) hours as needed for Wheezing or Shortness of Breath. Rescue 18 g 2    aspirin 81 MG Chew Take 1 tablet (81 mg total) by mouth once daily. 30 tablet 0    atorvastatin (LIPITOR) 40 MG tablet Take 1 tablet (40 mg total) by mouth once daily. 90 tablet 0    blood sugar diagnostic Strp To check BG 2 times daily, to use with insurance preferred meter 200 each 0    blood-glucose meter kit To check BG 2 times daily, to use with insurance preferred meter 1 each 0    fluticasone-umeclidin-vilanter (TRELEGY ELLIPTA) 200-62.5-25 mcg inhaler Inhale 1 puff into the lungs once daily. 60 each 11    lancets Misc To check BG 2 times daily, to use with insurance preferred meter 200 each 1    losartan (COZAAR) 25 MG tablet Take 1 tablet (25 mg total) by mouth 2 (two) times a day. 180 tablet 0    metFORMIN (GLUCOPHAGE-XR) 500 MG ER 24hr tablet Take 1 tablet (500 mg total) by mouth daily with breakfast. 90 tablet 0    metoprolol succinate (TOPROL-XL) 50 MG 24 hr tablet Take 1 tablet (50 mg total) by mouth once daily. 90 tablet 0    nicotine (NICODERM CQ) 21 mg/24 hr Place 1 patch onto the skin once daily. 28 patch 2    pantoprazole (PROTONIX) 40 MG tablet Take 1 tablet (40 mg total) by mouth 2 (two) times daily. 90 tablet 0    pregabalin (LYRICA) 75 MG capsule Take 1 capsule (75 mg total) by mouth 2 (two) times daily. 60 capsule 2    torsemide (DEMADEX) 20 MG Tab Take 1 tablet (20 mg total) by mouth once daily. 90 tablet 0    zolpidem (AMBIEN) 10 mg Tab Take 1 tablet (10 mg total) by mouth nightly as needed  (insomnia). 90 tablet 0    azithromycin (Z-PATRICK) 250 MG tablet Take 2 tablets by mouth on day 1; Take 1 tablet by mouth on days 2-5 6 tablet 0    clotrimazole-betamethasone 1-0.05% (LOTRISONE) cream Apply topically 2 (two) times daily. 15 g 0    fluconazole (DIFLUCAN) 150 MG Tab Take one tablet (150mg) by mouth once; May repeat in 72 hours if symptoms persist. 2 tablet 0    LORazepam (ATIVAN) 0.5 MG tablet Take 1 tablet (0.5 mg total) by mouth every 6 (six) hours as needed for Anxiety. 12 tablet 0    MOTEGRITY 2 mg Tab Take 1 tablet by mouth nightly.      ondansetron (ZOFRAN) 4 MG tablet Take 1 tablet (4 mg total) by mouth every 8 (eight) hours as needed for Nausea. 12 tablet 0     Scheduled Meds:   arformoteroL  15 mcg Nebulization BID    atorvastatin  40 mg Oral Daily    budesonide  1 mg Nebulization Q12H    ipratropium  0.5 mg Nebulization Q6H WAKE    polyethylene glycol  17 g Oral Daily    pregabalin  75 mg Oral BID     Continuous Infusions:  PRN Meds:.acetaminophen, acetaminophen, dextrose 10%, dextrose 10%, glucagon (human recombinant), glucose, glucose, HYDROcodone-acetaminophen, insulin aspart U-100, melatonin, naloxone, ondansetron, sodium chloride 0.9%    Physical Exam  Current Vitals:  Vitals:    09/16/22 0827   BP:    Pulse: 97   Resp: (!) 22   Temp:        Physical Exam:  General: AO x4  HEENT: PERRL, EOMI  CV: RRR  Lungs: no respiratory distress  Abdomen: soft, non tender    Neurological Exam    MENTAL STATUS EXAM:  Level of alertness: Alert  Level of attention: Attentive w/out deficit  Orientation/Awareness: intact to person, place, time, situation  Language: mild slurring of speech. Comprehension/repetition/naming intact    CRANIAL NERVE EXAM:  II/III: fundoscopic exam deferred, PERRL; visual fields full to threat  III/IV/VI: EOMI   V: no deficits appreciated to pinprick, temp, vibration; masseter strength intact bilaterally  VII: mild right sided facial droop  VIII: no deficits in hearing  bilaterally  IX/X: palate @ ML and raises symmetrically  XI: shoulder shrug 5/5 bilaterally; head turn 5/5 bilaterally  XII: tongue to midline w/out asymmetry    MOTOR EXAM:  Bulk and Tone: normal throughout  Strength is 5/5 proximally and distally in left upper and bilateral lower extremities without deficits. Strength is 4+/5 in right upper extremity proximally and 4/5 distally.     REFLEXES:  Masseter (CN V) Not Tested  1+ in bilateral upper and lower extremities, no Bernstein's, no clonus. Downgoing plantars.    SENSORY EXAM  Light touch intact throughout in upper and lower extremities without deficits.    COORDINATION/CEREBELLAR EXAM:  FTN: no dysmetria or other signs of appendicular ataxia  HTS: No evidence of appendicular ataxia    GAIT:  Deferred for safety.    NIH Stroke Scale      Date: 2022  Time: 1400  Person Administering Scale: Bela Stone MD    Administer stroke scale items in the order listed. Record performance in each category after each subscale exam. Do not go back and change scores. Follow directions provided for each exam technique. Scores should reflect what the patient does, not what the clinician thinks the patient can do. The clinician should record answers while administering the exam and work quickly. Except where indicated, the patient should not be coached (i.e., repeated requests to patient to make a special effort).      1a  Level of consciousness: 0=alert; keenly responsive   1b. LOC questions:  0=Answers both tasks correctly   1c. LOC commands: 0=Answers both tasks correctly   2.  Best Gaze: 0=normal   3.  Visual: 0=No visual loss   4. Facial Palsy: 1=Minor paralysis (flattened nasolabial fold, asymmetric on smiling)   5a.  Motor left arm: 0=No drift, limb holds 90 (or 45) degrees for full 10 seconds   5b.  Motor right arm: 1=Drift, limb holds 90 (or 45) degrees but drifts down before full 10 seconds: does not hit bed   6a. motor left le=No drift, limb holds 90 (or  45) degrees for full 10 seconds   6b  Motor right le=No drift, limb holds 90 (or 45) degrees for full 10 seconds   7. Limb Ataxia: 0=Absent   8.  Sensory: 0=Normal; no sensory loss   9. Best Language:  0=No aphasia, normal   10. Dysarthria: 1=Mild to moderate, patient slurs at least some words and at worst, can be understood with some difficulty   11. Extinction and Inattention: 0=No abnormality    Total:   3       Laboratory Data & Studies    No results for input(s): WBC, HGB, PLT, MCV, NEUTROPCT, BANDSPCT, LYMPHOPCT, MONOPCT, EOSPCT, BASOPCT in the last 168 hours.    No results for input(s): NA, K, CL, CO2, BUN, LABCREA, GLU, CALCIUM, MG, PHOS in the last 168 hours.    Invalid input(s): ANIONGAB    No results for input(s): PROT, ALBUMIN, BILITOT, BILIDIR, AST, ALT, ALKPHOS in the last 168 hours.    No results for input(s): LABPT, INR, APTT in the last 168 hours.    Recent Labs   Lab 22  0505   CHOL 189   TRIG 127   LDLCALC 130.6   HDL 33*   TSH 3.600       Microbiology:  Microbiology Results (last 7 days)       ** No results found for the last 168 hours. **            Imaging:  No results found.    Assessment:    Left MCA territory acute ischemic stroke - etiology ongoing  Left cervical carotid occlusion - likely chronic  56-year-old woman with history of COPD, tobacco abuse, coronary artery disease, diabetes, hyperlipidemia, hypertension presented with right sided weakness and numbness to outside facility and was then transferred to our facility for higher level of care. NIHSS is 3. CTA showed a left cervical carotid occlusion, but with distal reconstitution and no intracranial LVO. MRI brain demonstrated a left MCA territory stroke, so patient was admitted for management and treatment of stroke.        Stroke workup:  CTH:  No acute intracranial abnormality  CTA head and neck:  Left cervical carotid occlusion, distal reconstitution and no intracranial LVO  MRI brain:  Left MCA territory acute ischemic  stroke  Risk factors: A1C, TSH, LDL  Echocardiogram:  Ordered and pending  Carotid ultrasound:  Ordered and pending    Plan:  - Admitted to hospital medicine with q4 hour neuro checks, on telemetry, continuous pulse oximetry  - Diet after eval per SLP.  - Secondary stroke prevention with ASA 81 mg daily, plavix 75 mg daily, atorvastatin 40 mg daily. Continue DAPT with both ASA and plavix for 3 months, then stop plavix and continue ASA monotherapy and statin  - Risk factor stratification with HgbA1C, Fasting Lipid profile, TSH  - 2D echocardiogram as above  - Maintain Euthermia with Tylenol prn temp > 37.2 degrees C.  - Assessment for rehab with PT/OT/SLP evaluation and treatment.  - Permissive HTN systolic BP goal up to 220, diastolic up to 110 for 24H, then lower BP slowly to normotension  - Cardiac enzymes and EKG   - Smoking counseling and offer assistance with smoking cessation (possible nicotine patch)  - Will follow along    Patient was counseled in stroke signs, symptoms and risk factors. She was instructed to call [phone] immediately if experiencing acute neurological deficits.  Patient counseled in healthy diet and physical activity. Importance of medication adherence and follow-up were emphasized.    DVT prophylaxis with chemo/SCD prophylaxis  Extensively discussed lifestyle modifications as prophylactic measures for stroke prevention including smoking cessation, adequate blood pressure management, healthy diet and regular exercise.    Patient to follow up with [facility] at [phone] within 3 days from discharge.     Stroke education was provided including stroke risk factors modification and any acute neurological changes including weakness, confusion, visual changes to come straight to the ER.     All questions were answered.                              Thank you kindly for including us in the care of this patient. Please do not hesitate to contact us with any questions.      65 minutes of  care time has been spent evaluating with the patient. Time includes chart review not limited to diagnostic imaging, labs, and vitals, patient assessment, discussion with family and nursing, current order evaluations, and new order entries.      Bela Stone MD  Neurology

## 2022-09-16 NOTE — PLAN OF CARE
Problem: Adult Inpatient Plan of Care  Goal: Plan of Care Review  Outcome: Ongoing, Progressing  Goal: Patient-Specific Goal (Individualized)  Outcome: Ongoing, Progressing  Goal: Absence of Hospital-Acquired Illness or Injury  Outcome: Ongoing, Progressing  Goal: Optimal Comfort and Wellbeing  Outcome: Ongoing, Progressing  Goal: Readiness for Transition of Care  Outcome: Ongoing, Progressing     Problem: Diabetes Comorbidity  Goal: Blood Glucose Level Within Targeted Range  Outcome: Ongoing, Progressing     Problem: Fluid Imbalance (Pneumonia)  Goal: Fluid Balance  Outcome: Ongoing, Progressing     Problem: Infection (Pneumonia)  Goal: Resolution of Infection Signs and Symptoms  Outcome: Ongoing, Progressing     Problem: Respiratory Compromise (Pneumonia)  Goal: Effective Oxygenation and Ventilation  Outcome: Ongoing, Progressing     Problem: Adjustment to Illness (Stroke, Ischemic/Transient Ischemic Attack)  Goal: Optimal Coping  Outcome: Ongoing, Progressing     Problem: Cerebral Tissue Perfusion (Stroke, Ischemic/Transient Ischemic Attack)  Goal: Optimal Cerebral Tissue Perfusion  Outcome: Ongoing, Progressing     Problem: Cognitive Impairment (Stroke, Ischemic/Transient Ischemic Attack)  Goal: Optimal Cognitive Function  Outcome: Ongoing, Progressing     Problem: Functional Ability Impaired (Stroke, Ischemic/Transient Ischemic Attack)  Goal: Optimal Functional Ability  Outcome: Ongoing, Progressing     Problem: Respiratory Compromise (Stroke, Ischemic/Transient Ischemic Attack)  Goal: Effective Oxygenation and Ventilation  Outcome: Ongoing, Progressing     Problem: Sensorimotor Impairment (Stroke, Ischemic/Transient Ischemic Attack)  Goal: Improved Sensorimotor Function  Outcome: Ongoing, Progressing

## 2022-09-16 NOTE — PROGRESS NOTES
Automatic Inhaler to Nebulizer Interchange    fluticasone/umeclidinium/vilanterol (Trelegy) 200 mcg/ 62.5 mcg/ 25 mcg changed to budesonide 1 mg twice daily AND ipratropium 0.5 mg every 6 hour scheduled AND arformoterol 15 mcg twice daily per [facility] Automatic Therapeutic Sustitutions Protocol.    Please contact pharmacy at extension [phone] with any questions.     Thank you,   Michael Lloyd

## 2022-09-16 NOTE — PROVIDER TRANSFER
(Physician in Lead of Transfers)   Outside Transfer Acceptance Note / Regional Referral Center    Referring facility: [facility]   Referring provider: VALENTE MAGANA  Accepting facility: [facility]  Accepting provider: SHIRLENE CHIN CHRISTOPHER A.  Reason for transfer: Higher Level of Care Higher level of care  Transfer diagnosis: CVA  Transfer specialty requested: Neurology  Transfer specialty notified: Yes  Transfer level: 2  Isolation status: No active isolations   Admission class or status: IP- Inpatient      Narrative     Patient is a 56 y.o. female who has a past medical history of Acid reflux, COPD (chronic obstructive pulmonary disease), Coronary artery disease, COVID-19, Diabetes mellitus, High cholesterol, Hypertension, and Obese body habitus presented with right arm weakness. Symptoms started yesterday afternoon. CT head shows old frontal lobe stroke. Lab work unremarkable. Patient needing transfer for MRI and neurology consult. Stable for transfer.     Objective     Vitals:      Recent Labs: see EPIC  CBC:  No results for input(s): WBC, HGB, HCT, PLT in the last 24 hours.  CMP:  No results for input(s): GLUCOSE, CALCIUM, ALBUMIN, PROT, NA, K, CO2, CL, BUN, CREATININE, ALKPHOS, ALT, AST, BILITOT in the last 24 hours.  No results for input(s): LACTATE in the last 72 hours.  No results for input(s): CPK, CPKMB, TROPONINI, MB in the last 168 hours.  BNP  No results for input(s): BNP, BNPTRIAGEBLO in the last 168 hours.  ABG  No results for input(s): PH, PO2, PCO2, HCO3, BE in the last 168 hours.   Lab Results   Component Value Date    INR 1.0 04/04/2022    INR 1.1 12/31/2020    INR 1.3 11/21/2020       Recent imaging:   Mammo Digital Screening Bilat w/ Aníbal  Narrative: Result:  Mammo Digital Screening Bilat w/ Aníbal    History:  Patient is 56 y.o. and is seen for a screening mammogram.    Films Compared:  na    Findings:   This procedure was performed  using tomosynthesis.   Computer-aided detection was utilized in the interpretation of this   examination.    The breasts are almost entirely fatty. There is no evidence of suspicious   masses, microcalcifications or architectural distortion.  Impression:    No mammographic evidence of malignancy.    BI-RADS Category 1: Negative    Recommendation:  Routine screening mammogram in 1 year is recommended.    Your estimated lifetime risk of breast cancer (to age 85) based on   Tyrer-Cuzick risk assessment model is 10.78 %.  According to the American   Cancer Society, patients with a lifetime breast cancer risk of 20% or   higher might benefit from supplemental screening tests. ??       Airway:     Vent settings:     IV access:    Allergies:   Review of patient's allergies indicates:   Allergen Reactions    Morphine Nausea And Vomiting    Sulfa (sulfonamide antibiotics) Nausea And Vomiting      NPO: No    Anticoagulation:   Anticoagulants       None             Instructions      [facility]  Admit to Hospital Medicine  Upon patient arrival to floor, please contact Hospital Medicine on call.

## 2022-09-16 NOTE — PROGRESS NOTES
[facility] Medicine    Progress Note    Patient Name: Delores Fox  MRN: [id number]  Patient Class: IP- Inpatient   Admission Date: 9/16/2022  2:47 AM  Length of Stay: 0  Attending Physician: Lorenzo Mason MD  Primary Care Provider: Tomy Rios MD  Face-to-Face encounter date: 09/16/2022  Code status:  Chief Complaint: No chief complaint on file.        Subjective:    HPI:Delores Fox is a 56 y.o.  female who has a past medical history of Acid reflux, COPD (chronic obstructive pulmonary disease), Coronary artery disease, COVID-19, Diabetes mellitus, High cholesterol, Hypertension, and Obese body habitus presented to OSH with right arm weakness that had been present for greater than 24 hrs. Around 2 or 3 pm on Wednesday patient noticed right arm weakness and numbness tingling.  She noticed when she was unable to maintain arm raised when reaching for something and her hand was very weak.  She presented to OSH ER on Thursday around 4 pm.  Head CT was negative and she was transferred to [facility] for neurology consult and further workup. On my exam, the numbness and tingling of right arm has resolved.  She also had slurred speech with the onset of weakness. She has had previous similar episodes that resolved spontaneously and she did not seek medical care. Patient denies change in vision, hearing, fever, cough, congestion, runny nose, chest pain, shortness of breath, palpitations, abdominal pain, diarrhea, constipation, vomiting, dysuria, hematuria, joint pain and back pain.     Interval History:   9/16:  Patient for MRI, awaiting Neurology recommendation. Patient is doing well. No concerns/issues overnight reported by the patient or the nursing staff.    Review of Systems All other Review of Systems were found to be negative expect for that mentioned already in HPI.     Objective:     Vitals:    09/16/22 0823 09/16/22 0824 09/16/22 0827 09/16/22 1124   BP:    117/83   BP Location:     Right arm   Patient Position:    Sitting   Pulse: 97 97 97 98   Resp: (!) 22 (!) 22 (!) 22 20   Temp:    98.1 °F (36.7 °C)   TempSrc:    Oral   SpO2: (!) 93% 100%  96%   Weight:       Height:            Vitals reviewed.  Constitutional: No distress.   HENT: NC  Head: Atraumatic.   Cardiovascular: Normal rate, regular rhythm and normal heart sounds.   Pulmonary/Chest: Effort normal. No wheezes.   Abdominal: Soft. Bowel sounds are normal. No distension and no mass. No tenderness  Neurological: Alert.   Skin: Skin is warm and dry.   Psych: Appropriate mood and affect    Following labs were Reviewed   CBC:  No results for input(s): WBC, HGB, HCT, PLT in the last 24 hours.  CMP:  No results for input(s): GLUCOSE, CALCIUM, ALBUMIN, PROT, NA, K, CO2, CL, BUN, CREATININE, ALKPHOS, ALT, AST, BILITOT in the last 24 hours.    Micro Results  Microbiology Results (last 7 days)       ** No results found for the last 168 hours. **             Radiology Reports  MRI Brain W WO Contrast    Result Date: 9/16/2022  HISTORY: Neuro deficit, acute, stroke suspected TECHNIQUE: Multiplanar, multisequence precontrast and postcontrast imaging was performed with 10 mL Gadavist IV contrast. FINDINGS: No prior studies for comparison. There are multiple nodular, wedge-shaped and gyriform intra-axial diffusion hyperintensities in the left frontal and parietal lobes, having low signal on the ADC map, compatible with acute infarcts. Some areas have associated gyriform enhancement suggesting luxury perfusion. Tiny focus of diffusion restriction also involves the left frontal periventricular white matter. There is additional FLAIR and T2 hyperintense gliosis and fluidlike encephalomalacia in the left frontal and parietal lobes, as well as mild scattered FLAIR hyperintense gliosis elsewhere in the white matter. There is no additional abnormal intra-axial or extra-axial enhancement. There is no intra-axial mass or significant mass effect, with no  abnormal extra-axial fluid. The cortical sulci and ventricles are normal in size, with the cerebellum and brainstem unremarkable. Minimal GRE hypointensities in the left frontal cortex are compatible with hemosiderin deposition, with no additional focal intra-axial GRE signal abnormalities. The midline structures and craniocervical junction are normal. The major intracranial physiologic flow-voids are present. There is no focal signal abnormality of the orbits, the paranasal sinuses, with the skull base. IMPRESSION: 1. Multiple areas of acute infarction in the left cerebral hemisphere, with associated gyriform enhancement suggesting luxury perfusion. A follow-up MRI brain without and with contrast 6-8 weeks is recommended to document resolution of enhancement. 2. No significant regional mass effect or midline shift. 3. Chronic microvascular ischemic changes. Electronically signed by:  Jesús Huggins MD  9/16/2022 8:39 AM CDT Workstation: [id number]       Meds  Scheduled Meds:   arformoteroL  15 mcg Nebulization BID    atorvastatin  40 mg Oral Daily    budesonide  1 mg Nebulization Q12H    ipratropium  0.5 mg Nebulization Q6H WAKE    polyethylene glycol  17 g Oral Daily    pregabalin  75 mg Oral BID     Continuous Infusions:  PRN Meds:.acetaminophen, acetaminophen, dextrose 10%, dextrose 10%, glucagon (human recombinant), glucose, glucose, HYDROcodone-acetaminophen, insulin aspart U-100, melatonin, naloxone, ondansetron, sodium chloride 0.9%.    Assessment & Plan:     Active Hospital Problems    Diagnosis    Stroke    COPD (chronic obstructive pulmonary disease)    Type 2 diabetes mellitus with hyperglycemia, without long-term current use of insulin     Patient for MRI  Neurology consulted  Insulin sliding scale  Regular Accu-Chek  PT/OT    Discharge Planning:   Is the patient medically ready for discharge?: no    Reason for patient still in hospital (select all that apply): Patient trending condition and  Treatment    Above encounter included review of the medical records, interviewing and examining the patient face-to-face, discussion with family and other health care providers, ordering and interpreting lab/test results and formulating a plan of care.     Medical Decision Making:      [_] Low Complexity  [_] Moderate Complexity  [x] High Complexity      Lorenzo Mason MD  Department of Hospital Medicine   [facility]

## 2022-09-16 NOTE — PLAN OF CARE
09/16/22 0823   Patient Assessment/Suction   Level of Consciousness (AVPU) alert   Respiratory Effort Unlabored   Expansion/Accessory Muscles/Retractions no use of accessory muscles   All Lung Fields Breath Sounds coarse   Rhythm/Pattern, Respiratory unlabored;pattern regular;depth regular   Cough Frequency infrequent   Cough Type dry;nonproductive   PRE-TX-O2   O2 Device (Oxygen Therapy) room air   SpO2 (!) 93 %   Pulse Oximetry Type Intermittent   $ Pulse Oximetry - Multiple Charge Pulse Oximetry - Multiple   Pulse 97   Resp (!) 22   Aerosol Therapy   $ Aerosol Therapy Charges Aerosol Treatment   Daily Review of Necessity (SVN) completed   Respiratory Treatment Status (SVN) given   Treatment Route (SVN) mask;oxygen   Patient Position (SVN) sitting on edge of bed   Post Treatment Assessment (SVN) breath sounds improved   Signs of Intolerance (SVN) none   Breath Sounds Post-Respiratory Treatment   Throughout All Fields Post-Treatment All Fields   Throughout All Fields Post-Treatment aeration increased   Post-treatment Heart Rate (beats/min) 97   Post-treatment Resp Rate (breaths/min) 18

## 2022-09-16 NOTE — PLAN OF CARE
[facility]  Initial Discharge Assessment       Primary Care Provider: Tomy Rios MD    Admission Diagnosis: CVA (cerebral vascular accident) [I63.9]    Admission Date: 9/16/2022  Expected Discharge Date: TBD     met with Pt at bedside to complete discharge assessment. Pt AAOx4s. Demographics, PCP, and insurance verified. No home health. No dialysis. Pt reports ability to complete ADLs with the assistance of: a glucometer. Pt verbalized plan to discharge home via family transport. Pt has no other needs to be addressed at this time.    Discharge Barriers Identified: None    Payor: MEDICAID / Plan: [place] [employer] / Product Type: Managed Medicaid /     Extended Emergency Contact Information  Primary Emergency Contact: Vita Ramirez  Address: [address] [place] [address] [facility]  Home Phone: [phone]  Work Phone: [phone]  Mobile Phone: [phone]  Relation: Sister  Preferred language: English   needed? No    Discharge Plan A: Home with family  Discharge Plan B: Home with family      [id number] - [place], [place] - [address]  [address]  [facility] [address]  Phone: [phone] Fax: [phone]      Initial Assessment (most recent)       Adult Discharge Assessment - 09/16/22 1133          Discharge Assessment    Assessment Type Discharge Planning Assessment     Confirmed/corrected address, phone number and insurance Yes     Confirmed Demographics Correct on Facesheet     Source of Information patient     Does patient/caregiver understand observation status Yes     Communicated KEAGAN with patient/caregiver Yes     Lives With sibling(s)     Do you expect to return to your current living situation? Yes     Do you have help at home or someone to help you manage your care at home? Yes     Who are your caregiver(s) and their phone number(s)? Vita Ramirez  Smita (Sister)   [phone] (Mobile)     Prior to hospitilization cognitive status: Alert/Oriented     Current cognitive status: Alert/Oriented     Walking or Climbing Stairs Difficulty none     Dressing/Bathing Difficulty none     Home Accessibility wheelchair accessible     Home Layout Able to live on 1st floor     Equipment Currently Used at Home glucometer     Readmission within 30 days? No     Patient currently being followed by outpatient case management? No     Do you currently have service(s) that help you manage your care at home? No     Do you take prescription medications? Yes     Do you have prescription coverage? Yes     Coverage MEDICAID - [facility]     Do you have any problems affording any of your prescribed medications? No     Is the patient taking medications as prescribed? yes     Who is going to help you get home at discharge? Vita Ramirez Smita (Sister)   [phone] (Mobile)     Are you on dialysis? No     Do you take coumadin? No     Discharge Plan A Home with family     Discharge Plan B Home with family     DME Needed Upon Discharge  none     Discharge Plan discussed with: Patient     Discharge Barriers Identified None        Physical Activity    On average, how many days per week do you engage in moderate to strenuous exercise (like a brisk walk)? 0 days     On average, how many minutes do you engage in exercise at this level? 0 min        Financial Resource Strain    How hard is it for you to pay for the very basics like food, housing, medical care, and heating? Not hard at all        Housing Stability    In the last 12 months, was there a time when you were not able to pay the mortgage or rent on time? No     In the last 12 months, how many places have you lived? 1     In the last 12 months, was there a time when you did not have a steady place to sleep or slept in a shelter (including now)? No        Transportation Needs    In the past 12 months, has lack of  transportation kept you from medical appointments or from getting medications? No     In the past 12 months, has lack of transportation kept you from meetings, work, or from getting things needed for daily living? No        Food Insecurity    Within the past 12 months, you worried that your food would run out before you got the money to buy more. Never true     Within the past 12 months, the food you bought just didn't last and you didn't have money to get more. Never true        Stress    Do you feel stress - tense, restless, nervous, or anxious, or unable to sleep at night because your mind is troubled all the time - these days? Not at all        Social Connections    In a typical week, how many times do you talk on the phone with family, friends, or neighbors? More than three times a week     How often do you get together with friends or relatives? More than three times a week     How often do you attend Worship or Gnosticism services? Never     Do you belong to any clubs or organizations such as Worship groups, unions, fraternal or athletic groups, or school groups? No     How often do you attend meetings of the clubs or organizations you belong to? Never     Are you , , , , never , or living with a partner? Never         Alcohol Use    Q1: How often do you have a drink containing alcohol? Never     Q2: How many drinks containing alcohol do you have on a typical day when you are drinking? Patient does not drink     Q3: How often do you have six or more drinks on one occasion? Never        Relationship/Environment    Name(s) of Who Lives With Patient Vita Ramirez (Sister)   [phone] (Mobile)

## 2022-09-16 NOTE — PT/OT/SLP EVAL
Speech Language Pathology Evaluation  Bedside Swallow    Patient Name:  Delores Fox   MRN:  [id number]  Admitting Diagnosis: <principal problem not specified>    Recommendations:                 General Recommendations:  Dysphagia therapy  Diet recommendations:  Regular Diet - IDDSI Level 7, Thin   Aspiration Precautions:  Reflux precautions and Standard aspiration precautions   General Precautions: Standard, aspiration (reflux precautions)  Communication strategies:  none    History:     Past Medical History:   Diagnosis Date    Acid reflux     COPD (chronic obstructive pulmonary disease)     Coronary artery disease     COVID-19     Diabetes mellitus     High cholesterol     Hypertension     Obese body habitus        Past Surgical History:   Procedure Laterality Date    APPENDECTOMY      CATHETERIZATION OF BOTH LEFT AND RIGHT HEART N/A 11/23/2020    Procedure: CATHETERIZATION, HEART, BOTH LEFT AND RIGHT;  Surgeon: Doug Kendall MD;  Location: [facility] CATH/EP LAB;  Service: Cardiology;  Laterality: N/A;    CHOLECYSTECTOMY      EXCISION OF LESION OF EYELID Right 12/11/2020    Procedure: EXCISION, LESION, EYELID;  Surgeon: Malachi Rodriguez MD;  Location: [facility] OR;  Service: Ophthalmology;  Laterality: Right;  Inclusion Cyst removal right lower lid    HYSTERECTOMY      JOINT REPLACEMENT Left     left hip    MASTOIDECTOMY Right      MRI Results--9/15:  IMPRESSION:  1. Multiple areas of acute infarction in the left cerebral hemisphere, with associated gyriform enhancement suggesting luxury perfusion. A follow-up MRI brain without and with contrast 6-8 weeks is recommended to document resolution of enhancement.  2. No significant regional mass effect or midline shift.  3. Chronic microvascular ischemic changes.    Social History: Patient lives with sister.    Prior Intubation HX:  N/A    Modified Barium Swallow: N/A    Chest X-Rays: N/A    Prior diet: Reg/Thin.    Occupation/hobbies/homemaking: Pt has sedentary  "lifestyle.  Twenty year hx of SSI disability.    Subjective     Pt seen while sitting fully upright in bed. Pt confirmed hx of reflux.       Patient goals: "To go home."     Pain/Comfort:  Pain Rating 1: 0/10    Respiratory Status: Room air    Objective:     Oral Musculature Evaluation  Oral Musculature: WFL  Dentition: present and adequate  Mucosal Quality: good  Mandibular Strength and Mobility: WFL  Oral Labial Strength and Mobility: WFL  Lingual Strength and Mobility: WFL  Velar Elevation: WFL  Buccal Strength and Mobility: WFL    Bedside Swallow Eval:   Consistencies Assessed:  Thin liquids Coke  Solids Crackers    Note: Pt's breakfast was untouched at b/s .  Pt refused to eat due to dislike of hospital food, and was only agreeable to a few po trials with SLP for swallowing assessment.    Oral Phase:   WFL    Pharyngeal Phase:   no overt clinical signs/symptoms of aspiration    Compensatory Strategies  None    Treatment: Initiated education on general reflux and swallowing precautions.    Assessment:     Delores Fox is a 56 y.o. female with a recent medical diagnosis of CVA based on multiple areas of acute infarction in the left cerebral hemisphere, with associated gyriform enhancement suggesting luxury perfusion.  PMH includes:  Reflux and COPD.  Today's clinical-swallow eval revealed no overt s/s of oral/pharyngeal dysphagia. However, the evaluation was limited by pt.'s limited acceptance of po trials and hospital breakfast-meal tray, 2 to dislike of hospital food.  Pt with hx of reflux and is at risk for aspiration/penetration following the swallow.  Pt would benefit from brief follow-up from SLPduring meal intake (pending pt.'s acceptance of hospital meal) and education on general swallowing and reflux precautions.    Goals:   Multidisciplinary Problems       SLP Goals          Problem: SLP    Goal Priority Disciplines Outcome   SLP Goal     SLP Ongoing, Progressing   Description: 1.  Pt will safely " tolerate IDDSI-7/THIN meal intake without any overt s/s of aspiration, >95% of the time.    2.  Pt will participate in ongoing cognitive screen/eval.  3.  Pt will demonstrate IND verbal recall of trained general swallowing and reflux precautions, with >95% accuracy.                       Plan:     Patient to be seen:  3 x/week   Plan of Care expires:  10/07/22  Plan of Care reviewed with:      SLP Follow-Up:  Yes       Discharge recommendations:      Barriers to Discharge:  None    Time Tracking:     SLP Treatment Date:   09/16/22  Speech Start Time:  0910  Speech Stop Time:  0930     Speech Total Time (min):  20 min    Billable Minutes: Eval 20mins     09/16/2022

## 2022-09-16 NOTE — PT/OT/SLP EVAL
Physical Therapy Evaluation and Discharge Note    Patient Name:  Delores Fox   MRN:  [id number]    Recommendations:     Discharge Recommendations:  home   Discharge Equipment Recommendations: none   Barriers to discharge:  medical status    Assessment:     Delores Fox is a 56 y.o. female admitted with a medical diagnosis of <principal problem not specified>. .  At this time, patient is functioning at their prior level of function and does not require further acute PT services.     Recent Surgery: * No surgery found *      Plan:     During this hospitalization, patient does not require further acute PT services.  Please re-consult if situation changes.      Subjective     Chief Complaint: none  Patient/Family Comments/goals: go home  Pain/Comfort:  Pain Rating 1: 0/10    Patients cultural, spiritual, Scientology conflicts given the current situation: no    Living Environment:  Pt lives with family members in a first floor apartment. Recently got rid of her RW and wheelchair as she was doing so well  Prior to admission, patients level of function was Independent.  Equipment used at home: shower chair, grab bar.  DME owned (not currently used): none.  Upon discharge, patient will have assistance from family.    Objective:     Communicated with RN prior to session.  Patient found HOB elevated with peripheral IV, telemetry upon PT entry to room.    General Precautions: Standard, aspiration   Orthopedic Precautions:N/A   Braces: N/A   Respiratory Status: Room air    Exams:  Gross Motor Coordination:  WFL  Sensation:    -       Intact  RLE ROM: WFL  RLE Strength: WFL  LLE ROM: WFL  LLE Strength: WFL    Functional Mobility:  Bed Mobility:     Supine to Sit: independence  Sit to Supine: independence  Transfers:     Sit to Stand:  independence with no AD  Gait: 100 ft with no AD and supervision    AM-PAC 6 CLICK MOBILITY  Total Score:23       Therapeutic Activities and Exercises:   Pt educated on POC, discharge  recommendation, need for assist with mobility, importance of time OOB, pacing/energy conservation, use of call bell to seek assistance as needed and fall prevention      AM-PAC 6 CLICK MOBILITY  Total Score:23     Patient left HOB elevated with all lines intact and call button in reach.    GOALS:   Multidisciplinary Problems       Physical Therapy Goals       Not on file                    History:     Past Medical History:   Diagnosis Date    Acid reflux     COPD (chronic obstructive pulmonary disease)     Coronary artery disease     COVID-19     Diabetes mellitus     High cholesterol     Hypertension     Obese body habitus        Past Surgical History:   Procedure Laterality Date    APPENDECTOMY      CATHETERIZATION OF BOTH LEFT AND RIGHT HEART N/A 11/23/2020    Procedure: CATHETERIZATION, HEART, BOTH LEFT AND RIGHT;  Surgeon: Doug Kendall MD;  Location: [facility] CATH/EP LAB;  Service: Cardiology;  Laterality: N/A;    CHOLECYSTECTOMY      EXCISION OF LESION OF EYELID Right 12/11/2020    Procedure: EXCISION, LESION, EYELID;  Surgeon: Malachi Rodriguez MD;  Location: [facility] OR;  Service: Ophthalmology;  Laterality: Right;  Inclusion Cyst removal right lower lid    HYSTERECTOMY      JOINT REPLACEMENT Left     left hip    MASTOIDECTOMY Right        Time Tracking:     PT Received On: 09/16/22  PT Start Time: 0936     PT Stop Time: 0944  PT Total Time (min): 8 min     Billable Minutes: Evaluation 8      09/16/2022

## 2022-09-17 ENCOUNTER — CLINICAL SUPPORT (OUTPATIENT)
Dept: CARDIOLOGY | Facility: HOSPITAL | Age: 57
DRG: 066 | End: 2022-09-17
Attending: STUDENT IN AN ORGANIZED HEALTH CARE EDUCATION/TRAINING PROGRAM
Payer: MEDICAID

## 2022-09-17 VITALS — WEIGHT: 236.56 LBS | HEIGHT: 67 IN | BODY MASS INDEX: 37.13 KG/M2

## 2022-09-17 LAB
ALBUMIN SERPL BCP-MCNC: 3.5 G/DL (ref 3.5–5.2)
ALP SERPL-CCNC: 122 U/L (ref 55–135)
ALT SERPL W/O P-5'-P-CCNC: 15 U/L (ref 10–44)
ANION GAP SERPL CALC-SCNC: 7 MMOL/L (ref 8–16)
AORTIC ROOT ANNULUS: 2.4 CM
AST SERPL-CCNC: 15 U/L (ref 10–40)
AV INDEX (PROSTH): 0.43
AV MEAN GRADIENT: 6 MMHG
AV VALVE AREA: 1.52 CM2
BASOPHILS # BLD AUTO: 0.02 K/UL (ref 0–0.2)
BASOPHILS NFR BLD: 0.2 % (ref 0–1.9)
BILIRUB SERPL-MCNC: 0.9 MG/DL (ref 0.1–1)
BSA FOR ECHO PROCEDURE: 2.25 M2
BUN SERPL-MCNC: 16 MG/DL (ref 6–20)
CALCIUM SERPL-MCNC: 8.1 MG/DL (ref 8.7–10.5)
CHLORIDE SERPL-SCNC: 100 MMOL/L (ref 95–110)
CO2 SERPL-SCNC: 30 MMOL/L (ref 23–29)
CREAT SERPL-MCNC: 1.2 MG/DL (ref 0.5–1.4)
CV ECHO LV RWT: 0.31 CM
DIFFERENTIAL METHOD: NORMAL
DOP CALC AO VTI: 36.17 CM
DOP CALC LVOT AREA: 3.6 CM2
DOP CALC LVOT DIAMETER: 2.13 CM
DOP CALC LVOT PEAK VEL: 68.44 M/S
DOP CALC LVOT STROKE VOLUME: 54.99 CM3
DOP CALCLVOT PEAK VEL VTI: 15.44 CM
E WAVE DECELERATION TIME: 132.6 MSEC
E/A RATIO: 1.2
E/E' RATIO: 16.83 M/S
ECHO LV POSTERIOR WALL: 1.05 CM (ref 0.6–1.1)
EJECTION FRACTION: 30 %
EOSINOPHIL # BLD AUTO: 0.1 K/UL (ref 0–0.5)
EOSINOPHIL NFR BLD: 1.2 % (ref 0–8)
ERYTHROCYTE [DISTWIDTH] IN BLOOD BY AUTOMATED COUNT: 12.8 % (ref 11.5–14.5)
EST. GFR  (NO RACE VARIABLE): 53.8 ML/MIN/1.73 M^2
FRACTIONAL SHORTENING: 12 % (ref 28–44)
GLUCOSE SERPL-MCNC: 168 MG/DL (ref 70–110)
GLUCOSE SERPL-MCNC: 180 MG/DL (ref 70–110)
GLUCOSE SERPL-MCNC: 195 MG/DL (ref 70–110)
GLUCOSE SERPL-MCNC: 198 MG/DL (ref 70–110)
GLUCOSE SERPL-MCNC: 235 MG/DL (ref 70–110)
HCT VFR BLD AUTO: 43.9 % (ref 37–48.5)
HGB BLD-MCNC: 14.1 G/DL (ref 12–16)
IMM GRANULOCYTES # BLD AUTO: 0.03 K/UL (ref 0–0.04)
IMM GRANULOCYTES NFR BLD AUTO: 0.4 % (ref 0–0.5)
INTERVENTRICULAR SEPTUM: 1.05 CM (ref 0.6–1.1)
IVRT: 70.45 MSEC
LEFT ATRIUM SIZE: 3.84 CM
LEFT ATRIUM VOLUME INDEX MOD: 25.6 ML/M2
LEFT ATRIUM VOLUME MOD: 55.53 CM3
LEFT INTERNAL DIMENSION IN SYSTOLE: 5.93 CM (ref 2.1–4)
LEFT VENTRICLE DIASTOLIC VOLUME INDEX: 143.08 ML/M2
LEFT VENTRICLE DIASTOLIC VOLUME: 310.49 ML
LEFT VENTRICLE MASS INDEX: 149 G/M2
LEFT VENTRICLE SYSTOLIC VOLUME INDEX: 96 ML/M2
LEFT VENTRICLE SYSTOLIC VOLUME: 208.34 ML
LEFT VENTRICULAR INTERNAL DIMENSION IN DIASTOLE: 6.77 CM (ref 3.5–6)
LEFT VENTRICULAR MASS: 323.06 G
LV LATERAL E/E' RATIO: 14.43 M/S
LV SEPTAL E/E' RATIO: 20.2 M/S
LYMPHOCYTES # BLD AUTO: 2.5 K/UL (ref 1–4.8)
LYMPHOCYTES NFR BLD: 30.2 % (ref 18–48)
MAGNESIUM SERPL-MCNC: 1.9 MG/DL (ref 1.6–2.6)
MCH RBC QN AUTO: 29.9 PG (ref 27–31)
MCHC RBC AUTO-ENTMCNC: 32.1 G/DL (ref 32–36)
MCV RBC AUTO: 93 FL (ref 82–98)
MONOCYTES # BLD AUTO: 0.7 K/UL (ref 0.3–1)
MONOCYTES NFR BLD: 8.9 % (ref 4–15)
MV PEAK A VEL: 0.84 M/S
MV PEAK E VEL: 1.01 M/S
NEUTROPHILS # BLD AUTO: 4.8 K/UL (ref 1.8–7.7)
NEUTROPHILS NFR BLD: 59.1 % (ref 38–73)
NRBC BLD-RTO: 0 /100 WBC
PHOSPHATE SERPL-MCNC: 3.4 MG/DL (ref 2.7–4.5)
PISA MRMAX VEL: 420.44 M/S
PISA TR MAX VEL: 2.64 M/S
PLATELET # BLD AUTO: 286 K/UL (ref 150–450)
PMV BLD AUTO: 9.2 FL (ref 9.2–12.9)
POTASSIUM SERPL-SCNC: 4 MMOL/L (ref 3.5–5.1)
PROT SERPL-MCNC: 6.4 G/DL (ref 6–8.4)
RA PRESSURE: 3 MMHG
RBC # BLD AUTO: 4.72 M/UL (ref 4–5.4)
RIGHT VENTRICULAR END-DIASTOLIC DIMENSION: 2.1 CM
SODIUM SERPL-SCNC: 137 MMOL/L (ref 136–145)
TDI LATERAL: 0.07 M/S
TDI SEPTAL: 0.05 M/S
TDI: 0.06 M/S
TR MAX PG: 28 MMHG
TRICUSPID ANNULAR PLANE SYSTOLIC EXCURSION: 1.8 CM
TV REST PULMONARY ARTERY PRESSURE: 31 MMHG
WBC # BLD AUTO: 8.12 K/UL (ref 3.9–12.7)

## 2022-09-17 PROCEDURE — 25000003 PHARM REV CODE 250: Performed by: STUDENT IN AN ORGANIZED HEALTH CARE EDUCATION/TRAINING PROGRAM

## 2022-09-17 PROCEDURE — 21400001 HC TELEMETRY ROOM

## 2022-09-17 PROCEDURE — 93306 TTE W/DOPPLER COMPLETE: CPT | Mod: 26,,, | Performed by: INTERNAL MEDICINE

## 2022-09-17 PROCEDURE — 84100 ASSAY OF PHOSPHORUS: CPT | Performed by: STUDENT IN AN ORGANIZED HEALTH CARE EDUCATION/TRAINING PROGRAM

## 2022-09-17 PROCEDURE — 25000242 PHARM REV CODE 250 ALT 637 W/ HCPCS: Performed by: STUDENT IN AN ORGANIZED HEALTH CARE EDUCATION/TRAINING PROGRAM

## 2022-09-17 PROCEDURE — 99900031 HC PATIENT EDUCATION (STAT)

## 2022-09-17 PROCEDURE — 93306 ECHO (CUPID ONLY): ICD-10-PCS | Mod: 26,,, | Performed by: INTERNAL MEDICINE

## 2022-09-17 PROCEDURE — 80053 COMPREHEN METABOLIC PANEL: CPT | Performed by: STUDENT IN AN ORGANIZED HEALTH CARE EDUCATION/TRAINING PROGRAM

## 2022-09-17 PROCEDURE — 94799 UNLISTED PULMONARY SVC/PX: CPT

## 2022-09-17 PROCEDURE — 99900035 HC TECH TIME PER 15 MIN (STAT)

## 2022-09-17 PROCEDURE — 27000221 HC OXYGEN, UP TO 24 HOURS

## 2022-09-17 PROCEDURE — 93306 TTE W/DOPPLER COMPLETE: CPT

## 2022-09-17 PROCEDURE — 36415 COLL VENOUS BLD VENIPUNCTURE: CPT | Performed by: STUDENT IN AN ORGANIZED HEALTH CARE EDUCATION/TRAINING PROGRAM

## 2022-09-17 PROCEDURE — 83735 ASSAY OF MAGNESIUM: CPT | Performed by: STUDENT IN AN ORGANIZED HEALTH CARE EDUCATION/TRAINING PROGRAM

## 2022-09-17 PROCEDURE — 85025 COMPLETE CBC W/AUTO DIFF WBC: CPT | Performed by: STUDENT IN AN ORGANIZED HEALTH CARE EDUCATION/TRAINING PROGRAM

## 2022-09-17 PROCEDURE — 63600175 PHARM REV CODE 636 W HCPCS: Performed by: STUDENT IN AN ORGANIZED HEALTH CARE EDUCATION/TRAINING PROGRAM

## 2022-09-17 PROCEDURE — 94640 AIRWAY INHALATION TREATMENT: CPT

## 2022-09-17 PROCEDURE — 94761 N-INVAS EAR/PLS OXIMETRY MLT: CPT

## 2022-09-17 RX ADMIN — IPRATROPIUM BROMIDE 0.5 MG: 0.5 SOLUTION RESPIRATORY (INHALATION) at 01:09

## 2022-09-17 RX ADMIN — PREGABALIN 75 MG: 75 CAPSULE ORAL at 09:09

## 2022-09-17 RX ADMIN — INSULIN ASPART 2 UNITS: 100 INJECTION, SOLUTION INTRAVENOUS; SUBCUTANEOUS at 09:09

## 2022-09-17 RX ADMIN — IPRATROPIUM BROMIDE 0.5 MG: 0.5 SOLUTION RESPIRATORY (INHALATION) at 07:09

## 2022-09-17 RX ADMIN — ONDANSETRON 4 MG: 2 INJECTION INTRAMUSCULAR; INTRAVENOUS at 09:09

## 2022-09-17 RX ADMIN — Medication 6 MG: at 09:09

## 2022-09-17 RX ADMIN — HYDROCODONE BITARTRATE AND ACETAMINOPHEN 1 TABLET: 5; 325 TABLET ORAL at 09:09

## 2022-09-17 RX ADMIN — ARFORMOTEROL TARTRATE 15 MCG: 15 SOLUTION RESPIRATORY (INHALATION) at 07:09

## 2022-09-17 RX ADMIN — ATORVASTATIN CALCIUM 40 MG: 40 TABLET, FILM COATED ORAL at 09:09

## 2022-09-17 RX ADMIN — ARFORMOTEROL TARTRATE 15 MCG: 15 SOLUTION RESPIRATORY (INHALATION) at 08:09

## 2022-09-17 RX ADMIN — CLOPIDOGREL BISULFATE 75 MG: 75 TABLET, FILM COATED ORAL at 09:09

## 2022-09-17 RX ADMIN — BUDESONIDE 1 MG: 0.5 INHALANT RESPIRATORY (INHALATION) at 07:09

## 2022-09-17 RX ADMIN — IPRATROPIUM BROMIDE 0.5 MG: 0.5 SOLUTION RESPIRATORY (INHALATION) at 08:09

## 2022-09-17 RX ADMIN — BUDESONIDE 1 MG: 0.5 INHALANT RESPIRATORY (INHALATION) at 08:09

## 2022-09-17 RX ADMIN — ASPIRIN 81 MG 81 MG: 81 TABLET ORAL at 09:09

## 2022-09-17 NOTE — PROGRESS NOTES
[facility] Medicine    Progress Note    Patient Name: Delores Fox  MRN: [id number]  Patient Class: IP- Inpatient   Admission Date: 9/16/2022  2:47 AM  Length of Stay: 1  Attending Physician: Lorenzo Mason MD  Primary Care Provider: Tomy Rios MD  Face-to-Face encounter date: 09/17/2022  Code status:  Chief Complaint: No chief complaint on file.        Subjective:    HPI:Delores Fox is a 56 y.o.  female who has a past medical history of Acid reflux, COPD (chronic obstructive pulmonary disease), Coronary artery disease, COVID-19, Diabetes mellitus, High cholesterol, Hypertension, and Obese body habitus presented to OSH with right arm weakness that had been present for greater than 24 hrs. Around 2 or 3 pm on Wednesday patient noticed right arm weakness and numbness tingling.  She noticed when she was unable to maintain arm raised when reaching for something and her hand was very weak.  She presented to OSH ER on Thursday around 4 pm.  Head CT was negative and she was transferred to [facility] for neurology consult and further workup. On my exam, the numbness and tingling of right arm has resolved.  She also had slurred speech with the onset of weakness. She has had previous similar episodes that resolved spontaneously and she did not seek medical care. Patient denies change in vision, hearing, fever, cough, congestion, runny nose, chest pain, shortness of breath, palpitations, abdominal pain, diarrhea, constipation, vomiting, dysuria, hematuria, joint pain and back pain.     Interval History:   9/16:  Patient for MRI, awaiting Neurology recommendation. Patient is doing well. No concerns/issues overnight reported by the patient or the nursing staff.    9/17:  MRI showed stroke and patient started on DAPT, echo pending    Review of Systems All other Review of Systems were found to be negative expect for that mentioned already in HPI.     Objective:     Vitals:    09/17/22 0943  09/17/22 1142 09/17/22 1349 09/17/22 1505   BP:  125/74  123/80   BP Location:  Right arm  Right arm   Patient Position:  Sitting  Lying   Pulse:  91 80 85   Resp: 18 17 20 16   Temp:  97.7 °F (36.5 °C)  97.8 °F (36.6 °C)   TempSrc:  Oral  Oral   SpO2:  (!) 90% 96% (!) 94%   Weight:       Height:            Vitals reviewed.  Constitutional: No distress.   HENT: NC  Head: Atraumatic.   Cardiovascular: Normal rate, regular rhythm and normal heart sounds.   Pulmonary/Chest: Effort normal. No wheezes.   Abdominal: Soft. Bowel sounds are normal. No distension and no mass. No tenderness  Neurological: Alert.   Skin: Skin is warm and dry.   Psych: Appropriate mood and affect    Following labs were Reviewed   CBC:  Recent Labs   Lab 09/17/22  0727   WBC 8.12   HGB 14.1   HCT 43.9        CMP:  Recent Labs   Lab 09/17/22  0727   CALCIUM 8.1*   ALBUMIN 3.5   PROT 6.4      K 4.0   CO2 30*      BUN 16   CREATININE 1.2   ALKPHOS 122   ALT 15   AST 15   BILITOT 0.9       Micro Results  Microbiology Results (last 7 days)       ** No results found for the last 168 hours. **             Radiology Reports  MRI Brain W WO Contrast    Result Date: 9/16/2022  HISTORY: Neuro deficit, acute, stroke suspected TECHNIQUE: Multiplanar, multisequence precontrast and postcontrast imaging was performed with 10 mL Gadavist IV contrast. FINDINGS: No prior studies for comparison. There are multiple nodular, wedge-shaped and gyriform intra-axial diffusion hyperintensities in the left frontal and parietal lobes, having low signal on the ADC map, compatible with acute infarcts. Some areas have associated gyriform enhancement suggesting luxury perfusion. Tiny focus of diffusion restriction also involves the left frontal periventricular white matter. There is additional FLAIR and T2 hyperintense gliosis and fluidlike encephalomalacia in the left frontal and parietal lobes, as well as mild scattered FLAIR hyperintense gliosis elsewhere  in the white matter. There is no additional abnormal intra-axial or extra-axial enhancement. There is no intra-axial mass or significant mass effect, with no abnormal extra-axial fluid. The cortical sulci and ventricles are normal in size, with the cerebellum and brainstem unremarkable. Minimal GRE hypointensities in the left frontal cortex are compatible with hemosiderin deposition, with no additional focal intra-axial GRE signal abnormalities. The midline structures and craniocervical junction are normal. The major intracranial physiologic flow-voids are present. There is no focal signal abnormality of the orbits, the paranasal sinuses, with the skull base. IMPRESSION: 1. Multiple areas of acute infarction in the left cerebral hemisphere, with associated gyriform enhancement suggesting luxury perfusion. A follow-up MRI brain without and with contrast 6-8 weeks is recommended to document resolution of enhancement. 2. No significant regional mass effect or midline shift. 3. Chronic microvascular ischemic changes. Electronically signed by:  Jesús Huggins MD  9/16/2022 8:39 AM CDT Workstation: [id number]       Meds  Scheduled Meds:   arformoteroL  15 mcg Nebulization BID    aspirin  81 mg Oral Daily    atorvastatin  40 mg Oral Daily    budesonide  1 mg Nebulization Q12H    clopidogreL  75 mg Oral Daily    ipratropium  0.5 mg Nebulization Q6H WAKE    polyethylene glycol  17 g Oral Daily    pregabalin  75 mg Oral BID     Continuous Infusions:  PRN Meds:.acetaminophen, acetaminophen, dextrose 10%, dextrose 10%, glucagon (human recombinant), glucose, glucose, HYDROcodone-acetaminophen, insulin aspart U-100, melatonin, naloxone, ondansetron, sodium chloride 0.9%.    Assessment & Plan:     Active Hospital Problems    Diagnosis    Stroke    COPD (chronic obstructive pulmonary disease)    Type 2 diabetes mellitus with hyperglycemia, without long-term current use of insulin     MRI showed stroke  Neurology on board  Echo  pending  Insulin sliding scale  Regular Accu-Chek  PT/OT    Discharge Planning:   Is the patient medically ready for discharge?: no    Reason for patient still in hospital (select all that apply): Patient trending condition and Treatment    Above encounter included review of the medical records, interviewing and examining the patient face-to-face, discussion with family and other health care providers, ordering and interpreting lab/test results and formulating a plan of care.     Medical Decision Making:      [_] Low Complexity  [_] Moderate Complexity  [x] High Complexity      Lorenzo Mason MD  Department of Hospital Medicine   [facility]

## 2022-09-17 NOTE — PROGRESS NOTES
Replaced by [facility]  Neurology Progress Note    Patient Name: Delores Fox   MRN: [id number]  : 1965  TODAY'S DATE: 2022  ADMIT DATE: 2022  2:47 AM                                          CONSULTED PROVIDER: Bela Stone MD, Neurologist. Cellphone: [phone]  CONSULT REQUESTED BY: Lorenzo Mason MD     CC: right sided weakness and numbness    HPI per EMR:  Delores Fox is a 56 y.o.  female who has a past medical history of Acid reflux, COPD (chronic obstructive pulmonary disease), Coronary artery disease, COVID-19, Diabetes mellitus, High cholesterol, Hypertension, and Obese body habitus presented to OSH with right arm weakness that had been present for greater than 24 hrs. Around 2 or 3 pm on Wednesday patient noticed right arm weakness and numbness tingling.  She noticed when she was unable to maintain arm raised when reaching for something and her hand was very weak.  She presented to OSH ER on Thursday around 4 pm.  Head CT was negative and she was transferred to [facility] for neurology consult and further workup. On my exam, the numbness and tingling of right arm has resolved.  She also had slurred speech with the onset of weakness. She has had previous similar episodes that resolved spontaneously and she did not seek medical care. Patient denies change in vision, hearing, fever, cough, congestion, runny nose, chest pain, shortness of breath, palpitations, abdominal pain, diarrhea, constipation, vomiting, dysuria, hematuria, joint pain and back pain.     Neurology Consult:  Patient was seen and examined by me today.  She is a 56-year-old woman with history of COPD, tobacco abuse, coronary artery disease, diabetes, hyperlipidemia, hypertension presented with right sided weakness and numbness to outside facility and was then transferred to our facility for higher level of care.  She had been experiencing the symptoms for more than 24 hours when she arrived to outside facility,  so she was not a candidate for tPA administration.  CT brain was negative for bleeding, but CTA did show left cervical carotid occlusion, but reconstituted distally and no intracranial vessel occlusion.  MRI brain demonstrated a left MCA territory stroke, so patient was admitted for management and treatment of stroke.  She also reported some slurred speech at the onset, denies vision loss, left-sided weakness or numbness, chest pain, shortness of breath, gait imbalance or falls.    9/17/22:  Patient was seen and examined by me today.  She reports her right arm weakness has improved.  No new neuro deficits reported, and no acute events overnight.      Scheduled Meds:   arformoteroL  15 mcg Nebulization BID    aspirin  81 mg Oral Daily    atorvastatin  40 mg Oral Daily    budesonide  1 mg Nebulization Q12H    clopidogreL  75 mg Oral Daily    ipratropium  0.5 mg Nebulization Q6H WAKE    polyethylene glycol  17 g Oral Daily    pregabalin  75 mg Oral BID     Continuous Infusions:  PRN Meds:.acetaminophen, acetaminophen, dextrose 10%, dextrose 10%, glucagon (human recombinant), glucose, glucose, HYDROcodone-acetaminophen, insulin aspart U-100, melatonin, naloxone, ondansetron, sodium chloride 0.9%    Physical Exam  Current Vitals:  Vitals:    09/17/22 0813   BP:    Pulse: 78   Resp: 20   Temp:        Physical Exam:  General: AO x4  HEENT: PERRL, EOMI  CV: RRR  Lungs: no respiratory distress  Abdomen: soft, non tender    Neurological Exam    MENTAL STATUS EXAM:  Level of alertness: Alert  Level of attention: Attentive w/out deficit  Orientation/Awareness: intact to person, place, time, situation  Language: mild slurring of speech. Comprehension/repetition/naming intact    CRANIAL NERVE EXAM:  II/III: fundoscopic exam deferred, PERRL; visual fields full to threat  III/IV/VI: EOMI   V: no deficits appreciated to pinprick, temp, vibration; masseter strength intact bilaterally  VII: mild right sided facial droop  VIII: no  deficits in hearing bilaterally  IX/X: palate @ ML and raises symmetrically  XI: shoulder shrug 5/5 bilaterally; head turn 5/5 bilaterally  XII: tongue to midline w/out asymmetry    MOTOR EXAM:  Bulk and Tone: normal throughout  Strength is 5/5 proximally and distally in left upper and bilateral lower extremities without deficits. Strength is 4+/5 in right upper extremity proximally and 4/5 distally.     REFLEXES:  Masseter (CN V) Not Tested  1+ in bilateral upper and lower extremities, no Bernstein's, no clonus. Downgoing plantars.    SENSORY EXAM  Light touch intact throughout in upper and lower extremities without deficits.    COORDINATION/CEREBELLAR EXAM:  FTN: no dysmetria or other signs of appendicular ataxia  HTS: No evidence of appendicular ataxia    GAIT:  Deferred for safety.    NIH Stroke Scale      Date: 2022  Time: 1400  Person Administering Scale: Bela Stone MD    Administer stroke scale items in the order listed. Record performance in each category after each subscale exam. Do not go back and change scores. Follow directions provided for each exam technique. Scores should reflect what the patient does, not what the clinician thinks the patient can do. The clinician should record answers while administering the exam and work quickly. Except where indicated, the patient should not be coached (i.e., repeated requests to patient to make a special effort).      1a  Level of consciousness: 0=alert; keenly responsive   1b. LOC questions:  0=Answers both tasks correctly   1c. LOC commands: 0=Answers both tasks correctly   2.  Best Gaze: 0=normal   3.  Visual: 0=No visual loss   4. Facial Palsy: 1=Minor paralysis (flattened nasolabial fold, asymmetric on smiling)   5a.  Motor left arm: 0=No drift, limb holds 90 (or 45) degrees for full 10 seconds   5b.  Motor right arm: 1=Drift, limb holds 90 (or 45) degrees but drifts down before full 10 seconds: does not hit bed   6a. motor left le=No drift,  limb holds 90 (or 45) degrees for full 10 seconds   6b  Motor right le=No drift, limb holds 90 (or 45) degrees for full 10 seconds   7. Limb Ataxia: 0=Absent   8.  Sensory: 0=Normal; no sensory loss   9. Best Language:  0=No aphasia, normal   10. Dysarthria: 1=Mild to moderate, patient slurs at least some words and at worst, can be understood with some difficulty   11. Extinction and Inattention: 0=No abnormality    Total:   3       Laboratory Data & Studies    Recent Labs   Lab 22  0727   WBC 8.12   HGB 14.1      MCV 93       Recent Labs   Lab 22  0727      K 4.0      CO2 30*   BUN 16   *   CALCIUM 8.1*   MG 1.9   PHOS 3.4       Recent Labs   Lab 22  0727   PROT 6.4   ALBUMIN 3.5   BILITOT 0.9   AST 15   ALT 15   ALKPHOS 122       No results for input(s): LABPT, INR, APTT in the last 168 hours.    Recent Labs   Lab 22  0505   HGBA1C 9.9*   CHOL 189   TRIG 127   LDLCALC 130.6   HDL 33*   TSH 3.600         Microbiology:  Microbiology Results (last 7 days)       ** No results found for the last 168 hours. **            Imaging:  No results found.    Assessment:    Left MCA territory acute ischemic stroke - etiology cryptogenic with more than 1 possible cause (large vessel disease versus cardioembolic)  Left cervical carotid occlusion - likely chronic  56-year-old woman with history of COPD, tobacco abuse, coronary artery disease, diabetes, hyperlipidemia, hypertension presented with right sided weakness and numbness to outside facility and was then transferred to our facility for higher level of care. NIHSS is 3. CTA showed a left cervical carotid occlusion, but with distal reconstitution and no intracranial LVO. MRI brain demonstrated a left MCA territory stroke, so patient was admitted for management and treatment of stroke.      Stroke workup:  CTH:  No acute intracranial abnormality  CTA head and neck:  Left cervical carotid occlusion, distal reconstitution  and no intracranial LVO  MRI brain:  Left MCA territory acute ischemic stroke  Risk factors: A1C, TSH, LDL  Echocardiogram:  Ordered and pending  Carotid ultrasound:  Ordered and pending    Plan:  - Admitted to hospital medicine with q4 hour neuro checks, on telemetry, continuous pulse oximetry  - Diet as per SLP.  - Secondary stroke prevention with ASA 81 mg daily, plavix 75 mg daily, atorvastatin 40 mg daily. Continue DAPT with both ASA and plavix for 3 months, then stop plavix and continue ASA monotherapy and statin  - Risk factor stratification with HgbA1C, Fasting Lipid profile, TSH  - 2D echocardiogram as above  - Maintain Euthermia with Tylenol prn temp > 37.2 degrees C.  - Assessment for rehab with PT/OT/SLP evaluation and treatment.  - Permissive HTN systolic BP goal up to 220, diastolic up to 110 for 24H, then lower BP slowly to normotension  - Cardiac enzymes and EKG   - Smoking counseling and offer assistance with smoking cessation (possible nicotine patch)  - Will follow along    Patient was counseled in stroke signs, symptoms and risk factors. She was instructed to call 911 immediately if experiencing acute neurological deficits.  Patient counseled in healthy diet and physical activity. Importance of medication adherence and follow-up were emphasized.    DVT prophylaxis with chemo/SCD prophylaxis  Extensively discussed lifestyle modifications as prophylactic measures for stroke prevention including smoking cessation, adequate blood pressure management, healthy diet and regular exercise.    Patient to follow up with [facility] at [phone] within 3 days from discharge.     Stroke education was provided including stroke risk factors modification and any acute neurological changes including weakness, confusion, visual changes to come straight to the ER.     All questions were answered.                              Thank you kindly for including us in the care of this patient. Please do not  hesitate to contact us with any questions.      45 minutes of care time has been spent evaluating with the patient. Time includes chart review not limited to diagnostic imaging, labs, and vitals, patient assessment, discussion with family and nursing, current order evaluations, and new order entries.      Bela Stone MD  Neurology

## 2022-09-17 NOTE — CARE UPDATE
09/16/22 2025   Patient Assessment/Suction   Level of Consciousness (AVPU) alert   Respiratory Effort Normal   Expansion/Accessory Muscles/Retractions no use of accessory muscles   All Lung Fields Breath Sounds coarse   Rhythm/Pattern, Respiratory unlabored   Cough Frequency infrequent   Cough Type dry   PRE-TX-O2   O2 Device (Oxygen Therapy) room air   SpO2 (!) 92 %   Pulse Oximetry Type Intermittent   $ Pulse Oximetry - Multiple Charge Pulse Oximetry - Multiple   Pulse 79   Resp 19   Aerosol Therapy   $ Aerosol Therapy Charges Aerosol Treatment   Daily Review of Necessity (SVN) completed   Respiratory Treatment Status (SVN) given   Treatment Route (SVN) mask;oxygen   Patient Position (SVN) HOB elevated   Post Treatment Assessment (SVN) breath sounds unchanged;vital signs unchanged   Signs of Intolerance (SVN) none   Education   $ Education Bronchodilator;15 min   Respiratory Evaluation   $ Care Plan Tech Time 15 min

## 2022-09-18 VITALS
HEIGHT: 67 IN | SYSTOLIC BLOOD PRESSURE: 119 MMHG | TEMPERATURE: 98 F | DIASTOLIC BLOOD PRESSURE: 72 MMHG | OXYGEN SATURATION: 92 % | HEART RATE: 73 BPM | WEIGHT: 236.63 LBS | RESPIRATION RATE: 18 BRPM | BODY MASS INDEX: 37.14 KG/M2

## 2022-09-18 LAB
ALBUMIN SERPL BCP-MCNC: 3.5 G/DL (ref 3.5–5.2)
ALP SERPL-CCNC: 111 U/L (ref 55–135)
ALT SERPL W/O P-5'-P-CCNC: 13 U/L (ref 10–44)
ANION GAP SERPL CALC-SCNC: 4 MMOL/L (ref 8–16)
AST SERPL-CCNC: 14 U/L (ref 10–40)
BASOPHILS # BLD AUTO: 0.03 K/UL (ref 0–0.2)
BASOPHILS NFR BLD: 0.3 % (ref 0–1.9)
BILIRUB SERPL-MCNC: 0.6 MG/DL (ref 0.1–1)
BUN SERPL-MCNC: 19 MG/DL (ref 6–20)
CALCIUM SERPL-MCNC: 8.6 MG/DL (ref 8.7–10.5)
CHLORIDE SERPL-SCNC: 100 MMOL/L (ref 95–110)
CO2 SERPL-SCNC: 31 MMOL/L (ref 23–29)
CREAT SERPL-MCNC: 1.1 MG/DL (ref 0.5–1.4)
DIFFERENTIAL METHOD: NORMAL
EOSINOPHIL # BLD AUTO: 0.1 K/UL (ref 0–0.5)
EOSINOPHIL NFR BLD: 1.6 % (ref 0–8)
ERYTHROCYTE [DISTWIDTH] IN BLOOD BY AUTOMATED COUNT: 12.9 % (ref 11.5–14.5)
EST. GFR  (NO RACE VARIABLE): 59.7 ML/MIN/1.73 M^2
GLUCOSE SERPL-MCNC: 184 MG/DL (ref 70–110)
GLUCOSE SERPL-MCNC: 195 MG/DL (ref 70–110)
GLUCOSE SERPL-MCNC: 199 MG/DL (ref 70–110)
HCT VFR BLD AUTO: 41.4 % (ref 37–48.5)
HGB BLD-MCNC: 13.4 G/DL (ref 12–16)
IMM GRANULOCYTES # BLD AUTO: 0.02 K/UL (ref 0–0.04)
IMM GRANULOCYTES NFR BLD AUTO: 0.2 % (ref 0–0.5)
LYMPHOCYTES # BLD AUTO: 3.3 K/UL (ref 1–4.8)
LYMPHOCYTES NFR BLD: 37.7 % (ref 18–48)
MAGNESIUM SERPL-MCNC: 2 MG/DL (ref 1.6–2.6)
MCH RBC QN AUTO: 29.7 PG (ref 27–31)
MCHC RBC AUTO-ENTMCNC: 32.4 G/DL (ref 32–36)
MCV RBC AUTO: 92 FL (ref 82–98)
MONOCYTES # BLD AUTO: 0.7 K/UL (ref 0.3–1)
MONOCYTES NFR BLD: 8.1 % (ref 4–15)
NEUTROPHILS # BLD AUTO: 4.5 K/UL (ref 1.8–7.7)
NEUTROPHILS NFR BLD: 52.1 % (ref 38–73)
NRBC BLD-RTO: 0 /100 WBC
PHOSPHATE SERPL-MCNC: 3.5 MG/DL (ref 2.7–4.5)
PLATELET # BLD AUTO: 302 K/UL (ref 150–450)
PMV BLD AUTO: 9.5 FL (ref 9.2–12.9)
POTASSIUM SERPL-SCNC: 3.9 MMOL/L (ref 3.5–5.1)
PROT SERPL-MCNC: 6.2 G/DL (ref 6–8.4)
RBC # BLD AUTO: 4.51 M/UL (ref 4–5.4)
SODIUM SERPL-SCNC: 135 MMOL/L (ref 136–145)
WBC # BLD AUTO: 8.61 K/UL (ref 3.9–12.7)

## 2022-09-18 PROCEDURE — 84100 ASSAY OF PHOSPHORUS: CPT | Performed by: STUDENT IN AN ORGANIZED HEALTH CARE EDUCATION/TRAINING PROGRAM

## 2022-09-18 PROCEDURE — 99900031 HC PATIENT EDUCATION (STAT)

## 2022-09-18 PROCEDURE — 94761 N-INVAS EAR/PLS OXIMETRY MLT: CPT

## 2022-09-18 PROCEDURE — 94799 UNLISTED PULMONARY SVC/PX: CPT

## 2022-09-18 PROCEDURE — 36415 COLL VENOUS BLD VENIPUNCTURE: CPT | Performed by: STUDENT IN AN ORGANIZED HEALTH CARE EDUCATION/TRAINING PROGRAM

## 2022-09-18 PROCEDURE — 25000003 PHARM REV CODE 250: Performed by: STUDENT IN AN ORGANIZED HEALTH CARE EDUCATION/TRAINING PROGRAM

## 2022-09-18 PROCEDURE — 99900035 HC TECH TIME PER 15 MIN (STAT)

## 2022-09-18 PROCEDURE — 94640 AIRWAY INHALATION TREATMENT: CPT

## 2022-09-18 PROCEDURE — 80053 COMPREHEN METABOLIC PANEL: CPT | Performed by: STUDENT IN AN ORGANIZED HEALTH CARE EDUCATION/TRAINING PROGRAM

## 2022-09-18 PROCEDURE — 83735 ASSAY OF MAGNESIUM: CPT | Performed by: STUDENT IN AN ORGANIZED HEALTH CARE EDUCATION/TRAINING PROGRAM

## 2022-09-18 PROCEDURE — 63600175 PHARM REV CODE 636 W HCPCS: Performed by: STUDENT IN AN ORGANIZED HEALTH CARE EDUCATION/TRAINING PROGRAM

## 2022-09-18 PROCEDURE — 85025 COMPLETE CBC W/AUTO DIFF WBC: CPT | Performed by: STUDENT IN AN ORGANIZED HEALTH CARE EDUCATION/TRAINING PROGRAM

## 2022-09-18 PROCEDURE — 25000242 PHARM REV CODE 250 ALT 637 W/ HCPCS: Performed by: STUDENT IN AN ORGANIZED HEALTH CARE EDUCATION/TRAINING PROGRAM

## 2022-09-18 RX ORDER — PANTOPRAZOLE SODIUM 40 MG/1
40 TABLET, DELAYED RELEASE ORAL DAILY
Status: DISCONTINUED | OUTPATIENT
Start: 2022-09-18 | End: 2022-09-18 | Stop reason: HOSPADM

## 2022-09-18 RX ORDER — TORSEMIDE 20 MG/1
20 TABLET ORAL DAILY
Status: DISCONTINUED | OUTPATIENT
Start: 2022-09-18 | End: 2022-09-18 | Stop reason: HOSPADM

## 2022-09-18 RX ORDER — METOPROLOL SUCCINATE 25 MG/1
12.5 TABLET, EXTENDED RELEASE ORAL DAILY
Qty: 30 TABLET | Refills: 0 | Status: SHIPPED | OUTPATIENT
Start: 2022-09-18 | End: 2022-09-27 | Stop reason: SDUPTHER

## 2022-09-18 RX ORDER — LOSARTAN POTASSIUM 25 MG/1
12.5 TABLET ORAL DAILY
Qty: 180 TABLET | Refills: 0
Start: 2022-09-18 | End: 2022-09-27 | Stop reason: SDUPTHER

## 2022-09-18 RX ADMIN — PREGABALIN 75 MG: 75 CAPSULE ORAL at 08:09

## 2022-09-18 RX ADMIN — IPRATROPIUM BROMIDE 0.5 MG: 0.5 SOLUTION RESPIRATORY (INHALATION) at 08:09

## 2022-09-18 RX ADMIN — ONDANSETRON 4 MG: 2 INJECTION INTRAMUSCULAR; INTRAVENOUS at 11:09

## 2022-09-18 RX ADMIN — PANTOPRAZOLE SODIUM 40 MG: 40 TABLET, DELAYED RELEASE ORAL at 12:09

## 2022-09-18 RX ADMIN — TORSEMIDE 20 MG: 20 TABLET ORAL at 08:09

## 2022-09-18 RX ADMIN — IPRATROPIUM BROMIDE 0.5 MG: 0.5 SOLUTION RESPIRATORY (INHALATION) at 01:09

## 2022-09-18 RX ADMIN — CLOPIDOGREL BISULFATE 75 MG: 75 TABLET, FILM COATED ORAL at 09:09

## 2022-09-18 RX ADMIN — BUDESONIDE 1 MG: 0.5 INHALANT RESPIRATORY (INHALATION) at 09:09

## 2022-09-18 RX ADMIN — ARFORMOTEROL TARTRATE 15 MCG: 15 SOLUTION RESPIRATORY (INHALATION) at 08:09

## 2022-09-18 RX ADMIN — ASPIRIN 81 MG 81 MG: 81 TABLET ORAL at 08:09

## 2022-09-18 RX ADMIN — ATORVASTATIN CALCIUM 40 MG: 40 TABLET, FILM COATED ORAL at 08:09

## 2022-09-18 RX ADMIN — HYDROCODONE BITARTRATE AND ACETAMINOPHEN 1 TABLET: 5; 325 TABLET ORAL at 08:09

## 2022-09-18 RX ADMIN — METOPROLOL SUCCINATE 12.5 MG: 25 TABLET, EXTENDED RELEASE ORAL at 08:09

## 2022-09-18 NOTE — CARE UPDATE
09/17/22 1944   Patient Assessment/Suction   Level of Consciousness (AVPU) alert   Respiratory Effort Normal   Expansion/Accessory Muscles/Retractions no use of accessory muscles   All Lung Fields Breath Sounds coarse;diminished   Rhythm/Pattern, Respiratory unlabored   Cough Frequency no cough   PRE-TX-O2   O2 Device (Oxygen Therapy) room air   SpO2 96 %   Pulse Oximetry Type Intermittent   $ Pulse Oximetry - Multiple Charge Pulse Oximetry - Multiple   Pulse 86   Resp 19   Aerosol Therapy   $ Aerosol Therapy Charges Aerosol Treatment   Daily Review of Necessity (SVN) completed   Respiratory Treatment Status (SVN) given   Treatment Route (SVN) mask;oxygen   Patient Position (SVN) semi-Bautista's   Post Treatment Assessment (SVN) breath sounds unchanged;vital signs unchanged   Signs of Intolerance (SVN) none   Education   $ Education Bronchodilator;15 min   Respiratory Evaluation   $ Care Plan Tech Time 15 min

## 2022-09-18 NOTE — PLAN OF CARE
Discharge orders and chart reviewed with no further post-acute discharge needs identified at this time.  At this time, patient is cleared for discharge from Case Management standpoint.        09/18/22 1554   Final Note   Assessment Type Final Discharge Note   Anticipated Discharge Disposition Home   Post-Acute Status   Discharge Delays None known at this time

## 2022-09-18 NOTE — DISCHARGE INSTRUCTIONS
Blood pressure medications were adjusted and decreased.  Please check your blood pressure prior to taking any blood pressure medication.  Keep  a blood pressure log and follow-up with your PCP for further fine tuning of your medication

## 2022-09-18 NOTE — DISCHARGE SUMMARY
[facility] Medicine  Discharge Summary      Patient Name: Delores Fox  MRN: [id number]  Patient Class: IP- Inpatient  Admission Date: 9/16/2022  Hospital Length of Stay: 2 days  Discharge Date and Time:  09/18/2022 3:51 PM  Attending Physician: Lorenzo Mason MD   Discharging Provider: Lorenzo Mason MD  Primary Care Provider: Tomy Rios MD      HPI:   Delores Fox is a 56 y.o.  female who has a past medical history of Acid reflux, COPD (chronic obstructive pulmonary disease), Coronary artery disease, COVID-19, Diabetes mellitus, High cholesterol, Hypertension, and Obese body habitus presented to OSH with right arm weakness that had been present for greater than 24 hrs. Around 2 or 3 pm on Wednesday patient noticed right arm weakness and numbness tingling.  She noticed when she was unable to maintain arm raised when reaching for something and her hand was very weak.  She presented to OSH ER on Thursday around 4 pm.  Head CT was negative and she was transferred to [facility] for neurology consult and further workup. On my exam, the numbness and tingling of right arm has resolved.  She also had slurred speech with the onset of weakness. She has had previous similar episodes that resolved spontaneously and she did not seek medical care. Patient denies change in vision, hearing, fever, cough, congestion, runny nose, chest pain, shortness of breath, palpitations, abdominal pain, diarrhea, constipation, vomiting, dysuria, hematuria, joint pain and back pain.     * No surgery found *      Hospital Course:   9/16:  Patient for MRI, awaiting Neurology recommendation. Patient is doing well. No concerns/issues overnight reported by the patient or the nursing staff.     9/17:  MRI showed stroke and patient started on DAPT, echo pending    9/18:  Due to patient's cardiac status, case discussed with neurologist and patient to be started on Eliquis and just aspirin.  Clopidogrel discontinued.   Patient's blood pressure medications adjusted due to low normal blood pressures.  Patient to keep a blood pressure log and follow-up with her PCP for further fine tuning of her medications.    Physical examination on discharge:  Constitutional: No distress.   HENT: NC  Head: Atraumatic.   Cardiovascular: Normal rate, regular rhythm and normal heart sounds.   Pulmonary/Chest: Effort normal. No wheezes.   Abdominal: Soft. Bowel sounds are normal. No distension and no mass. No tenderness  Neurological: Alert.   Skin: Skin is warm and dry.   Psych: Appropriate mood and affect    I have seen the patient on the day of discharge and reviewed the discharge instructions as outlined.         Goals of Care Treatment Preferences:  Code Status: Full Code      Consults:   Consults (From admission, onward)        Status Ordering Provider     Inpatient consult to Neurology  Once        Provider:  Bela Gonzalez MD    Completed OSBALDO ECHEVERRIA          No new Assessment & Plan notes have been filed under this hospital service since the last note was generated.  Service: Hospital Medicine    Final Active Diagnoses:    Diagnosis Date Noted POA    PRINCIPAL PROBLEM:  Stroke [I63.9] 09/16/2022 Yes    COPD (chronic obstructive pulmonary disease) [J44.9] 11/22/2020 Yes     Chronic    Type 2 diabetes mellitus with hyperglycemia, without long-term current use of insulin [E11.65] 11/21/2020 Yes      Problems Resolved During this Admission:       Discharged Condition: good    Disposition: Home or Self Care    Follow Up:   Follow-up Information     Tomy Rios MD Follow up in 1 week(s).    Specialty: Family Medicine  Contact information:  [address] [facility]  [place] [address]  [phone]             Bela Stone MD Follow up in 2 week(s).    Specialty: Neurology  Contact information:  [address]  [place] [address] [phone]                       Patient Instructions:      Ambulatory referral/consult to  Physical/Occupational Therapy   Standing Status: Future   Referral Priority: Routine Referral Type: Physical Medicine   Referral Reason: Specialty Services Required   Requested Specialty: Occupational Therapy   Number of Visits Requested: 1     Activity as tolerated       Significant Diagnostic Studies: Labs:   BMP:   Recent Labs   Lab 09/17/22 0727 09/18/22 0417   * 199*    135*   K 4.0 3.9    100   CO2 30* 31*   BUN 16 19   CREATININE 1.2 1.1   CALCIUM 8.1* 8.6*   MG 1.9 2.0   , CMP   Recent Labs   Lab 09/17/22 0727 09/18/22 0417    135*   K 4.0 3.9    100   CO2 30* 31*   * 199*   BUN 16 19   CREATININE 1.2 1.1   CALCIUM 8.1* 8.6*   PROT 6.4 6.2   ALBUMIN 3.5 3.5   BILITOT 0.9 0.6   ALKPHOS 122 111   AST 15 14   ALT 15 13   ANIONGAP 7* 4*   , CBC   Recent Labs   Lab 09/17/22 0727 09/18/22 0417   WBC 8.12 8.61   HGB 14.1 13.4   HCT 43.9 41.4    302   , Troponin No results for input(s): TROPONINI in the last 168 hours., A1C:   Recent Labs   Lab 09/16/22  0505   HGBA1C 9.9*    and All labs within the past 24 hours have been reviewed  Radiology: X-Ray: CXR: X-Ray Chest 1 View (CXR): No results found for this visit on 09/16/22. and X-Ray Chest PA and Lateral (CXR): No results found for this visit on 09/16/22.  Cardiac Graphics: Echocardiogram:   2D echo with color flow doppler: No results found for this or any previous visit. and Transthoracic echo (TTE) complete (Cupid Only):   Results for orders placed or performed during the hospital encounter of 09/16/22   Echo Saline Bubble? Yes   Result Value Ref Range    BSA 2.25 m2    TDI SEPTAL 0.05 m/s    LV LATERAL E/E' RATIO 14.43 m/s    LV SEPTAL E/E' RATIO 20.20 m/s    Right Atrial Pressure (from IVC) 3 mmHg    TDI LATERAL 0.07 m/s    LVIDd 6.77 (A) 3.5 - 6.0 cm    IVS 1.05 0.6 - 1.1 cm    Posterior Wall 1.05 0.6 - 1.1 cm    Ao root annulus 2.40 cm    LVIDs 5.93 (A) 2.1 - 4.0 cm    FS 12 28 - 44 %    LV mass 323.06 g     LA size 3.84 cm    RVDD 2.10 cm    TAPSE 1.80 cm    Left Ventricle Relative Wall Thickness 0.31 cm    AV mean gradient 6 mmHg    AV valve area 1.52 cm2    AV index (prosthetic) 0.43     E/A ratio 1.20     Mean e' 0.06 m/s    E wave deceleration time 132.60 msec    IVRT 70.45 msec    LVOT diameter 2.13 cm    LVOT area 3.6 cm2    LVOT peak steven 68.44 m/s    LVOT peak VTI 15.44 cm    Ao VTI 36.17 cm    Mr max steven 420.44 m/s    LVOT stroke volume 54.99 cm3    TV rest pulmonary artery pressure 31 mmHg    E/E' ratio 16.83 m/s    MV Peak E Steven 1.01 m/s    TR Max Steven 2.64 m/s    MV Peak A Steven 0.84 m/s    LV Systolic Volume 208.34 mL    LV Systolic Volume Index 96.0 mL/m2    LV Diastolic Volume 310.49 mL    LV Diastolic Volume Index 143.08 mL/m2    LV Mass Index 149 g/m2    Triscuspid Valve Regurgitation Peak Gradient 28 mmHg    LA Volume Index (Mod) 25.6 mL/m2    LA volume (mod) 55.53 cm3    EF 30 %    Narrative    · There is no evidence of intracardiac shunting.  · The left ventricle is moderately enlarged with mild eccentric   hypertrophy and moderately decreased systolic function.  · There is severe left ventricular global hypokinesis.  · The estimated ejection fraction is 30%.  · Grade I left ventricular diastolic dysfunction.  · Normal right ventricular size with normal right ventricular systolic   function.  · Mild left atrial enlargement.  · Mild mitral regurgitation.  · Mild tricuspid regurgitation.  · Normal central venous pressure (3 mmHg).  · The estimated PA systolic pressure is 31 mmHg.          Pending Diagnostic Studies:     None         Medications:  Reconciled Home Medications:      Medication List      START taking these medications    apixaban 5 mg Tab  Commonly known as: ELIQUIS  Take 1 tablet (5 mg total) by mouth 2 (two) times daily.        CHANGE how you take these medications    losartan 25 MG tablet  Commonly known as: COZAAR  Take 0.5 tablets (12.5 mg total) by mouth once daily.  What changed:    · how much to take  · when to take this     metoprolol succinate 25 MG 24 hr tablet  Commonly known as: TOPROL-XL  Take 0.5 tablets (12.5 mg total) by mouth once daily.  What changed:   · medication strength  · how much to take        CONTINUE taking these medications    * albuterol 1.25 mg/3 mL Nebu  Commonly known as: ACCUNEB  Take 3 mLs (1.25 mg total) by nebulization every 6 (six) hours as needed (wheezing). Rescue     * albuterol 90 mcg/actuation inhaler  Commonly known as: PROVENTIL/VENTOLIN HFA  Inhale 2 puffs into the lungs every 6 (six) hours as needed for Wheezing or Shortness of Breath. Rescue     aspirin 81 MG Chew  Take 1 tablet (81 mg total) by mouth once daily.     atorvastatin 40 MG tablet  Commonly known as: LIPITOR  Take 1 tablet (40 mg total) by mouth once daily.     blood sugar diagnostic Strp  To check BG 2 times daily, to use with insurance preferred meter     blood-glucose meter kit  To check BG 2 times daily, to use with insurance preferred meter     clotrimazole-betamethasone 1-0.05% cream  Commonly known as: LOTRISONE  Apply topically 2 (two) times daily.     lancets Misc  To check BG 2 times daily, to use with insurance preferred meter     LORazepam 0.5 MG tablet  Commonly known as: ATIVAN  Take 1 tablet (0.5 mg total) by mouth every 6 (six) hours as needed for Anxiety.     metFORMIN 500 MG ER 24hr tablet  Commonly known as: GLUCOPHAGE-XR  Take 1 tablet (500 mg total) by mouth daily with breakfast.     MOTEGRITY 2 mg Tab  Generic drug: prucalopride  Take 1 tablet by mouth nightly.     nicotine 21 mg/24 hr  Commonly known as: NICODERM CQ  Place 1 patch onto the skin once daily.     ondansetron 4 MG tablet  Commonly known as: ZOFRAN  Take 1 tablet (4 mg total) by mouth every 8 (eight) hours as needed for Nausea.     pantoprazole 40 MG tablet  Commonly known as: PROTONIX  Take 1 tablet (40 mg total) by mouth 2 (two) times daily.     pregabalin 75 MG capsule  Commonly known as: LYRICA  Take  1 capsule (75 mg total) by mouth 2 (two) times daily.     torsemide 20 MG Tab  Commonly known as: DEMADEX  Take 1 tablet (20 mg total) by mouth once daily.     TRELEGY ELLIPTA 200-62.5-25 mcg inhaler  Generic drug: fluticasone-umeclidin-vilanter  Inhale 1 puff into the lungs once daily.     zolpidem 10 mg Tab  Commonly known as: AMBIEN  Take 1 tablet (10 mg total) by mouth nightly as needed (insomnia).         * This list has 2 medication(s) that are the same as other medications prescribed for you. Read the directions carefully, and ask your doctor or other care provider to review them with you.            STOP taking these medications    azithromycin 250 MG tablet  Commonly known as: Z-PATRICK     fluconazole 150 MG Tab  Commonly known as: DIFLUCAN            Indwelling Lines/Drains at time of discharge:   Lines/Drains/Airways     None                 Time spent on the discharge of patient: 40 minutes         Lorenzo Mason MD  Department of Hospital Medicine  [facility]

## 2022-09-18 NOTE — HOSPITAL COURSE
Delores Fox is a 56 y.o.  female who has a past medical history of Acid reflux, COPD (chronic obstructive pulmonary disease), Coronary artery disease, COVID-19, Diabetes mellitus, High cholesterol, Hypertension, and Obese body habitus presented to OSH with right arm weakness that had been present for greater than 24 hrs. Around 2 or 3 pm on Wednesday patient noticed right arm weakness and numbness tingling.  She noticed when she was unable to maintain arm raised when reaching for something and her hand was very weak.  She presented to OSH ER on Thursday around 4 pm.  Head CT was negative and she was transferred to [facility] for neurology consult and further workup. On my exam, the numbness and tingling of right arm has resolved.  She also had slurred speech with the onset of weakness. She has had previous similar episodes that resolved spontaneously and she did not seek medical care. Patient denies change in vision, hearing, fever, cough, congestion, runny nose, chest pain, shortness of breath, palpitations, abdominal pain, diarrhea, constipation, vomiting, dysuria, hematuria, joint pain and back pain.      Interval History:   9/16:  Patient for MRI, awaiting Neurology recommendation. Patient is doing well. No concerns/issues overnight reported by the patient or the nursing staff.     9/17:  MRI showed stroke and patient started on DAPT, echo pending

## 2022-09-18 NOTE — PROGRESS NOTES
[facility]  Neurology Progress Note    Patient Name: Delores Fox   MRN: [id number]  : 1965  TODAY'S DATE: 2022  ADMIT DATE: 2022  2:47 AM                                          CONSULTED PROVIDER: Bela Stone MD, Neurologist. Cellphone: [phone]  CONSULT REQUESTED BY: Lorenzo Mason MD     CC: right sided weakness and numbness    HPI per EMR:  Delores Fox is a 56 y.o.  female who has a past medical history of Acid reflux, COPD (chronic obstructive pulmonary disease), Coronary artery disease, COVID-19, Diabetes mellitus, High cholesterol, Hypertension, and Obese body habitus presented to OSH with right arm weakness that had been present for greater than 24 hrs. Around 2 or 3 pm on Wednesday patient noticed right arm weakness and numbness tingling.  She noticed when she was unable to maintain arm raised when reaching for something and her hand was very weak.  She presented to OSH ER on Thursday around 4 pm.  Head CT was negative and she was transferred to [facility] for neurology consult and further workup. On my exam, the numbness and tingling of right arm has resolved.  She also had slurred speech with the onset of weakness. She has had previous similar episodes that resolved spontaneously and she did not seek medical care. Patient denies change in vision, hearing, fever, cough, congestion, runny nose, chest pain, shortness of breath, palpitations, abdominal pain, diarrhea, constipation, vomiting, dysuria, hematuria, joint pain and back pain.     Neurology Consult:  Patient was seen and examined by me today.  She is a 56-year-old woman with history of COPD, tobacco abuse, coronary artery disease, diabetes, hyperlipidemia, hypertension presented with right sided weakness and numbness to outside facility and was then transferred to our facility for higher level of care.  She had been experiencing the symptoms for more than 24 hours when she arrived to outside facility,  so she was not a candidate for tPA administration.  CT brain was negative for bleeding, but CTA did show left cervical carotid occlusion, but reconstituted distally and no intracranial vessel occlusion.  MRI brain demonstrated a left MCA territory stroke, so patient was admitted for management and treatment of stroke.  She also reported some slurred speech at the onset, denies vision loss, left-sided weakness or numbness, chest pain, shortness of breath, gait imbalance or falls.    9/17/22:  Patient was seen and examined by me today.  She reports her right arm weakness has improved.  No new neuro deficits reported, and no acute events overnight.    9/18/22:  Patient was seen and examined by me today. She reports her weakness has continued to improve, and he denies any new neuro deficits. Echo showed severely reduced EF, so she was started on Eliquis and continued on ASA.    Scheduled Meds:   arformoteroL  15 mcg Nebulization BID    aspirin  81 mg Oral Daily    atorvastatin  40 mg Oral Daily    budesonide  1 mg Nebulization Q12H    clopidogreL  75 mg Oral Daily    ipratropium  0.5 mg Nebulization Q6H WAKE    losartan  12.5 mg Oral Daily    metoprolol succinate  12.5 mg Oral Daily    polyethylene glycol  17 g Oral Daily    pregabalin  75 mg Oral BID    torsemide  20 mg Oral Daily     Continuous Infusions:  PRN Meds:.acetaminophen, acetaminophen, dextrose 10%, dextrose 10%, glucagon (human recombinant), glucose, glucose, HYDROcodone-acetaminophen, insulin aspart U-100, melatonin, naloxone, ondansetron, sodium chloride 0.9%    Physical Exam  Current Vitals:  Vitals:    09/18/22 0852   BP:    Pulse: 94   Resp: 20   Temp:        Physical Exam:  General: AO x4  HEENT: PERRL, EOMI  CV: RRR  Lungs: no respiratory distress  Abdomen: soft, non tender    Neurological Exam    MENTAL STATUS EXAM:  Level of alertness: Alert  Level of attention: Attentive w/out deficit  Orientation/Awareness: intact to person, place, time,  situation  Language: mild slurring of speech. Comprehension/repetition/naming intact    CRANIAL NERVE EXAM:  II/III: fundoscopic exam deferred, PERRL; visual fields full to threat  III/IV/VI: EOMI   V: no deficits appreciated to pinprick, temp, vibration; masseter strength intact bilaterally  VII: mild right sided facial droop  VIII: no deficits in hearing bilaterally  IX/X: palate @ ML and raises symmetrically  XI: shoulder shrug 5/5 bilaterally; head turn 5/5 bilaterally  XII: tongue to midline w/out asymmetry    MOTOR EXAM:  Bulk and Tone: normal throughout  Strength is 5/5 proximally and distally in left upper and bilateral lower extremities without deficits. Strength is 4+/5 in right upper extremity proximally and 4/5 distally.     REFLEXES:  Masseter (CN V) Not Tested  1+ in bilateral upper and lower extremities, no Bernstein's, no clonus. Downgoing plantars.    SENSORY EXAM  Light touch intact throughout in upper and lower extremities without deficits.    COORDINATION/CEREBELLAR EXAM:  FTN: no dysmetria or other signs of appendicular ataxia  HTS: No evidence of appendicular ataxia    GAIT:  Deferred for safety.          Laboratory Data & Studies    Recent Labs   Lab 09/17/22 0727 09/18/22 0417   WBC 8.12 8.61   HGB 14.1 13.4    302   MCV 93 92         Recent Labs   Lab 09/17/22 0727 09/18/22 0417    135*   K 4.0 3.9    100   CO2 30* 31*   BUN 16 19   * 199*   CALCIUM 8.1* 8.6*   MG 1.9 2.0   PHOS 3.4 3.5         Recent Labs   Lab 09/17/22 0727 09/18/22 0417   PROT 6.4 6.2   ALBUMIN 3.5 3.5   BILITOT 0.9 0.6   AST 15 14   ALT 15 13   ALKPHOS 122 111         No results for input(s): LABPT, INR, APTT in the last 168 hours.    Recent Labs   Lab 09/16/22  0505   HGBA1C 9.9*   CHOL 189   TRIG 127   LDLCALC 130.6   HDL 33*   TSH 3.600         Microbiology:  Microbiology Results (last 7 days)       ** No results found for the last 168 hours. **            Imaging:  No results  found.    Assessment:    Left MCA territory acute ischemic stroke - etiology cryptogenic with more than 1 possible cause (large vessel disease versus cardioembolic)  Left cervical carotid occlusion - likely chronic  56-year-old woman with history of COPD, tobacco abuse, coronary artery disease, diabetes, hyperlipidemia, hypertension presented with right sided weakness and numbness to outside facility and was then transferred to our facility for higher level of care. NIHSS is 3. CTA showed a left cervical carotid occlusion, but with distal reconstitution and no intracranial LVO. MRI brain demonstrated a left MCA territory stroke, so patient was admitted for management and treatment of stroke.      Stroke workup:  CTH:  No acute intracranial abnormality  CTA head and neck:  Left cervical carotid occlusion, distal reconstitution and no intracranial LVO  MRI brain:  Left MCA territory acute ischemic stroke  Risk factors: A1C, TSH, LDL  Echocardiogram:  moderately decreased LVSF witht EF 30%  Carotid ultrasound:  Ordered and pending    Plan:  - Admitted to hospital medicine with q4 hour neuro checks, on telemetry, continuous pulse oximetry  - Diet as per SLP.  - Secondary stroke prevention with ASA 81 mg daily, eliquis 5 mg BID and atorvastatin 40 mg daily.  - Risk factor stratification with HgbA1C, Fasting Lipid profile, TSH  - 2D echocardiogram as above  - Maintain Euthermia with Tylenol prn temp > 37.2 degrees C.  - Assessment for rehab with PT/OT/SLP evaluation and treatment.  - Permissive HTN systolic BP goal up to 220, diastolic up to 110 for 24H, then lower BP slowly to normotension  - Cardiac enzymes and EKG   - Smoking counseling and offer assistance with smoking cessation (possible nicotine patch)  - Will follow along    Patient was counseled in stroke signs, symptoms and risk factors. She was instructed to call 911 immediately if experiencing acute neurological deficits.  Patient counseled in healthy diet and  physical activity. Importance of medication adherence and follow-up were emphasized.    DVT prophylaxis with chemo/SCD prophylaxis  Extensively discussed lifestyle modifications as prophylactic measures for stroke prevention including smoking cessation, adequate blood pressure management, healthy diet and regular exercise.    [facility] [phone] within 3 days from discharge.     Stroke education was provided including stroke risk factors modification and any acute neurological changes including weakness, confusion, visual changes to come straight to the ER.     All questions were answered.                              Thank you kindly for including us in the care of this patient. Please do not hesitate to contact us with any questions.      42 minutes of care time has been spent evaluating with the patient. Time includes chart review not limited to diagnostic imaging, labs, and vitals, patient assessment, discussion with family and nursing, current order evaluations, and new order entries.      Bela Stone MD  Neurology

## 2022-09-18 NOTE — PLAN OF CARE
Problem: Adult Inpatient Plan of Care  Goal: Plan of Care Review  Outcome: Ongoing, Progressing  Goal: Patient-Specific Goal (Individualized)  Outcome: Ongoing, Progressing  Goal: Absence of Hospital-Acquired Illness or Injury  Outcome: Ongoing, Progressing  Goal: Optimal Comfort and Wellbeing  Outcome: Ongoing, Progressing  Goal: Readiness for Transition of Care  Outcome: Ongoing, Progressing     Problem: Adjustment to Illness (Stroke, Ischemic/Transient Ischemic Attack)  Goal: Optimal Coping  Outcome: Ongoing, Progressing     Problem: Cerebral Tissue Perfusion (Stroke, Ischemic/Transient Ischemic Attack)  Goal: Optimal Cerebral Tissue Perfusion  Outcome: Ongoing, Progressing

## 2022-09-18 NOTE — PLAN OF CARE
09/18/22 0852   Patient Assessment/Suction   Level of Consciousness (AVPU) alert   Respiratory Effort Unlabored;Normal   Expansion/Accessory Muscles/Retractions no use of accessory muscles   All Lung Fields Breath Sounds coarse;diminished   Rhythm/Pattern, Respiratory unlabored   Cough Frequency infrequent   Cough Type good;nonproductive   PRE-TX-O2   O2 Device (Oxygen Therapy) room air   SpO2 (!) 94 %   Pulse Oximetry Type Intermittent   $ Pulse Oximetry - Multiple Charge Pulse Oximetry - Multiple   Pulse 94   Resp 20   Aerosol Therapy   $ Aerosol Therapy Charges Aerosol Treatment   Daily Review of Necessity (SVN) completed   Respiratory Treatment Status (SVN) given   Treatment Route (SVN) mask;oxygen   Patient Position (SVN) semi-Bautista's   Post Treatment Assessment (SVN) breath sounds improved   Signs of Intolerance (SVN) none   Breath Sounds Post-Respiratory Treatment   Throughout All Fields Post-Treatment All Fields   Throughout All Fields Post-Treatment aeration increased   Post-treatment Heart Rate (beats/min) 91   Post-treatment Resp Rate (breaths/min) 20   Education   $ Education 15 min;Bronchodilator   Respiratory Evaluation   $ Care Plan Tech Time 15 min   $ Eval/Re-eval Charges Re-evaluation

## 2022-09-19 ENCOUNTER — TELEPHONE (OUTPATIENT)
Dept: CARDIOLOGY | Facility: CLINIC | Age: 57
End: 2022-09-19
Payer: MEDICAID

## 2022-09-19 LAB — GLUCOSE SERPL-MCNC: 196 MG/DL (ref 70–110)

## 2022-09-19 NOTE — PT/OT/SLP DISCHARGE
Occupational Therapy Discharge Summary    Delores Fox  MRN: [id number]   Principal Problem: Stroke      Patient Discharged from acute Occupational Therapy on 9/18/22.  Please refer to prior OT note dated 9/16/22 for functional status.    Assessment:      Patient appropriate for care in another setting.    Objective:     GOALS:   Multidisciplinary Problems       Occupational Therapy Goals          Problem: Occupational Therapy    Goal Priority Disciplines Outcome Interventions   Occupational Therapy Goal     OT, PT/OT     Description: Goals to be met by: 10/15/22     Patient will increase functional independence with ADLs by performing:    Feeding with Modified [place].  Grooming while standing at sink with Modified [place].                         Reasons for Discontinuation of Therapy Services  Transfer to alternate level of care.      Plan:     Patient Discharged to:  Home. OPOT recommended by OT.    9/19/2022

## 2022-09-19 NOTE — TELEPHONE ENCOUNTER
----- Message from Earl Mooney MA sent at 9/19/2022  9:25 AM CDT -----  Contact: ALMA LARSON [[id number]]  Type: Needs Medical Advice    Who Called: ALMA LARSON [[id number]]   Best Call Back Number: [phone]  Inquiry/Question: Please call ALMA LARSON [[id number]] regarding upcoming canceled appt       Thank you~

## 2022-09-26 ENCOUNTER — PATIENT MESSAGE (OUTPATIENT)
Dept: ADMINISTRATIVE | Facility: HOSPITAL | Age: 57
End: 2022-09-26
Payer: MEDICAID

## 2022-09-27 ENCOUNTER — LAB VISIT (OUTPATIENT)
Dept: LAB | Facility: HOSPITAL | Age: 57
End: 2022-09-27
Attending: STUDENT IN AN ORGANIZED HEALTH CARE EDUCATION/TRAINING PROGRAM
Payer: MEDICAID

## 2022-09-27 ENCOUNTER — OFFICE VISIT (OUTPATIENT)
Dept: FAMILY MEDICINE | Facility: CLINIC | Age: 57
End: 2022-09-27
Payer: MEDICAID

## 2022-09-27 VITALS
WEIGHT: 231.94 LBS | DIASTOLIC BLOOD PRESSURE: 64 MMHG | BODY MASS INDEX: 36.4 KG/M2 | HEART RATE: 90 BPM | OXYGEN SATURATION: 93 % | RESPIRATION RATE: 15 BRPM | HEIGHT: 67 IN | TEMPERATURE: 98 F | SYSTOLIC BLOOD PRESSURE: 122 MMHG

## 2022-09-27 DIAGNOSIS — K31.83 ACHLORHYDRIA: ICD-10-CM

## 2022-09-27 DIAGNOSIS — I70.0 ATHEROSCLEROSIS OF AORTA: ICD-10-CM

## 2022-09-27 DIAGNOSIS — R53.83 FATIGUE, UNSPECIFIED TYPE: ICD-10-CM

## 2022-09-27 DIAGNOSIS — E66.01 SEVERE OBESITY WITH BODY MASS INDEX (BMI) OF 35.0 TO 39.9 WITH COMORBIDITY: ICD-10-CM

## 2022-09-27 DIAGNOSIS — L97.509 ULCER OF FOOT, UNSPECIFIED LATERALITY, UNSPECIFIED ULCER STAGE: ICD-10-CM

## 2022-09-27 DIAGNOSIS — I25.10 CORONARY ARTERY DISEASE INVOLVING NATIVE CORONARY ARTERY OF NATIVE HEART WITHOUT ANGINA PECTORIS: ICD-10-CM

## 2022-09-27 DIAGNOSIS — E11.69 HYPERLIPIDEMIA ASSOCIATED WITH TYPE 2 DIABETES MELLITUS: ICD-10-CM

## 2022-09-27 DIAGNOSIS — Z87.891 HISTORY OF NICOTINE DEPENDENCE: ICD-10-CM

## 2022-09-27 DIAGNOSIS — Z09 HOSPITAL DISCHARGE FOLLOW-UP: Primary | ICD-10-CM

## 2022-09-27 DIAGNOSIS — Z12.11 COLON CANCER SCREENING: ICD-10-CM

## 2022-09-27 DIAGNOSIS — E11.9 TYPE 2 DIABETES MELLITUS WITHOUT COMPLICATION: ICD-10-CM

## 2022-09-27 DIAGNOSIS — E11.65 TYPE 2 DIABETES MELLITUS WITH HYPERGLYCEMIA, WITHOUT LONG-TERM CURRENT USE OF INSULIN: ICD-10-CM

## 2022-09-27 DIAGNOSIS — J44.9 CHRONIC OBSTRUCTIVE PULMONARY DISEASE, UNSPECIFIED COPD TYPE: Chronic | ICD-10-CM

## 2022-09-27 DIAGNOSIS — R79.9 ABNORMAL FINDING OF BLOOD CHEMISTRY, UNSPECIFIED: ICD-10-CM

## 2022-09-27 DIAGNOSIS — K21.9 GASTROESOPHAGEAL REFLUX DISEASE, UNSPECIFIED WHETHER ESOPHAGITIS PRESENT: ICD-10-CM

## 2022-09-27 DIAGNOSIS — E78.5 HYPERLIPIDEMIA ASSOCIATED WITH TYPE 2 DIABETES MELLITUS: ICD-10-CM

## 2022-09-27 LAB
ESTIMATED AVG GLUCOSE: 214 MG/DL (ref 68–131)
FOLATE SERPL-MCNC: 5.3 NG/ML (ref 4–24)
HBA1C MFR BLD: 9.1 % (ref 4–5.6)

## 2022-09-27 PROCEDURE — 4010F PR ACE/ARB THEARPY RXD/TAKEN: ICD-10-PCS | Mod: CPTII,,, | Performed by: STUDENT IN AN ORGANIZED HEALTH CARE EDUCATION/TRAINING PROGRAM

## 2022-09-27 PROCEDURE — 3046F HEMOGLOBIN A1C LEVEL >9.0%: CPT | Mod: CPTII,,, | Performed by: STUDENT IN AN ORGANIZED HEALTH CARE EDUCATION/TRAINING PROGRAM

## 2022-09-27 PROCEDURE — 99214 PR OFFICE/OUTPT VISIT, EST, LEVL IV, 30-39 MIN: ICD-10-PCS | Mod: S$PBB,,, | Performed by: STUDENT IN AN ORGANIZED HEALTH CARE EDUCATION/TRAINING PROGRAM

## 2022-09-27 PROCEDURE — 3074F PR MOST RECENT SYSTOLIC BLOOD PRESSURE < 130 MM HG: ICD-10-PCS | Mod: CPTII,,, | Performed by: STUDENT IN AN ORGANIZED HEALTH CARE EDUCATION/TRAINING PROGRAM

## 2022-09-27 PROCEDURE — 3078F DIAST BP <80 MM HG: CPT | Mod: CPTII,,, | Performed by: STUDENT IN AN ORGANIZED HEALTH CARE EDUCATION/TRAINING PROGRAM

## 2022-09-27 PROCEDURE — 99214 OFFICE O/P EST MOD 30 MIN: CPT | Mod: PBBFAC,PO | Performed by: STUDENT IN AN ORGANIZED HEALTH CARE EDUCATION/TRAINING PROGRAM

## 2022-09-27 PROCEDURE — 4010F ACE/ARB THERAPY RXD/TAKEN: CPT | Mod: CPTII,,, | Performed by: STUDENT IN AN ORGANIZED HEALTH CARE EDUCATION/TRAINING PROGRAM

## 2022-09-27 PROCEDURE — 99999 PR PBB SHADOW E&M-EST. PATIENT-LVL IV: CPT | Mod: PBBFAC,,, | Performed by: STUDENT IN AN ORGANIZED HEALTH CARE EDUCATION/TRAINING PROGRAM

## 2022-09-27 PROCEDURE — 3078F PR MOST RECENT DIASTOLIC BLOOD PRESSURE < 80 MM HG: ICD-10-PCS | Mod: CPTII,,, | Performed by: STUDENT IN AN ORGANIZED HEALTH CARE EDUCATION/TRAINING PROGRAM

## 2022-09-27 PROCEDURE — 84425 ASSAY OF VITAMIN B-1: CPT | Performed by: STUDENT IN AN ORGANIZED HEALTH CARE EDUCATION/TRAINING PROGRAM

## 2022-09-27 PROCEDURE — 3008F BODY MASS INDEX DOCD: CPT | Mod: CPTII,,, | Performed by: STUDENT IN AN ORGANIZED HEALTH CARE EDUCATION/TRAINING PROGRAM

## 2022-09-27 PROCEDURE — 3066F PR DOCUMENTATION OF TREATMENT FOR NEPHROPATHY: ICD-10-PCS | Mod: CPTII,,, | Performed by: STUDENT IN AN ORGANIZED HEALTH CARE EDUCATION/TRAINING PROGRAM

## 2022-09-27 PROCEDURE — 1111F PR DISCHARGE MEDS RECONCILED W/ CURRENT OUTPATIENT MED LIST: ICD-10-PCS | Mod: CPTII,,, | Performed by: STUDENT IN AN ORGANIZED HEALTH CARE EDUCATION/TRAINING PROGRAM

## 2022-09-27 PROCEDURE — 3061F NEG MICROALBUMINURIA REV: CPT | Mod: CPTII,,, | Performed by: STUDENT IN AN ORGANIZED HEALTH CARE EDUCATION/TRAINING PROGRAM

## 2022-09-27 PROCEDURE — 99214 OFFICE O/P EST MOD 30 MIN: CPT | Mod: S$PBB,,, | Performed by: STUDENT IN AN ORGANIZED HEALTH CARE EDUCATION/TRAINING PROGRAM

## 2022-09-27 PROCEDURE — 3008F PR BODY MASS INDEX (BMI) DOCUMENTED: ICD-10-PCS | Mod: CPTII,,, | Performed by: STUDENT IN AN ORGANIZED HEALTH CARE EDUCATION/TRAINING PROGRAM

## 2022-09-27 PROCEDURE — 1111F DSCHRG MED/CURRENT MED MERGE: CPT | Mod: CPTII,,, | Performed by: STUDENT IN AN ORGANIZED HEALTH CARE EDUCATION/TRAINING PROGRAM

## 2022-09-27 PROCEDURE — 3061F PR NEG MICROALBUMINURIA RESULT DOCUMENTED/REVIEW: ICD-10-PCS | Mod: CPTII,,, | Performed by: STUDENT IN AN ORGANIZED HEALTH CARE EDUCATION/TRAINING PROGRAM

## 2022-09-27 PROCEDURE — 3066F NEPHROPATHY DOC TX: CPT | Mod: CPTII,,, | Performed by: STUDENT IN AN ORGANIZED HEALTH CARE EDUCATION/TRAINING PROGRAM

## 2022-09-27 PROCEDURE — 99999 PR PBB SHADOW E&M-EST. PATIENT-LVL IV: ICD-10-PCS | Mod: PBBFAC,,, | Performed by: STUDENT IN AN ORGANIZED HEALTH CARE EDUCATION/TRAINING PROGRAM

## 2022-09-27 PROCEDURE — 82607 VITAMIN B-12: CPT | Performed by: STUDENT IN AN ORGANIZED HEALTH CARE EDUCATION/TRAINING PROGRAM

## 2022-09-27 PROCEDURE — 1159F MED LIST DOCD IN RCRD: CPT | Mod: CPTII,,, | Performed by: STUDENT IN AN ORGANIZED HEALTH CARE EDUCATION/TRAINING PROGRAM

## 2022-09-27 PROCEDURE — 36415 COLL VENOUS BLD VENIPUNCTURE: CPT | Mod: PO | Performed by: STUDENT IN AN ORGANIZED HEALTH CARE EDUCATION/TRAINING PROGRAM

## 2022-09-27 PROCEDURE — 82746 ASSAY OF FOLIC ACID SERUM: CPT | Performed by: STUDENT IN AN ORGANIZED HEALTH CARE EDUCATION/TRAINING PROGRAM

## 2022-09-27 PROCEDURE — 3046F PR MOST RECENT HEMOGLOBIN A1C LEVEL > 9.0%: ICD-10-PCS | Mod: CPTII,,, | Performed by: STUDENT IN AN ORGANIZED HEALTH CARE EDUCATION/TRAINING PROGRAM

## 2022-09-27 PROCEDURE — 1159F PR MEDICATION LIST DOCUMENTED IN MEDICAL RECORD: ICD-10-PCS | Mod: CPTII,,, | Performed by: STUDENT IN AN ORGANIZED HEALTH CARE EDUCATION/TRAINING PROGRAM

## 2022-09-27 PROCEDURE — 83036 HEMOGLOBIN GLYCOSYLATED A1C: CPT | Performed by: STUDENT IN AN ORGANIZED HEALTH CARE EDUCATION/TRAINING PROGRAM

## 2022-09-27 PROCEDURE — 3074F SYST BP LT 130 MM HG: CPT | Mod: CPTII,,, | Performed by: STUDENT IN AN ORGANIZED HEALTH CARE EDUCATION/TRAINING PROGRAM

## 2022-09-27 PROCEDURE — 83921 ORGANIC ACID SINGLE QUANT: CPT | Performed by: STUDENT IN AN ORGANIZED HEALTH CARE EDUCATION/TRAINING PROGRAM

## 2022-09-27 RX ORDER — LOSARTAN POTASSIUM 25 MG/1
12.5 TABLET ORAL DAILY
Qty: 45 TABLET | Refills: 3 | Status: SHIPPED | OUTPATIENT
Start: 2022-09-27 | End: 2022-10-03 | Stop reason: SDUPTHER

## 2022-09-27 RX ORDER — MUPIROCIN 20 MG/G
OINTMENT TOPICAL 3 TIMES DAILY
Qty: 22 G | Refills: 1 | Status: SHIPPED | OUTPATIENT
Start: 2022-09-27 | End: 2022-10-05

## 2022-09-27 RX ORDER — PREGABALIN 100 MG/1
100 CAPSULE ORAL 3 TIMES DAILY
COMMUNITY
Start: 2022-09-20 | End: 2022-12-05 | Stop reason: SDUPTHER

## 2022-09-27 RX ORDER — MICONAZOLE NITRATE 2 %
2 CREAM (GRAM) TOPICAL
Qty: 100 EACH | Refills: 4 | Status: SHIPPED | OUTPATIENT
Start: 2022-09-27 | End: 2022-10-03 | Stop reason: SDUPTHER

## 2022-09-27 RX ORDER — PANTOPRAZOLE SODIUM 40 MG/1
40 TABLET, DELAYED RELEASE ORAL 2 TIMES DAILY
Qty: 180 TABLET | Refills: 3 | Status: SHIPPED | OUTPATIENT
Start: 2022-09-27 | End: 2022-10-03 | Stop reason: SDUPTHER

## 2022-09-27 RX ORDER — ALBUTEROL SULFATE 90 UG/1
2 AEROSOL, METERED RESPIRATORY (INHALATION) EVERY 6 HOURS PRN
Qty: 18 G | Refills: 2 | Status: SHIPPED | OUTPATIENT
Start: 2022-09-27 | End: 2022-12-05

## 2022-09-27 RX ORDER — METOPROLOL SUCCINATE 25 MG/1
12.5 TABLET, EXTENDED RELEASE ORAL DAILY
Qty: 45 TABLET | Refills: 3 | Status: SHIPPED | OUTPATIENT
Start: 2022-09-27 | End: 2022-10-03 | Stop reason: SDUPTHER

## 2022-09-27 RX ORDER — METFORMIN HYDROCHLORIDE 500 MG/1
500 TABLET, EXTENDED RELEASE ORAL
Qty: 90 TABLET | Refills: 3 | Status: SHIPPED | OUTPATIENT
Start: 2022-09-27 | End: 2022-10-03 | Stop reason: SDUPTHER

## 2022-09-27 RX ORDER — BUPROPION HYDROCHLORIDE 150 MG/1
150 TABLET ORAL DAILY
Qty: 30 TABLET | Refills: 2 | Status: SHIPPED | OUTPATIENT
Start: 2022-09-27 | End: 2022-10-16 | Stop reason: SDUPTHER

## 2022-09-27 NOTE — PROGRESS NOTES
[facility] Note    Subjective:    The HPI and pertinent ROS is included in the Diagnostic Impression Remarks section at the end of the note. Please see below for further details. Chief complaint is at end of note.     Delores is a pleasant intelligent patient who is here for evaluation.     Modified Medications    Modified Medication Previous Medication    ALBUTEROL (PROVENTIL/VENTOLIN HFA) 90 MCG/ACTUATION INHALER albuterol (PROVENTIL/VENTOLIN HFA) 90 mcg/actuation inhaler       Inhale 2 puffs into the lungs every 6 (six) hours as needed for Wheezing or Shortness of Breath. Rescue    Inhale 2 puffs into the lungs every 6 (six) hours as needed for Wheezing or Shortness of Breath. Rescue    LOSARTAN (COZAAR) 25 MG TABLET losartan (COZAAR) 25 MG tablet       Take 0.5 tablets (12.5 mg total) by mouth once daily.    Take 0.5 tablets (12.5 mg total) by mouth once daily.    METFORMIN (GLUCOPHAGE-XR) 500 MG ER 24HR TABLET metFORMIN (GLUCOPHAGE-XR) 500 MG ER 24hr tablet       Take 1 tablet (500 mg total) by mouth daily with breakfast.    Take 1 tablet (500 mg total) by mouth daily with breakfast.    METOPROLOL SUCCINATE (TOPROL-XL) 25 MG 24 HR TABLET metoprolol succinate (TOPROL-XL) 25 MG 24 hr tablet       Take 0.5 tablets (12.5 mg total) by mouth once daily.    Take 0.5 tablets (12.5 mg total) by mouth once daily.    PANTOPRAZOLE (PROTONIX) 40 MG TABLET pantoprazole (PROTONIX) 40 MG tablet       Take 1 tablet (40 mg total) by mouth 2 (two) times daily.    Take 1 tablet (40 mg total) by mouth 2 (two) times daily.       Data reviewed 274}  Previous medical records reviewed and summarized in plan section at end of note.      If you are due for any health screening(s) below please notify me so we can arrange them to be ordered and scheduled to maintain your health. Most healthy patients at your age complete them, but you are free to accept or refuse.      Tests to Keep You Healthy    Mammogram: Met on  4/28/2022  Eye Exam: Met on 11/4/2021  Colon Cancer Screening: ORDERED  Last Blood Pressure <= 139/89 (9/27/2022): Yes  Last HbA1c < 8 (09/16/2022): NO  Tobacco Cessation: Yes      The following portions of the patient's history were reviewed and updated as appropriate: allergies, current medications, past family history, past medical history, past social history, past surgical history and problem list.    She  has a past medical history of Acid reflux, COPD (chronic obstructive pulmonary disease), Coronary artery disease, COVID-19, Diabetes mellitus, High cholesterol, Hypertension, and Obese body habitus.  She  has a past surgical history that includes Catheterization of both left and right heart (N/A, 11/23/2020); Appendectomy; Joint replacement (Left); Mastoidectomy (Right); Excision of lesion of eyelid (Right, 12/11/2020); Cholecystectomy; and Hysterectomy.    She  reports that she quit smoking about 2 weeks ago. Her smoking use included cigarettes. She has a 6.50 pack-year smoking history. She has been exposed to tobacco smoke. She has never used smokeless tobacco. She reports that she does not currently use alcohol. She reports that she does not currently use drugs.  She family history is not on file.    Review of patient's allergies indicates:   Allergen Reactions    Morphine Nausea And Vomiting    Sulfa (sulfonamide antibiotics) Nausea And Vomiting       Tobacco Use: Medium Risk    Smoking Tobacco Use: Former    Smokeless Tobacco Use: Never    Passive Exposure: Current     Physical Examination  General appearance: alert, cooperative, no distress  Neck: no thyromegaly, no neck stiffness  Lungs: clear to auscultation, no wheezes, rales or rhonchi, symmetric air entry  Heart: normal rate, regular rhythm, normal S1, S2, no murmurs, rubs, clicks or gallops  Abdomen: soft, nontender, nondistended, no rigidity, rebound, or guarding.   Back: no point tenderness over spine  Extremities: peripheral pulses normal, no  "unilateral leg swelling or calf tenderness   Neurological:alert, oriented, normal speech, right extremity weakness     BP Readings from Last 3 Encounters:   09/27/22 122/64   09/18/22 119/72   09/15/22 133/74     Wt Readings from Last 3 Encounters:   09/27/22 105.2 kg (231 lb 14.8 oz)   09/17/22 107.3 kg (236 lb 9.6 oz)   09/17/22 107.3 kg (236 lb 8.9 oz)     /64 (BP Location: Right arm, Patient Position: Sitting, BP Method: Large (Manual))   Pulse 90   Temp 98.1 °F (36.7 °C) (Oral)   Resp 15   Ht 5' 7" (1.702 m)   Wt 105.2 kg (231 lb 14.8 oz)   SpO2 (!) 93%   BMI 36.32 kg/m²    274}  Laboratory: I have reviewed old labs below:    274}    Lab Results   Component Value Date    WBC 8.61 09/18/2022    HGB 13.4 09/18/2022    HCT 41.4 09/18/2022    MCV 92 09/18/2022     09/18/2022     (L) 09/18/2022    K 3.9 09/18/2022     09/18/2022    CALCIUM 8.6 (L) 09/18/2022    PHOS 3.5 09/18/2022    CO2 31 (H) 09/18/2022     (H) 09/18/2022    BUN 19 09/18/2022    CREATININE 1.1 09/18/2022    ANIONGAP 4 (L) 09/18/2022    PROT 6.2 09/18/2022    ALBUMIN 3.5 09/18/2022    BILITOT 0.6 09/18/2022    ALKPHOS 111 09/18/2022    ALT 13 09/18/2022    AST 14 09/18/2022    INR 1.0 04/04/2022    CHOL 189 09/16/2022    TRIG 127 09/16/2022    HDL 33 (L) 09/16/2022    LDLCALC 130.6 09/16/2022    TSH 3.600 09/16/2022    HGBA1C 9.9 (H) 09/16/2022     Lab reviewed by me: Particular labs of significance that I will monitor, workup, or treat to improve are mentioned below in diagnostic impression remarks.    Imaging/EKG: I have reviewed the pertinent results and my findings are noted in remarks.  274}    CC:   Chief Complaint   Patient presents with    Follow-up    Hypertension        274}    Assessment/Plan  Delores Fox is a 56 y.o. female who presents to clinic with:  1. Hospital discharge follow-up    2. Type 2 diabetes mellitus with hyperglycemia, without long-term current use of insulin    3. " "Gastroesophageal reflux disease, unspecified whether esophagitis present    4. Coronary artery disease involving native coronary artery of native heart without angina pectoris    5. Chronic obstructive pulmonary disease, unspecified COPD type    6. History of nicotine dependence    7. Fatigue, unspecified type    8. Hyperlipidemia associated with type 2 diabetes mellitus    9. Abnormal finding of blood chemistry, unspecified    10. Achlorhydria    11. Colon cancer screening    12. Ulcer of foot, unspecified laterality, unspecified ulcer stage    13. Severe obesity with body mass index (BMI) of 35.0 to 39.9 with comorbidity    14. Atherosclerosis of aorta       274}  Diagnostic Impression Remarks + HPI     Documentation entered by me for this encounter may have been done in part using speech-recognition technology. Although I have made an effort to ensure accuracy, "sound like" errors may exist and should be interpreted in context.     Hospital follow-up-patient had a stroke so some right upper extremity weakness no worse weakness or numbness stable she stop smoking which excellent  Diabetes needs improvement will send in G LP 1 agonist to work on further improvement continue metformin recommend healthy diet exercise monitor     History nicotine dependence-we will send in nicotine gum and Wellbutrin to stay off smoking     Fatigue- likely multifactorial some additional blood work   COPD-stable.  She stop smoking continue current inhalers monitor     CAD-stable continue current medications no chest pain no shortness of breath currently monitor improve diabetes    Foot ulcer-superficial ulcer on the right foot no drainage no fever will send in topical to prevent infection recommended keep covered    Hyperlipidemia-well controlled continue current meds    Hypertension well controlled continue current medications    Severe obesity with comorbidity-Hypertension to which the severe obesity is a contributing factor. This is " consistent with the definition of severe obesity with comorbidity. The patient's severe obesity was monitored, evaluated, addressed and/or treated. Diet and exercise recommended see counseling section for further info.     Atherosclerosis of aorta- stable continue statin and rec healthy diet monitor follow lipid levels     Venita is recommend colon cancer screen shows when he had a colonoscopy will send in color guard no family history  This is the extent of this pleasant patient's concerns at this present time. She did not feel chest pain upon exertion, dyspnea, nausea, vomiting, diaphoresis, or syncope. No pleuritic chest pain, unilateral leg swelling, calf tenderness, or calf pain. Negative for unintentional weight loss night sweats and fevers. Delores will return to clinic in a few months for further workup and reassessment or sooner as needed. She was instructed to call the clinic or go to the emergency department if her symptoms do not improve, worsens, or if new symptoms develop. As we discussed that symptoms could worsen over the next 24 hours she was advised that if any increased swelling, pain, or numbness arise to go immediately to the ED. Patient knows to call any time if an emergency arises. Shared decision making occurred and she verbalized understanding in agreement with this plan. I discussed imaging findings, diagnosis, possibilities, treatment options, medications, risks, and benefits. She had many questions regarding the options and long-term effects. All questions were answered. She expressed understanding after counseling regarding the diagnosis and recommendations. She was capable and demonstrated competence with understanding of these options. Shared decision making was performed resulting in her choosing the current treatment plan. Patient handout was given with instructions and recommendations. Advised the patient that if they become pregnant to alert us immediately to assess for medication  changes. I also discussed the importance of close follow up to discuss labs, change or modify her medications if needed, monitor side effects, and further evaluation of medical problems.     Additional workup planned: see labs ordered below.    See below for labs and meds ordered with associated diagnosis      1. Type 2 diabetes mellitus with hyperglycemia, without long-term current use of insulin  - metFORMIN (GLUCOPHAGE-XR) 500 MG ER 24hr tablet; Take 1 tablet (500 mg total) by mouth daily with breakfast.  Dispense: 90 tablet; Refill: 3    2. Gastroesophageal reflux disease, unspecified whether esophagitis present  - pantoprazole (PROTONIX) 40 MG tablet; Take 1 tablet (40 mg total) by mouth 2 (two) times daily.  Dispense: 180 tablet; Refill: 3    3. Hospital discharge follow-up    4. Coronary artery disease involving native coronary artery of native heart without angina pectoris    5. Chronic obstructive pulmonary disease, unspecified COPD type  - albuterol (PROVENTIL/VENTOLIN HFA) 90 mcg/actuation inhaler; Inhale 2 puffs into the lungs every 6 (six) hours as needed for Wheezing or Shortness of Breath. Rescue  Dispense: 18 g; Refill: 2    6. History of nicotine dependence  - buPROPion (WELLBUTRIN XL) 150 MG TB24 tablet; Take 1 tablet (150 mg total) by mouth once daily.  Dispense: 30 tablet; Refill: 2  - nicotine, polacrilex, (NICORETTE) 2 mg Gum; Take 1 each (2 mg total) by mouth as needed (smoking urge).  Dispense: 100 each; Refill: 4    7. Fatigue, unspecified type  - buPROPion (WELLBUTRIN XL) 150 MG TB24 tablet; Take 1 tablet (150 mg total) by mouth once daily.  Dispense: 30 tablet; Refill: 2    8. Hyperlipidemia associated with type 2 diabetes mellitus    9. Abnormal finding of blood chemistry, unspecified  - Vitamin B12 Deficiency Panel; Future  - Folate; Future  - VITAMIN B1; Future    10. Achlorhydria  - Vitamin B12 Deficiency Panel; Future  - Folate; Future  - VITAMIN B1; Future    11. Colon cancer  screening  - Cologuard Screening (Multitarget Stool DNA); Future  - Cologuard Screening (Multitarget Stool DNA)    12. Ulcer of foot, unspecified laterality, unspecified ulcer stage  - mupirocin (BACTROBAN) 2 % ointment; Apply topically 3 (three) times daily for 7 days  Dispense: 22 g; Refill: 1    13. Severe obesity with body mass index (BMI) of 35.0 to 39.9 with comorbidity    14. Atherosclerosis of aorta      Tomy Rios MD   274}    If you are due for any health screening(s) below please notify me so we can arrange them to be ordered and scheduled to maintain your health. Most healthy patients at your age complete them, but you are free to accept or refuse.   Tests to Keep You Healthy    Mammogram: Met on 4/28/2022  Eye Exam: Met on 11/4/2021  Colon Cancer Screening: ORDERED  Last Blood Pressure <= 139/89 (9/27/2022): Yes  Last HbA1c < 8 (09/16/2022): NO  Tobacco Cessation: Yes

## 2022-09-28 ENCOUNTER — PATIENT MESSAGE (OUTPATIENT)
Dept: FAMILY MEDICINE | Facility: CLINIC | Age: 57
End: 2022-09-28
Payer: MEDICAID

## 2022-09-28 ENCOUNTER — TELEPHONE (OUTPATIENT)
Dept: FAMILY MEDICINE | Facility: CLINIC | Age: 57
End: 2022-09-28
Payer: MEDICAID

## 2022-09-28 ENCOUNTER — CLINICAL SUPPORT (OUTPATIENT)
Dept: REHABILITATION | Facility: HOSPITAL | Age: 57
End: 2022-09-28
Payer: MEDICAID

## 2022-09-28 DIAGNOSIS — I63.9 STROKE: ICD-10-CM

## 2022-09-28 DIAGNOSIS — I63.9 CEREBROVASCULAR ACCIDENT (CVA), UNSPECIFIED MECHANISM: Primary | ICD-10-CM

## 2022-09-28 DIAGNOSIS — Z78.9 IMPAIRED INSTRUMENTAL ACTIVITIES OF DAILY LIVING (IADL): ICD-10-CM

## 2022-09-28 DIAGNOSIS — E11.65 TYPE 2 DIABETES MELLITUS WITH HYPERGLYCEMIA, WITHOUT LONG-TERM CURRENT USE OF INSULIN: Primary | ICD-10-CM

## 2022-09-28 DIAGNOSIS — I69.398 LACK OF COORDINATION AS LATE EFFECT OF CEREBROVASCULAR ACCIDENT (CVA): ICD-10-CM

## 2022-09-28 DIAGNOSIS — R27.9 LACK OF COORDINATION AS LATE EFFECT OF CEREBROVASCULAR ACCIDENT (CVA): ICD-10-CM

## 2022-09-28 DIAGNOSIS — R68.89 IMPAIRED FUNCTION OF UPPER EXTREMITY: ICD-10-CM

## 2022-09-28 PROCEDURE — 97165 OT EVAL LOW COMPLEX 30 MIN: CPT | Mod: PN

## 2022-09-28 NOTE — PROGRESS NOTES
Occupational Therapy   Evaluation    Delores Fox   MRN: [id number]     OT eval complete. Please see attached POC for details. Thank you for consult!    GERARDO Salas, LOTR   9/28/2022

## 2022-09-28 NOTE — PROGRESS NOTES
Please let the patient know that she had a slight improvement her diabetes but still uncontrolled and recommend for her to start taking the medication as sent to her pharmacy and we can recheck an A1c in 3 months.  Also placed referral for endocrinology as well to help out.  Please make an appointment with an AP P in 3 months if the weight for endocrinology is too long said taking over her repeat blood work for diabetes thanks

## 2022-09-28 NOTE — TELEPHONE ENCOUNTER
----- Message from Demetria Feng OT sent at 9/28/2022  2:01 PM CDT -----  Regarding: ST evaluation  Dear Dr. Rios,  Ms. Fox complains of slurred speech since her CVA. While I did not particularly note that during today's evaluation, I did note decreased memory and orientation. She would benefit from outpatient Speech Therapy orders if you would be so kind to enter them in the system.     Thank you and please let me know if you have questions or concerns.     -GERARDO Davis LOTR

## 2022-09-28 NOTE — PATIENT INSTRUCTIONS
OCCUPATIONAL THERAPY HOME PROGRAM SUGGESTIONS    *PICK 3-4 ACTIVITIES PER DAY AND ALTERNATE.    GRASP  A. CRUMPLE SHEETS OF PAPER IN HAND. KEEP BALL OF HAND IN ONE PLACE.  B. ALTERNATELY SQUEEZE AND RELEASE: SPONGE, PUTTY, RUBBER BALL, YARN BALL.  C. STACK: PAPER CUPS, CONES, BLOCKS.  D. USE TOOLS: HAMMER, SCREWDRIVER, HOLE , WIRE CUTTERS, LEATHER PUNCH.  E. WRING OUT WET CLOTH    PINCH  A. STATIC PINCH: PAINTING, METAL TOOLING, LEATHER LACING, WRITING, CUTTING, CARRY BOOK OR NEWSPAPER BETWEEN THUMB AND INDEX GAVIN.  B. TIP PINCH: STRING BEADS, POP BEADS, MAKE AN O BY TOUCHING THUMB TO EACH FINGERTIP ONE AT A TIME,  SMALL OBJECTS AS BEAD, NAIL, PIN, TOOTHPICK.  C. PALMAR AND LATERAL PINCH: USE TWEEZERS TO  SMALL OBJECTS, CLIP CLOTHES PINS ON EDGE OF CAN, PEGBOARD GAMES AS IQ, SQUEEZE AND RELEASE PUTTY, TORSTEN ACTIVITIES,  PLACE AND TURN AND RELEASE VARIOUS SIZE AND SHAPED OBJECTS AS SPOOLS OF THREAD, BLOCKS, PEGS, GRASP CARDS ONE AT A TIME TO PALM OF HAND, [id number].    HAND STRENGTHENING  A. ACTIVITES: GARDENING, WEEDING, PIANO, TYPING, WASH DISHES, USE SCISSORS, HOLE .  B. CLIP CLOTHES PINS ON EDGE OF CAN, INTERLANCE FINGERS; TURN PALMS OUT.  C. SQUEEZE AND PULL APART PUTTY (OTHER PUTTY EXERCISES).  D. TAP FINGERS ON TABLE.  E. PALMS TOGETHER, PUSH WEAK FINGERS AGAINST STRONG FINGERS.  F. PLACE KNOTTED TOWEL IN WEAK HAND, PULL IT THROUGH.  G. PALMS FLAT ON TALBE: SPREAD FINGERS APART THEN BRING TOGETHER; RAUSE EACH FINGER OFF TABLE AND LOWER IT; FINGERS BENT FLIP OBJECT BY STGRAIGHTENING FINGERS.  H. RUBBER BAND EXERCISES.  I. WIRE CUTTERS, SCREWDRIVER.  J. SQUIRT GUN.  K. HAND GRIPPER EXERCISES.  L. PRACTICE SHOOTING BEANS OR MARBLES AT A [employer].  M. TIDDLYWINKS, NO TOUCHING THUMBS.    HAND DEXTERITY (AS YOU IMPROVE, TRY TO INCREASE SPEED)  A. ACTIVITIES: TYPING, PIANO, WRITING, JIG-SAW PUZZLES, CHECKERS, CHESS, KNIT, SEW BUTTONS, OTHER FASTENERS, COMPUTERS, ADDING MACHINE,  CALCULATOR.  B.  AND STACK COINS. DO NOT SLIDE TO EDGE OF TABLE.  C.  INDIVIDUALLY AND RELEASE IN CONTINTER: MARBLES, NAILS, STRAIGHT PINS.  D. HOLD HANDFUL OF COINS (POKER CHIPS) AND SLIDE OFF FINGERTIP ONE AT A TIME.  E. TWIST NUTS AND BOLTS.  F. TAP FINGERS ON TABLE.  G. SHUFFLE CARDS, DEAL THEM,  ONE AT A TIME, PLAY CARD CAMES.  H. CHAIN PAPER CLIPS TOGETHER.  I. TURN PENCILS IN FINGERS.  J. TIE KNOTS, BRAID WITH HEAVY CORD, CATHLEENE  K. STACK PENNIES (50) ROLL COINS.  L. STRING BEADS SEW BUTTONING.  M. WASH DISHES.  N. PICKUP, PLACE AND RELEASE VARIOUS SIZED AND SHAPED OBJECTS: PEGS, DRIED SEEDS, BLOCKS, TOOTHPICKS, SPOOLS OF THREAD.  O. FINGER WALK ACROSS TABLE.  P. FEEDING, DRINKING OUT OF A CUP AND GLASS, ALL FINGER FOODS.  Q. FINGER WARS.  R. PUT 2 BLOCKS IN HAND AND ROTATE THEM AROUND CLOCKWISE (COUNTER-CLOCKWISE IS HARDER) THEN TRY WITH BALLS.    After you have practiced any activity above, begin to time yourself and try to become faster at each task.

## 2022-09-28 NOTE — PLAN OF CARE
"  [facility] Occupational Therapy  Initial Neurological Evaluation     Date: 9/28/2022  Patient: Delores Fox  Chart Number: [id number]    Therapy Diagnosis:   Encounter Diagnoses   Name Primary?    Stroke     Impaired function of upper extremity     Lack of coordination as late effect of cerebrovascular accident (CVA)     Impaired instrumental activities of daily living (IADL)      Physician: Lorenzo Mason MD (referring)  Tomy Rios MD (PCP)    Physician Orders: OT eval & treat  Medical Diagnosis: Stroke  Evaluation Date: 9/28/2022  Progress Notes Due: 10/28/22, 11/25/22  Plan of Care Expiration Period: 12/23/2022  Insurance Authorization period Expiration: 10/28/22  Date of Return to MD: cardiology on 10/11   Visit # / Visits Authorized: 1 / 1  FOTO: to be assessed       Time In:0820  Time Out: 0905  Total Billable (one on one) Time: 45 minutes    Precautions: Standard, Diabetes, and Fall    Subjective     History of Current Condition: "I caught a stroke."  Per H&P: Delores Fox is a 56 y.o.  female who has a past medical history of Acid reflux, COPD (chronic obstructive pulmonary disease), Coronary artery disease, COVID-19, Diabetes mellitus, High cholesterol, Hypertension, and Obese body habitus presented to OSH with right arm weakness that had been present for greater than 24 hrs. Around 2 or 3 pm on Wednesday patient noticed right arm weakness and numbness tingling.  She noticed when she was unable to maintain arm raised when reaching for something and her hand was very weak.  She presented to OSH ER on Thursday around 4 pm.  Head CT was negative and she was transferred to [facility] for neurology consult and further workup. On my exam, the numbness and tingling of right arm has resolved.  She also had slurred speech with the onset of weakness. She has had previous similar episodes that resolved spontaneously and she did not seek medical care. Patient denies change in vision, hearing, " "fever, cough, congestion, runny nose, chest pain, shortness of breath, palpitations, abdominal pain, diarrhea, constipation, vomiting, dysuria, hematuria, joint pain and back pain.     Involved Side: Right  Dominant Side: Right  Date of Onset: 2022  Surgical Procedure: n/a    Imaging:  MRI Brain 2022  IMPRESSION:  1. Multiple areas of acute infarction in the left cerebral hemisphere, with associated gyriform enhancement suggesting luxury perfusion. A follow-up MRI brain without and with contrast 6-8 weeks is recommended to document resolution of enhancement.  2. No significant regional mass effect or midline shift.  3. Chronic microvascular ischemic changes.    Previous Therapy: none per pt; per records, she had OT/Speech Therapy/PT evals in acute care    Patient's Goals for Therapy: be able to complete BADL without help, better speech, be able to do dishes and laundry, return to driving    Pain:  Pain Related Behaviors Observed: yes   Pain with movement  Functional Pain Scale Rating 0-10:   6/10 on average  6/10 at best  8/10 at worst  Location: right arm/hand/shoulder  Description: ache (also complains of numbness in the fingers)  Aggravating Factors: Movement, interfering with sleep  Easing Factors: "nothing"    Occupation:    Pt on disability due to history of hip and neck surgeries.  Home Responsibilities: washing clothes, washing dishes, grocery shopping, driving  Hobbies: TV    Functional Limitations/Social History:    Prior Level of Function: independent with responsibilities as above.    Current Level of Function: difficulty physically managing medications and opening containers  AM-PAC 6 CLICK ADL  How much help from another person does this patient currently need?  1 = Unable, Total/Dependent Assistance  2 = A lot, Maximum/Moderate Assistance  3 = A little, Minimum/Contact Guard/Supervision  4 = None, Modified [place]/Independent    Eating: 3 assist to cut food         Groomin using the " "left hand for brushing teeth & hair        UB Dressing: 3 assist for bra, no help for t-shirt     LB Dressing: 3 help with underwear        Bathing: 3 supervision, seated on shower chair in tub/shower combo        Toiletin mod I using left upper extremity for hygiene  Total:     AM-PAC Raw Score CMS "G-Code Modifier Level of Impairment Assistance   6 % Total / Unable   7 - 9 CM 80 - 100% Maximal Assist   10-14 CL 60 - 80% Moderate Assist   15 - 19 CK 40 - 60% Moderate Assist   20 - 22 CJ 20 - 40% Minimal Assist   23 CI 1-20% SBA / CGA   24 CH 0% Independent/ Mod I     Home/Living environment : Lives with her sister who works  Home Access: no stairs, single story  DME: shower chair, scooter     Driving: not since CVA    Past Medical History/Physical Systems Review:     Past Medical History:  Delores Fox  has a past medical history of Acid reflux, COPD (chronic obstructive pulmonary disease), Coronary artery disease, COVID-19, Diabetes mellitus, High cholesterol, Hypertension, and Obese body habitus.    Past Surgical History:  Delores Fox  has a past surgical history that includes Catheterization of both left and right heart (N/A, 2020); Appendectomy; Joint replacement (Left); Mastoidectomy (Right); Excision of lesion of eyelid (Right, 2020); Cholecystectomy; and Hysterectomy.    Current Medications:  Delores has a current medication list which includes the following prescription(s): albuterol, albuterol, apixaban, aspirin, atorvastatin, blood sugar diagnostic, blood-glucose meter, bupropion, clotrimazole-betamethasone 1-0.05%, trelegy ellipta, lancets, lorazepam, losartan, metformin, metoprolol succinate, motegrity, mupirocin, nicotine (polacrilex), ondansetron, pantoprazole, pregabalin, semaglutide, [START ON 10/28/2022] semaglutide, torsemide, and zolpidem, and the following Facility-Administered Medications: electrolyte-s (ph 7.4), lidocaine (pf) 10 mg/ml (1%), and sodium chloride " "0.9%.    Allergies:  Review of patient's allergies indicates:   Allergen Reactions    Morphine Nausea And Vomiting    Sulfa (sulfonamide antibiotics) Nausea And Vomiting      Objective     Cognitive Exam:  Oriented: person, place, general time, situation  Behaviors: normal, cooperative  Follows Commands/attention: Follows two-step simple commands seated at edge of mat   Communication: Pt complains of slurred speech (minimally noted during eval) and decreased thinking (increased time for processing noted, as well as decreased memory)  Memory: not formally assessed; min deficits noted during conversation and reported by pt  Safety awareness/insight to disability: largely intact  Coping skills/emotional control: Appropriate to situation    Visual/Perceptual: pt reports no visual changes with CVA  Diabetes related vision changes  Tracking: largely wfl, but with involuntary return to center after full range to the right and left  Saccades:  Horizontal: min decr   Vertical: wfl to min decr   Acuity: To be further assessed. Able to read short paragraph with difficulty. Forgot glasses, moving page in and out slightly for focus.  Convergence: 3"  R/L discrimination: intact  Visual field: impaired bilaterally and symmetrically to ~45*  Motor Planning Praxis: grossly intact    Physical Exam:  Postural examination/scapula alignment: Rounded shoulder, Head forward, Posterior pelvic tilt, and Slouched posture; scapular positioning to be assessed  Joint integrity: no concerns  Skin integrity: visible skin intact  Edema: No obvious edema     Joint Evaluation  AROM  9/28/2022 AROM  9/28/2022 PROM   9/28/2022    Left Right Right   Shoulder flex 0-180 wfl 100 tba   Shoulder Abd 0-180 wfl 70 tba   Shoulder Extension 0-80 wfl 30 tba   Functional ER Min decr Mod decr nt   Shoulder ER @0*abd 0-90 nt nt tba   Shoulder ER @45*abd  0-90 nt nt tba   Shoulder ER @90*abd   0-90 nt nt tba   SLIR nt nt tba   Functional IR Min decr Mod decr nt "   Elbow extension/flexion 0-150 wfl wfl nt   Wrist flex 0-80 wfl wfl nt   Wrist ext 0-70 wfl wfl nt   Supination 0-80 wfl wfl nt   Pronation 0-80 wfl wfl nt     Fist: loose right ue    Strength to be assessed.    Gross motor coordination:   DUNCAN (Rapid Alternating Movements): left upper extremity wfl; right upper extremity mod decr  Finger to Nose (5 times): nt  Finger Flicks (coordination moving from digit flexion to digit extension): left upper extremity wfl; right upper extremity max impaired    Old left shoulder injury - mod shoulder hike with reaching  Box and Block Assessment Left Right   9/28/2022 35 15   [norms for women aged 50-54: R=77.7 +/-10.7; L=74.3 +/-9.9 (Luis Manuel et al, 1985)]    Fine motor coordination:   -   Thumb to Finger Opposition: right upper extremity impaired and slow; unable to oppose 5th digit     9 Hole Peg Test (9HPT) Left Right   9/28/2022 43s unable   [norms for women aged 51-55: R=17.38s +/-1.88s: L=18.92s +/-2.29s (Haven et al, 2003)]     Tone:  Modified Jyotsna Scale:    0 - No increase in muscle tone    Sensation:  Delores reports numbness/ tingling in the right upper extremity   Light touch: left intact; right upper extremity min decr     Balance:   Static Sit - GOOD: Takes MODERATE challenges from all directions  Dynamic sit- At least FAIR: Maintains without assist, but unable to take any challenges ; to be further assessed  Static Stand - At least FAIR+: Able to take MINIMAL challenges from all directions; to be further assessed  Dynamic stand - At least FAIR+: Able to take MINIMAL challenges from all directions; to be further assessed    Endurance Deficit: severe                    Functional Status      Functional Mobility:  Mod I functional ambulation waiting room<>OT gym, no AD. Independent sit<>stand.   Bed mobility: to be assessed  Pt reports supervision for stepping in/out of bathtub.    ADL's:  Not formally assessed today. See subjective above.    IADL's:  Homecare:  impaired  Cooking: impaired for light meal prep  Laundry: impaired  Yard work: n/a  Use of telephone: impaired  Money management: impaired  Medication management: difficulty managing pills, multiple attempts to open med bottle  Handwriting: impaired        Technology Use: difficulty managing phone    Treatment     Home Exercises and Patient Education Provided    Education provided:   -role of OT, goals for OT, scheduling/cancellations, insurance limitations with patient.  -Additional Education provided: ramp pillow for positioning right upper extremity to decrease pain while sleeping supine    Written Home Exercises Provided: yes.  Exercises were reviewed and Delores was able to demonstrate them prior to the end of the session.    Delores demonstrated good  understanding of the education provided.     See EMR under Patient Instructions for exercises provided 9/28/2022.    Assessment     Delores Fox is a 56 y.o. female referred to outpatient occupational therapy and presents with a medical diagnosis of stroke, resulting in the performance deficits and limitations as described in the chart below. Following medical record review it is determined that patient will benefit from occupational therapy services in order to maximize independence, safety & efficiency with BADL/ IADL/ work/ leisure participation, as well as pain free and/or functional use of the right upper extremity.     Patient prognosis is Good due to recent onset with limited deficits.  Patient will benefit from skilled outpatient Occupational Therapy to address the deficits stated above and in the chart below, provide patient/family education, and to maximize patient's level of independence.     Plan of care discussed with patient: Yes  Patient's spiritual, cultural and educational needs considered and patient is agreeable to the plan of care and goals as stated below:     Anticipated Barriers for therapy: possibly transportation as pt not driving,  concerns with motivation    Medical Necessity is demonstrated by the following  Profile and History Assessment of Occupational Performance Level of Clinical Decision Making Complexity Score   Occupational Profile:   Deloers Fox is a 56 y.o. female who lives with her sister and is currently on disability.  Delores Fox has difficulty with feeding, bathing, grooming, dressing, and toileting driving/ transportation management, shopping, phone/computer use, housework/household chores, and medication management affecting his/her daily functional abilities. His/her main goal for therapy is be able to complete BADL without help, better speech, be able to do dishes and laundry, return to driving.     Comorbidities:   See medical history.    Medical and Therapy History Review:   Brief Performance Deficits    Physical:  Muscle Power/ Strength, Muscle Endurance,  Strength, Pinch Strength, Gross Motor Coordination,   Fine Motor Coordination,   Visual Functions,   Proprioception Functions,   Tactile Functions,   Postural Control,   Pain    Cognitive:  Attention, Initiation,   Orientation,   Communication,   Memory, Safety Awareness/Insight to Disability    Psychosocial:    Habits, Routines,   Rituals, Group Participation (all impacted by the Covid-19 pandemic, as well as pt's personal health situation)  Clinical Decision Making:  low    Assessment Process:  Problem-  Focused Assessments    Modification/ Need for Assistance:  Not Necessary    Intervention Selection:  Limited Treatment Options       low  Based on PMHX, co morbidities , data from assessments and functional level of assistance required with task and clinical presentation directly impacting function.       The following goals were discussed with the patient and patient is in agreement with them as to be addressed in the treatment plan.     Goals:  Short Term Goals: 4 weeks   Pt will be independent with initial HEP.  Pt will be able to open 5/5 medication  bottles on the 1st attempt.  Independent donning bra with modified techniques.  Pt will be able to print name x1 with 100% legibility.    Short Term Goals: 8 weeks  Pt will be able to complete the 9HPT in less than 2 minutes to increase fine motor coordination (FMC) in the right upper extremity.     Long Term Goals: 12 weeks   Pt will be independent with BM hygiene with her preferred technique.  Pt will be able to complete the 9HPT in less than 2 minutes to increase fine motor coordination (FMC).   Pt will score a 35 or better on the box & block assessment to increase gross motor coordination (GMC) with the left upper extremity.   Pt will demonstrate 120* AROM in shoulder flexion & abduction to increase independence with overhead reaching.   Mod I all BADLs.  Additional functional goals to be added as pt progresses and per her priorities.    Plan   Certification Period/Plan of care expiration: 9/28/2022 to 12/23/2022.    Outpatient Occupational Therapy 2 times weekly for 12 weeks to include the following interventions: Manual Therapy, Moist Heat/ Ice, Neuromuscular Re-ed, Patient Education, Self Care, Therapeutic Activities, and Therapeutic Exercise, and any other treatment modalities that will facilitate Delores Fox's ability to reach her goals.     ADDITIONAL RECOMMENDATIONS: Pt complains of slurring in speech. She also demonstrates decreased memory and orientation during today's eval. She would benefit from a Speech Therapy evaluation to address these concerns.     Demetria Feng OT      I certify the need for these services furnished under this plan of treatment and while under my care.  ____________________________________ Physician/Referring Practitioner   Date of Signature

## 2022-09-29 LAB — VIT B12 SERPL-MCNC: 227 NG/L (ref 180–914)

## 2022-10-01 LAB — METHYLMALONATE SERPL-SCNC: 0.39 NMOL/ML

## 2022-10-03 DIAGNOSIS — Z12.11 SCREENING FOR COLON CANCER: ICD-10-CM

## 2022-10-03 DIAGNOSIS — K21.9 GASTROESOPHAGEAL REFLUX DISEASE, UNSPECIFIED WHETHER ESOPHAGITIS PRESENT: ICD-10-CM

## 2022-10-03 DIAGNOSIS — E11.65 TYPE 2 DIABETES MELLITUS WITH HYPERGLYCEMIA, WITHOUT LONG-TERM CURRENT USE OF INSULIN: ICD-10-CM

## 2022-10-03 DIAGNOSIS — Z87.891 HISTORY OF NICOTINE DEPENDENCE: ICD-10-CM

## 2022-10-03 LAB — VIT B1 BLD-MCNC: 62 UG/L (ref 38–122)

## 2022-10-03 RX ORDER — TORSEMIDE 20 MG/1
20 TABLET ORAL DAILY
Qty: 90 TABLET | Refills: 1 | Status: SHIPPED | OUTPATIENT
Start: 2022-10-03 | End: 2022-12-05 | Stop reason: SDUPTHER

## 2022-10-03 RX ORDER — MICONAZOLE NITRATE 2 %
2 CREAM (GRAM) TOPICAL
Qty: 100 EACH | Refills: 4 | Status: SHIPPED | OUTPATIENT
Start: 2022-10-03 | End: 2023-02-26 | Stop reason: SDUPTHER

## 2022-10-03 RX ORDER — METFORMIN HYDROCHLORIDE 500 MG/1
500 TABLET, EXTENDED RELEASE ORAL
Qty: 90 TABLET | Refills: 3 | Status: SHIPPED | OUTPATIENT
Start: 2022-10-03 | End: 2022-10-16 | Stop reason: SDUPTHER

## 2022-10-03 RX ORDER — METOPROLOL SUCCINATE 25 MG/1
12.5 TABLET, EXTENDED RELEASE ORAL DAILY
Qty: 45 TABLET | Refills: 3 | Status: SHIPPED | OUTPATIENT
Start: 2022-10-03 | End: 2022-12-05 | Stop reason: SDUPTHER

## 2022-10-03 RX ORDER — LOSARTAN POTASSIUM 25 MG/1
12.5 TABLET ORAL DAILY
Qty: 45 TABLET | Refills: 3 | Status: SHIPPED | OUTPATIENT
Start: 2022-10-03 | End: 2022-10-16 | Stop reason: SDUPTHER

## 2022-10-03 RX ORDER — ATORVASTATIN CALCIUM 40 MG/1
40 TABLET, FILM COATED ORAL DAILY
Qty: 90 TABLET | Refills: 1 | Status: SHIPPED | OUTPATIENT
Start: 2022-10-03 | End: 2022-12-05 | Stop reason: SDUPTHER

## 2022-10-03 RX ORDER — ZOLPIDEM TARTRATE 10 MG/1
10 TABLET ORAL NIGHTLY PRN
Qty: 90 TABLET | Refills: 0 | Status: SHIPPED | OUTPATIENT
Start: 2022-10-03 | End: 2022-10-16 | Stop reason: SDUPTHER

## 2022-10-03 RX ORDER — PANTOPRAZOLE SODIUM 40 MG/1
40 TABLET, DELAYED RELEASE ORAL 2 TIMES DAILY
Qty: 180 TABLET | Refills: 3 | Status: SHIPPED | OUTPATIENT
Start: 2022-10-03 | End: 2023-02-24 | Stop reason: SDUPTHER

## 2022-10-03 NOTE — TELEPHONE ENCOUNTER
reviewed and consistent with medication use as prescribed. Will refill.   I received your message and based on this, the following orders were placed AND/OR medicines were sent in.     No orders of the defined types were placed in this encounter.      Medications written and sent at this time include:  Medications Ordered This Encounter   Medications    atorvastatin (LIPITOR) 40 MG tablet     Sig: Take 1 tablet (40 mg total) by mouth once daily.     Dispense:  90 tablet     Refill:  1    losartan (COZAAR) 25 MG tablet     Sig: Take 0.5 tablets (12.5 mg total) by mouth once daily.     Dispense:  45 tablet     Refill:  3     .    metFORMIN (GLUCOPHAGE-XR) 500 MG ER 24hr tablet     Sig: Take 1 tablet (500 mg total) by mouth daily with breakfast.     Dispense:  90 tablet     Refill:  3    metoprolol succinate (TOPROL-XL) 25 MG 24 hr tablet     Sig: Take 0.5 tablets (12.5 mg total) by mouth once daily.     Dispense:  45 tablet     Refill:  3     .    nicotine, polacrilex, (NICORETTE) 2 mg Gum     Sig: Take 1 each (2 mg total) by mouth as needed (smoking urge).     Dispense:  100 each     Refill:  4    pantoprazole (PROTONIX) 40 MG tablet     Sig: Take 1 tablet (40 mg total) by mouth 2 (two) times daily.     Dispense:  180 tablet     Refill:  3    torsemide (DEMADEX) 20 MG Tab     Sig: Take 1 tablet (20 mg total) by mouth once daily.     Dispense:  90 tablet     Refill:  1     .    zolpidem (AMBIEN) 10 mg Tab     Sig: Take 1 tablet (10 mg total) by mouth nightly as needed (insomnia).     Dispense:  90 tablet     Refill:  0       If you are due for any health screening(s) below please notify me so we can arrange them to be ordered and scheduled to maintain your health. Most healthy patients at your age complete them.     Tests to Keep You Healthy    Mammogram: Met on 4/28/2022  Eye Exam: Met on 11/4/2021  Colon Cancer Screening: ORDERED  Last Blood Pressure <= 139/89 (9/27/2022): Yes  Last HbA1c < 8 (09/27/2022):  NO  Tobacco Cessation: Yes      Thanks in advance      Tomy Rios MD      This note was completed with dictation software and grammatical errors may exist.

## 2022-10-06 ENCOUNTER — CLINICAL SUPPORT (OUTPATIENT)
Dept: REHABILITATION | Facility: HOSPITAL | Age: 57
End: 2022-10-06
Payer: MEDICAID

## 2022-10-06 DIAGNOSIS — Z78.9 IMPAIRED INSTRUMENTAL ACTIVITIES OF DAILY LIVING (IADL): ICD-10-CM

## 2022-10-06 DIAGNOSIS — R68.89 IMPAIRED FUNCTION OF UPPER EXTREMITY: Primary | ICD-10-CM

## 2022-10-06 DIAGNOSIS — I69.398 LACK OF COORDINATION AS LATE EFFECT OF CEREBROVASCULAR ACCIDENT (CVA): ICD-10-CM

## 2022-10-06 DIAGNOSIS — R27.9 LACK OF COORDINATION AS LATE EFFECT OF CEREBROVASCULAR ACCIDENT (CVA): ICD-10-CM

## 2022-10-06 PROCEDURE — 97530 THERAPEUTIC ACTIVITIES: CPT | Mod: PN

## 2022-10-06 NOTE — PROGRESS NOTES
[facility]  Occupational Therapy Treatment Note    Date: 10/6/2022  Name: Delores Stearnsux  Clinic Number: [id number]    Therapy Diagnosis:   Encounter Diagnoses   Name Primary?    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)     Impaired instrumental activities of daily living (IADL)      Physician: Lorenzo Mason MD    Physician Orders: OT eval & treat  Medical Diagnosis: Stroke  Evaluation Date: 9/28/2022  Progress Notes Due: 10/28/22, 11/25/22  Plan of Care Expiration Period: 12/23/2022  Insurance Authorization period Expiration: 10/28/22  Date of Return to MD: cardiology on 10/11   Visit # / Visits Authorized: 1 / 16 + eval     FOTO: 50/100    Precautions:  Standard    Time In: 10:35 AM   Time Out: 11:15 AM   Total Billable Time: 40  minutes    SUBJECTIVE     Pt reports: PT reports she is in extreme pain. She reports that she tried to do HEP but was unsuccessful. She does not move her hand very much due to the pain      She was inconsistently compliant with home exercise program given last session.     Response to previous treatment:1st treatment.   Functional change: no significant changes     Pain: 9/10 at the beginning of the session -  10/10 at the end of the session   Location: right fingers, left hip, bilateral lower back       OBJECTIVE     Objective Measures updated at progress report unless specified.    FOTO: 50/100     Treatment     Delores received the treatments listed below:     Therapeutic activities to improve functional performance for 40 minutes, including:    FOTO completed - see above     Gentle mobilization of joints involving the right UE phalanges, metacarpals, carpals, radius and ulna to facilitate increases in passive movement. Gentle stretching of the soft tissues of the right wrist and hand to decrease edema and improve PROM, potentially allowing for increases in active movement.     -pain  "behaviors observed     Opening and closing of containers    -mod verbal cues to use right hand to open and close containers and she was just holding the lids with the right hand and using the body of the container to open with left hand    -slow/small movements when turning lid with right hand     Serial opposition 5x each    -Making an "O" with digits    -Able to oppose to tip with index, middle, and ring finger.    Opposition to finger pad of distal phalanx with little finger     Palmar abduction 8x   -minimal movement    -increased pain       Patient Education and Home Exercises      Education provided:   -Fucntional use of right hand   - Progress towards goals     Written Home Exercises Provided: yes.  Exercises were reviewed and Delores was able to demonstrate them prior to the end of the session.  Delores demonstrated fair  understanding of the HEP provided. See EMR under Patient Instructions for exercises provided during therapy sessions.       Assessment     Pt would continue to benefit from skilled OT. Pt is experiencing high levels of pain that makes fine motor coordination and range of motion activities difficult. She was instructed to try more activities that were on her HEP that were lower level than dishes to begin with. She was also instructed to move her hand and use it as much as she can to potentially decrease pain. She would benefit from continued range of motion and coordination activities.     Delores is progressing well towards her goals and there are no updates to goals at this time. Pt prognosis is Good.     Pt will continue to benefit from skilled outpatient occupational therapy to address the deficits listed in the problem list on initial evaluation provide pt/family education and to maximize pt's level of independence in the home and community environment.     Pt's spiritual, cultural and educational needs considered and pt agreeable to plan of care and goals.    Anticipated barriers to " occupational therapy: transportation, motivation     Goals:  Short Term Goals: 4 weeks   Pt will be independent with initial HEP. (progressing 10/6/2022)   Pt will be able to open 5/5 medication bottles on the 1st attempt. (progressing 10/6/2022)   Independent donning bra with modified techniques. (progressing 10/6/2022)   Pt will be able to print name x1 with 100% legibility. (progressing 10/6/2022)      Short Term Goals: 8 weeks  Pt will be able to complete the 9HPT in less than 2 minutes to increase fine motor coordination (FMC) in the right upper extremity.  (progressing 10/6/2022)      Long Term Goals: 12 weeks   Pt will be independent with BM hygiene with her preferred technique. (progressing 10/6/2022)   Pt will be able to complete the 9HPT in less than 2 minutes to increase fine motor coordination (FMC). (progressing 10/6/2022)   Pt will score a 35 or better on the box & block assessment to increase gross motor coordination (GMC) with the left upper extremity. (progressing 10/6/2022)   Pt will demonstrate 120* AROM in shoulder flexion & abduction to increase independence with overhead reaching. (progressing 10/6/2022)   Mod I all BADLs. (progressing 10/6/2022)   Additional functional goals to be added as pt progresses and per her priorities.    PLAN   Certification Period/Plan of care expiration: 9/28/2022 to 12/23/2022.     Outpatient Occupational Therapy 2 times weekly for 12 weeks to include the following interventions: Manual Therapy, Moist Heat/ Ice, Neuromuscular Re-ed, Patient Education, Self Care, Therapeutic Activities, and Therapeutic Exercise, and any other treatment modalities that will facilitate Delores Fox's ability to reach her goals.      Kerrie Contreras, OT

## 2022-10-07 ENCOUNTER — CLINICAL SUPPORT (OUTPATIENT)
Dept: REHABILITATION | Facility: HOSPITAL | Age: 57
End: 2022-10-07
Payer: MEDICAID

## 2022-10-07 DIAGNOSIS — R68.89 IMPAIRED FUNCTION OF UPPER EXTREMITY: Primary | ICD-10-CM

## 2022-10-07 DIAGNOSIS — R27.9 LACK OF COORDINATION AS LATE EFFECT OF CEREBROVASCULAR ACCIDENT (CVA): ICD-10-CM

## 2022-10-07 DIAGNOSIS — Z78.9 IMPAIRED INSTRUMENTAL ACTIVITIES OF DAILY LIVING (IADL): ICD-10-CM

## 2022-10-07 DIAGNOSIS — I69.398 LACK OF COORDINATION AS LATE EFFECT OF CEREBROVASCULAR ACCIDENT (CVA): ICD-10-CM

## 2022-10-07 PROCEDURE — 97530 THERAPEUTIC ACTIVITIES: CPT | Mod: PN

## 2022-10-07 NOTE — PROGRESS NOTES
[facility]  Occupational Therapy Treatment Note    Date: 10/7/2022  Name: Delores Stearnsux  Clinic Number: [id number]    Therapy Diagnosis:   Encounter Diagnoses   Name Primary?    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)     Impaired instrumental activities of daily living (IADL)      Physician: Lorenzo Mason MD    Physician Orders: OT eval & treat  Medical Diagnosis: Stroke  Evaluation Date: 9/28/2022  Progress Notes Due: 10/28/22, 11/25/22  Plan of Care Expiration Period: 12/23/2022  Insurance Authorization period Expiration: 10/28/22  Date of Return to MD: cardiology on 10/11   Visit # / Visits Authorized: 2 / 16 + eval     FOTO: 50/100    Precautions:  Standard    Time In: 10:35 AM   Time Out: 11:15 AM   Total Billable Time: 40  minutes    SUBJECTIVE     Pt reports: Patient states she has pain in right hand that stops her from doing things.     She was inconsistently compliant with home exercise program given last session. She states she has a ball and stuffed animal that she tries to hold.     Response to previous treatment:attempting to use right hand   Functional change: no significant changes     Pain: 8/10   Location: right fingers and shoulder left hip, bilateral lower back       OBJECTIVE     Objective Measures updated at progress report unless specified.    FOTO: 50/100 on 10/6/2022 visit    Treatment     Delores received the treatments listed below:     Therapeutic activities to improve functional performance for 40 minutes, including:    Gentle mobilization of joints involving the right UE phalanges, metacarpals, carpals, radius and ulna to facilitate increases in passive movement. Gentle stretching of the soft tissues of the right wrist and hand to decrease edema and improve PROM, potentially allowing for increases in active movement.     -pain behaviors observed   Closed chain dusting right upper  "extremity with a variety of textures  Towel and pillow case scrunching  Closed chain finger movements into abduction/adduction and finger flexion/extension x 10 each movement and each finger.   Serial opposition 5x each    -Making an "O" with digits    -Able to oppose to tip with index, middle, and ring finger.    Opposition to finger pad of distal phalanx with little finger   Grasp of cup in right hand working on reach with left while holding right.  Pouring stones from cup with right upper extremity .  Juxicisor with right for  with wrist movement x 3 each direction.  Pinch and place with small pegs right upper extremity .     Patient Education and Home Exercises      Education provided:   -Fucntional use of right hand   - Progress towards goals   -educated on need to use right upper extremity as much as possible.     Written Home Exercises Provided: yes.  Exercises were reviewed and Delores was able to demonstrate them prior to the end of the session.  Delores demonstrated fair  understanding of the HEP provided. See EMR under Patient Instructions for exercises provided during therapy sessions.       Assessment     Pt would continue to benefit from skilled OT. Pt is experiencing high levels of pain that makes fine motor coordination and range of motion activities difficult. She was instructed to try more activities that were on her Home exercise program. She was also instructed to move her hand and use it as much as she can touching a variety of textures to potentially decrease pain. She would benefit from continued range of motion and coordination activities.     Delores is progressing well towards her goals and there are no updates to goals at this time. Pt prognosis is Good.     Pt will continue to benefit from skilled outpatient occupational therapy to address the deficits listed in the problem list on initial evaluation provide pt/family education and to maximize pt's level of independence in the home and " community environment.     Pt's spiritual, cultural and educational needs considered and pt agreeable to plan of care and goals.    Anticipated barriers to occupational therapy: transportation, motivation  pain    Goals:  Short Term Goals: 4 weeks   Pt will be independent with initial HEP. (progressing 10/7/2022)   Pt will be able to open 5/5 medication bottles on the 1st attempt. (progressing 10/7/2022)   Independent donning bra with modified techniques. (progressing 10/7/2022)   Pt will be able to print name x1 with 100% legibility. (progressing 10/7/2022)      Short Term Goals: 8 weeks  Pt will be able to complete the 9HPT in less than 2 minutes to increase fine motor coordination (FMC) in the right upper extremity.  (progressing 10/7/2022)      Long Term Goals: 12 weeks   Pt will be independent with BM hygiene with her preferred technique. (progressing 10/7/2022)   Pt will be able to complete the 9HPT in less than 2 minutes to increase fine motor coordination (FMC). (progressing 10/7/2022)   Pt will score a 35 or better on the box & block assessment to increase gross motor coordination (GMC) with the left upper extremity. (progressing 10/7/2022)   Pt will demonstrate 120* AROM in shoulder flexion & abduction to increase independence with overhead reaching. (progressing 10/7/2022)   Mod I all BADLs. (progressing 10/7/2022)   Additional functional goals to be added as pt progresses and per her priorities.    PLAN   Certification Period/Plan of care expiration: 9/28/2022 to 12/23/2022.     Outpatient Occupational Therapy 2 times weekly for 12 weeks to include the following interventions: Manual Therapy, Moist Heat/ Ice, Neuromuscular Re-ed, Patient Education, Self Care, Therapeutic Activities, and Therapeutic Exercise, and any other treatment modalities that will facilitate Delores Fox's ability to reach her goals.      Preethi Varela, OT

## 2022-10-08 ENCOUNTER — OFFICE VISIT (OUTPATIENT)
Dept: URGENT CARE | Facility: CLINIC | Age: 57
End: 2022-10-08
Payer: MEDICAID

## 2022-10-08 VITALS
BODY MASS INDEX: 36.57 KG/M2 | OXYGEN SATURATION: 94 % | DIASTOLIC BLOOD PRESSURE: 68 MMHG | HEART RATE: 72 BPM | HEIGHT: 67 IN | WEIGHT: 233 LBS | SYSTOLIC BLOOD PRESSURE: 121 MMHG | RESPIRATION RATE: 18 BRPM | TEMPERATURE: 98 F

## 2022-10-08 DIAGNOSIS — J11.1 INFLUENZA: ICD-10-CM

## 2022-10-08 DIAGNOSIS — R05.9 COUGH, UNSPECIFIED TYPE: Primary | ICD-10-CM

## 2022-10-08 LAB
CTP QC/QA: YES
CTP QC/QA: YES
FLUAV AG NPH QL: NEGATIVE
FLUBV AG NPH QL: NEGATIVE
SARS-COV-2 AG RESP QL IA.RAPID: NEGATIVE

## 2022-10-08 PROCEDURE — 1160F PR REVIEW ALL MEDS BY PRESCRIBER/CLIN PHARMACIST DOCUMENTED: ICD-10-PCS | Mod: CPTII,S$GLB,, | Performed by: NURSE PRACTITIONER

## 2022-10-08 PROCEDURE — 1159F PR MEDICATION LIST DOCUMENTED IN MEDICAL RECORD: ICD-10-PCS | Mod: CPTII,S$GLB,, | Performed by: NURSE PRACTITIONER

## 2022-10-08 PROCEDURE — 3061F NEG MICROALBUMINURIA REV: CPT | Mod: CPTII,S$GLB,, | Performed by: NURSE PRACTITIONER

## 2022-10-08 PROCEDURE — 1160F RVW MEDS BY RX/DR IN RCRD: CPT | Mod: CPTII,S$GLB,, | Performed by: NURSE PRACTITIONER

## 2022-10-08 PROCEDURE — 3046F HEMOGLOBIN A1C LEVEL >9.0%: CPT | Mod: CPTII,S$GLB,, | Performed by: NURSE PRACTITIONER

## 2022-10-08 PROCEDURE — 3074F SYST BP LT 130 MM HG: CPT | Mod: CPTII,S$GLB,, | Performed by: NURSE PRACTITIONER

## 2022-10-08 PROCEDURE — 87811 SARS CORONAVIRUS 2 ANTIGEN POCT, MANUAL READ: ICD-10-PCS | Mod: QW,S$GLB,, | Performed by: NURSE PRACTITIONER

## 2022-10-08 PROCEDURE — 3061F PR NEG MICROALBUMINURIA RESULT DOCUMENTED/REVIEW: ICD-10-PCS | Mod: CPTII,S$GLB,, | Performed by: NURSE PRACTITIONER

## 2022-10-08 PROCEDURE — 87804 POCT INFLUENZA A/B: ICD-10-PCS | Mod: 59,QW,, | Performed by: NURSE PRACTITIONER

## 2022-10-08 PROCEDURE — 3008F PR BODY MASS INDEX (BMI) DOCUMENTED: ICD-10-PCS | Mod: CPTII,S$GLB,, | Performed by: NURSE PRACTITIONER

## 2022-10-08 PROCEDURE — 4010F ACE/ARB THERAPY RXD/TAKEN: CPT | Mod: CPTII,S$GLB,, | Performed by: NURSE PRACTITIONER

## 2022-10-08 PROCEDURE — 1159F MED LIST DOCD IN RCRD: CPT | Mod: CPTII,S$GLB,, | Performed by: NURSE PRACTITIONER

## 2022-10-08 PROCEDURE — 99214 OFFICE O/P EST MOD 30 MIN: CPT | Mod: S$GLB,,, | Performed by: NURSE PRACTITIONER

## 2022-10-08 PROCEDURE — 3074F PR MOST RECENT SYSTOLIC BLOOD PRESSURE < 130 MM HG: ICD-10-PCS | Mod: CPTII,S$GLB,, | Performed by: NURSE PRACTITIONER

## 2022-10-08 PROCEDURE — 3008F BODY MASS INDEX DOCD: CPT | Mod: CPTII,S$GLB,, | Performed by: NURSE PRACTITIONER

## 2022-10-08 PROCEDURE — 3078F DIAST BP <80 MM HG: CPT | Mod: CPTII,S$GLB,, | Performed by: NURSE PRACTITIONER

## 2022-10-08 PROCEDURE — 3066F NEPHROPATHY DOC TX: CPT | Mod: CPTII,S$GLB,, | Performed by: NURSE PRACTITIONER

## 2022-10-08 PROCEDURE — 3078F PR MOST RECENT DIASTOLIC BLOOD PRESSURE < 80 MM HG: ICD-10-PCS | Mod: CPTII,S$GLB,, | Performed by: NURSE PRACTITIONER

## 2022-10-08 PROCEDURE — 3066F PR DOCUMENTATION OF TREATMENT FOR NEPHROPATHY: ICD-10-PCS | Mod: CPTII,S$GLB,, | Performed by: NURSE PRACTITIONER

## 2022-10-08 PROCEDURE — 3046F PR MOST RECENT HEMOGLOBIN A1C LEVEL > 9.0%: ICD-10-PCS | Mod: CPTII,S$GLB,, | Performed by: NURSE PRACTITIONER

## 2022-10-08 PROCEDURE — 87811 SARS-COV-2 COVID19 W/OPTIC: CPT | Mod: QW,S$GLB,, | Performed by: NURSE PRACTITIONER

## 2022-10-08 PROCEDURE — 4010F PR ACE/ARB THEARPY RXD/TAKEN: ICD-10-PCS | Mod: CPTII,S$GLB,, | Performed by: NURSE PRACTITIONER

## 2022-10-08 PROCEDURE — 99214 PR OFFICE/OUTPT VISIT, EST, LEVL IV, 30-39 MIN: ICD-10-PCS | Mod: S$GLB,,, | Performed by: NURSE PRACTITIONER

## 2022-10-08 PROCEDURE — 87804 INFLUENZA ASSAY W/OPTIC: CPT | Mod: QW,,, | Performed by: NURSE PRACTITIONER

## 2022-10-08 RX ORDER — OSELTAMIVIR PHOSPHATE 75 MG/1
75 CAPSULE ORAL 2 TIMES DAILY
Qty: 10 CAPSULE | Refills: 0 | Status: SHIPPED | OUTPATIENT
Start: 2022-10-08 | End: 2022-10-13

## 2022-10-08 RX ORDER — FLUTICASONE PROPIONATE 50 MCG
1 SPRAY, SUSPENSION (ML) NASAL DAILY
Qty: 16 G | Refills: 0 | Status: SHIPPED | OUTPATIENT
Start: 2022-10-08 | End: 2022-11-05

## 2022-10-08 RX ORDER — PROMETHAZINE HYDROCHLORIDE AND DEXTROMETHORPHAN HYDROBROMIDE 6.25; 15 MG/5ML; MG/5ML
5 SYRUP ORAL 3 TIMES DAILY PRN
Qty: 240 ML | Refills: 0 | Status: SHIPPED | OUTPATIENT
Start: 2022-10-08 | End: 2022-10-18

## 2022-10-08 NOTE — PROGRESS NOTES
"Subjective:       Patient ID: Delores Fox is a 56 y.o. female.    Vitals:  height is 5' 7" (1.702 m) and weight is 105.7 kg (233 lb). Her oral temperature is 97.8 °F (36.6 °C). Her blood pressure is 121/68 and her pulse is 72. Her respiration is 18 and oxygen saturation is 94% (abnormal).     Chief Complaint: Cough    Pt have a cough and cold. Pt symptoms started yesterday. Family member present in room has just tested positive for Flu.    Past Medical History:  No date: Acid reflux  No date: COPD (chronic obstructive pulmonary disease)  No date: Coronary artery disease  No date: COVID-19  No date: Diabetes mellitus  No date: High cholesterol  No date: Hypertension  No date: Obese body habitus    Past Surgical History:  No date: APPENDECTOMY  2020: CATHETERIZATION OF BOTH LEFT AND RIGHT HEART; N/A      Comment:  Procedure: CATHETERIZATION, HEART, BOTH LEFT AND RIGHT;                Surgeon: Doug Kendall MD;  Location: [facility] CATH/EP                LAB;  Service: Cardiology;  Laterality: N/A;  No date: CHOLECYSTECTOMY  2020: EXCISION OF LESION OF EYELID; Right      Comment:  Procedure: EXCISION, LESION, EYELID;  Surgeon: Malachi Rodriguez MD;  Location: [facility] OR;  Service:                Ophthalmology;  Laterality: Right;  Inclusion Cyst                removal right lower lid  No date: HYSTERECTOMY  No date: JOINT REPLACEMENT; Left      Comment:  left hip  No date: MASTOIDECTOMY; Right    History reviewed.  No pertinent family history.      Social History    Socioeconomic History      Marital status: Single    Tobacco Use      Smoking status: Former        Packs/day: 0.50        Years: 13.00        Pack years: 6.5        Types: Cigarettes        Quit date: 2022        Years since quittin.0        Passive exposure: Current      Smokeless tobacco: Never    Substance and Sexual Activity      Alcohol use: Not Currently      Drug use: Not Currently      Sexual activity: Not " Currently    Social Determinants of Health  Financial Resource Strain: Low Risk       Difficulty of Paying Living Expenses: Not hard at all  Food Insecurity: No Food Insecurity      Worried About Running Out of Food in the Last Year: Never true      Ran Out of Food in the Last Year: Never true  Transportation Needs: No Transportation Needs      Lack of Transportation (Medical): No      Lack of Transportation (Non-Medical): No  Physical Activity: Inactive      Days of Exercise per Week: 0 days      Minutes of Exercise per Session: 0 min  Stress: No Stress Concern Present      Feeling of Stress : Not at all  Social Connections: Socially Isolated      Frequency of Communication with Friends and Family: More than three times a week      Frequency of Social Gatherings with Friends and Family: More than three times a week      Attends Sikhism Services: Never      Active Member of Clubs or Organizations: No      Attends Club or Organization Meetings: Never      Marital Status: Never   Housing Stability: Low Risk       Unable to Pay for Housing in the Last Year: No      Number of Places Lived in the Last Year: 1      Unstable Housing in the Last Year: No    Current Outpatient Medications:  albuterol (ACCUNEB) 1.25 mg/3 mL Nebu, Take 3 mLs (1.25 mg total) by nebulization every 6 (six) hours as needed (wheezing). Rescue, Disp: 75 mL, Rfl: 1  albuterol (PROVENTIL/VENTOLIN HFA) 90 mcg/actuation inhaler, Inhale 2 puffs into the lungs every 6 (six) hours as needed for Wheezing or Shortness of Breath. Rescue, Disp: 18 g, Rfl: 2  apixaban (ELIQUIS) 5 mg Tab, Take 1 tablet (5 mg total) by mouth 2 (two) times daily., Disp: 180 tablet, Rfl: 0  aspirin 81 MG Chew, Take 1 tablet (81 mg total) by mouth once daily., Disp: 30 tablet, Rfl: 0  atorvastatin (LIPITOR) 40 MG tablet, Take 1 tablet (40 mg total) by mouth once daily., Disp: 90 tablet, Rfl: 1  blood sugar diagnostic Strp, To check BG 2 times daily, to use with insurance  preferred meter, Disp: 200 each, Rfl: 0  blood-glucose meter kit, To check BG 2 times daily, to use with insurance preferred meter, Disp: 1 each, Rfl: 0  buPROPion (WELLBUTRIN XL) 150 MG TB24 tablet, Take 1 tablet (150 mg total) by mouth once daily., Disp: 30 tablet, Rfl: 2  clotrimazole-betamethasone 1-0.05% (LOTRISONE) cream, Apply topically 2 (two) times daily., Disp: 15 g, Rfl: 0  fluticasone-umeclidin-vilanter (TRELEGY ELLIPTA) 200-62.5-25 mcg inhaler, Inhale 1 puff into the lungs once daily., Disp: 60 each, Rfl: 11  lancets Misc, To check BG 2 times daily, to use with insurance preferred meter, Disp: 200 each, Rfl: 1  losartan (COZAAR) 25 MG tablet, Take 0.5 tablets (12.5 mg total) by mouth once daily., Disp: 45 tablet, Rfl: 3  metFORMIN (GLUCOPHAGE-XR) 500 MG ER 24hr tablet, Take 1 tablet (500 mg total) by mouth daily with breakfast., Disp: 90 tablet, Rfl: 3  metoprolol succinate (TOPROL-XL) 25 MG 24 hr tablet, Take 0.5 tablets (12.5 mg total) by mouth once daily., Disp: 45 tablet, Rfl: 3  MOTEGRITY 2 mg Tab, Take 1 tablet by mouth nightly., Disp: , Rfl:   nicotine, polacrilex, (NICORETTE) 2 mg Gum, Take 1 each (2 mg total) by mouth as needed (smoking urge)., Disp: 100 each, Rfl: 4  ondansetron (ZOFRAN) 4 MG tablet, Take 1 tablet (4 mg total) by mouth every 8 (eight) hours as needed for Nausea., Disp: 12 tablet, Rfl: 0  pantoprazole (PROTONIX) 40 MG tablet, Take 1 tablet (40 mg total) by mouth 2 (two) times daily., Disp: 180 tablet, Rfl: 3  pregabalin (LYRICA) 100 MG capsule, 100 mg 3 (three) times daily., Disp: , Rfl:   semaglutide (RYBELSUS) 3 mg tablet, Take 1 tablet (3 mg total) by mouth once daily., Disp: 30 tablet, Rfl: 0  [START ON 10/28/2022] semaglutide (RYBELSUS) 7 mg tablet, Take 1 tablet (7 mg total) by mouth once daily., Disp: 30 tablet, Rfl: 2  torsemide (DEMADEX) 20 MG Tab, Take 1 tablet (20 mg total) by mouth once daily., Disp: 90 tablet, Rfl: 1  zolpidem (AMBIEN) 10 mg Tab, Take 1 tablet (10  mg total) by mouth nightly as needed (insomnia)., Disp: 90 tablet, Rfl: 0  fluticasone propionate (FLONASE) 50 mcg/actuation nasal spray, 1 spray (50 mcg total) by Each Nostril route once daily., Disp: 16 g, Rfl: 0  LORazepam (ATIVAN) 0.5 MG tablet, Take 1 tablet (0.5 mg total) by mouth every 6 (six) hours as needed for Anxiety., Disp: 12 tablet, Rfl: 0  oseltamivir (TAMIFLU) 75 MG capsule, Take 1 capsule (75 mg total) by mouth 2 (two) times daily. for 5 days, Disp: 10 capsule, Rfl: 0  promethazine-dextromethorphan (PROMETHAZINE-DM) 6.25-15 mg/5 mL Syrp, Take 5 mLs by mouth 3 (three) times daily as needed., Disp: 240 mL, Rfl: 0    No current facility-administered medications for this visit.  Facility-Administered Medications Ordered in Other Visits:  electrolyte-S (ISOLYTE), , Intravenous, Continuous, Madi Herr MD, New Bag at 12/11/20 1041  lidocaine (PF) 10 mg/ml (1%) injection 10 mg, 1 mL, Intradermal, Once, Madi Herr MD  sodium chloride 0.9% flush 3 mL, 3 mL, Intravenous, Q8H, Madi Herr MD        Review of patient's allergies indicates:   -- Morphine -- Nausea And Vomiting   -- Sulfa (sulfonamide antibiotics) -- Nausea And Vomiting     Cough  This is a new problem. The current episode started yesterday. Associated symptoms include postnasal drip and a sore throat. Pertinent negatives include no chills or fever. She has tried nothing for the symptoms. The treatment provided no relief.     Constitution: Positive for fatigue. Negative for chills and fever.   HENT:  Positive for congestion, postnasal drip and sore throat.    Respiratory:  Positive for cough, sputum production and COPD.    Gastrointestinal: Negative.    Neurological: Negative.      Objective:      Physical Exam   Constitutional: She is oriented to person, place, and time.  Non-toxic appearance. She appears ill. No distress.   HENT:   Head: Normocephalic and atraumatic.   Nose: Rhinorrhea present.   Eyes: Pupils are  equal, round, and reactive to light.   Cardiovascular: Normal rate and regular rhythm.   No murmur heard.  Pulmonary/Chest: Effort normal and breath sounds normal. No respiratory distress. She has no wheezes. She has no rhonchi. She has no rales.   Neurological: She is alert and oriented to person, place, and time.       Assessment:       1. Cough, unspecified type    2. Influenza          Plan:     COVId negative, rapid flu negative, but has had direct exposure and symptoms just began last night, will treat presumptively as flu, Follow up with PCP if symptoms are not resolving in 48-72 hours, follow up immediately for new or worsening symptoms, ED precautions discussed.      Cough, unspecified type  -     SARS Coronavirus 2 Antigen, POCT Manual Read  -     POCT Influenza A/B  -     oseltamivir (TAMIFLU) 75 MG capsule; Take 1 capsule (75 mg total) by mouth 2 (two) times daily. for 5 days  Dispense: 10 capsule; Refill: 0  -     promethazine-dextromethorphan (PROMETHAZINE-DM) 6.25-15 mg/5 mL Syrp; Take 5 mLs by mouth 3 (three) times daily as needed.  Dispense: 240 mL; Refill: 0  -     fluticasone propionate (FLONASE) 50 mcg/actuation nasal spray; 1 spray (50 mcg total) by Each Nostril route once daily.  Dispense: 16 g; Refill: 0    Influenza  -     oseltamivir (TAMIFLU) 75 MG capsule; Take 1 capsule (75 mg total) by mouth 2 (two) times daily. for 5 days  Dispense: 10 capsule; Refill: 0  -     promethazine-dextromethorphan (PROMETHAZINE-DM) 6.25-15 mg/5 mL Syrp; Take 5 mLs by mouth 3 (three) times daily as needed.  Dispense: 240 mL; Refill: 0  -     fluticasone propionate (FLONASE) 50 mcg/actuation nasal spray; 1 spray (50 mcg total) by Each Nostril route once daily.  Dispense: 16 g; Refill: 0            Additional MDM:     Heart Failure Score:   COPD = Yes

## 2022-10-09 NOTE — PROGRESS NOTES
[facility]  Speech Therapy Evaluation -Neurological Rehabilitation    Date: 10/10/2022     Name: Delores Fox   MRN: [id number]    Therapy Diagnosis: No diagnosis found. Physician: Tomy Rios MD  Physician Orders: Ambulatory Referral to Speech Therapy   Medical Diagnosis: I63.9 (ICD-10-CM) - Cerebrovascular accident (CVA), unspecified mechanism    Visit # / Visits Authorized:  1 / 1   Date of Evaluation:  10/10/2022   Insurance Authorization Period: 9/29/2022 - 9/29/2023   Plan of Care Certification:    10/10/2022 to 12/9/2022      Time In: 9:00   Time Out: 9:45  Procedure Min.   Cognitive Communication Evaluation  45     Precautions: Standard and stroke  Subjective   Date of Onset: patient notes slurred speech, Occupational Therapy notes memory and orientation deficits (clqt) Beginning of September  History of Current Condition:  Delores Fox is a 56 y.o. female female who presents to [facility] Out[facility] Speech Therapy for evaluation secondary to Cerebrovascular accident (CVA). Patient was referred to therapy by Tomy Rios MD , which is the patient's PCP. Patient reports processing, following directions, and memory deficits. Patient was not accompanied to the evaluation by anyone.  Past Medical History: Delores Fox  has a past medical history of Acid reflux, COPD (chronic obstructive pulmonary disease), Coronary artery disease, COVID-19, Diabetes mellitus, High cholesterol, Hypertension, and Obese body habitus.  Delores Fox  has a past surgical history that includes Catheterization of both left and right heart (N/A, 11/23/2020); Appendectomy; Joint replacement (Left); Mastoidectomy (Right); Excision of lesion of eyelid (Right, 12/11/2020); Cholecystectomy; and Hysterectomy.  Medical Hx and Allergies: Delores has a current medication list which includes the following prescription(s): albuterol, albuterol, apixaban, aspirin, atorvastatin, blood  sugar diagnostic, blood-glucose meter, bupropion, clotrimazole-betamethasone 1-0.05%, fluticasone propionate, trelegy ellipta, lancets, lorazepam, losartan, metformin, metoprolol succinate, motegrity, nicotine (polacrilex), ondansetron, oseltamivir, pantoprazole, pregabalin, promethazine-dextromethorphan, semaglutide, [START ON 10/28/2022] semaglutide, torsemide, and zolpidem, and the following Facility-Administered Medications: electrolyte-s (ph 7.4), lidocaine (pf) 10 mg/ml (1%), and sodium chloride 0.9%.   Review of patient's allergies indicates:   Allergen Reactions    Morphine Nausea And Vomiting    Sulfa (sulfonamide antibiotics) Nausea And Vomiting     Prior Therapy:  No history of speech therapy. Currently receiving outpatient Occupational Therapy.  Social History:  Patient lives with sister in [facility], Patient is not currently driving, Hobbies include: none  Occupation:  not currently working  Prior Level of Function: independently    Current Lev8el of Function: independently- Dependent for ADLs  Pain: 8/10  Pain Location / Description: right arm  Nutrition:  oral diet, IDDSI 0 (Thin), and IDDSI 7 (Regular)  Patient's Therapy Goals:  help with current deficits  Objective   Formal Assessment:  Cognitive Linguistic Quick Test (CLQT), Aphasia Administration was administered to quickly assess the patient's overall cognitive-linguistic function and to determine cognitive strengths and weaknesses.           Cognitive Domain Score     Severity Rating      Attention    170 / 215 mild  Memory    96 / 185 severe   Executive Functions   15 / 40  moderate  Language    23 / 37  moderate  Visuospatial Skills    60 / 105 mild  Clock Drawing    7 / 13  severe    Composite Severity Rating  2.2 / 4  moderate    Task Score Ages 18-69 Cut Score Below?   Personal Facts  8  8 average   Symbol Cancellation 9  11 below   Confrontational naming  10  10 average   Clock drawing  7  12 below   Story Retelling 3  6 below   Symbol  Trails 5  9 below   Generative Naming 2  5 below   Design Memory 2  5 below   Mazes 8  7 above   Design Generation  0  6 below        Description: Pt was able to recall 8/8 personal facts. The symbol cancellation was 9. Pt was able to name 10/10 line item photographs. The clock drawing was 7. On the story retell, the patient was able to recall 3 details. On the yes/no questions regarding the story, the patient answered 5/6 questions correctly. When asked to recall the details upon conclusion of the test, patient was able to recall 2 details. On the symbol trial, the patient scored 2. On the generative naming sub-test, the patient named 7 animals and 2 /m/ words within 60 seconds each. On the design memory subtest, the patient 2. Pt completed the maze subtest 8. Finally, on the design generation subtest, the patient completed 0 designs with 4 lines, 0 designs with more than 4 lines, 7 designs with less than 4 lines, and 2 perseverative designs.        Language Skills:   Auditory Comprehension:  comprehension of directions and conversation was WFL.  No concerns reported or observed; WFL for the purpose of assessment and conversation.  Verbal Expression: Patient presents with fluent speech and is able to express his ideas/wants/needs without difficulty.   Reading Comprehension: Did not formally assess; WFL for the purpose of assessment.    Written Expression:  Did not assess. No concerns reported or observed.        Cognition:   No concerns reported or observed. Pt and alert and cooperative throughout evaluation, was oriented x 4 independently. Pt did not require cues to attend to evaluation tasks throughout session. Pt demonstrated adequate topic maintenance and reasoning skills throughout evaluation. Cognitive-linguistic skills WFL for the purpose of assessment and conversation.        Motor Speech Skills:   Fluency: Patient presented with fluent speech  Voice: breathy, strained strangle  Treatment     Treatment    Total Treatment Time Separate from Evaluation: n/a   no treatment performed secondary to time to complete evaluation.    Education: Plan of Care, role of SLP in care, memory strategies, scheduling/ cancellation policy, and insurance limitations / visit limit  were discussed with patient. Patient verbalized understanding.     Home Program: not yet established  Assessment     Delores presents to [facility] status post medical diagnosis of Cerebrovascular accident (CVA). She presents with moderate cognitive-linguistic impairment characterized by moderate deficits in executive function, moderate deficits in language, and severe deficits in memory.  Demonstrates impairments including limitations as described in the problem list. Positive prognostic factors include patients motivation. Negative prognostic factors include none. No barriers to therapy identified. Patient will benefit from skilled, outpatient neurological rehabilitation speech therapy.    Rehab Potential: good  Pt's spiritual, cultural, and educational needs considered and patient agreeable to plan of care and goals.    Short Term Goals (8 weeks):   Patient will complete a motor speech assessment to determine severity of dysarthria.  2. Patient will recall the first names of 5 people at 3-5 minute delay with 90% accuracy and minimal cues.   3. Patient will recall 3/4 memory strategies independently 3 times overall.  4. Patient will complete moderate level auditory/visual memory tasks with 80% accuracy independently.   5. Patient will complete selective/alternating attention tasks in a distracting environment with 90% accuracy independently.  6. Patient will complete moderate level problem solving tasks with 80% accuracy independently.  Patient will follow two-three step complex verbal commands with 80% accuracy following nonverbal cues and requesting repetitions as needed.     Long Term Goals 18 weeks):   Patient will increase attention and  processing skills with use of appropriate and functional independent strategies.   2. Patient will execute daily activities (work, home, etc) with independent use of strategies.    Plan     Plan of Care Certification Period: 10/10/2022 to 12/9/2022    Recommended Treatment Plan:  Patient will participate in the [facility] rehabilitation program for speech therapy 2 times per week for 8 weeks to address her Cognition deficits, to educate patient and their family, and to participate in a home exercise program.    Other Recommendations:   No other recommendations at this time    Therapist's Name:   MAGDALENE Thomas-SLP   10/10/2022     I CERTIFY THE NEED FOR THESE SERVICES FURNISHED UNDER THIS PLAN OF TREATMENT AND WHILE UNDER MY CARE    Physician Name: _______________________________    Physician Signature: ____________________________

## 2022-10-10 ENCOUNTER — CLINICAL SUPPORT (OUTPATIENT)
Dept: REHABILITATION | Facility: HOSPITAL | Age: 57
End: 2022-10-10
Attending: STUDENT IN AN ORGANIZED HEALTH CARE EDUCATION/TRAINING PROGRAM
Payer: MEDICAID

## 2022-10-10 ENCOUNTER — CLINICAL SUPPORT (OUTPATIENT)
Dept: REHABILITATION | Facility: HOSPITAL | Age: 57
End: 2022-10-10
Payer: MEDICAID

## 2022-10-10 DIAGNOSIS — R27.9 LACK OF COORDINATION AS LATE EFFECT OF CEREBROVASCULAR ACCIDENT (CVA): ICD-10-CM

## 2022-10-10 DIAGNOSIS — I69.398 LACK OF COORDINATION AS LATE EFFECT OF CEREBROVASCULAR ACCIDENT (CVA): ICD-10-CM

## 2022-10-10 DIAGNOSIS — F80.9 IMPAIRED COMMUNICATION WITH IMPAIRED COGNITION: ICD-10-CM

## 2022-10-10 DIAGNOSIS — R46.89 COGNITIVE AND BEHAVIORAL CHANGES: ICD-10-CM

## 2022-10-10 DIAGNOSIS — R41.89 COGNITIVE AND BEHAVIORAL CHANGES: ICD-10-CM

## 2022-10-10 DIAGNOSIS — Z78.9 IMPAIRED INSTRUMENTAL ACTIVITIES OF DAILY LIVING (IADL): ICD-10-CM

## 2022-10-10 DIAGNOSIS — R68.89 IMPAIRED FUNCTION OF UPPER EXTREMITY: Primary | ICD-10-CM

## 2022-10-10 DIAGNOSIS — F09 IMPAIRED COMMUNICATION WITH IMPAIRED COGNITION: ICD-10-CM

## 2022-10-10 DIAGNOSIS — I63.9 CEREBROVASCULAR ACCIDENT (CVA), UNSPECIFIED MECHANISM: ICD-10-CM

## 2022-10-10 PROCEDURE — 96125 COGNITIVE TEST BY HC PRO: CPT | Mod: PN

## 2022-10-10 PROCEDURE — 97530 THERAPEUTIC ACTIVITIES: CPT | Mod: PN

## 2022-10-10 NOTE — PROGRESS NOTES
"[facility]  Occupational Therapy Treatment Note    Date: 10/10/2022  Name: Delores Fox  Clinic Number: [id number]    Therapy Diagnosis:   Encounter Diagnoses   Name Primary?    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)     Impaired instrumental activities of daily living (IADL)      Physician: Lorenzo Mason MD    Physician Orders: OT eval & treat  Medical Diagnosis: Stroke  Evaluation Date: 9/28/2022  Progress Notes Due: 10/28/22, 11/25/22  Plan of Care Expiration Period: 12/23/2022  Insurance Authorization period Expiration: 10/28/22  Date of Return to MD: cardiology on 10/21   Visit # / Visits Authorized: 3 / 16 + eval     FOTO: 50/100 initial assessment    Precautions:  Standard    Time In: 0950  Time Out: 1034  Total Billable Time: 42 minutes    SUBJECTIVE     Pt reports: Went to urgent care this weekend due to complications from a cold. Feeling better today.     She was more compliant with functional home exercise program given last session. Response to previous treatment: no concerns   Functional change: "I folded towels this weekend!"    Pain: 7/10 at start of treatment, up to 8/10 with UBE, 6/10 by end of session.  Location: right fingers and shoulder left hip, bilateral lower back       OBJECTIVE     Objective Measures updated at progress report unless specified.    FOTO: 50/100 on 10/6/2022 visit    Treatment     Delores received the treatments listed below:     Therapeutic activities to improve functional performance for 40 minutes, including:    -UBE x5 min forward/ 5 min back w/ spencer UE, level 1.0, seated to increase spencer UE act patricia, strength, coordination & integration, as well as right upper extremity ability to sustain . Grier avg=2, peak grier=6. Pt's reported RPE= 9/10.    Gentle mobilization and stretching of soft-tissues throughout the right shoulder, including levator, serratus anterior, " subscapularis, anterior complex, lats and combined internal rotators to allow for increased PROM.    Scapular depression AROM x10 with mod verbal & min tactile cues   Scapular setting AROM x10 with mod verbal & tactile cues   Scapular setting followed by depression 2x5 with max verbal & tactile cues   Chest press with ball followed by mid-range shoulder flexion and return to start position 3x5  Sit<>supine with mod I and increased effort  Reaching ipsilaterally & down with right upper extremity to retrieve container, using bilateral upper extremity to open & close container, reaching contralaterally to place container on table x~15.    Patient Education and Home Exercises      Education provided:   -Fucntional use of right hand   - Progress towards goals   -educated on need to use right upper extremity as much as possible.     Written Home Exercises Provided: yes.  Exercises were reviewed and Delores was able to demonstrate them prior to the end of the session.  Delores demonstrated fair  understanding of the HEP provided. See EMR under Patient Instructions for exercises provided during therapy sessions.       Assessment     Pt would continue to benefit from skilled OT. Though Delores continues to complains of pain, she reported increased functional use of the right upper extremity at home this weekend & demonstrated improved movement & function in the clinic today. She needs to continue to address strengthening of the shoulder/ scapular stabilizers to improve functional reach. She would benefit from continued range of motion and coordination activities.     Delores is progressing well towards her goals and there are no updates to goals at this time. Pt prognosis is Good.     Pt will continue to benefit from skilled outpatient occupational therapy to address the deficits listed in the problem list on initial evaluation provide pt/family education and to maximize pt's level of independence in the home and community  environment.     Pt's spiritual, cultural and educational needs considered and pt agreeable to plan of care and goals.    Anticipated barriers to occupational therapy: transportation, motivation  pain    Goals:  Short Term Goals: 4 weeks   Pt will be independent with initial HEP. (progressing 10/10/2022)   Pt will be able to open 5/5 medication bottles on the 1st attempt. (progressing 10/10/2022)   Independent donning bra with modified techniques. (progressing 10/10/2022)   Pt will be able to print name x1 with 100% legibility. (progressing 10/10/2022)      Short Term Goals: 8 weeks  Pt will be able to complete the 9HPT in less than 2 minutes to increase fine motor coordination (FMC) in the right upper extremity.  (progressing 10/10/2022)      Long Term Goals: 12 weeks   Pt will be independent with BM hygiene with her preferred technique. (progressing 10/10/2022)   Pt will be able to complete the 9HPT in less than 2 minutes to increase fine motor coordination (FMC). (progressing 10/10/2022)   Pt will score a 35 or better on the box & block assessment to increase gross motor coordination (GMC) with the left upper extremity. (progressing 10/10/2022)   Pt will demonstrate 120* AROM in shoulder flexion & abduction to increase independence with overhead reaching. (progressing 10/10/2022)   Mod I all BADLs. (progressing 10/10/2022)   Additional functional goals to be added as pt progresses and per her priorities.    PLAN   Certification Period/Plan of care expiration: 9/28/2022 to 12/23/2022.     Outpatient Occupational Therapy 2 times weekly for 12 weeks to include the following interventions: Manual Therapy, Moist Heat/ Ice, Neuromuscular Re-ed, Patient Education, Self Care, Therapeutic Activities, and Therapeutic Exercise, and any other treatment modalities that will facilitate Delores Fox's ability to reach her goals.    Next Session: scapular depression & retraction; supine overhead movement; functional use right  upper extremity.     Demetria Feng, OT

## 2022-10-10 NOTE — PLAN OF CARE
[facility]  Speech Therapy Evaluation -Neurological Rehabilitation    Date: 10/10/2022     Name: Delores Fox   MRN: [id number]    Therapy Diagnosis:   Encounter Diagnoses   Name Primary?    Cerebrovascular accident (CVA), unspecified mechanism     Cognitive and behavioral changes     Impaired communication with impaired cognition        Physician: Tomy Rios MD  Physician Orders: Ambulatory Referral to Speech Therapy   Medical Diagnosis: I63.9 (ICD-10-CM) - Cerebrovascular accident (CVA), unspecified mechanism    Visit # / Visits Authorized:  1 / 1   Date of Evaluation:  10/10/2022   Insurance Authorization Period: 9/29/2022 - 9/29/2023   Plan of Care Certification:    10/10/2022 to 12/9/2022      Time In: 9:00   Time Out: 9:45  Procedure Min.   Cognitive Communication Evaluation  45     Precautions: Standard and stroke  Subjective   Date of Onset: patient notes slurred speech, Occupational Therapy notes memory and orientation deficits (clqt) Beginning of September  History of Current Condition:  Delores Fox is a 56 y.o. female female who presents to [facility] Speech Therapy for evaluation secondary to Cerebrovascular accident (CVA). Patient was referred to therapy by Tomy Rios MD , which is the patient's PCP. Patient reports processing, following directions, and memory deficits. Patient was not accompanied to the evaluation by anyone.  Past Medical History: Delores Fox  has a past medical history of Acid reflux, COPD (chronic obstructive pulmonary disease), Coronary artery disease, COVID-19, Diabetes mellitus, High cholesterol, Hypertension, and Obese body habitus.  Delores Fox  has a past surgical history that includes Catheterization of both left and right heart (N/A, 11/23/2020); Appendectomy; Joint replacement (Left); Mastoidectomy (Right); Excision of lesion of eyelid (Right, 12/11/2020); Cholecystectomy; and  Hysterectomy.  Medical Hx and Allergies: Delores has a current medication list which includes the following prescription(s): albuterol, albuterol, apixaban, aspirin, atorvastatin, blood sugar diagnostic, blood-glucose meter, bupropion, clotrimazole-betamethasone 1-0.05%, fluticasone propionate, trelegy ellipta, lancets, lorazepam, losartan, metformin, metoprolol succinate, motegrity, nicotine (polacrilex), ondansetron, oseltamivir, pantoprazole, pregabalin, promethazine-dextromethorphan, semaglutide, [START ON 10/28/2022] semaglutide, torsemide, and zolpidem, and the following Facility-Administered Medications: electrolyte-s (ph 7.4), lidocaine (pf) 10 mg/ml (1%), and sodium chloride 0.9%.   Review of patient's allergies indicates:   Allergen Reactions    Morphine Nausea And Vomiting    Sulfa (sulfonamide antibiotics) Nausea And Vomiting     Prior Therapy:  No history of speech therapy. Currently receiving outpatient Occupational Therapy.  Social History:  Patient lives with sister in [facility], Patient is not currently driving, Hobbies include: none  Occupation:  not currently working  Prior Level of Function: independently    Current Lev8el of Function: independently- Dependent for ADLs  Pain: 8/10  Pain Location / Description: right arm  Nutrition:  oral diet, IDDSI 0 (Thin), and IDDSI 7 (Regular)  Patient's Therapy Goals:  help with current deficits  Objective   Formal Assessment:  Cognitive Linguistic Quick Test (CLQT), Aphasia Administration was administered to quickly assess the patient's overall cognitive-linguistic function and to determine cognitive strengths and weaknesses.           Cognitive Domain Score     Severity Rating      Attention    170 / 215 mild  Memory    96 / 185 severe   Executive Functions   15 / 40  moderate  Language    23 / 37  moderate  Visuospatial Skills    60 / 105 mild  Clock Drawing    7 / 13  severe    Composite Severity Rating  2.2 / 4  moderate    Task Score Ages 18-69 Cut  Score Below?   Personal Facts  8  8 average   Symbol Cancellation 9  11 below   Confrontational naming  10  10 average   Clock drawing  7  12 below   Story Retelling 3  6 below   Symbol Trails 5  9 below   Generative Naming 2  5 below   Design Memory 2  5 below   Mazes 8  7 above   Design Generation  0  6 below        Description: Pt was able to recall 8/8 personal facts. The symbol cancellation was 9. Pt was able to name 10/10 line item photographs. The clock drawing was 7. On the story retell, the patient was able to recall 3 details. On the yes/no questions regarding the story, the patient answered 5/6 questions correctly. When asked to recall the details upon conclusion of the test, patient was able to recall 2 details. On the symbol trial, the patient scored 2. On the generative naming sub-test, the patient named 7 animals and 2 /m/ words within 60 seconds each. On the design memory subtest, the patient 2. Pt completed the maze subtest 8. Finally, on the design generation subtest, the patient completed 0 designs with 4 lines, 0 designs with more than 4 lines, 7 designs with less than 4 lines, and 2 perseverative designs.        Language Skills:   Auditory Comprehension:  comprehension of directions and conversation was WFL.  No concerns reported or observed; WFL for the purpose of assessment and conversation.  Verbal Expression: Patient presents with fluent speech and is able to express his ideas/wants/needs without difficulty.   Reading Comprehension: Did not formally assess; WFL for the purpose of assessment.    Written Expression:  Did not assess. No concerns reported or observed.        Cognition:   No concerns reported or observed. Pt and alert and cooperative throughout evaluation, was oriented x 4 independently. Pt did not require cues to attend to evaluation tasks throughout session. Pt demonstrated adequate topic maintenance and reasoning skills throughout evaluation. Cognitive-linguistic skills WFL  for the purpose of assessment and conversation.        Motor Speech Skills:   Fluency: Patient presented with fluent speech  Voice: breathy, strained strangle  Treatment     Treatment   Total Treatment Time Separate from Evaluation: n/a   no treatment performed secondary to time to complete evaluation.    Education: Plan of Care, role of SLP in care, memory strategies, scheduling/ cancellation policy, and insurance limitations / visit limit  were discussed with patient. Patient verbalized understanding.     Home Program: not yet established  Assessment     Delores presents to [facility] status post medical diagnosis of Cerebrovascular accident (CVA). She presents with moderate cognitive-linguistic impairment characterized by moderate deficits in executive function, moderate deficits in language, and severe deficits in memory.  Demonstrates impairments including limitations as described in the problem list. Positive prognostic factors include patients motivation. Negative prognostic factors include none. No barriers to therapy identified. Patient will benefit from skilled, outpatient neurological rehabilitation speech therapy.    Rehab Potential: good  Pt's spiritual, cultural, and educational needs considered and patient agreeable to plan of care and goals.    Short Term Goals (8 weeks):   Patient will complete a motor speech assessment to determine severity of dysarthria.  2. Patient will recall the first names of 5 people at 3-5 minute delay with 90% accuracy and minimal cues.   3. Patient will recall 3/4 memory strategies independently 3 times overall.  4. Patient will complete moderate level auditory/visual memory tasks with 80% accuracy independently.   5. Patient will complete selective/alternating attention tasks in a distracting environment with 90% accuracy independently.  6. Patient will complete moderate level problem solving tasks with 80% accuracy independently.  Patient will follow  two-three step complex verbal commands with 80% accuracy following nonverbal cues and requesting repetitions as needed.     Long Term Goals 18 weeks):   Patient will increase attention and processing skills with use of appropriate and functional independent strategies.   2. Patient will execute daily activities (work, home, etc) with independent use of strategies.    Plan     Plan of Care Certification Period: 10/10/2022 to 12/9/2022    Recommended Treatment Plan:  Patient will participate in the [facility] rehabilitation program for speech therapy 2 times per week for 8 weeks to address her Cognition deficits, to educate patient and their family, and to participate in a home exercise program.    Other Recommendations:   No other recommendations at this time    Therapist's Name:   MAGDALENE Thomas-SLP   10/10/2022     I CERTIFY THE NEED FOR THESE SERVICES FURNISHED UNDER THIS PLAN OF TREATMENT AND WHILE UNDER MY CARE    Physician Name: _______________________________    Physician Signature: ____________________________     done

## 2022-10-13 ENCOUNTER — CLINICAL SUPPORT (OUTPATIENT)
Dept: REHABILITATION | Facility: HOSPITAL | Age: 57
End: 2022-10-13
Payer: MEDICAID

## 2022-10-13 DIAGNOSIS — F80.9 IMPAIRED COMMUNICATION WITH IMPAIRED COGNITION: Primary | ICD-10-CM

## 2022-10-13 DIAGNOSIS — F09 IMPAIRED COMMUNICATION WITH IMPAIRED COGNITION: Primary | ICD-10-CM

## 2022-10-13 PROCEDURE — 92523 SPEECH SOUND LANG COMPREHEN: CPT | Mod: PN

## 2022-10-13 PROCEDURE — 97129 THER IVNTJ 1ST 15 MIN: CPT | Mod: PN

## 2022-10-13 PROCEDURE — 92507 TX SP LANG VOICE COMM INDIV: CPT | Mod: PN

## 2022-10-13 NOTE — PROGRESS NOTES
[facility]  Speech Therapy Treatment Note- Neurological Rehabilitation  Date: 10/13/2022     Name: Delores Fox   MRN: [id number]   Therapy Diagnosis:   Encounter Diagnosis   Name Primary?    Impaired communication with impaired cognition Yes   Physician: Tomy Rios MD  Physician Orders:  Ambulatory Referral to Speech Therapy   Medical Diagnosis:  I63.9 (ICD-10-CM) - Cerebrovascular accident (CVA), unspecified mechanism    Visit #/ Visits Authorized: 1/ 1  Date of Evaluation:  10/10/2022   Insurance Authorization Period: 10/17/2022 - 12/17/2022  Plan of Care Expiration Date:    12/9/2022   Extended Plan of Care:  n/a   Progress Note: 11/11/2022   Visits Cancelled: 0  Visits No Show: 0    Time In:  2:00  Time Out:  2:45  Total Billable Time: 45     Precautions: Standard  Subjective:   Patient reports: Patient feels good and stayed up watching tv last night but did receive more than 8 hours  of sleep  She was compliant to home exercise program.   Response to previous treatment: none   Pain Scale:  0/10 on a Visual Analog Scale currently.   Pain Location: no pain indicated throughout session    Objective:   TIMED  Procedure Min.   Cognitive Therapeutic Interventions, first 15 minutes CPT 86528  15         UNTIMED  Procedure Min.   Speech Sound evaluation  30   Total Timed Units: 1  Total Untimed Units: 1  Charges Billed/Number of units: 2    Short Term Goals: (8 weeks) Current Progress:   Patient will complete a motor speech assessment to determine severity of dysarthria.    Progressing/ Not Met 10/13/2022   Goal Met    2. Patient will recall the first names of 5 people at 3-5 minute delay with 90% accuracy and minimal cues.      Progressing/ Not Met 10/13/2022   Patient was given a 5 item to-do list and was able to recall to-do list with 80% accuracy independently; given moderate semantic cues patient achieved 100% accuracy.   3. Patient will recall 3/4 memory strategies independently 3  times overall.     Progressing/ Not Met 10/13/2022   Patient educated on memory strategies and provided with detailed explanations of each.      4. Patient will complete moderate level auditory/visual memory tasks with 80% accuracy independently.      Progressing/ Not Met 10/13/2022   Not addressed in today's session.        5. Patient will complete selective/alternating attention tasks in a distracting environment with 90% accuracy independently.     Progressing/ Not Met 10/13/2022   Not addressed in today's session.      6. Patient will complete moderate level problem solving tasks with 80% accuracy independently.     Progressing/ Not Met 10/13/2022   Not addressed in today's session.      7.  Patient will follow two-three step complex verbal commands with 80% accuracy following nonverbal cues and requesting repetitions as needed.    Progressing/ Not Met 10/13/2022   Patient completed 2 step verbal commands with 57% accuracy independently. When given moderate cues patient achieved 100% accuracy.         MOTOR SPEECH/DYSARTHRIA EVALUATION      Respiration:  Posture:  slouched  Breath Support: Labored  Breath Rate: Irregular  Respiratory Features: Abnormal: Clavicular    Oral Mechanism at Rest   Labial Structures  Structure WNL   Symmetry WNL   Tone WNL   Ability to control secretions WNL       Lingual Structure (at rest in mouth)   Structure WNL   Symmetry WNL   Tone WNL   Irregular Movement WNL       Mandible  Symmetry WNL   Posture at Rest WNL=closed   Dentition WNL     Face  Symmetry WNL   Expression WNL   Irregular Movement WNL     Soft Palate  Symmetry WNL   Structure WNL     Hard Palate  Structure WNL     Oral Mechanism during Sustained Postures   Labial Retraction   Symmetry WNL   Range WNL   Tone WNL   Strength WNL     Labial Protrusion  Range Reduced   Tone Reduced   Strength Reduced     Labial Compression  Strength (Upper R) WNL   Strength (Upper L) WNL   Strength (Lower R) WNL   Strength (Lower L) WNL      Lingual Protrusion  Symmetry WNL   Range WNL   Tone WNL   Tremor WNL   Strength WNL     Lingual Elevation to Alveolar Ridge   Range WNL   Symmetry WNL     Mandible Depression  Symmetry WNL   Range WNL   Jaw Clonus WNL   Strength WNL     Mandible Elevation  Symmetry WNL   Range WNL   Strength WNL     Face: Raising Eyebrows  Symmetry WNL   Range WNL   Forehead Wrinkle WNL     Velopharyngeal Function: Cheek Puffing   Symmetry WNL   Range WNL   Tone WNL   Strength WNL       Oral Mechanism during Movement   Labial Protrusion and Lateralization   Rate WNL   Rhythm WNL     Lingual Lateralization (External)   Rate WNL   Rhythm WNL   Range WNL     Lingual Lateralization (Internal)  Rate WNL   Rhythm WNL   Range WNL     Circular Range of Motion (External)   Rate WNL   Rhythm WNL   Range WNL     Circular Range of Motion (Internal)  Rate WNL   Rhythm WNL   Range WNL     Velopharyngeal Function: Sustained /a/  Velum Range WNL   Pharyngeal Wall Range WNL     Velopharyngeal Function: Short Repeating /a/  Velum Range WNL   Pharyngeal Wall Range WNL     Gross Sensation  Sensation  Face: Upper L WNL    Upper R WNL    Lower L WNL    Lower R WNL   Lips: Upper L WNL    Upper R WNL    Lower L WNL    Lower R WNL         Alternating Motion Rates (AMRs) and Sequential Motion Rates (SMRs)  SMRs /pu/  Rate Fast   Rhythm WNL   Accuracy Blurred   Norm 5.0-7.1 Dinora/Sec WNL     SMRs /tu/  Rate Fast   Rhythm WNL   Accuracy Inaccurate   Norm 4.8-7.1 Dinora/Sec WNL     SMRs /ku/  Rate Fast   Rhythm WNL   Accuracy Inaccurate   Norm 4.4-7.5 Dinora/Sec WNL     AMRs /putuku/  Rate Slow   Rhythm Irregular   Accuracy Blurred   Norm 3.6-7.5 Dinora/Sec Below norm     Perceptual Ratings  Respiratory Features:  breathy  Respiratory/Phonatory Features: Mono-loudness  Phonatory Features:  WFL  Phonatory Quality:  strained-strangled, breathy, hoarse  Resonatory Features: Hyponasality  Articulatory Features: Imprecise consonants  Prosodic Features: Short phrases  Other  Features:  None    Diagnosis   OVERALL IMPRESSION:   Patient presents with Moderate Spastic dysarthria characterized by strained strangle vocal quality, imprecise consonants, and mono loudness.    Patient Education/Response:   Clear speech strategies    Home program established: yes-utilize clear speech strategies throughout entirety of day; especially when fatigued  Exercises were reviewed and Delores was able to demonstrate them prior to the end of the session.  Delores demonstrated good  understanding of the education provided.     See Electronic Medical Record under Patient Instructions for exercises provided throughout therapy.  Assessment:   Delores is progressing well towards her goals. Delores participated well today in today's session which focused on motor speech assessment. Today strengths were noted in patients ability to integrate strategies after being taught them. Deficits were seen in following 2-step directions. To date, Delores has met 0 goals.   Current goals remain appropriate. Goals to be updated as necessary.     Patient prognosis is Good. Patient will continue to benefit from skilled outpatient speech and language therapy to address the deficits listed in the problem list on initial evaluation, provide patient/family education and to maximize patient's level of independence in the home and community environment.   Medical necessity is demonstrated by the following IMPAIRMENTS:  Deficits in executive functioning and memory prevent the patient performing her work and personal daily activities effectively and efficiently which may put her at risk of unsafe behavior and a decline in quality of life.   Barriers to Therapy: none  Patient's spiritual, cultural and educational needs considered and patient agreeable to plan of care and goals.  Plan:   Continue Plan of Care with focus on cognitive communication. Refer to ENT.    Susan Martinez CF-SLP   10/13/2022

## 2022-10-16 DIAGNOSIS — R53.83 FATIGUE, UNSPECIFIED TYPE: ICD-10-CM

## 2022-10-16 DIAGNOSIS — E11.65 TYPE 2 DIABETES MELLITUS WITH HYPERGLYCEMIA, WITHOUT LONG-TERM CURRENT USE OF INSULIN: ICD-10-CM

## 2022-10-16 DIAGNOSIS — Z87.891 HISTORY OF NICOTINE DEPENDENCE: ICD-10-CM

## 2022-10-17 RX ORDER — ZOLPIDEM TARTRATE 10 MG/1
10 TABLET ORAL NIGHTLY PRN
Qty: 90 TABLET | Refills: 0 | Status: SHIPPED | OUTPATIENT
Start: 2022-10-17 | End: 2023-02-08 | Stop reason: SDUPTHER

## 2022-10-17 RX ORDER — LOSARTAN POTASSIUM 25 MG/1
12.5 TABLET ORAL DAILY
Qty: 45 TABLET | Refills: 3 | Status: SHIPPED | OUTPATIENT
Start: 2022-10-17 | End: 2022-12-05 | Stop reason: SDUPTHER

## 2022-10-17 RX ORDER — BUPROPION HYDROCHLORIDE 150 MG/1
150 TABLET ORAL DAILY
Qty: 30 TABLET | Refills: 2 | Status: SHIPPED | OUTPATIENT
Start: 2022-10-17 | End: 2022-12-05 | Stop reason: SDUPTHER

## 2022-10-17 RX ORDER — METFORMIN HYDROCHLORIDE 500 MG/1
500 TABLET, EXTENDED RELEASE ORAL
Qty: 90 TABLET | Refills: 3 | Status: SHIPPED | OUTPATIENT
Start: 2022-10-17 | End: 2023-02-24 | Stop reason: SDUPTHER

## 2022-10-17 NOTE — TELEPHONE ENCOUNTER
Risks of Ambien discussed including grogginess, balance impairment, memory lapses, hallucinations, sleep walking, driving while asleep, dependence, and psychiatric complications. After a discussion of the risks and benefits, we feel that because the patient's insomnia failed to improve with good sleep hygiene, other first line treatments, poor quality of life without this medication, and disruption in their work/life balance that the benefits exceed the risks at this point in time. After understanding of the risks and shared decision making, the patient decided to pursue taking a sedative-hypnotic to treat their insomnia. The risks of having limited amount of sleep leading to impairment of driving is substantial and thus improving sleep time will likely prevent harm due to lack of sleep.  reviewed and refill deemed appropriate.       reviewed and consistent with medication use as prescribed. Will refill.

## 2022-10-17 NOTE — TELEPHONE ENCOUNTER
No new care gaps identified.  [facility] Embedded Care Gaps. Reference number: [id number]. 10/16/2022   8:04:06 PM CDT

## 2022-10-20 NOTE — PROGRESS NOTES
[facility]  Speech Therapy Treatment Note- Neurological Rehabilitation  Date: 10/26/2022     Name: Delores Fox   MRN: [id number]   Therapy Diagnosis:   Encounter Diagnosis   Name Primary?    Impaired communication with impaired cognition Yes     Physician: Tomy Rios MD  Physician Orders:  Ambulatory Referral to Speech Therapy   Medical Diagnosis:  I63.9 (ICD-10-CM) - Cerebrovascular accident (CVA), unspecified mechanism    Visit #/ Visits Authorized: 3/ 8  Date of Evaluation:  10/10/2022   Insurance Authorization Period: 10/17/2022 - 12/17/2022  Plan of Care Expiration Date:    12/9/2022   Extended Plan of Care:  n/a   Progress Note: 11/11/2022   Visits Cancelled: 0  Visits No Show: 0    Time In:  3:00  Time Out:  3:45  Total Billable Time: 45     Precautions: Standard  Subjective:   Patient reports: Patient pleasant.  She was compliant to home exercise program.   Response to previous treatment: none   Pain Scale:  0/10 on a Visual Analog Scale currently.   Pain Location: no pain indicated throughout session    Objective:   TIMED  Procedure Min.   Cognitive Therapeutic Interventions, first 15 minutes CPT 57256  15         UNTIMED  Procedure Min.   Speech Sound evaluation  30   Total Timed Units: 1  Total Untimed Units: 1  Charges Billed/Number of units: 2    Short Term Goals: (8 weeks) Current Progress:   Patient will complete a motor speech assessment to determine severity of dysarthria.    Progressing/ Not Met 10/26/2022   Goal Met    2. Patient will recall the first names of 5 people at 3-5 minute delay with 90% accuracy and minimal cues.      Progressing/ Not Met 10/26/2022   Patient was shown 5 names and faces and recalled 1/5 following a 5 minute delay.  Given max cues patient unable to recall names and faces.   3. Patient will recall 3/4 memory strategies independently 3 times overall.     Progressing/ Not Met 10/26/2022   Patient educated on memory strategies and provided  "with detailed explanations of each.      4. Patient will complete moderate level auditory/visual memory tasks with 80% accuracy independently.      Progressing/ Not Met 10/26/2022   Recalled completed "remember the right card" via CT Level-3 with 75% accuracy independently.    Patient required re-explanation of task several times.   5. Patient will complete selective/alternating attention tasks in a distracting environment with 90% accuracy independently.     Progressing/ Not Met 10/26/2022   Patient alternated between 2 tasks every 30 seconds:  Task 1: Patient completed number cancellation task with 2 targeted stimuli in a field of 300 with 90% accuracy independently  Task 2: Patient completed card separation task (red odd, red even, red face cards, black odd, black even, black face cards)  with 100% accuracy independently,    Patient able to make independent self corrections in card sorting task but demonstrated difficulties with figuring out were she left off in cancellation task.          6. Patient will complete moderate level problem solving tasks with 80% accuracy independently.     Progressing/ Not Met 10/26/2022   Not addressed in today's session.          7.  Patient will follow two-three step complex verbal commands with 80% accuracy following nonverbal cues and requesting repetitions as needed.    Progressing/ Not Met 10/26/2022   Not addressed in today's session.              MOTOR SPEECH/DYSARTHRIA EVALUATION      Respiration:  Posture:  slouched  Breath Support: Labored  Breath Rate: Irregular  Respiratory Features: Abnormal: Clavicular    Oral Mechanism at Rest   Labial Structures  Structure WNL   Symmetry WNL   Tone WNL   Ability to control secretions WNL       Lingual Structure (at rest in mouth)   Structure WNL   Symmetry WNL   Tone WNL   Irregular Movement WNL       Mandible  Symmetry WNL   Posture at Rest WNL=closed   Dentition WNL     Face  Symmetry WNL   Expression WNL   Irregular Movement WNL "     Soft Palate  Symmetry WNL   Structure WNL     Hard Palate  Structure WNL     Oral Mechanism during Sustained Postures   Labial Retraction   Symmetry WNL   Range WNL   Tone WNL   Strength WNL     Labial Protrusion  Range Reduced   Tone Reduced   Strength Reduced     Labial Compression  Strength (Upper R) WNL   Strength (Upper L) WNL   Strength (Lower R) WNL   Strength (Lower L) WNL     Lingual Protrusion  Symmetry WNL   Range WNL   Tone WNL   Tremor WNL   Strength WNL     Lingual Elevation to Alveolar Ridge   Range WNL   Symmetry WNL     Mandible Depression  Symmetry WNL   Range WNL   Jaw Clonus WNL   Strength WNL     Mandible Elevation  Symmetry WNL   Range WNL   Strength WNL     Face: Raising Eyebrows  Symmetry WNL   Range WNL   Forehead Wrinkle WNL     Velopharyngeal Function: Cheek Puffing   Symmetry WNL   Range WNL   Tone WNL   Strength WNL       Oral Mechanism during Movement   Labial Protrusion and Lateralization   Rate WNL   Rhythm WNL     Lingual Lateralization (External)   Rate WNL   Rhythm WNL   Range WNL     Lingual Lateralization (Internal)  Rate WNL   Rhythm WNL   Range WNL     Circular Range of Motion (External)   Rate WNL   Rhythm WNL   Range WNL     Circular Range of Motion (Internal)  Rate WNL   Rhythm WNL   Range WNL     Velopharyngeal Function: Sustained /a/  Velum Range WNL   Pharyngeal Wall Range WNL     Velopharyngeal Function: Short Repeating /a/  Velum Range WNL   Pharyngeal Wall Range WNL     Gross Sensation  Sensation  Face: Upper L WNL    Upper R WNL    Lower L WNL    Lower R WNL   Lips: Upper L WNL    Upper R WNL    Lower L WNL    Lower R WNL         Alternating Motion Rates (AMRs) and Sequential Motion Rates (SMRs)  SMRs /pu/  Rate Fast   Rhythm WNL   Accuracy Blurred   Norm 5.0-7.1 Dinora/Sec WNL     SMRs /tu/  Rate Fast   Rhythm WNL   Accuracy Inaccurate   Norm 4.8-7.1 Dinora/Sec WNL     SMRs /ku/  Rate Fast   Rhythm WNL   Accuracy Inaccurate   Norm 4.4-7.5 Dinora/Sec WNL     AMRs  /putuku/  Rate Slow   Rhythm Irregular   Accuracy Blurred   Norm 3.6-7.5 Dinora/Sec Below norm     Perceptual Ratings  Respiratory Features:  breathy  Respiratory/Phonatory Features: Mono-loudness  Phonatory Features:  WFL  Phonatory Quality:  strained-strangled, breathy, hoarse  Resonatory Features: Hyponasality  Articulatory Features: Imprecise consonants  Prosodic Features: Short phrases  Other Features:  None    Diagnosis   OVERALL IMPRESSION:   Patient presents with Moderate Spastic dysarthria characterized by strained strangle vocal quality, imprecise consonants, and mono loudness.    Patient Education/Response:   Clear speech strategies    Home program established: yes-utilize clear speech strategies throughout entirety of day; especially when fatigued  Exercises were reviewed and Delores was able to demonstrate them prior to the end of the session.  Delores demonstrated good  understanding of the education provided.     See Electronic Medical Record under Patient Instructions for exercises provided throughout therapy.  Assessment:   Delores is progressing well towards her goals. Delores participated well today in today's session which focused on motor speech assessment. Today strengths were noted in patients ability to complete alternating attention task. Deficits were seen in attention to task when presented and recall of strategies.   Current goals remain appropriate. Goals to be updated as necessary.     Patient prognosis is Good. Patient will continue to benefit from skilled outpatient speech and language therapy to address the deficits listed in the problem list on initial evaluation, provide patient/family education and to maximize patient's level of independence in the home and community environment.   Medical necessity is demonstrated by the following IMPAIRMENTS:  Deficits in executive functioning and memory prevent the patient performing her work and personal daily activities effectively and efficiently which  may put her at risk of unsafe behavior and a decline in quality of life.   Barriers to Therapy: none  Patient's spiritual, cultural and educational needs considered and patient agreeable to plan of care and goals.  Plan:   Continue Plan of Care with focus on cognitive communication. Refer to ENTMAGDALENE Oliva-SLP   10/26/2022

## 2022-10-21 ENCOUNTER — OFFICE VISIT (OUTPATIENT)
Dept: CARDIOLOGY | Facility: CLINIC | Age: 57
End: 2022-10-21
Payer: MEDICAID

## 2022-10-21 VITALS
BODY MASS INDEX: 37.04 KG/M2 | HEIGHT: 67 IN | SYSTOLIC BLOOD PRESSURE: 120 MMHG | RESPIRATION RATE: 16 BRPM | OXYGEN SATURATION: 96 % | WEIGHT: 236 LBS | HEART RATE: 71 BPM | DIASTOLIC BLOOD PRESSURE: 76 MMHG

## 2022-10-21 DIAGNOSIS — R07.9 CHEST PAIN, UNSPECIFIED TYPE: Primary | ICD-10-CM

## 2022-10-21 DIAGNOSIS — I70.0 ATHEROSCLEROSIS OF AORTA: ICD-10-CM

## 2022-10-21 DIAGNOSIS — E11.65 TYPE 2 DIABETES MELLITUS WITH HYPERGLYCEMIA, WITHOUT LONG-TERM CURRENT USE OF INSULIN: ICD-10-CM

## 2022-10-21 DIAGNOSIS — R27.9 LACK OF COORDINATION AS LATE EFFECT OF CEREBROVASCULAR ACCIDENT (CVA): ICD-10-CM

## 2022-10-21 DIAGNOSIS — E78.5 HYPERLIPIDEMIA ASSOCIATED WITH TYPE 2 DIABETES MELLITUS: ICD-10-CM

## 2022-10-21 DIAGNOSIS — E78.00 PURE HYPERCHOLESTEROLEMIA: ICD-10-CM

## 2022-10-21 DIAGNOSIS — E66.01 SEVERE OBESITY WITH BODY MASS INDEX (BMI) OF 35.0 TO 39.9 WITH COMORBIDITY: ICD-10-CM

## 2022-10-21 DIAGNOSIS — E11.69 HYPERLIPIDEMIA ASSOCIATED WITH TYPE 2 DIABETES MELLITUS: ICD-10-CM

## 2022-10-21 DIAGNOSIS — I10 ESSENTIAL HYPERTENSION: Chronic | ICD-10-CM

## 2022-10-21 DIAGNOSIS — I69.398 LACK OF COORDINATION AS LATE EFFECT OF CEREBROVASCULAR ACCIDENT (CVA): ICD-10-CM

## 2022-10-21 DIAGNOSIS — J42 CHRONIC BRONCHITIS, UNSPECIFIED CHRONIC BRONCHITIS TYPE: Chronic | ICD-10-CM

## 2022-10-21 DIAGNOSIS — I51.9 LV DYSFUNCTION: Chronic | ICD-10-CM

## 2022-10-21 DIAGNOSIS — I50.43 SYSTOLIC AND DIASTOLIC CHF, ACUTE ON CHRONIC: ICD-10-CM

## 2022-10-21 PROCEDURE — 3046F HEMOGLOBIN A1C LEVEL >9.0%: CPT | Mod: CPTII,,, | Performed by: INTERNAL MEDICINE

## 2022-10-21 PROCEDURE — 1160F RVW MEDS BY RX/DR IN RCRD: CPT | Mod: CPTII,,, | Performed by: INTERNAL MEDICINE

## 2022-10-21 PROCEDURE — 99215 OFFICE O/P EST HI 40 MIN: CPT | Mod: PBBFAC,PN | Performed by: INTERNAL MEDICINE

## 2022-10-21 PROCEDURE — 4010F ACE/ARB THERAPY RXD/TAKEN: CPT | Mod: CPTII,,, | Performed by: INTERNAL MEDICINE

## 2022-10-21 PROCEDURE — 99999 PR PBB SHADOW E&M-EST. PATIENT-LVL V: ICD-10-PCS | Mod: PBBFAC,,, | Performed by: INTERNAL MEDICINE

## 2022-10-21 PROCEDURE — 3074F SYST BP LT 130 MM HG: CPT | Mod: CPTII,,, | Performed by: INTERNAL MEDICINE

## 2022-10-21 PROCEDURE — 99999 PR PBB SHADOW E&M-EST. PATIENT-LVL V: CPT | Mod: PBBFAC,,, | Performed by: INTERNAL MEDICINE

## 2022-10-21 PROCEDURE — 99214 PR OFFICE/OUTPT VISIT, EST, LEVL IV, 30-39 MIN: ICD-10-PCS | Mod: S$PBB,,, | Performed by: INTERNAL MEDICINE

## 2022-10-21 PROCEDURE — 3078F PR MOST RECENT DIASTOLIC BLOOD PRESSURE < 80 MM HG: ICD-10-PCS | Mod: CPTII,,, | Performed by: INTERNAL MEDICINE

## 2022-10-21 PROCEDURE — 4010F PR ACE/ARB THEARPY RXD/TAKEN: ICD-10-PCS | Mod: CPTII,,, | Performed by: INTERNAL MEDICINE

## 2022-10-21 PROCEDURE — 1159F MED LIST DOCD IN RCRD: CPT | Mod: CPTII,,, | Performed by: INTERNAL MEDICINE

## 2022-10-21 PROCEDURE — 3066F PR DOCUMENTATION OF TREATMENT FOR NEPHROPATHY: ICD-10-PCS | Mod: CPTII,,, | Performed by: INTERNAL MEDICINE

## 2022-10-21 PROCEDURE — 3046F PR MOST RECENT HEMOGLOBIN A1C LEVEL > 9.0%: ICD-10-PCS | Mod: CPTII,,, | Performed by: INTERNAL MEDICINE

## 2022-10-21 PROCEDURE — 3008F PR BODY MASS INDEX (BMI) DOCUMENTED: ICD-10-PCS | Mod: CPTII,,, | Performed by: INTERNAL MEDICINE

## 2022-10-21 PROCEDURE — 3008F BODY MASS INDEX DOCD: CPT | Mod: CPTII,,, | Performed by: INTERNAL MEDICINE

## 2022-10-21 PROCEDURE — 3074F PR MOST RECENT SYSTOLIC BLOOD PRESSURE < 130 MM HG: ICD-10-PCS | Mod: CPTII,,, | Performed by: INTERNAL MEDICINE

## 2022-10-21 PROCEDURE — 3061F NEG MICROALBUMINURIA REV: CPT | Mod: CPTII,,, | Performed by: INTERNAL MEDICINE

## 2022-10-21 PROCEDURE — 99214 OFFICE O/P EST MOD 30 MIN: CPT | Mod: S$PBB,,, | Performed by: INTERNAL MEDICINE

## 2022-10-21 PROCEDURE — 93010 ELECTROCARDIOGRAM REPORT: CPT | Mod: S$PBB,,, | Performed by: SPECIALIST

## 2022-10-21 PROCEDURE — 93010 EKG 12-LEAD: ICD-10-PCS | Mod: S$PBB,,, | Performed by: SPECIALIST

## 2022-10-21 PROCEDURE — 3061F PR NEG MICROALBUMINURIA RESULT DOCUMENTED/REVIEW: ICD-10-PCS | Mod: CPTII,,, | Performed by: INTERNAL MEDICINE

## 2022-10-21 PROCEDURE — 1160F PR REVIEW ALL MEDS BY PRESCRIBER/CLIN PHARMACIST DOCUMENTED: ICD-10-PCS | Mod: CPTII,,, | Performed by: INTERNAL MEDICINE

## 2022-10-21 PROCEDURE — 3078F DIAST BP <80 MM HG: CPT | Mod: CPTII,,, | Performed by: INTERNAL MEDICINE

## 2022-10-21 PROCEDURE — 3066F NEPHROPATHY DOC TX: CPT | Mod: CPTII,,, | Performed by: INTERNAL MEDICINE

## 2022-10-21 PROCEDURE — 1159F PR MEDICATION LIST DOCUMENTED IN MEDICAL RECORD: ICD-10-PCS | Mod: CPTII,,, | Performed by: INTERNAL MEDICINE

## 2022-10-21 NOTE — PROGRESS NOTES
Subjective:    Patient ID:  Delores Fox is a 57 y.o. female patient here for evaluation Hypertension, COPD, Congestive Heart Failure, and Cerebrovascular Accident (In September, doing well. Going to speech therapy twice a week along with physical therapy . )      History of Present Illness:     Patient is a 56-year-old lady with history of arterial hypertension COPD apparently suffered a stroke September 16 admit starting [facility] workup at as outlined below.  Subsequently she underwent carotid angiography and this had demonstrated total occlusion of the left common carotid artery is heavily calcified with atherosclerotic disease and chronic total occlusion of the distal common carotid artery extending into the apex intern external carotid arteries.  There is no opacification distal to the bifurcation noted.    Selective injection of the right coronary internal carotid artery demonstrates no evidence of aneurysm or are AV malformations.  There is cost midline collaterals and flow to the left anterior cerebral artery distribution via patent anterior communicating artery and there is some retrograde collateral filling of the A1 segment of the left anterior cerebral artery.  The right vertebral artery is widely patent.  There was minimal cross collateralization of the left middle cerebral artery territory.     Patient had some speech impediment and right hand weakness and she is receiving some therapy for the same.  She has history of cardiomyopathy and previous coronary angiogram in 2020 had demonstrated total occlusion of the right coronary artery with collateralization from the left.  I have reviewed CT of angiography of the head and neck in addition to IM angiography is mentioned above are.  Chest x-ray demonstrated mild cardiomegaly no radiographic evidence of acute cardiopulmonary disease noted.  Patient was initially placed on DA PT and subsequently changed  Eliquis at 5 mg p.o. b.i.d. and a baby  aspirin.       recent hospital summary is as below  Patient Name: Delores Fox  MRN: [id number]  Patient Class: IP- Inpatient  Admission Date: 9/16/2022  Hospital Length of Stay: 2 days  Discharge Date and Time:  09/18/2022 3:51 PM  Attending Physician: Lorenzo Mason MD   Discharging Provider: Lorenzo Mason MD  Primary Care Provider: Tomy Rios MD        HPI:   Delores Fox is a 56 y.o.  female who has a past medical history of Acid reflux, COPD (chronic obstructive pulmonary disease), Coronary artery disease, COVID-19, Diabetes mellitus, High cholesterol, Hypertension, and Obese body habitus presented to OSH with right arm weakness that had been present for greater than 24 hrs. Around 2 or 3 pm on Wednesday patient noticed right arm weakness and numbness tingling.  She noticed when she was unable to maintain arm raised when reaching for something and her hand was very weak.  She presented to OSH ER on Thursday around 4 pm.  Head CT was negative and she was transferred to [facility] for neurology consult and further workup. On my exam, the numbness and tingling of right arm has resolved.  She also had slurred speech with the onset of weakness. She has had previous similar episodes that resolved spontaneously and she did not seek medical care. Patient denies change in vision, hearing, fever, cough, congestion, runny nose, chest pain, shortness of breath, palpitations, abdominal pain, diarrhea, constipation, vomiting, dysuria, hematuria, joint pain and back pain.      * No surgery found *       Hospital Course:   9/16:  Patient for MRI, awaiting Neurology recommendation. Patient is doing well. No concerns/issues overnight reported by the patient or the nursing staff.     9/17:  MRI showed stroke and patient started on DAPT, echo pending     9/18:  Due to patient's cardiac status, case discussed with neurologist and patient to be started on Eliquis and just aspirin.  Clopidogrel discontinued.  Patient's  blood pressure medications adjusted due to low normal blood pressures.  Patient to keep a blood pressure log and follow-up with her PCP for further fine tuning of her medications.     Physical examination on discharge:  Constitutional: No distress.   HENT: NC  Head: Atraumatic.   Cardiovascular: Normal rate, regular rhythm and normal heart sounds.   Pulmonary/Chest: Effort normal. No wheezes.   Abdominal: Soft. Bowel sounds are normal. No distension and no mass. No tenderness  Neurological: Alert.   Skin: Skin is warm and dry.   Psych: Appropriate mood and affect          Review of patient's allergies indicates:   Allergen Reactions    Morphine Nausea And Vomiting    Sulfa (sulfonamide antibiotics) Nausea And Vomiting       Past Medical History:   Diagnosis Date    Acid reflux     COPD (chronic obstructive pulmonary disease)     Coronary artery disease     COVID-19     Diabetes mellitus     High cholesterol     Hypertension     Obese body habitus      Past Surgical History:   Procedure Laterality Date    APPENDECTOMY      CATHETERIZATION OF BOTH LEFT AND RIGHT HEART N/A 2020    Procedure: CATHETERIZATION, HEART, BOTH LEFT AND RIGHT;  Surgeon: Doug Kendall MD;  Location: [facility] CATH/EP LAB;  Service: Cardiology;  Laterality: N/A;    CHOLECYSTECTOMY      EXCISION OF LESION OF EYELID Right 2020    Procedure: EXCISION, LESION, EYELID;  Surgeon: Malachi Rodriguez MD;  Location: [facility] OR;  Service: Ophthalmology;  Laterality: Right;  Inclusion Cyst removal right lower lid    HYSTERECTOMY      JOINT REPLACEMENT Left     left hip    MASTOIDECTOMY Right      Social History     Tobacco Use    Smoking status: Former     Packs/day: 0.50     Years: 13.00     Pack years: 6.50     Types: Cigarettes     Quit date: 2022     Years since quittin.1     Passive exposure: Current    Smokeless tobacco: Never   Substance Use Topics    Alcohol use: Not Currently    Drug use: Not Currently        Review of Systems:     As noted in HPI in addition      REVIEW OF SYSTEMS  CARDIOVASCULAR: No recent chest pain, palpitations, arm, neck, or jaw pain  RESPIRATORY: No recent fever, cough chills, SOB or congestion  : No blood in the urine  GI: No Nausea, vomiting, constipation, diarrhea, blood, or reflux.  MUSCULOSKELETAL: No myalgias  NEURO: No lightheadedness or dizziness  EYES: No Double vision, blurry, vision or headache              Objective        Vitals:    10/21/22 0755   BP: 120/76   Pulse: 71   Resp: 16       LIPIDS - LAST 2   Lab Results   Component Value Date    CHOL 189 09/16/2022    CHOL 183 02/18/2022    HDL 33 (L) 09/16/2022    HDL 36 (L) 02/18/2022    LDLCALC 130.6 09/16/2022    LDLCALC 115.8 02/18/2022    TRIG 127 09/16/2022    TRIG 156 (H) 02/18/2022    CHOLHDL 17.5 (L) 09/16/2022    CHOLHDL 19.7 (L) 02/18/2022       CBC - LAST 2  Lab Results   Component Value Date    WBC 8.61 09/18/2022    WBC 8.12 09/17/2022    RBC 4.51 09/18/2022    RBC 4.72 09/17/2022    HGB 13.4 09/18/2022    HGB 14.1 09/17/2022    HCT 41.4 09/18/2022    HCT 43.9 09/17/2022    MCV 92 09/18/2022    MCV 93 09/17/2022    MCH 29.7 09/18/2022    MCH 29.9 09/17/2022    MCHC 32.4 09/18/2022    MCHC 32.1 09/17/2022    RDW 12.9 09/18/2022    RDW 12.8 09/17/2022     09/18/2022     09/17/2022    MPV 9.5 09/18/2022    MPV 9.2 09/17/2022    GRAN 4.5 09/18/2022    GRAN 52.1 09/18/2022    LYMPH 3.3 09/18/2022    LYMPH 37.7 09/18/2022    MONO 0.7 09/18/2022    MONO 8.1 09/18/2022    BASO 0.03 09/18/2022    BASO 0.02 09/17/2022    NRBC 0 09/18/2022    NRBC 0 09/17/2022       CHEMISTRY & LIVER FUNCTION - LAST 2  Lab Results   Component Value Date     (L) 09/18/2022     09/17/2022    K 3.9 09/18/2022    K 4.0 09/17/2022     09/18/2022     09/17/2022    CO2 31 (H) 09/18/2022    CO2 30 (H) 09/17/2022    ANIONGAP 4 (L) 09/18/2022    ANIONGAP 7 (L) 09/17/2022    BUN 19 09/18/2022    BUN 16 09/17/2022    CREATININE 1.1 09/18/2022     CREATININE 1.2 09/17/2022     (H) 09/18/2022     (H) 09/17/2022    CALCIUM 8.6 (L) 09/18/2022    CALCIUM 8.1 (L) 09/17/2022    PH 7.235 (LL) 02/12/2022    MG 2.0 09/18/2022    MG 1.9 09/17/2022    ALBUMIN 3.5 09/18/2022    ALBUMIN 3.5 09/17/2022    PROT 6.2 09/18/2022    PROT 6.4 09/17/2022    ALKPHOS 111 09/18/2022    ALKPHOS 122 09/17/2022    ALT 13 09/18/2022    ALT 15 09/17/2022    AST 14 09/18/2022    AST 15 09/17/2022    BILITOT 0.6 09/18/2022    BILITOT 0.9 09/17/2022        CARDIAC PROFILE - LAST 2  Lab Results   Component Value Date     (H) 04/04/2022    BNP 40 02/18/2022    CPK 31 11/21/2020     03/10/2022    TROPONINI <0.030 04/04/2022    TROPONINI <0.030 04/04/2022        COAGULATION - LAST 2  Lab Results   Component Value Date    LABPT 12.7 04/04/2022    LABPT 13.3 12/31/2020    INR 1.0 04/04/2022    INR 1.1 12/31/2020    APTT 34.3 (H) 11/23/2020    APTT 35.9 (H) 11/21/2020       ENDOCRINE & PSA - LAST 2  Lab Results   Component Value Date    HGBA1C 9.1 (H) 09/27/2022    HGBA1C 9.9 (H) 09/16/2022    TSH 3.600 09/16/2022    TSH 0.960 11/21/2020        ECHOCARDIOGRAM RESULTS  Results for orders placed during the hospital encounter of 09/16/22    Echo Saline Bubble? Yes    Interpretation Summary  · There is no evidence of intracardiac shunting.  · The left ventricle is moderately enlarged with mild eccentric hypertrophy and moderately decreased systolic function.  · There is severe left ventricular global hypokinesis.  · The estimated ejection fraction is 30%.  · Grade I left ventricular diastolic dysfunction.  · Normal right ventricular size with normal right ventricular systolic function.  · Mild left atrial enlargement.  · Mild mitral regurgitation.  · Mild tricuspid regurgitation.  · Normal central venous pressure (3 mmHg).  · The estimated PA systolic pressure is 31 mmHg.      CURRENT/PREVIOUS VISIT EKG  Results for orders placed or performed in visit on 10/21/22   IN  OFFICE EKG 12-LEAD (to [facility])    Collection Time: 10/21/22  8:30 AM    Narrative    Test Reason : R07.9,    Vent. Rate : 075 BPM     Atrial Rate : 075 BPM     P-R Int : 182 ms          QRS Dur : 100 ms      QT Int : 414 ms       P-R-T Axes : 061 004 140 degrees     QTc Int : 462 ms    Normal sinus rhythm  Moderate voltage criteria for LVH, may be normal variant ( R in aVL ,   Nehemias product )  T wave abnormality, consider lateral ischemia  Prolonged QT  Abnormal ECG  When compared with ECG of 04-APR-2022 21:17,  Significant changes have occurred  Confirmed by Shahid Begum MD ([id number]) on 10/30/2022 5:00:19 PM    Referred By:             Confirmed By:Shahid Begum MD     No valid procedures specified.   Results for orders placed during the hospital encounter of 11/21/20    Nuclear Stress Test    Interpretation Summary    The EKG portion of this study is negative for ischemia.    The patient reported no chest pain during the stress test.    There were no arrhythmias during stress.    ECG Stress Nuclear portion of this study will be reported separately.      Carotid angiography.  FINDINGS:   Selective injection of the sheath accessing the right common femoral artery demonstrates an expected appearance of the external iliac, common femoral, and superficial femoral arteries without access related complication.     Selective injection of the right common carotid artery demonstrates mild stenosis due to calcified atherosclerotic disease at the origin of the right internal carotid artery. There is no downstream flow limitation.     Selective injection of the right internal carotid artery demonstrates an expected appearance of the petrous, cavernous, and supraclinoid segments of the ICA as well as ipsilateral MCA and ROCIO branches. There is no evidence of aneurysm, arteriovenous   shunting, or other pathologic vascular anomaly. There is cross midline collateral inflow to the left ROCIO distribution via a patent anterior  communicating artery. There is some retrograde collateral inflow down the A1 segment of the left anterior cerebral   artery.     Selective injection of the right subclavian artery demonstrates a widely patent vertebral artery origin as well as an expected course and contour the cervical segment of the vertebral artery.     Selective injection of the right vertebral artery demonstrates an expected appearance of the vertebrobasilar system without evidence of aneurysm, arteriovenous fistula, or other pathologic vascular anomaly. There is minimal cross-pial collateralization   to the left MCA territory.     Selective injection of the left common carotid artery demonstrates heavy calcified atherosclerotic disease with chronic total occlusion of the distal common carotid artery extending into the internal and external carotid arteries. There is no   opacification distal to the bifurcation. There is no intracranial opacification.  Procedure Note        PHYSICAL EXAM  CONSTITUTIONAL: Well built, well nourished in no apparent distress  NECK: no carotid bruit, no JVD  LUNGS: CTA  CHEST WALL: no tenderness  HEART: regular rate and rhythm, S1, S2 normal, no murmur, click, rub or gallop   ABDOMEN: soft, non-tender; bowel sounds normal; no masses,  no organomegaly  EXTREMITIES: Extremities normal, no edema, no calf tenderness noted  NEURO: AAO X 3    I HAVE REVIEWED :    The vital signs, nurses notes, and all the pertinent radiology and labs.        Current Outpatient Medications   Medication Instructions    albuterol (ACCUNEB) 1.25 mg, Nebulization, Every 6 hours PRN, Rescue    albuterol (PROVENTIL/VENTOLIN HFA) 90 mcg/actuation inhaler 2 puffs, Inhalation, Every 6 hours PRN, Rescue    apixaban (ELIQUIS) 5 mg, Oral, 2 times daily    aspirin 81 mg, Oral, Daily    atorvastatin (LIPITOR) 40 mg, Oral, Daily    blood sugar diagnostic Strp To check BG 2 times daily, to use with insurance preferred meter    blood-glucose meter kit To  check BG 2 times daily, to use with insurance preferred meter    buPROPion (WELLBUTRIN XL) 150 mg, Oral, Daily    clotrimazole-betamethasone 1-0.05% (LOTRISONE) cream Topical (Top), 2 times daily    fluticasone propionate (FLONASE) 50 mcg/actuation nasal spray SHAKE LIQUID AND USE 1 SPRAY(50 MCG) IN EACH NOSTRIL EVERY DAY    fluticasone-umeclidin-vilanter (TRELEGY ELLIPTA) 200-62.5-25 mcg inhaler 1 puff, Inhalation, Daily    lancets Misc To check BG 2 times daily, to use with insurance preferred meter    LORazepam (ATIVAN) 0.5 mg, Oral, Every 6 hours PRN    losartan (COZAAR) 12.5 mg, Oral, Daily    metFORMIN (GLUCOPHAGE-XR) 500 mg, Oral, With breakfast    metoprolol succinate (TOPROL-XL) 12.5 mg, Oral, Daily    MOTEGRITY 2 mg Tab 1 tablet, Oral, Nightly    nicotine (polacrilex) (NICORETTE) 2 mg, Oral, As needed (PRN)    ondansetron (ZOFRAN) 4 mg, Oral, Every 8 hours PRN    pantoprazole (PROTONIX) 40 mg, Oral, 2 times daily    pregabalin (LYRICA) 100 mg, 3 times daily    RYBELSUS 7 mg, Oral, Daily    semaglutide (RYBELSUS) 3 mg, Oral, Daily    torsemide (DEMADEX) 20 mg, Oral, Daily    zolpidem (AMBIEN) 10 mg, Oral, Nightly PRN          Assessment & Plan     Essential hypertension  Blood pressure is fairly well controlled with the present time and it is 120/76 mmHg continue on Demadex, Toprol-XL 12.5 mg daily and losartan at 25 mg half a tablet daily    LV dysfunction  Continue present therapy to include losartan low-dose of 12-,1/2 mg daily as tolerated, Toprol-XL 12.5 mg daily, torsemide and magnesium supplements    HLD (hyperlipidemia)  Cardiac low-fat low-cholesterol diet Lipitor 40 mg daily    Systolic and diastolic CHF, acute on chronic  Patient is identified as having Systolic (HFrEF) heart failure that is Chronic. CHF is currently controlled. Latest ECHO performed and demonstrates- Results for orders placed during the hospital encounter of 09/16/22    Echo Saline Bubble? Yes    Interpretation Summary  · There  is no evidence of intracardiac shunting.  · The left ventricle is moderately enlarged with mild eccentric hypertrophy and moderately decreased systolic function.  · There is severe left ventricular global hypokinesis.  · The estimated ejection fraction is 30%.  · Grade I left ventricular diastolic dysfunction.  · Normal right ventricular size with normal right ventricular systolic function.  · Mild left atrial enlargement.  · Mild mitral regurgitation.  · Mild tricuspid regurgitation.  · Normal central venous pressure (3 mmHg).  · The estimated PA systolic pressure is 31 mmHg.  . Continue Beta Blocker, ACE/ARB and Furosemide and monitor clinical status closely. Monitor on telemetry. Patient is on CHF pathway.  Monitor strict Is&Os and daily weights.  Place on fluid restriction of 1.5 L. Continue to stress to patient importance of self efficacy and  on diet for CHF.      Atherosclerosis of aorta  Cardiomegaly so this factor modification, aspirin and antiplatelet therapy along with anticoagulant therapy with Eliquis 5 mg B b.i.d. and Lipitor 40 mg a day as well as diabetic control.    Hyperlipidemia associated with type 2 diabetes mellitus  Maintain on present regimen of his child was stable and compensated.    Type 2 diabetes mellitus with hyperglycemia, without long-term current use of insulin  Maintained on metformin, semiretired maintain on calorie restricted diet    Severe obesity with body mass index (BMI) of 35.0 to 39.9 with comorbidity  BMI is 36.96.  Recommend calorie restriction increase physical activity as tolerated    Lack of coordination as late effect of cerebrovascular accident (CVA)  Maintained on Adipex for therapy with aspirin and she is also on Eliquis at this time continue risk factor modification with statin therapy    COPD (chronic obstructive pulmonary disease)  She appears more stable continue albuterol nebulizer and follow-up of pulmonary          Follow up in about 3 months (around  1/21/2023).

## 2022-10-26 ENCOUNTER — CLINICAL SUPPORT (OUTPATIENT)
Dept: REHABILITATION | Facility: HOSPITAL | Age: 57
End: 2022-10-26
Payer: MEDICAID

## 2022-10-26 DIAGNOSIS — Z78.9 IMPAIRED INSTRUMENTAL ACTIVITIES OF DAILY LIVING (IADL): ICD-10-CM

## 2022-10-26 DIAGNOSIS — R49.0 HOARSE VOICE QUALITY: Primary | ICD-10-CM

## 2022-10-26 DIAGNOSIS — F80.9 IMPAIRED COMMUNICATION WITH IMPAIRED COGNITION: Primary | ICD-10-CM

## 2022-10-26 DIAGNOSIS — I69.398 LACK OF COORDINATION AS LATE EFFECT OF CEREBROVASCULAR ACCIDENT (CVA): ICD-10-CM

## 2022-10-26 DIAGNOSIS — F09 IMPAIRED COMMUNICATION WITH IMPAIRED COGNITION: Primary | ICD-10-CM

## 2022-10-26 DIAGNOSIS — R27.9 LACK OF COORDINATION AS LATE EFFECT OF CEREBROVASCULAR ACCIDENT (CVA): ICD-10-CM

## 2022-10-26 DIAGNOSIS — R68.89 IMPAIRED FUNCTION OF UPPER EXTREMITY: Primary | ICD-10-CM

## 2022-10-26 PROCEDURE — 97530 THERAPEUTIC ACTIVITIES: CPT | Mod: PN,59

## 2022-10-26 PROCEDURE — 92507 TX SP LANG VOICE COMM INDIV: CPT | Mod: PN

## 2022-10-26 NOTE — PROGRESS NOTES
"[facility]  Occupational Therapy Treatment Note    Date: 10/26/2022  Name: Delores oFx  Clinic Number: [id number]    Therapy Diagnosis:   Encounter Diagnoses   Name Primary?    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)     Impaired instrumental activities of daily living (IADL)        Physician: Lorenzo Mason MD    Physician Orders: OT eval & treat  Medical Diagnosis: Stroke  Evaluation Date: 9/28/2022  Progress Notes Due: 10/28/22, 11/25/22  Plan of Care Expiration Period: 12/23/2022  Insurance Authorization period Expiration: 10/28/22  Date of Return to MD: cardiology on 10/21   Visit # / Visits Authorized: 4 / 16 + eval     FOTO: 50/100 initial assessment    Precautions:  Standard    Time In: 4:30 PM  Time Out: 5:15 PM   Total Billable Time: 45 minutes    SUBJECTIVE     Pt reports: Pt reports she is doing okay today. She had to cancel last weeks appointments.     She was more compliant with functional home exercise program given last session.   Response to previous treatment: no concerns   Functional change: "I folded towels this weekend!"    Pain: 7/10 at start of treatment, up to 8/10 with UBE, 6/10 by end of session.  Location: right fingers and shoulder   OBJECTIVE     Objective Measures updated at progress report unless specified.    Treatment     Delores received the treatments listed below:     Therapeutic activities to improve functional performance for 45 minutes, including:    -UBE x5 min forward/ 5 min back w/ spencer UE, level 1.0, seated to increase spencer UE act patricia, strength, coordination & integration, as well as right upper extremity ability to sustain . Grier avg=5, peak grier=8. Pt's reported RPE= 9/10.     large oval stones flush with table   -10x    Serial opposition with large oval stones   -5x each finger    -difficulty with little finger     Finger to palm translation with 4 large " "stones    -5 groups of 4     Completed bilateral "food" cutting activity with build up handled utensils    -difficulty holding the fork in her right hand (she would hold the fork with her fist and winged elbow)     Practiced picking up the cut putty with a fork    -mod verbal cues for hand placement on fork     Opening and closing of containers    -difficulty using right hand only to open   -Frequently required verbal cues to use left hand as stabilization only   -completed 15 containers of various sizes with increased time     Patient Education and Home Exercises      Education provided:   -Fucntional use of right hand   - Progress towards goals   -educated on need to use right upper extremity as much as possible.     Written Home Exercises Provided: yes.  Exercises were reviewed and Delores was able to demonstrate them prior to the end of the session.  Delores demonstrated fair  understanding of the HEP provided. See EMR under Patient Instructions for exercises provided during therapy sessions.       Assessment     Pt would continue to benefit from skilled OT. Though Delores continues to complains of pain, she reports increased functional use of the right upper extremity a She needs to continue to address coordination of right hand and strengthening of the shoulder/ scapular stabilizers to improve functional reach. She completed activities well today, but not without pain, increased time, and rest breaks. She would benefit from continued range of motion and coordination activities.     Delores is progressing well towards her goals and there are no updates to goals at this time. Pt prognosis is Good.     Pt will continue to benefit from skilled outpatient occupational therapy to address the deficits listed in the problem list on initial evaluation provide pt/family education and to maximize pt's level of independence in the home and community environment.     Pt's spiritual, cultural and educational needs considered and pt " agreeable to plan of care and goals.    Anticipated barriers to occupational therapy: transportation, motivation  pain    Goals:  Short Term Goals: 4 weeks   Pt will be independent with initial HEP. (progressing 10/26/2022)   Pt will be able to open 5/5 medication bottles on the 1st attempt. (progressing 10/26/2022)   Independent donning bra with modified techniques. (progressing 10/26/2022)   Pt will be able to print name x1 with 100% legibility. (progressing 10/26/2022)      Short Term Goals: 8 weeks  Pt will be able to complete the 9HPT in less than 2 minutes to increase fine motor coordination (FMC) in the right upper extremity.  (progressing 10/26/2022)      Long Term Goals: 12 weeks   Pt will be independent with BM hygiene with her preferred technique. (progressing 10/26/2022)   Pt will be able to complete the 9HPT in less than 2 minutes to increase fine motor coordination (FMC). (progressing 10/26/2022)   Pt will score a 35 or better on the box & block assessment to increase gross motor coordination (GMC) with the left upper extremity. (progressing 10/26/2022)   Pt will demonstrate 120* AROM in shoulder flexion & abduction to increase independence with overhead reaching. (progressing 10/26/2022)   Mod I all BADLs. (progressing 10/26/2022)   Additional functional goals to be added as pt progresses and per her priorities.    PLAN   Certification Period/Plan of care expiration: 9/28/2022 to 12/23/2022.     Outpatient Occupational Therapy 2 times weekly for 12 weeks to include the following interventions: Manual Therapy, Moist Heat/ Ice, Neuromuscular Re-ed, Patient Education, Self Care, Therapeutic Activities, and Therapeutic Exercise, and any other treatment modalities that will facilitate Delores Fox's ability to reach her goals.    Next Session: scapular depression & retraction; supine overhead movement; functional use right upper extremity.     Kerrie Contreras OT

## 2022-10-27 ENCOUNTER — CLINICAL SUPPORT (OUTPATIENT)
Dept: REHABILITATION | Facility: HOSPITAL | Age: 57
End: 2022-10-27
Payer: MEDICAID

## 2022-10-27 DIAGNOSIS — F80.9 IMPAIRED COMMUNICATION WITH IMPAIRED COGNITION: Primary | ICD-10-CM

## 2022-10-27 DIAGNOSIS — Z78.9 IMPAIRED INSTRUMENTAL ACTIVITIES OF DAILY LIVING (IADL): ICD-10-CM

## 2022-10-27 DIAGNOSIS — I69.398 LACK OF COORDINATION AS LATE EFFECT OF CEREBROVASCULAR ACCIDENT (CVA): ICD-10-CM

## 2022-10-27 DIAGNOSIS — R68.89 IMPAIRED FUNCTION OF UPPER EXTREMITY: Primary | ICD-10-CM

## 2022-10-27 DIAGNOSIS — R27.9 LACK OF COORDINATION AS LATE EFFECT OF CEREBROVASCULAR ACCIDENT (CVA): ICD-10-CM

## 2022-10-27 DIAGNOSIS — F09 IMPAIRED COMMUNICATION WITH IMPAIRED COGNITION: Primary | ICD-10-CM

## 2022-10-27 PROCEDURE — 97530 THERAPEUTIC ACTIVITIES: CPT | Mod: PN

## 2022-10-27 PROCEDURE — 92507 TX SP LANG VOICE COMM INDIV: CPT | Mod: PN

## 2022-10-27 NOTE — PROGRESS NOTES
"[facility]  Occupational Therapy Treatment Note    Date: 10/27/2022  Name: Delores Fox  Clinic Number: [id number]    Therapy Diagnosis:   Encounter Diagnoses   Name Primary?    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)     Impaired instrumental activities of daily living (IADL)      Physician: Lorenzo Mason MD    Physician Orders: OT eval & treat  Medical Diagnosis: Stroke  Evaluation Date: 9/28/2022  Progress Notes Due: 10/28/22, 11/25/22  Plan of Care Expiration Period: 12/23/2022  Insurance Authorization period Expiration: 10/28/22  Date of Return to MD: cardiology on 10/21   Visit # / Visits Authorized: 5 / 16 + eval     FOTO: 50/100 initial assessment    Precautions:  Standard    Time In: 2:15 PM  Time Out: 3:00 PM   Total Billable Time: 45 minutes    SUBJECTIVE     Pt reports: Pt reports she is doing okay today. She had to cancel last weeks appointments.     She was more compliant with functional home exercise program given last session.   Response to previous treatment: no concerns   Functional change: "I folded towels this weekend!"    Pain: 7/10 at start of treatment, up to 8/10 with UBE, 6/10 by end of session.  Location: right fingers and shoulder   OBJECTIVE     Objective Measures updated at progress report unless specified.    Treatment     Delores received the treatments listed below:     Therapeutic activities to improve functional performance for 45 minutes, including:    -UBE x5 min forward/ 5 min back w/ spencer UE, level 1.0, seated to increase spencer UE act patricia, strength, coordination & integration, as well as right upper extremity ability to sustain . Grier avg=6  peak grier=11  Pt's reported RPE= 9/10.    Claw   -2x10     Digit flexion + extension on washcloth   -2x10     Digit hyper extention    -2x10     Serial opposition in "O" shape   ` -5x each digit     Stress ball squeezes    -2x20 "     Sit <> supine / Mod I with decreased efficiency     Supine bilateral shoulder flexion 2x10    Supine right shoulder circles 2x10 each direction     Supine bilateral shoulder abduction 1x10     Supine scapular depression    -right side only    -1x10    Patient Education and Home Exercises      Education provided:   -Fucntional use of right hand   - Progress towards goals   -educated on need to use right upper extremity as much as possible.     Written Home Exercises Provided: yes.  Exercises were reviewed and Delores was able to demonstrate them prior to the end of the session.  Delores demonstrated fair  understanding of the HEP provided. See EMR under Patient Instructions for exercises provided during therapy sessions.       Assessment     Pt would continue to benefit from skilled OT. Though Delores continues to complains of pain, she reports increased functional use of the right upper extremity. She needs to continue to address coordination of right hand and strengthening of the shoulder/ scapular stabilizers to improve functional reach. She completed activities well today, but not without pain, increased time, mod verbal cues for correct form, and rest breaks. She would benefit from continued range of motion and coordination activities.     Delores is progressing well towards her goals and there are no updates to goals at this time. Pt prognosis is Good.     Pt will continue to benefit from skilled outpatient occupational therapy to address the deficits listed in the problem list on initial evaluation provide pt/family education and to maximize pt's level of independence in the home and community environment.     Pt's spiritual, cultural and educational needs considered and pt agreeable to plan of care and goals.    Anticipated barriers to occupational therapy: transportation, motivation  pain    Goals:  Short Term Goals: 4 weeks   Pt will be independent with initial HEP. (progressing 10/27/2022)   Pt will be able to  open 5/5 medication bottles on the 1st attempt. (progressing 10/27/2022)   Independent donning bra with modified techniques. (progressing 10/27/2022)   Pt will be able to print name x1 with 100% legibility. (progressing 10/27/2022)      Short Term Goals: 8 weeks  Pt will be able to complete the 9HPT in less than 2 minutes to increase fine motor coordination (FMC) in the right upper extremity.  (progressing 10/27/2022)      Long Term Goals: 12 weeks   Pt will be independent with BM hygiene with her preferred technique. (progressing 10/27/2022)   Pt will be able to complete the 9HPT in less than 2 minutes to increase fine motor coordination (FMC). (progressing 10/27/2022)   Pt will score a 35 or better on the box & block assessment to increase gross motor coordination (GMC) with the left upper extremity. (progressing 10/27/2022)   Pt will demonstrate 120* AROM in shoulder flexion & abduction to increase independence with overhead reaching. (progressing 10/27/2022)   Mod I all BADLs. (progressing 10/27/2022)   Additional functional goals to be added as pt progresses and per her priorities.    PLAN   Certification Period/Plan of care expiration: 9/28/2022 to 12/23/2022.     Outpatient Occupational Therapy 2 times weekly for 12 weeks to include the following interventions: Manual Therapy, Moist Heat/ Ice, Neuromuscular Re-ed, Patient Education, Self Care, Therapeutic Activities, and Therapeutic Exercise, and any other treatment modalities that will facilitate Delores Fox's ability to reach her goals.    Next Session: scapular depression & retraction; supine overhead movement; functional use right upper extremity.     Kerrie Contreras, OT

## 2022-10-27 NOTE — PROGRESS NOTES
"  [facility]  Speech Therapy Treatment Note- Neurological Rehabilitation  Date: 10/27/2022     Name: Delores Fox   MRN: [id number]   Therapy Diagnosis:   Encounter Diagnosis   Name Primary?    Impaired communication with impaired cognition Yes     Physician: Tomy Rios MD  Physician Orders:  Ambulatory Referral to Speech Therapy   Medical Diagnosis:  I63.9 (ICD-10-CM) - Cerebrovascular accident (CVA), unspecified mechanism    Visit #/ Visits Authorized: 4/ 8  Date of Evaluation:  10/10/2022   Insurance Authorization Period: 10/17/2022 - 12/17/2022  Plan of Care Expiration Date:    12/9/2022   Extended Plan of Care:  n/a   Progress Note: 11/11/2022   Visits Cancelled: 0  Visits No Show: 0    Time In:  1:30  Time Out:  2:15  Total Billable Time: 45     Precautions: Standard  Subjective:   Patient reports: Patient pleasant. Reports she has been sleeping majority of the day when at home.  "The only outing I have is coming to therapy or going to get my nails done."  SLP encouraged patient to go out for walks or take up hobbies to keep her brain moving.  She was compliant to home exercise program.   Response to previous treatment: none   Pain Scale:  0/10 on a Visual Analog Scale currently.   Pain Location: no pain indicated throughout session    Objective:   TIMED  Procedure Min.   Cognitive Therapeutic Interventions, first 15 minutes CPT 57130  15         UNTIMED  Procedure Min.   Speech Sound evaluation  30   Total Timed Units: 1  Total Untimed Units: 1  Charges Billed/Number of units: 2    Short Term Goals: (8 weeks) Current Progress:   Patient will complete a motor speech assessment to determine severity of dysarthria.    Progressing/ Not Met 10/27/2022   Goal Met    2. Patient will recall the first names of 5 people at 3-5 minute delay with 90% accuracy and minimal cues.      Progressing/ Not Met 10/27/2022   Patient was provided with 5 to-do items recalled 4/5 following a 5 minute " delay.  Given minimal cues patient able to recall 5/5 items.   3. Patient will recall 3/4 memory strategies independently 3 times overall.     Progressing/ Not Met 10/27/2022   Patient recalled 3/4 memory strategies independently.     Met x1   4. Patient will complete moderate level auditory/visual memory tasks with 80% accuracy independently.      Progressing/ Not Met 10/27/2022   Patient completed visual scene task and was able to recall details with 70% accuracy.      5. Patient will complete selective/alternating attention tasks in a distracting environment with 90% accuracy independently.     Progressing/ Not Met 10/27/2022   Patient completed the alternating symbols task on Constant Therapy Level 3 with 97% accuracy independently.     Met x2   6. Patient will complete moderate level problem solving tasks with 80% accuracy independently.     Progressing/ Not Met 10/27/2022   Patient completed problem solving task with 80% accuracy.       Met x1   7.  Patient will follow two-three step complex verbal commands with 80% accuracy following nonverbal cues and requesting repetitions as needed.    Progressing/ Not Met 10/27/2022   2 step commands- 8/10  3 step commands- 3/5              MOTOR SPEECH/DYSARTHRIA EVALUATION      Respiration:  Posture:  slouched  Breath Support: Labored  Breath Rate: Irregular  Respiratory Features: Abnormal: Clavicular    Oral Mechanism at Rest   Labial Structures  Structure WNL   Symmetry WNL   Tone WNL   Ability to control secretions WNL       Lingual Structure (at rest in mouth)   Structure WNL   Symmetry WNL   Tone WNL   Irregular Movement WNL       Mandible  Symmetry WNL   Posture at Rest WNL=closed   Dentition WNL     Face  Symmetry WNL   Expression WNL   Irregular Movement WNL     Soft Palate  Symmetry WNL   Structure WNL     Hard Palate  Structure WNL     Oral Mechanism during Sustained Postures   Labial Retraction   Symmetry WNL   Range WNL   Tone WNL   Strength WNL     Labial  Protrusion  Range Reduced   Tone Reduced   Strength Reduced     Labial Compression  Strength (Upper R) WNL   Strength (Upper L) WNL   Strength (Lower R) WNL   Strength (Lower L) WNL     Lingual Protrusion  Symmetry WNL   Range WNL   Tone WNL   Tremor WNL   Strength WNL     Lingual Elevation to Alveolar Ridge   Range WNL   Symmetry WNL     Mandible Depression  Symmetry WNL   Range WNL   Jaw Clonus WNL   Strength WNL     Mandible Elevation  Symmetry WNL   Range WNL   Strength WNL     Face: Raising Eyebrows  Symmetry WNL   Range WNL   Forehead Wrinkle WNL     Velopharyngeal Function: Cheek Puffing   Symmetry WNL   Range WNL   Tone WNL   Strength WNL       Oral Mechanism during Movement   Labial Protrusion and Lateralization   Rate WNL   Rhythm WNL     Lingual Lateralization (External)   Rate WNL   Rhythm WNL   Range WNL     Lingual Lateralization (Internal)  Rate WNL   Rhythm WNL   Range WNL     Circular Range of Motion (External)   Rate WNL   Rhythm WNL   Range WNL     Circular Range of Motion (Internal)  Rate WNL   Rhythm WNL   Range WNL     Velopharyngeal Function: Sustained /a/  Velum Range WNL   Pharyngeal Wall Range WNL     Velopharyngeal Function: Short Repeating /a/  Velum Range WNL   Pharyngeal Wall Range WNL     Gross Sensation  Sensation  Face: Upper L WNL    Upper R WNL    Lower L WNL    Lower R WNL   Lips: Upper L WNL    Upper R WNL    Lower L WNL    Lower R WNL         Alternating Motion Rates (AMRs) and Sequential Motion Rates (SMRs)  SMRs /pu/  Rate Fast   Rhythm WNL   Accuracy Blurred   Norm 5.0-7.1 Dinora/Sec WNL     SMRs /tu/  Rate Fast   Rhythm WNL   Accuracy Inaccurate   Norm 4.8-7.1 Dinora/Sec WNL     SMRs /ku/  Rate Fast   Rhythm WNL   Accuracy Inaccurate   Norm 4.4-7.5 Dinora/Sec WNL     AMRs /putuku/  Rate Slow   Rhythm Irregular   Accuracy Blurred   Norm 3.6-7.5 Dinora/Sec Below norm     Perceptual Ratings  Respiratory Features:  breathy  Respiratory/Phonatory Features: Mono-loudness  Phonatory Features:   WFL  Phonatory Quality:  strained-strangled, breathy, hoarse  Resonatory Features: Hyponasality  Articulatory Features: Imprecise consonants  Prosodic Features: Short phrases  Other Features:  None    Diagnosis   OVERALL IMPRESSION:   Patient presents with Moderate Spastic dysarthria characterized by strained strangle vocal quality, imprecise consonants, and mono loudness.    Patient Education/Response:   Clear speech strategies  Spontaneous recovery following stroke    Home program established: yes-utilize clear speech strategies throughout entirety of day; especially when fatigued  Exercises were reviewed and Delores was able to demonstrate them prior to the end of the session.  Delores demonstrated good  understanding of the education provided.     See Electronic Medical Record under Patient Instructions for exercises provided throughout therapy.  Assessment:   Delores is progressing well towards her goals. Delores participated well today in today's session which focused on education which can be seen above. Today strengths were noted in patients ability to recall strategies and implement them into given task. Deficits were seen in attention to task and required re-explanation (patient reports I have always needed constant re-explanation of directions).  Current goals remain appropriate. Goals to be updated as necessary.     Patient prognosis is Good. Patient will continue to benefit from skilled outpatient speech and language therapy to address the deficits listed in the problem list on initial evaluation, provide patient/family education and to maximize patient's level of independence in the home and community environment.   Medical necessity is demonstrated by the following IMPAIRMENTS:  Deficits in executive functioning and memory prevent the patient performing her work and personal daily activities effectively and efficiently which may put her at risk of unsafe behavior and a decline in quality of life.   Barriers  to Therapy: none  Patient's spiritual, cultural and educational needs considered and patient agreeable to plan of care and goals.  Plan:   Continue Plan of Care with focus on cognitive communication. Refer to ENT.    MAGDALENE Thomas-SLP   10/27/2022

## 2022-10-29 ENCOUNTER — IMMUNIZATION (OUTPATIENT)
Dept: FAMILY MEDICINE | Facility: CLINIC | Age: 57
End: 2022-10-29
Payer: MEDICAID

## 2022-10-29 PROCEDURE — 90686 IIV4 VACC NO PRSV 0.5 ML IM: CPT | Mod: PBBFAC,PO

## 2022-10-31 ENCOUNTER — CLINICAL SUPPORT (OUTPATIENT)
Dept: REHABILITATION | Facility: HOSPITAL | Age: 57
End: 2022-10-31
Payer: MEDICAID

## 2022-10-31 DIAGNOSIS — F80.9 IMPAIRED COMMUNICATION WITH IMPAIRED COGNITION: Primary | ICD-10-CM

## 2022-10-31 DIAGNOSIS — I69.398 LACK OF COORDINATION AS LATE EFFECT OF CEREBROVASCULAR ACCIDENT (CVA): ICD-10-CM

## 2022-10-31 DIAGNOSIS — R27.9 LACK OF COORDINATION AS LATE EFFECT OF CEREBROVASCULAR ACCIDENT (CVA): ICD-10-CM

## 2022-10-31 DIAGNOSIS — Z78.9 IMPAIRED INSTRUMENTAL ACTIVITIES OF DAILY LIVING (IADL): ICD-10-CM

## 2022-10-31 DIAGNOSIS — R68.89 IMPAIRED FUNCTION OF UPPER EXTREMITY: Primary | ICD-10-CM

## 2022-10-31 DIAGNOSIS — F09 IMPAIRED COMMUNICATION WITH IMPAIRED COGNITION: Primary | ICD-10-CM

## 2022-10-31 PROCEDURE — 92507 TX SP LANG VOICE COMM INDIV: CPT | Mod: PN

## 2022-10-31 PROCEDURE — 97530 THERAPEUTIC ACTIVITIES: CPT | Mod: PN

## 2022-10-31 NOTE — PROGRESS NOTES
[facility]  Occupational Therapy Treatment Note    Date: 10/31/2022  Name: Delores Fox  Clinic Number: [id number]    Therapy Diagnosis:   Encounter Diagnoses   Name Primary?    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)     Impaired instrumental activities of daily living (IADL)      Physician: Tomy Rios MD    Physician Orders: OT eval & treat  Medical Diagnosis: Stroke  Evaluation Date: 9/28/2022  Progress Notes Due: 10/28/22, 11/25/22  Plan of Care Expiration Period: 12/23/2022  Insurance Authorization period Expiration: 10/28/22  Date of Return to MD: cardiology on 10/21   Visit # / Visits Authorized: 6 / 16 + eval     FOTO: 50/100 initial assessment  57/100 (10/31/2022)     Precautions:  Standard    Time In: 5:20 PM  Time Out: 6:00 PM   Total Billable Time: 40 minutes    SUBJECTIVE     Pt reports: Pt reports she is doing okay today. No pain at the moment     She was more compliant with functional home exercise program given last session.   Response to previous treatment: no concerns   Functional change: no significant changes     Pain: 5/10   Location: right digits   OBJECTIVE     Objective Measures updated at progress report unless specified.    FOTO: 57/100     Treatment     Delores received the treatments listed below:     Therapeutic activities to improve functional performance for 45 minutes, including:    FOTO completed     Stress ball squeezes    -30x    Handwriting practice    -on double lines, Delores wrote her name 15x in both cursive and print    -She tried different  and found that the yellow soft foam  was best for her and made her writing legible    -6/15 legible (all print)     After finding , Delores was tasked with remembering a verbal list of items and writing them down in order that they were said (5 items in each)   -3 lists    -1st list / 1 wrong item - all in order    -2nd  list / 2 wrong in order    -3rd list / 2 wrong in order    -All lists printed    -73% legible     Purdue pegboard with right hand only    -completed 10 with increased time    -frequently dropped pieces and could not pick them back up / swept them back to their hole     For increased bilateral coordination, Delores folded 4 towels, 2 pillow cases, and 3 wash clothes with mod I     Patient Education and Home Exercises      Education provided:   -Fucntional use of right hand   - Progress towards goals   -educated on need to use right upper extremity as much as possible.     Written Home Exercises Provided: yes.  Exercises were reviewed and Delores was able to demonstrate them prior to the end of the session.  Delores demonstrated fair  understanding of the HEP provided. See EMR under Patient Instructions for exercises provided during therapy sessions.       Assessment     Pt would continue to benefit from skilled OT. She increased her score from 50/100 to 57/100 with FOTO.  Today, the UBE was not completed at patient's request. She was able to find a pen/pencil  that assists in the legibility of her writing. She was given a foam handle to take home with her so she can practice her writing. For more handwriting and cognitive task practice, she was asked to listen to a list, remember them and write them down in order. She frequently got items wrong in the middle of the list and they were usually flip flopped with the item above or below. She presents with difficulty with grasping and manipulating small objects (pegboard). Laundry folding - Mod I and no difficulty. She would benefit from continued range of motion and coordination activities.     Delores is progressing well towards her goals and there are no updates to goals at this time. Pt prognosis is Good.     Pt will continue to benefit from skilled outpatient occupational therapy to address the deficits listed in the problem list on initial evaluation provide pt/family  education and to maximize pt's level of independence in the home and community environment.     Pt's spiritual, cultural and educational needs considered and pt agreeable to plan of care and goals.    Anticipated barriers to occupational therapy: transportation, motivation  pain    Goals:  Short Term Goals: 4 weeks   Pt will be independent with initial HEP. (progressing 10/31/2022)   Pt will be able to open 5/5 medication bottles on the 1st attempt. (progressing 10/31/2022)   Independent donning bra with modified techniques. (progressing 10/31/2022)   Pt will be able to print name x1 with 100% legibility. (progressing 10/31/2022)      Short Term Goals: 8 weeks  Pt will be able to complete the 9HPT in less than 2 minutes to increase fine motor coordination (FMC) in the right upper extremity.  (progressing 10/31/2022)      Long Term Goals: 12 weeks   Pt will be independent with BM hygiene with her preferred technique. (progressing 10/31/2022)   Pt will be able to complete the 9HPT in less than 2 minutes to increase fine motor coordination (FMC). (progressing 10/31/2022)   Pt will score a 35 or better on the box & block assessment to increase gross motor coordination (GMC) with the left upper extremity. (progressing 10/31/2022)   Pt will demonstrate 120* AROM in shoulder flexion & abduction to increase independence with overhead reaching. (progressing 10/31/2022)   Mod I all BADLs. (progressing 10/31/2022)   Additional functional goals to be added as pt progresses and per her priorities.    PLAN   Certification Period/Plan of care expiration: 9/28/2022 to 12/23/2022.     Outpatient Occupational Therapy 2 times weekly for 12 weeks to include the following interventions: Manual Therapy, Moist Heat/ Ice, Neuromuscular Re-ed, Patient Education, Self Care, Therapeutic Activities, and Therapeutic Exercise, and any other treatment modalities that will facilitate Delores Fox's ability to reach her goals.    Next Session:  scapular depression & retraction; supine overhead movement; functional use right upper extremity.     Kerrie Contreras, OT

## 2022-10-31 NOTE — PROGRESS NOTES
[facility]  Speech Therapy Treatment Note- Neurological Rehabilitation  Date: 10/31/2022     Name: Delores Fox   MRN: [id number]   Therapy Diagnosis:   Encounter Diagnosis   Name Primary?    Impaired communication with impaired cognition Yes     Physician: Tomy Rios MD  Physician Orders:  Ambulatory Referral to Speech Therapy   Medical Diagnosis:  I63.9 (ICD-10-CM) - Cerebrovascular accident (CVA), unspecified mechanism    Visit #/ Visits Authorized: 4/ 8  Date of Evaluation:  10/10/2022   Insurance Authorization Period: 10/17/2022 - 12/17/2022  Plan of Care Expiration Date:    12/9/2022   Extended Plan of Care:  n/a   Progress Note: 11/11/2022   Visits Cancelled: 0  Visits No Show: 0    Time In:  4:30  Time Out:  5:15  Total Billable Time: 45     Precautions: Standard  Subjective:   Patient reports: Patient pleasant.   Patient irritated in beginning of session due to hardships in her current relationship.  She was compliant to home exercise program.   Response to previous treatment: none   Pain Scale:  0/10 on a Visual Analog Scale currently.   Pain Location: no pain indicated throughout session    Objective:   TIMED  Procedure Min.   Cognitive Therapeutic Interventions, first 15 minutes CPT 57862  15         UNTIMED  Procedure Min.   Speech Sound evaluation  30   Total Timed Units: 1  Total Untimed Units: 1  Charges Billed/Number of units: 2    Short Term Goals: (8 weeks) Current Progress:   Patient will complete a motor speech assessment to determine severity of dysarthria.    Progressing/ Not Met 10/31/2022   Goal Met    2. Patient will recall the first names of 4/5 people at 3-5 minute delay with 90% accuracy and minimal cues.      Progressing/ Not Met 10/31/2022   Patient able to recall auditorily presented list of names with 80% accuracy independently. Given minimal phonemic cues patient achieved 100% accuracy.        Met x1   3. Patient will recall 3/4 memory strategies  independently 3 times overall.     Progressing/ Not Met 10/31/2022   Patient recalled 4/4 memory strategies independently.     Met x2   4. Patient will complete moderate level auditory/visual memory tasks with 80% accuracy independently.      Progressing/ Not Met 10/31/2022   Not addressed in today's session.     5. Patient will complete selective/alternating attention tasks in a distracting environment with 90% accuracy independently.     Progressing/ Not Met 10/31/2022   Patient alternated between left sided list and scattered stimuli with 60% accuracy moderate cues.    Patient required repetition of directions and cues to utilize check offs as she went to best keep her place in word list.    Patient was unable to generate strategy to keep herself organized.       Met x2   6. Patient will complete moderate level problem solving tasks with 80% accuracy independently.     Progressing/ Not Met 10/31/2022   Patient completed medication management with 60% accuracy independently. Given max hand over hand assist patient improved to 100% accuracy.         Met x1   7.  Patient will follow two-three step complex verbal commands with 80% accuracy following nonverbal cues and requesting repetitions as needed.    Progressing/ Not Met 10/31/2022   Not addressed in today's session.    Patient seen with difficulty in following direction in given tasks throughout the session.       Patient Education/Response:   Clear speech strategies  Spontaneous recovery following stroke  Utilization of other materials to increase accuracy given task (ex: a blank sheet of paper to eliminate majority of field in cancellation task)    Home program established: yes-utilize clear speech strategies throughout entirety of day; especially when fatigued  Exercises were reviewed and Delores was able to demonstrate them prior to the end of the session.  Delores demonstrated good  understanding of the education provided.     See Electronic Medical Record  under Patient Instructions for exercises provided throughout therapy.  Assessment:   Delores is progressing well towards her goals. Delores participated well today in today's session which focused on problem solving, attention, and meta cognitive strategies. Today strengths were noted in patients ability to recall strategies. Deficits were seen in attention to task and her ability to generate strategies to assist in improvement of tasks. Note: Patient encouraged to go out to the store and attempt to utilize WRAP strategies. Patient continues to appear unmotivated in daily life and reports hardships with boyfriend of 35 years. Current goals remain appropriate. Goals to be updated as necessary.     Patient prognosis is Good. Patient will continue to benefit from skilled outpatient speech and language therapy to address the deficits listed in the problem list on initial evaluation, provide patient/family education and to maximize patient's level of independence in the home and community environment.   Medical necessity is demonstrated by the following IMPAIRMENTS:  Deficits in executive functioning and memory prevent the patient performing her work and personal daily activities effectively and efficiently which may put her at risk of unsafe behavior and a decline in quality of life.   Barriers to Therapy: none  Patient's spiritual, cultural and educational needs considered and patient agreeable to plan of care and goals.  Plan:   Continue Plan of Care with focus on cognitive communication. Refer to ENTMAGDALENE Oliva-SLP   10/31/2022

## 2022-11-01 ENCOUNTER — CLINICAL SUPPORT (OUTPATIENT)
Dept: REHABILITATION | Facility: HOSPITAL | Age: 57
End: 2022-11-01
Payer: MEDICAID

## 2022-11-01 DIAGNOSIS — R68.89 IMPAIRED FUNCTION OF UPPER EXTREMITY: Primary | ICD-10-CM

## 2022-11-01 DIAGNOSIS — I69.398 LACK OF COORDINATION AS LATE EFFECT OF CEREBROVASCULAR ACCIDENT (CVA): ICD-10-CM

## 2022-11-01 DIAGNOSIS — Z78.9 IMPAIRED INSTRUMENTAL ACTIVITIES OF DAILY LIVING (IADL): ICD-10-CM

## 2022-11-01 DIAGNOSIS — F80.9 IMPAIRED COMMUNICATION WITH IMPAIRED COGNITION: Primary | ICD-10-CM

## 2022-11-01 DIAGNOSIS — F09 IMPAIRED COMMUNICATION WITH IMPAIRED COGNITION: Primary | ICD-10-CM

## 2022-11-01 DIAGNOSIS — R27.9 LACK OF COORDINATION AS LATE EFFECT OF CEREBROVASCULAR ACCIDENT (CVA): ICD-10-CM

## 2022-11-01 PROCEDURE — 97530 THERAPEUTIC ACTIVITIES: CPT | Mod: PN

## 2022-11-01 PROCEDURE — 92507 TX SP LANG VOICE COMM INDIV: CPT | Mod: PN

## 2022-11-01 NOTE — PROGRESS NOTES
"  [facility]  Speech Therapy Treatment Note- Neurological Rehabilitation  Date: 11/1/2022     Name: Delores Fox   MRN: [id number]   Therapy Diagnosis:   Encounter Diagnosis   Name Primary?    Impaired communication with impaired cognition Yes     Physician: Tomy Rios MD  Physician Orders:  Ambulatory Referral to Speech Therapy   Medical Diagnosis:  I63.9 (ICD-10-CM) - Cerebrovascular accident (CVA), unspecified mechanism    Visit #/ Visits Authorized: 6/ 8  Date of Evaluation:  10/10/2022   Insurance Authorization Period: 10/17/2022 - 12/17/2022  Plan of Care Expiration Date:    12/9/2022   Extended Plan of Care:  n/a   Progress Note: 11/11/2022   Visits Cancelled: 0  Visits No Show: 0    Time In:  1415  Time Out:  1500  Total Billable Time: 45     Precautions: Standard  Subjective:   Patient reports: Patient pleasant. States strong desire to drive. States she doesn't do much  throughout the day and she doesn't note any reasons her memory limits her. She states "she has been faking he memory problem." With probing, patient  states the stroke just made her slower- patient unable to list ways this would interfere with driving.  She was compliant to home exercise program.   Response to previous treatment: none   Pain Scale:  0/10 on a Visual Analog Scale currently.   Pain Location: no pain indicated throughout session    Objective:       UNTIMED  Procedure Min.   Speech- Language- Voice Therapy  45   Total Timed Units: 0  Total Untimed Units: 1  Charges Billed/Number of units: 1      Short Term Goals: (8 weeks) Current Progress:   Patient will complete a motor speech assessment to determine severity of dysarthria. Goal Met    2. Patient will recall the first names of 4/5 people at 3-5 minute delay with 90% accuracy and minimal cues.      Progressing/ Not Met 11/1/2022   Patient recalled the names and faces of 4 people at 3 minute delay with 100% accuracy independently.    To increase " difficulty- Patient recalled the names and faces of 5 people at 5 minute delay with 40% accuracy independently, 60% given moderate cues     Met x1   3. Patient will recall 3/4 memory strategies independently 3 times overall.    Patient recalled 3/4 memory strategies independently, 4/4 given minimal cues      Goal Met 2022     4. Patient will complete moderate level auditory/visual memory tasks with 80% accuracy independently.      Progressing/ Not Met 2022   Not addressed in today's session.     5. Patient will complete selective/alternating attention tasks in a distracting environment with 90% accuracy independently.     Progressing/ Not Met 2022   Patient alternated between 2 tasks every 25 seconds:  Task 1: Patient completed symbol cancellation task with 1 targeted stimuli in a field of 300 with 90% accuracy independently, 100% accuracy given minimal cues  Task 2: Patient completed number cancellation task with 1 targeted stimuli in a field of 300 with 100% accuracy independently    This was completed in a quiet environment.     Patient rated the task a 3 on The Relative Scale of Cognitive Load.        Met x2   6. Patient will complete moderate level problem solving tasks with 80% accuracy independently.     Progressing/ Not Met 2022   Patient completed moderate level time calculations with 20% accuracy independently secondary to decreased attention to written directions, 100% accuracy given maximum cues.     Met x1   7.  Patient will follow two-three step complex verbal commands with 80% accuracy following nonverbal cues and requesting repetitions as needed.    Progressing/ Not Met 2022   3 unit- 100% accuracy independently   Multiunit- 70% accuracy independently, 100% given minimal cues.        Patient Education/Response:   Patient educated regarding the followin. Patient educated regarding deficits addressed with therapy    Patient verbalized understanding to all above education  provided.     Home program established: yes-utilize clear speech strategies throughout entirety of day; especially when fatigued  Exercises were reviewed and Delores was able to demonstrate them prior to the end of the session.  Delores demonstrated good  understanding of the education provided.     See Electronic Medical Record under Patient Instructions for exercises provided throughout therapy.  Assessment:   Delores is progressing well towards her goals. Delores participated well today in today's session which focused on problem solving, attention, and meta cognitive strategies. Today strengths were noted in patients ability to recall strategies. Deficits remain in her awareness of deficits and the role they play in her daily functioning. Patient is frustrated regarding not being able to drive currently- but required cues to identify safety risks.  Current goals remain appropriate. Goals to be updated as necessary.     Patient prognosis is Good. Patient will continue to benefit from skilled outpatient speech and language therapy to address the deficits listed in the problem list on initial evaluation, provide patient/family education and to maximize patient's level of independence in the home and community environment.   Medical necessity is demonstrated by the following IMPAIRMENTS:  Deficits in executive functioning and memory prevent the patient performing her work and personal daily activities effectively and efficiently which may put her at risk of unsafe behavior and a decline in quality of life.   Barriers to Therapy: none  Patient's spiritual, cultural and educational needs considered and patient agreeable to plan of care and goals.  Plan:   Continue Plan of Care with focus on cognitive communication. Refer to CHARISSE Granda CCC-SLP   11/1/2022

## 2022-11-01 NOTE — PROGRESS NOTES
[facility]  Occupational Therapy Treatment Note    Date: 11/1/2022  Name: Delores Fox  Clinic Number: [id number]    Therapy Diagnosis:   Encounter Diagnoses   Name Primary?    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)     Impaired instrumental activities of daily living (IADL)      Physician: Lorenzo Mason MD    Physician Orders: OT eval & treat  Medical Diagnosis: Stroke  Evaluation Date: 9/28/2022  Progress Notes Due: 10/28/22, 11/25/22  Plan of Care Expiration Period: 12/23/2022  Insurance Authorization period Expiration: 10/28/22  Date of Return to MD: cardiology on 10/21   Visit # / Visits Authorized: 7 / 16 + eval     FOTO: 50/100 initial assessment  57/100 (10/31/2022)     Precautions:  Standard    Time In: 2:57 PM  Time Out: 3:42 PM   Total Billable Time: 45 minutes    SUBJECTIVE     Pt reports: Pt reports she is doing okay today. She reports not having any pain until she uses her right upper extremity.     She was more compliant with functional home exercise program given last session.   Response to previous treatment: no concerns   Functional change: no significant changes     Pain: 0/10   Location: NA   OBJECTIVE     Objective Measures updated at progress report unless specified.    Treatment     Delores received the treatments listed below:     Therapeutic activities to improve functional performance for 45 minutes, including:    Stress ball squeezes   -30x    Handwriting activity    -Instructed to right her name / she chose to complete it 3x in print with 4x in cursive - when writing her name in cursive her first name is legible, but her last name is not    -used the remaining 7 lines to write sentences while printing, which was 60% legible / this required increased time to complete due to her figuring out what she wanted to write     Green putty squeezes   -20x    Serial opposition with green putty  log    -completed on 4 logs     Seated bilateral shoulder flexion    -difficulty with fluidity of movement and elbow extension     Bilateral scapular retraction    -2x10    Bilateral scapular protraction    -2x10   -min verbal cues required for correct form   Patient Education and Home Exercises      Education provided:   -Fucntional use of right hand   - Progress towards goals   -educated on need to use right upper extremity as much as possible.     Written Home Exercises Provided: yes.  Exercises were reviewed and Delores was able to demonstrate them prior to the end of the session.  Delores demonstrated fair  understanding of the HEP provided. See EMR under Patient Instructions for exercises provided during therapy sessions.       Assessment     Pt would continue to benefit from skilled OT. Today, the UBE was not completed at patient's request. Handwriting should continue to be addressed. When printing her name, it is legible. When printing sentences, it is ~60% legible. When signing her name, her last name is not legible. She completed hand strengthening exercises today with min difficulty with serial opposition (ring and little finger). Shoulder and scapular range of motion completed for increased coordination of movement when overhead and forward reaching. She required min verbal cues for correct form and elbow extension. She would benefit from continued range of motion and coordination activities.     Delores is progressing well towards her goals and there are no updates to goals at this time. Pt prognosis is Good.     Pt will continue to benefit from skilled outpatient occupational therapy to address the deficits listed in the problem list on initial evaluation provide pt/family education and to maximize pt's level of independence in the home and community environment.     Pt's spiritual, cultural and educational needs considered and pt agreeable to plan of care and goals.    Anticipated barriers to occupational  therapy: transportation, motivation  pain    Goals:  Short Term Goals: 4 weeks   Pt will be independent with initial HEP. (progressing 11/1/2022)   Pt will be able to open 5/5 medication bottles on the 1st attempt. (progressing 11/1/2022)   Independent donning bra with modified techniques. (progressing 11/1/2022)   Pt will be able to print name x1 with 100% legibility. (progressing 11/1/2022)      Short Term Goals: 8 weeks  Pt will be able to complete the 9HPT in less than 2 minutes to increase fine motor coordination (FMC) in the right upper extremity.  (progressing 11/1/2022)      Long Term Goals: 12 weeks   Pt will be independent with BM hygiene with her preferred technique. (progressing 11/1/2022)   Pt will be able to complete the 9HPT in less than 2 minutes to increase fine motor coordination (FMC). (progressing 11/1/2022)   Pt will score a 35 or better on the box & block assessment to increase gross motor coordination (GMC) with the left upper extremity. (progressing 11/1/2022)   Pt will demonstrate 120* AROM in shoulder flexion & abduction to increase independence with overhead reaching. (progressing 11/1/2022)   Mod I all BADLs. (progressing 11/1/2022)   Additional functional goals to be added as pt progresses and per her priorities.    PLAN   Certification Period/Plan of care expiration: 9/28/2022 to 12/23/2022.     Outpatient Occupational Therapy 2 times weekly for 12 weeks to include the following interventions: Manual Therapy, Moist Heat/ Ice, Neuromuscular Re-ed, Patient Education, Self Care, Therapeutic Activities, and Therapeutic Exercise, and any other treatment modalities that will facilitate Delores Fox's ability to reach her goals.    Next Session: scapular depression & retraction; supine overhead movement; functional use right upper extremity.     Kerrie Contreras OT

## 2022-11-02 NOTE — PROGRESS NOTES
"  [facility]  Speech Therapy Treatment Note- Neurological Rehabilitation  Date: 11/3/2022     Name: Delores Fox   MRN: [id number]   Therapy Diagnosis:   Encounter Diagnosis   Name Primary?    Impaired communication with impaired cognition Yes     Physician: Tomy Rios MD  Physician Orders:  Ambulatory Referral to Speech Therapy   Medical Diagnosis:  I63.9 (ICD-10-CM) - Cerebrovascular accident (CVA), unspecified mechanism    Visit #/ Visits Authorized: 7/ 8  Date of Evaluation:  10/10/2022   Insurance Authorization Period: 10/17/2022 - 12/17/2022  Plan of Care Expiration Date:    12/9/2022   Extended Plan of Care:  n/a   Progress Note: 11/11/2022   Visits Cancelled: 0  Visits No Show: 0    Time In:  3:45  Time Out:  4:30  Total Billable Time: 45     Precautions: Standard  Subjective:   Patient reports: Patient pleasant. Patient says, "She had an amazing time going to the store." She reports it was her first time in months going to the store.  She was compliant to home exercise program.   Response to previous treatment: none   Pain Scale:  0/10 on a Visual Analog Scale currently.   Pain Location: no pain indicated throughout session    Objective:       UNTIMED  Procedure Min.   Speech- Language- Voice Therapy  45   Total Timed Units: 0  Total Untimed Units: 1  Charges Billed/Number of units: 1      Short Term Goals: (8 weeks) Current Progress:   Patient will complete a motor speech assessment to determine severity of dysarthria. Goal Met    2. Patient will recall the first names of 4/5 people at 3-5 minute delay with 80% accuracy and minimal cues.      Progressing/ Not Met 11/3/2022   Patient recalled random items around the room with 80% accuracy independently.    Met x2   3. Patient will recall 3/4 memory strategies independently 3 times overall.    Patient recalled 3/4 memory strategies independently, 4/4 given minimal cues      Goal Met 11/1/2022     4. Patient will complete moderate " level auditory/visual memory tasks with 80% accuracy independently.      Progressing/ Not Met 11/3/2022   Patient was shown a visual scene and was able to recall details following a 5 minute delay with 65% accuracy independently       5. Patient will complete selective/alternating attention tasks in a distracting environment with 90% accuracy independently.     Goal Met 11/3/2022     Patient alternated between a low level sequencing list at the top of a page and multiple stimuli at the bottom portion of the page and was able to adequately connect the scattered stimuli in the proper sequence with 100% accuracy independently         Met x3   6. Patient will complete moderate level problem solving tasks with 80% accuracy independently.     Progressing/ Not Met 11/3/2022   Patient completed moderate level deductive reasoning task with 63% accuracy independently       Met x1   7.  Patient will follow two-three step complex verbal commands with 80% accuracy following nonverbal cues and requesting repetitions as needed.    Progressing/ Not Met 11/3/2022   Not addressed in today's session.         Patient Education/Response:   Patient educated regarding the followin. Patient educated regarding deficits addressed with therapy  2. Patient progress, deficits, and motivation    Patient verbalized understanding to all above education provided.     Home program established: yes-utilize clear speech strategies throughout entirety of day; especially when fatigued  Exercises were reviewed and Delores was able to demonstrate them prior to the end of the session.  Delores demonstrated good  understanding of the education provided.     See Electronic Medical Record under Patient Instructions for exercises provided throughout therapy.  Assessment:   Delores participated well today in today's session which focused memory recall, attention, moderate level problem solving, and meta cognitive strategies. Patient with improvement with  "alternating attention task. Deficits remain in her awareness of deficits. Patient continues to express her frustration regarding not being able to drive-  and required cues to identify safety risks. SLP and patient discussed patient motivation in ST; patient voiced, "I really don't want to be in speech therapy, my speech is fine." SLP educated patient further on SLP scope of practice. Patient without buy in to speech therapy and lacks motivation at this time. Patient request discharge for ST. SLP reviewed strategies that the patient can continue to benefit from; patient with appreciation of education and SLPs support.     Patient prognosis is Good. Patient no longer benefits from skilled outpatient speech and language therapy to address the deficits listed in the problem list on initial evaluation, provide patient/family education and to maximize patient's level of independence in the home and community environment.   Medical necessity is demonstrated by the following IMPAIRMENTS:  Deficits in executive functioning and memory prevent the patient performing her work and personal daily activities effectively and efficiently which may put her at risk of unsafe behavior and a decline in quality of life.   Barriers to Therapy: none  Patient's spiritual, cultural and educational needs considered and patient agreeable to plan of care and goals.  Plan:   Discontinue Plan of Care with focus on cognitive communication.     Susan Martinez CF-SLP   11/3/2022      "

## 2022-11-03 ENCOUNTER — CLINICAL SUPPORT (OUTPATIENT)
Dept: REHABILITATION | Facility: HOSPITAL | Age: 57
End: 2022-11-03
Payer: MEDICAID

## 2022-11-03 DIAGNOSIS — F09 IMPAIRED COMMUNICATION WITH IMPAIRED COGNITION: Primary | ICD-10-CM

## 2022-11-03 DIAGNOSIS — I69.398 LACK OF COORDINATION AS LATE EFFECT OF CEREBROVASCULAR ACCIDENT (CVA): ICD-10-CM

## 2022-11-03 DIAGNOSIS — R68.89 IMPAIRED FUNCTION OF UPPER EXTREMITY: Primary | ICD-10-CM

## 2022-11-03 DIAGNOSIS — R27.9 LACK OF COORDINATION AS LATE EFFECT OF CEREBROVASCULAR ACCIDENT (CVA): ICD-10-CM

## 2022-11-03 DIAGNOSIS — F80.9 IMPAIRED COMMUNICATION WITH IMPAIRED COGNITION: Primary | ICD-10-CM

## 2022-11-03 DIAGNOSIS — Z78.9 IMPAIRED INSTRUMENTAL ACTIVITIES OF DAILY LIVING (IADL): ICD-10-CM

## 2022-11-03 PROCEDURE — 97530 THERAPEUTIC ACTIVITIES: CPT | Mod: PN

## 2022-11-03 PROCEDURE — 92507 TX SP LANG VOICE COMM INDIV: CPT | Mod: PN

## 2022-11-03 NOTE — PROGRESS NOTES
[facility]  Occupational Therapy Treatment Note    Date: 11/3/2022  Name: Delores Fox  Clinic Number: [id number]    Therapy Diagnosis:   Encounter Diagnoses   Name Primary?    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)     Impaired instrumental activities of daily living (IADL)        Physician: Lorenzo Mason MD    Physician Orders: OT eval & treat  Medical Diagnosis: Stroke  Evaluation Date: 9/28/2022  Progress Notes Due: 10/28/22, 11/25/22  Plan of Care Expiration Period: 12/23/2022  Insurance Authorization period Expiration: 10/28/22  Date of Return to MD: cardiology on 10/21   Visit # / Visits Authorized: 8 / 16 + eval     FOTO: 50/100 initial assessment  57/100 (10/31/2022)     Precautions:  Standard    Time In: 3:00 PM  Time Out: 3:45 PM   Total Billable Time: 45 minutes    SUBJECTIVE     Pt reports: no complaints today     She was more compliant with functional home exercise program given last session.   Response to previous treatment: no concerns   Functional change: no significant changes     Pain: 3/10   Location: Right hand   OBJECTIVE     Objective Measures updated at progress report unless specified.    Treatment     Delores received the treatments listed below:     Therapeutic activities to improve functional performance for 45 minutes, including:    Stress ball squeezes   -30x    Crumpled paper with right hand for increased fine motor coordination    -used small torn pieces    -First 4 pieces were small and in a tight ball, they then became larger and loose, she went back after she had crumpled to make the torn pieces smaller   -When she finished, she was instructed to use her bilateral hands to flatten them back out of their crumbles    -Activity required increased time to complete     Walk around clinic with 5 lbs in grocery bag in right hand    -2 lbs was too light, added 3lb dumbbell - Pt stated  she thought that was the max she could carry    -stood and walked for 3 minutes and 42 total with a seated break in between ~1 minute   -in total, activity lasted ~5 minutes    Wrote name in print 1x on a line (blank paper)    -100% legible    -goal met      Took medication caps off bottles 5x with left hand independently and 5x right hand independently    -goal met     Wringing wet wash cloth    -3x right hand only    -3x right hand twist with left hand     Discussion of goals (met versus not met)   Patient Education and Home Exercises      Education provided:   -Fucntional use of right hand   - Progress towards goals   -educated on need to use right upper extremity as much as possible.     Written Home Exercises Provided: yes.  Exercises were reviewed and Delores was able to demonstrate them prior to the end of the session.  Delores demonstrated fair  understanding of the HEP provided. See EMR under Patient Instructions for exercises provided during therapy sessions.       Assessment     Pt would continue to benefit from skilled OT. Delores has stated she does not like to upper body ergometer and does not want to do it anymore. She was able to crumple paper into small balls with min verbal cues and encouragement to complete. 3 minutes and 42 seconds were spent actively walking and holding 5 lb grocery bag for ease of being able to help bring in groceries. She presented with decreased strength when squeezing wet wash cloth as it stayed pretty saturated after squeezes. She was able to meet 2 goals today pertaining to handwriting and medication management. She would benefit from continued range of motion and coordination activities.     Delores is progressing well towards her goals and there are no updates to goals at this time. Pt prognosis is Good.     Pt will continue to benefit from skilled outpatient occupational therapy to address the deficits listed in the problem list on initial evaluation provide pt/family education  and to maximize pt's level of independence in the home and community environment.     Pt's spiritual, cultural and educational needs considered and pt agreeable to plan of care and goals.    Anticipated barriers to occupational therapy: transportation, motivation  pain    Goals:  Short Term Goals: 4 weeks   Pt will be independent with initial HEP. (progressing 11/3/2022)   Pt will be able to open 5/5 medication bottles on the 1st attempt. Met 11/3/2022  Independent donning bra with modified techniques - not addressed - does not wear bra   Pt will be able to print name x1 with 100% legibility. Met 11/3/2022     Short Term Goals: 8 weeks  Pt will be able to complete the 9HPT in less than 2 minutes to increase fine motor coordination (FMC) in the right upper extremity.  (progressing 11/3/2022)      Long Term Goals: 12 weeks   Pt will be independent with BM hygiene with her preferred technique. (progressing 11/3/2022)   Pt will be able to complete the 9HPT in less than 2 minutes to increase fine motor coordination (FMC). (progressing 11/3/2022)   Pt will score a 35 or better on the box & block assessment to increase gross motor coordination (GMC) with the left upper extremity. (progressing 11/3/2022)   Pt will demonstrate 120* AROM in shoulder flexion & abduction to increase independence with overhead reaching. (progressing 11/3/2022)   Mod I all BADLs. (progressing 11/3/2022)   Additional functional goals to be added as pt progresses and per her priorities.    PLAN   Certification Period/Plan of care expiration: 9/28/2022 to 12/23/2022.     Outpatient Occupational Therapy 2 times weekly for 12 weeks to include the following interventions: Manual Therapy, Moist Heat/ Ice, Neuromuscular Re-ed, Patient Education, Self Care, Therapeutic Activities, and Therapeutic Exercise, and any other treatment modalities that will facilitate Delores Fox's ability to reach her goals.    Next Session: scapular depression &  retraction; supine overhead movement; functional use right upper extremity.     Kerrie Contreras, OT

## 2022-11-04 NOTE — PLAN OF CARE
"    Outpatient Therapy Discharge Summary   Discharge Date: 11/3/2022   Name: Delores Fox  [facility] Number: [id number]  Therapy Diagnosis:   Encounter Diagnosis   Name Primary?    Impaired communication with impaired cognition Yes     Physician: Tomy Rios MD  Physician Orders: Ambulatory Referral to Speech Therapy   Medical Diagnosis: I63.9 (ICD-10-CM) - Cerebrovascular accident (CVA), unspecified mechanism  Evaluation Date: 10/10/2022    Date of Last visit: 11/3/2022  Total Visits Received: 7  Cancelled Visits: 0  No Show Visits: 0    Assessment    Assessment of Current Status: Delores participated well today in today's session which focused memory recall, attention, moderate level problem solving, and meta cognitive strategies. Patient with improvement with alternating attention task. Deficits remain in her awareness of deficits. Patient continues to express her frustration regarding not being able to drive-  and required cues to identify safety risks. SLP and patient discussed patient motivation in ST; patient voiced, "I really don't want to be in speech therapy, my speech is fine." SLP educated patient further on SLP scope of practice. Patient without buy in to speech therapy and lacks motivation at this time. Patient request discharge for ST. SLP reviewed strategies that the patient can continue to benefit from; patient with appreciation of education and SLPs support.      Goals:   Patient will complete a motor speech assessment to determine severity of dysarthria. Goal Met   2.  Patient will recall the first names of 5 people at 3-5 minute delay with 90% accuracy and minimal cues.   3. Patient will recall 3/4 memory strategies independently 3 times overall.Goal Met   4. Patient will complete moderate level auditory/visual memory tasks with 80% accuracy independently.   5. Patient will complete selective/alternating attention tasks in a distracting environment with 90% accuracy independently.Goal Met "   6. Patient will complete moderate level problem solving tasks with 80% accuracy independently.  7. Patient will follow two-three step complex verbal commands with 80% accuracy following nonverbal cues and requesting repetitions as needed.     Long Term Goals:  Patient will increase attention and processing skills with use of appropriate and functional independent strategies.   2. Patient will execute daily activities (work, home, etc) with independent use of strategies. Goal Met         Discharge reason: Patient requested discharge given she feels that she has met her max potential at this time.    Plan   This patient is discharged from Speech Therapy    MAGDALENE Thomas-SLP   11/3/2022

## 2022-11-08 NOTE — ASSESSMENT & PLAN NOTE
Patient is identified as having Systolic (HFrEF) heart failure that is Chronic. CHF is currently controlled. Latest ECHO performed and demonstrates- Results for orders placed during the hospital encounter of 09/16/22    Echo Saline Bubble? Yes    Interpretation Summary  · There is no evidence of intracardiac shunting.  · The left ventricle is moderately enlarged with mild eccentric hypertrophy and moderately decreased systolic function.  · There is severe left ventricular global hypokinesis.  · The estimated ejection fraction is 30%.  · Grade I left ventricular diastolic dysfunction.  · Normal right ventricular size with normal right ventricular systolic function.  · Mild left atrial enlargement.  · Mild mitral regurgitation.  · Mild tricuspid regurgitation.  · Normal central venous pressure (3 mmHg).  · The estimated PA systolic pressure is 31 mmHg.  . Continue Beta Blocker, ACE/ARB and Furosemide and monitor clinical status closely. Monitor on telemetry. Patient is on CHF pathway.  Monitor strict Is&Os and daily weights.  Place on fluid restriction of 1.5 L. Continue to stress to patient importance of self efficacy and  on diet for CHF.

## 2022-11-08 NOTE — ASSESSMENT & PLAN NOTE
Blood pressure is fairly well controlled with the present time and it is 120/76 mmHg continue on Demadex, Toprol-XL 12.5 mg daily and losartan at 25 mg half a tablet daily

## 2022-11-08 NOTE — ASSESSMENT & PLAN NOTE
Cardiomegaly so this factor modification, aspirin and antiplatelet therapy along with anticoagulant therapy with Eliquis 5 mg B b.i.d. and Lipitor 40 mg a day as well as diabetic control.

## 2022-11-08 NOTE — ASSESSMENT & PLAN NOTE
Continue present therapy to include losartan low-dose of 12-,1/2 mg daily as tolerated, Toprol-XL 12.5 mg daily, torsemide and magnesium supplements

## 2022-11-08 NOTE — ASSESSMENT & PLAN NOTE
Maintained on Adipex for therapy with aspirin and she is also on Eliquis at this time continue risk factor modification with statin therapy

## 2022-11-11 ENCOUNTER — CLINICAL SUPPORT (OUTPATIENT)
Dept: REHABILITATION | Facility: HOSPITAL | Age: 57
End: 2022-11-11
Payer: MEDICAID

## 2022-11-11 DIAGNOSIS — I69.398 LACK OF COORDINATION AS LATE EFFECT OF CEREBROVASCULAR ACCIDENT (CVA): ICD-10-CM

## 2022-11-11 DIAGNOSIS — R68.89 IMPAIRED FUNCTION OF UPPER EXTREMITY: Primary | ICD-10-CM

## 2022-11-11 DIAGNOSIS — Z78.9 IMPAIRED INSTRUMENTAL ACTIVITIES OF DAILY LIVING (IADL): ICD-10-CM

## 2022-11-11 DIAGNOSIS — R27.9 LACK OF COORDINATION AS LATE EFFECT OF CEREBROVASCULAR ACCIDENT (CVA): ICD-10-CM

## 2022-11-11 PROCEDURE — 97530 THERAPEUTIC ACTIVITIES: CPT | Mod: PN

## 2022-11-11 NOTE — PROGRESS NOTES
"[facility]  Occupational Therapy Treatment Note and Progress Note     Date: 2022  Name: Delores Fox  Clinic Number: [id number]    Therapy Diagnosis:   Encounter Diagnoses   Name Primary?    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)     Impaired instrumental activities of daily living (IADL)      Physician: Lorenzo Mason MD    Physician Orders: OT eval & treat  Medical Diagnosis: Stroke  Evaluation Date: 2022  Progress note done: 2022  Progress Notes Due: 10/28/22, 22  Plan of Care Expiration Period: 2022  Insurance Authorization period Expiration: 10/28/22  Date of Return to MD: cardiology on 10/21   Visit # / Visits Authorized:  + eval     FOTO: 50/100 initial assessment  57/100 (10/31/2022)     Precautions:  Standard    Time In: 2:15 PM  Time Out: 3:00 PM   Total Billable Time: 45 minutes    SUBJECTIVE     Pt reports: no complaints today. She would like to work on her right  strength.    She was more compliant with functional home exercise program given last session.   Response to previous treatment: no concerns   Functional change: no significant changes     Pain: 3/10   Location: Right hand     AM-PAC 6 CLICK ADL  How much help from another person does this patient currently need?  1 = Unable, Total/Dependent Assistance  2 = A lot, Maximum/Moderate Assistance  3 = A little, Minimum/Contact Guard/Supervision  4 = None, Modified Sullivan/Independent     Eating: 3 assist to cut food         Groomin using the right hand for brushing teeth & hair        UB Dressin  LB Dressin       Bathin, seated on shower chair in tub/shower combo        Toiletin mod I using both upper extremities for hygiene  Total:      AM-PAC Raw Score CMS "G-Code Modifier Level of Impairment Assistance   6 % Total / Unable   7 - 9 CM 80 - 100% Maximal Assist   10-14 " CL 60 - 80% Moderate Assist   15 - 19 CK 40 - 60% Moderate Assist   20 - 22 CJ 20 - 40% Minimal Assist   23 CI 1-20% SBA / CGA   24 CH 0% Independent/ Mod I     OBJECTIVE     Objective Measures updated at progress report unless specified.    Joint Evaluation  AROM   11/11/2022 AROM  9/28/2022 AROM  9/28/2022 PROM   9/28/2022     Right  Left Right Right   Shoulder flex 0-180 109 wfl 100 tba   Shoulder Abd 0-180 131 wfl 70 tba   Shoulder Extension 0-80 67 wfl 30 tba   Functional ER Min decrs Min decr Mod decr nt   Shoulder ER @0*abd 0-90 nt nt nt tba   Shoulder ER @45*abd  0-90 nt nt nt tba   Shoulder ER @90*abd   0-90 nt nt nt tba   SLIR nt nt nt tba   Functional IR Min decr Min decr Mod decr nt   Elbow extension/flexion 0-150 wfl wfl wfl nt   Wrist flex 0-80 wfl wfl wfl nt   Wrist ext 0-70 wfl wfl wfl nt   Supination 0-80 wfl wfl wfl nt   Pronation 0-80 wfl wfl wfl nt     Hand Strength (WALTER Dynamometer, Setting II)   (lbs) Left  11/11/2022  Right  11/11/2022    1 48 16   2 50 20   3 50 19   avg 49.3 12.3   [norms for women aged 55-59: R=57.3 +/-12.5; L=47.3 +/-11.9 (Dreawetz et al, 1985)]    Pinch Left  11/11/2022  Right  11/11/2022    Lateral 15 5   Tip (2 point) 9 2   3 jaw luis 8 4      Box and Block Assessment Left Right   11/11/2022 40 32   9/28/2022 35 15   [norms for women aged 50-54: R=77.7 +/-10.7; L=74.3 +/-9.9 (Mathiowetz et al, 1985)]     Fine motor coordination:   -   Thumb to Finger Opposition: right upper extremity impaired and slow; unable to oppose 5th digit      9 Hole Peg Test (9HPT) Left Right   11/11/2022 32s 57s   9/28/2022 43s unable   [norms for women aged 51-55: R=17.38s +/-1.88s: L=18.92s +/-2.29s (Haven et al, 2003)]   Treatment     Delores received the treatments listed below:     Therapeutic activities to improve functional performance for 45 minutes, including:  See measurements above     Stress ball squeezes   -3x15   -instructed to hold squeeze for 3 seconds before releasing      Yellow putty squeezes 20x    Serial opposition on yellow putty log 3 logs     Lateral pull with yellow putty 20x     Patient Education and Home Exercises      Education provided:   -Fucntional use of right hand   - Progress towards goals   -educated on need to use right upper extremity as much as possible.     Written Home Exercises Provided: yes.  Exercises were reviewed and Delores was able to demonstrate them prior to the end of the session.  Delores demonstrated fair  understanding of the HEP provided. See EMR under Patient Instructions for exercises provided during therapy sessions.       Assessment     Pt would continue to benefit from skilled OT. According to measurements taken today, Delores is slowly making progress with her right upper extremity.She was able to complete the 9 Hole peg today (she was not able to attempt on eval). She increased her range of motion of right upper extremity and gross motor coordination with the box and block. She still presents with decreased fine motor coordination and strength with right hand. She would benefit from continued range of motion and coordination activities.     Delores is progressing well towards her goals and there are no updates to goals at this time. Pt prognosis is Good.     Pt will continue to benefit from skilled outpatient occupational therapy to address the deficits listed in the problem list on initial evaluation provide pt/family education and to maximize pt's level of independence in the home and community environment.     Pt's spiritual, cultural and educational needs considered and pt agreeable to plan of care and goals.    Anticipated barriers to occupational therapy: transportation, motivation  pain    Goals:  Short Term Goals: 4 weeks   Pt will be independent with initial HEP. (progressing 11/11/2022)   Pt will be able to open 5/5 medication bottles on the 1st attempt. Met 11/3/2022  Independent karolina cano with modified techniques - not addressed -  does not wear bra   Pt will be able to print name x1 with 100% legibility. Met 11/3/2022     Short Term Goals: 8 weeks  Pt will be able to complete the 9HPT in less than 2 minutes to increase fine motor coordination (FMC) in the right upper extremity.  (progressing 11/11/2022)      Long Term Goals: 12 weeks   Pt will be independent with BM hygiene with her preferred technique. (progressing 11/11/2022)   Pt will be able to complete the 9HPT in less than 2 minutes to increase fine motor coordination (FMC). (progressing 11/11/2022)   Pt will score a 35 or better on the box & block assessment to increase gross motor coordination (GMC) with the left upper extremity. (progressing 11/11/2022)   Pt will demonstrate 120* AROM in shoulder flexion & abduction to increase independence with overhead reaching. (progressing 11/11/2022)   Mod I all BADLs. (progressing 11/11/2022)   Additional functional goals to be added as pt progresses and per her priorities.    PLAN   Certification Period/Plan of care expiration: 9/28/2022 to 12/23/2022.     Outpatient Occupational Therapy 2 times weekly for 12 weeks to include the following interventions: Manual Therapy, Moist Heat/ Ice, Neuromuscular Re-ed, Patient Education, Self Care, Therapeutic Activities, and Therapeutic Exercise, and any other treatment modalities that will facilitate Delores Fox's ability to reach her goals.    Next Session: scapular depression & retraction; supine overhead movement; functional use right upper extremity.     Kerrie Contreras OT

## 2022-11-11 NOTE — PATIENT INSTRUCTIONS
PUTTY        Squeeze putty in hand trying to keep it round by rotating putty after each squeeze. Push fingers through putty to palm each time.   Avoid pain. 20 squeezes, 3 times per day.       PUTTY ROLL OUT AND SERIAL PINCH           Roll some putty into a ball with your RIGHT hand. Then, roll it into a tube. Gently pinch the putty with the tips of your RIGHT index finger and thumb; repeat down the log with the rest of your digits  Then roll into a ball again and then into a tube again and repeat. Do the entire set 3 times.        PUTTY ROLL OUT AND 3 POINT PINCH           Roll some putty into a ball with your RIGHT hand. Then, roll it into a tube. Gently pinch the putty with the tips of your RIGHT index finger, long finger and thumb; repeat down the section. Then roll into a ball again and then into a tube again and repeat. Do the entire set 3 times.      PUTTY LATERAL PINCH      Hold the putty at the top of your hand. Squeeze the putty between your RIGHT thumb and the side of your 2nd finger as shown. Repeat 20 pinches. 3 times per day.

## 2022-11-15 ENCOUNTER — CLINICAL SUPPORT (OUTPATIENT)
Dept: REHABILITATION | Facility: HOSPITAL | Age: 57
End: 2022-11-15
Payer: MEDICAID

## 2022-11-15 DIAGNOSIS — R68.89 IMPAIRED FUNCTION OF UPPER EXTREMITY: Primary | ICD-10-CM

## 2022-11-15 DIAGNOSIS — R27.9 LACK OF COORDINATION AS LATE EFFECT OF CEREBROVASCULAR ACCIDENT (CVA): ICD-10-CM

## 2022-11-15 DIAGNOSIS — Z78.9 IMPAIRED INSTRUMENTAL ACTIVITIES OF DAILY LIVING (IADL): ICD-10-CM

## 2022-11-15 DIAGNOSIS — I69.398 LACK OF COORDINATION AS LATE EFFECT OF CEREBROVASCULAR ACCIDENT (CVA): ICD-10-CM

## 2022-11-15 PROCEDURE — 97530 THERAPEUTIC ACTIVITIES: CPT | Mod: PN

## 2022-11-15 NOTE — PROGRESS NOTES
"[facility]  Occupational Therapy Treatment Note    Date: 11/15/2022  Name: Delores Fox  Clinic Number: [id number]    Therapy Diagnosis:   Encounter Diagnoses   Name Primary?    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)     Impaired instrumental activities of daily living (IADL)      Physician: Lorenzo Mason MD    Physician Orders: OT eval & treat  Medical Diagnosis: Stroke  Evaluation Date: 9/28/2022  Progress note done: 11/11/2022  Progress Notes Due: 11/25/22  Plan of Care Expiration Period: 12/23/2022  Insurance Authorization period Expiration: 10/28/22  Date of Return to MD: tba  Visit # / Visits Authorized: 10 /16 + eval     FOTO: 50/100 initial assessment  57/100 (10/31/2022)     Precautions:  Standard    Time In: 1545  Time Out: 1628  Total Billable Time: 40 minutes    SUBJECTIVE     Pt reports: no complaints today. She would like to work on her handwriting.     She was more compliant with functional home exercise program given last session.   Response to previous treatment: no concerns   Functional change: no significant changes     Pain: 0/10   Location: Right hand         OBJECTIVE     Objective Measures updated at progress report unless specified.    Treatment     Delores received the treatments listed below:     Therapeutic activities to improve functional performance for 40 minutes, including:  -UBE x5 min forward/ 5 min back w/ spencer UE, level 2.5, seated to increase spencer UE act patricia and UB strength. She was encouraged to keep rate of perceived exertion (RPE) at "easy to moderate" (3-4/10). Grier avg=12, peak grier=20. Pt's reported RPE= 6/10.    Handwriting with a standard pen, "the pencil ," and yellow foam. She reported the most comfort with the yellow-foam (which has previously been issued to her for use at home." Worked on the "loop group" to large lined paper. Patient was encouraged to touch " "both the bottom & top lines with her capital letters and remember to take time to make her letters "pretty" even if it takes a long time. In total, she wrote about 3/4 page with significantly increased time. Blank lined paper sent home with patient so she could continue working on handwriting.    Patient Education and Home Exercises      Education provided:   -Fucntional use of right hand   - Progress towards goals   -educated on need to use right upper extremity as much as possible.      Written Home Exercises Provided: Patient instructed to cont prior HEP; work on handwriting.  Exercises were reviewed and Delores was able to demonstrate them prior to the end of the session.  Delores demonstrated fair  understanding of the HEP provided. See EMR under Patient Instructions for exercises provided during therapy sessions.       Assessment     Pt would continue to benefit from skilled OT. Delores did much better with handwriting today once she committed to writing slowly and focusing on the form of her letters. Continued focus on form should lead to increased speed, though not immediately. She needs to continue to focus on the process of handwriting and the form of her letters to improve her handwriting overall. She still presents with decreased fine motor coordination and strength with right hand. She would benefit from continued range of motion and coordination activities.     Delores is progressing well towards her goals and there are no updates to goals at this time. Pt prognosis is Good.     Pt will continue to benefit from skilled outpatient occupational therapy to address the deficits listed in the problem list on initial evaluation provide pt/family education and to maximize pt's level of independence in the home and community environment.     Pt's spiritual, cultural and educational needs considered and pt agreeable to plan of care and goals.    Anticipated barriers to occupational therapy: transportation, motivation  " pain    Goals:  Short Term Goals: 4 weeks   Pt will be independent with initial HEP. (progressing 11/15/2022)   Pt will be able to open 5/5 medication bottles on the 1st attempt. Met 11/3/2022  Independent donning bra with modified techniques - not addressed - does not wear bra   Pt will be able to print name x1 with 100% legibility. Met 11/3/2022     Short Term Goals: 8 weeks  Pt will be able to complete the 9HPT in less than 2 minutes to increase fine motor coordination (FMC) in the right upper extremity.  (MET 11/11/2022)       Long Term Goals: 12 weeks   Pt will be independent with BM hygiene with her preferred technique. (progressing 11/15/2022)   Pt will be able to complete the 9HPT in less than 2 minutes to increase fine motor coordination (FMC). (MET 11/11/2022)    Upgraded 11/15/2022) in less than 49s  (progressing 11/15/2022)   Pt will score a 35 or better on the box & block assessment to increase gross motor coordination (GMC) with the right upper extremity. (progressing 11/15/2022)   Pt will demonstrate 120* AROM in shoulder flexion & abduction to increase independence with overhead reaching. (progressing 11/15/2022)   Mod I all BADLs. (progressing 11/15/2022)   Additional functional goals to be added as pt progresses and per her priorities.    PLAN   Certification Period/Plan of care expiration: 9/28/2022 to 12/23/2022.     Outpatient Occupational Therapy 2 times weekly for 12 weeks to include the following interventions: Manual Therapy, Moist Heat/ Ice, Neuromuscular Re-ed, Patient Education, Self Care, Therapeutic Activities, and Therapeutic Exercise, and any other treatment modalities that will facilitate Delores Fox's ability to reach her goals.    Next Session: scapular depression & retraction; supine overhead movement; functional use right upper extremity; handwriting    Demetria Feng OT

## 2022-11-17 ENCOUNTER — CLINICAL SUPPORT (OUTPATIENT)
Dept: REHABILITATION | Facility: HOSPITAL | Age: 57
End: 2022-11-17
Payer: MEDICAID

## 2022-11-17 DIAGNOSIS — Z78.9 IMPAIRED INSTRUMENTAL ACTIVITIES OF DAILY LIVING (IADL): ICD-10-CM

## 2022-11-17 DIAGNOSIS — I69.398 LACK OF COORDINATION AS LATE EFFECT OF CEREBROVASCULAR ACCIDENT (CVA): ICD-10-CM

## 2022-11-17 DIAGNOSIS — R68.89 IMPAIRED FUNCTION OF UPPER EXTREMITY: Primary | ICD-10-CM

## 2022-11-17 DIAGNOSIS — R27.9 LACK OF COORDINATION AS LATE EFFECT OF CEREBROVASCULAR ACCIDENT (CVA): ICD-10-CM

## 2022-11-17 PROCEDURE — 97530 THERAPEUTIC ACTIVITIES: CPT | Mod: PN

## 2022-11-17 NOTE — PROGRESS NOTES
"[facility]  Occupational Therapy Treatment Note    Date: 11/17/2022  Name: Delores Fox  Clinic Number: [id number]    Therapy Diagnosis:   Encounter Diagnoses   Name Primary?    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)     Impaired instrumental activities of daily living (IADL)      Physician: Lorenzo Mason MD    Physician Orders: OT eval & treat  Medical Diagnosis: Stroke  Evaluation Date: 9/28/2022  Progress note done: 11/11/2022  Progress Notes Due: 11/25/22  Plan of Care Expiration Period: 12/23/2022  Insurance Authorization period Expiration: 10/28/22  Date of Return to MD: tba  Visit # / Visits Authorized: 11 /16 + eval     FOTO: 50/100 initial assessment  57/100 (10/31/2022)     Precautions:  Standard    Time In: 3:00 PM   Time Out: 3:45 PM   Total Billable Time: 45 minutes    SUBJECTIVE     Pt reports: no complaints today. She states she is doing okay.     She was more compliant with functional home exercise program given last session.   Response to previous treatment: no concerns   Functional change: no significant changes     Pain: 0/10   Location: Right hand     OBJECTIVE     Objective Measures updated at progress report unless specified.    Treatment     Delores received the treatments listed below:     Therapeutic activities to improve functional performance for 45 minutes, including:  -UBE x5 min forward/ 5 min back w/ spencer UE, level 2.5, seated to increase spencer UE act patricia and UB strength. She was encouraged to keep rate of perceived exertion (RPE) at "easy to moderate" (3-4/10). Grier avg=12, peak grier=24. Pt's reported RPE= 6/10.    Handwriting with yellow foam on standard pen. She completed 6 pages of a handwriting packet focused on lines/mounds/zigzag/curves, letter formation (in boxes), lists, sentences.    -re:ed on making letter "pretty"   -mod verbal cues to slow her writing    -rest breaks " after each worksheet.    -reported hand fatigue during and after activity (she would shake her hand when it was getting tired)     Patient Education and Home Exercises      Education provided:   -Fucntional use of right hand   - Progress towards goals   -educated on need to use right upper extremity as much as possible.      Written Home Exercises Provided: Patient instructed to cont prior HEP; work on handwriting.  Exercises were reviewed and Delores was able to demonstrate them prior to the end of the session.  Delores demonstrated fair  understanding of the HEP provided. See EMR under Patient Instructions for exercises provided during therapy sessions.       Assessment     Pt would continue to benefit from skilled OT. Delores did much better with handwriting today once she committed to writing slowly and focusing on the form of her letters.  She required mod verbal cues to slow her writing as she was trying to hurry through the activity so she could finished with it. Before completed form activities she completed the basics such as lines, mounds, zip zag's, and curves. She was having difficulty with fluidity of movement, therefore decreasing the integrity of what she was supposed to be writing. Continued focus on form should lead to increased speed, though not immediately. She needs to continue to focus on the process of handwriting and the form of her letters to improve her handwriting overall. She is to write another letter at home and bring it back for legibility checking. She still presents with decreased fine motor coordination and strength with right hand. She would benefit from continued range of motion and coordination activities.     Delores is progressing well towards her goals and there are no updates to goals at this time. Pt prognosis is Good.     Pt will continue to benefit from skilled outpatient occupational therapy to address the deficits listed in the problem list on initial evaluation provide pt/family  education and to maximize pt's level of independence in the home and community environment.     Pt's spiritual, cultural and educational needs considered and pt agreeable to plan of care and goals.    Anticipated barriers to occupational therapy: transportation, motivation  pain    Goals:  Short Term Goals: 4 weeks   Pt will be independent with initial HEP. (progressing 11/17/2022)   Pt will be able to open 5/5 medication bottles on the 1st attempt. Met 11/3/2022  Independent donning bra with modified techniques - not addressed - does not wear bra   Pt will be able to print name x1 with 100% legibility. Met 11/3/2022     Short Term Goals: 8 weeks  Pt will be able to complete the 9HPT in less than 2 minutes to increase fine motor coordination (FMC) in the right upper extremity.  (MET 11/11/2022)       Long Term Goals: 12 weeks   Pt will be independent with BM hygiene with her preferred technique. (progressing 11/17/2022)   Pt will be able to complete the 9HPT in less than 2 minutes to increase fine motor coordination (FMC). (MET 11/11/2022)    Upgraded 11/15/2022) in less than 49s  (progressing 11/17/2022)   Pt will score a 35 or better on the box & block assessment to increase gross motor coordination (GMC) with the right upper extremity. (progressing 11/17/2022)   Pt will demonstrate 120* AROM in shoulder flexion & abduction to increase independence with overhead reaching. (progressing 11/17/2022)   Mod I all BADLs. (progressing 11/17/2022)   Additional functional goals to be added as pt progresses and per her priorities.    PLAN   Certification Period/Plan of care expiration: 9/28/2022 to 12/23/2022.     Outpatient Occupational Therapy 2 times weekly for 12 weeks to include the following interventions: Manual Therapy, Moist Heat/ Ice, Neuromuscular Re-ed, Patient Education, Self Care, Therapeutic Activities, and Therapeutic Exercise, and any other treatment modalities that will facilitate Delores Fox's ability  to reach her goals.    Next Session: scapular depression & retraction; supine overhead movement; functional use right upper extremity; handwriting    Kerrie Contreras OT

## 2022-11-18 LAB
LEFT EYE DM RETINOPATHY: POSITIVE
RIGHT EYE DM RETINOPATHY: POSITIVE

## 2022-11-22 ENCOUNTER — CLINICAL SUPPORT (OUTPATIENT)
Dept: REHABILITATION | Facility: HOSPITAL | Age: 57
End: 2022-11-22
Payer: MEDICAID

## 2022-11-22 DIAGNOSIS — I69.398 LACK OF COORDINATION AS LATE EFFECT OF CEREBROVASCULAR ACCIDENT (CVA): ICD-10-CM

## 2022-11-22 DIAGNOSIS — R68.89 IMPAIRED FUNCTION OF UPPER EXTREMITY: Primary | ICD-10-CM

## 2022-11-22 DIAGNOSIS — Z78.9 IMPAIRED INSTRUMENTAL ACTIVITIES OF DAILY LIVING (IADL): ICD-10-CM

## 2022-11-22 DIAGNOSIS — R27.9 LACK OF COORDINATION AS LATE EFFECT OF CEREBROVASCULAR ACCIDENT (CVA): ICD-10-CM

## 2022-11-22 PROCEDURE — 97530 THERAPEUTIC ACTIVITIES: CPT | Mod: PN

## 2022-11-25 ENCOUNTER — CLINICAL SUPPORT (OUTPATIENT)
Dept: REHABILITATION | Facility: HOSPITAL | Age: 57
End: 2022-11-25
Payer: MEDICAID

## 2022-11-25 DIAGNOSIS — I69.398 LACK OF COORDINATION AS LATE EFFECT OF CEREBROVASCULAR ACCIDENT (CVA): ICD-10-CM

## 2022-11-25 DIAGNOSIS — R68.89 IMPAIRED FUNCTION OF UPPER EXTREMITY: Primary | ICD-10-CM

## 2022-11-25 DIAGNOSIS — Z78.9 IMPAIRED INSTRUMENTAL ACTIVITIES OF DAILY LIVING (IADL): ICD-10-CM

## 2022-11-25 DIAGNOSIS — R27.9 LACK OF COORDINATION AS LATE EFFECT OF CEREBROVASCULAR ACCIDENT (CVA): ICD-10-CM

## 2022-11-25 PROCEDURE — 97530 THERAPEUTIC ACTIVITIES: CPT | Mod: PN

## 2022-11-25 NOTE — PROGRESS NOTES
"[facility]  Occupational Therapy Treatment Note and Progress Note     Date: 2022  Name: Delores Fox  Clinic Number: [id number]    Therapy Diagnosis:   Encounter Diagnoses   Name Primary?    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)     Impaired instrumental activities of daily living (IADL)      Physician: Lorenzo Mason MD    Physician Orders: OT eval & treat  Medical Diagnosis: Stroke  Evaluation Date: 2022  Progress note done: 2022  Progress Notes Due: 22  Plan of Care Expiration Period: 2022  Insurance Authorization period Expiration: 10/28/22  Date of Return to MD: tba  Visit # / Visits Authorized:  + eval     FOTO: 50/100 initial assessment  57/100 (10/31/2022)   74/100 (2022)    Precautions:  Standard    Time In: 3:45 PM   Time Out: 4:30 PM  Total Billable Time: 45 minutes    SUBJECTIVE     Pt reports: Pt walked into the therapy gym and stated "I am absolutely not doing the arm bike today. I don't want to do it. I'm not doing it."     She was more compliant with functional home exercise program given last session.   Response to previous treatment: no concerns   Functional change: no significant changes     Pain: 6/10   Location: right upper extremity     OBJECTIVE     Objective Measures updated at progress report unless specified.    AM-PAC 6 CLICK ADL  How much help from another person does this patient currently need?  1 = Unable, Total/Dependent Assistance  2 = A lot, Maximum/Moderate Assistance  3 = A little, Minimum/Contact Guard/Supervision  4 = None, Modified [place]/Independent     Eating: 3 assist to cut food         Groomin using the right hand for brushing teeth & hair        UB Dressin  LB Dressin       Bathin, seated on shower chair in tub/shower combo        Toiletin mod I using both upper extremities for hygiene  Total: " "23/24     AM-PAC Raw Score CMS "G-Code Modifier Level of Impairment Assistance   6 % Total / Unable   7 - 9 CM 80 - 100% Maximal Assist   10-14 CL 60 - 80% Moderate Assist   15 - 19 CK 40 - 60% Moderate Assist   20 - 22 CJ 20 - 40% Minimal Assist   23 CI 1-20% SBA / CGA   24 CH 0% Independent/ Mod I     Objective Measures updated at progress report unless specified.     Joint Evaluation  AROM   11/25/2022 AROM   11/11/2022 AROM  9/28/2022 AROM  9/28/2022 PROM   9/28/2022     Right  Right  Left Right Right   Shoulder flex 0-180 78 109 wfl 100 tba   Shoulder Abd 0-180 119 131 wfl 70 tba   Shoulder Extension 0-80 56 67 wfl 30 tba   Functional ER WFL  Min decrs Min decr Mod decr nt   Shoulder ER @0*abd 0-90 Nt  nt nt nt tba   Shoulder ER @45*abd  0-90 Nt  nt nt nt tba   Shoulder ER @90*abd   0-90 Nt  nt nt nt tba   SLIR Nt  nt nt nt tba   Functional IR WFL Min decr Min decr Mod decr nt   Elbow extension/flexion 0-150 WFL68 wfl wfl wfl nt   Wrist flex 0-80 WNL wfl wfl wfl nt   Wrist ext 0-70 WNL wfl wfl wfl nt   Supination 0-80 WFL wfl wfl wfl nt   Pronation 0-80 WFL wfl wfl wfl nt      Hand Strength (WALTER Dynamometer, Setting II)   (lbs) Left   Right     1 53 30   2 52 25   3 50 20   Avg 11/25/2022 51.6 25   Avg 11/11/2022 49.3 12.3   [norms for women aged 55-59: R=57.3 +/-12.5; L=47.3 +/-11.9 (Luis Manuel et al, 1985)]     Pinch Left   11/25/2022 Right   11/25/2022 Left  11/11/2022  Right  11/11/2022    Lateral 15 6.5 15 5   Tip (2 point) 8 4 9 2   3 jaw luis 11 6 8 4      Box and Block Assessment Left Right   11/25/2022 43 33   11/11/2022 40 32   9/28/2022 35 15   [norms for women aged 50-54: R=77.7 +/-10.7; L=74.3 +/-9.9 (Luis Manuel et al, 1985)]     Fine motor coordination:   -   Thumb to Finger Opposition: right upper extremity impaired and slow; unable to fully oppose 5th digit      9 Hole Peg Test (9HPT) Left Right   11/25/2022 37s 1 min 14s   11/11/2022 32s 57s   9/28/2022 43s unable   [norms for " women aged 51-55: R=17.38s +/-1.88s: L=18.92s +/-2.29s (Haven et al, 2003)]     Treatment     Delores received the treatments listed below:     Therapeutic activities to improve functional performance for 45 minutes, including:    See measurements above     Delores participated in bilateral coordination and strengthening activity including scissors and paper. She had difficulty opening and closing the scissors with her right hand. She required mod encouragement to complete. When activities become hard, Delores has a tendency to shut down and terminate the activity.     Handwriting activity - Instructed to write one sentence of mirror with dry erase marker. The sentence was 100% legible. She required min verbal cues to slow her writing and focus on form.    Patient Education and Home Exercises      Education provided:   -Fucntional use of right hand   - Progress towards goals   -educated on need to use right upper extremity as much as possible.      Written Home Exercises Provided: Patient instructed to cont prior HEP; work on handwriting.  Exercises were reviewed and Delores was able to demonstrate them prior to the end of the session.  Delores demonstrated fair  understanding of the HEP provided. See EMR under Patient Instructions for exercises provided during therapy sessions.       Assessment     Pt would continue to benefit from skilled OT. According to measurements taken today, her shoulder range of motion has decreased and fine motor coordination time has increased. Her  and pinch strength are steadily increasing along with gross motor coordination.  Her fine motor coordination abilities might not reflect the time it took to complete the 9 Hole Peg due to observation and pt reported of increased functional participation with right hand. One goal was met today pertaining to toilet hygiene. She would benefit from continued range of motion and coordination activities, as well as strengthening of intrinsic hand  muscles.    Delores is progressing well towards her goals and there are no updates to goals at this time. Pt prognosis is Good.     Pt will continue to benefit from skilled outpatient occupational therapy to address the deficits listed in the problem list on initial evaluation provide pt/family education and to maximize pt's level of independence in the home and community environment.     Pt's spiritual, cultural and educational needs considered and pt agreeable to plan of care and goals.    Anticipated barriers to occupational therapy: transportation, motivation, pain    Goals:  Short Term Goals: 4 weeks   Pt will be independent with initial HEP. (progressing 11/25/2022)   Pt will be able to open 5/5 medication bottles on the 1st attempt. Met 11/3/2022  Independent donning bra with modified techniques - not addressed - does not wear bra   Pt will be able to print name x1 with 100% legibility. Met 11/3/2022     Short Term Goals: 8 weeks  Pt will be able to complete the 9HPT in less than 2 minutes to increase fine motor coordination (FMC) in the right upper extremity.  (MET 11/11/2022)       Long Term Goals: 12 weeks   Pt will be independent with BM hygiene with her preferred technique. MET 11/25/2022  Pt will be able to complete the 9HPT in less than 2 minutes to increase fine motor coordination (FMC). (MET 11/11/2022)    Upgraded 11/15/2022) in less than 49s  (progressing 11/25/2022)   Pt will score a 35 or better on the box & block assessment to increase gross motor coordination (GMC) with the right upper extremity. (progressing 11/25/2022)   Pt will demonstrate 120* AROM in shoulder flexion & abduction to increase independence with overhead reaching. (progressing 11/25/2022)   Mod I all BADLs. (progressing 11/25/2022)   Additional functional goals to be added as pt progresses and per her priorities.    PLAN   Certification Period/Plan of care expiration: 9/28/2022 to 12/23/2022.     Outpatient Occupational Therapy  2 times weekly for 12 weeks to include the following interventions: Manual Therapy, Moist Heat/ Ice, Neuromuscular Re-ed, Patient Education, Self Care, Therapeutic Activities, and Therapeutic Exercise, and any other treatment modalities that will facilitate Delores Fox's ability to reach her goals.    Next Session: ; scapular depression & retraction; supine overhead movement; functional use right upper extremity; handwriting    Kerrie Contreras OT

## 2022-11-30 DIAGNOSIS — N18.32 TYPE 2 DIABETES MELLITUS WITH STAGE 3B CHRONIC KIDNEY DISEASE, WITH LONG-TERM CURRENT USE OF INSULIN: ICD-10-CM

## 2022-11-30 DIAGNOSIS — Z79.4 TYPE 2 DIABETES MELLITUS WITH STAGE 3B CHRONIC KIDNEY DISEASE, WITH LONG-TERM CURRENT USE OF INSULIN: ICD-10-CM

## 2022-11-30 DIAGNOSIS — E11.22 TYPE 2 DIABETES MELLITUS WITH STAGE 3B CHRONIC KIDNEY DISEASE, WITH LONG-TERM CURRENT USE OF INSULIN: ICD-10-CM

## 2022-11-30 RX ORDER — CALCIUM CITRATE/VITAMIN D3 200MG-6.25
TABLET ORAL
Qty: 200 STRIP | Refills: 3 | Status: SHIPPED | OUTPATIENT
Start: 2022-11-30 | End: 2023-08-06 | Stop reason: SDUPTHER

## 2022-11-30 NOTE — TELEPHONE ENCOUNTER
Refill Decision Note   Delores Fox  is requesting a refill authorization.  Brief Assessment and Rationale for Refill:  Approve     Medication Therapy Plan:       Medication Reconciliation Completed: No   Comments:     No Care Gaps recommended.     Note composed:1:53 PM 11/30/2022

## 2022-11-30 NOTE — TELEPHONE ENCOUNTER
No new care gaps identified.  [facility] Embedded Care Gaps. Reference number: [id number]. 11/30/2022   12:12:45 PM CST

## 2022-12-01 ENCOUNTER — PATIENT MESSAGE (OUTPATIENT)
Dept: FAMILY MEDICINE | Facility: CLINIC | Age: 57
End: 2022-12-01
Payer: MEDICAID

## 2022-12-01 ENCOUNTER — CLINICAL SUPPORT (OUTPATIENT)
Dept: REHABILITATION | Facility: HOSPITAL | Age: 57
End: 2022-12-01
Payer: MEDICAID

## 2022-12-01 DIAGNOSIS — Z78.9 IMPAIRED INSTRUMENTAL ACTIVITIES OF DAILY LIVING (IADL): ICD-10-CM

## 2022-12-01 DIAGNOSIS — R27.9 LACK OF COORDINATION AS LATE EFFECT OF CEREBROVASCULAR ACCIDENT (CVA): ICD-10-CM

## 2022-12-01 DIAGNOSIS — R68.89 IMPAIRED FUNCTION OF UPPER EXTREMITY: Primary | ICD-10-CM

## 2022-12-01 DIAGNOSIS — I69.398 LACK OF COORDINATION AS LATE EFFECT OF CEREBROVASCULAR ACCIDENT (CVA): ICD-10-CM

## 2022-12-01 PROCEDURE — 97530 THERAPEUTIC ACTIVITIES: CPT | Mod: PN

## 2022-12-01 NOTE — PROGRESS NOTES
[facility]  Occupational Therapy Treatment Note     Date: 12/1/2022  Name: Delores Fox  Clinic Number: [id number]    Therapy Diagnosis:   Encounter Diagnoses   Name Primary?    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)     Impaired instrumental activities of daily living (IADL)      Physician: Lorenzo Mason MD    Physician Orders: OT eval & treat  Medical Diagnosis: Stroke  Evaluation Date: 9/28/2022  Progress note done: 11/11/2022  Progress Notes Due: 11/25/22  Plan of Care Expiration Period: 12/23/2022  Insurance Authorization period Expiration: 10/28/22  Date of Return to MD: tba  Visit # / Visits Authorized: 14/16 + eval     FOTO: 50/100 initial assessment  57/100 (10/31/2022)   74/100 (11/22/2022)    Precautions:  Standard    Time In: 12:05 PM   Time Out: 12:45  PM  Total Billable Time: 45 minutes    SUBJECTIVE     Pt reports: Pt reports that she is tired today.     She was more compliant with functional home exercise program given last session.   Response to previous treatment: no concerns   Functional change: no significant changes     Pain: 6/10   Location: right upper extremity     OBJECTIVE     Objective Measures updated at progress report unless specified.    Treatment     Delores received the treatments listed below:     Therapeutic activities to improve functional performance for 40 minutes, including:    UBE x5 min forward/ 5 min back w/ spencer UE, level 1.0, seated to increase spencer UE act patricia, strength, coordination & integration, as well as right upper extremity ability to sustain . Grier avg=8 peak grier=11  Pt's reported RPE= 6/10    Green hand gripper 1x20     Red hand gripper 1x20     Green therabar    -Smiles 20x, 10x   -Frowns 20x, 10x     Finger flicks with poms    -increased difficulty with power of digits   -better able to complete activity with long finger and thumb    -min verbal cues  to decrease compensative movements such as bring arm forward and moving the poms instead of flicking.     When cleaning up activity above, she was asked to manipulate the poms and keep them in her palm. She was able to hold 4 poms (large) and 5 poms (small) in her hand a time before dropping them.     10 green putty squeezes     2 point pinches with green putty (2 logs)     3 jaw luis pinches with green putty (2 logs)     Patient Education and Home Exercises      Education provided:   -Fucntional use of right hand   - Progress towards goals   -educated on need to use right upper extremity as much as possible.      Written Home Exercises Provided: Patient instructed to cont prior HEP; work on handwriting.  Exercises were reviewed and Delores was able to demonstrate them prior to the end of the session.  Delores demonstrated fair  understanding of the HEP provided. See EMR under Patient Instructions for exercises provided during therapy sessions.       Assessment     Pt would continue to benefit from skilled OT. Today's session was focused on activity tolerance and strengthening. She requires encouragement to complete activities since some were challenging. Strengthening completed with good form but she required mod verbal cues to slow her movement instead of going as fast as she could to get it over with. Hand range of motion and strength still limited but slowly improving. She reported doing dishes and folding laundry today.  She would benefit from continued range of motion and coordination activities, as well as strengthening of intrinsic hand muscles.    Delores is progressing well towards her goals and there are no updates to goals at this time. Pt prognosis is Good.     Pt will continue to benefit from skilled outpatient occupational therapy to address the deficits listed in the problem list on initial evaluation provide pt/family education and to maximize pt's level of independence in the home and community  environment.     Pt's spiritual, cultural and educational needs considered and pt agreeable to plan of care and goals.    Anticipated barriers to occupational therapy: transportation, motivation, pain    Goals:  Short Term Goals: 4 weeks   Pt will be independent with initial HEP. (progressing 12/1/2022)   Pt will be able to open 5/5 medication bottles on the 1st attempt. Met 11/3/2022  Independent donning bra with modified techniques - not addressed - does not wear bra   Pt will be able to print name x1 with 100% legibility. Met 11/3/2022     Short Term Goals: 8 weeks  Pt will be able to complete the 9HPT in less than 2 minutes to increase fine motor coordination (FMC) in the right upper extremity.  (MET 11/11/2022)       Long Term Goals: 12 weeks   Pt will be independent with BM hygiene with her preferred technique. MET 11/25/2022  Pt will be able to complete the 9HPT in less than 2 minutes to increase fine motor coordination (FMC). (MET 11/11/2022)    Upgraded 11/15/2022) in less than 49s  (progressing 12/1/2022)   Pt will score a 35 or better on the box & block assessment to increase gross motor coordination (GMC) with the right upper extremity. (progressing 12/1/2022)   Pt will demonstrate 120* AROM in shoulder flexion & abduction to increase independence with overhead reaching. (progressing 12/1/2022)   Mod I all BADLs. (progressing 12/1/2022)   Additional functional goals to be added as pt progresses and per her priorities.    PLAN   Certification Period/Plan of care expiration: 9/28/2022 to 12/23/2022.     Outpatient Occupational Therapy 2 times weekly for 12 weeks to include the following interventions: Manual Therapy, Moist Heat/ Ice, Neuromuscular Re-ed, Patient Education, Self Care, Therapeutic Activities, and Therapeutic Exercise, and any other treatment modalities that will facilitate Delores Fox's ability to reach her goals.    Next Session: ; scapular depression & retraction; supine overhead  movement; functional use right upper extremity; handwriting    Kerrie Contreras, OT

## 2022-12-05 RX ORDER — PREGABALIN 100 MG/1
100 CAPSULE ORAL 3 TIMES DAILY
Qty: 270 CAPSULE | Refills: 1 | Status: SHIPPED | OUTPATIENT
Start: 2022-12-05 | End: 2023-02-24 | Stop reason: SDUPTHER

## 2022-12-05 NOTE — TELEPHONE ENCOUNTER
No new care gaps identified.  [facility] Embedded Care Gaps. Reference number: [id number]. 12/05/2022   1:18:59 PM CST

## 2022-12-05 NOTE — TELEPHONE ENCOUNTER
I received your message and based on this, the following orders were placed AND/OR medicines were sent in.     No orders of the defined types were placed in this encounter.      Medications written and sent at this time include:  Medications Ordered This Encounter   Medications    pregabalin (LYRICA) 100 MG capsule     Sig: Take 1 capsule (100 mg total) by mouth 3 (three) times daily.     Dispense:  270 capsule     Refill:  1       If you are due for any health screening(s) below please notify me so we can arrange them to be ordered and scheduled to maintain your health. Most healthy patients at your age complete them.     Tests to Keep You Healthy    Mammogram: Met on 4/28/2022  Eye Exam: ORDERED BUT NOT SCHEDULED  Colon Cancer Screening: ORDERED  Last Blood Pressure <= 139/89 (10/21/2022): Yes  Last HbA1c < 8 (09/27/2022): NO  Tobacco Cessation: Yes      Thanks in advance      Tomy Rios MD      This note was completed with dictation software and grammatical errors may exist.

## 2022-12-05 NOTE — TELEPHONE ENCOUNTER
----- Message from Zaida Farias sent at 12/5/2022 11:34 AM CST -----  Type:  RX Refill Request    Who Called:  pt  Refill or New Rx:  New Rx  RX Name and Strength:  pregabalin (LYRICA) 100 MG capsule  How is the patient currently taking it? (ex. 1XDay):  as directed  Is this a 30 day or 90 day RX:  90  Preferred Pharmacy with phone number:    [id number] [place] - [address] [facility]  [address]  [place] [address]  Phone: [phone] Fax: [phone]      Local or Mail Order:  local  Ordering Provider:  Forrest Weber Call Back Number:  [phone] (home)     Additional Information:  please call and advise--thank you

## 2022-12-06 ENCOUNTER — CLINICAL SUPPORT (OUTPATIENT)
Dept: REHABILITATION | Facility: HOSPITAL | Age: 57
End: 2022-12-06
Payer: MEDICAID

## 2022-12-06 DIAGNOSIS — R68.89 IMPAIRED FUNCTION OF UPPER EXTREMITY: Primary | ICD-10-CM

## 2022-12-06 DIAGNOSIS — R27.9 LACK OF COORDINATION AS LATE EFFECT OF CEREBROVASCULAR ACCIDENT (CVA): ICD-10-CM

## 2022-12-06 DIAGNOSIS — Z78.9 IMPAIRED INSTRUMENTAL ACTIVITIES OF DAILY LIVING (IADL): ICD-10-CM

## 2022-12-06 DIAGNOSIS — I69.398 LACK OF COORDINATION AS LATE EFFECT OF CEREBROVASCULAR ACCIDENT (CVA): ICD-10-CM

## 2022-12-06 PROCEDURE — 97530 THERAPEUTIC ACTIVITIES: CPT | Mod: PN

## 2022-12-08 ENCOUNTER — CLINICAL SUPPORT (OUTPATIENT)
Dept: REHABILITATION | Facility: HOSPITAL | Age: 57
End: 2022-12-08
Payer: MEDICAID

## 2022-12-08 DIAGNOSIS — I69.398 LACK OF COORDINATION AS LATE EFFECT OF CEREBROVASCULAR ACCIDENT (CVA): ICD-10-CM

## 2022-12-08 DIAGNOSIS — R68.89 IMPAIRED FUNCTION OF UPPER EXTREMITY: Primary | ICD-10-CM

## 2022-12-08 DIAGNOSIS — Z78.9 IMPAIRED INSTRUMENTAL ACTIVITIES OF DAILY LIVING (IADL): ICD-10-CM

## 2022-12-08 DIAGNOSIS — R27.9 LACK OF COORDINATION AS LATE EFFECT OF CEREBROVASCULAR ACCIDENT (CVA): ICD-10-CM

## 2022-12-08 PROCEDURE — 97530 THERAPEUTIC ACTIVITIES: CPT | Mod: PN

## 2022-12-08 NOTE — PROGRESS NOTES
[facility]  Occupational Therapy Treatment Note     Date: 12/8/2022  Name: Delores Fox  Clinic Number: [id number]    Therapy Diagnosis:   Encounter Diagnoses   Name Primary?    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)     Impaired instrumental activities of daily living (IADL)      Physician: Lorenzo Mason MD    Physician Orders: OT eval & treat  Medical Diagnosis: Stroke  Evaluation Date: 9/28/2022  Progress note done: 11/11/2022  Progress Notes Due: 11/25/22  Plan of Care Expiration Period: 12/23/2022  Insurance Authorization period Expiration: 10/28/22  Date of Return to MD: tba  Visit # / Visits Authorized: 16/16 + eval     FOTO: 50/100 initial assessment  60/100 (12/6/2022)     Precautions:  Standard    Time In: 1:50 PM   Time Out:  2:30 PM   Total Billable Time: 40 minutes    SUBJECTIVE     Pt reports: Pt reports that today will be her last day in therapy. She is ready to be discharged.     She was more compliant with functional home exercise program given last session.   Response to previous treatment: no concerns   Functional change: She's been walking, doing laundry, doing dishes, and writing.     Pain: 6/10   Location: right upper extremity     OBJECTIVE     Objective Measures updated at progress report unless specified.    Treatment     Delores received the treatments listed below:     Therapeutic activities to improve functional performance for 40 minutes, including:    UBE x5 min forward/ 5 min back w/ spencer UE, level 1.0, seated to increase spencer UE act patricia, strength, coordination & integration, as well as right upper extremity ability to sustain . Grier avg=11 peak grier=14  Pt's reported RPE= 5/10    Standing at the sink, she transferred water from cup to cup with no feeling of dropping cup in right hand. After transferring from cup to cup (pronation) she practiced supination by turning hand up  and pouring water into cup on the right side of her right hand.      Practiced writing on mirror with dry erase marker and 2 lb wrist weight donned on right wrist. Her first sentence was very large and sloped. She was given lines on the mirror to write on and her writing was very proportional and legible. After writing more sentences, she was to erase the mirror with her right hand and weight donned. She needed to sit several times during this activity.     For increased fine motor coordination, she was tasked with walking a ball up and down the mirror with her fingers. She struggled with moving her fingers continuously and stopped frequently or dropped the ball. The ball was then taken away, and she was to do the same motion on the mirror itself. This was easier for her to complete. She required several seated rest breaks during this activity.     Opening and closing of containers using left hand to stabilize and right hand to turn the lids. She does better with larger lids and requires multiple attempts to open containers such as a drink bottle. She frequently gets frustrated and uses her left to turn the body of the container instead. 6 open and 6 closed.     Patient Education and Home Exercises      Education provided:   -ed that small changes in how she's holding her arm will lead to improved ability to use the arm for longer in IADL like washing dishes & folding clothes.   - Progress towards goals   -educated on need to use right upper extremity as much as possible.      Written Home Exercises Provided: Patient instructed to cont prior HEP; work on handwriting.  Exercises were reviewed and Delores was able to demonstrate them prior to the end of the session.  Delores demonstrated fair  understanding of the HEP provided. See EMR under Patient Instructions for exercises provided during therapy sessions.       Assessment     Pt would continue to benefit from skilled OT. Delores has requested discharge at this time.  Today she struggled with fine motor coordination and endurance during her standing activities. She was told that she could request to return if indicated or a decline in function. She was encouraged to stay active and use her right hand as much as possible.     Delores is progressing well towards her goals and there are no updates to goals at this time. Pt prognosis is Good.     Pt will continue to benefit from skilled outpatient occupational therapy to address the deficits listed in the problem list on initial evaluation provide pt/family education and to maximize pt's level of independence in the home and community environment.     Pt's spiritual, cultural and educational needs considered and pt agreeable to plan of care and goals.    Anticipated barriers to occupational therapy: transportation, motivation, pain    Goals:  Short Term Goals: 4 weeks   Pt will be independent with initial HEP. (progressing 12/8/2022)   Pt will be able to open 5/5 medication bottles on the 1st attempt. Met 11/3/2022  Independent donning bra with modified techniques - not addressed - does not wear bra   Pt will be able to print name x1 with 100% legibility. Met 11/3/2022     Short Term Goals: 8 weeks  Pt will be able to complete the 9HPT in less than 2 minutes to increase fine motor coordination (FMC) in the right upper extremity.  (MET 11/11/2022)       Long Term Goals: 12 weeks   Pt will be independent with BM hygiene with her preferred technique. MET 11/25/2022  Pt will be able to complete the 9HPT in less than 2 minutes to increase fine motor coordination (FMC). (MET 11/11/2022)    Upgraded 11/15/2022) in less than 49s  (progressing 12/8/2022)   Pt will score a 35 or better on the box & block assessment to increase gross motor coordination (GMC) with the right upper extremity. (progressing 12/8/2022)   Pt will demonstrate 120* AROM in shoulder flexion & abduction to increase independence with overhead reaching. (progressing  12/8/2022)   Mod I all BADLs. (MET per patient report 12/6/2022)    Additional functional goals to be added as pt progresses and per her priorities.    PLAN   Certification Period/Plan of care expiration: 9/28/2022 to 12/23/2022.     Outpatient Occupational Therapy 2 times weekly for 12 weeks to include the following interventions: Manual Therapy, Moist Heat/ Ice, Neuromuscular Re-ed, Patient Education, Self Care, Therapeutic Activities, and Therapeutic Exercise, and any other treatment modalities that will facilitate Delores Fox's ability to reach her goals.    Next Session: finalize HEP, anticipate d/c pending identification of additional concerns  Heather Williams, OT [facility] OUTPATIENT THERAP[facility]   Discharge Note    Name: Delores Fox  Clinic Number: [id number]    Therapy Diagnosis:   Encounter Diagnoses   Name Primary?    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)     Impaired instrumental activities of daily living (IADL)      Physician: Lorenzo Mason MD    Physician Orders: OT eval and treat   Medical Diagnosis: CVA - Stroke   Evaluation Date: 9/28/2022    Date of Last visit: 12/8/2022  Total Visits Received: 16    ASSESSMENT      Pt would continue to benefit from skilled OT. Delores has requested discharge at this time. Today she struggled with fine motor coordination and endurance during her standing activities. She was told that she could request to return if indicated or a decline in function. She was encouraged to stay active and use her right hand as much as possible.     Discharge reason: Patient requested discharge    Goals:   Short Term Goals: 4 weeks   Pt will be independent with initial HEP. NOT MET   Pt will be able to open 5/5 medication bottles on the 1st attempt. Met 11/3/2022  Independent donning bra with modified techniques - not addressed - does not wear bra   Pt will be able to print name x1 with  100% legibility. Met 11/3/2022     Short Term Goals: 8 weeks  Pt will be able to complete the 9HPT in less than 2 minutes to increase fine motor coordination (FMC) in the right upper extremity.  (MET 11/11/2022)       Long Term Goals: 12 weeks   Pt will be independent with BM hygiene with her preferred technique. MET 11/25/2022  Pt will be able to complete the 9HPT in less than 2 minutes to increase fine motor coordination (FMC). (MET 11/11/2022)    Upgraded 11/15/2022) in less than 49s  NOT MET   Pt will score a 35 or better on the box & block assessment to increase gross motor coordination (GMC) with the right upper extremity.NOT MET  Pt will demonstrate 120* AROM in shoulder flexion & abduction to increase independence with overhead reaching. NOT MET   Mod I all BADLs. (MET per patient report 12/6/2022)        PLAN   This patient is discharged from skilled occupational therapy services at this time.     Kerrie Contreras OT

## 2022-12-19 PROBLEM — I63.9 STROKE: Status: RESOLVED | Noted: 2022-09-16 | Resolved: 2022-12-19

## 2022-12-22 ENCOUNTER — PATIENT MESSAGE (OUTPATIENT)
Dept: FAMILY MEDICINE | Facility: CLINIC | Age: 57
End: 2022-12-22
Payer: MEDICAID

## 2022-12-26 ENCOUNTER — PATIENT MESSAGE (OUTPATIENT)
Dept: FAMILY MEDICINE | Facility: CLINIC | Age: 57
End: 2022-12-26
Payer: MEDICAID

## 2022-12-26 DIAGNOSIS — I70.0 ATHEROSCLEROSIS OF AORTA: Primary | ICD-10-CM

## 2023-01-17 ENCOUNTER — PATIENT MESSAGE (OUTPATIENT)
Dept: ADMINISTRATIVE | Facility: HOSPITAL | Age: 58
End: 2023-01-17
Payer: MEDICAID

## 2023-02-08 NOTE — TELEPHONE ENCOUNTER
Care Due:                  Date            Visit Type   Department     Provider  --------------------------------------------------------------------------------                                EP -                              PRIMARY      [facility]  Last Visit: 09-      CARE ([facility])   COLLEEN Rios                              EP -                              PRIMARY      [facility]  Next Visit: 03-      CARE ([facility])   MEDICINE       Tomy Rios                                                            Last  Test          Frequency    Reason                     Performed    Due Date  --------------------------------------------------------------------------------    HBA1C.......  6 months...  metFORMIN................  09- 03-    [employer] Embedded Care Gaps. Reference number: [id number]. 2/08/2023   3:33:09 PM CST

## 2023-02-09 RX ORDER — ZOLPIDEM TARTRATE 10 MG/1
10 TABLET ORAL NIGHTLY PRN
Qty: 90 TABLET | Refills: 1 | Status: SHIPPED | OUTPATIENT
Start: 2023-02-09 | End: 2023-02-26 | Stop reason: SDUPTHER

## 2023-02-27 ENCOUNTER — CLINICAL SUPPORT (OUTPATIENT)
Dept: CARDIOLOGY | Facility: HOSPITAL | Age: 58
DRG: 208 | End: 2023-02-27
Payer: MEDICAID

## 2023-02-27 ENCOUNTER — PATIENT MESSAGE (OUTPATIENT)
Dept: PULMONOLOGY | Facility: CLINIC | Age: 58
End: 2023-02-27
Payer: MEDICAID

## 2023-02-27 ENCOUNTER — OUTSIDE PLACE OF SERVICE (OUTPATIENT)
Dept: PULMONOLOGY | Facility: CLINIC | Age: 58
End: 2023-02-27
Payer: MEDICAID

## 2023-02-27 ENCOUNTER — HOSPITAL ENCOUNTER (INPATIENT)
Facility: HOSPITAL | Age: 58
LOS: 3 days | Discharge: HOME | DRG: 208 | End: 2023-03-02
Attending: EMERGENCY MEDICINE | Admitting: INTERNAL MEDICINE
Payer: MEDICAID

## 2023-02-27 VITALS — BODY MASS INDEX: 39.24 KG/M2 | HEIGHT: 67 IN | WEIGHT: 250 LBS

## 2023-02-27 DIAGNOSIS — R07.9 CHEST PAIN: ICD-10-CM

## 2023-02-27 DIAGNOSIS — I50.43 SYSTOLIC AND DIASTOLIC CHF, ACUTE ON CHRONIC: ICD-10-CM

## 2023-02-27 DIAGNOSIS — I50.9 CHF (CONGESTIVE HEART FAILURE): ICD-10-CM

## 2023-02-27 DIAGNOSIS — J44.9 CHRONIC OBSTRUCTIVE PULMONARY DISEASE, UNSPECIFIED COPD TYPE: Chronic | ICD-10-CM

## 2023-02-27 DIAGNOSIS — J44.1 COPD EXACERBATION: Primary | Chronic | ICD-10-CM

## 2023-02-27 DIAGNOSIS — J96.02 ACUTE HYPERCAPNIC RESPIRATORY FAILURE: Primary | ICD-10-CM

## 2023-02-27 PROBLEM — D72.829 LEUCOCYTOSIS: Status: ACTIVE | Noted: 2023-02-27

## 2023-02-27 PROBLEM — E87.1 HYPONATREMIA: Status: ACTIVE | Noted: 2023-02-27

## 2023-02-27 PROBLEM — E11.65 TYPE 2 DIABETES MELLITUS WITH HYPERGLYCEMIA, WITHOUT LONG-TERM CURRENT USE OF INSULIN: Chronic | Status: ACTIVE | Noted: 2020-11-21

## 2023-02-27 PROBLEM — F41.9 ANXIETY: Chronic | Status: ACTIVE | Noted: 2023-02-27

## 2023-02-27 PROBLEM — B37.89 CANDIDA RASH OF GROIN: Chronic | Status: ACTIVE | Noted: 2023-02-27

## 2023-02-27 PROBLEM — N17.9 AKI (ACUTE KIDNEY INJURY): Status: ACTIVE | Noted: 2023-02-27

## 2023-02-27 LAB
ALBUMIN SERPL BCP-MCNC: 3.9 G/DL (ref 3.5–5.2)
ALLENS TEST: ABNORMAL
ALP SERPL-CCNC: 134 U/L (ref 55–135)
ALT SERPL W/O P-5'-P-CCNC: 18 U/L (ref 10–44)
ANION GAP SERPL CALC-SCNC: 14 MMOL/L (ref 8–16)
AST SERPL-CCNC: 29 U/L (ref 10–40)
BASOPHILS # BLD AUTO: 0.05 K/UL (ref 0–0.2)
BASOPHILS NFR BLD: 0.3 % (ref 0–1.9)
BILIRUB SERPL-MCNC: 1.2 MG/DL (ref 0.1–1)
BNP SERPL-MCNC: 486 PG/ML (ref 0–99)
BUN SERPL-MCNC: 25 MG/DL (ref 6–20)
CALCIUM SERPL-MCNC: 7.9 MG/DL (ref 8.7–10.5)
CHLORIDE SERPL-SCNC: 95 MMOL/L (ref 95–110)
CK MB SERPL-MCNC: 10 NG/ML (ref 0.1–6.5)
CK SERPL-CCNC: 282 U/L (ref 20–180)
CO2 SERPL-SCNC: 21 MMOL/L (ref 23–29)
CREAT SERPL-MCNC: 1.7 MG/DL (ref 0.5–1.4)
DELSYS: ABNORMAL
DIFFERENTIAL METHOD: ABNORMAL
EOSINOPHIL # BLD AUTO: 0.2 K/UL (ref 0–0.5)
EOSINOPHIL NFR BLD: 1.1 % (ref 0–8)
EP: 8
ERYTHROCYTE [DISTWIDTH] IN BLOOD BY AUTOMATED COUNT: 13.3 % (ref 11.5–14.5)
ERYTHROCYTE [SEDIMENTATION RATE] IN BLOOD BY WESTERGREN METHOD: 20 MM/H
ERYTHROCYTE [SEDIMENTATION RATE] IN BLOOD BY WESTERGREN METHOD: 32 MM/H
EST. GFR  (NO RACE VARIABLE): 35.2 ML/MIN/1.73 M^2
FIO2: 100
FIO2: 50
FIO2: 80
GLUCOSE SERPL-MCNC: 228 MG/DL (ref 70–110)
GLUCOSE SERPL-MCNC: 295 MG/DL (ref 70–110)
GLUCOSE SERPL-MCNC: 306 MG/DL (ref 70–110)
GLUCOSE SERPL-MCNC: 313 MG/DL (ref 70–110)
GLUCOSE SERPL-MCNC: 392 MG/DL (ref 70–110)
GLUCOSE SERPL-MCNC: 459 MG/DL (ref 70–110)
GLUCOSE SERPL-MCNC: 470 MG/DL (ref 70–110)
GLUCOSE SERPL-MCNC: 479 MG/DL (ref 70–110)
HCO3 UR-SCNC: 22.7 MMOL/L (ref 24–28)
HCO3 UR-SCNC: 23.9 MMOL/L (ref 24–28)
HCO3 UR-SCNC: 24.2 MMOL/L (ref 24–28)
HCO3 UR-SCNC: 24.8 MMOL/L (ref 24–28)
HCO3 UR-SCNC: 25.9 MMOL/L (ref 24–28)
HCT VFR BLD AUTO: 41.9 % (ref 37–48.5)
HCT VFR BLD CALC: 42 %PCV (ref 36–54)
HCT VFR BLD CALC: 42 %PCV (ref 36–54)
HGB BLD-MCNC: 13.2 G/DL (ref 12–16)
IMM GRANULOCYTES # BLD AUTO: 0.16 K/UL (ref 0–0.04)
IMM GRANULOCYTES NFR BLD AUTO: 0.9 % (ref 0–0.5)
INFLUENZA A, MOLECULAR: NEGATIVE
INFLUENZA B, MOLECULAR: NEGATIVE
INR PPP: 1.1 (ref 0.8–1.2)
IP: 14
LYMPHOCYTES # BLD AUTO: 5.4 K/UL (ref 1–4.8)
LYMPHOCYTES NFR BLD: 29.2 % (ref 18–48)
MCH RBC QN AUTO: 31.1 PG (ref 27–31)
MCHC RBC AUTO-ENTMCNC: 31.5 G/DL (ref 32–36)
MCV RBC AUTO: 99 FL (ref 82–98)
MIN VOL: 14.6
MIN VOL: 14.8
MODE: ABNORMAL
MONOCYTES # BLD AUTO: 1.5 K/UL (ref 0.3–1)
MONOCYTES NFR BLD: 8.1 % (ref 4–15)
MRSA SCREEN BY PCR: NEGATIVE
NEUTROPHILS # BLD AUTO: 11.1 K/UL (ref 1.8–7.7)
NEUTROPHILS NFR BLD: 60.4 % (ref 38–73)
NRBC BLD-RTO: 0 /100 WBC
PCO2 BLDA: 35.9 MMHG (ref 35–45)
PCO2 BLDA: 38.7 MMHG (ref 35–45)
PCO2 BLDA: 40.3 MMHG (ref 35–45)
PCO2 BLDA: 54.6 MMHG (ref 35–45)
PCO2 BLDA: 82.9 MMHG (ref 35–45)
PEEP: 10
PEEP: 5
PH SMN: 7.08 [PH] (ref 7.35–7.45)
PH SMN: 7.25 [PH] (ref 7.35–7.45)
PH SMN: 7.36 [PH] (ref 7.35–7.45)
PH SMN: 7.43 [PH] (ref 7.35–7.45)
PH SMN: 7.44 [PH] (ref 7.35–7.45)
PIP: 28
PIP: 31
PLATELET # BLD AUTO: 455 K/UL (ref 150–450)
PMV BLD AUTO: 9.9 FL (ref 9.2–12.9)
PO2 BLDA: 53 MMHG (ref 80–100)
PO2 BLDA: 67 MMHG (ref 80–100)
PO2 BLDA: 69 MMHG (ref 80–100)
PO2 BLDA: 75 MMHG (ref 80–100)
PO2 BLDA: 95 MMHG (ref 80–100)
POC BE: -3 MMOL/L
POC BE: -3 MMOL/L
POC BE: -5 MMOL/L
POC BE: 0 MMOL/L
POC BE: 2 MMOL/L
POC IONIZED CALCIUM: 1.04 MMOL/L (ref 1.06–1.42)
POC IONIZED CALCIUM: 1.18 MMOL/L (ref 1.06–1.42)
POC SATURATED O2: 89 % (ref 95–100)
POC SATURATED O2: 92 % (ref 95–100)
POC SATURATED O2: 93 % (ref 95–100)
POC SATURATED O2: 93 % (ref 95–100)
POC SATURATED O2: 94 % (ref 95–100)
POC TCO2: 24 MMOL/L (ref 23–27)
POC TCO2: 25 MMOL/L (ref 23–27)
POC TCO2: 26 MMOL/L (ref 23–27)
POC TCO2: 27 MMOL/L (ref 23–27)
POC TCO2: 27 MMOL/L (ref 23–27)
POTASSIUM BLD-SCNC: 3.9 MMOL/L (ref 3.5–5.1)
POTASSIUM BLD-SCNC: 4.4 MMOL/L (ref 3.5–5.1)
POTASSIUM SERPL-SCNC: 4.6 MMOL/L (ref 3.5–5.1)
PROT SERPL-MCNC: 7.2 G/DL (ref 6–8.4)
PROTHROMBIN TIME: 11.4 SEC (ref 9–12.5)
RBC # BLD AUTO: 4.25 M/UL (ref 4–5.4)
SAMPLE: ABNORMAL
SARS-COV-2 RDRP RESP QL NAA+PROBE: NEGATIVE
SITE: ABNORMAL
SODIUM BLD-SCNC: 129 MMOL/L (ref 136–145)
SODIUM BLD-SCNC: 131 MMOL/L (ref 136–145)
SODIUM SERPL-SCNC: 130 MMOL/L (ref 136–145)
SP02: 91
SP02: 94
SPECIMEN SOURCE: NORMAL
SPONT RATE: 27
TROPONIN I SERPL HS-MCNC: 39.8 PG/ML (ref 0–14.9)
TROPONIN I SERPL HS-MCNC: 51.7 PG/ML (ref 0–14.9)
TROPONIN I SERPL HS-MCNC: 67.4 PG/ML (ref 0–14.9)
VT: 400
VT: 450
WBC # BLD AUTO: 18.42 K/UL (ref 3.9–12.7)

## 2023-02-27 PROCEDURE — 63600175 PHARM REV CODE 636 W HCPCS

## 2023-02-27 PROCEDURE — 82803 BLOOD GASES ANY COMBINATION: CPT

## 2023-02-27 PROCEDURE — 99900035 HC TECH TIME PER 15 MIN (STAT)

## 2023-02-27 PROCEDURE — 99900026 HC AIRWAY MAINTENANCE (STAT)

## 2023-02-27 PROCEDURE — 93308 ECHO (CUPID ONLY): ICD-10-PCS | Mod: 26,,, | Performed by: INTERNAL MEDICINE

## 2023-02-27 PROCEDURE — 63600175 PHARM REV CODE 636 W HCPCS: Performed by: INTERNAL MEDICINE

## 2023-02-27 PROCEDURE — 63600175 PHARM REV CODE 636 W HCPCS: Performed by: EMERGENCY MEDICINE

## 2023-02-27 PROCEDURE — 99900031 HC PATIENT EDUCATION (STAT)

## 2023-02-27 PROCEDURE — 63600175 PHARM REV CODE 636 W HCPCS: Mod: TB,JG | Performed by: INTERNAL MEDICINE

## 2023-02-27 PROCEDURE — 96375 TX/PRO/DX INJ NEW DRUG ADDON: CPT

## 2023-02-27 PROCEDURE — 87205 SMEAR GRAM STAIN: CPT | Performed by: INTERNAL MEDICINE

## 2023-02-27 PROCEDURE — 99291 CRITICAL CARE FIRST HOUR: CPT | Mod: ,,, | Performed by: INTERNAL MEDICINE

## 2023-02-27 PROCEDURE — 85610 PROTHROMBIN TIME: CPT | Performed by: EMERGENCY MEDICINE

## 2023-02-27 PROCEDURE — 20000000 HC ICU ROOM

## 2023-02-27 PROCEDURE — 93010 EKG 12-LEAD: ICD-10-PCS | Mod: ,,, | Performed by: INTERNAL MEDICINE

## 2023-02-27 PROCEDURE — 99285 EMERGENCY DEPT VISIT HI MDM: CPT | Mod: 25

## 2023-02-27 PROCEDURE — 25000003 PHARM REV CODE 250: Performed by: INTERNAL MEDICINE

## 2023-02-27 PROCEDURE — 99499 NO LOS: ICD-10-PCS | Mod: ,,, | Performed by: INTERNAL MEDICINE

## 2023-02-27 PROCEDURE — 94660 CPAP INITIATION&MGMT: CPT

## 2023-02-27 PROCEDURE — 93308 TTE F-UP OR LMTD: CPT | Mod: 26,,, | Performed by: INTERNAL MEDICINE

## 2023-02-27 PROCEDURE — 99291 PR CRITICAL CARE, E/M 30-74 MINUTES: ICD-10-PCS | Mod: ,,, | Performed by: INTERNAL MEDICINE

## 2023-02-27 PROCEDURE — 80053 COMPREHEN METABOLIC PANEL: CPT | Performed by: EMERGENCY MEDICINE

## 2023-02-27 PROCEDURE — 84484 ASSAY OF TROPONIN QUANT: CPT | Mod: 91 | Performed by: INTERNAL MEDICINE

## 2023-02-27 PROCEDURE — 99223 PR INITIAL HOSPITAL CARE,LEVL III: ICD-10-PCS | Mod: ,,, | Performed by: INTERNAL MEDICINE

## 2023-02-27 PROCEDURE — 96374 THER/PROPH/DIAG INJ IV PUSH: CPT

## 2023-02-27 PROCEDURE — 84484 ASSAY OF TROPONIN QUANT: CPT | Performed by: EMERGENCY MEDICINE

## 2023-02-27 PROCEDURE — 99499 UNLISTED E&M SERVICE: CPT | Mod: ,,, | Performed by: INTERNAL MEDICINE

## 2023-02-27 PROCEDURE — 85014 HEMATOCRIT: CPT

## 2023-02-27 PROCEDURE — 94799 UNLISTED PULMONARY SVC/PX: CPT

## 2023-02-27 PROCEDURE — U0002 COVID-19 LAB TEST NON-CDC: HCPCS | Performed by: INTERNAL MEDICINE

## 2023-02-27 PROCEDURE — 82553 CREATINE MB FRACTION: CPT | Performed by: EMERGENCY MEDICINE

## 2023-02-27 PROCEDURE — 36415 COLL VENOUS BLD VENIPUNCTURE: CPT | Performed by: INTERNAL MEDICINE

## 2023-02-27 PROCEDURE — 82550 ASSAY OF CK (CPK): CPT | Performed by: EMERGENCY MEDICINE

## 2023-02-27 PROCEDURE — 25000242 PHARM REV CODE 250 ALT 637 W/ HCPCS: Performed by: INTERNAL MEDICINE

## 2023-02-27 PROCEDURE — 94761 N-INVAS EAR/PLS OXIMETRY MLT: CPT

## 2023-02-27 PROCEDURE — 93010 ELECTROCARDIOGRAM REPORT: CPT | Mod: ,,, | Performed by: INTERNAL MEDICINE

## 2023-02-27 PROCEDURE — 87641 MR-STAPH DNA AMP PROBE: CPT | Performed by: INTERNAL MEDICINE

## 2023-02-27 PROCEDURE — 94002 VENT MGMT INPAT INIT DAY: CPT

## 2023-02-27 PROCEDURE — 85025 COMPLETE CBC W/AUTO DIFF WBC: CPT | Performed by: EMERGENCY MEDICINE

## 2023-02-27 PROCEDURE — 93005 ELECTROCARDIOGRAM TRACING: CPT | Performed by: INTERNAL MEDICINE

## 2023-02-27 PROCEDURE — 94640 AIRWAY INHALATION TREATMENT: CPT

## 2023-02-27 PROCEDURE — 36600 WITHDRAWAL OF ARTERIAL BLOOD: CPT

## 2023-02-27 PROCEDURE — 27000221 HC OXYGEN, UP TO 24 HOURS

## 2023-02-27 PROCEDURE — 83880 ASSAY OF NATRIURETIC PEPTIDE: CPT | Performed by: EMERGENCY MEDICINE

## 2023-02-27 PROCEDURE — 36620 INSERTION CATHETER ARTERY: CPT

## 2023-02-27 PROCEDURE — 87502 INFLUENZA DNA AMP PROBE: CPT | Performed by: INTERNAL MEDICINE

## 2023-02-27 PROCEDURE — 82330 ASSAY OF CALCIUM: CPT

## 2023-02-27 PROCEDURE — 87070 CULTURE OTHR SPECIMN AEROBIC: CPT | Performed by: INTERNAL MEDICINE

## 2023-02-27 PROCEDURE — 99223 1ST HOSP IP/OBS HIGH 75: CPT | Mod: ,,, | Performed by: INTERNAL MEDICINE

## 2023-02-27 PROCEDURE — 84132 ASSAY OF SERUM POTASSIUM: CPT

## 2023-02-27 PROCEDURE — 84295 ASSAY OF SERUM SODIUM: CPT

## 2023-02-27 PROCEDURE — 25000242 PHARM REV CODE 250 ALT 637 W/ HCPCS: Performed by: EMERGENCY MEDICINE

## 2023-02-27 PROCEDURE — 36415 COLL VENOUS BLD VENIPUNCTURE: CPT | Performed by: EMERGENCY MEDICINE

## 2023-02-27 PROCEDURE — 37799 UNLISTED PX VASCULAR SURGERY: CPT

## 2023-02-27 PROCEDURE — 31500 INSERT EMERGENCY AIRWAY: CPT

## 2023-02-27 PROCEDURE — 93308 TTE F-UP OR LMTD: CPT

## 2023-02-27 RX ORDER — VANCOMYCIN HCL IN 5 % DEXTROSE 1G/250ML
1000 PLASTIC BAG, INJECTION (ML) INTRAVENOUS ONCE
Status: COMPLETED | OUTPATIENT
Start: 2023-02-27 | End: 2023-02-27

## 2023-02-27 RX ORDER — GLUCAGON 1 MG
1 KIT INJECTION
Status: DISCONTINUED | OUTPATIENT
Start: 2023-02-27 | End: 2023-02-27

## 2023-02-27 RX ORDER — MEROPENEM AND SODIUM CHLORIDE 1 G/50ML
1 INJECTION, SOLUTION INTRAVENOUS
Status: DISCONTINUED | OUTPATIENT
Start: 2023-02-27 | End: 2023-02-27

## 2023-02-27 RX ORDER — POTASSIUM CHLORIDE 7.45 MG/ML
40 INJECTION INTRAVENOUS
Status: DISCONTINUED | OUTPATIENT
Start: 2023-02-27 | End: 2023-03-02 | Stop reason: HOSPADM

## 2023-02-27 RX ORDER — IPRATROPIUM BROMIDE 0.5 MG/2.5ML
0.5 SOLUTION RESPIRATORY (INHALATION) EVERY 6 HOURS
Status: DISCONTINUED | OUTPATIENT
Start: 2023-02-27 | End: 2023-02-27

## 2023-02-27 RX ORDER — METOPROLOL TARTRATE 25 MG/1
12.5 TABLET ORAL 2 TIMES DAILY
Status: DISCONTINUED | OUTPATIENT
Start: 2023-02-27 | End: 2023-03-01

## 2023-02-27 RX ORDER — CALCIUM GLUCONATE 20 MG/ML
2 INJECTION, SOLUTION INTRAVENOUS
Status: DISCONTINUED | OUTPATIENT
Start: 2023-02-27 | End: 2023-03-02 | Stop reason: HOSPADM

## 2023-02-27 RX ORDER — IBUPROFEN 200 MG
16 TABLET ORAL
Status: DISCONTINUED | OUTPATIENT
Start: 2023-02-27 | End: 2023-03-02 | Stop reason: HOSPADM

## 2023-02-27 RX ORDER — POTASSIUM CHLORIDE 7.45 MG/ML
80 INJECTION INTRAVENOUS
Status: DISCONTINUED | OUTPATIENT
Start: 2023-02-27 | End: 2023-03-02 | Stop reason: HOSPADM

## 2023-02-27 RX ORDER — NALOXONE HCL 0.4 MG/ML
0.02 VIAL (ML) INJECTION
Status: DISCONTINUED | OUTPATIENT
Start: 2023-02-27 | End: 2023-03-02 | Stop reason: HOSPADM

## 2023-02-27 RX ORDER — INSULIN ASPART 100 [IU]/ML
0-15 INJECTION, SOLUTION INTRAVENOUS; SUBCUTANEOUS EVERY 6 HOURS PRN
Status: DISCONTINUED | OUTPATIENT
Start: 2023-02-27 | End: 2023-02-27 | Stop reason: SDUPTHER

## 2023-02-27 RX ORDER — SODIUM CHLORIDE 0.9 % (FLUSH) 0.9 %
10 SYRINGE (ML) INJECTION EVERY 12 HOURS PRN
Status: DISCONTINUED | OUTPATIENT
Start: 2023-02-27 | End: 2023-03-02 | Stop reason: HOSPADM

## 2023-02-27 RX ORDER — IBUPROFEN 200 MG
24 TABLET ORAL
Status: DISCONTINUED | OUTPATIENT
Start: 2023-02-27 | End: 2023-03-02 | Stop reason: HOSPADM

## 2023-02-27 RX ORDER — PROPOFOL 10 MG/ML
0-50 INJECTION, EMULSION INTRAVENOUS CONTINUOUS
Status: DISCONTINUED | OUTPATIENT
Start: 2023-02-27 | End: 2023-02-28

## 2023-02-27 RX ORDER — ATROPINE SULFATE 0.1 MG/ML
INJECTION INTRAVENOUS CODE/TRAUMA/SEDATION MEDICATION
Status: COMPLETED | OUTPATIENT
Start: 2023-02-27 | End: 2023-02-27

## 2023-02-27 RX ORDER — BUPROPION HYDROCHLORIDE 150 MG/1
150 TABLET ORAL DAILY
Status: DISCONTINUED | OUTPATIENT
Start: 2023-02-27 | End: 2023-03-02 | Stop reason: HOSPADM

## 2023-02-27 RX ORDER — SUCCINYLCHOLINE CHLORIDE 20 MG/ML
INJECTION INTRAMUSCULAR; INTRAVENOUS CODE/TRAUMA/SEDATION MEDICATION
Status: COMPLETED | OUTPATIENT
Start: 2023-02-27 | End: 2023-02-27

## 2023-02-27 RX ORDER — MEROPENEM AND SODIUM CHLORIDE 1 G/50ML
1 INJECTION, SOLUTION INTRAVENOUS
Status: DISCONTINUED | OUTPATIENT
Start: 2023-02-27 | End: 2023-02-28

## 2023-02-27 RX ORDER — FUROSEMIDE 10 MG/ML
40 INJECTION INTRAMUSCULAR; INTRAVENOUS
Status: DISCONTINUED | OUTPATIENT
Start: 2023-02-27 | End: 2023-02-27

## 2023-02-27 RX ORDER — INSULIN ASPART 100 [IU]/ML
0-15 INJECTION, SOLUTION INTRAVENOUS; SUBCUTANEOUS EVERY 6 HOURS PRN
Status: DISCONTINUED | OUTPATIENT
Start: 2023-02-27 | End: 2023-02-28

## 2023-02-27 RX ORDER — LORAZEPAM 2 MG/ML
INJECTION INTRAMUSCULAR
Status: COMPLETED
Start: 2023-02-27 | End: 2023-02-27

## 2023-02-27 RX ORDER — LEVALBUTEROL INHALATION SOLUTION 0.63 MG/3ML
0.63 SOLUTION RESPIRATORY (INHALATION)
Status: DISCONTINUED | OUTPATIENT
Start: 2023-02-27 | End: 2023-03-02 | Stop reason: HOSPADM

## 2023-02-27 RX ORDER — DOXYLAMINE SUCCINATE 25 MG
TABLET ORAL 2 TIMES DAILY
Status: DISCONTINUED | OUTPATIENT
Start: 2023-02-27 | End: 2023-03-02 | Stop reason: HOSPADM

## 2023-02-27 RX ORDER — NAPROXEN SODIUM 220 MG/1
81 TABLET, FILM COATED ORAL DAILY
Status: DISCONTINUED | OUTPATIENT
Start: 2023-02-27 | End: 2023-03-02 | Stop reason: HOSPADM

## 2023-02-27 RX ORDER — METHYLPREDNISOLONE SOD SUCC 125 MG
125 VIAL (EA) INJECTION
Status: COMPLETED | OUTPATIENT
Start: 2023-02-27 | End: 2023-02-27

## 2023-02-27 RX ORDER — ATORVASTATIN CALCIUM 40 MG/1
40 TABLET, FILM COATED ORAL NIGHTLY
Status: DISCONTINUED | OUTPATIENT
Start: 2023-02-27 | End: 2023-03-02 | Stop reason: HOSPADM

## 2023-02-27 RX ORDER — IPRATROPIUM BROMIDE AND ALBUTEROL SULFATE 2.5; .5 MG/3ML; MG/3ML
6 SOLUTION RESPIRATORY (INHALATION)
Status: COMPLETED | OUTPATIENT
Start: 2023-02-27 | End: 2023-02-27

## 2023-02-27 RX ORDER — FUROSEMIDE 10 MG/ML
40 INJECTION INTRAMUSCULAR; INTRAVENOUS
Status: DISCONTINUED | OUTPATIENT
Start: 2023-02-27 | End: 2023-03-02

## 2023-02-27 RX ORDER — CALCIUM GLUCONATE 20 MG/ML
3 INJECTION, SOLUTION INTRAVENOUS
Status: DISCONTINUED | OUTPATIENT
Start: 2023-02-27 | End: 2023-03-02 | Stop reason: HOSPADM

## 2023-02-27 RX ORDER — FUROSEMIDE 10 MG/ML
40 INJECTION INTRAMUSCULAR; INTRAVENOUS DAILY
Status: DISCONTINUED | OUTPATIENT
Start: 2023-02-27 | End: 2023-02-27

## 2023-02-27 RX ORDER — ENOXAPARIN SODIUM 100 MG/ML
40 INJECTION SUBCUTANEOUS EVERY 24 HOURS
Status: DISCONTINUED | OUTPATIENT
Start: 2023-02-27 | End: 2023-02-28

## 2023-02-27 RX ORDER — FAMOTIDINE 10 MG/ML
20 INJECTION INTRAVENOUS DAILY
Status: DISCONTINUED | OUTPATIENT
Start: 2023-02-27 | End: 2023-02-28

## 2023-02-27 RX ORDER — DOBUTAMINE HYDROCHLORIDE 400 MG/100ML
2.5 INJECTION INTRAVENOUS CONTINUOUS
Status: DISCONTINUED | OUTPATIENT
Start: 2023-02-27 | End: 2023-03-01

## 2023-02-27 RX ORDER — CALCIUM GLUCONATE 20 MG/ML
1 INJECTION, SOLUTION INTRAVENOUS
Status: DISCONTINUED | OUTPATIENT
Start: 2023-02-27 | End: 2023-03-02 | Stop reason: HOSPADM

## 2023-02-27 RX ORDER — POTASSIUM CHLORIDE 7.45 MG/ML
60 INJECTION INTRAVENOUS
Status: DISCONTINUED | OUTPATIENT
Start: 2023-02-27 | End: 2023-03-02 | Stop reason: HOSPADM

## 2023-02-27 RX ORDER — FUROSEMIDE 10 MG/ML
40 INJECTION INTRAMUSCULAR; INTRAVENOUS
Status: COMPLETED | OUTPATIENT
Start: 2023-02-27 | End: 2023-02-27

## 2023-02-27 RX ORDER — LORAZEPAM 2 MG/ML
2 INJECTION INTRAMUSCULAR
Status: COMPLETED | OUTPATIENT
Start: 2023-02-27 | End: 2023-02-27

## 2023-02-27 RX ORDER — MAGNESIUM SULFATE HEPTAHYDRATE 40 MG/ML
4 INJECTION, SOLUTION INTRAVENOUS
Status: DISCONTINUED | OUTPATIENT
Start: 2023-02-27 | End: 2023-03-02 | Stop reason: HOSPADM

## 2023-02-27 RX ORDER — EPINEPHRINE 0.1 MG/ML
INJECTION INTRAVENOUS CODE/TRAUMA/SEDATION MEDICATION
Status: COMPLETED | OUTPATIENT
Start: 2023-02-27 | End: 2023-02-27

## 2023-02-27 RX ORDER — NAPROXEN SODIUM 220 MG/1
324 TABLET, FILM COATED ORAL ONCE
Status: DISCONTINUED | OUTPATIENT
Start: 2023-02-27 | End: 2023-03-02 | Stop reason: HOSPADM

## 2023-02-27 RX ORDER — MAGNESIUM SULFATE HEPTAHYDRATE 40 MG/ML
2 INJECTION, SOLUTION INTRAVENOUS
Status: DISCONTINUED | OUTPATIENT
Start: 2023-02-27 | End: 2023-03-02 | Stop reason: HOSPADM

## 2023-02-27 RX ORDER — IPRATROPIUM BROMIDE 0.5 MG/2.5ML
0.5 SOLUTION RESPIRATORY (INHALATION) EVERY 12 HOURS
Status: DISCONTINUED | OUTPATIENT
Start: 2023-02-28 | End: 2023-03-02 | Stop reason: HOSPADM

## 2023-02-27 RX ORDER — GLUCAGON 1 MG
1 KIT INJECTION
Status: DISCONTINUED | OUTPATIENT
Start: 2023-02-27 | End: 2023-02-28

## 2023-02-27 RX ORDER — METOPROLOL SUCCINATE 50 MG/1
50 TABLET, EXTENDED RELEASE ORAL DAILY
Status: ON HOLD | COMMUNITY
End: 2023-03-02 | Stop reason: SDUPTHER

## 2023-02-27 RX ADMIN — HUMAN INSULIN 10 UNITS: 100 INJECTION, SOLUTION SUBCUTANEOUS at 05:02

## 2023-02-27 RX ADMIN — FUROSEMIDE 40 MG: 10 INJECTION, SOLUTION INTRAMUSCULAR; INTRAVENOUS at 05:02

## 2023-02-27 RX ADMIN — IPRATROPIUM BROMIDE AND ALBUTEROL SULFATE 6 ML: 2.5; .5 SOLUTION RESPIRATORY (INHALATION) at 05:02

## 2023-02-27 RX ADMIN — MEROPENEM AND SODIUM CHLORIDE 1 G: 1 INJECTION, SOLUTION INTRAVENOUS at 07:02

## 2023-02-27 RX ADMIN — PROPOFOL 40 MCG/KG/MIN: 10 INJECTION, EMULSION INTRAVENOUS at 07:02

## 2023-02-27 RX ADMIN — PROPOFOL 35 MCG/KG/MIN: 10 INJECTION, EMULSION INTRAVENOUS at 03:02

## 2023-02-27 RX ADMIN — LORAZEPAM 2 MG: 2 INJECTION INTRAMUSCULAR; INTRAVENOUS at 06:02

## 2023-02-27 RX ADMIN — IPRATROPIUM BROMIDE 0.5 MG: 0.5 SOLUTION RESPIRATORY (INHALATION) at 02:02

## 2023-02-27 RX ADMIN — LEVALBUTEROL HYDROCHLORIDE 0.63 MG: 0.63 SOLUTION RESPIRATORY (INHALATION) at 02:02

## 2023-02-27 RX ADMIN — METHYLPREDNISOLONE SODIUM SUCCINATE 80 MG: 40 INJECTION, POWDER, FOR SOLUTION INTRAMUSCULAR; INTRAVENOUS at 09:02

## 2023-02-27 RX ADMIN — INSULIN DETEMIR 15 UNITS: 100 INJECTION, SOLUTION SUBCUTANEOUS at 03:02

## 2023-02-27 RX ADMIN — METHYLPREDNISOLONE SODIUM SUCCINATE 125 MG: 125 INJECTION, POWDER, FOR SOLUTION INTRAMUSCULAR; INTRAVENOUS at 05:02

## 2023-02-27 RX ADMIN — IPRATROPIUM BROMIDE 0.5 MG: 0.5 SOLUTION RESPIRATORY (INHALATION) at 07:02

## 2023-02-27 RX ADMIN — VANCOMYCIN HYDROCHLORIDE 1000 MG: 1 INJECTION, POWDER, LYOPHILIZED, FOR SOLUTION INTRAVENOUS at 06:02

## 2023-02-27 RX ADMIN — LEVALBUTEROL HYDROCHLORIDE 0.63 MG: 0.63 SOLUTION RESPIRATORY (INHALATION) at 07:02

## 2023-02-27 RX ADMIN — FAMOTIDINE 20 MG: 10 INJECTION INTRAVENOUS at 01:02

## 2023-02-27 RX ADMIN — MEROPENEM AND SODIUM CHLORIDE 1 G: 1 INJECTION, SOLUTION INTRAVENOUS at 11:02

## 2023-02-27 RX ADMIN — METOPROLOL TARTRATE 12.5 MG: 25 TABLET, FILM COATED ORAL at 09:02

## 2023-02-27 RX ADMIN — Medication 120 MG: at 05:02

## 2023-02-27 RX ADMIN — PROPOFOL 40 MCG/KG/MIN: 10 INJECTION, EMULSION INTRAVENOUS at 12:02

## 2023-02-27 RX ADMIN — LORAZEPAM 2 MG: 2 INJECTION INTRAMUSCULAR at 06:02

## 2023-02-27 RX ADMIN — FUROSEMIDE 40 MG: 10 INJECTION INTRAMUSCULAR; INTRAVENOUS at 01:02

## 2023-02-27 RX ADMIN — LEVALBUTEROL HYDROCHLORIDE 0.63 MG: 0.63 SOLUTION RESPIRATORY (INHALATION) at 08:02

## 2023-02-27 RX ADMIN — ATROPINE SULFATE 1 MG: 0.1 INJECTION, SOLUTION INTRAVENOUS at 05:02

## 2023-02-27 RX ADMIN — EPINEPHRINE 1 MG: 0.1 INJECTION, SOLUTION ENDOTRACHEAL; INTRACARDIAC; INTRAVENOUS at 05:02

## 2023-02-27 RX ADMIN — MICONAZOLE NITRATE: 20 CREAM TOPICAL at 01:02

## 2023-02-27 RX ADMIN — ATORVASTATIN CALCIUM 40 MG: 40 TABLET, FILM COATED ORAL at 09:02

## 2023-02-27 RX ADMIN — HUMAN INSULIN 10 UNITS: 100 INJECTION, SOLUTION SUBCUTANEOUS at 07:02

## 2023-02-27 RX ADMIN — METHYLPREDNISOLONE SODIUM SUCCINATE 80 MG: 40 INJECTION, POWDER, FOR SOLUTION INTRAMUSCULAR; INTRAVENOUS at 01:02

## 2023-02-27 RX ADMIN — ENOXAPARIN SODIUM 40 MG: 100 INJECTION SUBCUTANEOUS at 06:02

## 2023-02-27 RX ADMIN — MICONAZOLE NITRATE: 20 CREAM TOPICAL at 09:02

## 2023-02-27 RX ADMIN — INSULIN ASPART 12 UNITS: 100 INJECTION, SOLUTION INTRAVENOUS; SUBCUTANEOUS at 03:02

## 2023-02-27 RX ADMIN — PROPOFOL 5 MCG/KG/MIN: 10 INJECTION, EMULSION INTRAVENOUS at 06:02

## 2023-02-27 RX ADMIN — DOBUTAMINE HYDROCHLORIDE 2.5 MCG/KG/MIN: 400 INJECTION INTRAVENOUS at 06:02

## 2023-02-27 NOTE — CONSULTS
Pulmonary/Critical Care Consult      PATIENT NAME: Delores Fox  MRN: [id number]  TODAY'S DATE: 2023  9:07 AM  ADMIT DATE: 2023  AGE: 57 y.o. : 1965    CONSULT REQUESTED BY: Kassandra Mota MD    REASON FOR CONSULT:   Critical care management    HPI:  Patient is a 57-year-old morbidly obese female with COPD who continues to smoke and has an EF of 30 percent with grade 1 diastolic dysfunction as well.  She is now intubated and remains acidotic on the ventilator, with both a respiratory and metabolic acidosis.  Her chest x-ray shows pulmonary edema.    REVIEW OF SYSTEMS  Unobtainable    ALLERGIES  Review of patient's allergies indicates:   Allergen Reactions    Morphine Nausea And Vomiting    Sulfa (sulfonamide antibiotics) Nausea And Vomiting       INPATIENT SCHEDULED MEDICATIONS   aspirin  324 mg Oral Once    aspirin  81 mg Oral Daily    atorvastatin  40 mg Oral QHS    buPROPion  150 mg Oral Daily    enoxaparin  40 mg Subcutaneous Daily    famotidine (PF)  20 mg Intravenous Daily    furosemide (LASIX) injection  40 mg Intravenous Q12H    insulin detemir U-100  15 Units Subcutaneous Daily    ipratropium  0.5 mg Nebulization Q6H    levalbuterol  0.63 mg Nebulization TID WAKE    meropenem (MERREM) IVPB  1 g Intravenous Q12H    methylPREDNISolone sodium succinate injection  80 mg Intravenous Q8H    metoprolol tartrate  12.5 mg Oral BID    miconazole   Topical (Top) BID    [START ON 2023] vancomycin (VANCOCIN) IVPB  1,750 mg Intravenous Q24H      propofoL 30 mcg/kg/min (23 1314)       MEDICAL AND SURGICAL HISTORY  Past Medical History:   Diagnosis Date    Acid reflux     COPD (chronic obstructive pulmonary disease)     Coronary artery disease     COVID-19     Diabetes mellitus     High cholesterol     Hypertension     Obese body habitus      Past Surgical History:   Procedure Laterality Date    APPENDECTOMY      CATHETERIZATION OF BOTH LEFT AND RIGHT HEART N/A 2020    Procedure:  CATHETERIZATION, HEART, BOTH LEFT AND RIGHT;  Surgeon: Doug Kendall MD;  Location: [facility] CATH/EP LAB;  Service: Cardiology;  Laterality: N/A;    CHOLECYSTECTOMY      EXCISION OF LESION OF EYELID Right 2020    Procedure: EXCISION, LESION, EYELID;  Surgeon: Malachi Rodriguez MD;  Location: [facility] OR;  Service: Ophthalmology;  Laterality: Right;  Inclusion Cyst removal right lower lid    HYSTERECTOMY      JOINT REPLACEMENT Left     left hip    MASTOIDECTOMY Right        ALCOHOL, TOBACCO AND DRUG USE  Social History     Tobacco Use   Smoking Status Former    Packs/day: 0.50    Years: 13.00    Pack years: 6.50    Types: Cigarettes    Quit date: 2022    Years since quittin.4    Passive exposure: Current   Smokeless Tobacco Never     Social History     Substance and Sexual Activity   Alcohol Use Not Currently     Social History     Substance and Sexual Activity   Drug Use Not Currently       FAMILY HISTORY  History reviewed. No pertinent family history.    VITAL SIGNS (MOST RECENT)  Temp: 97.7 °F (36.5 °C) (23 0717)  Pulse: 92 (23 1151)  Resp: (!) 32 (23 1151)  BP: (!) 115/59 (23 0906)  SpO2: (!) 94 % (23 1151)    INTAKE AND OUTPUT (LAST 24 HOURS):  Intake/Output Summary (Last 24 hours) at 2023 1332  Last data filed at 2023 1145  Gross per 24 hour   Intake --   Output 2000 ml   Net -2000 ml       WEIGHT  Wt Readings from Last 1 Encounters:   23 113.4 kg (250 lb)       PHYSICAL EXAM  GENERAL: Obese patient in no distress, resting on the ventilator.  HEENT: Pupils equal and reactive. Extraocular movements intact. Nose intact. Pharynx intubated with ET tube and OG tube.  NECK: Supple.   HEART: Regular rate and rhythm. No murmur or gallop auscultated.  LUNGS: Clear to auscultation and percussion, anteriorly; posteriorly, there crackles bilaterally.. Lung excursion symmetrical. No change in fremitus.  ABDOMEN: Bowel sounds present. Non-tender, no masses  palpated.  : Normal anatomy.  EXTREMITIES: Normal muscle tone and joint movement, no cyanosis or clubbing.   LYMPHATICS: No adenopathy palpated, 1+ edema  SKIN: Dry, intact, no lesions.   NEURO:  Sedated  PSYCH:  Unable to assess      CBC LAST (LAST 24 HOURS)  Recent Labs   Lab 02/27/23  0505 02/27/23  0506   WBC 18.42*  --    RBC 4.25  --    HGB 13.2  --    HCT 41.9 42   MCV 99*  --    MCH 31.1*  --    MCHC 31.5*  --    RDW 13.3  --    *  --    MPV 9.9  --    GRAN 60.4  11.1*  --    LYMPH 29.2  5.4*  --    MONO 8.1  1.5*  --    BASO 0.05  --    NRBC 0  --        CHEMISTRY LAST (LAST 24 HOURS)  Recent Labs   Lab 02/27/23  0505 02/27/23  0506 02/27/23  1009   *  --   --    K 4.6  --   --    CL 95  --   --    CO2 21*  --   --    ANIONGAP 14  --   --    BUN 25*  --   --    CREATININE 1.7*  --   --    *  --   --    CALCIUM 7.9*  --   --    PH  --    < > 7.360   ALBUMIN 3.9  --   --    PROT 7.2  --   --    ALKPHOS 134  --   --    ALT 18  --   --    AST 29  --   --    BILITOT 1.2*  --   --     < > = values in this interval not displayed.       COAGULATION LAST (LAST 24 HOURS)  Recent Labs   Lab 02/27/23  0505   INR 1.1       CARDIAC PROFILE (LAST 24 HOURS)  Recent Labs   Lab 02/27/23  0505 02/27/23  1151   *  --    *  --    CPKMB 10.0*  --    TROPONINIHS 39.8* 67.4*       LAST 7 DAYS MICROBIOLOGY   Microbiology Results (last 7 days)       Procedure Component Value Units Date/Time    MRSA Screen by PCR [[id number]] Collected: 02/27/23 1308    Order Status: Sent Specimen: Nasopharyngeal Swab from Nasal Updated: 02/27/23 1322    Culture, Respiratory with Gram Stain [[id number]] Collected: 02/27/23 1215    Order Status: Sent Specimen: Respiratory from Tracheal Aspirate Updated: 02/27/23 1235            MOST RECENT IMAGING  X-Ray Chest AP Portable  Chest single view    CLINICAL DATA: Cardiac arrest, post intubation    FINDINGS: AP view is compared to 0456 hours. Endotracheal tube has been  placed with tip just below the clavicles. The heart and mediastinum are unchanged in appearance. Diffuse abnormal interstitial and alveolar opacities persist.    IMPRESSION:  1. ET tube tip is just below the clavicles.  2. Chest is otherwise unchanged.    Electronically signed by:  Carter Saavedra MD  2/27/2023 7:32 AM CST Workstation: [id number]  X-Ray Chest AP Portable  Chest single view    CLINICAL DATA: Respiratory distress    FINDINGS: AP view is compared to April 2022. The heart is enlarged. The mediastinum is unremarkable. Diffuse bilateral abnormal interstitial and airspace opacities are noted likely reflecting pulmonary edema. There are small bilateral pleural effusions. No acute osseous abnormalities are identified.    IMPRESSION:  1. Cardiomegaly with diffuse interstitial and airspace opacities bilaterally, likely reflecting pulmonary edema. Pneumonia is within the differential but felt less likely.    Electronically signed by:  Carter Saavedra MD  2/27/2023 7:28 AM CST Workstation: [id number]      CURRENT VISIT EKG  Results for orders placed or performed during the hospital encounter of 02/27/23   EKG 12-lead    Narrative    Test Reason : R07.9,    Vent. Rate : 115 BPM     Atrial Rate : 115 BPM     P-R Int : 152 ms          QRS Dur : 102 ms      QT Int : 352 ms       P-R-T Axes : 072 047 050 degrees     QTc Int : 486 ms    Sinus tachycardia  Possible Left atrial enlargement  T wave abnormality, consider inferolateral ischemia  Abnormal ECG  When compared with ECG of 21-OCT-2022 08:30,  Vent. rate has increased BY  40 BPM  T wave inversion now evident in Inferior leads  T wave inversion less evident in Lateral leads    Referred By: [email or web address]   SELF           Confirmed By:        ECHOCARDIOGRAM RESULTS  Results for orders placed during the hospital encounter of 09/16/22    Echo Saline Bubble? Yes    Interpretation Summary  · There is no evidence of intracardiac shunting.  · The left ventricle is  moderately enlarged with mild eccentric hypertrophy and moderately decreased systolic function.  · There is severe left ventricular global hypokinesis.  · The estimated ejection fraction is 30%.  · Grade I left ventricular diastolic dysfunction.  · Normal right ventricular size with normal right ventricular systolic function.  · Mild left atrial enlargement.  · Mild mitral regurgitation.  · Mild tricuspid regurgitation.  · Normal central venous pressure (3 mmHg).  · The estimated PA systolic pressure is 31 mmHg.        VENTILATOR INFORMATION  Vent Mode: A/C  Oxygen Concentration (%):  [] 50  Resp Rate Total:  [32 br/min-65 br/min] 32 br/min  Vt Set:  [400 mL-450 mL] 450 mL  PEEP/CPAP:  [5 cmH20] 5 cmH20  Pressure Support:  [0 cmH20] 0 cmH20  Mean Airway Pressure:  [14 deT78-76 cmH20] 15 cmH20       LAST ARTERIAL BLOOD GAS  ABG  Recent Labs   Lab 02/27/23  1009   PH 7.360   PO2 69*   PCO2 40.3   HCO3 22.7*   BE -3       IMPRESSION AND PLAN  Acute on chronic hypercapnic hypoxemic respiratory failure with mechanical ventilation  Pulmonary edema   Systolic and diastolic heart failure, acute on chronic   COPD with exacerbation   Tobacco abuse  Morbid obesity    Adjust ventilator as her acidosis corrects  Lasix is ordered  Protonix for GI prophylaxis  Enoxaparin for DVT prophylaxis  Bronchodilators  Do not feel the patient needs antibiotics but will let run for 24 hours  The patient definitely does not need a CTA.  She has pulmonary edema.    Critical care time spent reviewing the chart, examining the patient, reviewing the labs, reviewing the radiological findings, discussing care with nursing, physicians, and respiratory and creating the note and  has been greater than 35 minutes    Leeanna Feng MD  [facility]  Department of Pulmonology  Date of Service: 02/27/2023  9:07 AM

## 2023-02-27 NOTE — PROGRESS NOTES
VANCOMYCIN PHARMACOKINETIC NOTE:  Vancomycin Day # 1    Objective/Assessment:    Diagnosis/Indication for Vancomycin:Pneumonia      57 y.o., female; Actual Body Weight = 113.4 kg (250 lb).    The patient has the following labs:  2/27/2023 Estimated Creatinine Clearance: 48.6 mL/min (A) (based on SCr of 1.7 mg/dL (H)). Lab Results   Component Value Date    BUN 25 (H) 02/27/2023     Lab Results   Component Value Date    WBC 18.42 (H) 02/27/2023          Plan:  Adjust vancomycin dose and/or frequency based on the patient's actual weight and renal function:  Initiate Vancomycin 1750 mg IV every 24 hours.  Orders have been entered into patient's chart.        Vancomycin trough level has been ordered for 3/2 @ 0800, prior to 2nd dose.     Pharmacy will manage vancomycin therapy, monitor serum vancomycin levels, monitor renal function and adjust regimen as necessary.    Thank you for allowing us to participate in this patient's care.     Marcello Kaiser 2/27/2023   Department of Pharmacy  Ext [phone]

## 2023-02-27 NOTE — SUBJECTIVE & OBJECTIVE
Interval History:  See hospital course.  Patient remains on propofol.  Orally intubated on mechanical ventilation, current settings FiO2 of 70 with 5 of PEEP.  Afebrile with T-max 98.7°.  Labs with WBC 18, platelets 4 5 5, sodium 130, BUN/creatinine 25/1.7, BNP 4 8 6.  Previous echo with EF of 30% with grade 1 diastolic dysfunction.  Chest x-ray with diffuse interstitial and alveolar infiltrates bilaterally.  No visitors present.    Review of Systems   Unable to perform ROS: Intubated   Objective:     Vital Signs (Most Recent):  Temp: 97.7 °F (36.5 °C) (02/27/23 0717)  Pulse: 98 (02/27/23 0930)  Resp: (!) 32 (02/27/23 0930)  BP: 121/75 (02/27/23 0851)  SpO2: 98 % (02/27/23 0930)   Vital Signs (24h Range):  Temp:  [97.7 °F (36.5 °C)-98.7 °F (37.1 °C)] 97.7 °F (36.5 °C)  Pulse:  [] 98  Resp:  [25-86] 32  SpO2:  [81 %-100 %] 98 %  BP: ()/() 121/75     Weight: 113.4 kg (250 lb)  Body mass index is 39.16 kg/m².  No intake or output data in the 24 hours ending 02/27/23 0957   Physical Exam  Vitals and nursing note reviewed.   Constitutional:       Comments: Chronically and critically ill-appearing, lying in bed   HENT:      Head: Normocephalic and atraumatic.      Mouth/Throat:      Comments: Orally intubated  Eyes:      Pupils: Pupils are equal, round, and reactive to light.   Cardiovascular:      Rate and Rhythm: Normal rate and regular rhythm.      Comments: Trace lower extremity edema  Pulmonary:      Comments: Orally intubated on mechanical ventilation FiO2 70 with 5 of PEEP, mechanical breath sounds  Abdominal:      Palpations: Abdomen is soft.      Tenderness: There is no abdominal tenderness.      Comments: Abdominal pannus.  Moisture with erythema between abdominal folds   Genitourinary:     Comments: Siegel in place with yellow urine draining  Skin:     Comments: Right radial arterial line   Neurological:      Comments: Sedated on propofol       Significant Labs: Blood Culture: No results for  input(s): LABBLOO in the last 48 hours.  BMP:   Recent Labs   Lab 02/27/23  0505   *   *   K 4.6   CL 95   CO2 21*   BUN 25*   CREATININE 1.7*   CALCIUM 7.9*     CBC:   Recent Labs   Lab 02/27/23  0505 02/27/23  0506   WBC 18.42*  --    HGB 13.2  --    HCT 41.9 42   *  --      CMP:   Recent Labs   Lab 02/27/23  0505   *   K 4.6   CL 95   CO2 21*   *   BUN 25*   CREATININE 1.7*   CALCIUM 7.9*   PROT 7.2   ALBUMIN 3.9   BILITOT 1.2*   ALKPHOS 134   AST 29   ALT 18   ANIONGAP 14     Magnesium: No results for input(s): MG in the last 48 hours.    Significant Imaging: I have reviewed all pertinent imaging results/findings within the past 24 hours.    X-Ray Chest AP Portable    Result Date: 2/27/2023  Chest single view CLINICAL DATA: Cardiac arrest, post intubation FINDINGS: AP view is compared to 0456 hours. Endotracheal tube has been placed with tip just below the clavicles. The heart and mediastinum are unchanged in appearance. Diffuse abnormal interstitial and alveolar opacities persist. IMPRESSION: 1. ET tube tip is just below the clavicles. 2. Chest is otherwise unchanged. Electronically signed by:  Carter Saavedra MD  2/27/2023 7:32 AM CST Workstation: [id number]    X-Ray Chest AP Portable    Result Date: 2/27/2023  Chest single view CLINICAL DATA: Respiratory distress FINDINGS: AP view is compared to April 2022. The heart is enlarged. The mediastinum is unremarkable. Diffuse bilateral abnormal interstitial and airspace opacities are noted likely reflecting pulmonary edema. There are small bilateral pleural effusions. No acute osseous abnormalities are identified. IMPRESSION: 1. Cardiomegaly with diffuse interstitial and airspace opacities bilaterally, likely reflecting pulmonary edema. Pneumonia is within the differential but felt less likely. Electronically signed by:  Carter Saavedra MD  2/27/2023 7:28 AM CST Workstation: [id number]

## 2023-02-27 NOTE — CONSULTS
Critical access hospital  Department of Cardiology  Consult Note      PATIENT NAME: Delores Fox  MRN: [id number]  TODAY'S DATE: 02/27/2023  ADMIT DATE: 2/27/2023                          CONSULT REQUESTED BY: Kassandra Mota MD    SUBJECTIVE     PRINCIPAL PROBLEM: Acute hypercapnic respiratory failure      REASON FOR CONSULT:  CHF/SOB      HPI:      Patient intubated and sedated  Resting on ventilator  Seen and examined in ICU  Lasix given in ER  CXR shows pulmonary edema  Patient england catheter is full and is diuresing well.    FROM H AND P    Delores Fox is a 56 y.o.  female who has a past medical history of Acid reflux, COPD (chronic obstructive pulmonary disease),  CHF with EF 30% ,Coronary artery disease, COVID-19, Diabetes mellitus, High cholesterol, Hypertension was brought by EMS to the hospital with severe shortness of breath.   Initially she was on BiPAP at ER  saturation 100% on 100% oxygen.   She was following commands and answer questions as per ED physician .  EMS given  magnesium for her  en route but no Solu-Medrol was given because she is a diabetic!!!?  Admission was given for COPD/CHF exacerbation and respiratory failure and Lasix 40 mg was given by ED physician.  When I went to  see the patient, they are intubating the patient.  As per ED physician, patient tried to remove the BiPAP and  said can not breathe and later bradycardic and 1 atropine was given and intubated with 2nd attempt video scope  and stabilized.  No further information at this time.  Leukocytosis noted.  CMP is pending and ABG with severe respiratory acidosis.    chest x-ray with significant pulmonary edema.        Review of patient's allergies indicates:   Allergen Reactions    Morphine Nausea And Vomiting    Sulfa (sulfonamide antibiotics) Nausea And Vomiting       Past Medical History:   Diagnosis Date    Acid reflux     COPD (chronic obstructive pulmonary disease)     Coronary artery disease     COVID-19      Diabetes mellitus     High cholesterol     Hypertension     Obese body habitus      Past Surgical History:   Procedure Laterality Date    APPENDECTOMY      CATHETERIZATION OF BOTH LEFT AND RIGHT HEART N/A 2020    Procedure: CATHETERIZATION, HEART, BOTH LEFT AND RIGHT;  Surgeon: Doug Kendall MD;  Location: [facility] CATH/EP LAB;  Service: Cardiology;  Laterality: N/A;    CHOLECYSTECTOMY      EXCISION OF LESION OF EYELID Right 2020    Procedure: EXCISION, LESION, EYELID;  Surgeon: Malachi Rodriguez MD;  Location: [facility] OR;  Service: Ophthalmology;  Laterality: Right;  Inclusion Cyst removal right lower lid    HYSTERECTOMY      JOINT REPLACEMENT Left     left hip    MASTOIDECTOMY Right      Social History     Tobacco Use    Smoking status: Former     Packs/day: 0.50     Years: 13.00     Pack years: 6.50     Types: Cigarettes     Quit date: 2022     Years since quittin.4     Passive exposure: Current    Smokeless tobacco: Never   Substance Use Topics    Alcohol use: Not Currently    Drug use: Not Currently        REVIEW OF SYSTEMS      Unable to assess  OBJECTIVE     VITAL SIGNS (Most Recent)  Temp: 97.7 °F (36.5 °C) (23 0717)  Pulse: 91 (23 1041)  Resp: (!) 32 (23 1041)  BP: (!) 115/59 (23 0906)  SpO2: 95 % (23 1041)    VENTILATION STATUS  Resp: (!) 32 (23 1041)  SpO2: 95 % (23 1041)  Vent Mode: A/C  Oxygen Concentration (%):  [] 50  Resp Rate Total:  [32 br/min-65 br/min] 32 br/min  Vt Set:  [400 mL-450 mL] 450 mL  PEEP/CPAP:  [5 cmH20] 5 cmH20  Pressure Support:  [0 cmH20] 0 cmH20  Mean Airway Pressure:  [14 bfJ88-34 cmH20] 15 cmH20    I & O (Last 24H):No intake or output data in the 24 hours ending 23 1048    WEIGHTS  Wt Readings from Last 3 Encounters:   23 0456 113.4 kg (250 lb)   10/21/22 0755 107 kg (236 lb)   10/08/22 1054 105.7 kg (233 lb)       PHYSICAL EXAM  GENERAL: Female resting on ventilator looks older than charted  age  HEENT: OG Tube  NECK: No JVD. No bruit..   THYROID: Thyroid not enlarged. No nodules present..   CARDIAC: Tachycardic  CHEST ANATOMY: normal.   LUNGS: Mechanical breath sounds  ABDOMEN: Soft no masses or organomegaly.  No abdomen pulsations or bruits.  Normal bowel sounds. No pulsations and no masses felt, No guarding or rebound.   URINARY:  england catheter draining clear yellow urine  EXTREMITIES: No cyanosis, clubbing or edema noted at this time., no calf tenderness bilaterally.   PERIPHERAL VASCULAR SYSTEM: Good palpable distal pulses.       HOME MEDICATIONS:  Current Facility-Administered Medications on File Prior to Encounter   Medication Dose Route Frequency Provider Last Rate Last Admin    electrolyte-S (ISOLYTE)   Intravenous Continuous Madi Herr MD   New Bag at 12/11/20 1041    lidocaine (PF) 10 mg/ml (1%) injection 10 mg  1 mL Intradermal Once Madi Herr MD        sodium chloride 0.9% flush 3 mL  3 mL Intravenous Q8H Madi Herr MD         Current Outpatient Medications on File Prior to Encounter   Medication Sig Dispense Refill    albuterol (ACCUNEB) 1.25 mg/3 mL Nebu Take 3 mLs (1.25 mg total) by nebulization every 6 (six) hours as needed (wheezing). Rescue 75 mL 1    albuterol (PROVENTIL/VENTOLIN HFA) 90 mcg/actuation inhaler Inhale 2 puffs into the lungs every 6 (six) hours as needed for Wheezing. Rescue 54 g 2    apixaban (ELIQUIS) 5 mg Tab Take 1 tablet (5 mg total) by mouth 2 (two) times daily. 180 tablet 0    aspirin 81 MG Chew Take 1 tablet (81 mg total) by mouth once daily. 30 tablet 0    atorvastatin (LIPITOR) 40 MG tablet Take 1 tablet (40 mg total) by mouth once daily. 90 tablet 1    blood-glucose meter kit To check BG 2 times daily, to use with insurance preferred meter 1 each 0    buPROPion (WELLBUTRIN XL) 150 MG TB24 tablet Take 1 tablet (150 mg total) by mouth once daily. 30 tablet 2    clotrimazole-betamethasone 1-0.05% (LOTRISONE) cream Apply topically 2  (two) times daily. 15 g 0    fluticasone propionate (FLONASE) 50 mcg/actuation nasal spray SHAKE LIQUID AND USE 1 SPRAY(50 MCG) IN EACH NOSTRIL EVERY DAY 16 g 0    fluticasone-umeclidin-vilanter (TRELEGY ELLIPTA) 200-62.5-25 mcg inhaler Inhale 1 puff into the lungs once daily. 60 each 2    lancets Misc To check BG 2 times daily, to use with insurance preferred meter 200 each 1    LORazepam (ATIVAN) 0.5 MG tablet Take 1 tablet (0.5 mg total) by mouth every 6 (six) hours as needed for Anxiety. 12 tablet 0    losartan (COZAAR) 25 MG tablet Take 0.5 tablets (12.5 mg total) by mouth once daily. 45 tablet 3    metFORMIN (GLUCOPHAGE-XR) 500 MG ER 24hr tablet Take 1 tablet (500 mg total) by mouth daily with breakfast. 90 tablet 3    metoprolol succinate (TOPROL-XL) 25 MG 24 hr tablet Take 0.5 tablets (12.5 mg total) by mouth once daily. 45 tablet 3    MOTEGRITY 2 mg Tab Take 1 tablet by mouth nightly.      nicotine, polacrilex, (NICORETTE) 2 mg Gum Take 1 each (2 mg total) by mouth as needed (smoking urge). 100 each 4    ondansetron (ZOFRAN) 4 MG tablet Take 1 tablet (4 mg total) by mouth every 8 (eight) hours as needed for Nausea. 12 tablet 0    pantoprazole (PROTONIX) 40 MG tablet Take 1 tablet (40 mg total) by mouth 2 (two) times daily. 180 tablet 3    pregabalin (LYRICA) 100 MG capsule Take 1 capsule (100 mg total) by mouth 3 (three) times daily. 270 capsule 1    torsemide (DEMADEX) 20 MG Tab Take 1 tablet (20 mg total) by mouth once daily. 90 tablet 1    TRUE METRIX GLUCOSE TEST STRIP Strp CHECK SUGAR TWICE DAILY 200 strip 3    zolpidem (AMBIEN) 10 mg Tab Take 1 tablet (10 mg total) by mouth nightly as needed (insomnia). 90 tablet 1       SCHEDULED MEDS:   aspirin  324 mg Oral Once    aspirin  81 mg Oral Daily    atorvastatin  40 mg Oral QHS    buPROPion  150 mg Oral Daily    enoxaparin  40 mg Subcutaneous Daily    famotidine (PF)  20 mg Intravenous Daily    furosemide (LASIX) injection  40 mg Intravenous Q12H     insulin detemir U-100  15 Units Subcutaneous Daily    ipratropium  0.5 mg Nebulization Q6H    levalbuterol  0.63 mg Nebulization TID WAKE    meropenem (MERREM) IVPB  1 g Intravenous Q12H    methylPREDNISolone sodium succinate injection  80 mg Intravenous Q8H    metoprolol tartrate  12.5 mg Oral BID    miconazole   Topical (Top) BID       CONTINUOUS INFUSIONS:   propofoL 15 mcg/kg/min (02/27/23 0832)       PRN MEDS:calcium gluconate IVPB, calcium gluconate IVPB, calcium gluconate IVPB, dextrose 10%, dextrose 10%, glucagon (human recombinant), glucose, glucose, insulin aspart U-100, iohexoL, magnesium sulfate IVPB, magnesium sulfate IVPB, naloxone, potassium chloride **AND** potassium chloride **AND** potassium chloride, sodium chloride 0.9%, sodium phosphate IVPB, sodium phosphate IVPB, sodium phosphate IVPB, Pharmacy to dose Vancomycin consult **AND** vancomycin - pharmacy to dose    LABS AND DIAGNOSTICS     CBC LAST 3 DAYS  Recent Labs   Lab 02/27/23  0505 02/27/23  0506   WBC 18.42*  --    RBC 4.25  --    HGB 13.2  --    HCT 41.9 42   MCV 99*  --    MCH 31.1*  --    MCHC 31.5*  --    RDW 13.3  --    *  --    MPV 9.9  --    GRAN 60.4  11.1*  --    LYMPH 29.2  5.4*  --    MONO 8.1  1.5*  --    BASO 0.05  --    NRBC 0  --        COAGULATION LAST 3 DAYS  Recent Labs   Lab 02/27/23  0505   INR 1.1       CHEMISTRY LAST 3 DAYS  Recent Labs   Lab 02/27/23  0505 02/27/23  0506 02/27/23  0732 02/27/23  1009   *  --   --   --    K 4.6  --   --   --    CL 95  --   --   --    CO2 21*  --   --   --    ANIONGAP 14  --   --   --    BUN 25*  --   --   --    CREATININE 1.7*  --   --   --    *  --   --   --    CALCIUM 7.9*  --   --   --    PH  --  7.083* 7.249* 7.360   ALBUMIN 3.9  --   --   --    PROT 7.2  --   --   --    ALKPHOS 134  --   --   --    ALT 18  --   --   --    AST 29  --   --   --    BILITOT 1.2*  --   --   --        CARDIAC PROFILE LAST 3 DAYS  Recent Labs   Lab 02/27/23  0505   *   CPK  282*   CPKMB 10.0*   TROPONINIHS 39.8*       ENDOCRINE LAST 3 DAYS  No results for input(s): TSH, PROCAL in the last 168 hours.    LAST ARTERIAL BLOOD GAS  ABG  Recent Labs   Lab 02/27/23  1009   PH 7.360   PO2 69*   PCO2 40.3   HCO3 22.7*   BE -3       LAST 7 DAYS MICROBIOLOGY   Microbiology Results (last 7 days)       Procedure Component Value Units Date/Time    MRSA Screen by PCR [[id number]]     Order Status: No result Specimen: Nasopharyngeal Swab from Nasal     Culture, Respiratory with Gram Stain [[id number]]     Order Status: No result Specimen: Respiratory from Tracheal Aspirate             MOST RECENT IMAGING  X-Ray Chest AP Portable  Chest single view    CLINICAL DATA: Cardiac arrest, post intubation    FINDINGS: AP view is compared to 0456 hours. Endotracheal tube has been placed with tip just below the clavicles. The heart and mediastinum are unchanged in appearance. Diffuse abnormal interstitial and alveolar opacities persist.    IMPRESSION:  1. ET tube tip is just below the clavicles.  2. Chest is otherwise unchanged.    Electronically signed by:  Carter Saavedra MD  2/27/2023 7:32 AM CST Workstation: [id number]  X-Ray Chest AP Portable  Chest single view    CLINICAL DATA: Respiratory distress    FINDINGS: AP view is compared to April 2022. The heart is enlarged. The mediastinum is unremarkable. Diffuse bilateral abnormal interstitial and airspace opacities are noted likely reflecting pulmonary edema. There are small bilateral pleural effusions. No acute osseous abnormalities are identified.    IMPRESSION:  1. Cardiomegaly with diffuse interstitial and airspace opacities bilaterally, likely reflecting pulmonary edema. Pneumonia is within the differential but felt less likely.    Electronically signed by:  Carter Saavedra MD  2/27/2023 7:28 AM CST Workstation: [id number]      ECHOCARDIOGRAM RESULTS (last 5)  Results for orders placed during the hospital encounter of 09/16/22    Echo Saline Bubble?  Yes    Interpretation Summary  · There is no evidence of intracardiac shunting.  · The left ventricle is moderately enlarged with mild eccentric hypertrophy and moderately decreased systolic function.  · There is severe left ventricular global hypokinesis.  · The estimated ejection fraction is 30%.  · Grade I left ventricular diastolic dysfunction.  · Normal right ventricular size with normal right ventricular systolic function.  · Mild left atrial enlargement.  · Mild mitral regurgitation.  · Mild tricuspid regurgitation.  · Normal central venous pressure (3 mmHg).  · The estimated PA systolic pressure is 31 mmHg.      Results for orders placed during the hospital encounter of 02/12/22    Echo    Interpretation Summary  · The estimated PA systolic pressure is 27 mmHg.  · The left ventricle is mildly enlarged with mild concentric hypertrophy and moderately decreased systolic function.  · The estimated ejection fraction is 35%.  · Grade I left ventricular diastolic dysfunction.  · There is moderate left ventricular global hypokinesis.  · Normal right ventricular size with normal right ventricular systolic function.  · Mild left atrial enlargement.  · Mild tricuspid regurgitation.  · Normal central venous pressure (3 mmHg).      Results for orders placed during the hospital encounter of 03/03/21    Echo Color Flow Doppler? Yes    Interpretation Summary  · The left ventricle is mildly enlarged with mild concentric hypertrophy andThe estimated ejection fraction is 40%  · Grade I left ventricular diastolic dysfunction.  · There is moderate left ventricular global hypokinesis.  · Normal right ventricular size with normal right ventricular systolic function.  · Mild left atrial enlargement.  · Mild mitral regurgitation.  · Normal central venous pressure (3 mmHg).      Results for orders placed during the hospital encounter of 11/21/20    Echo Color Flow Doppler? Yes    Interpretation Summary  · The estimated PA systolic  pressure is 32 mmHg.  · There is mild left ventricular concentric hypertrophy.  · The left ventricle is mildly enlarged with moderately decreased systolic function. The estimated ejection fraction is 30%.  · Grade I diastolic dysfunction.  · There is left ventricular global hypokinesis.  · Normal right ventricular size with normal right ventricular systolic function.  · Mild left atrial enlargement.  · Mild-to-moderate mitral regurgitation.  · Intermediate central venous pressure (8 mmHg).      CURRENT/PREVIOUS VISIT EKG  Results for orders placed or performed during the hospital encounter of 02/27/23   EKG 12-lead    Collection Time: 02/27/23  6:50 AM    Narrative    Test Reason : R07.9,    Vent. Rate : 115 BPM     Atrial Rate : 115 BPM     P-R Int : 152 ms          QRS Dur : 102 ms      QT Int : 352 ms       P-R-T Axes : 072 047 050 degrees     QTc Int : 486 ms    Sinus tachycardia  Possible Left atrial enlargement  T wave abnormality, consider inferolateral ischemia  Abnormal ECG  When compared with ECG of 21-OCT-2022 08:30,  Vent. rate has increased BY  40 BPM  T wave inversion now evident in Inferior leads  T wave inversion less evident in Lateral leads    Referred By: [email or web address]   SELF           Confirmed By:            ASSESSMENT/PLAN:     Active Hospital Problems    Diagnosis    *Acute hypoxic hypercapnic respiratory failure, intubated on mechanical ventilation    Acute on chronic systolic and diastolic combined heart failure    Leucocytosis    STEPHEN (acute kidney injury)    Candida rash of groin    Hyponatremia    Anxiety and depression    Severe obesity with body mass index (BMI) of 35.0 to 39.9 with comorbidity    CAD (coronary artery disease)    Cardiomyopathy    Tobacco abuse    Acute exacerbation of COPD    Essential hypertension    HLD (hyperlipidemia)    Type 2 diabetes mellitus with hyperglycemia, without long-term current use of insulin, HbA1c 9.1%       CATH 2020    The Prox RCA lesion was 100%  stenosed.  The ejection fraction was calculated to be 25%.  There was severe left ventricular systolic dysfunction.     Assessment:  1. RCA  with left to right collaterals  2. LVEDP 12 mmHg  3. LVEF 25%   4. PA pressure 31/16 mmHg with mean pressure of 22 mmHg  5. PCWP 13 mmHg  6. Cardiac output of 4.4 L/min by thermodilution      Recommendations:  1. Optimize medical management of CAD  2. Aggressive risk factor and lifestyle modifications  3. Routine post cath care and monitoring  4. Outpatient follow-up with Cardiology         ASSESSMENT & PLAN:       Pulmonary edema-Acute on Chronic CHFrEF  Acute on Chronic hypercapnic hypoxemic respiratory failure with mechanical ventilation  COPD  Tobacco abuse  Obesity  Ischemic Cardiomyopathy  STEPHEN  Leukocytosis  DM  H/O CVA- On Eliquis 5 mg PO BID      RECOMMENDATIONS:      Continue to diurese  Diuresing well for now if this slows will consider dobutamine  Vent settings per pulmonary  Would increase lovenox to therapeutic dosing as she is on eliquis at home.  Consider procalcitonin level          Dorota Vogel NP  [facility]  Department of Cardiology  Date of Service: 02/27/2023      Delores Fox is a 56 y.o.  female who has a past medical history of Acid reflux, COPD (chronic obstructive pulmonary disease),  CHF with EF 30% ,Coronary artery disease, COVID-19, Diabetes mellitus, High cholesterol, Hypertension was brought by EMS to the hospital with severe shortness of breath.  Patient had developed progressive respiratory failure requiring emergent intubation in the emergency room currently oxygenating fairly well on ventilator and sedated.  Reportedly patient has been having progressive shortness of breath greater than 24 hours preceding the admission although patient declined evaluation yesterday her BNP today is 486 and chest x-ray is consistent with acute pulmonary edema.  Patient has history of severe LV dysfunction with ejection fraction of 25%  previous evaluations.    In addition to present efforts recommend to start on dobutamine inotrope therapy for about 24 hour duration.    I have personally interviewed and examined the patient, I have reviewed the Nurse Practitioner's history and physical, assessment, and plan. I agree with the findings and plan.      Doug Kendall M.D.  [facility]  Department of Cardiology  Date of Service: 02/27/2023  10:48 AM

## 2023-02-27 NOTE — ED NOTES
"In room to place purwick. Patient became increasingly anxious and stated "I can't breathe". Called MD to room. Patient became bradycardic and unresponsive.   "

## 2023-02-27 NOTE — ED NOTES
Intubated.  AC Unresponsive.  ST at monitor. Vancomycin and Diprovan IV infusing. OG on place to LIS. FC to gravity.  Vent settings. , O2 80%, P 5.9. BBS equal and CC. Diprovan to 10 mcg/kg/min to LAC. Vancomycin  2 g at 250ml hr to RAC. Obese, soft  abdomon.  ++ pulses x 4 extremities.  Skin WDI. Continue to monitor. Falls precautions continue.

## 2023-02-27 NOTE — ED PROVIDER NOTES
Encounter Date: 2023       History     Chief Complaint   Patient presents with    Shortness of Breath    Altered Mental Status     Chief complaint is shortness of breath.  This patient arrived here per EMS with a complaint of shortness of breath.  When I saw the patient she was on BiPAP.  Pulse ox is 100% on 100% oxygen.  She can open her eyes and answer questions.  She is following commands.  It is difficult for her to speak through the BiPAP device.  She does have a history of acid reflux COPD coronary artery disease COVID-19 diabetes high cholesterol hypertension and is obese.  EN route they started magnesium for her but no Solu-Medrol was given because she is a diabetic.      Review of patient's allergies indicates:   Allergen Reactions    Morphine Nausea And Vomiting    Sulfa (sulfonamide antibiotics) Nausea And Vomiting     Past Medical History:   Diagnosis Date    Acid reflux     COPD (chronic obstructive pulmonary disease)     Coronary artery disease     COVID-19     Diabetes mellitus     High cholesterol     Hypertension     Obese body habitus      Past Surgical History:   Procedure Laterality Date    APPENDECTOMY      CATHETERIZATION OF BOTH LEFT AND RIGHT HEART N/A 2020    Procedure: CATHETERIZATION, HEART, BOTH LEFT AND RIGHT;  Surgeon: Doug Kendall MD;  Location: [facility] CATH/EP LAB;  Service: Cardiology;  Laterality: N/A;    CHOLECYSTECTOMY      EXCISION OF LESION OF EYELID Right 2020    Procedure: EXCISION, LESION, EYELID;  Surgeon: Malachi Rodriguez MD;  Location: [facility] OR;  Service: Ophthalmology;  Laterality: Right;  Inclusion Cyst removal right lower lid    HYSTERECTOMY      JOINT REPLACEMENT Left     left hip    MASTOIDECTOMY Right      History reviewed. No pertinent family history.  Social History     Tobacco Use    Smoking status: Former     Packs/day: 0.50     Years: 13.00     Pack years: 6.50     Types: Cigarettes     Quit date: 2022     Years since quittin.4      Passive exposure: Current    Smokeless tobacco: Never   Substance Use Topics    Alcohol use: Not Currently    Drug use: Not Currently     Review of Systems   Constitutional:  Negative for chills and fever.   HENT:  Negative for ear pain, rhinorrhea and sore throat.    Eyes:  Negative for pain and visual disturbance.   Respiratory:  Positive for shortness of breath. Negative for cough.    Cardiovascular:  Negative for chest pain and palpitations.   Gastrointestinal:  Negative for abdominal pain, constipation, diarrhea, nausea and vomiting.   Genitourinary:  Negative for dysuria, frequency, hematuria and urgency.   Musculoskeletal:  Negative for back pain, joint swelling and myalgias.   Skin:  Negative for rash.   Neurological:  Negative for dizziness, seizures, weakness and headaches.   Psychiatric/Behavioral:  Negative for dysphoric mood. The patient is not nervous/anxious.      Physical Exam     Initial Vitals [02/27/23 0456]   BP Pulse Resp Temp SpO2   (!) 140/87 (!) 130 (!) 38 98.7 °F (37.1 °C) (!) 81 %      MAP       --         Physical Exam    Nursing note and vitals reviewed.  Constitutional: She appears well-developed and well-nourished.   HENT:   Head: Normocephalic and atraumatic.   Eyes: Conjunctivae, EOM and lids are normal. Pupils are equal, round, and reactive to light.   Neck: Trachea normal. Neck supple. No thyroid mass and no thyromegaly present.   Normal range of motion.  Cardiovascular:  Normal rate, regular rhythm and normal heart sounds.           Pulmonary/Chest: Effort normal.   Patient with wheezing in the posterior bases.   Abdominal: Abdomen is soft. Bowel sounds are normal. There is no abdominal tenderness.   Musculoskeletal:         General: Normal range of motion.      Cervical back: Normal range of motion and neck supple.     Neurological: She is alert and oriented to person, place, and time. She has normal strength and normal reflexes. No cranial nerve deficit or sensory deficit.    Skin: Skin is warm and dry.   Psychiatric: She has a normal mood and affect. Her speech is normal and behavior is normal. Judgment and thought content normal.       ED Course   Procedures  Labs Reviewed   CBC W/ AUTO DIFFERENTIAL - Abnormal; Notable for the following components:       Result Value    WBC 18.42 (*)     MCV 99 (*)     MCH 31.1 (*)     MCHC 31.5 (*)     Platelets 455 (*)     Immature Granulocytes 0.9 (*)     Gran # (ANC) 11.1 (*)     Immature Grans (Abs) 0.16 (*)     Lymph # 5.4 (*)     Mono # 1.5 (*)     All other components within normal limits   COMPREHENSIVE METABOLIC PANEL - Abnormal; Notable for the following components:    Sodium 130 (*)     CO2 21 (*)     Glucose 392 (*)     BUN 25 (*)     Creatinine 1.7 (*)     Calcium 7.9 (*)     Total Bilirubin 1.2 (*)     eGFR 35.2 (*)     All other components within normal limits   B-TYPE NATRIURETIC PEPTIDE - Abnormal; Notable for the following components:     (*)     All other components within normal limits   TROPONIN I HIGH SENSITIVITY - Abnormal; Notable for the following components:    Troponin I High Sensitivity 39.8 (*)     All other components within normal limits   CK - Abnormal; Notable for the following components:     (*)     All other components within normal limits   CK-MB - Abnormal; Notable for the following components:    CPK MB 10.0 (*)     All other components within normal limits   ISTAT PROCEDURE - Abnormal; Notable for the following components:    POC PH 7.083 (*)     POC PCO2 82.9 (*)     POC SATURATED O2 93 (*)     POC Glucose 479 (*)     POC Sodium 129 (*)     All other components within normal limits   POCT GLUCOSE - Abnormal; Notable for the following components:    POC Glucose 459 (*)     All other components within normal limits   ISTAT PROCEDURE - Abnormal; Notable for the following components:    POC PH 7.249 (*)     POC PCO2 54.6 (*)     POC PO2 75 (*)     POC HCO3 23.9 (*)     POC SATURATED O2 92 (*)      All other components within normal limits   POCT GLUCOSE - Abnormal; Notable for the following components:    POC Glucose 470 (*)     All other components within normal limits   POCT GLUCOSE - Abnormal; Notable for the following components:    POC Glucose 228 (*)     All other components within normal limits   ISTAT PROCEDURE - Abnormal; Notable for the following components:    POC PO2 69 (*)     POC HCO3 22.7 (*)     POC SATURATED O2 93 (*)     All other components within normal limits   PROTIME-INR   CK   CK-MB        ECG Results              EKG 12-lead (Final result)  Result time 02/27/23 19:44:43      Final result by Interface, Lab In [facility] (02/27/23 19:44:43)                   Narrative:    Test Reason : R07.9,    Vent. Rate : 115 BPM     Atrial Rate : 115 BPM     P-R Int : 152 ms          QRS Dur : 102 ms      QT Int : 352 ms       P-R-T Axes : 072 047 050 degrees     QTc Int : 486 ms    Sinus tachycardia  Possible Left atrial enlargement  T wave abnormality, consider inferolateral ischemia  Abnormal ECG  When compared with ECG of 21-OCT-2022 08:30,  Vent. rate has increased BY  40 BPM  T wave inversion now evident in Inferior leads  T wave inversion less evident in Lateral leads  Confirmed by Doug Kendall MD ([id number]) on 2/27/2023 7:44:30 PM    Referred By: [email or web address]   SELF           Confirmed By:Doug Kendall MD                                  Imaging Results              X-Ray Chest AP Portable (Final result)  Result time 02/27/23 07:32:04      Final result by Carter Saavedra MD (02/27/23 07:32:04)                   Narrative:    Chest single view    CLINICAL DATA: Cardiac arrest, post intubation    FINDINGS: AP view is compared to 0456 hours. Endotracheal tube has been placed with tip just below the clavicles. The heart and mediastinum are unchanged in appearance. Diffuse abnormal interstitial and alveolar opacities persist.    IMPRESSION:  1. ET tube tip is just below the clavicles.  2. Chest is  otherwise unchanged.    Electronically signed by:  Carter Saavedra MD  2/27/2023 7:32 AM CST Workstation: [id number]                                     X-Ray Chest AP Portable (Final result)  Result time 02/27/23 07:28:45      Final result by Carter Saavedra MD (02/27/23 07:28:45)                   Narrative:    Chest single view    CLINICAL DATA: Respiratory distress    FINDINGS: AP view is compared to April 2022. The heart is enlarged. The mediastinum is unremarkable. Diffuse bilateral abnormal interstitial and airspace opacities are noted likely reflecting pulmonary edema. There are small bilateral pleural effusions. No acute osseous abnormalities are identified.    IMPRESSION:  1. Cardiomegaly with diffuse interstitial and airspace opacities bilaterally, likely reflecting pulmonary edema. Pneumonia is within the differential but felt less likely.    Electronically signed by:  Carter Saavedra MD  2/27/2023 7:28 AM [facility] Workstation: [id number]                                  X-Rays:   Independently Interpreted Readings:   Other Readings:  Chest x-ray reveals what could be slight pulmonary edema.  Medications   furosemide injection 40 mg (40 mg Intravenous Given 2/27/23 0508)   methylPREDNISolone sodium succinate injection 125 mg (125 mg Intravenous Given 2/27/23 0505)   insulin regular injection 10 Units 0.1 mL (10 Units Intravenous Given 2/27/23 0521)   albuterol-ipratropium 2.5 mg-0.5 mg/3 mL nebulizer solution 6 mL (6 mLs Nebulization Given 2/27/23 0506)   furosemide injection 40 mg (40 mg Intravenous Given 2/27/23 0534)   atropine injection (1 mg Intravenous Given 2/27/23 0547)   succinylcholine injection (120 mg Intravenous Given 2/27/23 0548)   EPINEPHrine 0.1 mg/mL injection (1 mg Intravenous Given 2/27/23 0549)   vancomycin in dextrose 5 % 1 gram/250 mL IVPB 1,000 mg (0 mg Intravenous Stopped 2/27/23 0748)     And   vancomycin in dextrose 5 % 1 gram/250 mL IVPB 1,000 mg (0 mg Intravenous Stopped  2/27/23 0748)   LORazepam injection 2 mg (2 mg Intravenous Given 2/27/23 0681)   insulin regular injection 10 Units 0.1 mL (10 Units Intravenous Given 2/27/23 8539)   potassium bicarbonate disintegrating tablet 50 mEq (50 mEq Oral Given 3/2/23 0159)   potassium chloride SA CR tablet 40 mEq (40 mEq Oral Given 3/2/23 0995)     Medical Decision Making:   ED Management:  The patient has a presentation compatible with severe COPD.  With rales in the bases I will give her Lasix as well as magnesium DuoNeb and she is on BiPAP.  The patient on re-examine is more awake.  I did speak with the hospitalist to his aware that she needs to come in.  We discussed the labs we had up to that point and Dr. Lanza will take care the patient admitted for COPD exacerbation as well as possible CHF exacerbation.    The patient was improving then the nurses alerted me that she had a turn for the worse.  I went into the room and she was very unresponsive trying to take her mask off.  She then became apneic and coded.  We bagged her and on a 2nd attempt intubation I intubated her with video scope.  Good color change and good breath sounds.  The patient did receive a dose of atropine and epinephrine and responded with a strong rapid pulse and blood pressure.    The patient in the ER had an EKG that showed a heart rate of 115 T-wave inversions in the inferior leads but no ST elevation.          Attending Attestation:         Attending Critical Care:   Critical Care Times:   Direct Patient Care (initial evaluation, reassessments, and time considering the case)................................................................10 minutes.   Ordering, Reviewing, and Interpreting Diagnostic Studies...............................................................................................................10 minutes.    Documentation..................................................................................................................................................................................10 minutes.   Consultation with other Physicians. .................................................................................................................................................5 minutes.   ==============================================================  Total Critical Care Time - exclusive of procedural time: 35 minutes.  ==============================================================  Critical care was necessary to treat or prevent imminent or life-threatening deterioration of the following conditions: airway compromise.   The following critical care procedures were done by me (see procedure notes): airway management, endotracheal tube placement * and pulse oximetry.   Critical care was time spent personally by me on the following activities: examination of patient, ordering lab, x-rays, and/or EKG, ordering and performing treatments and interventions, evaluation of patient's response to treatment and discussion with consultants.   Critical Care Condition: critical                      Clinical Impression:   Final diagnoses:  [R07.9] Chest pain        ED Disposition Condition    Admit                 Suhail Lam MD  02/27/23 0533       Suhail Lam MD  02/27/23 0604       Suhail Lam MD  03/08/23 0050       Suhail Lam MD  03/08/23 0330

## 2023-02-27 NOTE — Clinical Note
Diagnosis: Chest pain [592199]   Future Attending Provider: BEATRICE CONWAY [[id number]]   Admitting Provider:: BEATRICE CONWAY [[id number]]

## 2023-02-27 NOTE — HPI
Delores Fox is a 56 y.o.  female who has a past medical history of Acid reflux, COPD (chronic obstructive pulmonary disease),  CHF with EF 30% ,Coronary artery disease, COVID-19, Diabetes mellitus, High cholesterol, Hypertension was brought by EMS to the hospital with severe shortness of breath.   Initially she was on BiPAP at ER  saturation 100% on 100% oxygen.   She was following commands and answer questions as per ED physician .  EMS given  magnesium for her  en route but no Solu-Medrol was given because she is a diabetic!!!?  Admission was given for COPD/CHF exacerbation and respiratory failure and Lasix 40 mg was given by ED physician.  When I went to  see the patient, they are intubating the patient.  As per ED physician, patient tried to remove the BiPAP and  said can not breathe and later bradycardic and 1 atropine was given and intubated with 2nd attempt video scope  and stabilized.  No further information at this time.  Leukocytosis noted.  CMP is pending and ABG with severe respiratory acidosis.

## 2023-02-27 NOTE — PROGRESS NOTES
[facility] - Emergency Dept  Hospital Medicine  Progress Note    Patient Name: Delores Fox  MRN: [id number]  Patient Class: IP- Inpatient   Admission Date: 2/27/2023  Length of Stay: 0 days  Attending Physician: Kassandra Mota MD  Primary Care Provider: Tomy Rios MD        Subjective:     Principal Problem:Acute hypercapnic respiratory failure        HPI:    Delores Fox is a 56 y.o.  female who has a past medical history of Acid reflux, COPD (chronic obstructive pulmonary disease),  CHF with EF 30% ,Coronary artery disease, COVID-19, Diabetes mellitus, High cholesterol, Hypertension was brought by EMS to the hospital with severe shortness of breath.   Initially she was on BiPAP at ER  saturation 100% on 100% oxygen.   She was following commands and answer questions as per ED physician .  EMS given  magnesium for her  en route but no Solu-Medrol was given because she is a diabetic!!!?  Admission was given for COPD/CHF exacerbation and respiratory failure and Lasix 40 mg was given by ED physician.  When I went to  see the patient, they are intubating the patient.  As per ED physician, patient tried to remove the BiPAP and  said can not breathe and later bradycardic and 1 atropine was given and intubated with 2nd attempt video scope  and stabilized.  No further information at this time.  Leukocytosis noted.  CMP is pending and ABG with severe respiratory acidosis.         Overview/Hospital Course:  Patient with a history of COPD with ongoing nicotine use, CAD status post intervention RCA, cardiomyopathy with last echo with EF 30% with grade 1 diastolic dysfunction, hypertension, anxiety/depression, non-insulin-dependent diabetes mellitus, morbid obesity with BMI 40, presented via EMS with acute onset shortness of breath with respiratory distress.  She was on CPAP, noted to be feroz and in distress on arrival.  She was placed on BiPAP 100%, subsequently noted to be apneic, unresponsive  and bradycardic, received atropine and was intubated.  Chest x-ray with diffuse interstitial and alveolar infiltrates bilaterally.  Admitted to the ICU, treatment for COPD exacerbation, empiric antibiotics, IV diuresis, pulmonary and critical care consult.  Awaiting COVID-19 testing.      Interval History:  See hospital course.  Patient remains on propofol.  Orally intubated on mechanical ventilation, current settings FiO2 of 70 with 5 of PEEP.  Afebrile with T-max 98.7°.  Labs with WBC 18, platelets 4 5 5, sodium 130, BUN/creatinine 25/1.7, BNP 4 8 6.  Previous echo with EF of 30% with grade 1 diastolic dysfunction.  Chest x-ray with diffuse interstitial and alveolar infiltrates bilaterally.  No visitors present.    Review of Systems   Unable to perform ROS: Intubated   Objective:     Vital Signs (Most Recent):  Temp: 97.7 °F (36.5 °C) (02/27/23 0717)  Pulse: 98 (02/27/23 0930)  Resp: (!) 32 (02/27/23 0930)  BP: 121/75 (02/27/23 0851)  SpO2: 98 % (02/27/23 0930)   Vital Signs (24h Range):  Temp:  [97.7 °F (36.5 °C)-98.7 °F (37.1 °C)] 97.7 °F (36.5 °C)  Pulse:  [] 98  Resp:  [25-86] 32  SpO2:  [81 %-100 %] 98 %  BP: ()/() 121/75     Weight: 113.4 kg (250 lb)  Body mass index is 39.16 kg/m².  No intake or output data in the 24 hours ending 02/27/23 0957   Physical Exam  Vitals and nursing note reviewed.   Constitutional:       Comments: Chronically and critically ill-appearing, lying in bed   HENT:      Head: Normocephalic and atraumatic.      Mouth/Throat:      Comments: Orally intubated  Eyes:      Pupils: Pupils are equal, round, and reactive to light.   Cardiovascular:      Rate and Rhythm: Normal rate and regular rhythm.      Comments: Trace lower extremity edema  Pulmonary:      Comments: Orally intubated on mechanical ventilation FiO2 70 with 5 of PEEP, mechanical breath sounds  Abdominal:      Palpations: Abdomen is soft.      Tenderness: There is no abdominal tenderness.      Comments:  Abdominal pannus.  Moisture with erythema between abdominal folds   Genitourinary:     Comments: Siegel in place with yellow urine draining  Skin:     Comments: Right radial arterial line   Neurological:      Comments: Sedated on propofol       Significant Labs: Blood Culture: No results for input(s): LABBLOO in the last 48 hours.  BMP:   Recent Labs   Lab 02/27/23  0505   *   *   K 4.6   CL 95   CO2 21*   BUN 25*   CREATININE 1.7*   CALCIUM 7.9*     CBC:   Recent Labs   Lab 02/27/23  0505 02/27/23  0506   WBC 18.42*  --    HGB 13.2  --    HCT 41.9 42   *  --      CMP:   Recent Labs   Lab 02/27/23  0505   *   K 4.6   CL 95   CO2 21*   *   BUN 25*   CREATININE 1.7*   CALCIUM 7.9*   PROT 7.2   ALBUMIN 3.9   BILITOT 1.2*   ALKPHOS 134   AST 29   ALT 18   ANIONGAP 14     Magnesium: No results for input(s): MG in the last 48 hours.    Significant Imaging: I have reviewed all pertinent imaging results/findings within the past 24 hours.    X-Ray Chest AP Portable    Result Date: 2/27/2023  Chest single view CLINICAL DATA: Cardiac arrest, post intubation FINDINGS: AP view is compared to 0456 hours. Endotracheal tube has been placed with tip just below the clavicles. The heart and mediastinum are unchanged in appearance. Diffuse abnormal interstitial and alveolar opacities persist. IMPRESSION: 1. ET tube tip is just below the clavicles. 2. Chest is otherwise unchanged. Electronically signed by:  Carter Saavedra MD  2/27/2023 7:32 AM [facility] Workstation: [id number]    X-Ray Chest AP Portable    Result Date: 2/27/2023  Chest single view CLINICAL DATA: Respiratory distress FINDINGS: AP view is compared to April 2022. The heart is enlarged. The mediastinum is unremarkable. Diffuse bilateral abnormal interstitial and airspace opacities are noted likely reflecting pulmonary edema. There are small bilateral pleural effusions. No acute osseous abnormalities are identified. IMPRESSION: 1. Cardiomegaly  with diffuse interstitial and airspace opacities bilaterally, likely reflecting pulmonary edema. Pneumonia is within the differential but felt less likely. Electronically signed by:  Carter Saavedra MD  2/27/2023 7:28 AM CST Workstation: [id number]         Assessment/Plan:     Active Hospital Problems    Diagnosis    *Acute hypoxic hypercapnic respiratory failure, intubated on mechanical ventilation    Acute on chronic systolic and diastolic combined heart failure    Cardiomyopathy    Leucocytosis    STEPHEN (acute kidney injury)    Candida rash of groin    Hyponatremia    Anxiety and depression    Severe obesity with body mass index (BMI) of 35.0 to 39.9 with comorbidity    CAD (coronary artery disease)    Tobacco abuse    Acute exacerbation of COPD    Essential hypertension    HLD (hyperlipidemia)    Type 2 diabetes mellitus with hyperglycemia, without long-term current use of insulin, HbA1c 9.1%     Plan:  Awaiting a bed in ICU, continuous cardiac telemetry monitoring  Continue oral intubation with mechanical ventilation  Continue ventilator precautions  Add famotidine for GI prophylaxis  P.r.n. ABG and chest x-ray, repeat chest x-ray in a.m.  Tracheal aspirate for Gram stain and culture  Await COVID-19 and influenza testing result   Add scheduled breathing treatments  Continue IV steroids with Solu-Medrol  Continue empiric antibiotics with vancomycin and meropenem, check MRSA nasal screen, if negative discontinue vancomycin   Continue IV diuresis with Lasix   Continue to trend high sensitivity troponin   Previous echo with EF of 30% with grade 1 diastolic dysfunction   Topical anti fungal to groin area   On propofol for sedation, daily sedation vacation, titrate as per RASS  Start long-acting insulin 15 units once daily.  Insulin sliding scale q.6 hours with Accu-Cheks.  As needed hypoglycemic measures.  Adjust as needed  Renally dosing all medications and avoiding nephrotoxin drugs   A.m. labs  ordered  Pulmonary/critical Care consulted   Cardiology consulted   Further plan as per hospital course   Patient remains critically ill with high risk for decline and decompensation     Critical care time spent including chart review, review of laboratory and radiological studies, patient encounter,  and note preparation, discussion with nursing/consultant 42 minutes.    VTE Risk Mitigation (From admission, onward)           Ordered     enoxaparin injection 40 mg  Daily         02/27/23 0540     IP VTE HIGH RISK PATIENT  Once         02/27/23 0540     Place sequential compression device  Until discontinued         02/27/23 0540                    Discharge Planning   KEAGAN:      Code Status: Full Code   Is the patient medically ready for discharge?:     Reason for patient still in hospital (select all that apply): Patient unstable                     Kassandra Mota MD  Department of Hospital Medicine   [facility] - Emergency Dept

## 2023-02-27 NOTE — H&P
[facility] - Emergency Dept  Hospital Medicine  History & Physical    Patient Name: Delores Fox  MRN: [id number]  Patient Class: IP- Inpatient  Admission Date: 2/27/2023  Attending Physician: Bryson Lanza MD   Primary Care Provider: Tomy Rios MD         Patient information was obtained from ER MD    Subjective:     Principal Problem:<principal problem not specified>    Chief Complaint:   Chief Complaint   Patient presents with    Shortness of Breath    Altered Mental Status        HPI:   Delores Fox is a 56 y.o.  female who has a past medical history of Acid reflux, COPD (chronic obstructive pulmonary disease),  CHF with EF 30% ,Coronary artery disease, COVID-19, Diabetes mellitus, High cholesterol, Hypertension was brought by EMS to the hospital with severe shortness of breath.   Initially she was on BiPAP at ER  saturation 100% on 100% oxygen.   She was following commands and answer questions as per ED physician .  EMS given  magnesium for her  en route but no Solu-Medrol was given because she is a diabetic!!!?  Admission was given for COPD/CHF exacerbation and respiratory failure and Lasix 40 mg was given by ED physician.  When I went to  see the patient, they are intubating the patient.  As per ED physician, patient tried to remove the BiPAP and  said can not breathe and later bradycardic and 1 atropine was given and intubated with 2nd attempt video scope  and stabilized.  No further information at this time.  Leukocytosis noted.  CMP is pending and ABG with severe respiratory acidosis.    chest x-ray with significant pulmonary edema.      Past Medical History:   Diagnosis Date    Acid reflux     COPD (chronic obstructive pulmonary disease)     Coronary artery disease     COVID-19     Diabetes mellitus     High cholesterol     Hypertension     Obese body habitus        Past Surgical History:   Procedure Laterality Date    APPENDECTOMY      CATHETERIZATION OF BOTH LEFT AND RIGHT  HEART N/A 11/23/2020    Procedure: CATHETERIZATION, HEART, BOTH LEFT AND RIGHT;  Surgeon: Doug Kendall MD;  Location: [facility] CATH/EP LAB;  Service: Cardiology;  Laterality: N/A;    CHOLECYSTECTOMY      EXCISION OF LESION OF EYELID Right 12/11/2020    Procedure: EXCISION, LESION, EYELID;  Surgeon: Malachi Rodriguez MD;  Location: [facility] OR;  Service: Ophthalmology;  Laterality: Right;  Inclusion Cyst removal right lower lid    HYSTERECTOMY      JOINT REPLACEMENT Left     left hip    MASTOIDECTOMY Right        Review of patient's allergies indicates:   Allergen Reactions    Morphine Nausea And Vomiting    Sulfa (sulfonamide antibiotics) Nausea And Vomiting       Current Facility-Administered Medications on File Prior to Encounter   Medication    electrolyte-S (ISOLYTE)    lidocaine (PF) 10 mg/ml (1%) injection 10 mg    sodium chloride 0.9% flush 3 mL     Current Outpatient Medications on File Prior to Encounter   Medication Sig    albuterol (ACCUNEB) 1.25 mg/3 mL Nebu Take 3 mLs (1.25 mg total) by nebulization every 6 (six) hours as needed (wheezing). Rescue    albuterol (PROVENTIL/VENTOLIN HFA) 90 mcg/actuation inhaler INHALE 2 PUFFS INTO THE LUNGS EVERY 6 HOURS AS NEEDED FOR WHEEZING OR SHORTNESS OF BREATH    apixaban (ELIQUIS) 5 mg Tab Take 1 tablet (5 mg total) by mouth 2 (two) times daily.    aspirin 81 MG Chew Take 1 tablet (81 mg total) by mouth once daily.    atorvastatin (LIPITOR) 40 MG tablet Take 1 tablet (40 mg total) by mouth once daily.    blood-glucose meter kit To check BG 2 times daily, to use with insurance preferred meter    buPROPion (WELLBUTRIN XL) 150 MG TB24 tablet Take 1 tablet (150 mg total) by mouth once daily.    clotrimazole-betamethasone 1-0.05% (LOTRISONE) cream Apply topically 2 (two) times daily.    fluticasone propionate (FLONASE) 50 mcg/actuation nasal spray SHAKE LIQUID AND USE 1 SPRAY(50 MCG) IN EACH NOSTRIL EVERY DAY    fluticasone-umeclidin-vilanter (TRELEGY ELLIPTA)  200-62.5-25 mcg inhaler Inhale 1 puff into the lungs once daily.    lancets Misc To check BG 2 times daily, to use with insurance preferred meter    LORazepam (ATIVAN) 0.5 MG tablet Take 1 tablet (0.5 mg total) by mouth every 6 (six) hours as needed for Anxiety.    losartan (COZAAR) 25 MG tablet Take 0.5 tablets (12.5 mg total) by mouth once daily.    metFORMIN (GLUCOPHAGE-XR) 500 MG ER 24hr tablet Take 1 tablet (500 mg total) by mouth daily with breakfast.    metoprolol succinate (TOPROL-XL) 25 MG 24 hr tablet Take 0.5 tablets (12.5 mg total) by mouth once daily.    MOTEGRITY 2 mg Tab Take 1 tablet by mouth nightly.    nicotine, polacrilex, (NICORETTE) 2 mg Gum Take 1 each (2 mg total) by mouth as needed (smoking urge).    ondansetron (ZOFRAN) 4 MG tablet Take 1 tablet (4 mg total) by mouth every 8 (eight) hours as needed for Nausea.    pantoprazole (PROTONIX) 40 MG tablet Take 1 tablet (40 mg total) by mouth 2 (two) times daily.    pregabalin (LYRICA) 100 MG capsule Take 1 capsule (100 mg total) by mouth 3 (three) times daily.    torsemide (DEMADEX) 20 MG Tab Take 1 tablet (20 mg total) by mouth once daily.    TRUE METRIX GLUCOSE TEST STRIP Strp CHECK SUGAR TWICE DAILY    zolpidem (AMBIEN) 10 mg Tab Take 1 tablet (10 mg total) by mouth nightly as needed (insomnia).     Family History    None       Tobacco Use    Smoking status: Former     Packs/day: 0.50     Years: 13.00     Pack years: 6.50     Types: Cigarettes     Quit date: 2022     Years since quittin.4     Passive exposure: Current    Smokeless tobacco: Never   Substance and Sexual Activity    Alcohol use: Not Currently    Drug use: Not Currently    Sexual activity: Not Currently     Review of Systems   Unable to perform ROS: Acuity of condition   All other systems reviewed and are negative.  Objective:     Vital Signs (Most Recent):  Temp: 98.7 °F (37.1 °C) (23 0456)  Pulse: (!) 125 (23 0609)  Resp: (!) 32 (23 0609)  BP: 139/61  (02/27/23 0609)  SpO2: 98 % (02/27/23 0609)   Vital Signs (24h Range):  Temp:  [98.7 °F (37.1 °C)] 98.7 °F (37.1 °C)  Pulse:  [112-140] 125  Resp:  [25-86] 32  SpO2:  [81 %-98 %] 98 %  BP: (126-140)/(61-87) 139/61     Weight: 113.4 kg (250 lb)  Body mass index is 39.16 kg/m².    Physical Exam  Vitals and nursing note reviewed.   Constitutional:       Comments: Intubating    HENT:      Head: Normocephalic and atraumatic.   Eyes:      Conjunctiva/sclera: Conjunctivae normal.   Neck:      Vascular: No JVD.   Cardiovascular:      Rate and Rhythm: Bradycardia present.   Abdominal:      Palpations: Abdomen is soft.   Skin:     General: Skin is warm.   Neurological:      Comments: intubated           Significant Labs: All pertinent labs within the past 24 hours have been reviewed.  CBC:   Recent Labs   Lab 02/27/23  0505 02/27/23  0506   WBC 18.42*  --    HGB 13.2  --    HCT 41.9 42   *  --      CMP: No results for input(s): NA, K, CL, CO2, GLU, BUN, CREATININE, CALCIUM, PROT, ALBUMIN, BILITOT, ALKPHOS, AST, ALT, ANIONGAP, EGFRNONAA in the last 48 hours.    Invalid input(s): ESTGFAFRICA    Significant Imaging: I have reviewed all pertinent imaging results/findings within the past 24 hours.    Assessment/Plan:     Active Hospital Problems    Diagnosis    Acute hypercapnic respiratory failure    COPD exacerbation    Acute exacerbation of CHF (congestive heart failure)    Severe obesity with body mass index (BMI) of 35.0 to 39.9 with comorbidity    Acute respiratory failure with hypoxia and hypercarbia    CAD (coronary artery disease)    Cardiomyopathy    Tobacco abuse    Essential hypertension    HLD (hyperlipidemia)    Type 2 diabetes mellitus with hyperglycemia, without long-term current use of insulin       PLAN     NPO  Lasix 40 mg b.i.d.  Follow CMP  Finish reconciliation once  medications inserted  Vent support and consult Pulmonary  IV antibiotics for now  Strict intake output  Lovenox DVT dose.  ED physician  wants to rule out PE and CTA will be done on an route to ICU  Status post insulin treatment in emergency room and sliding scale for hyperglycemia  IV steroid and close monitor glucose  Trend cardiac enzymes.  Elevation most likely from hypoxia  Consult cardiology given very low EF.AICD?    Addendum'  Later called sister Vita who lives with her, patient was having shortness of breath since yesterday and  she advised her to go to hospital but she did not.  This morning she was at the front porch and saying that she can not breathe well and called 911.  She used the inhaler but sister is not sure of she takes her medications regularly.  She continued to smoke        VTE Risk Mitigation (From admission, onward)           Ordered     enoxaparin injection 40 mg  Daily         02/27/23 0540     IP VTE HIGH RISK PATIENT  Once         02/27/23 0540     Place sequential compression device  Until discontinued         02/27/23 0540                       Bryson Lanza MD  Department of Hospital Medicine   [facility] - Emergency Dept

## 2023-02-27 NOTE — PROGRESS NOTES
Pharmacist Renal Adjustment Note    Delores Fox is a 57 y.o. female being treated with Meropenem.    Patient Data:    Vital Signs (Most Recent):  Temp: 98.7 °F (37.1 °C) (02/27/23 0456)  Pulse: (!) 115 (02/27/23 0617)  Resp: (!) 33 (02/27/23 0617)  BP: (!) 93/55 (02/27/23 0617)  SpO2: 97 % (02/27/23 0617)   Vital Signs (72h Range):  Temp:  [98.7 °F (37.1 °C)]   Pulse:  [112-200]   Resp:  [25-86]   BP: ()/()   SpO2:  [81 %-100 %]      Recent Labs   Lab 02/27/23  0505   CREATININE 1.7*     Serum creatinine: 1.7 mg/dL (H) 02/27/23 0505  Estimated creatinine clearance: 48.6 mL/min (A)    Meropenem 1 g every 8 hours will be changed to Meropenem 1 g every 12 hours due to CrCl 25-50 per [facility] renal dosing adjustment protocol.    Pharmacist's Name: Anay Mcintohs  Pharmacist's Extension: [phone]

## 2023-02-27 NOTE — HOSPITAL COURSE
Patient with a history of COPD with ongoing nicotine use, CAD status post intervention RCA, cardiomyopathy with last echo with EF 30% with grade 1 diastolic dysfunction, hypertension, anxiety/depression, non-insulin-dependent diabetes mellitus, morbid obesity with BMI 40, presented via EMS with acute onset shortness of breath with respiratory distress.  She was on CPAP, noted to be feroz and in distress on arrival.  She was placed on BiPAP 100%, subsequently noted to be apneic, unresponsive and bradycardic, received atropine and was intubated.  Chest x-ray with diffuse interstitial and alveolar infiltrates bilaterally.  Admitted to the ICU, treatment for COPD exacerbation, empiric antibiotics, IV diuresis for pulmonary edema, pulmonary and cardiology consult.  Cardiology added dobutamine and has been diuresing well.  On 02/28 she was successfully extubated.  Echo with EF 40% with grade 1 diastolic dysfunction.  Transferring to telemetry floor.  On 03/01 continues to improve, chest x-ray with improving pulmonary edema, home O2 evaluation performed and not requiring supplemental oxygen, some mild throat soreness suspect from ETT, dobutamine discontinued, IV diuresis for next 24 hours.  On 03/21 respiratory status remains stable, states breathing feels at baseline and improved, appears medically stable for discharge and cleared by consultant for discharge.  Did provide refill for COPD and cardiac medication.  Continue to reinforce smoking cessation.  Discharge plan including medication, follow-up as well as strict return precautions were reviewed with the patient, she expressed understanding, no questions or concerns, no objection to discharge.  Communicated with nursing on day of discharge.      Discharge examination   Sitting side of bed, alert, oriented, on room air, no wheeze or accessory muscle use, regular heart rhythm, no lower extremity edema

## 2023-02-27 NOTE — SUBJECTIVE & OBJECTIVE
Past Medical History:   Diagnosis Date    Acid reflux     COPD (chronic obstructive pulmonary disease)     Coronary artery disease     COVID-19     Diabetes mellitus     High cholesterol     Hypertension     Obese body habitus        Past Surgical History:   Procedure Laterality Date    APPENDECTOMY      CATHETERIZATION OF BOTH LEFT AND RIGHT HEART N/A 11/23/2020    Procedure: CATHETERIZATION, HEART, BOTH LEFT AND RIGHT;  Surgeon: Doug Kendall MD;  Location: [facility] CATH/EP LAB;  Service: Cardiology;  Laterality: N/A;    CHOLECYSTECTOMY      EXCISION OF LESION OF EYELID Right 12/11/2020    Procedure: EXCISION, LESION, EYELID;  Surgeon: Malachi Rodriguez MD;  Location: [facility] OR;  Service: Ophthalmology;  Laterality: Right;  Inclusion Cyst removal right lower lid    HYSTERECTOMY      JOINT REPLACEMENT Left     left hip    MASTOIDECTOMY Right        Review of patient's allergies indicates:   Allergen Reactions    Morphine Nausea And Vomiting    Sulfa (sulfonamide antibiotics) Nausea And Vomiting       Current Facility-Administered Medications on File Prior to Encounter   Medication    electrolyte-S (ISOLYTE)    lidocaine (PF) 10 mg/ml (1%) injection 10 mg    sodium chloride 0.9% flush 3 mL     Current Outpatient Medications on File Prior to Encounter   Medication Sig    albuterol (ACCUNEB) 1.25 mg/3 mL Nebu Take 3 mLs (1.25 mg total) by nebulization every 6 (six) hours as needed (wheezing). Rescue    albuterol (PROVENTIL/VENTOLIN HFA) 90 mcg/actuation inhaler INHALE 2 PUFFS INTO THE LUNGS EVERY 6 HOURS AS NEEDED FOR WHEEZING OR SHORTNESS OF BREATH    apixaban (ELIQUIS) 5 mg Tab Take 1 tablet (5 mg total) by mouth 2 (two) times daily.    aspirin 81 MG Chew Take 1 tablet (81 mg total) by mouth once daily.    atorvastatin (LIPITOR) 40 MG tablet Take 1 tablet (40 mg total) by mouth once daily.    blood-glucose meter kit To check BG 2 times daily, to use with insurance preferred meter    buPROPion (WELLBUTRIN XL) 150 MG  TB24 tablet Take 1 tablet (150 mg total) by mouth once daily.    clotrimazole-betamethasone 1-0.05% (LOTRISONE) cream Apply topically 2 (two) times daily.    fluticasone propionate (FLONASE) 50 mcg/actuation nasal spray SHAKE LIQUID AND USE 1 SPRAY(50 MCG) IN EACH NOSTRIL EVERY DAY    fluticasone-umeclidin-vilanter (TRELEGY ELLIPTA) 200-62.5-25 mcg inhaler Inhale 1 puff into the lungs once daily.    lancets Misc To check BG 2 times daily, to use with insurance preferred meter    LORazepam (ATIVAN) 0.5 MG tablet Take 1 tablet (0.5 mg total) by mouth every 6 (six) hours as needed for Anxiety.    losartan (COZAAR) 25 MG tablet Take 0.5 tablets (12.5 mg total) by mouth once daily.    metFORMIN (GLUCOPHAGE-XR) 500 MG ER 24hr tablet Take 1 tablet (500 mg total) by mouth daily with breakfast.    metoprolol succinate (TOPROL-XL) 25 MG 24 hr tablet Take 0.5 tablets (12.5 mg total) by mouth once daily.    MOTEGRITY 2 mg Tab Take 1 tablet by mouth nightly.    nicotine, polacrilex, (NICORETTE) 2 mg Gum Take 1 each (2 mg total) by mouth as needed (smoking urge).    ondansetron (ZOFRAN) 4 MG tablet Take 1 tablet (4 mg total) by mouth every 8 (eight) hours as needed for Nausea.    pantoprazole (PROTONIX) 40 MG tablet Take 1 tablet (40 mg total) by mouth 2 (two) times daily.    pregabalin (LYRICA) 100 MG capsule Take 1 capsule (100 mg total) by mouth 3 (three) times daily.    torsemide (DEMADEX) 20 MG Tab Take 1 tablet (20 mg total) by mouth once daily.    TRUE METRIX GLUCOSE TEST STRIP Strp CHECK SUGAR TWICE DAILY    zolpidem (AMBIEN) 10 mg Tab Take 1 tablet (10 mg total) by mouth nightly as needed (insomnia).     Family History    None       Tobacco Use    Smoking status: Former     Packs/day: 0.50     Years: 13.00     Pack years: 6.50     Types: Cigarettes     Quit date: 2022     Years since quittin.4     Passive exposure: Current    Smokeless tobacco: Never   Substance and Sexual Activity    Alcohol use: Not Currently     Drug use: Not Currently    Sexual activity: Not Currently     Review of Systems   Unable to perform ROS: Acuity of condition   All other systems reviewed and are negative.  Objective:     Vital Signs (Most Recent):  Temp: 98.7 °F (37.1 °C) (02/27/23 0456)  Pulse: (!) 125 (02/27/23 0609)  Resp: (!) 32 (02/27/23 0609)  BP: 139/61 (02/27/23 0609)  SpO2: 98 % (02/27/23 0609)   Vital Signs (24h Range):  Temp:  [98.7 °F (37.1 °C)] 98.7 °F (37.1 °C)  Pulse:  [112-140] 125  Resp:  [25-86] 32  SpO2:  [81 %-98 %] 98 %  BP: (126-140)/(61-87) 139/61     Weight: 113.4 kg (250 lb)  Body mass index is 39.16 kg/m².    Physical Exam  Vitals and nursing note reviewed.   Constitutional:       Comments: Intubating    HENT:      Head: Normocephalic and atraumatic.   Eyes:      Conjunctiva/sclera: Conjunctivae normal.   Neck:      Vascular: No JVD.   Cardiovascular:      Rate and Rhythm: Bradycardia present.   Abdominal:      Palpations: Abdomen is soft.   Skin:     General: Skin is warm.   Neurological:      Comments: intubated           Significant Labs: All pertinent labs within the past 24 hours have been reviewed.  CBC:   Recent Labs   Lab 02/27/23  0505 02/27/23  0506   WBC 18.42*  --    HGB 13.2  --    HCT 41.9 42   *  --      CMP: No results for input(s): NA, K, CL, CO2, GLU, BUN, CREATININE, CALCIUM, PROT, ALBUMIN, BILITOT, ALKPHOS, AST, ALT, ANIONGAP, EGFRNONAA in the last 48 hours.    Invalid input(s): ESTGFAFRICA    Significant Imaging: I have reviewed all pertinent imaging results/findings within the past 24 hours.

## 2023-02-27 NOTE — ED NOTES
PT HERE PER EMS. CPAP, DUSKY AND IN DISTRESS. PT IS LETHARGIC. ABLE TO ANSWER SOME QUESTIONS. PT ANXIOUS SHAKING LEGS AND STATES THEY HURT.

## 2023-02-27 NOTE — PLAN OF CARE
Unable to speak with patient at bedside so spoke with Vita Ramirez (Sister)   [phone] (Mobile) on phone to complete assessment. Patient / family reports patient DOES NOT have a living will and NO ONE is medical POA.     [facility]  Initial Discharge Assessment       Primary Care Provider: Tomy Rios MD    Admission Diagnosis: Chest pain [R07.9]    Admission Date: 2/27/2023  Expected Discharge Date: 3/2/2023    Discharge Barriers Identified: (P) None    Payor: MEDICAID / Plan: [place] [employer] / Product Type: Managed Medicaid /     Extended Emergency Contact Information  Primary Emergency Contact: Vita Ramirez  Address: [address] [place] [address] [facility]  Home Phone: [phone]  Work Phone: [phone]  Mobile Phone: [phone]  Relation: Sister  Preferred language: English   needed? No    Discharge Plan A: (P) Home with family  Discharge Plan B: (P) Home with family      [id number] - [place], [place] - [address] & [facility]  [address]  [facility] [address]  Phone: [phone] Fax: [phone]      Initial Assessment (most recent)       Adult Discharge Assessment - 02/27/23 1312          Discharge Assessment    Assessment Type Discharge Planning Assessment (P)      Confirmed/corrected address, phone number and insurance Yes (P)      Confirmed Demographics Correct on Facesheet (P)      Source of Information family (P)      Communicated KEAGAN with patient/caregiver No (P)      Reason For Admission respiratory failure (P)      People in Home sibling(s) (P)      Facility Arrived From: home (P)      Do you expect to return to your current living situation? Yes (P)      Do you have help at home or someone to help you manage your care at home? Yes (P)      Who are your caregiver(s) and their phone number(s)? Vita Ramirez (Sister)   [phone]  (Mobile) (P)      Dressing/Bathing bathing difficulty, requires equipment (P)      Dressing/Bathing Management shower chair (P)      Equipment Currently Used at Home rollator;shower chair (P)      Readmission within 30 days? No (P)      Patient currently being followed by outpatient case management? No (P)      Do you currently have service(s) that help you manage your care at home? No (P)      Do you take prescription medications? Yes (P)      Do you have prescription coverage? Yes (P)      Coverage Medicaid (P)      Who is going to help you get home at discharge? Vita Ramirez (Sister)   [phone] (Mobile) (P)      How do you get to doctors appointments? family or friend will provide (P)      Are you on dialysis? No (P)      Do you take coumadin? No (P)      Discharge Plan A Home with family (P)      Discharge Plan B Home with family (P)      DME Needed Upon Discharge  none (P)      Discharge Plan discussed with: Sibling (P)      Name(s) and Number(s) Curtis BharatiVita zapata (Sister)   [phone] (Mobile) (P)      Discharge Barriers Identified None (P)         OTHER    Name(s) of People in Home Vita Ramirez (Sister)   [phone] (Mobile) (P)

## 2023-02-27 NOTE — PLAN OF CARE
02/27/23 1737   Patient Assessment/Suction   Level of Consciousness (AVPU) responds to pain   Respiratory Effort Unlabored   Expansion/Accessory Muscles/Retractions no use of accessory muscles   All Lung Fields Breath Sounds coarse;diminished   Rhythm/Pattern, Respiratory assisted mechanically   Cough Frequency infrequent;with stimulation   Cough Type fair;productive   Suction Method tracheal;oral   Suction Pressure (mmHg) -120 mmHg   $ Suction Charges Inline Suction Procedure Stat Charge   Secretions Amount small   Secretions Color clear   Secretions Characteristics thick;thin   PRE-TX-O2   Device (Oxygen Therapy) ventilator   $ Is the patient on Low Flow Oxygen? Yes   Oxygen Concentration (%) 50   SpO2 (!) 94 %   Pulse Oximetry Type Continuous   $ Pulse Oximetry - Multiple Charge Pulse Oximetry - Multiple   Pulse 92   Resp (!) 32   Vent Select   Charged w/in last 24h YES   Preset Conventional Ventilator Settings   Ventilation Type VC   Vent Mode A/C   Set Rate 32 BPM   Vt Set 450 mL   PEEP/CPAP 10 cmH20   Peak Flow 70 L/min   Peak End Inspiratory Pressure 32 cmH20   I-Trigger Type  V-TRIG   Trigger Sensitivity Flow/I-Trigger 3 L/min   Patient Ventilator Parameters   Resp Rate Total 32 br/min   Peak Airway Pressure 38 cmH20   Mean Airway Pressure 18 cmH20   Plateau Pressure 27 cmH20   Exhaled Vt 460 mL   Total Ve 8.84 L/m   I:E Ratio Measured 1:1.70   Auto PEEP 0 cmH20   Conventional Ventilator Alarms   Alarms On Y   Ve High Alarm 24 L/min   Ve Low Alarm 5 L/min   Vt High Alarm 1200 mL   Vt Low Alarm 200 mL   Resp Rate High Alarm 50 br/min   Press High Alarm 50 cmH2O   Apnea Rate 10   Apnea Volume (mL) 1 mL   Apnea Oxygen Concentration  100   Apnea Flow Rate (L/min) 97   T Apnea 20 sec(s)   IHI Ventilator Associated Pneumonia Bundle (Required)   Head of Bed Elevated  HOB 30   Oral Care Mouth swabbed;with mouthwash;Oral suctioning prior to turn   Vent Circut Breaks Minimized Yes   Ready to Wean/Extubation Screen    FIO2<=50 (chart decimal) 0.5   MV<16L (chart vol.) 8.84   PEEP <=8 (chart #) (!) 10   Ready to Wean Parameters   F/VT Ratio<105 (RSBI) (!) 69.57   Vital Capacity   Vital Capacity (mL) 0

## 2023-02-28 PROBLEM — D72.829 LEUCOCYTOSIS: Status: RESOLVED | Noted: 2023-02-27 | Resolved: 2023-02-28

## 2023-02-28 PROBLEM — J44.1 COPD EXACERBATION: Chronic | Status: ACTIVE | Noted: 2020-11-22

## 2023-02-28 PROBLEM — E87.6 HYPOKALEMIA: Status: ACTIVE | Noted: 2023-02-28

## 2023-02-28 LAB
ALLENS TEST: ABNORMAL
ANION GAP SERPL CALC-SCNC: 12 MMOL/L (ref 8–16)
BSA FOR ECHO PROCEDURE: 2.32 M2
BUN SERPL-MCNC: 34 MG/DL (ref 6–20)
CALCIUM SERPL-MCNC: 7.7 MG/DL (ref 8.7–10.5)
CHLORIDE SERPL-SCNC: 95 MMOL/L (ref 95–110)
CO2 SERPL-SCNC: 25 MMOL/L (ref 23–29)
CREAT SERPL-MCNC: 1.7 MG/DL (ref 0.5–1.4)
CV ECHO LV RWT: 0.46 CM
DELSYS: ABNORMAL
DOP CALC LVOT AREA: 3.1 CM2
DOP CALC LVOT DIAMETER: 2 CM
DOP CALC LVOT PEAK VEL: 0.83 M/S
DOP CALC LVOT STROKE VOLUME: 47.73 CM3
DOP CALCLVOT PEAK VEL VTI: 15.2 CM
E WAVE DECELERATION TIME: 150 MSEC
E/A RATIO: 0.86
E/E' RATIO: 13.45 M/S
ECHO LV POSTERIOR WALL: 1.13 CM (ref 0.6–1.1)
EJECTION FRACTION: 40 %
ERYTHROCYTE [DISTWIDTH] IN BLOOD BY AUTOMATED COUNT: 13.4 % (ref 11.5–14.5)
ERYTHROCYTE [SEDIMENTATION RATE] IN BLOOD BY WESTERGREN METHOD: 32 MM/H
ERYTHROCYTE [SEDIMENTATION RATE] IN BLOOD BY WESTERGREN METHOD: 32 MM/H
EST. GFR  (NO RACE VARIABLE): 35.2 ML/MIN/1.73 M^2
FIO2: 0.3
FIO2: 50
FIO2: 50
FRACTIONAL SHORTENING: 28 % (ref 28–44)
GLUCOSE SERPL-MCNC: 220 MG/DL (ref 70–110)
GLUCOSE SERPL-MCNC: 244 MG/DL (ref 70–110)
GLUCOSE SERPL-MCNC: 293 MG/DL (ref 70–110)
GLUCOSE SERPL-MCNC: 329 MG/DL (ref 70–110)
GLUCOSE SERPL-MCNC: 340 MG/DL (ref 70–110)
GLUCOSE SERPL-MCNC: 346 MG/DL (ref 70–110)
GLUCOSE SERPL-MCNC: 352 MG/DL (ref 70–110)
HCO3 UR-SCNC: 26.9 MMOL/L (ref 24–28)
HCO3 UR-SCNC: 27.1 MMOL/L (ref 24–28)
HCO3 UR-SCNC: 27.8 MMOL/L (ref 24–28)
HCT VFR BLD AUTO: 38.4 % (ref 37–48.5)
HCT VFR BLD CALC: 40 %PCV (ref 36–54)
HCT VFR BLD CALC: 40 %PCV (ref 36–54)
HGB BLD-MCNC: 12.8 G/DL (ref 12–16)
INTERVENTRICULAR SEPTUM: 1.08 CM (ref 0.6–1.1)
IVC DIAMETER: 1.85 CM
LEFT INTERNAL DIMENSION IN SYSTOLE: 3.58 CM (ref 2.1–4)
LEFT VENTRICLE DIASTOLIC VOLUME INDEX: 86.94 ML/M2
LEFT VENTRICLE DIASTOLIC VOLUME: 193 ML
LEFT VENTRICLE MASS INDEX: 92 G/M2
LEFT VENTRICLE SYSTOLIC VOLUME INDEX: 51.8 ML/M2
LEFT VENTRICLE SYSTOLIC VOLUME: 115 ML
LEFT VENTRICULAR INTERNAL DIMENSION IN DIASTOLE: 4.95 CM (ref 3.5–6)
LEFT VENTRICULAR MASS: 205.08 G
LV LATERAL E/E' RATIO: 12.33 M/S
LV SEPTAL E/E' RATIO: 14.8 M/S
LVOT MG: 1 MMHG
LVOT MV: 0.55 CM/S
MAGNESIUM SERPL-MCNC: 2.5 MG/DL (ref 1.6–2.6)
MCH RBC QN AUTO: 30.1 PG (ref 27–31)
MCHC RBC AUTO-ENTMCNC: 33.3 G/DL (ref 32–36)
MCV RBC AUTO: 90 FL (ref 82–98)
MIN VOL: 14
MIN VOL: 14.6
MIN VOL: 14.7
MODE: ABNORMAL
MV PEAK A VEL: 0.86 M/S
MV PEAK E VEL: 0.74 M/S
PCO2 BLDA: 36.4 MMHG (ref 35–45)
PCO2 BLDA: 38.1 MMHG (ref 35–45)
PCO2 BLDA: 40.6 MMHG (ref 35–45)
PEEP: 10
PEEP: 5
PEEP: 5
PH SMN: 7.44 [PH] (ref 7.35–7.45)
PH SMN: 7.46 [PH] (ref 7.35–7.45)
PH SMN: 7.48 [PH] (ref 7.35–7.45)
PHOSPHATE SERPL-MCNC: 3.7 MG/DL (ref 2.7–4.5)
PIP: 26
PIP: 30
PLATELET # BLD AUTO: 352 K/UL (ref 150–450)
PMV BLD AUTO: 9.5 FL (ref 9.2–12.9)
PO2 BLDA: 102 MMHG (ref 80–100)
PO2 BLDA: 106 MMHG (ref 80–100)
PO2 BLDA: 67 MMHG (ref 80–100)
POC BE: 3 MMOL/L
POC BE: 3 MMOL/L
POC BE: 4 MMOL/L
POC IONIZED CALCIUM: 0.99 MMOL/L (ref 1.06–1.42)
POC IONIZED CALCIUM: 1.02 MMOL/L (ref 1.06–1.42)
POC SATURATED O2: 94 % (ref 95–100)
POC SATURATED O2: 98 % (ref 95–100)
POC SATURATED O2: 98 % (ref 95–100)
POC TCO2: 28 MMOL/L (ref 23–27)
POC TCO2: 28 MMOL/L (ref 23–27)
POC TCO2: 29 MMOL/L (ref 23–27)
POTASSIUM BLD-SCNC: 3.5 MMOL/L (ref 3.5–5.1)
POTASSIUM BLD-SCNC: 3.6 MMOL/L (ref 3.5–5.1)
POTASSIUM SERPL-SCNC: 3.4 MMOL/L (ref 3.5–5.1)
PS: 5
RA PRESSURE: 3 MMHG
RBC # BLD AUTO: 4.25 M/UL (ref 4–5.4)
RV TISSUE DOPPLER FREE WALL SYSTOLIC VELOCITY 1 (APICAL 4 CHAMBER VIEW): 0.01 CM/S
SAMPLE: ABNORMAL
SITE: ABNORMAL
SODIUM BLD-SCNC: 132 MMOL/L (ref 136–145)
SODIUM BLD-SCNC: 133 MMOL/L (ref 136–145)
SODIUM SERPL-SCNC: 132 MMOL/L (ref 136–145)
SP02: 100
SP02: 100
SP02: 94
SPONT RATE: 27
TDI LATERAL: 0.06 M/S
TDI SEPTAL: 0.05 M/S
TDI: 0.06 M/S
TRICUSPID ANNULAR PLANE SYSTOLIC EXCURSION: 1.42 CM
TROPONIN I SERPL HS-MCNC: 53.8 PG/ML (ref 0–14.9)
VT: 450
VT: 450
WBC # BLD AUTO: 9.75 K/UL (ref 3.9–12.7)

## 2023-02-28 PROCEDURE — 94003 VENT MGMT INPAT SUBQ DAY: CPT

## 2023-02-28 PROCEDURE — 99900026 HC AIRWAY MAINTENANCE (STAT)

## 2023-02-28 PROCEDURE — 25000242 PHARM REV CODE 250 ALT 637 W/ HCPCS: Performed by: INTERNAL MEDICINE

## 2023-02-28 PROCEDURE — 99900035 HC TECH TIME PER 15 MIN (STAT)

## 2023-02-28 PROCEDURE — 99900017 HC EXTUBATION W/PARAMETERS (STAT)

## 2023-02-28 PROCEDURE — 99233 SBSQ HOSP IP/OBS HIGH 50: CPT | Mod: ,,, | Performed by: INTERNAL MEDICINE

## 2023-02-28 PROCEDURE — 25000003 PHARM REV CODE 250: Mod: TB,JG | Performed by: INTERNAL MEDICINE

## 2023-02-28 PROCEDURE — 85027 COMPLETE CBC AUTOMATED: CPT | Performed by: INTERNAL MEDICINE

## 2023-02-28 PROCEDURE — 94640 AIRWAY INHALATION TREATMENT: CPT

## 2023-02-28 PROCEDURE — 99233 PR SUBSEQUENT HOSPITAL CARE,LEVL III: ICD-10-PCS | Mod: ,,, | Performed by: INTERNAL MEDICINE

## 2023-02-28 PROCEDURE — 63600175 PHARM REV CODE 636 W HCPCS: Performed by: EMERGENCY MEDICINE

## 2023-02-28 PROCEDURE — 83735 ASSAY OF MAGNESIUM: CPT | Performed by: INTERNAL MEDICINE

## 2023-02-28 PROCEDURE — 99900031 HC PATIENT EDUCATION (STAT)

## 2023-02-28 PROCEDURE — 37799 UNLISTED PX VASCULAR SURGERY: CPT

## 2023-02-28 PROCEDURE — 25000003 PHARM REV CODE 250

## 2023-02-28 PROCEDURE — 25000003 PHARM REV CODE 250: Performed by: INTERNAL MEDICINE

## 2023-02-28 PROCEDURE — 21400001 HC TELEMETRY ROOM

## 2023-02-28 PROCEDURE — 80048 BASIC METABOLIC PNL TOTAL CA: CPT | Performed by: INTERNAL MEDICINE

## 2023-02-28 PROCEDURE — 82962 GLUCOSE BLOOD TEST: CPT

## 2023-02-28 PROCEDURE — 82803 BLOOD GASES ANY COMBINATION: CPT

## 2023-02-28 PROCEDURE — 94761 N-INVAS EAR/PLS OXIMETRY MLT: CPT

## 2023-02-28 PROCEDURE — 84100 ASSAY OF PHOSPHORUS: CPT | Performed by: INTERNAL MEDICINE

## 2023-02-28 PROCEDURE — 94799 UNLISTED PULMONARY SVC/PX: CPT

## 2023-02-28 PROCEDURE — 63600175 PHARM REV CODE 636 W HCPCS: Performed by: INTERNAL MEDICINE

## 2023-02-28 PROCEDURE — 27000221 HC OXYGEN, UP TO 24 HOURS

## 2023-02-28 RX ORDER — IBUPROFEN 200 MG
16 TABLET ORAL
Status: DISCONTINUED | OUTPATIENT
Start: 2023-02-28 | End: 2023-03-02 | Stop reason: HOSPADM

## 2023-02-28 RX ORDER — GLUCAGON 1 MG
1 KIT INJECTION
Status: DISCONTINUED | OUTPATIENT
Start: 2023-02-28 | End: 2023-03-02 | Stop reason: HOSPADM

## 2023-02-28 RX ORDER — INSULIN ASPART 100 [IU]/ML
1-10 INJECTION, SOLUTION INTRAVENOUS; SUBCUTANEOUS
Status: DISCONTINUED | OUTPATIENT
Start: 2023-02-28 | End: 2023-03-02 | Stop reason: HOSPADM

## 2023-02-28 RX ORDER — MUPIROCIN 20 MG/G
OINTMENT TOPICAL 2 TIMES DAILY
Status: DISCONTINUED | OUTPATIENT
Start: 2023-02-28 | End: 2023-02-28

## 2023-02-28 RX ORDER — FLUTICASONE FUROATE AND VILANTEROL 100; 25 UG/1; UG/1
1 POWDER RESPIRATORY (INHALATION) DAILY
Status: DISCONTINUED | OUTPATIENT
Start: 2023-02-28 | End: 2023-02-28

## 2023-02-28 RX ORDER — IBUPROFEN 200 MG
24 TABLET ORAL
Status: DISCONTINUED | OUTPATIENT
Start: 2023-02-28 | End: 2023-03-02 | Stop reason: HOSPADM

## 2023-02-28 RX ORDER — BUDESONIDE 0.5 MG/2ML
0.5 INHALANT ORAL EVERY 12 HOURS
Status: DISCONTINUED | OUTPATIENT
Start: 2023-02-28 | End: 2023-03-02 | Stop reason: HOSPADM

## 2023-02-28 RX ORDER — CHLORHEXIDINE GLUCONATE ORAL RINSE 1.2 MG/ML
15 SOLUTION DENTAL 2 TIMES DAILY
Status: DISCONTINUED | OUTPATIENT
Start: 2023-02-28 | End: 2023-02-28

## 2023-02-28 RX ORDER — ARFORMOTEROL TARTRATE 15 UG/2ML
15 SOLUTION RESPIRATORY (INHALATION) 2 TIMES DAILY
Status: DISCONTINUED | OUTPATIENT
Start: 2023-02-28 | End: 2023-03-02 | Stop reason: HOSPADM

## 2023-02-28 RX ADMIN — MUPIROCIN 1 G: 20 OINTMENT TOPICAL at 09:02

## 2023-02-28 RX ADMIN — INSULIN ASPART 6 UNITS: 100 INJECTION, SOLUTION INTRAVENOUS; SUBCUTANEOUS at 12:02

## 2023-02-28 RX ADMIN — FUROSEMIDE 40 MG: 10 INJECTION INTRAMUSCULAR; INTRAVENOUS at 01:02

## 2023-02-28 RX ADMIN — PROPOFOL 50 MCG/KG/MIN: 10 INJECTION, EMULSION INTRAVENOUS at 10:02

## 2023-02-28 RX ADMIN — CALCIUM GLUCONATE 2 G: 20 INJECTION, SOLUTION INTRAVENOUS at 03:02

## 2023-02-28 RX ADMIN — INSULIN ASPART 2 UNITS: 100 INJECTION, SOLUTION INTRAVENOUS; SUBCUTANEOUS at 09:02

## 2023-02-28 RX ADMIN — METHYLPREDNISOLONE SODIUM SUCCINATE 80 MG: 40 INJECTION, POWDER, FOR SOLUTION INTRAMUSCULAR; INTRAVENOUS at 05:02

## 2023-02-28 RX ADMIN — PROPOFOL 40 MCG/KG/MIN: 10 INJECTION, EMULSION INTRAVENOUS at 01:02

## 2023-02-28 RX ADMIN — ATORVASTATIN CALCIUM 40 MG: 40 TABLET, FILM COATED ORAL at 08:02

## 2023-02-28 RX ADMIN — FAMOTIDINE 20 MG: 10 INJECTION INTRAVENOUS at 08:02

## 2023-02-28 RX ADMIN — PROPOFOL 50 MCG/KG/MIN: 10 INJECTION, EMULSION INTRAVENOUS at 03:02

## 2023-02-28 RX ADMIN — MEROPENEM AND SODIUM CHLORIDE 1 G: 1 INJECTION, SOLUTION INTRAVENOUS at 08:02

## 2023-02-28 RX ADMIN — MICONAZOLE NITRATE: 20 CREAM TOPICAL at 08:02

## 2023-02-28 RX ADMIN — BUPROPION HYDROCHLORIDE 150 MG: 150 TABLET, FILM COATED, EXTENDED RELEASE ORAL at 08:02

## 2023-02-28 RX ADMIN — POTASSIUM CHLORIDE 60 MEQ: 7.46 INJECTION, SOLUTION INTRAVENOUS at 05:02

## 2023-02-28 RX ADMIN — ARFORMOTEROL TARTRATE 15 MCG: 15 SOLUTION RESPIRATORY (INHALATION) at 08:02

## 2023-02-28 RX ADMIN — APIXABAN 5 MG: 5 TABLET, FILM COATED ORAL at 08:02

## 2023-02-28 RX ADMIN — INSULIN DETEMIR 15 UNITS: 100 INJECTION, SOLUTION SUBCUTANEOUS at 08:02

## 2023-02-28 RX ADMIN — METOPROLOL TARTRATE 12.5 MG: 25 TABLET, FILM COATED ORAL at 08:02

## 2023-02-28 RX ADMIN — LEVALBUTEROL HYDROCHLORIDE 0.63 MG: 0.63 SOLUTION RESPIRATORY (INHALATION) at 08:02

## 2023-02-28 RX ADMIN — PROPOFOL 50 MCG/KG/MIN: 10 INJECTION, EMULSION INTRAVENOUS at 06:02

## 2023-02-28 RX ADMIN — INSULIN ASPART 4 UNITS: 100 INJECTION, SOLUTION INTRAVENOUS; SUBCUTANEOUS at 05:02

## 2023-02-28 RX ADMIN — LEVALBUTEROL HYDROCHLORIDE 0.63 MG: 0.63 SOLUTION RESPIRATORY (INHALATION) at 07:02

## 2023-02-28 RX ADMIN — LEVALBUTEROL HYDROCHLORIDE 0.63 MG: 0.63 SOLUTION RESPIRATORY (INHALATION) at 02:02

## 2023-02-28 RX ADMIN — IPRATROPIUM BROMIDE 0.5 MG: 0.5 SOLUTION RESPIRATORY (INHALATION) at 07:02

## 2023-02-28 RX ADMIN — CHLORHEXIDINE GLUCONATE 15 ML: 1.2 RINSE ORAL at 09:02

## 2023-02-28 RX ADMIN — ASPIRIN 81 MG CHEWABLE TABLET 81 MG: 81 TABLET CHEWABLE at 08:02

## 2023-02-28 RX ADMIN — INSULIN ASPART 15 UNITS: 100 INJECTION, SOLUTION INTRAVENOUS; SUBCUTANEOUS at 06:02

## 2023-02-28 RX ADMIN — IPRATROPIUM BROMIDE 0.5 MG: 0.5 SOLUTION RESPIRATORY (INHALATION) at 08:02

## 2023-02-28 RX ADMIN — BUDESONIDE INHALATION 0.5 MG: 0.5 SUSPENSION RESPIRATORY (INHALATION) at 08:02

## 2023-02-28 NOTE — PROGRESS NOTES
Pharmacy Consult Discontinuation: Vancomycin    Delores Fox [id number] is a 57 y.o. female was consulted for vancomycin pharmacotherapy management by pharmacy.    Pharmacy consult for vancomycin dosing is no longer required.  Vancomycin was discontinued 02/27/2023 by Dr. Mota.    Thank you for allowing us to participate in this patient's care. Should you have any questions or concerns please feel free to contact the pharmacy department at [phone].    Arron Shepard RPH

## 2023-02-28 NOTE — PROGRESS NOTES
[facility] Medicine  Progress Note    Patient Name: Delores Fox  MRN: [id number]  Patient Class: IP- Inpatient   Admission Date: 2/27/2023  Length of Stay: 1 days  Attending Physician: Kassandra Mota MD  Primary Care Provider: Tomy Rios MD        Subjective:     Principal Problem:Acute hypercapnic respiratory failure        HPI:    Delores Fox is a 56 y.o.  female who has a past medical history of Acid reflux, COPD (chronic obstructive pulmonary disease),  CHF with EF 30% ,Coronary artery disease, COVID-19, Diabetes mellitus, High cholesterol, Hypertension was brought by EMS to the hospital with severe shortness of breath.   Initially she was on BiPAP at ER  saturation 100% on 100% oxygen.   She was following commands and answer questions as per ED physician .  EMS given  magnesium for her  en route but no Solu-Medrol was given because she is a diabetic!!!?  Admission was given for COPD/CHF exacerbation and respiratory failure and Lasix 40 mg was given by ED physician.  When I went to  see the patient, they are intubating the patient.  As per ED physician, patient tried to remove the BiPAP and  said can not breathe and later bradycardic and 1 atropine was given and intubated with 2nd attempt video scope  and stabilized.  No further information at this time.  Leukocytosis noted.  CMP is pending and ABG with severe respiratory acidosis.         Overview/Hospital Course:  Patient with a history of COPD with ongoing nicotine use, CAD status post intervention RCA, cardiomyopathy with last echo with EF 30% with grade 1 diastolic dysfunction, hypertension, anxiety/depression, non-insulin-dependent diabetes mellitus, morbid obesity with BMI 40, presented via EMS with acute onset shortness of breath with respiratory distress.  She was on CPAP, noted to be feroz and in distress on arrival.  She was placed on BiPAP 100%, subsequently noted to be apneic, unresponsive and bradycardic,  received atropine and was intubated.  Chest x-ray with diffuse interstitial and alveolar infiltrates bilaterally.  Admitted to the ICU, treatment for COPD exacerbation, empiric antibiotics, IV diuresis, pulmonary and critical care consult.  Cardiology added dobutamine and has been diuresing well.  On 02/28 she was extubated.  Echo with EF 40% with grade 1 diastolic dysfunction.  Transferring to telemetry floor.      Interval History:  Patient was successfully extubated earlier today.  She is currently on nasal cannula, does not use oxygen at home.  States she does smoke half pack per day.  Was not on Lasix prior to admission.  States she is on Eliquis from her neurologist.  States her breathing feels much improved.  Afebrile with T-max 98.8°.  On nasal cannula.  Labs with WBC 9.75, sodium 132, potassium 3.4, BUN/creatinine 34/1.7.  Echo with EF of 40% with grade 1 diastolic dysfunction.  Documented urine output last 24 hours 4875 cc.  Discussed with patient.  Discussed with nursing at bedside.    Review of Systems   Constitutional:  Negative for fever.   Respiratory:  Positive for shortness of breath.    Gastrointestinal:  Negative for abdominal pain, nausea and vomiting.   Genitourinary:         Has [place]   Psychiatric/Behavioral:  Negative for confusion.    Objective:     Vital Signs (Most Recent):  Temp: 98.3 °F (36.8 °C) (02/28/23 1101)  Pulse: 95 (02/28/23 1200)  Resp: (!) 27 (02/28/23 1200)  BP: 108/62 (02/28/23 1200)  SpO2: 96 % (02/28/23 1200)   Vital Signs (24h Range):  Temp:  [97.7 °F (36.5 °C)-98.8 °F (37.1 °C)] 98.3 °F (36.8 °C)  Pulse:  [] 95  Resp:  [25-34] 27  SpO2:  [90 %-100 %] 96 %  BP: ()/(53-73) 108/62  Arterial Line BP: (103-155)/(44-68) 121/49     Weight: 109.1 kg (240 lb 8.4 oz)  Body mass index is 37.67 kg/m².    Intake/Output Summary (Last 24 hours) at 2/28/2023 1206  Last data filed at 2/28/2023 0926  Gross per 24 hour   Intake 1432.08 ml   Output 2875 ml   Net -1442.92 ml       Physical Exam  Vitals and nursing note reviewed.   Constitutional:       Comments: Lying in bed, cooperative   HENT:      Head: Normocephalic and atraumatic.      Mouth/Throat:      Mouth: Mucous membranes are moist.   Eyes:      Pupils: Pupils are equal, round, and reactive to light.   Cardiovascular:      Rate and Rhythm: Normal rate and regular rhythm.      Comments: Trace lower extremity edema  Pulmonary:      Comments: On nasal cannula, inspiratory crackles at bases to the mid zones, no wheeze or accessory muscle use  Abdominal:      Palpations: Abdomen is soft.      Tenderness: There is no abdominal tenderness.      Comments: Abdominal pannus.  Moisture with erythema between abdominal folds   Genitourinary:     Comments: Siegel in place with yellow urine draining  Skin:     Comments: Right radial arterial line   Neurological:      Mental Status: She is oriented to person, place, and time.      Comments: Sedated on propofol   Psychiatric:         Mood and Affect: Mood normal.       Significant Labs: BMP:   Recent Labs   Lab 02/28/23  0340   *   *   K 3.4*   CL 95   CO2 25   BUN 34*   CREATININE 1.7*   CALCIUM 7.7*   MG 2.5     CBC:   Recent Labs   Lab 02/27/23  0505 02/27/23  0506 02/28/23  0340 02/28/23  0525 02/28/23  0628   WBC 18.42*  --  9.75  --   --    HGB 13.2  --  12.8  --   --    HCT 41.9   < > 38.4 40 40   *  --  352  --   --     < > = values in this interval not displayed.     CMP:   Recent Labs   Lab 02/27/23  0505 02/28/23  0340   * 132*   K 4.6 3.4*   CL 95 95   CO2 21* 25   * 329*   BUN 25* 34*   CREATININE 1.7* 1.7*   CALCIUM 7.9* 7.7*   PROT 7.2  --    ALBUMIN 3.9  --    BILITOT 1.2*  --    ALKPHOS 134  --    AST 29  --    ALT 18  --    ANIONGAP 14 12     Lactic Acid: No results for input(s): LACTATE in the last 48 hours.  Respiratory Culture:   Recent Labs   Lab 02/27/23  1215   GSRESP Few WBC's  Rare Gram positive cocci  Rare Gram positive rods  Rare Gram  negative rods   RESPIRATORYC Normal respiratory evette     Troponin:   Recent Labs   Lab 02/27/23  1151 02/27/23  1800 02/27/23  2329   TROPONINIHS 67.4* 51.7* 53.8*     Urine Culture: No results for input(s): LABURIN in the last 48 hours.  Urine Studies: No results for input(s): COLORU, APPEARANCEUA, PHUR, SPECGRAV, PROTEINUA, GLUCUA, KETONESU, BILIRUBINUA, OCCULTUA, NITRITE, UROBILINOGEN, LEUKOCYTESUR, RBCUA, WBCUA, BACTERIA, SQUAMEPITHEL, HYALINECASTS in the last 48 hours.    Invalid input(s): WRIGHTSUR    Significant Imaging: I have reviewed all pertinent imaging results/findings within the past 24 hours.      Assessment/Plan:      Active Hospital Problems    Diagnosis    *Acute hypoxic hypercapnic respiratory failure, intubated on mechanical ventilation    Acute on chronic systolic and diastolic combined heart failure    Cardiomyopathy    Hypokalemia    STEPHEN (acute kidney injury)    Candida rash of groin    Hyponatremia    Anxiety and depression    Severe obesity with body mass index (BMI) of 35.0 to 39.9 with comorbidity    CAD (coronary artery disease)    Tobacco abuse    COPD    Essential hypertension    HLD (hyperlipidemia)    Type 2 diabetes mellitus with hyperglycemia, without long-term current use of insulin, HbA1c 9.1%     Plan:  Transfer to telemetry floor with continuous cardiac monitoring  Successfully extubated on 2/28, continue supplemental oxygen, wean/adjust as needed   Currently on dobutamine, managed as per Cardiology  Continue IV diuresis with Lasix 40 mg q.12h   Strict I/O, daily weights, oral fluid and sodium restriction  Echo with EF of 40% with grade 1 diastolic dysfunction  Discontinue antibiotics and monitoring   Continue treatments for COPD   Continue insulin with Accu-Cheks, as needed hypoglycemic measures.  Cardiac diabetic diet with oral fluid restriction   Continue to reinforce smoking cessation counseling as able  Renally dosing all medications and avoid nephrotoxin  drugs   A.m. labs ordered  Appreciate all consultant's input   Further plan as per hospital course    VTE Risk Mitigation (From admission, onward)         Ordered     apixaban tablet 5 mg  2 times daily         02/28/23 1209     IP VTE HIGH RISK PATIENT  Once         02/27/23 0540     Place sequential compression device  Until discontinued         02/27/23 0540                Discharge Planning   KEAGAN: 3/3/2023     Code Status: Full Code   Is the patient medically ready for discharge?:     Reason for patient still in hospital (select all that apply): Patient trending condition  Discharge Plan A: Home with family                  Kassandra Mota MD  Department of Hospital Medicine   [facility]

## 2023-02-28 NOTE — PROGRESS NOTES
Formerly [facility], [facility]  Department of Cardiology  Consult Note      PATIENT NAME: Delores Fox  MRN: [id number]  TODAY'S DATE: 02/28/2023  ADMIT DATE: 2/27/2023                          CONSULT REQUESTED BY: Kassandra Mota MD    SUBJECTIVE     PRINCIPAL PROBLEM: Acute hypercapnic respiratory failure      REASON FOR CONSULT:  CHF/SOB      HPI:    INTERVAL HISTORY:    2/28/2023    Patient extubated sitting up in chair on NC.  Diuresing well    Patient has some congestion and hoarseness in her voice preceding the intubation.Cough is improved        Patient intubated and sedated  Resting on ventilator  Seen and examined in ICU  Lasix given in ER  CXR shows pulmonary edema  Patient england catheter is full and is diuresing well.    FROM H AND P    Delores Fox is a 56 y.o.  female who has a past medical history of Acid reflux, COPD (chronic obstructive pulmonary disease),  CHF with EF 30% ,Coronary artery disease, COVID-19, Diabetes mellitus, High cholesterol, Hypertension was brought by EMS to the hospital with severe shortness of breath.   Initially she was on BiPAP at ER  saturation 100% on 100% oxygen.   She was following commands and answer questions as per ED physician .  EMS given  magnesium for her  en route but no Solu-Medrol was given because she is a diabetic!!!?  Admission was given for COPD/CHF exacerbation and respiratory failure and Lasix 40 mg was given by ED physician.  When I went to  see the patient, they are intubating the patient.  As per ED physician, patient tried to remove the BiPAP and  said can not breathe and later bradycardic and 1 atropine was given and intubated with 2nd attempt video scope  and stabilized.  No further information at this time.  Leukocytosis noted.  CMP is pending and ABG with severe respiratory acidosis.    chest x-ray with significant pulmonary edema.        Review of patient's allergies indicates:   Allergen Reactions    Morphine Nausea And Vomiting    Sulfa  (sulfonamide antibiotics) Nausea And Vomiting       Past Medical History:   Diagnosis Date    Acid reflux     COPD (chronic obstructive pulmonary disease)     Coronary artery disease     COVID-19     Diabetes mellitus     High cholesterol     Hypertension     Obese body habitus      Past Surgical History:   Procedure Laterality Date    APPENDECTOMY      CATHETERIZATION OF BOTH LEFT AND RIGHT HEART N/A 2020    Procedure: CATHETERIZATION, HEART, BOTH LEFT AND RIGHT;  Surgeon: Doug Kendall MD;  Location: [facility] CATH/EP LAB;  Service: Cardiology;  Laterality: N/A;    CHOLECYSTECTOMY      EXCISION OF LESION OF EYELID Right 2020    Procedure: EXCISION, LESION, EYELID;  Surgeon: Malachi Rodriguez MD;  Location: [facility] OR;  Service: Ophthalmology;  Laterality: Right;  Inclusion Cyst removal right lower lid    HYSTERECTOMY      JOINT REPLACEMENT Left     left hip    MASTOIDECTOMY Right      Social History     Tobacco Use    Smoking status: Former     Packs/day: 0.50     Years: 13.00     Pack years: 6.50     Types: Cigarettes     Quit date: 2022     Years since quittin.4     Passive exposure: Current    Smokeless tobacco: Never   Substance Use Topics    Alcohol use: Not Currently    Drug use: Not Currently        REVIEW OF SYSTEMS      Unable to assess  OBJECTIVE     VITAL SIGNS (Most Recent)  Temp: 98.3 °F (36.8 °C) (23 1101)  Pulse: 97 (23 1101)  Resp: (!) 28 (23 110)  BP: (!) 106/59 (23 1101)  SpO2: (!) 94 % (23 1101)    VENTILATION STATUS  Resp: (!) 28 (23 1101)  SpO2: (!) 94 % (23 1101)  Vent Mode: Spont  Oxygen Concentration (%):  [30-50] 30  Resp Rate Total:  [24 br/min-40 br/min] 27 br/min  Vt Set:  [450 mL] 450 mL  PEEP/CPAP:  [5 cmH20-10 cmH20] 5 cmH20  Pressure Support:  [5 cmH20] 5 cmH20  Mean Airway Pressure:  [7.3 hhB55-77 cmH20] 7.5 cmH20    I & O (Last 24H):  Intake/Output Summary (Last 24 hours) at 2023 1131  Last data filed at 2023  0926  Gross per 24 hour   Intake 1432.08 ml   Output 4875 ml   Net -3442.92 ml       WEIGHTS  Wt Readings from Last 3 Encounters:   02/28/23 0430 109.1 kg (240 lb 8.4 oz)   02/27/23 1922 113.4 kg (250 lb)   02/27/23 0456 113.4 kg (250 lb)   02/27/23 1240 113.4 kg (250 lb)   10/21/22 0755 107 kg (236 lb)       PHYSICAL EXAM  GENERAL: Female resting on ventilator looks older than charted age  HEENT: OG Tube  NECK: No JVD. No bruit..   THYROID: Thyroid not enlarged. No nodules present..   CARDIAC: Tachycardic  CHEST ANATOMY: normal.   LUNGS: Mechanical breath sounds  ABDOMEN: Soft no masses or organomegaly.  No abdomen pulsations or bruits.  Normal bowel sounds. No pulsations and no masses felt, No guarding or rebound.   URINARY:  england catheter draining clear yellow urine  EXTREMITIES: No cyanosis, clubbing or edema noted at this time., no calf tenderness bilaterally.   PERIPHERAL VASCULAR SYSTEM: Good palpable distal pulses.       HOME MEDICATIONS:  Current Facility-Administered Medications on File Prior to Encounter   Medication Dose Route Frequency Provider Last Rate Last Admin    electrolyte-S (ISOLYTE)   Intravenous Continuous Madi Herr MD   New Bag at 12/11/20 1041    lidocaine (PF) 10 mg/ml (1%) injection 10 mg  1 mL Intradermal Once Madi Herr MD        sodium chloride 0.9% flush 3 mL  3 mL Intravenous Q8H Madi Herr MD         Current Outpatient Medications on File Prior to Encounter   Medication Sig Dispense Refill    albuterol (PROVENTIL/VENTOLIN HFA) 90 mcg/actuation inhaler Inhale 2 puffs into the lungs every 6 (six) hours as needed for Wheezing. Rescue 54 g 2    apixaban (ELIQUIS) 5 mg Tab Take 1 tablet (5 mg total) by mouth 2 (two) times daily. 180 tablet 0    aspirin 81 MG Chew Take 1 tablet (81 mg total) by mouth once daily. 30 tablet 0    atorvastatin (LIPITOR) 40 MG tablet Take 1 tablet (40 mg total) by mouth once daily. 90 tablet 1    fluticasone propionate (FLONASE)  50 mcg/actuation nasal spray SHAKE LIQUID AND USE 1 SPRAY(50 MCG) IN EACH NOSTRIL EVERY DAY 16 g 0    LORazepam (ATIVAN) 0.5 MG tablet Take 1 tablet (0.5 mg total) by mouth every 6 (six) hours as needed for Anxiety. 12 tablet 0    losartan (COZAAR) 25 MG tablet Take 0.5 tablets (12.5 mg total) by mouth once daily. 45 tablet 3    metFORMIN (GLUCOPHAGE-XR) 500 MG ER 24hr tablet Take 1 tablet (500 mg total) by mouth daily with breakfast. 90 tablet 3    metoprolol succinate (TOPROL-XL) 25 MG 24 hr tablet Take 0.5 tablets (12.5 mg total) by mouth once daily. 45 tablet 3    ondansetron (ZOFRAN) 4 MG tablet Take 1 tablet (4 mg total) by mouth every 8 (eight) hours as needed for Nausea. 12 tablet 0    pantoprazole (PROTONIX) 40 MG tablet Take 1 tablet (40 mg total) by mouth 2 (two) times daily. 180 tablet 3    pregabalin (LYRICA) 100 MG capsule Take 1 capsule (100 mg total) by mouth 3 (three) times daily. 270 capsule 1    torsemide (DEMADEX) 20 MG Tab Take 1 tablet (20 mg total) by mouth once daily. 90 tablet 1    albuterol (ACCUNEB) 1.25 mg/3 mL Nebu Take 3 mLs (1.25 mg total) by nebulization every 6 (six) hours as needed (wheezing). Rescue 75 mL 1    blood-glucose meter kit To check BG 2 times daily, to use with insurance preferred meter 1 each 0    buPROPion (WELLBUTRIN XL) 150 MG TB24 tablet Take 1 tablet (150 mg total) by mouth once daily. (Patient not taking: Reported on 2/27/2023) 30 tablet 2    clotrimazole-betamethasone 1-0.05% (LOTRISONE) cream Apply topically 2 (two) times daily. 15 g 0    fluticasone-umeclidin-vilanter (TRELEGY ELLIPTA) 200-62.5-25 mcg inhaler Inhale 1 puff into the lungs once daily. (Patient not taking: Reported on 2/27/2023) 60 each 2    lancets Misc To check BG 2 times daily, to use with insurance preferred meter 200 each 3    metoprolol succinate (TOPROL-XL) 50 MG 24 hr tablet Take 50 mg by mouth once daily.      MOTEGRITY 2 mg Tab Take 1 tablet by mouth nightly.      nicotine, polacrilex,  (NICORETTE) 2 mg Gum Take 1 each (2 mg total) by mouth as needed (smoking urge). 100 each 4    pregabalin (LYRICA) 100 MG capsule Take 1 capsule (100 mg total) by mouth 3 (three) times daily. 270 capsule 1    TRUE METRIX GLUCOSE TEST STRIP Strp CHECK SUGAR TWICE DAILY 200 strip 3    zolpidem (AMBIEN) 10 mg Tab Take 1 tablet (10 mg total) by mouth nightly as needed (insomnia). 90 tablet 1       SCHEDULED MEDS:   aspirin  324 mg Oral Once    aspirin  81 mg Oral Daily    atorvastatin  40 mg Oral QHS    buPROPion  150 mg Oral Daily    chlorhexidine  15 mL Mouth/Throat BID    enoxaparin  40 mg Subcutaneous Daily    famotidine (PF)  20 mg Intravenous Daily    furosemide (LASIX) injection  40 mg Intravenous Q12H    insulin detemir U-100  15 Units Subcutaneous Daily    ipratropium  0.5 mg Nebulization Q12H    levalbuterol  0.63 mg Nebulization TID WAKE    meropenem (MERREM) IVPB  1 g Intravenous Q12H    methylPREDNISolone sodium succinate injection  80 mg Intravenous Q8H    metoprolol tartrate  12.5 mg Oral BID    miconazole   Topical (Top) BID    mupirocin   Nasal BID       CONTINUOUS INFUSIONS:   DOBUTamine IV infusion (non-titrating) 2.5 mcg/kg/min (02/27/23 1901)    propofoL Stopped (02/28/23 1022)       PRN MEDS:calcium gluconate IVPB, calcium gluconate IVPB, calcium gluconate IVPB, dextrose 10%, dextrose 10%, glucose, glucose, iohexoL, magnesium sulfate IVPB, magnesium sulfate IVPB, naloxone, potassium chloride **AND** potassium chloride **AND** potassium chloride, sodium chloride 0.9%, sodium phosphate IVPB, sodium phosphate IVPB, sodium phosphate IVPB    LABS AND DIAGNOSTICS     CBC LAST 3 DAYS  Recent Labs   Lab 02/27/23  0505 02/27/23  0506 02/28/23  0340 02/28/23  0525 02/28/23  0628   WBC 18.42*  --  9.75  --   --    RBC 4.25  --  4.25  --   --    HGB 13.2  --  12.8  --   --    HCT 41.9   < > 38.4 40 40   MCV 99*  --  90  --   --    MCH 31.1*  --  30.1  --   --    MCHC 31.5*  --  33.3  --   --    RDW 13.3  --   13.4  --   --    *  --  352  --   --    MPV 9.9  --  9.5  --   --    GRAN 60.4  11.1*  --   --   --   --    LYMPH 29.2  5.4*  --   --   --   --    MONO 8.1  1.5*  --   --   --   --    BASO 0.05  --   --   --   --    NRBC 0  --   --   --   --     < > = values in this interval not displayed.       COAGULATION LAST 3 DAYS  Recent Labs   Lab 02/27/23  0505   INR 1.1       CHEMISTRY LAST 3 DAYS  Recent Labs   Lab 02/27/23  0505 02/27/23  0506 02/28/23  0340 02/28/23  0525 02/28/23  0628 02/28/23  1050   *  --  132*  --   --   --    K 4.6  --  3.4*  --   --   --    CL 95  --  95  --   --   --    CO2 21*  --  25  --   --   --    ANIONGAP 14  --  12  --   --   --    BUN 25*  --  34*  --   --   --    CREATININE 1.7*  --  1.7*  --   --   --    *  --  329*  --   --   --    CALCIUM 7.9*  --  7.7*  --   --   --    PH  --    < >  --  7.444 7.476* 7.461*   MG  --   --  2.5  --   --   --    ALBUMIN 3.9  --   --   --   --   --    PROT 7.2  --   --   --   --   --    ALKPHOS 134  --   --   --   --   --    ALT 18  --   --   --   --   --    AST 29  --   --   --   --   --    BILITOT 1.2*  --   --   --   --   --     < > = values in this interval not displayed.       CARDIAC PROFILE LAST 3 DAYS  Recent Labs   Lab 02/27/23  0505 02/27/23  1151 02/27/23  1800 02/27/23  2329   *  --   --   --    *  --   --   --    CPKMB 10.0*  --   --   --    TROPONINIHS 39.8* 67.4* 51.7* 53.8*       ENDOCRINE LAST 3 DAYS  No results for input(s): TSH, PROCAL in the last 168 hours.    LAST ARTERIAL BLOOD GAS  ABG  Recent Labs   Lab 02/28/23  1050   PH 7.461*   PO2 67*   PCO2 38.1   HCO3 27.1   BE 3       LAST 7 DAYS MICROBIOLOGY   Microbiology Results (last 7 days)       Procedure Component Value Units Date/Time    Culture, Respiratory with Gram Stain [[id number]] Collected: 02/27/23 1215    Order Status: Completed Specimen: Respiratory from Tracheal Aspirate Updated: 02/28/23 0752     Respiratory Culture Normal respiratory  evette     Gram Stain (Respiratory) Few WBC's     Gram Stain (Respiratory) Rare Gram positive cocci     Gram Stain (Respiratory) Rare Gram positive rods     Gram Stain (Respiratory) Rare Gram negative rods    MRSA Screen by PCR [[id number]] Collected: 02/27/23 1308    Order Status: Completed Specimen: Nasopharyngeal Swab from Nasal Updated: 02/27/23 1457     MRSA SCREEN BY PCR Negative            MOST RECENT IMAGING  Echo  · The left ventricle is mildly enlarged with concentric remodeling and   mildly decreased systolic function.  · The estimated ejection fraction is 40%.  · Grade I left ventricular diastolic dysfunction.  · There is moderate left ventricular global hypokinesis.  · Normal right ventricular size with normal right ventricular systolic   function.  · Mild left atrial enlargement.  · Normal central venous pressure (3 mmHg).  · There is abnormal septal wall motion consistent with left bundle branch   block.     X-Ray Chest AP Portable  Reason: intubated intubated    FINDINGS:  Portable chest at 456 compared with 2/27/2023 shows placement of enteric catheter with tip at least in stomach but off inferior aspect of this view. Endotracheal tube and cardiomediastinal silhouette unchanged. Increased degree of patient rotation is evident.    Lung volumes are low. Diffuse pulmonary interstitial opacities have slightly improved. Left basilar pleural-parenchymal opacity is more conspicuous, perhaps due to patient rotation. No pneumothorax.    IMPRESSION:    1. Slight interval improvement of bilateral pulmonary interstitial opacities suggesting improving interstitial edema.  2. Increased conspicuity of left basilar pleural-parenchymal opacity which could be due to patient positioning although slight worsening of left pleural effusion and/or left basilar alveolar consolidation or atelectasis can also be considered.    Electronically signed by:  Derrek Boykin MD  2/28/2023 7:23 AM CST Workstation:  [id number]      ECHOCARDIOGRAM RESULTS (last 5)  Results for orders placed during the hospital encounter of 09/16/22    Echo Saline Bubble? Yes    Interpretation Summary  · There is no evidence of intracardiac shunting.  · The left ventricle is moderately enlarged with mild eccentric hypertrophy and moderately decreased systolic function.  · There is severe left ventricular global hypokinesis.  · The estimated ejection fraction is 30%.  · Grade I left ventricular diastolic dysfunction.  · Normal right ventricular size with normal right ventricular systolic function.  · Mild left atrial enlargement.  · Mild mitral regurgitation.  · Mild tricuspid regurgitation.  · Normal central venous pressure (3 mmHg).  · The estimated PA systolic pressure is 31 mmHg.      Results for orders placed during the hospital encounter of 02/12/22    Echo    Interpretation Summary  · The estimated PA systolic pressure is 27 mmHg.  · The left ventricle is mildly enlarged with mild concentric hypertrophy and moderately decreased systolic function.  · The estimated ejection fraction is 35%.  · Grade I left ventricular diastolic dysfunction.  · There is moderate left ventricular global hypokinesis.  · Normal right ventricular size with normal right ventricular systolic function.  · Mild left atrial enlargement.  · Mild tricuspid regurgitation.  · Normal central venous pressure (3 mmHg).      Results for orders placed during the hospital encounter of 03/03/21    Echo Color Flow Doppler? Yes    Interpretation Summary  · The left ventricle is mildly enlarged with mild concentric hypertrophy andThe estimated ejection fraction is 40%  · Grade I left ventricular diastolic dysfunction.  · There is moderate left ventricular global hypokinesis.  · Normal right ventricular size with normal right ventricular systolic function.  · Mild left atrial enlargement.  · Mild mitral regurgitation.  · Normal central venous pressure (3 mmHg).      Results for  orders placed during the hospital encounter of 11/21/20    Echo Color Flow Doppler? Yes    Interpretation Summary  · The estimated PA systolic pressure is 32 mmHg.  · There is mild left ventricular concentric hypertrophy.  · The left ventricle is mildly enlarged with moderately decreased systolic function. The estimated ejection fraction is 30%.  · Grade I diastolic dysfunction.  · There is left ventricular global hypokinesis.  · Normal right ventricular size with normal right ventricular systolic function.  · Mild left atrial enlargement.  · Mild-to-moderate mitral regurgitation.  · Intermediate central venous pressure (8 mmHg).      CURRENT/PREVIOUS VISIT EKG  Results for orders placed or performed during the hospital encounter of 02/27/23   EKG 12-lead    Collection Time: 02/27/23  6:50 AM    Narrative    Test Reason : R07.9,    Vent. Rate : 115 BPM     Atrial Rate : 115 BPM     P-R Int : 152 ms          QRS Dur : 102 ms      QT Int : 352 ms       P-R-T Axes : 072 047 050 degrees     QTc Int : 486 ms    Sinus tachycardia  Possible Left atrial enlargement  T wave abnormality, consider inferolateral ischemia  Abnormal ECG  When compared with ECG of 21-OCT-2022 08:30,  Vent. rate has increased BY  40 BPM  T wave inversion now evident in Inferior leads  T wave inversion less evident in Lateral leads  Confirmed by Doug Kendall MD ([id number]) on 2/27/2023 7:44:30 PM    Referred By: ABAD   SELF           Confirmed By:Doug Kendall MD           ASSESSMENT/PLAN:     Active Hospital Problems    Diagnosis    *Acute hypoxic hypercapnic respiratory failure, intubated on mechanical ventilation    Acute on chronic systolic and diastolic combined heart failure    Leucocytosis    STEPHEN (acute kidney injury)    Candida rash of groin    Hyponatremia    Anxiety and depression    Severe obesity with body mass index (BMI) of 35.0 to 39.9 with comorbidity    CAD (coronary artery disease)    Cardiomyopathy    Tobacco abuse    Acute  exacerbation of COPD    Essential hypertension    HLD (hyperlipidemia)    Type 2 diabetes mellitus with hyperglycemia, without long-term current use of insulin, HbA1c 9.1%       CATH 2020    The Prox RCA lesion was 100% stenosed.  The ejection fraction was calculated to be 25%.  There was severe left ventricular systolic dysfunction.  4. PA pressure 31/16 mmHg with mean pressure of 22 mmHg  5. PCWP 13 mmHg  6. Cardiac output of 4.4 L/min by thermodilution      Assessment:  1. RCA  with left to right collaterals  2. LVEDP 12 mmHg  3. LVEF 25%   4. PA pressure 31/16 mmHg with mean pressure of 22 mmHg  5. PCWP 13 mmHg  6. Cardiac output of 4.4 L/min by thermodilution      Recommendations:  1. Optimize medical management of CAD  2. Aggressive risk factor and lifestyle modifications  3. Routine post cath care and monitoring  4. Outpatient follow-up with Cardiology         ASSESSMENT & PLAN:       Pulmonary edema-Acute on Chronic CHFrEF  Acute on Chronic hypercapnic hypoxemic respiratory failure with mechanical ventilation  COPD  Tobacco abuse  Obesity  Ischemic Cardiomyopathy  STEPHEN  Leukocytosis  DM  H/O CVA- On Eliquis 5 mg PO BID      RECOMMENDATIONS:      Continue dobutamine for another 24 hours  Continue to diurese with IV lasix  Continue Metoprolol, eliquis and statin therapy  Will optimize her heart failure therapy as she continues to show progress and wean her off the inotrope therapy.  Will follow          Dorota Vogel NP  [facility]  Department of Cardiology  Date of Service: 02/28/2023      Significant clinical improvement with initial therapy with dobutamine and diuretics.  May consider discontinuing the inotrope therapy in the morning  Initiate guideline mediated therapies for congestive heart failure.    I have personally interviewed and examined the patient, I have reviewed the Nurse Practitioner's history and physical, assessment, and plan. I have personally evaluated the patient at  bedside and agree with the findings and made appropriate changes as necessary in recommendations.      Doug Kendall M.D.  [facility]  Department of Cardiology  Date of Service: 02/28/2023  10:48 AM

## 2023-02-28 NOTE — NURSING
Sedation paused for about 3 min before pt started to bite the tube and hold her breath. Sedation restarted.

## 2023-02-28 NOTE — PLAN OF CARE
02/28/23 0721   Patient Assessment/Suction   Level of Consciousness (AVPU) alert   Respiratory Effort Unlabored   Expansion/Accessory Muscles/Retractions no use of accessory muscles   All Lung Fields Breath Sounds coarse;diminished   Rhythm/Pattern, Respiratory assisted mechanically   Cough Frequency with stimulation   Cough Type loose   Suction Method oral;tracheal   Suction Pressure (mmHg) -120 mmHg   $ Suction Charges Inline Suction Procedure Stat Charge   Secretions Amount moderate   Secretions Color clear;creamy   Secretions Characteristics thick   Skin Integrity   $ Wound Care Tech Time 15 min   Area Observed Cheek;Upper lip   Skin Appearance without discoloration   PRE-TX-O2   Device (Oxygen Therapy) ventilator   $ Is the patient on Low Flow Oxygen? Yes   Oxygen Concentration (%) 50   SpO2 98 %   Pulse Oximetry Type Continuous   $ Pulse Oximetry - Multiple Charge Pulse Oximetry - Multiple   Oximetry Probe Site Assessed   Pulse 96   Resp (!) 32   Aerosol Therapy   $ Aerosol Therapy Charges Aerosol Treatment   Daily Review of Necessity (SVN) completed   Respiratory Treatment Status (SVN) given   Treatment Route (SVN) in-line   Patient Position (SVN) HOB elevated   Post Treatment Assessment (SVN) increased aeration   Signs of Intolerance (SVN) none   Breath Sounds Post-Respiratory Treatment   Throughout All Fields Post-Treatment All Fields   Throughout All Fields Post-Treatment aeration increased   Post-treatment Heart Rate (beats/min) 100   Post-treatment Resp Rate (breaths/min) 32        Airway   Placement Date/Time: 02/27/23 0600   Size (mm): 7.5 Fr  Inserted By: MD  Insertion attempts (enter comment if more than 2 attempts): 1   (RETIRED) Site right side of mouth   (RETIRED) Appearance clean and dry   (RETIRED) Tube Reference Point teeth   (RETIRED) Airway Tube Secured At (cm) 23   (RETIRED) Cuff Pressure cuff inflated   (RETIRED) Bite Block none   Cuffed Cuffed   General Safety Checklist   Safety  Promotion/Fall Prevention side rails raised   Airway Safety   Ambu bag with the patient? Yes, Adult Ambu   Is mask with the patient? Yes, Adult Mask   Suction set is at the bedside? Yes   Extra trach sizes at bedside? 7   Vent Select   Conventional Vent Y   $ Ventilator Subsequent 1   Charged w/in last 24h YES   Preset Conventional Ventilator Settings   Vent ID [id number]   Vent Type    Ventilation Type VC   Vent Mode A/C   Set Rate 32 BPM   Vt Set 450 mL   PEEP/CPAP 5 cmH20   Peak Flow 70 L/min   Peak End Inspiratory Pressure 25 cmH20   I-Trigger Type  V-TRIG   Trigger Sensitivity Flow/I-Trigger 3 L/min   Patient Ventilator Parameters   Resp Rate Total 32 br/min   Peak Airway Pressure 34 cmH20   Mean Airway Pressure 15 cmH20   Plateau Pressure 27 cmH20   Exhaled Vt 461 mL   Total Ve 14.7 L/m   I:E Ratio Measured 1:1.70   Auto PEEP 0 cmH20   Tubing ID (mm) 7.5 mm   Tube Type ET   Conventional Ventilator Alarms   Alarms On Y   Ve High Alarm 24 L/min   Ve Low Alarm 5 L/min   Vt High Alarm 1200 mL   Vt Low Alarm 200 mL   Resp Rate High Alarm 50 br/min   Press High Alarm 50 cmH2O   Apnea Rate 10   Apnea Volume (mL) 1 mL   Apnea Oxygen Concentration  100   Apnea Flow Rate (L/min) 97   T Apnea 20 sec(s)   IHI Ventilator Associated Pneumonia Bundle (Required)   Daily Awakening Trials Performed Yes   Daily Assessment of Readiness to Extubate Yes   Head of Bed Elevated  HOB 30   Oral Care Mouth swabbed;Mouth suctioned   Vent Circut Breaks Minimized Yes   Ready to Wean/Extubation Screen   FIO2<=50 (chart decimal) 0.5   MV<16L (chart vol.) 14.7   PEEP <=8 (chart #) 5   Ready to Wean Parameters   F/VT Ratio<105 (RSBI) (!) 69.41   Vital Capacity   Vital Capacity (mL) 0   Education   $ Education Bronchodilator;15 min   Respiratory Evaluation   $ Care Plan Tech Time 15 min   $ Eval/Re-eval Charges Re-evaluation   Evaluation For New Orders

## 2023-02-28 NOTE — PLAN OF CARE
Problem: Adult Inpatient Plan of Care  Goal: Plan of Care Review  Outcome: Ongoing, Progressing  Goal: Patient-Specific Goal (Individualized)  Outcome: Ongoing, Progressing  Goal: Absence of Hospital-Acquired Illness or Injury  Outcome: Ongoing, Progressing  Goal: Optimal Comfort and Wellbeing  Outcome: Ongoing, Progressing  Goal: Readiness for Transition of Care  Outcome: Ongoing, Progressing     Problem: Communication Impairment (Mechanical Ventilation, Invasive)  Goal: Effective Communication  Outcome: Ongoing, Progressing     Problem: Device-Related Complication Risk (Mechanical Ventilation, Invasive)  Goal: Optimal Device Function  Outcome: Ongoing, Progressing     Problem: Inability to Wean (Mechanical Ventilation, Invasive)  Goal: Mechanical Ventilation Liberation  Outcome: Ongoing, Progressing     Problem: Nutrition Impairment (Mechanical Ventilation, Invasive)  Goal: Optimal Nutrition Delivery  Outcome: Ongoing, Progressing     Problem: Skin and Tissue Injury (Mechanical Ventilation, Invasive)  Goal: Absence of Device-Related Skin and Tissue Injury  Outcome: Ongoing, Progressing     Problem: Ventilator-Induced Lung Injury (Mechanical Ventilation, Invasive)  Goal: Absence of Ventilator-Induced Lung Injury  Outcome: Ongoing, Progressing     Problem: Communication Impairment (Artificial Airway)  Goal: Effective Communication  Outcome: Ongoing, Progressing     Problem: Device-Related Complication Risk (Artificial Airway)  Goal: Optimal Device Function  Outcome: Ongoing, Progressing     Problem: Skin and Tissue Injury (Artificial Airway)  Goal: Absence of Device-Related Skin or Tissue Injury  Outcome: Ongoing, Progressing     Problem: Noninvasive Ventilation Acute  Goal: Effective Unassisted Ventilation and Oxygenation  Outcome: Ongoing, Progressing     Problem: Infection  Goal: Absence of Infection Signs and Symptoms  Outcome: Ongoing, Progressing     Problem: Diabetes Comorbidity  Goal: Blood Glucose Level  Within Targeted Range  Outcome: Ongoing, Progressing     Problem: Fluid and Electrolyte Imbalance (Acute Kidney Injury/Impairment)  Goal: Fluid and Electrolyte Balance  Outcome: Ongoing, Progressing     Problem: Oral Intake Inadequate (Acute Kidney Injury/Impairment)  Goal: Optimal Nutrition Intake  Outcome: Ongoing, Progressing     Problem: Renal Function Impairment (Acute Kidney Injury/Impairment)  Goal: Effective Renal Function  Outcome: Ongoing, Progressing     Problem: Fluid Imbalance (Pneumonia)  Goal: Fluid Balance  Outcome: Ongoing, Progressing     Problem: Infection (Pneumonia)  Goal: Resolution of Infection Signs and Symptoms  Outcome: Ongoing, Progressing     Problem: Respiratory Compromise (Pneumonia)  Goal: Effective Oxygenation and Ventilation  Outcome: Ongoing, Progressing     Problem: Fall Injury Risk  Goal: Absence of Fall and Fall-Related Injury  Outcome: Ongoing, Progressing     Problem: Restraint, Nonbehavioral (Nonviolent)  Goal: Absence of Harm or Injury  Outcome: Ongoing, Progressing     Problem: Skin Injury Risk Increased  Goal: Skin Health and Integrity  Outcome: Ongoing, Progressing

## 2023-02-28 NOTE — PLAN OF CARE
Patient remains intubated at this time. Dobutrex and propofol infusing. NSR on monitor. BP WDL. OG tube with minimal bile output. Siegel in place--3725cc output this shift.     POC reviewed with the patient's family and they verbalized understanding. All comments and concerns addressed. Bed locked in lowest position with bed alarm set, call light within reach. Safety precautions maintained.

## 2023-02-28 NOTE — SUBJECTIVE & OBJECTIVE
Interval History:  Patient was successfully extubated earlier today.  She is currently on nasal cannula, does not use oxygen at home.  States she does smoke half pack per day.  Was not on Lasix prior to admission.  States she is on Eliquis from her neurologist.  States her breathing feels much improved.  Afebrile with T-max 98.8°.  On nasal cannula.  Labs with WBC 9.75, sodium 132, potassium 3.4, BUN/creatinine 34/1.7.  Echo with EF of 40% with grade 1 diastolic dysfunction.  Documented urine output last 24 hours 4875 cc.  Discussed with patient.  Discussed with nursing at bedside.    Review of Systems   Constitutional:  Negative for fever.   Respiratory:  Positive for shortness of breath.    Gastrointestinal:  Negative for abdominal pain, nausea and vomiting.   Genitourinary:         Has [place]   Psychiatric/Behavioral:  Negative for confusion.    Objective:     Vital Signs (Most Recent):  Temp: 98.3 °F (36.8 °C) (02/28/23 1101)  Pulse: 95 (02/28/23 1200)  Resp: (!) 27 (02/28/23 1200)  BP: 108/62 (02/28/23 1200)  SpO2: 96 % (02/28/23 1200)   Vital Signs (24h Range):  Temp:  [97.7 °F (36.5 °C)-98.8 °F (37.1 °C)] 98.3 °F (36.8 °C)  Pulse:  [] 95  Resp:  [25-34] 27  SpO2:  [90 %-100 %] 96 %  BP: ()/(53-73) 108/62  Arterial Line BP: (103-155)/(44-68) 121/49     Weight: 109.1 kg (240 lb 8.4 oz)  Body mass index is 37.67 kg/m².    Intake/Output Summary (Last 24 hours) at 2/28/2023 1206  Last data filed at 2/28/2023 0926  Gross per 24 hour   Intake 1432.08 ml   Output 2875 ml   Net -1442.92 ml      Physical Exam  Vitals and nursing note reviewed.   Constitutional:       Comments: Lying in bed, cooperative   HENT:      Head: Normocephalic and atraumatic.      Mouth/Throat:      Mouth: Mucous membranes are moist.   Eyes:      Pupils: Pupils are equal, round, and reactive to light.   Cardiovascular:      Rate and Rhythm: Normal rate and regular rhythm.      Comments: Trace lower extremity edema  Pulmonary:       Comments: On nasal cannula, inspiratory crackles at bases to the mid zones, no wheeze or accessory muscle use  Abdominal:      Palpations: Abdomen is soft.      Tenderness: There is no abdominal tenderness.      Comments: Abdominal pannus.  Moisture with erythema between abdominal folds   Genitourinary:     Comments: Siegel in place with yellow urine draining  Skin:     Comments: Right radial arterial line   Neurological:      Mental Status: She is oriented to person, place, and time.      Comments: Sedated on propofol   Psychiatric:         Mood and Affect: Mood normal.       Significant Labs: BMP:   Recent Labs   Lab 02/28/23  0340   *   *   K 3.4*   CL 95   CO2 25   BUN 34*   CREATININE 1.7*   CALCIUM 7.7*   MG 2.5     CBC:   Recent Labs   Lab 02/27/23  0505 02/27/23  0506 02/28/23  0340 02/28/23  0525 02/28/23  0628   WBC 18.42*  --  9.75  --   --    HGB 13.2  --  12.8  --   --    HCT 41.9   < > 38.4 40 40   *  --  352  --   --     < > = values in this interval not displayed.     CMP:   Recent Labs   Lab 02/27/23  0505 02/28/23  0340   * 132*   K 4.6 3.4*   CL 95 95   CO2 21* 25   * 329*   BUN 25* 34*   CREATININE 1.7* 1.7*   CALCIUM 7.9* 7.7*   PROT 7.2  --    ALBUMIN 3.9  --    BILITOT 1.2*  --    ALKPHOS 134  --    AST 29  --    ALT 18  --    ANIONGAP 14 12     Lactic Acid: No results for input(s): LACTATE in the last 48 hours.  Respiratory Culture:   Recent Labs   Lab 02/27/23  1215   GSRESP Few WBC's  Rare Gram positive cocci  Rare Gram positive rods  Rare Gram negative rods   RESPIRATORYC Normal respiratory evette     Troponin:   Recent Labs   Lab 02/27/23  1151 02/27/23  1800 02/27/23  2329   TROPONINIHS 67.4* 51.7* 53.8*     Urine Culture: No results for input(s): LABURIN in the last 48 hours.  Urine Studies: No results for input(s): COLORU, APPEARANCEUA, PHUR, SPECGRAV, PROTEINUA, GLUCUA, KETONESU, BILIRUBINUA, OCCULTUA, NITRITE, UROBILINOGEN, LEUKOCYTESUR, RBCUA,  WBCUA, BACTERIA, SQUAMEPITHEL, HYALINECASTS in the last 48 hours.    Invalid input(s): HAYDEE    Significant Imaging: I have reviewed all pertinent imaging results/findings within the past 24 hours.

## 2023-02-28 NOTE — PROGRESS NOTES
Pulmonary/Critical Care Progress Note      PATIENT NAME: Delores Fox  MRN: [id number]  TODAY'S DATE: 2023  9:07 AM  ADMIT DATE: 2023  AGE: 57 y.o. : 1965    CONSULT REQUESTED BY: Kassandra Mota MD    REASON FOR CONSULT:   Critical care management    HPI:  Patient is a 57-year-old morbidly obese female with COPD who continues to smoke and has an EF of 30 percent with grade 1 diastolic dysfunction as well.  She is now intubated and remains acidotic on the ventilator, with both a respiratory and metabolic acidosis.  Her chest x-ray shows pulmonary edema.     the patient's pulmonary edema is improved.  She was able to be extubated this morning.  She states she will no longer smoke.  Discussed how important this is with her with both her pulmonary disease and her cardiac disease.  Also discussed the need for her to take her medicine on a regular basis.    REVIEW OF SYSTEMS    General: Feeling better.  Eyes: Vision is good.  ENT:  No sinusitis or pharyngitis.   Heart: No chest pain or palpitations.  Lungs: No cough, sputum, or wheezing.  GI: No Nausea, vomiting, constipation, diarrhea, or reflux.  : No dysuria, hesitancy, or nocturia.  Skin: No lesions or rashes.  Musculoskeletal: No joint pain or myalgias.  Neuro: No headaches or neuropathy.  Lymph: No edema or adenopathy.  Psych: No anxiety or depression.  Endo: No weight change.      ALLERGIES  Review of patient's allergies indicates:   Allergen Reactions    Morphine Nausea And Vomiting    Sulfa (sulfonamide antibiotics) Nausea And Vomiting       INPATIENT SCHEDULED MEDICATIONS   apixaban  5 mg Oral BID    aspirin  324 mg Oral Once    aspirin  81 mg Oral Daily    atorvastatin  40 mg Oral QHS    buPROPion  150 mg Oral Daily    furosemide (LASIX) injection  40 mg Intravenous Q12H    insulin detemir U-100  15 Units Subcutaneous Daily    ipratropium  0.5 mg Nebulization Q12H    levalbuterol  0.63 mg Nebulization TID WAKE    metoprolol  tartrate  12.5 mg Oral BID    miconazole   Topical (Top) BID      DOBUTamine IV infusion (non-titrating) 2.5 mcg/kg/min (23 1901)       MEDICAL AND SURGICAL HISTORY  Past Medical History:   Diagnosis Date    Acid reflux     COPD (chronic obstructive pulmonary disease)     Coronary artery disease     COVID-19     Diabetes mellitus     High cholesterol     Hypertension     Obese body habitus      Past Surgical History:   Procedure Laterality Date    APPENDECTOMY      CATHETERIZATION OF BOTH LEFT AND RIGHT HEART N/A 2020    Procedure: CATHETERIZATION, HEART, BOTH LEFT AND RIGHT;  Surgeon: Doug Kendall MD;  Location[facility] CATH[facility]EP LAB;  Service: Cardiology;  Laterality: N/A;    CHOLECYSTECTOMY      EXCISION OF LESION OF EYELID Right 2020    Procedure: EXCISION, LESION, EYELID;  Surgeon: Malachi Rodriguez MD;  Location: [facility] OR;  Service: Ophthalmology;  Laterality: Right;  Inclusion Cyst removal right lower lid    HYSTERECTOMY      JOINT REPLACEMENT Left     left hip    MASTOIDECTOMY Right        ALCOHOL, TOBACCO AND DRUG USE  Social History     Tobacco Use   Smoking Status Former    Packs/day: 0.50    Years: 13.00    Pack years: 6.50    Types: Cigarettes    Quit date: 2022    Years since quittin.4    Passive exposure: Current   Smokeless Tobacco Never     Social History     Substance and Sexual Activity   Alcohol Use Not Currently     Social History     Substance and Sexual Activity   Drug Use Not Currently       FAMILY HISTORY  History reviewed. No pertinent family history.    VITAL SIGNS (MOST RECENT)  Temp: 98.3 °F (36.8 °C) (23 1101)  Pulse: 104 (23 1300)  Resp: (!) 27 (23 1300)  BP: 121/86 (23 1300)  SpO2: 97 % (23 1300)    INTAKE AND OUTPUT (LAST 24 HOURS):  Intake/Output Summary (Last 24 hours) at 2023 1322  Last data filed at 2023 1320  Gross per 24 hour   Intake 1812.78 ml   Output 2875 ml   Net -1062.22 ml       WEIGHT  Wt Readings from  Last 1 Encounters:   02/28/23 109.1 kg (240 lb 8.4 oz)       PHYSICAL EXAM  GENERAL: Obese patient in no distress.  HEENT: Pupils equal and reactive. Extraocular movements intact. Nose intact. Pharynx moist.  NECK: Supple.   HEART: Regular rate and rhythm. No murmur or gallop auscultated.  LUNGS: Clear to auscultation and percussion, anteriorly; posteriorly, there crackles bilaterally.. Lung excursion symmetrical. No change in fremitus.  ABDOMEN: Bowel sounds present. Non-tender, no masses palpated.  : Normal anatomy.  EXTREMITIES: Normal muscle tone and joint movement, no cyanosis or clubbing.   LYMPHATICS: No adenopathy palpated, tr edema  SKIN: Dry, intact, no lesions.   NEURO:  Cranial nerves 2-12 intact.  Motor strength 5/5 bilaterally.  PSYCH:  Pleasant      CBC LAST (LAST 24 HOURS)  Recent Labs   Lab 02/28/23  0340 02/28/23  0525 02/28/23  0628   WBC 9.75  --   --    RBC 4.25  --   --    HGB 12.8  --   --    HCT 38.4   < > 40   MCV 90  --   --    MCH 30.1  --   --    MCHC 33.3  --   --    RDW 13.4  --   --      --   --    MPV 9.5  --   --     < > = values in this interval not displayed.       CHEMISTRY LAST (LAST 24 HOURS)  Recent Labs   Lab 02/28/23  0340 02/28/23  0525 02/28/23  1050   *  --   --    K 3.4*  --   --    CL 95  --   --    CO2 25  --   --    ANIONGAP 12  --   --    BUN 34*  --   --    CREATININE 1.7*  --   --    *  --   --    CALCIUM 7.7*  --   --    PH  --    < > 7.461*   MG 2.5  --   --     < > = values in this interval not displayed.         CARDIAC PROFILE (LAST 24 HOURS)  Recent Labs   Lab 02/27/23  0505 02/27/23  1151 02/27/23  2329   *  --   --    *  --   --    CPKMB 10.0*  --   --    TROPONINIHS 39.8*   < > 53.8*    < > = values in this interval not displayed.       LAST 7 DAYS MICROBIOLOGY   Microbiology Results (last 7 days)       Procedure Component Value Units Date/Time    Culture, Respiratory with Gram Stain [[id number]] Collected: 02/27/23 1211     Order Status: Completed Specimen: Respiratory from Tracheal Aspirate Updated: 02/28/23 0752     Respiratory Culture Normal respiratory evette     Gram Stain (Respiratory) Few WBC's     Gram Stain (Respiratory) Rare Gram positive cocci     Gram Stain (Respiratory) Rare Gram positive rods     Gram Stain (Respiratory) Rare Gram negative rods    MRSA Screen by PCR [[id number]] Collected: 02/27/23 1308    Order Status: Completed Specimen: Nasopharyngeal Swab from Nasal Updated: 02/27/23 1456     MRSA SCREEN BY PCR Negative            MOST RECENT IMAGING  Echo  · The left ventricle is mildly enlarged with concentric remodeling and   mildly decreased systolic function.  · The estimated ejection fraction is 40%.  · Grade I left ventricular diastolic dysfunction.  · There is moderate left ventricular global hypokinesis.  · Normal right ventricular size with normal right ventricular systolic   function.  · Mild left atrial enlargement.  · Normal central venous pressure (3 mmHg).  · There is abnormal septal wall motion consistent with left bundle branch   block.     X-Ray Chest AP Portable  Reason: intubated intubated    FINDINGS:  Portable chest at 456 compared with 2/27/2023 shows placement of enteric catheter with tip at least in stomach but off inferior aspect of this view. Endotracheal tube and cardiomediastinal silhouette unchanged. Increased degree of patient rotation is evident.    Lung volumes are low. Diffuse pulmonary interstitial opacities have slightly improved. Left basilar pleural-parenchymal opacity is more conspicuous, perhaps due to patient rotation. No pneumothorax.    IMPRESSION:    1. Slight interval improvement of bilateral pulmonary interstitial opacities suggesting improving interstitial edema.  2. Increased conspicuity of left basilar pleural-parenchymal opacity which could be due to patient positioning although slight worsening of left pleural effusion and/or left basilar alveolar consolidation or  atelectasis can also be considered.    Electronically signed by:  Derrek Boykin MD  2/28/2023 7:23 AM CST Workstation: [id number]      CURRENT VISIT EKG  Results for orders placed or performed during the hospital encounter of 02/27/23   EKG 12-lead    Narrative    Test Reason : R07.9,    Vent. Rate : 115 BPM     Atrial Rate : 115 BPM     P-R Int : 152 ms          QRS Dur : 102 ms      QT Int : 352 ms       P-R-T Axes : 072 047 050 degrees     QTc Int : 486 ms    Sinus tachycardia  Possible Left atrial enlargement  T wave abnormality, consider inferolateral ischemia  Abnormal ECG  When compared with ECG of 21-OCT-2022 08:30,  Vent. rate has increased BY  40 BPM  T wave inversion now evident in Inferior leads  T wave inversion less evident in Lateral leads    Referred By: [email or web address]   SELF           Confirmed By:        ECHOCARDIOGRAM RESULTS  Results for orders placed during the hospital encounter of 09/16/22    Echo Saline Bubble? Yes    Interpretation Summary  · There is no evidence of intracardiac shunting.  · The left ventricle is moderately enlarged with mild eccentric hypertrophy and moderately decreased systolic function.  · There is severe left ventricular global hypokinesis.  · The estimated ejection fraction is 30%.  · Grade I left ventricular diastolic dysfunction.  · Normal right ventricular size with normal right ventricular systolic function.  · Mild left atrial enlargement.  · Mild mitral regurgitation.  · Mild tricuspid regurgitation.  · Normal central venous pressure (3 mmHg).  · The estimated PA systolic pressure is 31 mmHg.        VENTILATOR INFORMATION  Vent Mode: Spont  Oxygen Concentration (%):  [30-50] 30  Resp Rate Total:  [24 br/min-40 br/min] 27 br/min  Vt Set:  [450 mL] 450 mL  PEEP/CPAP:  [5 cmH20-10 cmH20] 5 cmH20  Pressure Support:  [5 cmH20] 5 cmH20  Mean Airway Pressure:  [7.3 pkO20-82 cmH20] 7.5 cmH20       LAST ARTERIAL BLOOD GAS  ABG  Recent Labs   Lab 02/28/23  1050   PH 7.461*    PO2 67*   PCO2 38.1   HCO3 27.1   BE 3       IMPRESSION AND PLAN  Acute on chronic hypercapnic hypoxemic respiratory failure with mechanical ventilation, resolved  Pulmonary edema, better  Systolic and diastolic heart failure, acute on chronic, on Dobutrex  COPD with exacerbation, improved  Tobacco abuse  Morbid obesity      Lasix is ordered q12  Protonix for GI prophylaxis  Enoxaparin for DVT prophylaxis  Bronchodilators  Discontinue antibiotics  Need for smoking cessation strongly counseled.  Cardiology managing Dobutrex and Lasix  Patient looks good enough to move out of ICU today if the bed is needed    Leeanna Feng MD  [facility]  Department of Pulmonology  Date of Service: 02/28/2023  9:07 AM

## 2023-02-28 NOTE — RESPIRATORY THERAPY
02/27/23 1905   Patient Assessment/Suction   Level of Consciousness (AVPU) responds to pain   Respiratory Effort Unlabored   Expansion/Accessory Muscles/Retractions no use of accessory muscles   All Lung Fields Breath Sounds coarse;diminished   Rhythm/Pattern, Respiratory assisted mechanically   Cough Frequency infrequent;with stimulation   Cough Type assisted   Suction Method oral;tracheal   Suction Pressure (mmHg) -120 mmHg   $ Suction Charges Inline Suction Procedure Stat Charge   Secretions Amount small   Secretions Color clear   Secretions Characteristics thick   Skin Integrity   $ Wound Care Tech Time 15 min   Area Observed Upper lip;Lower lip;Corner lip;Cheek   Skin Appearance without discoloration   PRE-TX-O2   Device (Oxygen Therapy) ventilator   $ Is the patient on Low Flow Oxygen? Yes   Oxygen Concentration (%) 50   SpO2 95 %   Pulse Oximetry Type Continuous   $ Pulse Oximetry - Multiple Charge Pulse Oximetry - Multiple   Pulse 97   Resp (!) 32        Airway   Placement Date/Time: 02/27/23 0600   Size (mm): 7.5 Fr  Inserted By: MD  Insertion attempts (enter comment if more than 2 attempts): 1   (RETIRED) Site center of mouth   (RETIRED) Tube Reference Point teeth   (RETIRED) Airway Tube Secured At (cm) 23   (RETIRED) Bite Block none   Cuffed Cuffed   Airway Safety   Ambu bag with the patient? Yes, Adult Ambu   Suction set is at the bedside? Yes   Respiratory Interventions   Airway/Ventilation Management airway patency maintained   Vent Select   Conventional Vent Y   Charged w/in last 24h YES   Preset Conventional Ventilator Settings   Vent ID [id number]   Vent Type    Ventilation Type VC   Vent Mode A/C   Humidity HME   Set Rate 32 BPM   Vt Set 450 mL   PEEP/CPAP 10 cmH20   Peak Flow 70 L/min   Peak End Inspiratory Pressure 27 cmH20   I-Trigger Type  V-TRIG   Trigger Sensitivity Flow/I-Trigger 3 L/min   Patient Ventilator Parameters   Resp Rate Total 32 br/min   Peak Airway Pressure 30 cmH20   Mean Airway  Pressure 18 cmH20   Plateau Pressure 27 cmH20   Exhaled Vt 458 mL   Total Ve 14.6 L/m   I:E Ratio Measured 1:1.70   Auto PEEP 0 cmH20   Tubing ID (mm) 7.5 mm   Tube Type ET   Conventional Ventilator Alarms   Alarms On Y   Resp Rate High Alarm 50 br/min   Press High Alarm 50 cmH2O   Apnea Rate 10   Apnea Volume (mL) 1 mL   Apnea Oxygen Concentration  100   Apnea Flow Rate (L/min) 97   T Apnea 20 sec(s)   IHI Ventilator Associated Pneumonia Bundle (Required)   Head of Bed Elevated  HOB 30   Oral Care Mouth swabbed;Mouth moisturizer;Mouth suctioned;with mouthwash   Vent Circut Breaks Minimized Yes   Ready to Wean/Extubation Screen   FIO2<=50 (chart decimal) 0.5   MV<16L (chart vol.) 14.6   PEEP <=8 (chart #) (!) 10   Ready to Wean Parameters   F/VT Ratio<105 (RSBI) (!) 69.87   Vital Capacity   Vital Capacity (mL) 0   Education   $ Education Bronchodilator;DME Nebulizer;Suction;Ventilator Oxygen;15 min   Respiratory Evaluation   $ Care Plan Tech Time 15 min   $ Eval/Re-eval Charges Re-evaluation

## 2023-02-28 NOTE — PLAN OF CARE
Problem: Adult Inpatient Plan of Care  Goal: Plan of Care Review  Outcome: Ongoing, Progressing  Goal: Patient-Specific Goal (Individualized)  Outcome: Ongoing, Progressing  Goal: Absence of Hospital-Acquired Illness or Injury  Outcome: Ongoing, Progressing  Goal: Optimal Comfort and Wellbeing  Outcome: Ongoing, Progressing  Goal: Readiness for Transition of Care  Outcome: Ongoing, Progressing     Problem: Ventilator-Induced Lung Injury (Mechanical Ventilation, Invasive)  Goal: Absence of Ventilator-Induced Lung Injury  Outcome: Ongoing, Progressing     Problem: Noninvasive Ventilation Acute  Goal: Effective Unassisted Ventilation and Oxygenation  Outcome: Ongoing, Progressing     Problem: Infection  Goal: Absence of Infection Signs and Symptoms  Outcome: Ongoing, Progressing     Problem: Diabetes Comorbidity  Goal: Blood Glucose Level Within Targeted Range  Outcome: Ongoing, Progressing     Problem: Fluid and Electrolyte Imbalance (Acute Kidney Injury/Impairment)  Goal: Fluid and Electrolyte Balance  Outcome: Ongoing, Progressing     Problem: Oral Intake Inadequate (Acute Kidney Injury/Impairment)  Goal: Optimal Nutrition Intake  Outcome: Ongoing, Progressing     Problem: Renal Function Impairment (Acute Kidney Injury/Impairment)  Goal: Effective Renal Function  Outcome: Ongoing, Progressing     Problem: Fluid Imbalance (Pneumonia)  Goal: Fluid Balance  Outcome: Ongoing, Progressing     Problem: Infection (Pneumonia)  Goal: Resolution of Infection Signs and Symptoms  Outcome: Ongoing, Progressing     Problem: Respiratory Compromise (Pneumonia)  Goal: Effective Oxygenation and Ventilation  Outcome: Ongoing, Progressing     Problem: Fall Injury Risk  Goal: Absence of Fall and Fall-Related Injury  Outcome: Ongoing, Progressing     Problem: Restraint, Nonbehavioral (Nonviolent)  Goal: Absence of Harm or Injury  Outcome: Ongoing, Progressing     Problem: Skin Injury Risk Increased  Goal: Skin Health and  Integrity  Outcome: Ongoing, Progressing

## 2023-03-01 PROBLEM — E87.6 HYPOKALEMIA: Status: RESOLVED | Noted: 2023-02-28 | Resolved: 2023-03-01

## 2023-03-01 PROBLEM — E87.1 HYPONATREMIA: Status: RESOLVED | Noted: 2023-02-27 | Resolved: 2023-03-01

## 2023-03-01 PROBLEM — J96.02 ACUTE HYPERCAPNIC RESPIRATORY FAILURE: Status: RESOLVED | Noted: 2022-02-12 | Resolved: 2023-03-01

## 2023-03-01 LAB
ANION GAP SERPL CALC-SCNC: 14 MMOL/L (ref 8–16)
BACTERIA SPEC AEROBE CULT: NORMAL
BUN SERPL-MCNC: 45 MG/DL (ref 6–20)
CALCIUM SERPL-MCNC: 8 MG/DL (ref 8.7–10.5)
CHLORIDE SERPL-SCNC: 99 MMOL/L (ref 95–110)
CO2 SERPL-SCNC: 25 MMOL/L (ref 23–29)
CREAT SERPL-MCNC: 1.6 MG/DL (ref 0.5–1.4)
ERYTHROCYTE [DISTWIDTH] IN BLOOD BY AUTOMATED COUNT: 13.8 % (ref 11.5–14.5)
EST. GFR  (NO RACE VARIABLE): 37.9 ML/MIN/1.73 M^2
ESTIMATED AVG GLUCOSE: 212 MG/DL (ref 68–131)
GLUCOSE SERPL-MCNC: 212 MG/DL (ref 70–110)
GLUCOSE SERPL-MCNC: 213 MG/DL (ref 70–110)
GLUCOSE SERPL-MCNC: 216 MG/DL (ref 70–110)
GLUCOSE SERPL-MCNC: 216 MG/DL (ref 70–110)
GLUCOSE SERPL-MCNC: 227 MG/DL (ref 70–110)
GRAM STN SPEC: NORMAL
HBA1C MFR BLD: 9 % (ref 4.5–6.2)
HCT VFR BLD AUTO: 38.9 % (ref 37–48.5)
HGB BLD-MCNC: 12.7 G/DL (ref 12–16)
MAGNESIUM SERPL-MCNC: 2.6 MG/DL (ref 1.6–2.6)
MCH RBC QN AUTO: 30.5 PG (ref 27–31)
MCHC RBC AUTO-ENTMCNC: 32.6 G/DL (ref 32–36)
MCV RBC AUTO: 93 FL (ref 82–98)
PHOSPHATE SERPL-MCNC: 4.6 MG/DL (ref 2.7–4.5)
PLATELET # BLD AUTO: 356 K/UL (ref 150–450)
PMV BLD AUTO: 9.8 FL (ref 9.2–12.9)
POTASSIUM SERPL-SCNC: 3.5 MMOL/L (ref 3.5–5.1)
RBC # BLD AUTO: 4.17 M/UL (ref 4–5.4)
SODIUM SERPL-SCNC: 138 MMOL/L (ref 136–145)
WBC # BLD AUTO: 15.08 K/UL (ref 3.9–12.7)

## 2023-03-01 PROCEDURE — 99900035 HC TECH TIME PER 15 MIN (STAT)

## 2023-03-01 PROCEDURE — 85027 COMPLETE CBC AUTOMATED: CPT | Performed by: INTERNAL MEDICINE

## 2023-03-01 PROCEDURE — 94618 PULMONARY STRESS TESTING: CPT

## 2023-03-01 PROCEDURE — 84100 ASSAY OF PHOSPHORUS: CPT | Performed by: INTERNAL MEDICINE

## 2023-03-01 PROCEDURE — 94640 AIRWAY INHALATION TREATMENT: CPT

## 2023-03-01 PROCEDURE — 83036 HEMOGLOBIN GLYCOSYLATED A1C: CPT | Performed by: INTERNAL MEDICINE

## 2023-03-01 PROCEDURE — 99232 PR SUBSEQUENT HOSPITAL CARE,LEVL II: ICD-10-PCS | Mod: ,,, | Performed by: INTERNAL MEDICINE

## 2023-03-01 PROCEDURE — 27000221 HC OXYGEN, UP TO 24 HOURS

## 2023-03-01 PROCEDURE — 25000003 PHARM REV CODE 250: Performed by: STUDENT IN AN ORGANIZED HEALTH CARE EDUCATION/TRAINING PROGRAM

## 2023-03-01 PROCEDURE — 63600175 PHARM REV CODE 636 W HCPCS: Performed by: INTERNAL MEDICINE

## 2023-03-01 PROCEDURE — 99232 SBSQ HOSP IP/OBS MODERATE 35: CPT | Mod: ,,, | Performed by: INTERNAL MEDICINE

## 2023-03-01 PROCEDURE — 21400001 HC TELEMETRY ROOM

## 2023-03-01 PROCEDURE — 99900031 HC PATIENT EDUCATION (STAT)

## 2023-03-01 PROCEDURE — 80048 BASIC METABOLIC PNL TOTAL CA: CPT | Performed by: INTERNAL MEDICINE

## 2023-03-01 PROCEDURE — 36415 COLL VENOUS BLD VENIPUNCTURE: CPT | Performed by: INTERNAL MEDICINE

## 2023-03-01 PROCEDURE — 94799 UNLISTED PULMONARY SVC/PX: CPT

## 2023-03-01 PROCEDURE — 94761 N-INVAS EAR/PLS OXIMETRY MLT: CPT

## 2023-03-01 PROCEDURE — 83735 ASSAY OF MAGNESIUM: CPT | Performed by: INTERNAL MEDICINE

## 2023-03-01 PROCEDURE — 25000003 PHARM REV CODE 250: Performed by: INTERNAL MEDICINE

## 2023-03-01 PROCEDURE — 25000242 PHARM REV CODE 250 ALT 637 W/ HCPCS: Performed by: INTERNAL MEDICINE

## 2023-03-01 RX ORDER — MAG HYDROX/ALUMINUM HYD/SIMETH 200-200-20
30 SUSPENSION, ORAL (FINAL DOSE FORM) ORAL EVERY 4 HOURS PRN
Status: DISCONTINUED | OUTPATIENT
Start: 2023-03-01 | End: 2023-03-02 | Stop reason: HOSPADM

## 2023-03-01 RX ORDER — TALC
6 POWDER (GRAM) TOPICAL NIGHTLY PRN
Status: DISCONTINUED | OUTPATIENT
Start: 2023-03-01 | End: 2023-03-02 | Stop reason: HOSPADM

## 2023-03-01 RX ORDER — LOSARTAN POTASSIUM 25 MG/1
25 TABLET ORAL DAILY
Status: DISCONTINUED | OUTPATIENT
Start: 2023-03-02 | End: 2023-03-02 | Stop reason: HOSPADM

## 2023-03-01 RX ORDER — SIMETHICONE 80 MG
1 TABLET,CHEWABLE ORAL 4 TIMES DAILY PRN
Status: DISCONTINUED | OUTPATIENT
Start: 2023-03-01 | End: 2023-03-02 | Stop reason: HOSPADM

## 2023-03-01 RX ORDER — METOPROLOL SUCCINATE 50 MG/1
50 TABLET, EXTENDED RELEASE ORAL DAILY
Status: DISCONTINUED | OUTPATIENT
Start: 2023-03-02 | End: 2023-03-02 | Stop reason: HOSPADM

## 2023-03-01 RX ADMIN — LEVALBUTEROL HYDROCHLORIDE 0.63 MG: 0.63 SOLUTION RESPIRATORY (INHALATION) at 08:03

## 2023-03-01 RX ADMIN — ATORVASTATIN CALCIUM 40 MG: 40 TABLET, FILM COATED ORAL at 08:03

## 2023-03-01 RX ADMIN — BUDESONIDE INHALATION 0.5 MG: 0.5 SUSPENSION RESPIRATORY (INHALATION) at 08:03

## 2023-03-01 RX ADMIN — ASPIRIN 81 MG CHEWABLE TABLET 81 MG: 81 TABLET CHEWABLE at 09:03

## 2023-03-01 RX ADMIN — ARFORMOTEROL TARTRATE 15 MCG: 15 SOLUTION RESPIRATORY (INHALATION) at 08:03

## 2023-03-01 RX ADMIN — SIMETHICONE 80 MG: 80 TABLET, CHEWABLE ORAL at 04:03

## 2023-03-01 RX ADMIN — INSULIN ASPART 2 UNITS: 100 INJECTION, SOLUTION INTRAVENOUS; SUBCUTANEOUS at 08:03

## 2023-03-01 RX ADMIN — FUROSEMIDE 40 MG: 10 INJECTION INTRAMUSCULAR; INTRAVENOUS at 03:03

## 2023-03-01 RX ADMIN — INSULIN DETEMIR 15 UNITS: 100 INJECTION, SOLUTION SUBCUTANEOUS at 09:03

## 2023-03-01 RX ADMIN — MICONAZOLE NITRATE: 20 CREAM TOPICAL at 08:03

## 2023-03-01 RX ADMIN — LEVALBUTEROL HYDROCHLORIDE 0.63 MG: 0.63 SOLUTION RESPIRATORY (INHALATION) at 01:03

## 2023-03-01 RX ADMIN — Medication 6 MG: at 08:03

## 2023-03-01 RX ADMIN — IPRATROPIUM BROMIDE 0.5 MG: 0.5 SOLUTION RESPIRATORY (INHALATION) at 08:03

## 2023-03-01 RX ADMIN — APIXABAN 5 MG: 5 TABLET, FILM COATED ORAL at 08:03

## 2023-03-01 RX ADMIN — INSULIN ASPART 4 UNITS: 100 INJECTION, SOLUTION INTRAVENOUS; SUBCUTANEOUS at 12:03

## 2023-03-01 RX ADMIN — INSULIN ASPART 4 UNITS: 100 INJECTION, SOLUTION INTRAVENOUS; SUBCUTANEOUS at 04:03

## 2023-03-01 RX ADMIN — BUPROPION HYDROCHLORIDE 150 MG: 150 TABLET, FILM COATED, EXTENDED RELEASE ORAL at 09:03

## 2023-03-01 RX ADMIN — ALUMINUM HYDROXIDE, MAGNESIUM HYDROXIDE, AND SIMETHICONE 30 ML: 200; 200; 20 SUSPENSION ORAL at 10:03

## 2023-03-01 RX ADMIN — METOPROLOL TARTRATE 12.5 MG: 25 TABLET, FILM COATED ORAL at 09:03

## 2023-03-01 RX ADMIN — FUROSEMIDE 40 MG: 10 INJECTION INTRAMUSCULAR; INTRAVENOUS at 01:03

## 2023-03-01 RX ADMIN — APIXABAN 5 MG: 5 TABLET, FILM COATED ORAL at 09:03

## 2023-03-01 RX ADMIN — SIMETHICONE 80 MG: 80 TABLET, CHEWABLE ORAL at 08:03

## 2023-03-01 RX ADMIN — INSULIN ASPART 4 UNITS: 100 INJECTION, SOLUTION INTRAVENOUS; SUBCUTANEOUS at 09:03

## 2023-03-01 NOTE — CARE UPDATE
03/01/23 1345   Home Oxygen Qualification   $ Home O2 Qualification Tech time 15 minutes;Pulmonary Stress Test/6 min walk   Room Air SpO2 At Rest 99 %   Room Air SpO2 During Ambulation 91 %   SpO2 During Ambulation on O2   (na)   Heart Rate on O2   (na)   Ambulation O2 LPM   (na)   SpO2 Post Ambulation 95 %   Post Ambulation Heart Rate 90 bpm   Post Ambulation O2 LPM   (na)

## 2023-03-01 NOTE — SUBJECTIVE & OBJECTIVE
Interval History:  Patient continues to feel improved.  Shortness of breath has resolved, denies any productive coughing episode.  Does admit to sore throat which is improving this afternoon.  Currently on room air, home O2 evaluation performed and not requiring.  Afebrile with T-max 98.3°.  Labs with WBC 15, hemoglobin 12.7, BUN/creatinine 45/1.6, HbA1c 9.  Documented urine output last 24 hours 3895 cc.   Discussed with patient.  No visitors at bedside.    Review of Systems   Constitutional:  Negative for fever.   HENT:  Positive for sore throat.    Respiratory:  Negative for cough and shortness of breath.    Genitourinary:  Negative for difficulty urinating.   Psychiatric/Behavioral:  Negative for confusion.    Objective:     Vital Signs (Most Recent):  Temp: 98.3 °F (36.8 °C) (03/01/23 1148)  Pulse: 90 (03/01/23 1354)  Resp: 19 (03/01/23 1354)  BP: (!) 110/56 (03/01/23 1148)  SpO2: 99 % (03/01/23 1354)   Vital Signs (24h Range):  Temp:  [97.6 °F (36.4 °C)-98.3 °F (36.8 °C)] 98.3 °F (36.8 °C)  Pulse:  [] 90  Resp:  [18-30] 19  SpO2:  [95 %-100 %] 99 %  BP: (104-149)/(47-65) 110/56     Weight: 108 kg (238 lb 1.6 oz)  Body mass index is 37.29 kg/m².    Intake/Output Summary (Last 24 hours) at 3/1/2023 1529  Last data filed at 3/1/2023 0809  Gross per 24 hour   Intake 0 ml   Output 2545 ml   Net -2545 ml      Physical Exam  Vitals and nursing note reviewed.   Constitutional:       Comments: Lying in bed, cooperative   HENT:      Head: Normocephalic and atraumatic.      Mouth/Throat:      Mouth: Mucous membranes are moist.      Comments: No oral thrush, no pharyngeal erythema  Eyes:      Pupils: Pupils are equal, round, and reactive to light.   Cardiovascular:      Rate and Rhythm: Normal rate and regular rhythm.      Comments: No lower extremity edema  Pulmonary:      Comments: On room air, no wheeze or accessory muscle use  Abdominal:      Palpations: Abdomen is soft.      Tenderness: There is no abdominal  tenderness.      Comments: Abdominal pannus.  Moisture with erythema between abdominal folds   Neurological:      Mental Status: She is oriented to person, place, and time.   Psychiatric:         Mood and Affect: Mood normal.       Significant Labs: BMP:   Recent Labs   Lab 03/01/23 0342   *      K 3.5   CL 99   CO2 25   BUN 45*   CREATININE 1.6*   CALCIUM 8.0*   MG 2.6     CBC:   Recent Labs   Lab 02/28/23  0340 02/28/23  0525 02/28/23  0628 03/01/23 0342   WBC 9.75  --   --  15.08*   HGB 12.8  --   --  12.7   HCT 38.4 40 40 38.9     --   --  356     CMP:   Recent Labs   Lab 02/28/23 0340 03/01/23 0342   * 138   K 3.4* 3.5   CL 95 99   CO2 25 25   * 216*   BUN 34* 45*   CREATININE 1.7* 1.6*   CALCIUM 7.7* 8.0*   ANIONGAP 12 14     Lactic Acid: No results for input(s): LACTATE in the last 48 hours.  Magnesium:   Recent Labs   Lab 02/28/23 0340 03/01/23 0342   MG 2.5 2.6       Significant Imaging: I have reviewed all pertinent imaging results/findings within the past 24 hours.

## 2023-03-01 NOTE — PLAN OF CARE
met with Pt at bedside to discuss advance directives.  provided Pt with [place] power of  and living will guide as well as power of  for healthcare form.  explained power of  process and asked Pt if she had any questions. Pt stated no. Pt stated she would consult family and complete form. Consult completed.

## 2023-03-01 NOTE — CARE UPDATE
02/28/23 2000   Patient Assessment/Suction   Level of Consciousness (AVPU) alert   Respiratory Effort Normal   Expansion/Accessory Muscles/Retractions no use of accessory muscles   All Lung Fields Breath Sounds diminished   Rhythm/Pattern, Respiratory unlabored   Cough Frequency frequent   Cough Type dry   PRE-TX-O2   Device (Oxygen Therapy) nasal cannula   $ Is the patient on Low Flow Oxygen? Yes   Flow (L/min) 2   SpO2 100 %   Pulse Oximetry Type Continuous   $ Pulse Oximetry - Multiple Charge Pulse Oximetry - Multiple   Pulse 91   Resp (!) 21   BP (!) 143/65   Aerosol Therapy   $ Aerosol Therapy Charges Aerosol Treatment   Daily Review of Necessity (SVN) completed   Respiratory Treatment Status (SVN) given   Treatment Route (SVN) mask   Patient Position (SVN) semi-Bautista's   Post Treatment Assessment (SVN) breath sounds unchanged;vital signs unchanged   Signs of Intolerance (SVN) none   Education   $ Education Bronchodilator;15 min   Respiratory Evaluation   $ Care Plan Tech Time 15 min

## 2023-03-01 NOTE — PROGRESS NOTES
Pulmonary/Critical Care Progress Note      PATIENT NAME: Delores Fox  MRN: [id number]  TODAY'S DATE: 2023  9:07 AM  ADMIT DATE: 2023  AGE: 57 y.o. : 1965    CONSULT REQUESTED BY: Kassandra Mota MD    REASON FOR CONSULT:   Critical care management    HPI:  Patient is a 57-year-old morbidly obese female with COPD who continues to smoke and has an EF of 30 percent with grade 1 diastolic dysfunction as well.  She is now intubated and remains acidotic on the ventilator, with both a respiratory and metabolic acidosis.  Her chest x-ray shows pulmonary edema.     the patient's pulmonary edema is improved.  She was able to be extubated this morning.  She states she will no longer smoke.  Discussed how important this is with her with both her pulmonary disease and her cardiac disease.  Also discussed the need for her to take her medicine on a regular basis.    3/1 the patient looks good this morning.  She is satting 92% on room air at rest.  She is hoarse from being intubated.    REVIEW OF SYSTEMS    General: Feeling better.  Eyes: Vision is good.  ENT:  No sinusitis or pharyngitis.   Heart: No chest pain or palpitations.  Lungs: No cough, sputum, or wheezing.  GI: No Nausea, vomiting, constipation, diarrhea, or reflux.  : No dysuria, hesitancy, or nocturia.  Skin: No lesions or rashes.  Musculoskeletal: No joint pain or myalgias.  Neuro: No headaches or neuropathy.  Lymph: No edema or adenopathy.  Psych: No anxiety or depression.  Endo: No weight change.      No change in the patient's Past Medical History, Past Surgical History, Social History or Family History since admission.    VITAL SIGNS (MOST RECENT)  Temp: 98.3 °F (36.8 °C) (23 1148)  Pulse: 79 (23 1148)  Resp: 18 (23 1148)  BP: (!) 110/56 (23 1148)  SpO2: 95 % (23 1148)    INTAKE AND OUTPUT (LAST 24 HOURS):  Intake/Output Summary (Last 24 hours) at 3/1/2023 1256  Last data filed at 3/1/2023 0809  Gross per  24 hour   Intake 800 ml   Output 3895 ml   Net -3095 ml       WEIGHT  Wt Readings from Last 1 Encounters:   02/28/23 108 kg (238 lb 1.6 oz)       PHYSICAL EXAM  GENERAL: Obese patient in no distress.  HEENT: Pupils equal and reactive. Extraocular movements intact. Nose intact. Pharynx moist.  NECK: Supple.   HEART: Regular rate and rhythm. No murmur or gallop auscultated.  LUNGS: Clear to auscultation and percussion, anteriorly; posteriorly, there crackles bilaterally.. Lung excursion symmetrical. No change in fremitus.  ABDOMEN: Bowel sounds present. Non-tender, no masses palpated.  : Normal anatomy.  EXTREMITIES: Normal muscle tone and joint movement, no cyanosis or clubbing.   LYMPHATICS: No adenopathy palpated, tr edema  SKIN: Dry, intact, no lesions.   NEURO:  Cranial nerves 2-12 intact.  Motor strength 5/5 bilaterally.  PSYCH:  Pleasant      CBC LAST (LAST 24 HOURS)  Recent Labs   Lab 03/01/23  0342   WBC 15.08*   RBC 4.17   HGB 12.7   HCT 38.9   MCV 93   MCH 30.5   MCHC 32.6   RDW 13.8      MPV 9.8       CHEMISTRY LAST (LAST 24 HOURS)  Recent Labs   Lab 03/01/23  0342      K 3.5   CL 99   CO2 25   ANIONGAP 14   BUN 45*   CREATININE 1.6*   *   CALCIUM 8.0*   MG 2.6         CARDIAC PROFILE (LAST 24 HOURS)  Recent Labs   Lab 02/27/23  0505 02/27/23  1151 02/27/23  2329   *  --   --    *  --   --    CPKMB 10.0*  --   --    TROPONINIHS 39.8*   < > 53.8*    < > = values in this interval not displayed.       LAST 7 DAYS MICROBIOLOGY   Microbiology Results (last 7 days)       Procedure Component Value Units Date/Time    Culture, Respiratory with Gram Stain [[id number]] Collected: 02/27/23 1215    Order Status: Completed Specimen: Respiratory from Tracheal Aspirate Updated: 03/01/23 0807     Respiratory Culture Normal respiratory evette     Gram Stain (Respiratory) Few WBC's     Gram Stain (Respiratory) Rare Gram positive cocci     Gram Stain (Respiratory) Rare Gram positive rods      Gram Stain (Respiratory) Rare Gram negative rods    MRSA Screen by PCR [[id number]] Collected: 02/27/23 1308    Order Status: Completed Specimen: Nasopharyngeal Swab from Nasal Updated: 02/27/23 1456     MRSA SCREEN BY PCR Negative            MOST RECENT IMAGING  Echo  · The left ventricle is mildly enlarged with concentric remodeling and   mildly decreased systolic function.  · The estimated ejection fraction is 40%.  · Grade I left ventricular diastolic dysfunction.  · There is moderate left ventricular global hypokinesis.  · Normal right ventricular size with normal right ventricular systolic   function.  · Mild left atrial enlargement.  · Normal central venous pressure (3 mmHg).  · There is abnormal septal wall motion consistent with left bundle branch   block.     X-Ray Chest AP Portable  Reason: intubated intubated    FINDINGS:  Portable chest at 456 compared with 2/27/2023 shows placement of enteric catheter with tip at least in stomach but off inferior aspect of this view. Endotracheal tube and cardiomediastinal silhouette unchanged. Increased degree of patient rotation is evident.    Lung volumes are low. Diffuse pulmonary interstitial opacities have slightly improved. Left basilar pleural-parenchymal opacity is more conspicuous, perhaps due to patient rotation. No pneumothorax.    IMPRESSION:    1. Slight interval improvement of bilateral pulmonary interstitial opacities suggesting improving interstitial edema.  2. Increased conspicuity of left basilar pleural-parenchymal opacity which could be due to patient positioning although slight worsening of left pleural effusion and/or left basilar alveolar consolidation or atelectasis can also be considered.    Electronically signed by:  Derrek Boykin MD  2/28/2023 7:23 AM CST Workstation: [id number]      CURRENT VISIT EKG  Results for orders placed or performed during the hospital encounter of 02/27/23   EKG 12-lead    Narrative    Test Reason : R07.9,    Vent.  Rate : 115 BPM     Atrial Rate : 115 BPM     P-R Int : 152 ms          QRS Dur : 102 ms      QT Int : 352 ms       P-R-T Axes : 072 047 050 degrees     QTc Int : 486 ms    Sinus tachycardia  Possible Left atrial enlargement  T wave abnormality, consider inferolateral ischemia  Abnormal ECG  When compared with ECG of 21-OCT-2022 08:30,  Vent. rate has increased BY  40 BPM  T wave inversion now evident in Inferior leads  T wave inversion less evident in Lateral leads    Referred By: [email or web address]   SELF           Confirmed By:        ECHOCARDIOGRAM RESULTS  Results for orders placed during the hospital encounter of 09/16/22    Echo Saline Bubble? Yes    Interpretation Summary  · There is no evidence of intracardiac shunting.  · The left ventricle is moderately enlarged with mild eccentric hypertrophy and moderately decreased systolic function.  · There is severe left ventricular global hypokinesis.  · The estimated ejection fraction is 30%.  · Grade I left ventricular diastolic dysfunction.  · Normal right ventricular size with normal right ventricular systolic function.  · Mild left atrial enlargement.  · Mild mitral regurgitation.  · Mild tricuspid regurgitation.  · Normal central venous pressure (3 mmHg).  · The estimated PA systolic pressure is 31 mmHg.        VENTILATOR INFORMATION          LAST ARTERIAL BLOOD GAS  ABG  Recent Labs   Lab 02/28/23  1050   PH 7.461*   PO2 67*   PCO2 38.1   HCO3 27.1   BE 3       IMPRESSION AND PLAN  Acute on chronic hypercapnic hypoxemic respiratory failure with mechanical ventilation, resolved  Pulmonary edema, better  Systolic and diastolic heart failure, acute on chronic, better, still receiving Dobutrex and Lasix  COPD with exacerbation, improved  Tobacco abuse  Morbid obesity      Patient should discharge home on Trelegy till we have a chance to get her back in the office and do PFTs and see the appropriate bronchodilators for her  Protonix for GI prophylaxis  Enoxaparin for DVT  prophylaxis  Bronchodilators  Need for smoking cessation strongly counseled.  Cardiology managing Dobutrex and Lasix      Leeanna Feng MD  [facility]  Department of Pulmonology  Date of Service: 03/01/2023  9:07 AM

## 2023-03-01 NOTE — PROGRESS NOTES
[facility]  Department of Cardiology  Consult Note      PATIENT NAME: Delores Fox  MRN: [id number]  TODAY'S DATE: 03/01/2023  ADMIT DATE: 2/27/2023                          CONSULT REQUESTED BY: Kassandra Mota MD    SUBJECTIVE     PRINCIPAL PROBLEM: Acute hypercapnic respiratory failure      REASON FOR CONSULT:  CHF/SOB      HPI:    INTERVAL HISTORY:        3/1/2023  Diuresed another 3895 yesterday.   She is on room air.  Hoarse voice.   Cough present still.     2/28/2023    Patient extubated sitting up in chair on NC.  Diuresing well    Patient has some congestion and hoarseness in her voice preceding the intubation.Cough is improved        Patient intubated and sedated  Resting on ventilator  Seen and examined in ICU  Lasix given in ER  CXR shows pulmonary edema  Patient england catheter is full and is diuresing well.    FROM H AND P    Delores Fox is a 56 y.o.  female who has a past medical history of Acid reflux, COPD (chronic obstructive pulmonary disease),  CHF with EF 30% ,Coronary artery disease, COVID-19, Diabetes mellitus, High cholesterol, Hypertension was brought by EMS to the hospital with severe shortness of breath.   Initially she was on BiPAP at ER  saturation 100% on 100% oxygen.   She was following commands and answer questions as per ED physician .  EMS given  magnesium for her  en route but no Solu-Medrol was given because she is a diabetic!!!?  Admission was given for COPD/CHF exacerbation and respiratory failure and Lasix 40 mg was given by ED physician.  When I went to  see the patient, they are intubating the patient.  As per ED physician, patient tried to remove the BiPAP and  said can not breathe and later bradycardic and 1 atropine was given and intubated with 2nd attempt video scope  and stabilized.  No further information at this time.  Leukocytosis noted.  CMP is pending and ABG with severe respiratory acidosis.    chest x-ray with significant pulmonary  edema.        Review of patient's allergies indicates:   Allergen Reactions    Morphine Nausea And Vomiting    Sulfa (sulfonamide antibiotics) Nausea And Vomiting       Past Medical History:   Diagnosis Date    Acid reflux     COPD (chronic obstructive pulmonary disease)     Coronary artery disease     COVID-19     Diabetes mellitus     High cholesterol     Hypertension     Obese body habitus      Past Surgical History:   Procedure Laterality Date    APPENDECTOMY      CATHETERIZATION OF BOTH LEFT AND RIGHT HEART N/A 2020    Procedure: CATHETERIZATION, HEART, BOTH LEFT AND RIGHT;  Surgeon: Doug Kendall MD;  Location: [facility] CATH/EP LAB;  Service: Cardiology;  Laterality: N/A;    CHOLECYSTECTOMY      EXCISION OF LESION OF EYELID Right 2020    Procedure: EXCISION, LESION, EYELID;  Surgeon: Maalchi Rodriguez MD;  Location: [facility] OR;  Service: Ophthalmology;  Laterality: Right;  Inclusion Cyst removal right lower lid    HYSTERECTOMY      JOINT REPLACEMENT Left     left hip    MASTOIDECTOMY Right      Social History     Tobacco Use    Smoking status: Former     Packs/day: 0.50     Years: 13.00     Pack years: 6.50     Types: Cigarettes     Quit date: 2022     Years since quittin.4     Passive exposure: Current    Smokeless tobacco: Never   Substance Use Topics    Alcohol use: Not Currently    Drug use: Not Currently        REVIEW OF SYSTEMS      Unable to assess  OBJECTIVE     VITAL SIGNS (Most Recent)  Temp: 98.3 °F (36.8 °C) (23 1148)  Pulse: 79 (23 1148)  Resp: 18 (23 1148)  BP: (!) 110/56 (23 1148)  SpO2: 95 % (23 1148)    VENTILATION STATUS  Resp: 18 (23 1148)  SpO2: 95 % (23 1148)       I & O (Last 24H):  Intake/Output Summary (Last 24 hours) at 3/1/2023 1326  Last data filed at 3/1/2023 0809  Gross per 24 hour   Intake 500 ml   Output 3895 ml   Net -3395 ml       WEIGHTS  Wt Readings from Last 3 Encounters:   23 2110 108 kg (238 lb 1.6 oz)    02/28/23 0430 109.1 kg (240 lb 8.4 oz)   02/27/23 1922 113.4 kg (250 lb)   02/27/23 0456 113.4 kg (250 lb)   02/27/23 1240 113.4 kg (250 lb)   10/21/22 0755 107 kg (236 lb)       PHYSICAL EXAM  GENERAL:  Well-built well-nourished female sitting comfortably on room air  NECK: No JVD. No bruit..   THYROID: Thyroid not enlarged. No nodules present..   CARDIAC: Tachycardic  CHEST ANATOMY: normal.   LUNGS:  Coarse rhonchi bilaterally much improved  ABDOMEN: Soft no masses or organomegaly.  No abdomen pulsations or bruits.  Normal bowel sounds. No pulsations and no masses felt, No guarding or rebound.   URINARY:  england catheter draining clear yellow urine  EXTREMITIES: No cyanosis, clubbing or edema noted at this time., no calf tenderness bilaterally.   PERIPHERAL VASCULAR SYSTEM: Good palpable distal pulses.       HOME MEDICATIONS:  Current Facility-Administered Medications on File Prior to Encounter   Medication Dose Route Frequency Provider Last Rate Last Admin    electrolyte-S (ISOLYTE)   Intravenous Continuous Madi Herr MD   New Bag at 12/11/20 1041    lidocaine (PF) 10 mg/ml (1%) injection 10 mg  1 mL Intradermal Once Madi Herr MD        sodium chloride 0.9% flush 3 mL  3 mL Intravenous Q8H Madi Herr MD         Current Outpatient Medications on File Prior to Encounter   Medication Sig Dispense Refill    albuterol (PROVENTIL/VENTOLIN HFA) 90 mcg/actuation inhaler Inhale 2 puffs into the lungs every 6 (six) hours as needed for Wheezing. Rescue 54 g 2    apixaban (ELIQUIS) 5 mg Tab Take 1 tablet (5 mg total) by mouth 2 (two) times daily. 180 tablet 0    aspirin 81 MG Chew Take 1 tablet (81 mg total) by mouth once daily. 30 tablet 0    atorvastatin (LIPITOR) 40 MG tablet Take 1 tablet (40 mg total) by mouth once daily. 90 tablet 1    fluticasone propionate (FLONASE) 50 mcg/actuation nasal spray SHAKE LIQUID AND USE 1 SPRAY(50 MCG) IN EACH NOSTRIL EVERY DAY 16 g 0    LORazepam (ATIVAN)  0.5 MG tablet Take 1 tablet (0.5 mg total) by mouth every 6 (six) hours as needed for Anxiety. 12 tablet 0    losartan (COZAAR) 25 MG tablet Take 0.5 tablets (12.5 mg total) by mouth once daily. 45 tablet 3    metFORMIN (GLUCOPHAGE-XR) 500 MG ER 24hr tablet Take 1 tablet (500 mg total) by mouth daily with breakfast. 90 tablet 3    metoprolol succinate (TOPROL-XL) 25 MG 24 hr tablet Take 0.5 tablets (12.5 mg total) by mouth once daily. 45 tablet 3    ondansetron (ZOFRAN) 4 MG tablet Take 1 tablet (4 mg total) by mouth every 8 (eight) hours as needed for Nausea. 12 tablet 0    pantoprazole (PROTONIX) 40 MG tablet Take 1 tablet (40 mg total) by mouth 2 (two) times daily. 180 tablet 3    pregabalin (LYRICA) 100 MG capsule Take 1 capsule (100 mg total) by mouth 3 (three) times daily. 270 capsule 1    torsemide (DEMADEX) 20 MG Tab Take 1 tablet (20 mg total) by mouth once daily. 90 tablet 1    albuterol (ACCUNEB) 1.25 mg/3 mL Nebu Take 3 mLs (1.25 mg total) by nebulization every 6 (six) hours as needed (wheezing). Rescue 75 mL 1    blood-glucose meter kit To check BG 2 times daily, to use with insurance preferred meter 1 each 0    buPROPion (WELLBUTRIN XL) 150 MG TB24 tablet Take 1 tablet (150 mg total) by mouth once daily. (Patient not taking: Reported on 2/27/2023) 30 tablet 2    clotrimazole-betamethasone 1-0.05% (LOTRISONE) cream Apply topically 2 (two) times daily. 15 g 0    fluticasone-umeclidin-vilanter (TRELEGY ELLIPTA) 200-62.5-25 mcg inhaler Inhale 1 puff into the lungs once daily. (Patient not taking: Reported on 2/27/2023) 60 each 2    lancets Misc To check BG 2 times daily, to use with insurance preferred meter 200 each 3    metoprolol succinate (TOPROL-XL) 50 MG 24 hr tablet Take 50 mg by mouth once daily.      MOTEGRITY 2 mg Tab Take 1 tablet by mouth nightly.      nicotine, polacrilex, (NICORETTE) 2 mg Gum Take 1 each (2 mg total) by mouth as needed (smoking urge). 100 each 4    pregabalin (LYRICA) 100 MG  capsule Take 1 capsule (100 mg total) by mouth 3 (three) times daily. 270 capsule 1    TRUE METRIX GLUCOSE TEST STRIP Strp CHECK SUGAR TWICE DAILY 200 strip 3    zolpidem (AMBIEN) 10 mg Tab Take 1 tablet (10 mg total) by mouth nightly as needed (insomnia). 90 tablet 1       SCHEDULED MEDS:   apixaban  5 mg Oral BID    arformoteroL  15 mcg Nebulization BID    aspirin  324 mg Oral Once    aspirin  81 mg Oral Daily    atorvastatin  40 mg Oral QHS    budesonide  0.5 mg Nebulization Q12H    buPROPion  150 mg Oral Daily    furosemide (LASIX) injection  40 mg Intravenous Q12H    insulin detemir U-100  15 Units Subcutaneous Daily    ipratropium  0.5 mg Nebulization Q12H    levalbuterol  0.63 mg Nebulization TID WAKE    metoprolol tartrate  12.5 mg Oral BID    miconazole   Topical (Top) BID       CONTINUOUS INFUSIONS:   DOBUTamine IV infusion (non-titrating) 2.5 mcg/kg/min (02/28/23 1901)       PRN MEDS:calcium gluconate IVPB, calcium gluconate IVPB, calcium gluconate IVPB, dextrose 10%, dextrose 10%, dextrose 50%, dextrose 50%, glucagon (human recombinant), glucose, glucose, glucose, glucose, insulin aspart U-100, iohexoL, magnesium sulfate IVPB, magnesium sulfate IVPB, naloxone, pneumoc 20-tammy conj-dip cr(PF), potassium chloride **AND** potassium chloride **AND** potassium chloride, sodium chloride 0.9%, sodium phosphate IVPB, sodium phosphate IVPB, sodium phosphate IVPB    LABS AND DIAGNOSTICS     CBC LAST 3 DAYS  Recent Labs   Lab 02/27/23  0505 02/27/23  0506 02/28/23  0340 02/28/23  0525 02/28/23  0628 03/01/23  0342   WBC 18.42*  --  9.75  --   --  15.08*   RBC 4.25  --  4.25  --   --  4.17   HGB 13.2  --  12.8  --   --  12.7   HCT 41.9   < > 38.4 40 40 38.9   MCV 99*  --  90  --   --  93   MCH 31.1*  --  30.1  --   --  30.5   MCHC 31.5*  --  33.3  --   --  32.6   RDW 13.3  --  13.4  --   --  13.8   *  --  352  --   --  356   MPV 9.9  --  9.5  --   --  9.8   GRAN 60.4  11.1*  --   --   --   --   --    LYMPH  29.2  5.4*  --   --   --   --   --    MONO 8.1  1.5*  --   --   --   --   --    BASO 0.05  --   --   --   --   --    NRBC 0  --   --   --   --   --     < > = values in this interval not displayed.       COAGULATION LAST 3 DAYS  Recent Labs   Lab 02/27/23  0505   INR 1.1       CHEMISTRY LAST 3 DAYS  Recent Labs   Lab 02/27/23  0505 02/27/23  0506 02/28/23  0340 02/28/23  0525 02/28/23  0628 02/28/23  1050 03/01/23  0342   *  --  132*  --   --   --  138   K 4.6  --  3.4*  --   --   --  3.5   CL 95  --  95  --   --   --  99   CO2 21*  --  25  --   --   --  25   ANIONGAP 14  --  12  --   --   --  14   BUN 25*  --  34*  --   --   --  45*   CREATININE 1.7*  --  1.7*  --   --   --  1.6*   *  --  329*  --   --   --  216*   CALCIUM 7.9*  --  7.7*  --   --   --  8.0*   PH  --    < >  --  7.444 7.476* 7.461*  --    MG  --   --  2.5  --   --   --  2.6   ALBUMIN 3.9  --   --   --   --   --   --    PROT 7.2  --   --   --   --   --   --    ALKPHOS 134  --   --   --   --   --   --    ALT 18  --   --   --   --   --   --    AST 29  --   --   --   --   --   --    BILITOT 1.2*  --   --   --   --   --   --     < > = values in this interval not displayed.       CARDIAC PROFILE LAST 3 DAYS  Recent Labs   Lab 02/27/23  0505 02/27/23  1151 02/27/23  1800 02/27/23  2329   *  --   --   --    *  --   --   --    CPKMB 10.0*  --   --   --    TROPONINIHS 39.8* 67.4* 51.7* 53.8*       ENDOCRINE LAST 3 DAYS  No results for input(s): TSH, PROCAL in the last 168 hours.    LAST ARTERIAL BLOOD GAS  ABG  Recent Labs   Lab 02/28/23  1050   PH 7.461*   PO2 67*   PCO2 38.1   HCO3 27.1   BE 3       LAST 7 DAYS MICROBIOLOGY   Microbiology Results (last 7 days)       Procedure Component Value Units Date/Time    Culture, Respiratory with Gram Stain [[id number]] Collected: 02/27/23 1215    Order Status: Completed Specimen: Respiratory from Tracheal Aspirate Updated: 03/01/23 0807     Respiratory Culture Normal respiratory evette      Gram Stain (Respiratory) Few WBC's     Gram Stain (Respiratory) Rare Gram positive cocci     Gram Stain (Respiratory) Rare Gram positive rods     Gram Stain (Respiratory) Rare Gram negative rods    MRSA Screen by PCR [[id number]] Collected: 02/27/23 1308    Order Status: Completed Specimen: Nasopharyngeal Swab from Nasal Updated: 02/27/23 1450     MRSA SCREEN BY PCR Negative            MOST RECENT IMAGING  Echo  · The left ventricle is mildly enlarged with concentric remodeling and   mildly decreased systolic function.  · The estimated ejection fraction is 40%.  · Grade I left ventricular diastolic dysfunction.  · There is moderate left ventricular global hypokinesis.  · Normal right ventricular size with normal right ventricular systolic   function.  · Mild left atrial enlargement.  · Normal central venous pressure (3 mmHg).  · There is abnormal septal wall motion consistent with left bundle branch   block.     X-Ray Chest AP Portable  Reason: intubated intubated    FINDINGS:  Portable chest at 456 compared with 2/27/2023 shows placement of enteric catheter with tip at least in stomach but off inferior aspect of this view. Endotracheal tube and cardiomediastinal silhouette unchanged. Increased degree of patient rotation is evident.    Lung volumes are low. Diffuse pulmonary interstitial opacities have slightly improved. Left basilar pleural-parenchymal opacity is more conspicuous, perhaps due to patient rotation. No pneumothorax.    IMPRESSION:    1. Slight interval improvement of bilateral pulmonary interstitial opacities suggesting improving interstitial edema.  2. Increased conspicuity of left basilar pleural-parenchymal opacity which could be due to patient positioning although slight worsening of left pleural effusion and/or left basilar alveolar consolidation or atelectasis can also be considered.    Electronically signed by:  Derrek Boykin MD  2/28/2023 7:23 AM CST Workstation: [id number]      ECHOCARDIOGRAM  RESULTS (last 5)  Results for orders placed during the hospital encounter of 09/16/22    Echo Saline Bubble? Yes    Interpretation Summary  · There is no evidence of intracardiac shunting.  · The left ventricle is moderately enlarged with mild eccentric hypertrophy and moderately decreased systolic function.  · There is severe left ventricular global hypokinesis.  · The estimated ejection fraction is 30%.  · Grade I left ventricular diastolic dysfunction.  · Normal right ventricular size with normal right ventricular systolic function.  · Mild left atrial enlargement.  · Mild mitral regurgitation.  · Mild tricuspid regurgitation.  · Normal central venous pressure (3 mmHg).  · The estimated PA systolic pressure is 31 mmHg.      Results for orders placed during the hospital encounter of 02/12/22    Echo    Interpretation Summary  · The estimated PA systolic pressure is 27 mmHg.  · The left ventricle is mildly enlarged with mild concentric hypertrophy and moderately decreased systolic function.  · The estimated ejection fraction is 35%.  · Grade I left ventricular diastolic dysfunction.  · There is moderate left ventricular global hypokinesis.  · Normal right ventricular size with normal right ventricular systolic function.  · Mild left atrial enlargement.  · Mild tricuspid regurgitation.  · Normal central venous pressure (3 mmHg).      Results for orders placed during the hospital encounter of 03/03/21    Echo Color Flow Doppler? Yes    Interpretation Summary  · The left ventricle is mildly enlarged with mild concentric hypertrophy andThe estimated ejection fraction is 40%  · Grade I left ventricular diastolic dysfunction.  · There is moderate left ventricular global hypokinesis.  · Normal right ventricular size with normal right ventricular systolic function.  · Mild left atrial enlargement.  · Mild mitral regurgitation.  · Normal central venous pressure (3 mmHg).      Results for orders placed during the hospital  encounter of 11/21/20    Echo Color Flow Doppler? Yes    Interpretation Summary  · The estimated PA systolic pressure is 32 mmHg.  · There is mild left ventricular concentric hypertrophy.  · The left ventricle is mildly enlarged with moderately decreased systolic function. The estimated ejection fraction is 30%.  · Grade I diastolic dysfunction.  · There is left ventricular global hypokinesis.  · Normal right ventricular size with normal right ventricular systolic function.  · Mild left atrial enlargement.  · Mild-to-moderate mitral regurgitation.  · Intermediate central venous pressure (8 mmHg).      CURRENT/PREVIOUS VISIT EKG  Results for orders placed or performed during the hospital encounter of 02/27/23   EKG 12-lead    Collection Time: 02/27/23  6:50 AM    Narrative    Test Reason : R07.9,    Vent. Rate : 115 BPM     Atrial Rate : 115 BPM     P-R Int : 152 ms          QRS Dur : 102 ms      QT Int : 352 ms       P-R-T Axes : 072 047 050 degrees     QTc Int : 486 ms    Sinus tachycardia  Possible Left atrial enlargement  T wave abnormality, consider inferolateral ischemia  Abnormal ECG  When compared with ECG of 21-OCT-2022 08:30,  Vent. rate has increased BY  40 BPM  T wave inversion now evident in Inferior leads  T wave inversion less evident in Lateral leads  Confirmed by Doug Kendall MD ([id number]) on 2/27/2023 7:44:30 PM    Referred By: [email or web address]   SELF           Confirmed By:Doug Kendall MD           ASSESSMENT/PLAN:     Active Hospital Problems    Diagnosis    *Acute hypoxic hypercapnic respiratory failure, intubated on mechanical ventilation    Hypokalemia    Acute on chronic systolic and diastolic combined heart failure    STEPHEN (acute kidney injury)    Candida rash of groin    Hyponatremia    Anxiety and depression    Severe obesity with body mass index (BMI) of 35.0 to 39.9 with comorbidity    CAD (coronary artery disease)    Cardiomyopathy    Tobacco abuse    COPD    Essential hypertension    HLD  (hyperlipidemia)    Type 2 diabetes mellitus with hyperglycemia, without long-term current use of insulin, HbA1c 9.1%       CATH 2020    The Prox RCA lesion was 100% stenosed.  The ejection fraction was calculated to be 25%.  There was severe left ventricular systolic dysfunction.  4. PA pressure 31/16 mmHg with mean pressure of 22 mmHg  5. PCWP 13 mmHg  6. Cardiac output of 4.4 L/min by thermodilution      Assessment:  1. RCA  with left to right collaterals  2. LVEDP 12 mmHg  3. LVEF 25%   4. PA pressure 31/16 mmHg with mean pressure of 22 mmHg  5. PCWP 13 mmHg  6. Cardiac output of 4.4 L/min by thermodilution      Recommendations:  1. Optimize medical management of CAD  2. Aggressive risk factor and lifestyle modifications  3. Routine post cath care and monitoring  4. Outpatient follow-up with Cardiology         ASSESSMENT & PLAN:       Pulmonary edema-Acute on Chronic HFrEF  Acute on Chronic hypercapnic hypoxemic respiratory failure with mechanical ventilation  COPD  Tobacco abuse  Obesity  Ischemic Cardiomyopathy  STEPHEN  Leukocytosis  DM  H/O CVA- On Eliquis 5 mg PO BID      RECOMMENDATIONS:    CXR  DC DOBUTAMINE  Continue to diurese   Change to Toremide PO tomorrow  BNP and CMP in am  Home 02 eval   Will follow        Dorota Vogel NP  [facility]  Department of Cardiology  Date of Service: 03/01/2023      Patient has been maintaining on torsemide 20 mg daily she can have an additional dose in the afternoon p.r.n. basis.  Resume Cozaar 25 mg daily and continue on Toprol-XL 50 mg daily.    I have personally interviewed and examined the patient, I have reviewed the Nurse Practitioner's history and physical, assessment, and plan. I have personally evaluated the patient at bedside and agree with the findings and made appropriate changes as necessary in recommendations.      Doug Kendall M.D.  [facility]  Department of Cardiology  Date of Service: 03/01/2023  10:48 AM

## 2023-03-01 NOTE — PROGRESS NOTES
[facility] Medicine  Progress Note    Patient Name: Delores Fox  MRN: [id number]  Patient Class: IP- Inpatient   Admission Date: 2/27/2023  Length of Stay: 2 days  Attending Physician: Kassandra Mota MD  Primary Care Provider: Tomy Rios MD        Subjective:     Principal Problem:Acute exacerbation of CHF (congestive heart failure)        HPI:    Delores Fox is a 56 y.o.  female who has a past medical history of Acid reflux, COPD (chronic obstructive pulmonary disease),  CHF with EF 30% ,Coronary artery disease, COVID-19, Diabetes mellitus, High cholesterol, Hypertension was brought by EMS to the hospital with severe shortness of breath.   Initially she was on BiPAP at ER  saturation 100% on 100% oxygen.   She was following commands and answer questions as per ED physician .  EMS given  magnesium for her  en route but no Solu-Medrol was given because she is a diabetic!!!?  Admission was given for COPD/CHF exacerbation and respiratory failure and Lasix 40 mg was given by ED physician.  When I went to  see the patient, they are intubating the patient.  As per ED physician, patient tried to remove the BiPAP and  said can not breathe and later bradycardic and 1 atropine was given and intubated with 2nd attempt video scope  and stabilized.  No further information at this time.  Leukocytosis noted.  CMP is pending and ABG with severe respiratory acidosis.         Overview/Hospital Course:  Patient with a history of COPD with ongoing nicotine use, CAD status post intervention RCA, cardiomyopathy with last echo with EF 30% with grade 1 diastolic dysfunction, hypertension, anxiety/depression, non-insulin-dependent diabetes mellitus, morbid obesity with BMI 40, presented via EMS with acute onset shortness of breath with respiratory distress.  She was on CPAP, noted to be feroz and in distress on arrival.  She was placed on BiPAP 100%, subsequently noted to be apneic, unresponsive  and bradycardic, received atropine and was intubated.  Chest x-ray with diffuse interstitial and alveolar infiltrates bilaterally.  Admitted to the ICU, treatment for COPD exacerbation, empiric antibiotics, IV diuresis, pulmonary and critical care consult.  Cardiology added dobutamine and has been diuresing well.  On 02/28 she was extubated.  Echo with EF 40% with grade 1 diastolic dysfunction.  Transferring to telemetry floor.  On 03/01 continues to improve, chest x-ray with improving pulmonary edema, home O2 evaluation performed and not requiring supplemental oxygen, some mild throat soreness suspect from ETT, dobutamine discontinued, IV diuresis for next 24 hours.      Interval History:  Patient continues to feel improved.  Shortness of breath has resolved, denies any productive coughing episode.  Does admit to sore throat which is improving this afternoon.  Currently on room air, home O2 evaluation performed and not requiring.  Afebrile with T-max 98.3°.  Labs with WBC 15, hemoglobin 12.7, BUN/creatinine 45/1.6, HbA1c 9.  Documented urine output last 24 hours 3895 cc.   Discussed with patient.  No visitors at bedside.    Review of Systems   Constitutional:  Negative for fever.   HENT:  Positive for sore throat.    Respiratory:  Negative for cough and shortness of breath.    Genitourinary:  Negative for difficulty urinating.   Psychiatric/Behavioral:  Negative for confusion.    Objective:     Vital Signs (Most Recent):  Temp: 98.3 °F (36.8 °C) (03/01/23 1148)  Pulse: 90 (03/01/23 1354)  Resp: 19 (03/01/23 1354)  BP: (!) 110/56 (03/01/23 1148)  SpO2: 99 % (03/01/23 1354)   Vital Signs (24h Range):  Temp:  [97.6 °F (36.4 °C)-98.3 °F (36.8 °C)] 98.3 °F (36.8 °C)  Pulse:  [] 90  Resp:  [18-30] 19  SpO2:  [95 %-100 %] 99 %  BP: (104-149)/(47-65) 110/56     Weight: 108 kg (238 lb 1.6 oz)  Body mass index is 37.29 kg/m².    Intake/Output Summary (Last 24 hours) at 3/1/2023 1529  Last data filed at 3/1/2023  0809  Gross per 24 hour   Intake 0 ml   Output 2545 ml   Net -2545 ml      Physical Exam  Vitals and nursing note reviewed.   Constitutional:       Comments: Lying in bed, cooperative   HENT:      Head: Normocephalic and atraumatic.      Mouth/Throat:      Mouth: Mucous membranes are moist.      Comments: No oral thrush, no pharyngeal erythema  Eyes:      Pupils: Pupils are equal, round, and reactive to light.   Cardiovascular:      Rate and Rhythm: Normal rate and regular rhythm.      Comments: No lower extremity edema  Pulmonary:      Comments: On room air, no wheeze or accessory muscle use  Abdominal:      Palpations: Abdomen is soft.      Tenderness: There is no abdominal tenderness.      Comments: Abdominal pannus.  Moisture with erythema between abdominal folds   Neurological:      Mental Status: She is oriented to person, place, and time.   Psychiatric:         Mood and Affect: Mood normal.       Significant Labs: BMP:   Recent Labs   Lab 03/01/23  0342   *      K 3.5   CL 99   CO2 25   BUN 45*   CREATININE 1.6*   CALCIUM 8.0*   MG 2.6     CBC:   Recent Labs   Lab 02/28/23  0340 02/28/23  0525 02/28/23  0628 03/01/23  0342   WBC 9.75  --   --  15.08*   HGB 12.8  --   --  12.7   HCT 38.4 40 40 38.9     --   --  356     CMP:   Recent Labs   Lab 02/28/23  0340 03/01/23  0342   * 138   K 3.4* 3.5   CL 95 99   CO2 25 25   * 216*   BUN 34* 45*   CREATININE 1.7* 1.6*   CALCIUM 7.7* 8.0*   ANIONGAP 12 14     Lactic Acid: No results for input(s): LACTATE in the last 48 hours.  Magnesium:   Recent Labs   Lab 02/28/23  0340 03/01/23  0342   MG 2.5 2.6       Significant Imaging: I have reviewed all pertinent imaging results/findings within the past 24 hours.      Assessment/Plan:     Active Hospital Problems    Diagnosis    *Acute on chronic systolic and diastolic combined heart failure    Cardiomyopathy    Leucocytosis    STEPHEN (acute kidney injury)    Candida rash of groin     Anxiety and depression    Severe obesity with body mass index (BMI) of 35.0 to 39.9 with comorbidity    CAD (coronary artery disease)    Tobacco abuse    COPD    Essential hypertension    HLD (hyperlipidemia)    Type 2 diabetes mellitus with hyperglycemia, without long-term current use of insulin, HbA1c 9.1%     Plan:  Transfer to telemetry floor with continuous cardiac monitoring  Successfully extubated on 2/28, home oxygen evaluation performed and not requiring, respiratory status stable   Dobutamine discontinued   Continue IV diuresis with Lasix 40 b.i.d. today, likely transition to torsemide tomorrow   Strict I/O, daily weights, oral fluid and sodium restriction  Echo with EF of 40% with grade 1 diastolic dysfunction  Continue treatments for COPD, as per Pulmonary Trelegy on discharge   Continue insulin with Accu-Cheks, as needed hypoglycemic measures.  Cardiac diabetic diet with oral fluid restriction.  HbA1c 9%   Continue to reinforce smoking cessation counseling as able  Renally dosing all medications and avoid nephrotoxin drugs   A.m. labs ordered  Appreciate all consultant's input   Further plan as per hospital course    VTE Risk Mitigation (From admission, onward)         Ordered     apixaban tablet 5 mg  2 times daily         02/28/23 1209     IP VTE HIGH RISK PATIENT  Once         02/27/23 0540     Place sequential compression device  Until discontinued         02/27/23 0540                Discharge Planning   KEAGAN: 3/3/2023     Code Status: Full Code   Is the patient medically ready for discharge?:     Reason for patient still in hospital (select all that apply): Consult recommendations  Discharge Plan A: Home with family                  Kassandra Mota MD  Department of Hospital Medicine   [facility]

## 2023-03-02 VITALS
SYSTOLIC BLOOD PRESSURE: 127 MMHG | HEART RATE: 82 BPM | BODY MASS INDEX: 37.51 KG/M2 | TEMPERATURE: 98 F | DIASTOLIC BLOOD PRESSURE: 76 MMHG | HEIGHT: 67 IN | OXYGEN SATURATION: 95 % | WEIGHT: 239 LBS | RESPIRATION RATE: 15 BRPM

## 2023-03-02 PROBLEM — N17.9 AKI (ACUTE KIDNEY INJURY): Status: RESOLVED | Noted: 2023-02-27 | Resolved: 2023-03-02

## 2023-03-02 PROBLEM — D72.829 LEUCOCYTOSIS: Status: RESOLVED | Noted: 2023-02-27 | Resolved: 2023-03-02

## 2023-03-02 LAB
ANION GAP SERPL CALC-SCNC: 12 MMOL/L (ref 8–16)
BUN SERPL-MCNC: 37 MG/DL (ref 6–20)
CALCIUM SERPL-MCNC: 8 MG/DL (ref 8.7–10.5)
CHLORIDE SERPL-SCNC: 92 MMOL/L (ref 95–110)
CO2 SERPL-SCNC: 33 MMOL/L (ref 23–29)
CREAT SERPL-MCNC: 1.2 MG/DL (ref 0.5–1.4)
ERYTHROCYTE [DISTWIDTH] IN BLOOD BY AUTOMATED COUNT: 13.6 % (ref 11.5–14.5)
EST. GFR  (NO RACE VARIABLE): 53.5 ML/MIN/1.73 M^2
GLUCOSE SERPL-MCNC: 240 MG/DL (ref 70–110)
GLUCOSE SERPL-MCNC: 256 MG/DL (ref 70–110)
HCT VFR BLD AUTO: 45.1 % (ref 37–48.5)
HGB BLD-MCNC: 14.6 G/DL (ref 12–16)
MAGNESIUM SERPL-MCNC: 2.5 MG/DL (ref 1.6–2.6)
MAGNESIUM SERPL-MCNC: 2.6 MG/DL (ref 1.6–2.6)
MCH RBC QN AUTO: 30.2 PG (ref 27–31)
MCHC RBC AUTO-ENTMCNC: 32.4 G/DL (ref 32–36)
MCV RBC AUTO: 93 FL (ref 82–98)
PHOSPHATE SERPL-MCNC: 3.6 MG/DL (ref 2.7–4.5)
PLATELET # BLD AUTO: 361 K/UL (ref 150–450)
PMV BLD AUTO: 9.3 FL (ref 9.2–12.9)
POTASSIUM SERPL-SCNC: 3.2 MMOL/L (ref 3.5–5.1)
POTASSIUM SERPL-SCNC: 3.7 MMOL/L (ref 3.5–5.1)
RBC # BLD AUTO: 4.84 M/UL (ref 4–5.4)
SODIUM SERPL-SCNC: 137 MMOL/L (ref 136–145)
TROPONIN I SERPL HS-MCNC: 45.6 PG/ML (ref 0–14.9)
WBC # BLD AUTO: 9.89 K/UL (ref 3.9–12.7)

## 2023-03-02 PROCEDURE — 25000003 PHARM REV CODE 250: Performed by: INTERNAL MEDICINE

## 2023-03-02 PROCEDURE — 84484 ASSAY OF TROPONIN QUANT: CPT | Performed by: INTERNAL MEDICINE

## 2023-03-02 PROCEDURE — 83735 ASSAY OF MAGNESIUM: CPT | Performed by: INTERNAL MEDICINE

## 2023-03-02 PROCEDURE — 84132 ASSAY OF SERUM POTASSIUM: CPT | Performed by: INTERNAL MEDICINE

## 2023-03-02 PROCEDURE — 99900035 HC TECH TIME PER 15 MIN (STAT)

## 2023-03-02 PROCEDURE — 99232 SBSQ HOSP IP/OBS MODERATE 35: CPT | Mod: ,,, | Performed by: INTERNAL MEDICINE

## 2023-03-02 PROCEDURE — 84100 ASSAY OF PHOSPHORUS: CPT | Performed by: INTERNAL MEDICINE

## 2023-03-02 PROCEDURE — 94640 AIRWAY INHALATION TREATMENT: CPT

## 2023-03-02 PROCEDURE — 25000003 PHARM REV CODE 250: Performed by: STUDENT IN AN ORGANIZED HEALTH CARE EDUCATION/TRAINING PROGRAM

## 2023-03-02 PROCEDURE — 25000242 PHARM REV CODE 250 ALT 637 W/ HCPCS: Performed by: INTERNAL MEDICINE

## 2023-03-02 PROCEDURE — 99232 PR SUBSEQUENT HOSPITAL CARE,LEVL II: ICD-10-PCS | Mod: ,,, | Performed by: INTERNAL MEDICINE

## 2023-03-02 PROCEDURE — 80048 BASIC METABOLIC PNL TOTAL CA: CPT | Performed by: INTERNAL MEDICINE

## 2023-03-02 PROCEDURE — 63600175 PHARM REV CODE 636 W HCPCS: Performed by: INTERNAL MEDICINE

## 2023-03-02 PROCEDURE — 85027 COMPLETE CBC AUTOMATED: CPT | Performed by: INTERNAL MEDICINE

## 2023-03-02 PROCEDURE — 36415 COLL VENOUS BLD VENIPUNCTURE: CPT | Performed by: INTERNAL MEDICINE

## 2023-03-02 RX ORDER — METOPROLOL SUCCINATE 50 MG/1
50 TABLET, EXTENDED RELEASE ORAL DAILY
Qty: 30 TABLET | Refills: 0 | Status: SHIPPED | OUTPATIENT
Start: 2023-03-02 | End: 2023-09-20 | Stop reason: SDUPTHER

## 2023-03-02 RX ORDER — ALBUTEROL SULFATE 90 UG/1
2 AEROSOL, METERED RESPIRATORY (INHALATION) EVERY 6 HOURS PRN
Qty: 18 G | Refills: 0 | Status: SHIPPED | OUTPATIENT
Start: 2023-03-02 | End: 2023-03-13

## 2023-03-02 RX ORDER — LOSARTAN POTASSIUM 25 MG/1
12.5 TABLET ORAL DAILY
Qty: 15 TABLET | Refills: 0 | Status: SHIPPED | OUTPATIENT
Start: 2023-03-02 | End: 2023-03-29 | Stop reason: SDUPTHER

## 2023-03-02 RX ORDER — NAPROXEN SODIUM 220 MG/1
81 TABLET, FILM COATED ORAL DAILY
Qty: 30 TABLET | Refills: 0 | Status: SHIPPED | OUTPATIENT
Start: 2023-03-02

## 2023-03-02 RX ORDER — POTASSIUM CHLORIDE 20 MEQ/1
40 TABLET, EXTENDED RELEASE ORAL ONCE
Status: COMPLETED | OUTPATIENT
Start: 2023-03-02 | End: 2023-03-02

## 2023-03-02 RX ORDER — FLUTICASONE PROPIONATE AND SALMETEROL XINAFOATE 115; 21 UG/1; UG/1
2 AEROSOL, METERED RESPIRATORY (INHALATION) EVERY 12 HOURS
Qty: 12 G | Refills: 0 | Status: SHIPPED | OUTPATIENT
Start: 2023-03-02 | End: 2023-09-20 | Stop reason: SDUPTHER

## 2023-03-02 RX ORDER — TORSEMIDE 20 MG/1
20 TABLET ORAL DAILY
Status: DISCONTINUED | OUTPATIENT
Start: 2023-03-02 | End: 2023-03-02 | Stop reason: HOSPADM

## 2023-03-02 RX ORDER — TORSEMIDE 20 MG/1
20 TABLET ORAL DAILY
Qty: 90 TABLET | Refills: 0 | Status: SHIPPED | OUTPATIENT
Start: 2023-03-02 | End: 2023-03-29 | Stop reason: SDUPTHER

## 2023-03-02 RX ADMIN — IPRATROPIUM BROMIDE 0.5 MG: 0.5 SOLUTION RESPIRATORY (INHALATION) at 07:03

## 2023-03-02 RX ADMIN — BUPROPION HYDROCHLORIDE 150 MG: 150 TABLET, FILM COATED, EXTENDED RELEASE ORAL at 08:03

## 2023-03-02 RX ADMIN — INSULIN DETEMIR 15 UNITS: 100 INJECTION, SOLUTION SUBCUTANEOUS at 08:03

## 2023-03-02 RX ADMIN — LOSARTAN POTASSIUM 25 MG: 25 TABLET, FILM COATED ORAL at 08:03

## 2023-03-02 RX ADMIN — METOPROLOL SUCCINATE 50 MG: 50 TABLET, FILM COATED, EXTENDED RELEASE ORAL at 08:03

## 2023-03-02 RX ADMIN — POTASSIUM CHLORIDE 40 MEQ: 1500 TABLET, EXTENDED RELEASE ORAL at 09:03

## 2023-03-02 RX ADMIN — ASPIRIN 81 MG CHEWABLE TABLET 81 MG: 81 TABLET CHEWABLE at 08:03

## 2023-03-02 RX ADMIN — TORSEMIDE 20 MG: 20 TABLET ORAL at 08:03

## 2023-03-02 RX ADMIN — LEVALBUTEROL HYDROCHLORIDE 0.63 MG: 0.63 SOLUTION RESPIRATORY (INHALATION) at 07:03

## 2023-03-02 RX ADMIN — FUROSEMIDE 40 MG: 10 INJECTION INTRAMUSCULAR; INTRAVENOUS at 01:03

## 2023-03-02 RX ADMIN — BUDESONIDE INHALATION 0.5 MG: 0.5 SUSPENSION RESPIRATORY (INHALATION) at 07:03

## 2023-03-02 RX ADMIN — ARFORMOTEROL TARTRATE 15 MCG: 15 SOLUTION RESPIRATORY (INHALATION) at 07:03

## 2023-03-02 RX ADMIN — INSULIN ASPART 6 UNITS: 100 INJECTION, SOLUTION INTRAVENOUS; SUBCUTANEOUS at 08:03

## 2023-03-02 RX ADMIN — POTASSIUM BICARBONATE 50 MEQ: 977.5 TABLET, EFFERVESCENT ORAL at 01:03

## 2023-03-02 RX ADMIN — APIXABAN 5 MG: 5 TABLET, FILM COATED ORAL at 08:03

## 2023-03-02 NOTE — CARE UPDATE
03/01/23 2023   Patient Assessment/Suction   Level of Consciousness (AVPU) alert   Respiratory Effort Normal   Expansion/Accessory Muscles/Retractions no use of accessory muscles   All Lung Fields Breath Sounds diminished   Rhythm/Pattern, Respiratory unlabored   Cough Frequency no cough   PRE-TX-O2   Device (Oxygen Therapy) room air   SpO2 (!) 93 %   Pulse Oximetry Type Intermittent   $ Pulse Oximetry - Multiple Charge Pulse Oximetry - Multiple   Pulse 77   Resp 19   Aerosol Therapy   $ Aerosol Therapy Charges Aerosol Treatment   Daily Review of Necessity (SVN) completed   Respiratory Treatment Status (SVN) given   Treatment Route (SVN) mask   Patient Position (SVN) semi-Bautista's   Post Treatment Assessment (SVN) breath sounds unchanged;vital signs unchanged   Signs of Intolerance (SVN) none   Education   $ Education Bronchodilator;15 min   Respiratory Evaluation   $ Care Plan Tech Time 15 min

## 2023-03-02 NOTE — PLAN OF CARE
Discharge orders and chart reviewed with no further post-acute discharge needs identified at this time.  At this time, patient is cleared for discharge from Case Management standpoint.        03/02/23 1100   Final Note   Assessment Type Final Discharge Note   Anticipated Discharge Disposition Home   Hospital Resources/Appts/Education Provided   (Patient instructed to make post hospital follow up appointment with their PCP in 7 to 10 days from discharge)   Post-Acute Status   Coverage Medicaid   Discharge Delays None known at this time

## 2023-03-02 NOTE — PROGRESS NOTES
[facility]  Department of Cardiology  Consult Note      PATIENT NAME: Delores Fox  MRN: [id number]  TODAY'S DATE: 03/02/2023  ADMIT DATE: 2/27/2023                          CONSULT REQUESTED BY: No att. providers found    SUBJECTIVE     PRINCIPAL PROBLEM: Acute exacerbation of CHF (congestive heart failure)      REASON FOR CONSULT:  CHF/SOB      HPI:    INTERVAL HISTORY:    3/2/2023    She is breathing better. CXR improved.  Some congested cough is still persisting overall much improved.    3/1/2023  Diuresed another 3895 yesterday.   She is on room air.  Hoarse voice.   Cough present still.     2/28/2023    Patient extubated sitting up in chair on NC.  Diuresing well    Patient has some congestion and hoarseness in her voice preceding the intubation.Cough is improved        Patient intubated and sedated  Resting on ventilator  Seen and examined in ICU  Lasix given in ER  CXR shows pulmonary edema  Patient england catheter is full and is diuresing well.    FROM H AND P    Delores Fox is a 56 y.o.  female who has a past medical history of Acid reflux, COPD (chronic obstructive pulmonary disease),  CHF with EF 30% ,Coronary artery disease, COVID-19, Diabetes mellitus, High cholesterol, Hypertension was brought by EMS to the hospital with severe shortness of breath.   Initially she was on BiPAP at ER  saturation 100% on 100% oxygen.   She was following commands and answer questions as per ED physician .  EMS given  magnesium for her  en route but no Solu-Medrol was given because she is a diabetic!!!?  Admission was given for COPD/CHF exacerbation and respiratory failure and Lasix 40 mg was given by ED physician.  When I went to  see the patient, they are intubating the patient.  As per ED physician, patient tried to remove the BiPAP and  said can not breathe and later bradycardic and 1 atropine was given and intubated with 2nd attempt video scope  and stabilized.  No further information at this  time.  Leukocytosis noted.  CMP is pending and ABG with severe respiratory acidosis.    chest x-ray with significant pulmonary edema.        Review of patient's allergies indicates:   Allergen Reactions    Morphine Nausea And Vomiting    Sulfa (sulfonamide antibiotics) Nausea And Vomiting       Past Medical History:   Diagnosis Date    Acid reflux     COPD (chronic obstructive pulmonary disease)     Coronary artery disease     COVID-19     Diabetes mellitus     High cholesterol     Hypertension     Obese body habitus      Past Surgical History:   Procedure Laterality Date    APPENDECTOMY      CATHETERIZATION OF BOTH LEFT AND RIGHT HEART N/A 2020    Procedure: CATHETERIZATION, HEART, BOTH LEFT AND RIGHT;  Surgeon: Doug Kendall MD;  Location: [facility] CATH/EP LAB;  Service: Cardiology;  Laterality: N/A;    CHOLECYSTECTOMY      EXCISION OF LESION OF EYELID Right 2020    Procedure: EXCISION, LESION, EYELID;  Surgeon: Malachi Rodriguez MD;  Location: [facility] OR;  Service: Ophthalmology;  Laterality: Right;  Inclusion Cyst removal right lower lid    HYSTERECTOMY      JOINT REPLACEMENT Left     left hip    MASTOIDECTOMY Right      Social History     Tobacco Use    Smoking status: Former     Packs/day: 0.50     Years: 13.00     Pack years: 6.50     Types: Cigarettes     Quit date: 2022     Years since quittin.4     Passive exposure: Current    Smokeless tobacco: Never   Substance Use Topics    Alcohol use: Not Currently    Drug use: Not Currently        REVIEW OF SYSTEMS    Breathing easier  Denies CP  Ambulating in the room free of anginal symptoms and shortness of breath.  OBJECTIVE     VITAL SIGNS (Most Recent)  Temp: 98.1 °F (36.7 °C) (23)  Pulse: 82 (23)  Resp: 15 (23)  BP: 127/76 (23)  SpO2: 95 % (23)    VENTILATION STATUS  Resp: 15 (23)  SpO2: 95 % (23)       I & O (Last 24H):  Intake/Output Summary (Last 24 hours) at  3/2/2023 1455  Last data filed at 3/2/2023 1019  Gross per 24 hour   Intake 240 ml   Output 1750 ml   Net -1510 ml       WEIGHTS  Wt Readings from Last 3 Encounters:   03/02/23 0300 108.4 kg (238 lb 15.7 oz)   02/28/23 2110 108 kg (238 lb 1.6 oz)   02/28/23 0430 109.1 kg (240 lb 8.4 oz)   02/27/23 1922 113.4 kg (250 lb)   02/27/23 0456 113.4 kg (250 lb)   02/27/23 1240 113.4 kg (250 lb)   10/21/22 0755 107 kg (236 lb)       PHYSICAL EXAM  GENERAL:  Well-built well-nourished female sitting comfortably on room air  NECK: No JVD. No bruit..   THYROID: Thyroid not enlarged. No nodules present..   CARDIAC:  Regular no distant S3 gallop   CHEST ANATOMY: normal.   LUNGS:  diminshed  ABDOMEN: Soft no masses or organomegaly.  No abdomen pulsations or bruits.  Normal bowel sounds. No pulsations and no masses felt, No guarding or rebound.   URINARY:  NO Siegel  EXTREMITIES: No cyanosis, clubbing or edema noted at this time., no calf tenderness bilaterally.   PERIPHERAL VASCULAR SYSTEM: Good palpable distal pulses.       HOME MEDICATIONS:  Current Facility-Administered Medications on File Prior to Encounter   Medication Dose Route Frequency Provider Last Rate Last Admin    electrolyte-S (ISOLYTE)   Intravenous Continuous Madi Herr MD   New Bag at 12/11/20 1041    lidocaine (PF) 10 mg/ml (1%) injection 10 mg  1 mL Intradermal Once Madi Herr MD        sodium chloride 0.9% flush 3 mL  3 mL Intravenous Q8H Madi Herr MD         Current Outpatient Medications on File Prior to Encounter   Medication Sig Dispense Refill    atorvastatin (LIPITOR) 40 MG tablet Take 1 tablet (40 mg total) by mouth once daily. 90 tablet 1    fluticasone propionate (FLONASE) 50 mcg/actuation nasal spray SHAKE LIQUID AND USE 1 SPRAY(50 MCG) IN EACH NOSTRIL EVERY DAY 16 g 0    LORazepam (ATIVAN) 0.5 MG tablet Take 1 tablet (0.5 mg total) by mouth every 6 (six) hours as needed for Anxiety. 12 tablet 0    metFORMIN  (GLUCOPHAGE-XR) 500 MG ER 24hr tablet Take 1 tablet (500 mg total) by mouth daily with breakfast. 90 tablet 3    ondansetron (ZOFRAN) 4 MG tablet Take 1 tablet (4 mg total) by mouth every 8 (eight) hours as needed for Nausea. 12 tablet 0    pantoprazole (PROTONIX) 40 MG tablet Take 1 tablet (40 mg total) by mouth 2 (two) times daily. 180 tablet 3    pregabalin (LYRICA) 100 MG capsule Take 1 capsule (100 mg total) by mouth 3 (three) times daily. 270 capsule 1    [DISCONTINUED] albuterol (PROVENTIL/VENTOLIN HFA) 90 mcg/actuation inhaler Inhale 2 puffs into the lungs every 6 (six) hours as needed for Wheezing. Rescue 54 g 2    [DISCONTINUED] apixaban (ELIQUIS) 5 mg Tab Take 1 tablet (5 mg total) by mouth 2 (two) times daily. 180 tablet 0    [DISCONTINUED] aspirin 81 MG Chew Take 1 tablet (81 mg total) by mouth once daily. 30 tablet 0    [DISCONTINUED] losartan (COZAAR) 25 MG tablet Take 0.5 tablets (12.5 mg total) by mouth once daily. 45 tablet 3    [DISCONTINUED] metoprolol succinate (TOPROL-XL) 25 MG 24 hr tablet Take 0.5 tablets (12.5 mg total) by mouth once daily. 45 tablet 3    [DISCONTINUED] torsemide (DEMADEX) 20 MG Tab Take 1 tablet (20 mg total) by mouth once daily. 90 tablet 1    albuterol (ACCUNEB) 1.25 mg/3 mL Nebu Take 3 mLs (1.25 mg total) by nebulization every 6 (six) hours as needed (wheezing). Rescue 75 mL 1    blood-glucose meter kit To check BG 2 times daily, to use with insurance preferred meter 1 each 0    buPROPion (WELLBUTRIN XL) 150 MG TB24 tablet Take 1 tablet (150 mg total) by mouth once daily. (Patient not taking: Reported on 2/27/2023) 30 tablet 2    clotrimazole-betamethasone 1-0.05% (LOTRISONE) cream Apply topically 2 (two) times daily. 15 g 0    lancets Misc To check BG 2 times daily, to use with insurance preferred meter 200 each 3    nicotine, polacrilex, (NICORETTE) 2 mg Gum Take 1 each (2 mg total) by mouth as needed (smoking urge). 100 each 4    TRUE METRIX GLUCOSE TEST STRIP Strp  CHECK SUGAR TWICE DAILY 200 strip 3    zolpidem (AMBIEN) 10 mg Tab Take 1 tablet (10 mg total) by mouth nightly as needed (insomnia). 90 tablet 1    [DISCONTINUED] fluticasone-umeclidin-vilanter (TRELEGY ELLIPTA) 200-62.5-25 mcg inhaler Inhale 1 puff into the lungs once daily. (Patient not taking: Reported on 2/27/2023) 60 each 2    [DISCONTINUED] metoprolol succinate (TOPROL-XL) 50 MG 24 hr tablet Take 50 mg by mouth once daily.      [DISCONTINUED] MOTEGRITY 2 mg Tab Take 1 tablet by mouth nightly.      [DISCONTINUED] pregabalin (LYRICA) 100 MG capsule Take 1 capsule (100 mg total) by mouth 3 (three) times daily. 270 capsule 1       SCHEDULED MEDS:        CONTINUOUS INFUSIONS:        PRN MEDS:    LABS AND DIAGNOSTICS     CBC LAST 3 DAYS  Recent Labs   Lab 02/27/23  0505 02/27/23  0506 02/28/23  0340 02/28/23  0525 02/28/23  0628 03/01/23  0342 03/02/23  0448   WBC 18.42*  --  9.75  --   --  15.08* 9.89   RBC 4.25  --  4.25  --   --  4.17 4.84   HGB 13.2  --  12.8  --   --  12.7 14.6   HCT 41.9   < > 38.4   < > 40 38.9 45.1   MCV 99*  --  90  --   --  93 93   MCH 31.1*  --  30.1  --   --  30.5 30.2   MCHC 31.5*  --  33.3  --   --  32.6 32.4   RDW 13.3  --  13.4  --   --  13.8 13.6   *  --  352  --   --  356 361   MPV 9.9  --  9.5  --   --  9.8 9.3   GRAN 60.4  11.1*  --   --   --   --   --   --    LYMPH 29.2  5.4*  --   --   --   --   --   --    MONO 8.1  1.5*  --   --   --   --   --   --    BASO 0.05  --   --   --   --   --   --    NRBC 0  --   --   --   --   --   --     < > = values in this interval not displayed.       COAGULATION LAST 3 DAYS  Recent Labs   Lab 02/27/23  0505   INR 1.1       CHEMISTRY LAST 3 DAYS  Recent Labs   Lab 02/27/23  0505 02/27/23  0506 02/28/23  0340 02/28/23  0525 02/28/23  0628 02/28/23  1050 03/01/23  0342 03/02/23  0021 03/02/23  0448   *  --  132*  --   --   --  138  --  137   K 4.6  --  3.4*  --   --   --  3.5 3.2* 3.7   CL 95  --  95  --   --   --  99  --  92*    CO2 21*  --  25  --   --   --  25  --  33*   ANIONGAP 14  --  12  --   --   --  14  --  12   BUN 25*  --  34*  --   --   --  45*  --  37*   CREATININE 1.7*  --  1.7*  --   --   --  1.6*  --  1.2   *  --  329*  --   --   --  216*  --  240*   CALCIUM 7.9*  --  7.7*  --   --   --  8.0*  --  8.0*   PH  --    < >  --  7.444 7.476* 7.461*  --   --   --    MG  --    < > 2.5  --   --   --  2.6 2.6 2.5   ALBUMIN 3.9  --   --   --   --   --   --   --   --    PROT 7.2  --   --   --   --   --   --   --   --    ALKPHOS 134  --   --   --   --   --   --   --   --    ALT 18  --   --   --   --   --   --   --   --    AST 29  --   --   --   --   --   --   --   --    BILITOT 1.2*  --   --   --   --   --   --   --   --     < > = values in this interval not displayed.       CARDIAC PROFILE LAST 3 DAYS  Recent Labs   Lab 02/27/23  0505 02/27/23  1151 02/27/23  1800 02/27/23  2329 03/02/23  0021   *  --   --   --   --    *  --   --   --   --    CPKMB 10.0*  --   --   --   --    TROPONINIHS 39.8*   < > 51.7* 53.8* 45.6*    < > = values in this interval not displayed.       ENDOCRINE LAST 3 DAYS  No results for input(s): TSH, PROCAL in the last 168 hours.    LAST ARTERIAL BLOOD GAS  ABG  Recent Labs   Lab 02/28/23  1050   PH 7.461*   PO2 67*   PCO2 38.1   HCO3 27.1   BE 3       LAST 7 DAYS MICROBIOLOGY   Microbiology Results (last 7 days)       Procedure Component Value Units Date/Time    Culture, Respiratory with Gram Stain [[id number]] Collected: 02/27/23 1215    Order Status: Completed Specimen: Respiratory from Tracheal Aspirate Updated: 03/01/23 0807     Respiratory Culture Normal respiratory evette     Gram Stain (Respiratory) Few WBC's     Gram Stain (Respiratory) Rare Gram positive cocci     Gram Stain (Respiratory) Rare Gram positive rods     Gram Stain (Respiratory) Rare Gram negative rods    MRSA Screen by PCR [[id number]] Collected: 02/27/23 1308    Order Status: Completed Specimen: Nasopharyngeal Swab from  Nasal Updated: 02/27/23 1456     MRSA SCREEN BY PCR Negative            MOST RECENT IMAGING  X-Ray Chest PA And Lateral  2 view chest    CLINICAL DATA: Shortness of breath    FINDINGS: PA and lateral views are compared to February 28. Cardiomegaly is stable. The mediastinum is unremarkable. There is mild persistent interstitial prominence, improved compared to February 28. Aforementioned left basilar opacity appears improved, with improving aeration of the left lung base. No significant pleural effusion is identified.    Endotracheal and nasogastric tubes have been removed.    IMPRESSION:  1. Interval improvement compared to February 28 as above.    Electronically signed by:  Carter Saavedra MD  3/1/2023 3:24 PM [facility] Workstation: [id number]      ECHOCARDIOGRAM RESULTS (last 5)  Results for orders placed during the hospital encounter of 09/16/22    Echo Saline Bubble? Yes    Interpretation Summary  · There is no evidence of intracardiac shunting.  · The left ventricle is moderately enlarged with mild eccentric hypertrophy and moderately decreased systolic function.  · There is severe left ventricular global hypokinesis.  · The estimated ejection fraction is 30%.  · Grade I left ventricular diastolic dysfunction.  · Normal right ventricular size with normal right ventricular systolic function.  · Mild left atrial enlargement.  · Mild mitral regurgitation.  · Mild tricuspid regurgitation.  · Normal central venous pressure (3 mmHg).  · The estimated PA systolic pressure is 31 mmHg.      Results for orders placed during the hospital encounter of 02/12/22    Echo    Interpretation Summary  · The estimated PA systolic pressure is 27 mmHg.  · The left ventricle is mildly enlarged with mild concentric hypertrophy and moderately decreased systolic function.  · The estimated ejection fraction is 35%.  · Grade I left ventricular diastolic dysfunction.  · There is moderate left ventricular global hypokinesis.  · Normal right  ventricular size with normal right ventricular systolic function.  · Mild left atrial enlargement.  · Mild tricuspid regurgitation.  · Normal central venous pressure (3 mmHg).      Results for orders placed during the hospital encounter of 03/03/21    Echo Color Flow Doppler? Yes    Interpretation Summary  · The left ventricle is mildly enlarged with mild concentric hypertrophy andThe estimated ejection fraction is 40%  · Grade I left ventricular diastolic dysfunction.  · There is moderate left ventricular global hypokinesis.  · Normal right ventricular size with normal right ventricular systolic function.  · Mild left atrial enlargement.  · Mild mitral regurgitation.  · Normal central venous pressure (3 mmHg).      Results for orders placed during the hospital encounter of 11/21/20    Echo Color Flow Doppler? Yes    Interpretation Summary  · The estimated PA systolic pressure is 32 mmHg.  · There is mild left ventricular concentric hypertrophy.  · The left ventricle is mildly enlarged with moderately decreased systolic function. The estimated ejection fraction is 30%.  · Grade I diastolic dysfunction.  · There is left ventricular global hypokinesis.  · Normal right ventricular size with normal right ventricular systolic function.  · Mild left atrial enlargement.  · Mild-to-moderate mitral regurgitation.  · Intermediate central venous pressure (8 mmHg).      CURRENT/PREVIOUS VISIT EKG  Results for orders placed or performed during the hospital encounter of 02/27/23   EKG 12-lead    Collection Time: 02/27/23  6:50 AM    Narrative    Test Reason : R07.9,    Vent. Rate : 115 BPM     Atrial Rate : 115 BPM     P-R Int : 152 ms          QRS Dur : 102 ms      QT Int : 352 ms       P-R-T Axes : 072 047 050 degrees     QTc Int : 486 ms    Sinus tachycardia  Possible Left atrial enlargement  T wave abnormality, consider inferolateral ischemia  Abnormal ECG  When compared with ECG of 21-OCT-2022 08:30,  Vent. rate has  increased BY  40 BPM  T wave inversion now evident in Inferior leads  T wave inversion less evident in Lateral leads  Confirmed by Doug Kendall MD ([id number]) on 2/27/2023 7:44:30 PM    Referred By: [email or web address]   SELF           Confirmed By:Doug Kendall MD           ASSESSMENT/PLAN:     Active Hospital Problems    Diagnosis    *Acute on chronic systolic and diastolic combined heart failure    Candida rash of groin    Anxiety and depression    Severe obesity with body mass index (BMI) of 35.0 to 39.9 with comorbidity    CAD (coronary artery disease)    Cardiomyopathy    Tobacco abuse    COPD    Essential hypertension    HLD (hyperlipidemia)    Type 2 diabetes mellitus with hyperglycemia, without long-term current use of insulin, HbA1c 9.1%       CATH 2020    The Prox RCA lesion was 100% stenosed.  The ejection fraction was calculated to be 25%.  There was severe left ventricular systolic dysfunction.  4. PA pressure 31/16 mmHg with mean pressure of 22 mmHg  5. PCWP 13 mmHg  6. Cardiac output of 4.4 L/min by thermodilution      Assessment:  1. RCA  with left to right collaterals  2. LVEDP 12 mmHg  3. LVEF 25%   4. PA pressure 31/16 mmHg with mean pressure of 22 mmHg  5. PCWP 13 mmHg  6. Cardiac output of 4.4 L/min by thermodilution      Recommendations:  1. Optimize medical management of CAD  2. Aggressive risk factor and lifestyle modifications  3. Routine post cath care and monitoring  4. Outpatient follow-up with Cardiology         ASSESSMENT & PLAN:       Pulmonary edema-Acute on Chronic HFrEF  Acute on Chronic hypercapnic hypoxemic respiratory failure with mechanical ventilation  COPD  Tobacco abuse  Obesity  Ischemic Cardiomyopathy  STEPHEN  Leukocytosis  DM  H/O CVA- On Eliquis 5 mg PO BID              Dorota Vogel NP  [facility]  Department of Cardiology  Date of Service: 03/02/2023      RECOMMENDATIONS:  Encouraged her to maintain on Advair, albuterol nebulizer     Continue on Toprol-XL 50  mg daily  Continue on losartan 25 mg , half a tablet daily  Maintain on torsemide 20 mg a day Eliquis 5 mg p.o. b.i.d.  Aspirin 81 mg a day   Lipitor 40 mg daily  Encouraged patient to refrain from tobacco usage  Also encouraged to follow-up in the office in 2-3 weeks  I have personally interviewed and examined the patient, I have reviewed the Nurse Practitioner's history and physical, assessment, and plan. I have personally evaluated the patient at bedside and agree with the findings and made appropriate changes as necessary in recommendations.      Doug Kendall M.D.  [facility]  Department of Cardiology  Date of Service: 03/02/2023  10:48 AM

## 2023-03-02 NOTE — CARE UPDATE
03/02/23 0719   Patient Assessment/Suction   Level of Consciousness (AVPU) alert   All Lung Fields Breath Sounds clear   PRE-TX-O2   Device (Oxygen Therapy) room air   SpO2 95 %   Pulse Oximetry Type Intermittent   $ Pulse Oximetry - Multiple Charge Pulse Oximetry - Multiple   Pulse 82   Resp 15   Aerosol Therapy   $ Aerosol Therapy Charges Aerosol Treatment  (uda/xop)   Daily Review of Necessity (SVN) completed   Respiratory Treatment Status (SVN) given   Treatment Route (SVN) mask   Patient Position (SVN) semi-Bautista's   Post Treatment Assessment (SVN) increased aeration   Signs of Intolerance (SVN) none   Breath Sounds Post-Respiratory Treatment   Throughout All Fields Post-Treatment aeration increased   Post-treatment Heart Rate (beats/min) 79   Post-treatment Resp Rate (breaths/min) 15   Education   $ Education Bronchodilator;15 min   Respiratory Evaluation   $ Care Plan Tech Time 15 min

## 2023-03-02 NOTE — DISCHARGE SUMMARY
[facility] Medicine  Discharge Summary      Patient Name: Delores Fox  MRN: [id number]  TEMO: [id number]  Patient Class: IP- Inpatient  Admission Date: 2/27/2023  Hospital Length of Stay: 3 days  Discharge Date and Time: 3/2/2023 11:31 AM  Attending Physician: No att. providers found   Discharging Provider: Kassandra Mota MD  Primary Care Provider: Tomy Rios MD    Primary Care Team: Networked reference to record PCT     HPI:     Delores Fox is a 56 y.o.  female who has a past medical history of Acid reflux, COPD (chronic obstructive pulmonary disease),  CHF with EF 30% ,Coronary artery disease, COVID-19, Diabetes mellitus, High cholesterol, Hypertension was brought by EMS to the hospital with severe shortness of breath.   Initially she was on BiPAP at ER  saturation 100% on 100% oxygen.   She was following commands and answer questions as per ED physician .  EMS given  magnesium for her  en route but no Solu-Medrol was given because she is a diabetic!!!?  Admission was given for COPD/CHF exacerbation and respiratory failure and Lasix 40 mg was given by ED physician.  When I went to  see the patient, they are intubating the patient.  As per ED physician, patient tried to remove the BiPAP and  said can not breathe and later bradycardic and 1 atropine was given and intubated with 2nd attempt video scope  and stabilized.  No further information at this time.  Leukocytosis noted.  CMP is pending and ABG with severe respiratory acidosis.         * No surgery found *      Hospital Course:   Patient with a history of COPD with ongoing nicotine use, CAD status post intervention RCA, cardiomyopathy with last echo with EF 30% with grade 1 diastolic dysfunction, hypertension, anxiety/depression, non-insulin-dependent diabetes mellitus, morbid obesity with BMI 40, presented via EMS with acute onset shortness of breath with respiratory distress.  She was on CPAP, noted to be feroz and  in distress on arrival.  She was placed on BiPAP 100%, subsequently noted to be apneic, unresponsive and bradycardic, received atropine and was intubated.  Chest x-ray with diffuse interstitial and alveolar infiltrates bilaterally.  Admitted to the ICU, treatment for COPD exacerbation, empiric antibiotics, IV diuresis for pulmonary edema, pulmonary and cardiology consult.  Cardiology added dobutamine and has been diuresing well.  On 02/28 she was successfully extubated.  Echo with EF 40% with grade 1 diastolic dysfunction.  Transferring to telemetry floor.  On 03/01 continues to improve, chest x-ray with improving pulmonary edema, home O2 evaluation performed and not requiring supplemental oxygen, some mild throat soreness suspect from ETT, dobutamine discontinued, IV diuresis for next 24 hours.  On 03/21 respiratory status remains stable, states breathing feels at baseline and improved, appears medically stable for discharge and cleared by consultant for discharge.  Did provide refill for COPD and cardiac medication.  Continue to reinforce smoking cessation.  Discharge plan including medication, follow-up as well as strict return precautions were reviewed with the patient, she expressed understanding, no questions or concerns, no objection to discharge.  Communicated with nursing on day of discharge.      Discharge examination   Sitting side of bed, alert, oriented, on room air, no wheeze or accessory muscle use, regular heart rhythm, no lower extremity edema       Goals of Care Treatment Preferences:  Code Status: Full Code      Consults:   Consults (From admission, onward)        Status Ordering Provider     Inpatient consult to   Once        Provider:  (Not yet assigned)    Completed VINICIO RODRIGUEZ     Inpatient consult to Cardiology  Once        Provider:  Hank Goodson MD    Completed BEATRICE CONWAY     Inpatient consult to Pulmonology  Once        Provider:  Cholo Meredith,  MD    Completed BEATRICE CONWAY.          No notes have been filed under this hospital service.  Service: Hospital Medicine    Final Active Diagnoses:    Diagnosis Date Noted POA    PRINCIPAL PROBLEM:  Acute on chronic systolic and diastolic combined heart failure [I50.9] 02/27/2023 Yes    Cardiomyopathy [I42.9] 11/22/2020 Yes     Chronic    Candida rash of groin [B37.89] 02/27/2023 Yes     Chronic    Anxiety and depression [F41.9] 02/27/2023 Yes     Chronic    Severe obesity with body mass index (BMI) of 35.0 to 39.9 with comorbidity [E66.01] 09/27/2022 Yes    CAD (coronary artery disease) [I25.10] 12/10/2020 Yes    Tobacco abuse [Z72.0] 11/22/2020 Yes     Chronic    COPD [J44.1] 11/22/2020 Yes     Chronic    Essential hypertension [I10] 11/21/2020 Yes     Chronic    HLD (hyperlipidemia) [E78.5] 11/21/2020 Yes    Type 2 diabetes mellitus with hyperglycemia, without long-term current use of insulin, HbA1c 9.1% [E11.65] 11/21/2020 Yes     Chronic      Problems Resolved During this Admission:    Diagnosis Date Noted Date Resolved POA    Hypokalemia [E87.6] 02/28/2023 03/01/2023 No    Leucocytosis [D72.829] 02/27/2023 03/02/2023 Yes    STEPHEN (acute kidney injury) [N17.9] 02/27/2023 03/02/2023 Yes    Hyponatremia [E87.1] 02/27/2023 03/01/2023 Yes    Acute hypoxic hypercapnic respiratory failure, intubated on mechanical ventilation [J96.02] 02/12/2022 03/01/2023 Yes       Discharged Condition: good    Disposition: Home or Self Care    Follow Up:   Follow-up Information     Tomy Rios MD. Schedule an appointment as soon as possible for a visit in 1 week(s).    Specialty: Family Medicine  Why: follow up  Contact information:  [address] [place] [address]  [phone]             Doug Kendall MD. Schedule an appointment as soon as possible for a visit in 2 week(s).    Specialties: Interventional Cardiology, Cardiology  Why: follow up  Contact information:  [address]  CARDIOLOGY  [facility] [address]  [phone]                       Patient Instructions:      Diet diabetic     Notify your health care provider if you experience any of the following:  temperature >100.4     Notify your health care provider if you experience any of the following:  difficulty breathing or increased cough     Activity as tolerated       Significant Diagnostic Studies: Labs:   BMP:   Recent Labs   Lab 03/01/23 0342 03/02/23 0021 03/02/23 0448   *  --  240*     --  137   K 3.5 3.2* 3.7   CL 99  --  92*   CO2 25  --  33*   BUN 45*  --  37*   CREATININE 1.6*  --  1.2   CALCIUM 8.0*  --  8.0*   MG 2.6 2.6 2.5   , CMP   Recent Labs   Lab 03/01/23 0342 03/02/23  0021 03/02/23 0448     --  137   K 3.5 3.2* 3.7   CL 99  --  92*   CO2 25  --  33*   *  --  240*   BUN 45*  --  37*   CREATININE 1.6*  --  1.2   CALCIUM 8.0*  --  8.0*   ANIONGAP 14  --  12   , CBC   Recent Labs   Lab 03/01/23 0342 03/02/23 0448   WBC 15.08* 9.89   HGB 12.7 14.6   HCT 38.9 45.1    361   , INR   Lab Results   Component Value Date    INR 1.1 02/27/2023    INR 1.0 04/04/2022    INR 1.1 12/31/2020   , Lipid Panel   Lab Results   Component Value Date    CHOL 189 09/16/2022    HDL 33 (L) 09/16/2022    LDLCALC 130.6 09/16/2022    TRIG 127 09/16/2022    CHOLHDL 17.5 (L) 09/16/2022   , Troponin No results for input(s): TROPONINI in the last 168 hours. and A1C:   Recent Labs   Lab 09/16/22  0505 09/27/22  1057 03/01/23 0342   HGBA1C 9.9* 9.1* 9.0*       Pending Diagnostic Studies:     None       X-Ray Chest PA And Lateral    Result Date: 3/1/2023  2 view chest CLINICAL DATA: Shortness of breath FINDINGS: PA and lateral views are compared to February 28. Cardiomegaly is stable. The mediastinum is unremarkable. There is mild persistent interstitial prominence, improved compared to February 28. Aforementioned left basilar opacity appears improved, with improving aeration of the left lung base. No significant  pleural effusion is identified. Endotracheal and nasogastric tubes have been removed. IMPRESSION: 1. Interval improvement compared to February 28 as above. Electronically signed by:  Carter Saavedra MD  3/1/2023 3:24 PM CST Workstation: [id number]    X-Ray Chest AP Portable    Result Date: 2/28/2023  Reason: intubated intubated FINDINGS: Portable chest at 456 compared with 2/27/2023 shows placement of enteric catheter with tip at least in stomach but off inferior aspect of this view. Endotracheal tube and cardiomediastinal silhouette unchanged. Increased degree of patient rotation is evident. Lung volumes are low. Diffuse pulmonary interstitial opacities have slightly improved. Left basilar pleural-parenchymal opacity is more conspicuous, perhaps due to patient rotation. No pneumothorax. IMPRESSION: 1. Slight interval improvement of bilateral pulmonary interstitial opacities suggesting improving interstitial edema. 2. Increased conspicuity of left basilar pleural-parenchymal opacity which could be due to patient positioning although slight worsening of left pleural effusion and/or left basilar alveolar consolidation or atelectasis can also be considered. Electronically signed by:  Derrek Boykin MD  2/28/2023 7:23 AM CST Workstation: [id number]    X-Ray Chest AP Portable    Result Date: 2/27/2023  Chest single view CLINICAL DATA: Cardiac arrest, post intubation FINDINGS: AP view is compared to 0456 hours. Endotracheal tube has been placed with tip just below the clavicles. The heart and mediastinum are unchanged in appearance. Diffuse abnormal interstitial and alveolar opacities persist. IMPRESSION: 1. ET tube tip is just below the clavicles. 2. Chest is otherwise unchanged. Electronically signed by:  Carter Saavedra MD  2/27/2023 7:32 AM CST Workstation: [id number]    X-Ray Chest AP Portable    Result Date: 2/27/2023  Chest single view CLINICAL DATA: Respiratory distress FINDINGS: AP view is compared to April  2022. The heart is enlarged. The mediastinum is unremarkable. Diffuse bilateral abnormal interstitial and airspace opacities are noted likely reflecting pulmonary edema. There are small bilateral pleural effusions. No acute osseous abnormalities are identified. IMPRESSION: 1. Cardiomegaly with diffuse interstitial and airspace opacities bilaterally, likely reflecting pulmonary edema. Pneumonia is within the differential but felt less likely. Electronically signed by:  Carter Saavedra MD  2/27/2023 7:28 AM CST Workstation: [id number]    Echo    Result Date: 2/28/2023  · The left ventricle is mildly enlarged with concentric remodeling and mildly decreased systolic function. · The estimated ejection fraction is 40%. · Grade I left ventricular diastolic dysfunction. · There is moderate left ventricular global hypokinesis. · Normal right ventricular size with normal right ventricular systolic function. · Mild left atrial enlargement. · Normal central venous pressure (3 mmHg). · There is abnormal septal wall motion consistent with left bundle branch block.      Medications:  Reconciled Home Medications:      Medication List      START taking these medications    ADVAIR -21 mcg/actuation Hfaa inhaler  Generic drug: fluticasone propion-salmeterol 115-21 mcg/dose  Inhale 2 puffs into the lungs every 12 (twelve) hours. Controller  Replaces: TRELEGY ELLIPTA 200-62.5-25 mcg inhaler     pregabalin 100 MG capsule  Commonly known as: LYRICA  Take 1 capsule (100 mg total) by mouth 3 (three) times daily.        CHANGE how you take these medications    * albuterol 1.25 mg/3 mL Nebu  Commonly known as: ACCUNEB  Take 3 mLs (1.25 mg total) by nebulization every 6 (six) hours as needed (wheezing). Rescue  What changed: Another medication with the same name was changed. Make sure you understand how and when to take each.     * albuterol 90 mcg/actuation inhaler  Commonly known as: PROVENTIL/VENTOLIN HFA  Inhale 2 puffs into the  lungs every 6 (six) hours as needed for Wheezing or Shortness of Breath. Rescue  What changed:   · reasons to take this  · additional instructions     metoprolol succinate 50 MG 24 hr tablet  Commonly known as: TOPROL-XL  Take 1 tablet (50 mg total) by mouth once daily.  What changed: Another medication with the same name was removed. Continue taking this medication, and follow the directions you see here.     torsemide 20 MG Tab  Commonly known as: DEMADEX  Take 1 tablet (20 mg total) by mouth once daily. Cake torsemide 20 mg once daily.  Can take additional dose in afternoon if increased shortness of breath or leg swelling for 2-3 days if needed  What changed: additional instructions         * This list has 2 medication(s) that are the same as other medications prescribed for you. Read the directions carefully, and ask your doctor or other care provider to review them with you.            CONTINUE taking these medications    apixaban 5 mg Tab  Commonly known as: ELIQUIS  Take 1 tablet (5 mg total) by mouth 2 (two) times daily.     aspirin 81 MG Chew  Take 1 tablet (81 mg total) by mouth once daily.     atorvastatin 40 MG tablet  Commonly known as: LIPITOR  Take 1 tablet (40 mg total) by mouth once daily.     blood-glucose meter kit  To check BG 2 times daily, to use with insurance preferred meter     clotrimazole-betamethasone 1-0.05% cream  Commonly known as: LOTRISONE  Apply topically 2 (two) times daily.     fluticasone propionate 50 mcg/actuation nasal spray  Commonly known as: FLONASE  SHAKE LIQUID AND USE 1 SPRAY(50 MCG) IN EACH NOSTRIL EVERY DAY     lancets Misc  To check BG 2 times daily, to use with insurance preferred meter     LORazepam 0.5 MG tablet  Commonly known as: ATIVAN  Take 1 tablet (0.5 mg total) by mouth every 6 (six) hours as needed for Anxiety.     losartan 25 MG tablet  Commonly known as: COZAAR  Take 0.5 tablets (12.5 mg total) by mouth once daily.     metFORMIN 500 MG ER 24hr  tablet  Commonly known as: GLUCOPHAGE-XR  Take 1 tablet (500 mg total) by mouth daily with breakfast.     nicotine (polacrilex) 2 mg Gum  Commonly known as: NICORETTE  Take 1 each (2 mg total) by mouth as needed (smoking urge).     ondansetron 4 MG tablet  Commonly known as: ZOFRAN  Take 1 tablet (4 mg total) by mouth every 8 (eight) hours as needed for Nausea.     pantoprazole 40 MG tablet  Commonly known as: PROTONIX  Take 1 tablet (40 mg total) by mouth 2 (two) times daily.     TRUE METRIX GLUCOSE TEST STRIP Strp  Generic drug: blood sugar diagnostic  CHECK SUGAR TWICE DAILY     zolpidem 10 mg Tab  Commonly known as: AMBIEN  Take 1 tablet (10 mg total) by mouth nightly as needed (insomnia).        STOP taking these medications    MOTEGRITY 2 mg Tab  Generic drug: prucalopride     TRELEGY ELLIPTA 200-62.5-25 mcg inhaler  Generic drug: fluticasone-umeclidin-vilanter  Replaced by: ADVAIR -21 mcg/actuation Hfaa inhaler        ASK your doctor about these medications    buPROPion 150 MG TB24 tablet  Commonly known as: WELLBUTRIN XL  Take 1 tablet (150 mg total) by mouth once daily.            Indwelling Lines/Drains at time of discharge:   Lines/Drains/Airways     None                 Time spent on the discharge of patient: 33 minutes         Kassandra Mota MD  Department of Hospital Medicine  [facility]

## 2023-03-03 ENCOUNTER — TELEPHONE (OUTPATIENT)
Dept: FAMILY MEDICINE | Facility: CLINIC | Age: 58
End: 2023-03-03
Payer: MEDICAID

## 2023-03-03 ENCOUNTER — TELEPHONE (OUTPATIENT)
Dept: CARDIOLOGY | Facility: CLINIC | Age: 58
End: 2023-03-03
Payer: MEDICAID

## 2023-03-03 ENCOUNTER — PATIENT MESSAGE (OUTPATIENT)
Dept: ADMINISTRATIVE | Facility: HOSPITAL | Age: 58
End: 2023-03-03
Payer: MEDICAID

## 2023-03-03 ENCOUNTER — PATIENT OUTREACH (OUTPATIENT)
Dept: ADMINISTRATIVE | Facility: HOSPITAL | Age: 58
End: 2023-03-03
Payer: MEDICAID

## 2023-03-03 NOTE — LETTER
AUTHORIZATION FOR RELEASE OF   CONFIDENTIAL INFORMATION    [facility] ,    We are seeing Delores Fox, date of birth 1965, in the clinic at [facility]. Tomy Rios MD is the patient's PCP. Delores Fox has an outstanding lab/procedure at the time we reviewed her chart. In order to help keep her health information updated, she has authorized us to request the following medical record(s):                                                ( X )  EYE EXAM - most recent          Please fax records to [facility], Stephen Lee Lambert, MD, [phone]     If you have any questions, please contact [facility] [phone].           Patient Name: Delores Fox  : 1965  Patient Phone #: [phone]

## 2023-03-03 NOTE — TELEPHONE ENCOUNTER
----- Message from Ashlee Lujan, Patient Care Assistant sent at 3/3/2023 10:16 AM CST -----  Contact: self  Pt needs a 1 wk hospital f/u appt. Please call back to advise [phone]  thanks

## 2023-03-03 NOTE — TELEPHONE ENCOUNTER
CALLED AND SCHEDULED A FOLLOW UP    SHE IS ASKING FOR SOMETHING TO HELP WITH GAS, SHE IS TRYING OTC TUMS AND GAS X NO RELIEF

## 2023-03-03 NOTE — PROGRESS NOTES
Cologuard gap report review. No CRS found. Patient outreach for colon cancer screening.    Eye exam requested from [facility] 20/20.    Immunizations reconciled.

## 2023-03-03 NOTE — TELEPHONE ENCOUNTER
----- Message from Yahir Romero sent at 3/3/2023 10:21 AM CST -----  [facility]  Type:  HOSP F/U Appointment Request    Caller is requesting a HOSP F/U appointment.      Name of Caller:  Delores    When is the first available appointment?  None in time frame needed.    Symptoms:  [facility]    Best Call Back Number:  [phone]    Additional Information:

## 2023-03-06 ENCOUNTER — HOSPITAL ENCOUNTER (INPATIENT)
Facility: HOSPITAL | Age: 58
LOS: 3 days | Discharge: HOME | DRG: 291 | End: 2023-03-09
Attending: EMERGENCY MEDICINE | Admitting: INTERNAL MEDICINE
Payer: MEDICAID

## 2023-03-06 DIAGNOSIS — I20.89 STABLE ANGINA PECTORIS: Primary | ICD-10-CM

## 2023-03-06 DIAGNOSIS — R07.9 CHEST PAIN: ICD-10-CM

## 2023-03-06 LAB
ALBUMIN SERPL BCP-MCNC: 3.7 G/DL (ref 3.5–5.2)
ALP SERPL-CCNC: 111 U/L (ref 55–135)
ALT SERPL W/O P-5'-P-CCNC: 22 U/L (ref 10–44)
ANION GAP SERPL CALC-SCNC: 14 MMOL/L (ref 8–16)
AST SERPL-CCNC: 16 U/L (ref 10–40)
BASOPHILS # BLD AUTO: 0.03 K/UL (ref 0–0.2)
BASOPHILS NFR BLD: 0.2 % (ref 0–1.9)
BILIRUB SERPL-MCNC: 0.7 MG/DL (ref 0.1–1)
BNP SERPL-MCNC: 140 PG/ML (ref 0–99)
BUN SERPL-MCNC: 62 MG/DL (ref 6–20)
CALCIUM SERPL-MCNC: 10.4 MG/DL (ref 8.7–10.5)
CHLORIDE SERPL-SCNC: 84 MMOL/L (ref 95–110)
CO2 SERPL-SCNC: 32 MMOL/L (ref 23–29)
CREAT SERPL-MCNC: 2.5 MG/DL (ref 0.5–1.4)
DIFFERENTIAL METHOD: ABNORMAL
EOSINOPHIL # BLD AUTO: 0.2 K/UL (ref 0–0.5)
EOSINOPHIL NFR BLD: 1.2 % (ref 0–8)
ERYTHROCYTE [DISTWIDTH] IN BLOOD BY AUTOMATED COUNT: 13 % (ref 11.5–14.5)
EST. GFR  (NO RACE VARIABLE): 22.2 ML/MIN/1.73 M^2
GLUCOSE SERPL-MCNC: 320 MG/DL (ref 70–110)
HCT VFR BLD AUTO: 45.3 % (ref 37–48.5)
HGB BLD-MCNC: 14.8 G/DL (ref 12–16)
IMM GRANULOCYTES # BLD AUTO: 0.06 K/UL (ref 0–0.04)
IMM GRANULOCYTES NFR BLD AUTO: 0.3 % (ref 0–0.5)
INR PPP: 1.1 (ref 0.8–1.2)
LYMPHOCYTES # BLD AUTO: 3.1 K/UL (ref 1–4.8)
LYMPHOCYTES NFR BLD: 17.6 % (ref 18–48)
MAGNESIUM SERPL-MCNC: 1.6 MG/DL (ref 1.6–2.6)
MCH RBC QN AUTO: 30.1 PG (ref 27–31)
MCHC RBC AUTO-ENTMCNC: 32.7 G/DL (ref 32–36)
MCV RBC AUTO: 92 FL (ref 82–98)
MONOCYTES # BLD AUTO: 1.6 K/UL (ref 0.3–1)
MONOCYTES NFR BLD: 8.9 % (ref 4–15)
NEUTROPHILS # BLD AUTO: 12.6 K/UL (ref 1.8–7.7)
NEUTROPHILS NFR BLD: 71.8 % (ref 38–73)
NRBC BLD-RTO: 0 /100 WBC
PLATELET # BLD AUTO: 349 K/UL (ref 150–450)
PMV BLD AUTO: 9.9 FL (ref 9.2–12.9)
POTASSIUM SERPL-SCNC: 3.9 MMOL/L (ref 3.5–5.1)
PROT SERPL-MCNC: 6.7 G/DL (ref 6–8.4)
PROTHROMBIN TIME: 11.8 SEC (ref 9–12.5)
RBC # BLD AUTO: 4.92 M/UL (ref 4–5.4)
SODIUM SERPL-SCNC: 130 MMOL/L (ref 136–145)
TROPONIN I SERPL HS-MCNC: 22.4 PG/ML (ref 0–14.9)
TROPONIN I SERPL HS-MCNC: 23.7 PG/ML (ref 0–14.9)
TROPONIN I SERPL HS-MCNC: 24.4 PG/ML (ref 0–14.9)
WBC # BLD AUTO: 17.52 K/UL (ref 3.9–12.7)

## 2023-03-06 PROCEDURE — G0378 HOSPITAL OBSERVATION PER HR: HCPCS

## 2023-03-06 PROCEDURE — S4991 NICOTINE PATCH NONLEGEND: HCPCS | Performed by: INTERNAL MEDICINE

## 2023-03-06 PROCEDURE — 21400001 HC TELEMETRY ROOM

## 2023-03-06 PROCEDURE — 93010 ELECTROCARDIOGRAM REPORT: CPT | Mod: ,,, | Performed by: INTERNAL MEDICINE

## 2023-03-06 PROCEDURE — 63600175 PHARM REV CODE 636 W HCPCS: Performed by: INTERNAL MEDICINE

## 2023-03-06 PROCEDURE — 25000003 PHARM REV CODE 250: Performed by: INTERNAL MEDICINE

## 2023-03-06 PROCEDURE — 83880 ASSAY OF NATRIURETIC PEPTIDE: CPT | Performed by: EMERGENCY MEDICINE

## 2023-03-06 PROCEDURE — 96375 TX/PRO/DX INJ NEW DRUG ADDON: CPT

## 2023-03-06 PROCEDURE — 85025 COMPLETE CBC W/AUTO DIFF WBC: CPT | Performed by: EMERGENCY MEDICINE

## 2023-03-06 PROCEDURE — 93005 ELECTROCARDIOGRAM TRACING: CPT | Performed by: INTERNAL MEDICINE

## 2023-03-06 PROCEDURE — 83735 ASSAY OF MAGNESIUM: CPT | Performed by: EMERGENCY MEDICINE

## 2023-03-06 PROCEDURE — 25000003 PHARM REV CODE 250: Performed by: EMERGENCY MEDICINE

## 2023-03-06 PROCEDURE — 84484 ASSAY OF TROPONIN QUANT: CPT | Mod: 91 | Performed by: EMERGENCY MEDICINE

## 2023-03-06 PROCEDURE — 99285 EMERGENCY DEPT VISIT HI MDM: CPT | Mod: 25

## 2023-03-06 PROCEDURE — 85610 PROTHROMBIN TIME: CPT | Performed by: EMERGENCY MEDICINE

## 2023-03-06 PROCEDURE — 80053 COMPREHEN METABOLIC PANEL: CPT | Performed by: EMERGENCY MEDICINE

## 2023-03-06 PROCEDURE — 84484 ASSAY OF TROPONIN QUANT: CPT | Mod: 91 | Performed by: INTERNAL MEDICINE

## 2023-03-06 PROCEDURE — 93010 EKG 12-LEAD: ICD-10-PCS | Mod: ,,, | Performed by: INTERNAL MEDICINE

## 2023-03-06 RX ORDER — ARFORMOTEROL TARTRATE 15 UG/2ML
15 SOLUTION RESPIRATORY (INHALATION) 2 TIMES DAILY
Status: DISCONTINUED | OUTPATIENT
Start: 2023-03-07 | End: 2023-03-09 | Stop reason: HOSPADM

## 2023-03-06 RX ORDER — IBUPROFEN 200 MG
1 TABLET ORAL DAILY
Status: DISCONTINUED | OUTPATIENT
Start: 2023-03-06 | End: 2023-03-09 | Stop reason: HOSPADM

## 2023-03-06 RX ORDER — LOSARTAN POTASSIUM 25 MG/1
25 TABLET ORAL DAILY
Status: DISCONTINUED | OUTPATIENT
Start: 2023-03-07 | End: 2023-03-09 | Stop reason: HOSPADM

## 2023-03-06 RX ORDER — PANTOPRAZOLE SODIUM 40 MG/1
40 TABLET, DELAYED RELEASE ORAL 2 TIMES DAILY
Status: DISCONTINUED | OUTPATIENT
Start: 2023-03-06 | End: 2023-03-09 | Stop reason: HOSPADM

## 2023-03-06 RX ORDER — METOPROLOL SUCCINATE 50 MG/1
50 TABLET, EXTENDED RELEASE ORAL DAILY
Status: DISCONTINUED | OUTPATIENT
Start: 2023-03-07 | End: 2023-03-09 | Stop reason: HOSPADM

## 2023-03-06 RX ORDER — GUAIFENESIN 600 MG/1
1200 TABLET, EXTENDED RELEASE ORAL 2 TIMES DAILY
Status: DISCONTINUED | OUTPATIENT
Start: 2023-03-06 | End: 2023-03-09 | Stop reason: HOSPADM

## 2023-03-06 RX ORDER — BUPROPION HYDROCHLORIDE 150 MG/1
150 TABLET ORAL DAILY
Status: DISCONTINUED | OUTPATIENT
Start: 2023-03-07 | End: 2023-03-09 | Stop reason: HOSPADM

## 2023-03-06 RX ORDER — NAPROXEN SODIUM 220 MG/1
81 TABLET, FILM COATED ORAL DAILY
Status: DISCONTINUED | OUTPATIENT
Start: 2023-03-07 | End: 2023-03-09 | Stop reason: HOSPADM

## 2023-03-06 RX ORDER — TORSEMIDE 20 MG/1
20 TABLET ORAL DAILY
Status: DISCONTINUED | OUTPATIENT
Start: 2023-03-07 | End: 2023-03-07

## 2023-03-06 RX ORDER — SODIUM CHLORIDE 0.9 % (FLUSH) 0.9 %
10 SYRINGE (ML) INJECTION
Status: DISCONTINUED | OUTPATIENT
Start: 2023-03-06 | End: 2023-03-09 | Stop reason: HOSPADM

## 2023-03-06 RX ORDER — ALBUTEROL SULFATE 0.83 MG/ML
1.25 SOLUTION RESPIRATORY (INHALATION) EVERY 6 HOURS PRN
Status: DISCONTINUED | OUTPATIENT
Start: 2023-03-06 | End: 2023-03-09 | Stop reason: HOSPADM

## 2023-03-06 RX ORDER — HYDROCODONE BITARTRATE AND ACETAMINOPHEN 5; 325 MG/1; MG/1
1 TABLET ORAL
Status: COMPLETED | OUTPATIENT
Start: 2023-03-06 | End: 2023-03-06

## 2023-03-06 RX ORDER — ORAL SEMAGLUTIDE 7 MG/1
7 TABLET ORAL DAILY
COMMUNITY
End: 2023-03-16 | Stop reason: SDUPTHER

## 2023-03-06 RX ORDER — PROCHLORPERAZINE EDISYLATE 5 MG/ML
5 INJECTION INTRAMUSCULAR; INTRAVENOUS EVERY 6 HOURS PRN
Status: DISCONTINUED | OUTPATIENT
Start: 2023-03-06 | End: 2023-03-09 | Stop reason: HOSPADM

## 2023-03-06 RX ORDER — BUDESONIDE 0.5 MG/2ML
0.5 INHALANT ORAL EVERY 12 HOURS
Status: DISCONTINUED | OUTPATIENT
Start: 2023-03-07 | End: 2023-03-09 | Stop reason: HOSPADM

## 2023-03-06 RX ORDER — ZOLPIDEM TARTRATE 5 MG/1
5 TABLET ORAL NIGHTLY PRN
Status: DISCONTINUED | OUTPATIENT
Start: 2023-03-07 | End: 2023-03-09 | Stop reason: HOSPADM

## 2023-03-06 RX ORDER — FLUTICASONE FUROATE AND VILANTEROL 100; 25 UG/1; UG/1
1 POWDER RESPIRATORY (INHALATION) DAILY
Status: DISCONTINUED | OUTPATIENT
Start: 2023-03-07 | End: 2023-03-06

## 2023-03-06 RX ORDER — FLUTICASONE PROPIONATE 50 MCG
1 SPRAY, SUSPENSION (ML) NASAL DAILY
Status: DISCONTINUED | OUTPATIENT
Start: 2023-03-07 | End: 2023-03-09 | Stop reason: HOSPADM

## 2023-03-06 RX ORDER — ACETAMINOPHEN 325 MG/1
650 TABLET ORAL EVERY 4 HOURS PRN
Status: DISCONTINUED | OUTPATIENT
Start: 2023-03-06 | End: 2023-03-09 | Stop reason: HOSPADM

## 2023-03-06 RX ORDER — ATORVASTATIN CALCIUM 40 MG/1
40 TABLET, FILM COATED ORAL DAILY
Status: DISCONTINUED | OUTPATIENT
Start: 2023-03-07 | End: 2023-03-09 | Stop reason: HOSPADM

## 2023-03-06 RX ORDER — FUROSEMIDE 10 MG/ML
40 INJECTION INTRAMUSCULAR; INTRAVENOUS ONCE
Status: COMPLETED | OUTPATIENT
Start: 2023-03-06 | End: 2023-03-06

## 2023-03-06 RX ORDER — FUROSEMIDE 10 MG/ML
40 INJECTION INTRAMUSCULAR; INTRAVENOUS
Status: DISCONTINUED | OUTPATIENT
Start: 2023-03-06 | End: 2023-03-07

## 2023-03-06 RX ORDER — GUAIFENESIN 600 MG/1
1200 TABLET, EXTENDED RELEASE ORAL 2 TIMES DAILY
COMMUNITY
End: 2023-09-20 | Stop reason: ALTCHOICE

## 2023-03-06 RX ADMIN — PREGABALIN 100 MG: 75 CAPSULE ORAL at 09:03

## 2023-03-06 RX ADMIN — HYDROCODONE BITARTRATE AND ACETAMINOPHEN 1 TABLET: 5; 325 TABLET ORAL at 02:03

## 2023-03-06 RX ADMIN — NICOTINE 1 PATCH: 14 PATCH, EXTENDED RELEASE TRANSDERMAL at 06:03

## 2023-03-06 RX ADMIN — FUROSEMIDE 40 MG: 10 INJECTION, SOLUTION INTRAMUSCULAR; INTRAVENOUS at 03:03

## 2023-03-06 RX ADMIN — PANTOPRAZOLE SODIUM 40 MG: 40 TABLET, DELAYED RELEASE ORAL at 06:03

## 2023-03-06 RX ADMIN — GUAIFENESIN 1200 MG: 600 TABLET, EXTENDED RELEASE ORAL at 09:03

## 2023-03-06 RX ADMIN — APIXABAN 5 MG: 5 TABLET, FILM COATED ORAL at 09:03

## 2023-03-06 NOTE — ED PROVIDER NOTES
Encounter Date: 3/6/2023       History     Chief Complaint   Patient presents with    Chest Pain     Since being d/c on Thursday      Patient presents emergency department with reported chest pain onset since released from the hospital on Thursday patient was admitted intubated for COPD exacerbation with hypercapnia patient reports that she was told she had a heart attack at that time but see no evidence of this on her chart states that they made no changes in her medications he was apparently intubated overnight and then extubated states since discharge she is had midsternal chest pain that occasionally radiates to the right parasternal area she reports no significant shortness of breath she does report a cough with productive only of clear sputum she denies any fever chills patient was mildly hypoxic in the emergency department with oxygen saturation of 89% she is placed on 2 L in the ER with improvement to 98%      Review of patient's allergies indicates:   Allergen Reactions    Morphine Nausea And Vomiting    Sulfa (sulfonamide antibiotics) Nausea And Vomiting     Past Medical History:   Diagnosis Date    Acid reflux     COPD (chronic obstructive pulmonary disease)     Coronary artery disease     COVID-19     Diabetes mellitus     High cholesterol     Hypertension     Obese body habitus      Past Surgical History:   Procedure Laterality Date    APPENDECTOMY      CATHETERIZATION OF BOTH LEFT AND RIGHT HEART N/A 11/23/2020    Procedure: CATHETERIZATION, HEART, BOTH LEFT AND RIGHT;  Surgeon: Doug Kendall MD;  Location: [facility] CATH/EP LAB;  Service: Cardiology;  Laterality: N/A;    CHOLECYSTECTOMY      EXCISION OF LESION OF EYELID Right 12/11/2020    Procedure: EXCISION, LESION, EYELID;  Surgeon: Malachi Rodriguez MD;  Location: [facility] OR;  Service: Ophthalmology;  Laterality: Right;  Inclusion Cyst removal right lower lid    HYSTERECTOMY      JOINT REPLACEMENT Left     left hip    MASTOIDECTOMY Right      No  family history on file.  Social History     Tobacco Use    Smoking status: Former     Packs/day: 0.50     Years: 13.00     Pack years: 6.50     Types: Cigarettes     Quit date: 2022     Years since quittin.4     Passive exposure: Current    Smokeless tobacco: Never   Substance Use Topics    Alcohol use: Not Currently    Drug use: Not Currently     Review of Systems   Constitutional:  Positive for fatigue. Negative for chills and fever.   HENT:  Negative for congestion and sore throat.    Respiratory:  Positive for cough. Negative for shortness of breath.    Cardiovascular:  Positive for chest pain. Negative for palpitations and leg swelling.   Gastrointestinal:  Negative for abdominal pain, diarrhea, nausea and vomiting.   Genitourinary:  Negative for dysuria.   Musculoskeletal:  Negative for back pain.   All other systems reviewed and are negative.    Physical Exam     Initial Vitals [23 1051]   BP Pulse Resp Temp SpO2   (!) 102/56 81 20 98.3 °F (36.8 °C) 98 %      MAP       --         Physical Exam    Constitutional: She appears well-developed and well-nourished. No distress.   HENT:   Head: Normocephalic and atraumatic.   Right Ear: External ear normal.   Left Ear: External ear normal.   Mouth/Throat: Oropharynx is clear and moist.   Eyes: Conjunctivae and EOM are normal. Pupils are equal, round, and reactive to light.   Neck: Neck supple.   Normal range of motion.  Cardiovascular:  Normal rate, regular rhythm, normal heart sounds and intact distal pulses.           Pulmonary/Chest: Breath sounds normal. She is in respiratory distress.   Abdominal: Abdomen is soft. Bowel sounds are normal. There is no abdominal tenderness.   Musculoskeletal:         General: No edema. Normal range of motion.      Cervical back: Normal range of motion and neck supple.     Neurological: She is alert and oriented to person, place, and time. She has normal strength. GCS score is 15. GCS eye subscore is 4. GCS verbal  subscore is 5. GCS motor subscore is 6.   Skin: Skin is warm and dry. Capillary refill takes less than 2 seconds. No rash noted.   Psychiatric: She has a normal mood and affect. Her behavior is normal.       ED Course   Procedures  Labs Reviewed   CBC W/ AUTO DIFFERENTIAL - Abnormal; Notable for the following components:       Result Value    WBC 17.52 (*)     Gran # (ANC) 12.6 (*)     Immature Grans (Abs) 0.06 (*)     Mono # 1.6 (*)     Lymph % 17.6 (*)     All other components within normal limits   COMPREHENSIVE METABOLIC PANEL - Abnormal; Notable for the following components:    Sodium 130 (*)     Chloride 84 (*)     CO2 32 (*)     Glucose 320 (*)     BUN 62 (*)     Creatinine 2.5 (*)     eGFR 22.2 (*)     All other components within normal limits   B-TYPE NATRIURETIC PEPTIDE - Abnormal; Notable for the following components:     (*)     All other components within normal limits   TROPONIN I HIGH SENSITIVITY - Abnormal; Notable for the following components:    Troponin I High Sensitivity 23.7 (*)     All other components within normal limits   TROPONIN I HIGH SENSITIVITY - Abnormal; Notable for the following components:    Troponin I High Sensitivity 22.4 (*)     All other components within normal limits   TROPONIN I HIGH SENSITIVITY - Abnormal; Notable for the following components:    Troponin I High Sensitivity 24.4 (*)     All other components within normal limits    Narrative:     In order to access the algorithm for troponin high  sensitivity orders access the address below:  [email or web address]   PROTIME-INR   MAGNESIUM        ECG Results              EKG 12-lead (In process)  Result time 03/06/23 11:11:49      In process by Interface, Lab In [facility] (03/06/23 11:11:49)                   Narrative:    Test Reason : R07.9,    Vent. Rate : 084 BPM     Atrial Rate : 084 BPM     P-R Int : 160 ms          QRS Dur : 094 ms      QT Int : 402 ms        P-R-T Axes : 052 -14 076 degrees     QTc Int : 475 ms    Normal sinus rhythm  Voltage criteria for left ventricular hypertrophy  Abnormal ECG  When compared with ECG of 27-FEB-2023 06:50,  Questionable change in The axis  T wave inversion no longer evident in Inferior leads  T wave inversion less evident in Lateral leads    Referred By: [email or web address]   SELF           Confirmed By:                                   Imaging Results              NM Lung Scan Ventilation Perfusion (Final result)  Result time 03/06/23 14:21:57      Final result by Julián You MD (03/06/23 14:21:57)                   Narrative:    CLINICAL HISTORY:  57 years (1965) Female Pulmonary embolism (PE) suspected, high prob HX CHEST PAIN SINCE LAST Thursday; DOSE 10.0 mCI ; 5.2 mCI 99mTC  MAA; HX OF COPD    TECHNIQUE:  NM LUNG VENTILATION PERFUSION. 3 images obtained. Using 10 mCi Xe-133, ventilation images were acquired in the posterior projection during single breath, equilibrium and wash out phases. Then, 5.2 mCi Tc-99m MAA was injected intravenously and 8 standard projections of the lungs were acquired.    COMPARISON:  Most recent radiograph of the chest from the same day.    FINDINGS:  The cardiac silhouette is mildly enlarged.    Ventilation images demonstrate homogeneous tracer distribution.    Perfusion images demonstrate relative homogeneous tracer distribution.. There are no moderate or large subsegmental or segmental perfusion defects.    There are no areas of V/Q mismatch.    IMPRESSION:  Low probability for acute PE.            Electronically signed by:  Julián You MD  3/6/2023 2:21 PM [facility] Workstation: [id number]                                     X-Ray Chest AP Portable (Final result)  Result time 03/06/23 12:11:01      Final result by Valencia Encinas MD (03/06/23 12:11:01)                   Narrative:    XR CHEST 1 VIEW    CLINICAL HISTORY:  57 years Female chest pain    COMPARISON: 3/1/2023    FINDINGS:  Cardiomediastinal silhouette is within normal limits. Lungs are normally expanded with no airspace consolidation. No pleural effusion or pneumothorax. No acute osseous abnormality.    IMPRESSION: No acute pulmonary process.      Electronically signed by:  Valencia Encinas MD  3/6/2023 12:11 PM [facility] Workstation: [id number]                                     Medications   albuterol nebulizer solution 1.25 mg (has no administration in time range)   apixaban tablet 5 mg (5 mg Oral Given 3/7/23 0838)   aspirin chewable tablet 81 mg (81 mg Oral Given 3/7/23 0838)   atorvastatin tablet 40 mg (40 mg Oral Given 3/7/23 0838)   buPROPion TB24 tablet 150 mg (150 mg Oral Given 3/7/23 0837)   fluticasone propionate 50 mcg/actuation nasal spray 50 mcg (50 mcg Each Nostril Given 3/7/23 0838)   guaiFENesin 12 hr tablet 1,200 mg (1,200 mg Oral Given 3/7/23 0837)   losartan tablet 25 mg (0 mg Oral Hold 3/7/23 0900)   metoprolol succinate (TOPROL-XL) 24 hr tablet 50 mg (0 mg Oral Hold 3/7/23 0900)   nicotine 14 mg/24 hr 1 patch (1 patch Transdermal Patch Applied 3/6/23 1808)   pantoprazole EC tablet 40 mg (40 mg Oral Not Given 3/7/23 0600)   pregabalin capsule 100 mg (100 mg Oral Given 3/7/23 0837)   torsemide tablet 20 mg (20 mg Oral Given 3/7/23 0838)   sodium chloride 0.9% flush 10 mL (has no administration in time range)   acetaminophen tablet 650 mg (has no administration in time range)   prochlorperazine injection Soln 5 mg (has no administration in time range)   furosemide injection 40 mg (40 mg Intravenous Given 3/7/23 0520)   budesonide nebulizer solution 0.5 mg (0.5 mg Nebulization Given 3/7/23 0743)   arformoteroL nebulizer solution 15 mcg (15 mcg Nebulization Given 3/7/23 0737)   zolpidem tablet 5 mg (5 mg Oral Given 3/7/23 0010)   levoFLOXacin 500 mg/100 mL IVPB 500 mg (has no administration in time range)   guaiFENesin-codeine 100-10 mg/5 ml syrup 10 mL (has no administration in time range)   HYDROcodone-acetaminophen  5-325 mg per tablet 1 tablet (1 tablet Oral Given 3/6/23 1400)   furosemide injection 40 mg (40 mg Intravenous Given 3/6/23 1549)     Medical Decision Making:   Differential Diagnosis:   Angina, coronary disease, chest wall pain, pneumonia, pulmonary embolus  Clinical Tests:   Lab Tests: Ordered and Reviewed  Radiological Study: Ordered and Reviewed  Medical Tests: Ordered and Reviewed  ED Management:  Laboratory evaluation reviewed discussed patient's findings with  he was evaluated patient in the emergency department for admission                        Clinical Impression:   Final diagnoses:  [R07.9] Chest pain        ED Disposition Condition    Observation                 Tim Sung MD  03/07/23 1007

## 2023-03-06 NOTE — HPI
ED Note  Patient presents emergency department with reported chest pain onset since released from the hospital on Thursday patient was admitted intubated for COPD exacerbation with hypercapnia patient reports that she was told she had a heart attack at that time but see no evidence of this on her chart states that they made no changes in her medications he was apparently intubated overnight and then extubated states since discharge she is had midsternal chest pain that occasionally radiates to the right parasternal area she reports no significant shortness of breath she does report a cough with productive only of clear sputum she denies any fever chills patient was mildly hypoxic in the emergency department with oxygen saturation of 89% she is placed on 2 L in the ER with improvement to 98%    3/6/2023  Pt has had 4-6/10 substernal pain since her discharge on Thursday. Ms Fox feels that she has something that she cannot cough up. Her heart doctor is Dr Kendall. She is experiencing orthopnea and PND

## 2023-03-06 NOTE — H&P
[facility] - [facility]  [facility] Medicine  History & Physical    Patient Name: Delores Fox  MRN: [id number]  Patient Class: OP- Observation  Admission Date: 3/6/2023  Attending Physician: Vinny Ravi MD  Primary Care Provider: Tomy Rios MD         Patient information was obtained from patient, past medical records and ER records.     Subjective:     Principal Problem:Chest pain    Chief Complaint:   Chief Complaint   Patient presents with    Chest Pain     Since being d/c on Thursday         HPI: ED Note  Patient presents emergency department with reported chest pain onset since released from the hospital on Thursday patient was admitted intubated for COPD exacerbation with hypercapnia patient reports that she was told she had a heart attack at that time but see no evidence of this on her chart states that they made no changes in her medications he was apparently intubated overnight and then extubated states since discharge she is had midsternal chest pain that occasionally radiates to the right parasternal area she reports no significant shortness of breath she does report a cough with productive only of clear sputum she denies any fever chills patient was mildly hypoxic in the emergency department with oxygen saturation of 89% she is placed on 2 L in the ER with improvement to 98%    3/6/2023  Pt has had 4-6/10 substernal pain since her discharge on Thursday. Ms Fox feels that she has something that she cannot cough up. Her heart doctor is Dr Kendall. She is experiencing orthopnea and PND      Past Medical History:   Diagnosis Date    Acid reflux     COPD (chronic obstructive pulmonary disease)     Coronary artery disease     COVID-19     Diabetes mellitus     High cholesterol     Hypertension     Obese body habitus        Past Surgical History:   Procedure Laterality Date    APPENDECTOMY      CATHETERIZATION OF BOTH LEFT AND RIGHT HEART N/A 11/23/2020     Procedure: CATHETERIZATION, HEART, BOTH LEFT AND RIGHT;  Surgeon: Doug Kendall MD;  Location: [facility] CATH/EP LAB;  Service: Cardiology;  Laterality: N/A;    CHOLECYSTECTOMY      EXCISION OF LESION OF EYELID Right 12/11/2020    Procedure: EXCISION, LESION, EYELID;  Surgeon: Malachi Rodriguez MD;  Location: [facility] OR;  Service: Ophthalmology;  Laterality: Right;  Inclusion Cyst removal right lower lid    HYSTERECTOMY      JOINT REPLACEMENT Left     left hip    MASTOIDECTOMY Right        Review of patient's allergies indicates:   Allergen Reactions    Morphine Nausea And Vomiting    Sulfa (sulfonamide antibiotics) Nausea And Vomiting       Current Facility-Administered Medications on File Prior to Encounter   Medication    electrolyte-S (ISOLYTE)    lidocaine (PF) 10 mg/ml (1%) injection 10 mg    sodium chloride 0.9% flush 3 mL     Current Outpatient Medications on File Prior to Encounter   Medication Sig    apixaban (ELIQUIS) 5 mg Tab Take 1 tablet (5 mg total) by mouth 2 (two) times daily.    aspirin 81 MG Chew Take 1 tablet (81 mg total) by mouth once daily.    atorvastatin (LIPITOR) 40 MG tablet Take 1 tablet (40 mg total) by mouth once daily.    buPROPion (WELLBUTRIN XL) 150 MG TB24 tablet Take 1 tablet (150 mg total) by mouth once daily.    clotrimazole-betamethasone 1-0.05% (LOTRISONE) cream Apply topically 2 (two) times daily.    fluticasone propion-salmeterol 115-21 mcg/dose (ADVAIR HFA) 115-21 mcg/actuation HFAA inhaler Inhale 2 puffs into the lungs every 12 (twelve) hours. Controller    fluticasone-umeclidin-vilanter (TRELEGY ELLIPTA) 200-62.5-25 mcg inhaler Inhale 1 puff into the lungs once daily.    guaiFENesin (MUCINEX) 600 mg 12 hr tablet Take 1,200 mg by mouth 2 (two) times daily.    losartan (COZAAR) 25 MG tablet Take 0.5 tablets (12.5 mg total) by mouth once daily. (Patient taking differently: Take 25 mg by mouth once daily.)    metoprolol succinate (TOPROL-XL) 50 MG 24 hr  tablet Take 1 tablet (50 mg total) by mouth once daily.    pantoprazole (PROTONIX) 40 MG tablet Take 1 tablet (40 mg total) by mouth 2 (two) times daily.    pregabalin (LYRICA) 100 MG capsule Take 1 capsule (100 mg total) by mouth 3 (three) times daily.    semaglutide (RYBELSUS) 7 mg tablet Take 7 mg by mouth once daily.    torsemide (DEMADEX) 20 MG Tab Take 1 tablet (20 mg total) by mouth once daily. Cake torsemide 20 mg once daily.  Can take additional dose in afternoon if increased shortness of breath or leg swelling for 2-3 days if needed    zolpidem (AMBIEN) 10 mg Tab Take 1 tablet (10 mg total) by mouth nightly as needed (insomnia).    albuterol (ACCUNEB) 1.25 mg/3 mL Nebu Take 3 mLs (1.25 mg total) by nebulization every 6 (six) hours as needed (wheezing). Rescue (Patient not taking: Reported on 3/6/2023)    albuterol (PROVENTIL/VENTOLIN HFA) 90 mcg/actuation inhaler Inhale 2 puffs into the lungs every 6 (six) hours as needed for Wheezing or Shortness of Breath. Rescue (Patient not taking: Reported on 3/6/2023)    blood-glucose meter kit To check BG 2 times daily, to use with insurance preferred meter    fluticasone propionate (FLONASE) 50 mcg/actuation nasal spray SHAKE LIQUID AND USE 1 SPRAY(50 MCG) IN EACH NOSTRIL EVERY DAY (Patient not taking: Reported on 3/6/2023)    lancets Misc To check BG 2 times daily, to use with insurance preferred meter    metFORMIN (GLUCOPHAGE-XR) 500 MG ER 24hr tablet Take 1 tablet (500 mg total) by mouth daily with breakfast. (Patient not taking: Reported on 3/6/2023)    nicotine, polacrilex, (NICORETTE) 2 mg Gum Take 1 each (2 mg total) by mouth as needed (smoking urge).    TRUE METRIX GLUCOSE TEST STRIP Strp CHECK SUGAR TWICE DAILY    [DISCONTINUED] LORazepam (ATIVAN) 0.5 MG tablet Take 1 tablet (0.5 mg total) by mouth every 6 (six) hours as needed for Anxiety.    [DISCONTINUED] ondansetron (ZOFRAN) 4 MG tablet Take 1 tablet (4 mg total) by mouth every 8 (eight)  hours as needed for Nausea.     Family History    None       Tobacco Use    Smoking status: Former     Packs/day: 0.50     Years: 13.00     Pack years: 6.50     Types: Cigarettes     Quit date: 2022     Years since quittin.4     Passive exposure: Current    Smokeless tobacco: Never   Substance and Sexual Activity    Alcohol use: Not Currently    Drug use: Not Currently    Sexual activity: Not Currently     Review of Systems   Constitutional:  Positive for activity change, diaphoresis and fatigue. Negative for chills and fever.   HENT: Negative.     Eyes: Negative.    Respiratory:  Positive for chest tightness and shortness of breath. Negative for wheezing.    Cardiovascular:  Positive for chest pain and palpitations.   Gastrointestinal:  Positive for abdominal distention. Negative for abdominal pain.   Endocrine: Positive for heat intolerance.   Genitourinary: Negative.    Musculoskeletal:  Positive for arthralgias and myalgias.   Skin: Negative.    Allergic/Immunologic: Negative.    Neurological:  Positive for weakness.   Hematological:  Bruises/bleeds easily.   Psychiatric/Behavioral:  Positive for dysphoric mood. The patient is nervous/anxious.    Objective:     Vital Signs (Most Recent):  Temp: 98.3 °F (36.8 °C) (23 1051)  Pulse: 77 (23 1401)  Resp: (!) 22 (23 1401)  BP: 110/66 (23 1401)  SpO2: (!) 94 % (23 1401)   Vital Signs (24h Range):  Temp:  [98.3 °F (36.8 °C)] 98.3 °F (36.8 °C)  Pulse:  [75-81] 77  Resp:  [18-22] 22  SpO2:  [89 %-98 %] 94 %  BP: ()/(56-99) 110/66     Weight: 108.4 kg (239 lb)  Body mass index is 37.43 kg/m².    Physical Exam  Vitals and nursing note reviewed.   Constitutional:       General: She is not in acute distress.     Appearance: She is ill-appearing and diaphoretic.   HENT:      Head: Normocephalic and atraumatic.      Nose: Nose normal.      Mouth/Throat:      Mouth: Mucous membranes are moist.   Eyes:      Extraocular Movements:  Extraocular movements intact.      Pupils: Pupils are equal, round, and reactive to light.   Neck:      Comments: Use of accessory muscles with movement  Cardiovascular:      Rate and Rhythm: Normal rate and regular rhythm.      Heart sounds:     Gallop present.   Pulmonary:      Breath sounds: Rhonchi present.   Abdominal:      General: There is distension.      Tenderness: There is no abdominal tenderness. There is no guarding or rebound.   Musculoskeletal:         General: Normal range of motion.      Cervical back: Normal range of motion and neck supple.   Skin:     General: Skin is warm.   Neurological:      Mental Status: She is alert and oriented to person, place, and time.   Psychiatric:      Comments: Dysphoric mood         CRANIAL NERVES     CN III, IV, VI   Pupils are equal, round, and reactive to light.     Significant Labs: All pertinent labs within the past 24 hours have been reviewed.  Recent Lab Results         03/06/23  1311   03/06/23  1118        Albumin   3.7       Alkaline Phosphatase   111       ALT   22       Anion Gap   14       AST   16       Baso #   0.03       Basophil %   0.2       BILIRUBIN TOTAL   0.7  Comment: For infants and newborns, interpretation of results should be based  on gestational age, weight and in agreement with clinical  observations.    Premature Infant recommended reference ranges:  Up to 24 hours.............<8.0 mg/dL  Up to 48 hours............<12.0 mg/dL  3-5 days..................<15.0 mg/dL  6-29 days.................<15.0 mg/dL         BNP   140  Comment: Values of less than 100 pg/ml are consistent with non-CHF populations.       BUN   62       Calcium   10.4       Chloride   84       CO2   32       Creatinine   2.5       Differential Method   Automated       eGFR   22.2       Eos #   0.2       Eosinophil %   1.2       Glucose   320       Gran # (ANC)   12.6       Gran %   71.8       Hematocrit   45.3       Hemoglobin   14.8       Immature Grans (Abs)    0.06  Comment: Mild elevation in immature granulocytes is non specific and   can be seen in a variety of conditions including stress response,   acute inflammation, trauma and pregnancy. Correlation with other   laboratory and clinical findings is essential.         Immature Granulocytes   0.3       INR   1.1  Comment: Coumadin Therapy:  2.0 - 3.0 for INR for all indicators except mechanical heart valves  and antiphospholipid syndromes which should use 2.5 - 3.5.         Lymph #   3.1       Lymph %   17.6       Magnesium   1.6       MCH   30.1       MCHC   32.7       MCV   92       Mono #   1.6       Mono %   8.9       MPV   9.9       nRBC   0       Platelets   349       Potassium   3.9       PROTEIN TOTAL   6.7       Protime   11.8       RBC   4.92       RDW   13.0       Sodium   130       Troponin I High Sensitivity 22.4  Comment: Troponin results differ between methods. Do not use   results between Troponin methods interchangeably.    Alkaline Phospatase levels above 400 U/L may   cause false positive results.    Access hsTnI should not be used for patients taking   Asfotase can (Strensiq).     23.7  Comment: Troponin results differ between methods. Do not use   results between Troponin methods interchangeably.    Alkaline Phospatase levels above 400 U/L may   cause false positive results.    Access hsTnI should not be used for patients taking   Asfotase can (Strensiq).         WBC   17.52               Significant Imaging: I have reviewed all pertinent imaging results/findings within the past 24 hours.    Assessment/Plan:     * Chest pain with abnormal stress test  Observe telemetry  Serial cardiac enzymes  Cardiology consulted      COPD  Respiratory treatments      Cardiomyopathy  Last EF of 30%  Lasix 40 mg IVP administered then Q 12  Cardiology consulted          VTE Risk Mitigation (From admission, onward)    None             Vinny Ravi MD  Department of Hospital Medicine   [facility] -  Emergency Dept

## 2023-03-06 NOTE — SUBJECTIVE & OBJECTIVE
Past Medical History:   Diagnosis Date    Acid reflux     COPD (chronic obstructive pulmonary disease)     Coronary artery disease     COVID-19     Diabetes mellitus     High cholesterol     Hypertension     Obese body habitus        Past Surgical History:   Procedure Laterality Date    APPENDECTOMY      CATHETERIZATION OF BOTH LEFT AND RIGHT HEART N/A 11/23/2020    Procedure: CATHETERIZATION, HEART, BOTH LEFT AND RIGHT;  Surgeon: Doug Kendall MD;  Location: [facility] CATH/EP LAB;  Service: Cardiology;  Laterality: N/A;    CHOLECYSTECTOMY      EXCISION OF LESION OF EYELID Right 12/11/2020    Procedure: EXCISION, LESION, EYELID;  Surgeon: Malachi Rodriguez MD;  Location: [facility] OR;  Service: Ophthalmology;  Laterality: Right;  Inclusion Cyst removal right lower lid    HYSTERECTOMY      JOINT REPLACEMENT Left     left hip    MASTOIDECTOMY Right        Review of patient's allergies indicates:   Allergen Reactions    Morphine Nausea And Vomiting    Sulfa (sulfonamide antibiotics) Nausea And Vomiting       Current Facility-Administered Medications on File Prior to Encounter   Medication    electrolyte-S (ISOLYTE)    lidocaine (PF) 10 mg/ml (1%) injection 10 mg    sodium chloride 0.9% flush 3 mL     Current Outpatient Medications on File Prior to Encounter   Medication Sig    apixaban (ELIQUIS) 5 mg Tab Take 1 tablet (5 mg total) by mouth 2 (two) times daily.    aspirin 81 MG Chew Take 1 tablet (81 mg total) by mouth once daily.    atorvastatin (LIPITOR) 40 MG tablet Take 1 tablet (40 mg total) by mouth once daily.    buPROPion (WELLBUTRIN XL) 150 MG TB24 tablet Take 1 tablet (150 mg total) by mouth once daily.    clotrimazole-betamethasone 1-0.05% (LOTRISONE) cream Apply topically 2 (two) times daily.    fluticasone propion-salmeterol 115-21 mcg/dose (ADVAIR HFA) 115-21 mcg/actuation HFAA inhaler Inhale 2 puffs into the lungs every 12 (twelve) hours. Controller    fluticasone-umeclidin-vilanter (TRELEGY ELLIPTA)  200-62.5-25 mcg inhaler Inhale 1 puff into the lungs once daily.    guaiFENesin (MUCINEX) 600 mg 12 hr tablet Take 1,200 mg by mouth 2 (two) times daily.    losartan (COZAAR) 25 MG tablet Take 0.5 tablets (12.5 mg total) by mouth once daily. (Patient taking differently: Take 25 mg by mouth once daily.)    metoprolol succinate (TOPROL-XL) 50 MG 24 hr tablet Take 1 tablet (50 mg total) by mouth once daily.    pantoprazole (PROTONIX) 40 MG tablet Take 1 tablet (40 mg total) by mouth 2 (two) times daily.    pregabalin (LYRICA) 100 MG capsule Take 1 capsule (100 mg total) by mouth 3 (three) times daily.    semaglutide (RYBELSUS) 7 mg tablet Take 7 mg by mouth once daily.    torsemide (DEMADEX) 20 MG Tab Take 1 tablet (20 mg total) by mouth once daily. Cake torsemide 20 mg once daily.  Can take additional dose in afternoon if increased shortness of breath or leg swelling for 2-3 days if needed    zolpidem (AMBIEN) 10 mg Tab Take 1 tablet (10 mg total) by mouth nightly as needed (insomnia).    albuterol (ACCUNEB) 1.25 mg/3 mL Nebu Take 3 mLs (1.25 mg total) by nebulization every 6 (six) hours as needed (wheezing). Rescue (Patient not taking: Reported on 3/6/2023)    albuterol (PROVENTIL/VENTOLIN HFA) 90 mcg/actuation inhaler Inhale 2 puffs into the lungs every 6 (six) hours as needed for Wheezing or Shortness of Breath. Rescue (Patient not taking: Reported on 3/6/2023)    blood-glucose meter kit To check BG 2 times daily, to use with insurance preferred meter    fluticasone propionate (FLONASE) 50 mcg/actuation nasal spray SHAKE LIQUID AND USE 1 SPRAY(50 MCG) IN EACH NOSTRIL EVERY DAY (Patient not taking: Reported on 3/6/2023)    lancets Misc To check BG 2 times daily, to use with insurance preferred meter    metFORMIN (GLUCOPHAGE-XR) 500 MG ER 24hr tablet Take 1 tablet (500 mg total) by mouth daily with breakfast. (Patient not taking: Reported on 3/6/2023)    nicotine, polacrilex, (NICORETTE) 2 mg Gum Take 1 each (2 mg  total) by mouth as needed (smoking urge).    TRUE METRIX GLUCOSE TEST STRIP Strp CHECK SUGAR TWICE DAILY    [DISCONTINUED] LORazepam (ATIVAN) 0.5 MG tablet Take 1 tablet (0.5 mg total) by mouth every 6 (six) hours as needed for Anxiety.    [DISCONTINUED] ondansetron (ZOFRAN) 4 MG tablet Take 1 tablet (4 mg total) by mouth every 8 (eight) hours as needed for Nausea.     Family History    None       Tobacco Use    Smoking status: Former     Packs/day: 0.50     Years: 13.00     Pack years: 6.50     Types: Cigarettes     Quit date: 2022     Years since quittin.4     Passive exposure: Current    Smokeless tobacco: Never   Substance and Sexual Activity    Alcohol use: Not Currently    Drug use: Not Currently    Sexual activity: Not Currently     Review of Systems   Constitutional:  Positive for activity change, diaphoresis and fatigue. Negative for chills and fever.   HENT: Negative.     Eyes: Negative.    Respiratory:  Positive for chest tightness and shortness of breath. Negative for wheezing.    Cardiovascular:  Positive for chest pain and palpitations.   Gastrointestinal:  Positive for abdominal distention. Negative for abdominal pain.   Endocrine: Positive for heat intolerance.   Genitourinary: Negative.    Musculoskeletal:  Positive for arthralgias and myalgias.   Skin: Negative.    Allergic/Immunologic: Negative.    Neurological:  Positive for weakness.   Hematological:  Bruises/bleeds easily.   Psychiatric/Behavioral:  Positive for dysphoric mood. The patient is nervous/anxious.    Objective:     Vital Signs (Most Recent):  Temp: 98.3 °F (36.8 °C) (23 1051)  Pulse: 77 (23 1401)  Resp: (!) 22 (23 1401)  BP: 110/66 (23 1401)  SpO2: (!) 94 % (23 1401)   Vital Signs (24h Range):  Temp:  [98.3 °F (36.8 °C)] 98.3 °F (36.8 °C)  Pulse:  [75-81] 77  Resp:  [18-22] 22  SpO2:  [89 %-98 %] 94 %  BP: ()/(56-99) 110/66     Weight: 108.4 kg (239 lb)  Body mass index is 37.43  kg/m².    Physical Exam  Vitals and nursing note reviewed.   Constitutional:       General: She is not in acute distress.     Appearance: She is ill-appearing and diaphoretic.   HENT:      Head: Normocephalic and atraumatic.      Nose: Nose normal.      Mouth/Throat:      Mouth: Mucous membranes are moist.   Eyes:      Extraocular Movements: Extraocular movements intact.      Pupils: Pupils are equal, round, and reactive to light.   Neck:      Comments: Use of accessory muscles with movement  Cardiovascular:      Rate and Rhythm: Normal rate and regular rhythm.      Heart sounds:     Gallop present.   Pulmonary:      Breath sounds: Rhonchi present.   Abdominal:      General: There is distension.      Tenderness: There is no abdominal tenderness. There is no guarding or rebound.   Musculoskeletal:         General: Normal range of motion.      Cervical back: Normal range of motion and neck supple.   Skin:     General: Skin is warm.   Neurological:      Mental Status: She is alert and oriented to person, place, and time.   Psychiatric:      Comments: Dysphoric mood         CRANIAL NERVES     CN III, IV, VI   Pupils are equal, round, and reactive to light.     Significant Labs: All pertinent labs within the past 24 hours have been reviewed.  Recent Lab Results         03/06/23  1311   03/06/23  1118        Albumin   3.7       Alkaline Phosphatase   111       ALT   22       Anion Gap   14       AST   16       Baso #   0.03       Basophil %   0.2       BILIRUBIN TOTAL   0.7  Comment: For infants and newborns, interpretation of results should be based  on gestational age, weight and in agreement with clinical  observations.    Premature Infant recommended reference ranges:  Up to 24 hours.............<8.0 mg/dL  Up to 48 hours............<12.0 mg/dL  3-5 days..................<15.0 mg/dL  6-29 days.................<15.0 mg/dL         BNP   140  Comment: Values of less than 100 pg/ml are consistent with non-CHF  populations.       BUN   62       Calcium   10.4       Chloride   84       CO2   32       Creatinine   2.5       Differential Method   Automated       eGFR   22.2       Eos #   0.2       Eosinophil %   1.2       Glucose   320       Gran # (ANC)   12.6       Gran %   71.8       Hematocrit   45.3       Hemoglobin   14.8       Immature Grans (Abs)   0.06  Comment: Mild elevation in immature granulocytes is non specific and   can be seen in a variety of conditions including stress response,   acute inflammation, trauma and pregnancy. Correlation with other   laboratory and clinical findings is essential.         Immature Granulocytes   0.3       INR   1.1  Comment: Coumadin Therapy:  2.0 - 3.0 for INR for all indicators except mechanical heart valves  and antiphospholipid syndromes which should use 2.5 - 3.5.         Lymph #   3.1       Lymph %   17.6       Magnesium   1.6       MCH   30.1       MCHC   32.7       MCV   92       Mono #   1.6       Mono %   8.9       MPV   9.9       nRBC   0       Platelets   349       Potassium   3.9       PROTEIN TOTAL   6.7       Protime   11.8       RBC   4.92       RDW   13.0       Sodium   130       Troponin I High Sensitivity 22.4  Comment: Troponin results differ between methods. Do not use   results between Troponin methods interchangeably.    Alkaline Phospatase levels above 400 U/L may   cause false positive results.    Access hsTnI should not be used for patients taking   Asfotase can (Strensiq).     23.7  Comment: Troponin results differ between methods. Do not use   results between Troponin methods interchangeably.    Alkaline Phospatase levels above 400 U/L may   cause false positive results.    Access hsTnI should not be used for patients taking   Asfotase can (Strensiq).         WBC   17.52               Significant Imaging: I have reviewed all pertinent imaging results/findings within the past 24 hours.

## 2023-03-07 ENCOUNTER — CLINICAL SUPPORT (OUTPATIENT)
Dept: SMOKING CESSATION | Facility: CLINIC | Age: 58
End: 2023-03-07
Payer: COMMERCIAL

## 2023-03-07 DIAGNOSIS — F17.200 NICOTINE DEPENDENCE: Primary | ICD-10-CM

## 2023-03-07 LAB
ANION GAP SERPL CALC-SCNC: 11 MMOL/L (ref 8–16)
BACTERIA #/AREA URNS HPF: NEGATIVE /HPF
BUN SERPL-MCNC: 68 MG/DL (ref 6–20)
CALCIUM SERPL-MCNC: 9 MG/DL (ref 8.7–10.5)
CHLORIDE SERPL-SCNC: 87 MMOL/L (ref 95–110)
CO2 SERPL-SCNC: 33 MMOL/L (ref 23–29)
CREAT SERPL-MCNC: 2.2 MG/DL (ref 0.5–1.4)
ERYTHROCYTE [DISTWIDTH] IN BLOOD BY AUTOMATED COUNT: 12.9 % (ref 11.5–14.5)
EST. GFR  (NO RACE VARIABLE): 25.8 ML/MIN/1.73 M^2
GLUCOSE SERPL-MCNC: 217 MG/DL (ref 70–110)
GLUCOSE SERPL-MCNC: 231 MG/DL (ref 70–110)
GLUCOSE SERPL-MCNC: 303 MG/DL (ref 70–110)
HCT VFR BLD AUTO: 44.7 % (ref 37–48.5)
HGB BLD-MCNC: 14.4 G/DL (ref 12–16)
HYALINE CASTS #/AREA URNS LPF: 5 /LPF
MCH RBC QN AUTO: 30 PG (ref 27–31)
MCHC RBC AUTO-ENTMCNC: 32.2 G/DL (ref 32–36)
MCV RBC AUTO: 93 FL (ref 82–98)
MICROSCOPIC COMMENT: ABNORMAL
PLATELET # BLD AUTO: 349 K/UL (ref 150–450)
PMV BLD AUTO: 9.7 FL (ref 9.2–12.9)
POTASSIUM SERPL-SCNC: 3.5 MMOL/L (ref 3.5–5.1)
RBC # BLD AUTO: 4.8 M/UL (ref 4–5.4)
RBC #/AREA URNS HPF: 2 /HPF (ref 0–4)
SODIUM SERPL-SCNC: 131 MMOL/L (ref 136–145)
SQUAMOUS #/AREA URNS HPF: 3 /HPF
TROPONIN I SERPL HS-MCNC: 21 PG/ML (ref 0–14.9)
WBC # BLD AUTO: 16.24 K/UL (ref 3.9–12.7)
WBC #/AREA URNS HPF: 13 /HPF (ref 0–5)

## 2023-03-07 PROCEDURE — 27000221 HC OXYGEN, UP TO 24 HOURS

## 2023-03-07 PROCEDURE — 81001 URINALYSIS AUTO W/SCOPE: CPT | Performed by: INTERNAL MEDICINE

## 2023-03-07 PROCEDURE — 25000003 PHARM REV CODE 250: Performed by: INTERNAL MEDICINE

## 2023-03-07 PROCEDURE — 25000242 PHARM REV CODE 250 ALT 637 W/ HCPCS: Performed by: INTERNAL MEDICINE

## 2023-03-07 PROCEDURE — 84484 ASSAY OF TROPONIN QUANT: CPT | Performed by: INTERNAL MEDICINE

## 2023-03-07 PROCEDURE — 94640 AIRWAY INHALATION TREATMENT: CPT

## 2023-03-07 PROCEDURE — 80048 BASIC METABOLIC PNL TOTAL CA: CPT | Performed by: INTERNAL MEDICINE

## 2023-03-07 PROCEDURE — 36415 COLL VENOUS BLD VENIPUNCTURE: CPT | Performed by: INTERNAL MEDICINE

## 2023-03-07 PROCEDURE — 99222 PR INITIAL HOSPITAL CARE,LEVL II: ICD-10-PCS | Mod: ,,, | Performed by: INTERNAL MEDICINE

## 2023-03-07 PROCEDURE — 96375 TX/PRO/DX INJ NEW DRUG ADDON: CPT

## 2023-03-07 PROCEDURE — 96376 TX/PRO/DX INJ SAME DRUG ADON: CPT

## 2023-03-07 PROCEDURE — 94799 UNLISTED PULMONARY SVC/PX: CPT

## 2023-03-07 PROCEDURE — 63600175 PHARM REV CODE 636 W HCPCS: Performed by: INTERNAL MEDICINE

## 2023-03-07 PROCEDURE — 99900031 HC PATIENT EDUCATION (STAT)

## 2023-03-07 PROCEDURE — 99222 1ST HOSP IP/OBS MODERATE 55: CPT | Mod: ,,, | Performed by: INTERNAL MEDICINE

## 2023-03-07 PROCEDURE — S4991 NICOTINE PATCH NONLEGEND: HCPCS | Performed by: INTERNAL MEDICINE

## 2023-03-07 PROCEDURE — 94761 N-INVAS EAR/PLS OXIMETRY MLT: CPT

## 2023-03-07 PROCEDURE — 99407 BEHAV CHNG SMOKING > 10 MIN: CPT | Mod: S$PBB,,, | Performed by: FAMILY MEDICINE

## 2023-03-07 PROCEDURE — 99407 PR TOBACCO USE CESSATION INTENSIVE >10 MINUTES: ICD-10-PCS | Mod: S$PBB,,, | Performed by: FAMILY MEDICINE

## 2023-03-07 PROCEDURE — 96365 THER/PROPH/DIAG IV INF INIT: CPT

## 2023-03-07 PROCEDURE — G0378 HOSPITAL OBSERVATION PER HR: HCPCS

## 2023-03-07 PROCEDURE — 85027 COMPLETE CBC AUTOMATED: CPT | Performed by: INTERNAL MEDICINE

## 2023-03-07 PROCEDURE — 99900035 HC TECH TIME PER 15 MIN (STAT)

## 2023-03-07 PROCEDURE — 99999 PR PBB SHADOW E&M-EST. PATIENT-LVL I: CPT | Mod: PBBFAC,,,

## 2023-03-07 PROCEDURE — 99999 PR PBB SHADOW E&M-EST. PATIENT-LVL I: ICD-10-PCS | Mod: PBBFAC,,,

## 2023-03-07 PROCEDURE — 21400001 HC TELEMETRY ROOM

## 2023-03-07 RX ORDER — LEVOFLOXACIN 5 MG/ML
250 INJECTION, SOLUTION INTRAVENOUS
Status: DISCONTINUED | OUTPATIENT
Start: 2023-03-08 | End: 2023-03-08

## 2023-03-07 RX ORDER — LEVOFLOXACIN 5 MG/ML
500 INJECTION, SOLUTION INTRAVENOUS
Status: DISCONTINUED | OUTPATIENT
Start: 2023-03-07 | End: 2023-03-07

## 2023-03-07 RX ORDER — CODEINE PHOSPHATE AND GUAIFENESIN 10; 100 MG/5ML; MG/5ML
10 SOLUTION ORAL EVERY 4 HOURS PRN
Status: DISCONTINUED | OUTPATIENT
Start: 2023-03-07 | End: 2023-03-09 | Stop reason: HOSPADM

## 2023-03-07 RX ORDER — FUROSEMIDE 40 MG/1
40 TABLET ORAL 2 TIMES DAILY
Status: DISCONTINUED | OUTPATIENT
Start: 2023-03-07 | End: 2023-03-09 | Stop reason: HOSPADM

## 2023-03-07 RX ADMIN — GUAIFENESIN AND CODEINE PHOSPHATE 10 ML: 100; 10 SOLUTION ORAL at 06:03

## 2023-03-07 RX ADMIN — ATORVASTATIN CALCIUM 40 MG: 40 TABLET, FILM COATED ORAL at 08:03

## 2023-03-07 RX ADMIN — ARFORMOTEROL TARTRATE 15 MCG: 15 SOLUTION RESPIRATORY (INHALATION) at 08:03

## 2023-03-07 RX ADMIN — LEVOFLOXACIN 500 MG: 500 INJECTION, SOLUTION INTRAVENOUS at 10:03

## 2023-03-07 RX ADMIN — BUDESONIDE INHALATION 0.5 MG: 0.5 SUSPENSION RESPIRATORY (INHALATION) at 07:03

## 2023-03-07 RX ADMIN — GUAIFENESIN 1200 MG: 600 TABLET, EXTENDED RELEASE ORAL at 08:03

## 2023-03-07 RX ADMIN — BUDESONIDE INHALATION 0.5 MG: 0.5 SUSPENSION RESPIRATORY (INHALATION) at 08:03

## 2023-03-07 RX ADMIN — FLUTICASONE PROPIONATE 50 MCG: 50 SPRAY, METERED NASAL at 08:03

## 2023-03-07 RX ADMIN — BUPROPION HYDROCHLORIDE 150 MG: 150 TABLET, FILM COATED, EXTENDED RELEASE ORAL at 08:03

## 2023-03-07 RX ADMIN — ARFORMOTEROL TARTRATE 15 MCG: 15 SOLUTION RESPIRATORY (INHALATION) at 07:03

## 2023-03-07 RX ADMIN — APIXABAN 5 MG: 5 TABLET, FILM COATED ORAL at 08:03

## 2023-03-07 RX ADMIN — FUROSEMIDE 40 MG: 10 INJECTION, SOLUTION INTRAMUSCULAR; INTRAVENOUS at 05:03

## 2023-03-07 RX ADMIN — PANTOPRAZOLE SODIUM 40 MG: 40 TABLET, DELAYED RELEASE ORAL at 06:03

## 2023-03-07 RX ADMIN — ZOLPIDEM TARTRATE 5 MG: 5 TABLET, COATED ORAL at 12:03

## 2023-03-07 RX ADMIN — FUROSEMIDE 40 MG: 40 TABLET ORAL at 06:03

## 2023-03-07 RX ADMIN — ZOLPIDEM TARTRATE 5 MG: 5 TABLET, COATED ORAL at 08:03

## 2023-03-07 RX ADMIN — PREGABALIN 100 MG: 75 CAPSULE ORAL at 04:03

## 2023-03-07 RX ADMIN — PREGABALIN 100 MG: 75 CAPSULE ORAL at 08:03

## 2023-03-07 RX ADMIN — GUAIFENESIN AND CODEINE PHOSPHATE 10 ML: 100; 10 SOLUTION ORAL at 10:03

## 2023-03-07 RX ADMIN — ASPIRIN 81 MG CHEWABLE TABLET 81 MG: 81 TABLET CHEWABLE at 08:03

## 2023-03-07 RX ADMIN — NICOTINE 1 PATCH: 14 PATCH, EXTENDED RELEASE TRANSDERMAL at 06:03

## 2023-03-07 RX ADMIN — TORSEMIDE 20 MG: 20 TABLET ORAL at 08:03

## 2023-03-07 NOTE — PROGRESS NOTES
Automatic Inhaler to Nebulizer Interchange    fluticasone/vilanterol (Breo Ellipta) 100 mcg/25 mcg changed to budesonide 0.5 mg twice daily AND arformoterol 15 mcg twice daily per [facility] Automatic Therapeutic Substitutions Protocol.    Please contact pharmacy at extension [phone] with any questions.     Thank you,   Michael Lloyd

## 2023-03-07 NOTE — CONSULTS
[facility]  Department of Cardiology  Consult Note      PATIENT NAME: Delores Fox  MRN: [id number]  TODAY'S DATE: 03/07/2023  ADMIT DATE: 3/6/2023                          CONSULT REQUESTED BY: Bryson Lanza MD    SUBJECTIVE     PRINCIPAL PROBLEM: Chest pain      REASON FOR CONSULT:    Chest pain      HPI:    Patient is a 57-year-old female with past medical history COPD, CAD, COVID-19, diabetes mellitus, hypertension, obesity who presented with c/o chest discomfort that is worsened with coughing and deep breathing.  Chest wall is tender to palpation as well.  Patient complains of productive cough with tan sputum.  Patient denies chest pain with ambulation or exertion.  She denies dizziness, lightheadedness, edema, jaw/neck/arm pain, palpitations, or bleeding.  Chest x-ray is negative.          Patient was recently discharged from hospital on 3/2/23. She was admitted for severe shortness of breath.  Discharge summary below:  Hospital Course:   Patient with a history of COPD with ongoing nicotine use, CAD status post intervention RCA, cardiomyopathy with last echo with EF 30% with grade 1 diastolic dysfunction, hypertension, anxiety/depression, non-insulin-dependent diabetes mellitus, morbid obesity with BMI 40, presented via EMS with acute onset shortness of breath with respiratory distress.  She was on CPAP, noted to be feroz and in distress on arrival.  She was placed on BiPAP 100%, subsequently noted to be apneic, unresponsive and bradycardic, received atropine and was intubated.  Chest x-ray with diffuse interstitial and alveolar infiltrates bilaterally.  Admitted to the ICU, treatment for COPD exacerbation, empiric antibiotics, IV diuresis for pulmonary edema, pulmonary and cardiology consult.  Cardiology added dobutamine and has been diuresing well.  On 02/28 she was successfully extubated.  Echo with EF 40% with grade 1 diastolic dysfunction.  Transferring to telemetry floor.  On 03/01  continues to improve, chest x-ray with improving pulmonary edema, home O2 evaluation performed and not requiring supplemental oxygen, some mild throat soreness suspect from ETT, dobutamine discontinued, IV diuresis for next 24 hours.  On 03/2 respiratory status remains stable, states breathing feels at baseline and improved, appears medically stable for discharge and cleared by consultant for discharge.  Did provide refill for COPD and cardiac medication.  Continue to reinforce smoking cessation.  Discharge plan including medication, follow-up as well as strict return precautions were reviewed with the patient, she expressed understanding, no questions or concerns, no objection to discharge.  Communicated with nursing on day of discharge.          Review of patient's allergies indicates:   Allergen Reactions    Morphine Nausea And Vomiting    Sulfa (sulfonamide antibiotics) Nausea And Vomiting       Past Medical History:   Diagnosis Date    Acid reflux     COPD (chronic obstructive pulmonary disease)     Coronary artery disease     COVID-19     Diabetes mellitus     High cholesterol     Hypertension     Obese body habitus      Past Surgical History:   Procedure Laterality Date    APPENDECTOMY      CATHETERIZATION OF BOTH LEFT AND RIGHT HEART N/A 11/23/2020    Procedure: CATHETERIZATION, HEART, BOTH LEFT AND RIGHT;  Surgeon: Doug Kendall MD;  Location: [facility] CATH/EP LAB;  Service: Cardiology;  Laterality: N/A;    CHOLECYSTECTOMY      EXCISION OF LESION OF EYELID Right 12/11/2020    Procedure: EXCISION, LESION, EYELID;  Surgeon: Malachi Rodriguez MD;  Location: [facility] OR;  Service: Ophthalmology;  Laterality: Right;  Inclusion Cyst removal right lower lid    HYSTERECTOMY      JOINT REPLACEMENT Left     left hip    MASTOIDECTOMY Right      Social History     Tobacco Use    Smoking status: Former     Packs/day: 0.50     Years: 13.00     Pack years: 6.50     Types: Cigarettes     Quit date: 9/13/2022     Years since  quittin.4     Passive exposure: Current    Smokeless tobacco: Never   Substance Use Topics    Alcohol use: Not Currently    Drug use: Not Currently        REVIEW OF SYSTEMS  CONSTITUTIONAL: Negative for chills, fatigue and fever.   EYES: No double vision, No blurred vision  NEURO: No headaches, No dizziness  RESPIRATORY:  Positive for cough, shortness of breath , and wheezing.    CARDIOVASCULAR: Positive for pleuritic chest pain. Negative for palpitations and leg swelling.   GI: Negative for abdominal pain, No melena, diarrhea, nausea and vomiting.   : Negative for dysuria and frequency, Negative for hematuria  SKIN: Negative for bruising, Negative for edema or discoloration noted.   ENDOCRINE: Negative for polyphagia, Negative for heat intolerance, Negative for cold intolerance  PSYCHIATRIC: Negative for depression, Negative for anxiety, Negative for memory loss  MUSCULOSKELETAL: Negative for neck pain, Negative for muscle weakness, Negative for back pain     OBJECTIVE     VITAL SIGNS (Most Recent)  Temp: 97.5 °F (36.4 °C) (23)  Pulse: 74 (23)  Resp: 18 (23 1007)  BP: 102/68 (23)  SpO2: (!) 94 % (23)    VENTILATION STATUS  Resp: 18 (23 1007)  SpO2: (!) 94 % (2337)       I & O (Last 24H):No intake or output data in the 24 hours ending 23 1125    WEIGHTS  Wt Readings from Last 3 Encounters:   23 2350 102.9 kg (226 lb 13.7 oz)   23 1051 108.4 kg (239 lb)   23 0300 108.4 kg (238 lb 15.7 oz)   23 2110 108 kg (238 lb 1.6 oz)   23 0430 109.1 kg (240 lb 8.4 oz)   23 1922 113.4 kg (250 lb)   23 0456 113.4 kg (250 lb)   23 1240 113.4 kg (250 lb)       PHYSICAL EXAM  GENERAL: obese, well-developed in no apparent distress alert and oriented.   HEENT: Normocephalic. Pupils normal and conjunctivae normal.    NECK: No JVD. No bruit..   THYROID: Thyroid not examined   CARDIAC: Regular rate and rhythm. S1 is  normal.S2 is normal.No gallops, clicks or murmurs noted at this time.  CHEST ANATOMY: Chest wall tender to palpation  LUNGS: Scattered Expiratory wheezing  ABDOMEN: Soft. Normal bowel sounds. Nontender  URINARY: No england catheter   EXTREMITIES: No cyanosis, clubbing or edema noted at this time.  PERIPHERAL VASCULAR SYSTEM: Good palpable distal pulses.   CENTRAL NERVOUS SYSTEM: No focal motor or sensory deficits noted.   SKIN: Skin without lesions, moist, well perfused.   MUSCLE STRENGTH & TONE: No noteable weakness, atrophy or abnormal movement.     HOME MEDICATIONS:  Current Facility-Administered Medications on File Prior to Encounter   Medication Dose Route Frequency Provider Last Rate Last Admin    electrolyte-S (ISOLYTE)   Intravenous Continuous Madi Herr MD   New Bag at 12/11/20 1041    lidocaine (PF) 10 mg/ml (1%) injection 10 mg  1 mL Intradermal Once Madi Herr MD        sodium chloride 0.9% flush 3 mL  3 mL Intravenous Q8H Madi Herr MD         Current Outpatient Medications on File Prior to Encounter   Medication Sig Dispense Refill    apixaban (ELIQUIS) 5 mg Tab Take 1 tablet (5 mg total) by mouth 2 (two) times daily. 60 tablet 0    aspirin 81 MG Chew Take 1 tablet (81 mg total) by mouth once daily. 30 tablet 0    atorvastatin (LIPITOR) 40 MG tablet Take 1 tablet (40 mg total) by mouth once daily. 90 tablet 1    buPROPion (WELLBUTRIN XL) 150 MG TB24 tablet Take 1 tablet (150 mg total) by mouth once daily. 30 tablet 2    clotrimazole-betamethasone 1-0.05% (LOTRISONE) cream Apply topically 2 (two) times daily. 15 g 0    fluticasone propion-salmeterol 115-21 mcg/dose (ADVAIR HFA) 115-21 mcg/actuation HFAA inhaler Inhale 2 puffs into the lungs every 12 (twelve) hours. Controller 12 g 0    fluticasone-umeclidin-vilanter (TRELEGY ELLIPTA) 200-62.5-25 mcg inhaler Inhale 1 puff into the lungs once daily.      guaiFENesin (MUCINEX) 600 mg 12 hr tablet Take 1,200 mg by mouth 2 (two)  times daily.      losartan (COZAAR) 25 MG tablet Take 0.5 tablets (12.5 mg total) by mouth once daily. (Patient taking differently: Take 25 mg by mouth once daily.) 15 tablet 0    metoprolol succinate (TOPROL-XL) 50 MG 24 hr tablet Take 1 tablet (50 mg total) by mouth once daily. 30 tablet 0    pantoprazole (PROTONIX) 40 MG tablet Take 1 tablet (40 mg total) by mouth 2 (two) times daily. 180 tablet 3    pregabalin (LYRICA) 100 MG capsule Take 1 capsule (100 mg total) by mouth 3 (three) times daily. 270 capsule 1    semaglutide (RYBELSUS) 7 mg tablet Take 7 mg by mouth once daily.      torsemide (DEMADEX) 20 MG Tab Take 1 tablet (20 mg total) by mouth once daily. Cake torsemide 20 mg once daily.  Can take additional dose in afternoon if increased shortness of breath or leg swelling for 2-3 days if needed 90 tablet 0    zolpidem (AMBIEN) 10 mg Tab Take 1 tablet (10 mg total) by mouth nightly as needed (insomnia). 90 tablet 1    albuterol (ACCUNEB) 1.25 mg/3 mL Nebu Take 3 mLs (1.25 mg total) by nebulization every 6 (six) hours as needed (wheezing). Rescue (Patient not taking: Reported on 3/6/2023) 75 mL 1    albuterol (PROVENTIL/VENTOLIN HFA) 90 mcg/actuation inhaler Inhale 2 puffs into the lungs every 6 (six) hours as needed for Wheezing or Shortness of Breath. Rescue (Patient not taking: Reported on 3/6/2023) 18 g 0    blood-glucose meter kit To check BG 2 times daily, to use with insurance preferred meter 1 each 0    fluticasone propionate (FLONASE) 50 mcg/actuation nasal spray SHAKE LIQUID AND USE 1 SPRAY(50 MCG) IN EACH NOSTRIL EVERY DAY (Patient not taking: Reported on 3/6/2023) 16 g 0    lancets Misc To check BG 2 times daily, to use with insurance preferred meter 200 each 3    metFORMIN (GLUCOPHAGE-XR) 500 MG ER 24hr tablet Take 1 tablet (500 mg total) by mouth daily with breakfast. (Patient not taking: Reported on 3/6/2023) 90 tablet 3    nicotine, polacrilex, (NICORETTE) 2 mg Gum Take 1 each (2 mg total) by  mouth as needed (smoking urge). 100 each 4    TRUE METRIX GLUCOSE TEST STRIP Strp CHECK SUGAR TWICE DAILY 200 strip 3       SCHEDULED MEDS:   apixaban  5 mg Oral BID    arformoteroL  15 mcg Nebulization BID    aspirin  81 mg Oral Daily    atorvastatin  40 mg Oral Daily    budesonide  0.5 mg Nebulization Q12H    buPROPion  150 mg Oral Daily    fluticasone propionate  1 spray Each Nostril Daily    furosemide (LASIX) injection  40 mg Intravenous Q12H    guaiFENesin  1,200 mg Oral BID    levoFLOXacin  500 mg Intravenous Q24H    losartan  25 mg Oral Daily    metoprolol succinate  50 mg Oral Daily    nicotine  1 patch Transdermal Daily    pantoprazole  40 mg Oral BID    pregabalin  100 mg Oral TID    torsemide  20 mg Oral Daily       CONTINUOUS INFUSIONS:    PRN MEDS:acetaminophen, albuterol, guaiFENesin-codeine 100-10 mg/5 ml, prochlorperazine, sodium chloride 0.9%, zolpidem    LABS AND DIAGNOSTICS     CBC LAST 3 DAYS  Recent Labs   Lab 03/02/23 0448 03/06/23  1118 03/07/23  0822   WBC 9.89 17.52* 16.24*   RBC 4.84 4.92 4.80   HGB 14.6 14.8 14.4   HCT 45.1 45.3 44.7   MCV 93 92 93   MCH 30.2 30.1 30.0   MCHC 32.4 32.7 32.2   RDW 13.6 13.0 12.9    349 349   MPV 9.3 9.9 9.7   GRAN  --  71.8  12.6*  --    LYMPH  --  17.6*  3.1  --    MONO  --  8.9  1.6*  --    BASO  --  0.03  --    NRBC  --  0  --        COAGULATION LAST 3 DAYS  Recent Labs   Lab 03/06/23  1118   INR 1.1       CHEMISTRY LAST 3 DAYS  Recent Labs   Lab 03/02/23  0021 03/02/23 0448 03/06/23  1118 03/07/23  0822   NA  --  137 130* 131*   K 3.2* 3.7 3.9 3.5   CL  --  92* 84* 87*   CO2  --  33* 32* 33*   ANIONGAP  --  12 14 11   BUN  --  37* 62* 68*   CREATININE  --  1.2 2.5* 2.2*   GLU  --  240* 320* 231*   CALCIUM  --  8.0* 10.4 9.0   MG 2.6 2.5 1.6  --    ALBUMIN  --   --  3.7  --    PROT  --   --  6.7  --    ALKPHOS  --   --  111  --    ALT  --   --  22  --    AST  --   --  16  --    BILITOT  --   --  0.7  --        CARDIAC PROFILE LAST 3  DAYS  Recent Labs   Lab 03/06/23  1118 03/06/23  1311 03/06/23  2305 03/07/23  0803   *  --   --   --    TROPONINIHS 23.7* 22.4* 24.4* 21.0*       ENDOCRINE LAST 3 DAYS  No results for input(s): TSH, PROCAL in the last 168 hours.    LAST ARTERIAL BLOOD GAS  ABG  No results for input(s): PH, PO2, PCO2, HCO3, BE in the last 168 hours.    LAST 7 DAYS MICROBIOLOGY   Microbiology Results (last 7 days)       ** No results found for the last 168 hours. **            MOST RECENT IMAGING  NM Lung Scan Ventilation Perfusion  CLINICAL HISTORY:  57 years (1965) Female Pulmonary embolism (PE) suspected, high prob HX CHEST PAIN SINCE LAST Thursday; DOSE 10.0 mCI ; 5.2 mCI 99mTC  MAA; HX OF COPD    TECHNIQUE:  NM LUNG VENTILATION PERFUSION. 3 images obtained. Using 10 mCi Xe-133, ventilation images were acquired in the posterior projection during single breath, equilibrium and wash out phases. Then, 5.2 mCi Tc-99m MAA was injected intravenously and 8 standard projections of the lungs were acquired.    COMPARISON:  Most recent radiograph of the chest from the same day.    FINDINGS:  The cardiac silhouette is mildly enlarged.    Ventilation images demonstrate homogeneous tracer distribution.    Perfusion images demonstrate relative homogeneous tracer distribution.. There are no moderate or large subsegmental or segmental perfusion defects.    There are no areas of V/Q mismatch.    IMPRESSION:  Low probability for acute PE.    Electronically signed by:  Julián You MD  3/6/2023 2:21 PM [facility] Workstation: [id number]  X-Ray Chest AP Portable  XR CHEST 1 VIEW    CLINICAL HISTORY:  57 years Female chest pain    COMPARISON: 3/1/2023    FINDINGS: Cardiomediastinal silhouette is within normal limits. Lungs are normally expanded with no airspace consolidation. No pleural effusion or pneumothorax. No acute osseous abnormality.    IMPRESSION: No acute pulmonary process.    Electronically signed by:  Valencia Encinas MD   3/6/2023 12:11 PM [facility] Workstation: [id number]      ECHOCARDIOGRAM RESULTS (last 5)  Results for orders placed during the hospital encounter of 02/27/23    Echo    Interpretation Summary  · The left ventricle is mildly enlarged with concentric remodeling and mildly decreased systolic function.  · The estimated ejection fraction is 40%.  · Grade I left ventricular diastolic dysfunction.  · There is moderate left ventricular global hypokinesis.  · Normal right ventricular size with normal right ventricular systolic function.  · Mild left atrial enlargement.  · Normal central venous pressure (3 mmHg).  · There is abnormal septal wall motion consistent with left bundle branch block.      Results for orders placed during the hospital encounter of 09/16/22    Echo Saline Bubble? Yes    Interpretation Summary  · There is no evidence of intracardiac shunting.  · The left ventricle is moderately enlarged with mild eccentric hypertrophy and moderately decreased systolic function.  · There is severe left ventricular global hypokinesis.  · The estimated ejection fraction is 30%.  · Grade I left ventricular diastolic dysfunction.  · Normal right ventricular size with normal right ventricular systolic function.  · Mild left atrial enlargement.  · Mild mitral regurgitation.  · Mild tricuspid regurgitation.  · Normal central venous pressure (3 mmHg).  · The estimated PA systolic pressure is 31 mmHg.      Results for orders placed during the hospital encounter of 02/12/22    Echo    Interpretation Summary  · The estimated PA systolic pressure is 27 mmHg.  · The left ventricle is mildly enlarged with mild concentric hypertrophy and moderately decreased systolic function.  · The estimated ejection fraction is 35%.  · Grade I left ventricular diastolic dysfunction.  · There is moderate left ventricular global hypokinesis.  · Normal right ventricular size with normal right ventricular systolic function.  · Mild left atrial  enlargement.  · Mild tricuspid regurgitation.  · Normal central venous pressure (3 mmHg).      Results for orders placed during the hospital encounter of 03/03/21    Echo Color Flow Doppler? Yes    Interpretation Summary  · The left ventricle is mildly enlarged with mild concentric hypertrophy andThe estimated ejection fraction is 40%  · Grade I left ventricular diastolic dysfunction.  · There is moderate left ventricular global hypokinesis.  · Normal right ventricular size with normal right ventricular systolic function.  · Mild left atrial enlargement.  · Mild mitral regurgitation.  · Normal central venous pressure (3 mmHg).      Results for orders placed during the hospital encounter of 11/21/20    Echo Color Flow Doppler? Yes    Interpretation Summary  · The estimated PA systolic pressure is 32 mmHg.  · There is mild left ventricular concentric hypertrophy.  · The left ventricle is mildly enlarged with moderately decreased systolic function. The estimated ejection fraction is 30%.  · Grade I diastolic dysfunction.  · There is left ventricular global hypokinesis.  · Normal right ventricular size with normal right ventricular systolic function.  · Mild left atrial enlargement.  · Mild-to-moderate mitral regurgitation.  · Intermediate central venous pressure (8 mmHg).      CURRENT/PREVIOUS VISIT EKG  Results for orders placed or performed during the hospital encounter of 03/06/23   EKG 12-lead    Collection Time: 03/06/23 11:10 AM    Narrative    Test Reason : R07.9,    Vent. Rate : 084 BPM     Atrial Rate : 084 BPM     P-R Int : 160 ms          QRS Dur : 094 ms      QT Int : 402 ms       P-R-T Axes : 052 -14 076 degrees     QTc Int : 475 ms    Normal sinus rhythm  Voltage criteria for left ventricular hypertrophy  Abnormal ECG  When compared with ECG of 27-FEB-2023 06:50,  Questionable change in The axis  T wave inversion no longer evident in Inferior leads  T wave inversion less evident in Lateral  leads    Referred By: [email or web address]   SELF           Confirmed By:            ASSESSMENT/PLAN:     Active Hospital Problems    Diagnosis    *Chest pain with abnormal stress test    Systolic and diastolic CHF, acute on chronic    Cardiomyopathy    COPD    Tobacco abuse    Essential hypertension       ASSESSMENT & PLAN:     Pleuritic Chest Pain  Bronchitis  Acute on Chronic CHF  Hypertension  CAD      RECOMMENDATIONS:      Patient complains of chest pain with deep breathing and coughing as well as chest wall tenderness.  Chest pain is pleuritic secondary to chest congestion and bronchitis.    Patient has history of RCA  with left-to-right collaterals from cath report in November of 2020.  EKG with no acute STT wave changes.  Troponins trivially elevated.  Recommend medical management of pleuritic symptoms at this time.  Continue Eliquis 5 mg b.i.d. for anticoagulation of paroxysmal atrial fibrillation.    Continue to check and replace potassium and magnesium. Goal for potassium is 4.0, and goal for magnesium is 2.0.    Continue 81 mg aspirin and statin therapy.  Continue metoprolol succinate 50 mg daily, losartan 25 mg daily.   Patient has known EF 40% and Grade I diastolic dysfunction. Recommend 2g low sodium heart healthy diet.  on admission.  Strict I's and O's. 1.5 L fluid restriction. Continue lasix.   Thank you for the consultation. We will sign off at this time.     Eli Ferreira NP  [facility]  Department of Cardiology  Date of Service: 03/07/2023      I have personally interviewed and examined the patient, I have reviewed the Nurse Practitioner's history and physical, assessment, and plan. I have personally evaluated the patient at bedside and agree with the findings and made appropriate changes as necessary in recommendations.    Hank Goodson MD  Department of Cardiology  [facility]  3/7/23

## 2023-03-07 NOTE — PROGRESS NOTES
Critical access hospital Medicine  Progress Note    Patient Name: Delores Fox  MRN: [id number]  Patient Class: OP- Observation   Admission Date: 3/6/2023  Length of Stay: 0 days  Attending Physician: Bryson Lanza MD  Primary Care Provider: Tomy Rios MD        Subjective:     Principal Problem:Chest pain    Interval History:     Patient seen and examined  Patient was on oxygen and discontinued and she was doing 92% at room air  Cardiology review and they did not think chest pain is from cardiac origin.  Previous stress test and angiogram noted and cardiology recommended only medical management  White cell is elevated for unsure reason    Review of Systems  Objective:     Vital Signs (Most Recent):  Temp: 97.5 °F (36.4 °C) (03/07/23 1100)  Pulse: 94 (03/07/23 1100)  Resp: 18 (03/07/23 1100)  BP: 116/72 (03/07/23 1100)  SpO2: (!) 90 % (03/07/23 1100)   Vital Signs (24h Range):  Temp:  [97.2 °F (36.2 °C)-98.2 °F (36.8 °C)] 97.5 °F (36.4 °C)  Pulse:  [71-94] 94  Resp:  [18-22] 18  SpO2:  [90 %-97 %] 90 %  BP: (101-130)/(58-76) 116/72     Weight: 102.9 kg (226 lb 13.7 oz)  Body mass index is 35.53 kg/m².  No intake or output data in the 24 hours ending 03/07/23 1552   Physical Exam    General: Patient resting comfortably in no acute distress. Appears as stated age. Calm  Eyes: EOM intact. No conjunctivae injection. No scleral icterus.  ENT: Hearing grossly intact. No discharge from ears. No nasal discharge.   CVS: RRR. No LE edema BL.  Lungs: . No accessory muscle use. No acute respiratory distress  Neuro: non focal , Follows commands. Responds appropriately           Significant Labs: All pertinent labs within the past 24 hours have been reviewed.  BMP:   Recent Labs   Lab 03/06/23  1118 03/07/23  0822   * 231*   * 131*   K 3.9 3.5   CL 84* 87*   CO2 32* 33*   BUN 62* 68*   CREATININE 2.5* 2.2*   CALCIUM 10.4 9.0   MG 1.6  --      CBC:   Recent Labs   Lab 03/06/23  1112  03/07/23  0822   WBC 17.52* 16.24*   HGB 14.8 14.4   HCT 45.3 44.7    349     CMP:   Recent Labs   Lab 03/06/23  1118 03/07/23  0822   * 131*   K 3.9 3.5   CL 84* 87*   CO2 32* 33*   * 231*   BUN 62* 68*   CREATININE 2.5* 2.2*   CALCIUM 10.4 9.0   PROT 6.7  --    ALBUMIN 3.7  --    BILITOT 0.7  --    ALKPHOS 111  --    AST 16  --    ALT 22  --    ANIONGAP 14 11       Significant Imaging: I have reviewed all pertinent imaging results/findings within the past 24 hours.    Assessment/Plan:      Active Diagnoses:    Diagnosis Date Noted POA    PRINCIPAL PROBLEM:  Chest pain with abnormal stress test [R07.9] 11/21/2020 Yes    COPD [J44.1] 11/22/2020 Yes     Chronic    Systolic and diastolic CHF, acute on chronic [I50.43] 02/12/2022 Yes    Cardiomyopathy [I42.9] 11/22/2020 Yes     Chronic    Tobacco abuse [Z72.0] 11/22/2020 Yes     Chronic    Essential hypertension [I10] 11/21/2020 Yes     Chronic      Problems Resolved During this Admission:     #Chest pain with abnormal stress test in 2020  Patient had angiogram and CT old was done totally occluded RCA but with collateral    Continue home medication and conservative management as per Cardiology  Ejection fraction was 30% in the past and improved to 40% on recent echocardiogram          COPD with mild exacerbation   Respiratory treatments p.r.n.  May need home oxygen test assessment at discharge      Cardiomyopathy  Last EF of 30% and latest EF 40%  Change to po lasixd 40 mg daily   Cardiology consulted    Leukocytosis  Unsure reason, no signs of infection and close monitor.  UA         VTE Risk Mitigation (From admission, onward)           Ordered     apixaban tablet 5 mg  2 times daily         03/06/23 1737     IP VTE HIGH RISK PATIENT  Once         03/06/23 1737     Place sequential compression device  Until discontinued         03/06/23 1737                       Bryson Lanza MD  Department of Hospital Medicine   [facility]

## 2023-03-07 NOTE — PROGRESS NOTES
Spoke with patient today in regard to smoking cessation progress, he states not yet tobacco free and request to reenroll. Commended patient on the decision to reenroll. Informed patient of benefit period and contact information if any further help or support is needed. Will resolve episode and complete smart form for Quit attempt #1.

## 2023-03-07 NOTE — PROGRESS NOTES
Pharmacist Renal Dose Adjustment Note    Delores Fox is a 57 y.o. female being treated with the medication levofloxacin    Patient Data:    Vital Signs (Most Recent):  Temp: 97.5 °F (36.4 °C) (03/07/23 1100)  Pulse: 94 (03/07/23 1100)  Resp: 18 (03/07/23 1100)  BP: 116/72 (03/07/23 1100)  SpO2: (!) 90 % (03/07/23 1100)   Vital Signs (72h Range):  Temp:  [97.2 °F (36.2 °C)-98.3 °F (36.8 °C)]   Pulse:  [71-94]   Resp:  [18-22]   BP: ()/(56-99)   SpO2:  [89 %-98 %]      Recent Labs   Lab 03/02/23  0448 03/06/23  1118 03/07/23  0822   CREATININE 1.2 2.5* 2.2*     Serum creatinine: 2.2 mg/dL (H) 03/07/23 0822  Estimated creatinine clearance: 35.6 mL/min (A)    Levofloxacin 500 mg every 24 hour will be changed to levofloxacin 250 mg every 24 hour    Pharmacist's Name: Elo Lloyd  Pharmacist's Extension: [phone]

## 2023-03-07 NOTE — PLAN OF CARE
03/07/23 0737   Patient Assessment/Suction   Level of Consciousness (AVPU) alert   Respiratory Effort Unlabored;Normal   Expansion/Accessory Muscles/Retractions no use of accessory muscles   All Lung Fields Breath Sounds coarse;crackles, coarse;rhonchi   Rhythm/Pattern, Respiratory unlabored   Cough Frequency infrequent   PRE-TX-O2   Device (Oxygen Therapy) nasal cannula   $ Is the patient on Low Flow Oxygen? Yes   Flow (L/min) 2   SpO2 (!) 94 %   Pulse 74   Resp 20   Aerosol Therapy   $ Aerosol Therapy Charges Aerosol Treatment   Daily Review of Necessity (SVN) completed   Respiratory Treatment Status (SVN) given   Treatment Route (SVN) mask;oxygen   Patient Position (SVN) sitting on edge of bed   Post Treatment Assessment (SVN) breath sounds improved   Signs of Intolerance (SVN) none   Breath Sounds Post-Respiratory Treatment   Throughout All Fields Post-Treatment All Fields   Throughout All Fields Post-Treatment aeration increased   Post-treatment Heart Rate (beats/min) 77   Post-treatment Resp Rate (breaths/min) 18   Education   $ Education Bronchodilator;15 min   Respiratory Evaluation   $ Care Plan Tech Time 15 min   $ Eval/Re-eval Charges Evaluation   Evaluation For New Orders

## 2023-03-07 NOTE — PLAN OF CARE
[facility]  Initial Discharge Assessment       Primary Care Provider: Tomy Rios MD    Admission Diagnosis: Chest pain [R07.9]    Admission Date: 3/6/2023  Expected Discharge Date: TBD     with Pt at bedside to complete discharge assessment. Pt AAOx4s. Demographics, PCP, and insurance verified. No home health. No dialysis. Pt reports ability to complete ADLs without assistance. Pt verbalized plan to discharge home via family transport. Pt has no other needs to be addressed at this time.    Discharge Barriers Identified: None    Payor: MEDICAID / Plan: [place] [employer] CONNECT / Product Type: Managed Medicaid /     Extended Emergency Contact Information  Primary Emergency Contact: Vita Ramirez  Address: [address] [place] [address] [facility]  Home Phone: [phone]  Work Phone: [phone]  Mobile Phone: [phone]  Relation: Sister  Preferred language: English   needed? No    Discharge Plan A: Home with family  Discharge Plan B: Home with family      [id number] - [place], [place] - [address] AT [address] [facility]  [address]  [facility] [address]  Phone: [phone] Fax: [phone]      Initial Assessment (most recent)       Adult Discharge Assessment - 03/07/23 0925          Discharge Assessment    Assessment Type Discharge Planning Assessment     Confirmed/corrected address, phone number and insurance Yes     Confirmed Demographics Correct on Facesheet     Source of Information patient     When was your last doctors appointment? --   6 months ago    Does patient/caregiver understand observation status Yes     Communicated KEAGAN with patient/caregiver Yes     Reason For Admission Chest pain with abnormal stress test     People in Home sibling(s)     Facility Arrived From: home     Do you expect to return to your current living situation? Yes     Do you have help at home or someone to  help you manage your care at home? Yes     Who are your caregiver(s) and their phone number(s)? Curtis CarrizalesVita (Sister)   [phone]     Prior to hospitilization cognitive status: Alert/Oriented     Current cognitive status: Alert/Oriented     Home Accessibility wheelchair accessible     Home Layout Able to live on 1st floor     Equipment Currently Used at Home none     Readmission within 30 days? Yes     Patient currently being followed by outpatient case management? No     Do you currently have service(s) that help you manage your care at home? No     Do you take prescription medications? Yes     Do you have prescription coverage? Yes     Coverage Payor:  MEDICAID - [facility] CONNECT     Do you have any problems affording any of your prescribed medications? No     Is the patient taking medications as prescribed? yes     Who is going to help you get home at discharge? Curtis CarrizalesVita Ordoñez (Sister)   [phone]     How do you get to doctors appointments? car, drives self     Are you on dialysis? No     Do you take coumadin? No     Discharge Plan A Home with family     Discharge Plan B Home with family     DME Needed Upon Discharge  none     Discharge Plan discussed with: Patient     Discharge Barriers Identified None

## 2023-03-08 LAB
ANION GAP SERPL CALC-SCNC: 13 MMOL/L (ref 8–16)
BUN SERPL-MCNC: 58 MG/DL (ref 6–20)
CALCIUM SERPL-MCNC: 8.5 MG/DL (ref 8.7–10.5)
CHLORIDE SERPL-SCNC: 87 MMOL/L (ref 95–110)
CO2 SERPL-SCNC: 32 MMOL/L (ref 23–29)
CREAT SERPL-MCNC: 2 MG/DL (ref 0.5–1.4)
ERYTHROCYTE [DISTWIDTH] IN BLOOD BY AUTOMATED COUNT: 13 % (ref 11.5–14.5)
ERYTHROCYTE [DISTWIDTH] IN BLOOD BY AUTOMATED COUNT: 13.1 % (ref 11.5–14.5)
EST. GFR  (NO RACE VARIABLE): 29 ML/MIN/1.73 M^2
GLUCOSE SERPL-MCNC: 254 MG/DL (ref 70–110)
HCT VFR BLD AUTO: 42.4 % (ref 37–48.5)
HCT VFR BLD AUTO: 44 % (ref 37–48.5)
HGB BLD-MCNC: 13.7 G/DL (ref 12–16)
HGB BLD-MCNC: 14.2 G/DL (ref 12–16)
LACTATE SERPL-SCNC: 1.4 MMOL/L (ref 0.5–1.9)
MCH RBC QN AUTO: 29.8 PG (ref 27–31)
MCH RBC QN AUTO: 30.1 PG (ref 27–31)
MCHC RBC AUTO-ENTMCNC: 32.3 G/DL (ref 32–36)
MCHC RBC AUTO-ENTMCNC: 32.3 G/DL (ref 32–36)
MCV RBC AUTO: 92 FL (ref 82–98)
MCV RBC AUTO: 93 FL (ref 82–98)
PLATELET # BLD AUTO: 308 K/UL (ref 150–450)
PLATELET # BLD AUTO: 311 K/UL (ref 150–450)
PMV BLD AUTO: 10 FL (ref 9.2–12.9)
PMV BLD AUTO: 9.9 FL (ref 9.2–12.9)
POTASSIUM SERPL-SCNC: 3.2 MMOL/L (ref 3.5–5.1)
PROCALCITONIN SERPL IA-MCNC: 0.88 NG/ML (ref 0–0.5)
RBC # BLD AUTO: 4.6 M/UL (ref 4–5.4)
RBC # BLD AUTO: 4.71 M/UL (ref 4–5.4)
SODIUM SERPL-SCNC: 132 MMOL/L (ref 136–145)
WBC # BLD AUTO: 26.18 K/UL (ref 3.9–12.7)
WBC # BLD AUTO: 30.71 K/UL (ref 3.9–12.7)

## 2023-03-08 PROCEDURE — 94640 AIRWAY INHALATION TREATMENT: CPT

## 2023-03-08 PROCEDURE — 63600175 PHARM REV CODE 636 W HCPCS: Performed by: INTERNAL MEDICINE

## 2023-03-08 PROCEDURE — 87086 URINE CULTURE/COLONY COUNT: CPT | Performed by: INTERNAL MEDICINE

## 2023-03-08 PROCEDURE — 25000003 PHARM REV CODE 250: Performed by: INTERNAL MEDICINE

## 2023-03-08 PROCEDURE — 36415 COLL VENOUS BLD VENIPUNCTURE: CPT | Performed by: INTERNAL MEDICINE

## 2023-03-08 PROCEDURE — 80048 BASIC METABOLIC PNL TOTAL CA: CPT | Performed by: INTERNAL MEDICINE

## 2023-03-08 PROCEDURE — 21400001 HC TELEMETRY ROOM

## 2023-03-08 PROCEDURE — 94761 N-INVAS EAR/PLS OXIMETRY MLT: CPT

## 2023-03-08 PROCEDURE — 85027 COMPLETE CBC AUTOMATED: CPT | Mod: 91 | Performed by: INTERNAL MEDICINE

## 2023-03-08 PROCEDURE — S4991 NICOTINE PATCH NONLEGEND: HCPCS | Performed by: INTERNAL MEDICINE

## 2023-03-08 PROCEDURE — 96366 THER/PROPH/DIAG IV INF ADDON: CPT

## 2023-03-08 PROCEDURE — G0378 HOSPITAL OBSERVATION PER HR: HCPCS

## 2023-03-08 PROCEDURE — 87040 BLOOD CULTURE FOR BACTERIA: CPT | Performed by: INTERNAL MEDICINE

## 2023-03-08 PROCEDURE — 25000242 PHARM REV CODE 250 ALT 637 W/ HCPCS: Performed by: INTERNAL MEDICINE

## 2023-03-08 PROCEDURE — 96376 TX/PRO/DX INJ SAME DRUG ADON: CPT

## 2023-03-08 PROCEDURE — 94760 N-INVAS EAR/PLS OXIMETRY 1: CPT

## 2023-03-08 PROCEDURE — 84145 PROCALCITONIN (PCT): CPT | Performed by: INTERNAL MEDICINE

## 2023-03-08 PROCEDURE — 83605 ASSAY OF LACTIC ACID: CPT | Performed by: INTERNAL MEDICINE

## 2023-03-08 PROCEDURE — 99900035 HC TECH TIME PER 15 MIN (STAT)

## 2023-03-08 RX ADMIN — ZOLPIDEM TARTRATE 5 MG: 5 TABLET, COATED ORAL at 10:03

## 2023-03-08 RX ADMIN — GUAIFENESIN 1200 MG: 600 TABLET, EXTENDED RELEASE ORAL at 08:03

## 2023-03-08 RX ADMIN — FLUTICASONE PROPIONATE 50 MCG: 50 SPRAY, METERED NASAL at 09:03

## 2023-03-08 RX ADMIN — PREGABALIN 100 MG: 75 CAPSULE ORAL at 08:03

## 2023-03-08 RX ADMIN — APIXABAN 5 MG: 5 TABLET, FILM COATED ORAL at 09:03

## 2023-03-08 RX ADMIN — PANTOPRAZOLE SODIUM 40 MG: 40 TABLET, DELAYED RELEASE ORAL at 05:03

## 2023-03-08 RX ADMIN — ATORVASTATIN CALCIUM 40 MG: 40 TABLET, FILM COATED ORAL at 09:03

## 2023-03-08 RX ADMIN — BUDESONIDE INHALATION 0.5 MG: 0.5 SUSPENSION RESPIRATORY (INHALATION) at 07:03

## 2023-03-08 RX ADMIN — BUDESONIDE INHALATION 0.5 MG: 0.5 SUSPENSION RESPIRATORY (INHALATION) at 08:03

## 2023-03-08 RX ADMIN — ARFORMOTEROL TARTRATE 15 MCG: 15 SOLUTION RESPIRATORY (INHALATION) at 08:03

## 2023-03-08 RX ADMIN — LEVOFLOXACIN 250 MG: 5 INJECTION, SOLUTION INTRAVENOUS at 09:03

## 2023-03-08 RX ADMIN — NICOTINE 1 PATCH: 14 PATCH, EXTENDED RELEASE TRANSDERMAL at 05:03

## 2023-03-08 RX ADMIN — PREGABALIN 100 MG: 75 CAPSULE ORAL at 02:03

## 2023-03-08 RX ADMIN — METOPROLOL SUCCINATE 50 MG: 50 TABLET, FILM COATED, EXTENDED RELEASE ORAL at 09:03

## 2023-03-08 RX ADMIN — ARFORMOTEROL TARTRATE 15 MCG: 15 SOLUTION RESPIRATORY (INHALATION) at 07:03

## 2023-03-08 RX ADMIN — FUROSEMIDE 40 MG: 40 TABLET ORAL at 05:03

## 2023-03-08 RX ADMIN — BUPROPION HYDROCHLORIDE 150 MG: 150 TABLET, FILM COATED, EXTENDED RELEASE ORAL at 09:03

## 2023-03-08 RX ADMIN — GUAIFENESIN AND CODEINE PHOSPHATE 10 ML: 100; 10 SOLUTION ORAL at 09:03

## 2023-03-08 RX ADMIN — PREGABALIN 100 MG: 75 CAPSULE ORAL at 09:03

## 2023-03-08 RX ADMIN — FUROSEMIDE 40 MG: 40 TABLET ORAL at 09:03

## 2023-03-08 RX ADMIN — PIPERACILLIN SODIUM AND TAZOBACTAM SODIUM 3.38 G: 3; .375 INJECTION, POWDER, LYOPHILIZED, FOR SOLUTION INTRAVENOUS at 04:03

## 2023-03-08 RX ADMIN — GUAIFENESIN 1200 MG: 600 TABLET, EXTENDED RELEASE ORAL at 09:03

## 2023-03-08 RX ADMIN — GUAIFENESIN AND CODEINE PHOSPHATE 10 ML: 100; 10 SOLUTION ORAL at 04:03

## 2023-03-08 RX ADMIN — APIXABAN 5 MG: 5 TABLET, FILM COATED ORAL at 08:03

## 2023-03-08 RX ADMIN — LOSARTAN POTASSIUM 25 MG: 25 TABLET, FILM COATED ORAL at 09:03

## 2023-03-08 RX ADMIN — ASPIRIN 81 MG CHEWABLE TABLET 81 MG: 81 TABLET CHEWABLE at 09:03

## 2023-03-08 NOTE — CARE UPDATE
03/07/23 2024 03/07/23 2026 03/07/23 2027   Patient Assessment/Suction   Level of Consciousness (AVPU) alert  --  alert   Respiratory Effort Unlabored  --  Unlabored   Expansion/Accessory Muscles/Retractions no retractions;no use of accessory muscles  --  no retractions;no use of accessory muscles   All Lung Fields Breath Sounds coarse  --  coarse   MAINOR Breath Sounds  --   --  coarse   LLL Breath Sounds diminished  --  diminished   RUL Breath Sounds coarse  --  coarse   RML Breath Sounds coarse  --  diminished;coarse   RLL Breath Sounds diminished  --  diminished   PRE-TX-O2   Device (Oxygen Therapy) room air  --  nasal cannula  (setup post tx)   $ Is the patient on Low Flow Oxygen?  --   --  Yes   Flow (L/min)  --   --  2   SpO2 (!) 89 % (!) 89 % (!) 94 %   Pulse Oximetry Type Intermittent  --  Intermittent   $ Pulse Oximetry - Multiple Charge Pulse Oximetry - Multiple  --  Pulse Oximetry - Multiple   Pulse 91 91 94   Resp 19 18 17   Aerosol Therapy   $ Aerosol Therapy Charges Aerosol Treatment  (sushant)  --  Aerosol Treatment  (pulmicort)   Respiratory Treatment Status (SVN) given  --  given   Treatment Route (SVN) mask  --  mask   Patient Position (SVN) HOB elevated  --  HOB elevated   Post Treatment Assessment (SVN) breath sounds unchanged  --  breath sounds unchanged   Signs of Intolerance (SVN) none  --  none   Breath Sounds Post-Respiratory Treatment   Throughout All Fields Post-Treatment All Fields  --  All Fields   Throughout All Fields Post-Treatment no change  --  no change   Post-treatment Heart Rate (beats/min) 91  --  92   Post-treatment Resp Rate (breaths/min) 18  --  18

## 2023-03-08 NOTE — CARE UPDATE
03/08/23 0753   Patient Assessment/Suction   Level of Consciousness (AVPU) alert   Respiratory Effort Normal;Unlabored   Expansion/Accessory Muscles/Retractions other (see comments);no use of accessory muscles   All Lung Fields Breath Sounds diminished   PRE-TX-O2   Device (Oxygen Therapy) room air   SpO2 99 %   Pulse Oximetry Type Intermittent   $ Pulse Oximetry - Single Charge Pulse Oximetry - Single   Pulse 96   Resp 18   Aerosol Therapy   $ Aerosol Therapy Charges Aerosol Treatment   Daily Review of Necessity (SVN) completed   Respiratory Treatment Status (SVN) given   Treatment Route (SVN) mask;oxygen   Patient Position (SVN) HOB elevated   Post Treatment Assessment (SVN) increased aeration   Signs of Intolerance (SVN) none   Respiratory Evaluation   $ Care Plan Tech Time 15 min

## 2023-03-08 NOTE — PROGRESS NOTES
[facility] Medicine  Progress Note    Patient Name: Delores Fox  MRN: [id number]  Patient Class: OP- Observation   Admission Date: 3/6/2023  Length of Stay: 0 days  Attending Physician: Bryson Lanza MD  Primary Care Provider: Tomy Rios MD        Subjective:     Principal Problem:Chest pain    Interval History:     Patient seen and examined  Patient was on oxygen and discontinued and she was doing 92% at room air  Cardiology review and they did not think chest pain is from cardiac origin.  Previous stress test and angiogram noted and cardiology recommended only medical management  White cell is elevated for unsure reason    3/8  Patient is afebrile and feeling better and wants to go home  However the white cell is keep rising up possible leukemoid reaction.      Examination was done again and thus no wound in the body and no signs of IV infiltration and no calf muscle tenderness and lungs is clear and no murmur and no urinary symptoms reported and no abdominal pain    Review of Systems  Objective:     Vital Signs (Most Recent):  Temp: 98.5 °F (36.9 °C) (03/08/23 1100)  Pulse: 83 (03/08/23 1100)  Resp: 18 (03/08/23 1100)  BP: 103/63 (03/08/23 1100)  SpO2: (!) 90 % (03/08/23 1100)   Vital Signs (24h Range):  Temp:  [98 °F (36.7 °C)-99.4 °F (37.4 °C)] 98.5 °F (36.9 °C)  Pulse:  [] 83  Resp:  [17-20] 18  SpO2:  [89 %-99 %] 90 %  BP: (103-124)/(60-77) 103/63     Weight: 102.5 kg (225 lb 15.5 oz) (bed weight)  Body mass index is 35.39 kg/m².    Intake/Output Summary (Last 24 hours) at 3/8/2023 1452  Last data filed at 3/8/2023 0835  Gross per 24 hour   Intake 360 ml   Output --   Net 360 ml        Physical Exam    General: Patient resting comfortably in no acute distress. Appears as stated age. Calm  Eyes: EOM intact. No conjunctivae injection. No scleral icterus.  ENT: Hearing grossly intact. No discharge from ears. No nasal discharge.   CVS: RRR. No LE edema BL.  Lungs: .  No accessory muscle use. No acute respiratory distress  Neuro: non focal , Follows commands. Responds appropriately           Significant Labs: All pertinent labs within the past 24 hours have been reviewed.  BMP:   Recent Labs   Lab 03/08/23  0806   *   *   K 3.2*   CL 87*   CO2 32*   BUN 58*   CREATININE 2.0*   CALCIUM 8.5*       CBC:   Recent Labs   Lab 03/07/23  0822 03/08/23  0807 03/08/23  1140   WBC 16.24* 26.18* 30.71*   HGB 14.4 14.2 13.7   HCT 44.7 44.0 42.4    311 308       CMP:   Recent Labs   Lab 03/07/23  0822 03/08/23  0806   * 132*   K 3.5 3.2*   CL 87* 87*   CO2 33* 32*   * 254*   BUN 68* 58*   CREATININE 2.2* 2.0*   CALCIUM 9.0 8.5*   ANIONGAP 11 13         Significant Imaging: I have reviewed all pertinent imaging results/findings within the past 24 hours.    Assessment/Plan:      Active Diagnoses:    Diagnosis Date Noted POA    PRINCIPAL PROBLEM:  Chest pain with abnormal stress test [R07.9] 11/21/2020 Yes    COPD [J44.1] 11/22/2020 Yes     Chronic    Systolic and diastolic CHF, acute on chronic [I50.43] 02/12/2022 Yes    Cardiomyopathy [I42.9] 11/22/2020 Yes     Chronic    Tobacco abuse [Z72.0] 11/22/2020 Yes     Chronic    Essential hypertension [I10] 11/21/2020 Yes     Chronic      Problems Resolved During this Admission:     #Chest pain with abnormal stress test in 2020  Patient had angiogram and CT old was done totally occluded RCA but with collateral    Continue home medication and conservative management as per Cardiology., discussed with them  Ejection fraction was 30% in the past and improved to 40% on recent echocardiogram    Severe leukocytosis possible leukemoid reaction/rule out occult infection       Blood culture urine culture and lactic acid and procalcitonin,may need ID consult   Continue antibiotics and changed to Zosyn  UA possible infection but was on antibiotics      COPD with mild exacerbation   Respiratory treatments p.r.n.  May need home  oxygen test assessment at discharge      Cardiomyopathy  Last EF of 30% and latest EF 40%  Change to po lasixd 40 mg daily          VTE Risk Mitigation (From admission, onward)           Ordered     apixaban tablet 5 mg  2 times daily         03/06/23 1737     IP VTE HIGH RISK PATIENT  Once         03/06/23 1737     Place sequential compression device  Until discontinued         03/06/23 1737                       Bryson Lanza MD  Department of Hospital Medicine   [facility]

## 2023-03-08 NOTE — PLAN OF CARE
Pt not medically ready for discharge at this time. WBC continues to rise. CM following for discharge needs       03/08/23 1587   Discharge Assessment   Assessment Type Discharge Planning Reassessment

## 2023-03-09 VITALS
DIASTOLIC BLOOD PRESSURE: 54 MMHG | OXYGEN SATURATION: 93 % | HEART RATE: 86 BPM | SYSTOLIC BLOOD PRESSURE: 98 MMHG | HEIGHT: 67 IN | BODY MASS INDEX: 35.57 KG/M2 | WEIGHT: 226.63 LBS | TEMPERATURE: 98 F | RESPIRATION RATE: 18 BRPM

## 2023-03-09 LAB
ANION GAP SERPL CALC-SCNC: 14 MMOL/L (ref 8–16)
BUN SERPL-MCNC: 59 MG/DL (ref 6–20)
CALCIUM SERPL-MCNC: 7.7 MG/DL (ref 8.7–10.5)
CHLORIDE SERPL-SCNC: 85 MMOL/L (ref 95–110)
CO2 SERPL-SCNC: 28 MMOL/L (ref 23–29)
CREAT SERPL-MCNC: 2.1 MG/DL (ref 0.5–1.4)
ERYTHROCYTE [DISTWIDTH] IN BLOOD BY AUTOMATED COUNT: 13 % (ref 11.5–14.5)
EST. GFR  (NO RACE VARIABLE): 27.3 ML/MIN/1.73 M^2
GLUCOSE SERPL-MCNC: 232 MG/DL (ref 70–110)
HCT VFR BLD AUTO: 39.4 % (ref 37–48.5)
HGB BLD-MCNC: 13.1 G/DL (ref 12–16)
MCH RBC QN AUTO: 30.2 PG (ref 27–31)
MCHC RBC AUTO-ENTMCNC: 33.2 G/DL (ref 32–36)
MCV RBC AUTO: 91 FL (ref 82–98)
PLATELET # BLD AUTO: 301 K/UL (ref 150–450)
PMV BLD AUTO: 10.2 FL (ref 9.2–12.9)
POTASSIUM SERPL-SCNC: 2.9 MMOL/L (ref 3.5–5.1)
RBC # BLD AUTO: 4.34 M/UL (ref 4–5.4)
SODIUM SERPL-SCNC: 127 MMOL/L (ref 136–145)
WBC # BLD AUTO: 23.92 K/UL (ref 3.9–12.7)

## 2023-03-09 PROCEDURE — 94760 N-INVAS EAR/PLS OXIMETRY 1: CPT

## 2023-03-09 PROCEDURE — 25000003 PHARM REV CODE 250: Performed by: INTERNAL MEDICINE

## 2023-03-09 PROCEDURE — 63600175 PHARM REV CODE 636 W HCPCS: Performed by: INTERNAL MEDICINE

## 2023-03-09 PROCEDURE — 80048 BASIC METABOLIC PNL TOTAL CA: CPT | Performed by: INTERNAL MEDICINE

## 2023-03-09 PROCEDURE — 94640 AIRWAY INHALATION TREATMENT: CPT

## 2023-03-09 PROCEDURE — 25000242 PHARM REV CODE 250 ALT 637 W/ HCPCS: Performed by: INTERNAL MEDICINE

## 2023-03-09 PROCEDURE — 85027 COMPLETE CBC AUTOMATED: CPT | Performed by: INTERNAL MEDICINE

## 2023-03-09 PROCEDURE — 36415 COLL VENOUS BLD VENIPUNCTURE: CPT | Performed by: INTERNAL MEDICINE

## 2023-03-09 PROCEDURE — 99900035 HC TECH TIME PER 15 MIN (STAT)

## 2023-03-09 PROCEDURE — 21400001 HC TELEMETRY ROOM

## 2023-03-09 RX ORDER — LEVOFLOXACIN 500 MG/1
500 TABLET, FILM COATED ORAL DAILY
Qty: 3 TABLET | Refills: 0 | Status: SHIPPED | OUTPATIENT
Start: 2023-03-09 | End: 2023-09-20 | Stop reason: ALTCHOICE

## 2023-03-09 RX ORDER — CODEINE PHOSPHATE AND GUAIFENESIN 10; 100 MG/5ML; MG/5ML
10 SOLUTION ORAL EVERY 6 HOURS PRN
Qty: 200 ML | Refills: 0 | Status: SHIPPED | OUTPATIENT
Start: 2023-03-09 | End: 2023-03-19

## 2023-03-09 RX ORDER — POTASSIUM CHLORIDE 20 MEQ/1
40 TABLET, EXTENDED RELEASE ORAL DAILY
Status: DISCONTINUED | OUTPATIENT
Start: 2023-03-09 | End: 2023-03-09 | Stop reason: HOSPADM

## 2023-03-09 RX ORDER — POTASSIUM CHLORIDE 7.45 MG/ML
10 INJECTION INTRAVENOUS ONCE
Status: COMPLETED | OUTPATIENT
Start: 2023-03-09 | End: 2023-03-09

## 2023-03-09 RX ORDER — POTASSIUM CHLORIDE 20 MEQ/1
TABLET, EXTENDED RELEASE ORAL
Qty: 66 TABLET | Refills: 0 | Status: SHIPPED | OUTPATIENT
Start: 2023-03-10 | End: 2023-05-12

## 2023-03-09 RX ADMIN — APIXABAN 5 MG: 5 TABLET, FILM COATED ORAL at 09:03

## 2023-03-09 RX ADMIN — PREGABALIN 100 MG: 75 CAPSULE ORAL at 09:03

## 2023-03-09 RX ADMIN — BUDESONIDE INHALATION 0.5 MG: 0.5 SUSPENSION RESPIRATORY (INHALATION) at 08:03

## 2023-03-09 RX ADMIN — ASPIRIN 81 MG CHEWABLE TABLET 81 MG: 81 TABLET CHEWABLE at 09:03

## 2023-03-09 RX ADMIN — ATORVASTATIN CALCIUM 40 MG: 40 TABLET, FILM COATED ORAL at 09:03

## 2023-03-09 RX ADMIN — FUROSEMIDE 40 MG: 40 TABLET ORAL at 09:03

## 2023-03-09 RX ADMIN — ARFORMOTEROL TARTRATE 15 MCG: 15 SOLUTION RESPIRATORY (INHALATION) at 08:03

## 2023-03-09 RX ADMIN — PIPERACILLIN SODIUM AND TAZOBACTAM SODIUM 3.38 G: 3; .375 INJECTION, POWDER, LYOPHILIZED, FOR SOLUTION INTRAVENOUS at 09:03

## 2023-03-09 RX ADMIN — POTASSIUM CHLORIDE 40 MEQ: 1500 TABLET, EXTENDED RELEASE ORAL at 11:03

## 2023-03-09 RX ADMIN — BUPROPION HYDROCHLORIDE 150 MG: 150 TABLET, FILM COATED, EXTENDED RELEASE ORAL at 09:03

## 2023-03-09 RX ADMIN — PANTOPRAZOLE SODIUM 40 MG: 40 TABLET, DELAYED RELEASE ORAL at 05:03

## 2023-03-09 RX ADMIN — POTASSIUM CHLORIDE 10 MEQ: 7.46 INJECTION, SOLUTION INTRAVENOUS at 11:03

## 2023-03-09 RX ADMIN — PIPERACILLIN SODIUM AND TAZOBACTAM SODIUM 3.38 G: 3; .375 INJECTION, POWDER, LYOPHILIZED, FOR SOLUTION INTRAVENOUS at 12:03

## 2023-03-09 RX ADMIN — GUAIFENESIN 1200 MG: 600 TABLET, EXTENDED RELEASE ORAL at 09:03

## 2023-03-09 NOTE — DISCHARGE SUMMARY
[facility] Medicine  Discharge Summary      Patient Name: Delores Fox  MRN: [id number]  TEMO: [id number]  Patient Class: IP- Inpatient  Admission Date: 3/6/2023  Hospital Length of Stay: 0 days  Discharge Date and Time:  03/09/2023 2:33 PM  Attending Physician: Bryson Lanza MD   Discharging Provider: Bryson Lanza MD  Primary Care Provider: Tomy Rios MD    Primary Care Team: Networked reference to record PCT     HPI:   ED Note  Patient presents emergency department with reported chest pain onset since released from the hospital on Thursday patient was admitted intubated for COPD exacerbation with hypercapnia patient reports that she was told she had a heart attack at that time but see no evidence of this on her chart states that they made no changes in her medications he was apparently intubated overnight and then extubated states since discharge she is had midsternal chest pain that occasionally radiates to the right parasternal area she reports no significant shortness of breath she does report a cough with productive only of clear sputum she denies any fever chills patient was mildly hypoxic in the emergency department with oxygen saturation of 89% she is placed on 2 L in the ER with improvement to 98%    3/6/2023  Pt has had 4-6/10 substernal pain since her discharge on Thursday. Ms Fox feels that she has something that she cannot cough up. Her heart doctor is Dr Kendall. She is experiencing orthopnea and PND      * No surgery found *      Hospital Course:   HPI: ED Note  Patient presents emergency department with reported chest pain onset since released from the hospital on Thursday patient was admitted intubated for COPD exacerbation with hypercapnia patient reports that she was told she had a heart attack at that time but see no evidence of this on her chart states that they made no changes in her medications he was apparently intubated overnight and then extubated  states since discharge she is had midsternal chest pain that occasionally radiates to the right parasternal area she reports no significant shortness of breath she does report a cough with productive only of clear sputum she denies any fever chills patient was mildly hypoxic in the emergency department with oxygen saturation of 89% she is placed on 2 L in the ER with improvement to 98%    Hospital course    Patient was admitted with chest pain.  It was pleuritic type chest pain and exacerbated with deep breathing and also having chest wall tenderness.  CT angiogram was not able to do because of renal function and the nuclear lung scan was done and negative and low possibility for PE.    Patient had no apparent pneumonia in the chest x-ray but started antibiotics since admission.  Admission WBC is high and later continued to rise and infection workup was done but negative leukocytosis, negative lactic acid and minimally elevated procalcitonin which could be from renal failure.  Patient had no fever and UA possible infection and continue antibiotics and no signs of infection whatsoever and discharged in stable condition with Levaquin to complete 7 days.  Patient is also adamant to get discharge and she said she will follow up with her primary care doctor and repeat CBC ordered  to follow up with PCP and referred.  Cardiology recommend only medical management for her chest pain.                 Goals of Care Treatment Preferences:  Code Status: Full Code      Consults:   Consults (From admission, onward)        Status Ordering Provider     Inpatient consult to Registered Dietitian/Nutritionist  Once        Provider:  (Not yet assigned)    Completed BEATRICE CONWAY     Inpatient consult to Cardiology  Once        Provider:  Hank Goodson MD    Completed BEATRICE CONWAY     Inpatient consult to Hospitalist  Once        Provider:  Randolph Ravi MD    Acknowledged RANDOLPH RAVI          No new Assessment &  Plan notes have been filed under this hospital service since the last note was generated.  Service: Hospital Medicine    Final Active Diagnoses:    Diagnosis Date Noted POA    PRINCIPAL PROBLEM:  Chest pain with abnormal stress test [R07.9] 11/21/2020 Yes    COPD [J44.1] 11/22/2020 Yes     Chronic    Systolic and diastolic CHF, acute on chronic [I50.43] 02/12/2022 Yes    Cardiomyopathy [I42.9] 11/22/2020 Yes     Chronic    Tobacco abuse [Z72.0] 11/22/2020 Yes     Chronic    Essential hypertension [I10] 11/21/2020 Yes     Chronic      Problems Resolved During this Admission:       Discharged Condition: good    Disposition: Home or Self Care    Follow Up:   Follow-up Information     Tomy Rios MD Follow up in 1 week(s).    Specialty: Family Medicine  Contact information:  [address] [facility] [id number] [phone]                       Patient Instructions:      CBC auto differential   Standing Status: Future Standing Exp. Date: 05/07/24     Diet Adult Regular     Reason for not Ordering Smoking Cessation Referral     Order Specific Question Answer Comments   Reason for not ordering: Patient refused      Reason for not Prescribing Nicotine Replacement     Order Specific Question Answer Comments   Reason for not Prescribing: Patient refused      Activity as tolerated       Significant Diagnostic Studies: Labs:   CMP   Recent Labs   Lab 03/08/23  0806 03/09/23  0755   * 127*   K 3.2* 2.9*   CL 87* 85*   CO2 32* 28   * 232*   BUN 58* 59*   CREATININE 2.0* 2.1*   CALCIUM 8.5* 7.7*   ANIONGAP 13 14    and CBC   Recent Labs   Lab 03/08/23  0807 03/08/23  1140 03/09/23  0755   WBC 26.18* 30.71* 23.92*   HGB 14.2 13.7 13.1   HCT 44.0 42.4 39.4    308 301       Pending Diagnostic Studies:     None         Medications:  Reconciled Home Medications:      Medication List      START taking these medications    levoFLOXacin 500 MG tablet  Commonly known as: LEVAQUIN  Take 1 tablet (500 mg  total) by mouth once daily.     potassium chloride SA 20 MEQ tablet  Commonly known as: K-DUR,KLOR-CON  Take 2 tablets (40 mEq total) by mouth once daily for 3 days, THEN 1 tablet (20 mEq total) once daily.  Start taking on: March 10, 2023        CONTINUE taking these medications    ADVAIR -21 mcg/actuation Hfaa inhaler  Generic drug: fluticasone propion-salmeterol 115-21 mcg/dose  Inhale 2 puffs into the lungs every 12 (twelve) hours. Controller     apixaban 5 mg Tab  Commonly known as: ELIQUIS  Take 1 tablet (5 mg total) by mouth 2 (two) times daily.     aspirin 81 MG Chew  Take 1 tablet (81 mg total) by mouth once daily.     atorvastatin 40 MG tablet  Commonly known as: LIPITOR  Take 1 tablet (40 mg total) by mouth once daily.     blood-glucose meter kit  To check BG 2 times daily, to use with insurance preferred meter     buPROPion 150 MG TB24 tablet  Commonly known as: WELLBUTRIN XL  Take 1 tablet (150 mg total) by mouth once daily.     clotrimazole-betamethasone 1-0.05% cream  Commonly known as: LOTRISONE  Apply topically 2 (two) times daily.     fluticasone-umeclidin-vilanter 200-62.5-25 mcg inhaler  Commonly known as: TRELEGY ELLIPTA  Inhale 1 puff into the lungs once daily.     guaiFENesin 600 mg 12 hr tablet  Commonly known as: MUCINEX  Take 1,200 mg by mouth 2 (two) times daily.     lancets Misc  To check BG 2 times daily, to use with insurance preferred meter     metoprolol succinate 50 MG 24 hr tablet  Commonly known as: TOPROL-XL  Take 1 tablet (50 mg total) by mouth once daily.     nicotine (polacrilex) 2 mg Gum  Commonly known as: NICORETTE  Take 1 each (2 mg total) by mouth as needed (smoking urge).     pantoprazole 40 MG tablet  Commonly known as: PROTONIX  Take 1 tablet (40 mg total) by mouth 2 (two) times daily.     pregabalin 100 MG capsule  Commonly known as: LYRICA  Take 1 capsule (100 mg total) by mouth 3 (three) times daily.     RYBELSUS 7 mg tablet  Generic drug: semaglutide  Take 7  mg by mouth once daily.     torsemide 20 MG Tab  Commonly known as: DEMADEX  Take 1 tablet (20 mg total) by mouth once daily. Cake torsemide 20 mg once daily.  Can take additional dose in afternoon if increased shortness of breath or leg swelling for 2-3 days if needed     TRUE METRIX GLUCOSE TEST STRIP Strp  Generic drug: blood sugar diagnostic  CHECK SUGAR TWICE DAILY     zolpidem 10 mg Tab  Commonly known as: AMBIEN  Take 1 tablet (10 mg total) by mouth nightly as needed (insomnia).        ASK your doctor about these medications    * albuterol 1.25 mg/3 mL Nebu  Commonly known as: ACCUNEB  Take 3 mLs (1.25 mg total) by nebulization every 6 (six) hours as needed (wheezing). Rescue     * albuterol 90 mcg/actuation inhaler  Commonly known as: PROVENTIL/VENTOLIN HFA  Inhale 2 puffs into the lungs every 6 (six) hours as needed for Wheezing or Shortness of Breath. Rescue     fluticasone propionate 50 mcg/actuation nasal spray  Commonly known as: FLONASE  SHAKE LIQUID AND USE 1 SPRAY(50 MCG) IN EACH NOSTRIL EVERY DAY     losartan 25 MG tablet  Commonly known as: COZAAR  Take 0.5 tablets (12.5 mg total) by mouth once daily.     metFORMIN 500 MG ER 24hr tablet  Commonly known as: GLUCOPHAGE-XR  Take 1 tablet (500 mg total) by mouth daily with breakfast.         * This list has 2 medication(s) that are the same as other medications prescribed for you. Read the directions carefully, and ask your doctor or other care provider to review them with you.                Indwelling Lines/Drains at time of discharge:   Lines/Drains/Airways     None             General: Patient resting comfortably in no acute distress. Appears as stated age. Calm  Eyes: EOM intact. No conjunctivae injection. No scleral icterus.  ENT: Hearing grossly intact. No discharge from ears. No nasal discharge.   CVS: RRR. No LE edema BL.  Lungs: CTA BL, no wheezing or crackles. Good breath sounds. No accessory muscle use. No acute respiratory distress  Neuro:  non focal , Follows commands. Responds appropriately    Time spent on the discharge of patient: 22 minutes         Bryson Lanza MD  Department of Hospital Medicine  [facility]

## 2023-03-09 NOTE — CARE UPDATE
03/09/23 0805   Patient Assessment/Suction   Level of Consciousness (AVPU) alert   Respiratory Effort Normal;Unlabored   Expansion/Accessory Muscles/Retractions no use of accessory muscles   All Lung Fields Breath Sounds clear   PRE-TX-O2   Device (Oxygen Therapy) room air   SpO2 (!) 93 %   Pulse Oximetry Type Intermittent   $ Pulse Oximetry - Single Charge Pulse Oximetry - Single   Pulse 82   Resp 16   Aerosol Therapy   $ Aerosol Therapy Charges Aerosol Treatment   Daily Review of Necessity (SVN) completed   Respiratory Treatment Status (SVN) given   Treatment Route (SVN) mask;oxygen   Patient Position (SVN) HOB elevated   Post Treatment Assessment (SVN) increased aeration   Signs of Intolerance (SVN) none   Respiratory Evaluation   $ Care Plan Tech Time 15 min

## 2023-03-09 NOTE — PLAN OF CARE
Appts scheduled  Pt clear for discharge from CM standpoint. Discharging home       03/09/23 1416   Final Note   Assessment Type Final Discharge Note   Anticipated Discharge Disposition Home   Hospital Resources/Appts/Education Provided Appointments scheduled by Navigator/Coordinator

## 2023-03-09 NOTE — UM SECONDARY REVIEW
I notified dc planner and leaders patient meets inpatient with suspected infection, wbc, iv antibiotics. cbcarter

## 2023-03-09 NOTE — HOSPITAL COURSE
HPI: ED Note  Patient presents emergency department with reported chest pain onset since released from the hospital on Thursday patient was admitted intubated for COPD exacerbation with hypercapnia patient reports that she was told she had a heart attack at that time but see no evidence of this on her chart states that they made no changes in her medications he was apparently intubated overnight and then extubated states since discharge she is had midsternal chest pain that occasionally radiates to the right parasternal area she reports no significant shortness of breath she does report a cough with productive only of clear sputum she denies any fever chills patient was mildly hypoxic in the emergency department with oxygen saturation of 89% she is placed on 2 L in the ER with improvement to 98%    Hospital course    Patient was admitted with chest pain.  It was pleuritic type chest pain and exacerbated with deep breathing and also having chest wall tenderness.  CT angiogram was not able to do because of renal function and the nuclear lung scan was done and negative and low possibility for PE.    Patient had no apparent pneumonia in the chest x-ray but started antibiotics since admission.  Admission WBC is high and later continued to rise and infection workup was done but negative leukocytosis, negative lactic acid and minimally elevated procalcitonin which could be from renal failure.  Patient had no fever and UA possible infection and continue antibiotics and no signs of infection whatsoever and discharged in stable condition with Levaquin to complete 7 days.  Patient is also adamant to get discharge and she said she will follow up with her primary care doctor and repeat CBC ordered  to follow up with PCP and referred.  Cardiology recommend only medical management for her chest pain.

## 2023-03-11 LAB — BACTERIA UR CULT: NO GROWTH

## 2023-03-13 ENCOUNTER — OFFICE VISIT (OUTPATIENT)
Dept: CARDIOLOGY | Facility: CLINIC | Age: 58
End: 2023-03-13
Payer: MEDICAID

## 2023-03-13 VITALS
DIASTOLIC BLOOD PRESSURE: 74 MMHG | BODY MASS INDEX: 35.79 KG/M2 | WEIGHT: 228 LBS | OXYGEN SATURATION: 94 % | HEART RATE: 85 BPM | SYSTOLIC BLOOD PRESSURE: 128 MMHG | HEIGHT: 67 IN

## 2023-03-13 DIAGNOSIS — I20.89 STABLE ANGINA PECTORIS: Primary | ICD-10-CM

## 2023-03-13 DIAGNOSIS — Z72.0 TOBACCO ABUSE: Chronic | ICD-10-CM

## 2023-03-13 DIAGNOSIS — I10 ESSENTIAL HYPERTENSION: Chronic | ICD-10-CM

## 2023-03-13 DIAGNOSIS — E78.2 MIXED HYPERLIPIDEMIA: ICD-10-CM

## 2023-03-13 DIAGNOSIS — E11.65 TYPE 2 DIABETES MELLITUS WITH HYPERGLYCEMIA, WITHOUT LONG-TERM CURRENT USE OF INSULIN: Chronic | ICD-10-CM

## 2023-03-13 DIAGNOSIS — I50.43 SYSTOLIC AND DIASTOLIC CHF, ACUTE ON CHRONIC: ICD-10-CM

## 2023-03-13 DIAGNOSIS — E66.01 SEVERE OBESITY WITH BODY MASS INDEX (BMI) OF 35.0 TO 39.9 WITH COMORBIDITY: ICD-10-CM

## 2023-03-13 LAB — BACTERIA BLD CULT: NORMAL

## 2023-03-13 PROCEDURE — 3008F BODY MASS INDEX DOCD: CPT | Mod: CPTII,,, | Performed by: NURSE PRACTITIONER

## 2023-03-13 PROCEDURE — 3074F SYST BP LT 130 MM HG: CPT | Mod: CPTII,,, | Performed by: NURSE PRACTITIONER

## 2023-03-13 PROCEDURE — 99214 PR OFFICE/OUTPT VISIT, EST, LEVL IV, 30-39 MIN: ICD-10-PCS | Mod: S$PBB,,, | Performed by: NURSE PRACTITIONER

## 2023-03-13 PROCEDURE — 3008F PR BODY MASS INDEX (BMI) DOCUMENTED: ICD-10-PCS | Mod: CPTII,,, | Performed by: NURSE PRACTITIONER

## 2023-03-13 PROCEDURE — 1111F PR DISCHARGE MEDS RECONCILED W/ CURRENT OUTPATIENT MED LIST: ICD-10-PCS | Mod: CPTII,,, | Performed by: NURSE PRACTITIONER

## 2023-03-13 PROCEDURE — 3074F PR MOST RECENT SYSTOLIC BLOOD PRESSURE < 130 MM HG: ICD-10-PCS | Mod: CPTII,,, | Performed by: NURSE PRACTITIONER

## 2023-03-13 PROCEDURE — 99214 OFFICE O/P EST MOD 30 MIN: CPT | Mod: PBBFAC,PN | Performed by: NURSE PRACTITIONER

## 2023-03-13 PROCEDURE — 4010F PR ACE/ARB THEARPY RXD/TAKEN: ICD-10-PCS | Mod: CPTII,,, | Performed by: NURSE PRACTITIONER

## 2023-03-13 PROCEDURE — 1111F DSCHRG MED/CURRENT MED MERGE: CPT | Mod: CPTII,,, | Performed by: NURSE PRACTITIONER

## 2023-03-13 PROCEDURE — 99999 PR PBB SHADOW E&M-EST. PATIENT-LVL IV: ICD-10-PCS | Mod: PBBFAC,,, | Performed by: NURSE PRACTITIONER

## 2023-03-13 PROCEDURE — 4010F ACE/ARB THERAPY RXD/TAKEN: CPT | Mod: CPTII,,, | Performed by: NURSE PRACTITIONER

## 2023-03-13 PROCEDURE — 99214 OFFICE O/P EST MOD 30 MIN: CPT | Mod: S$PBB,,, | Performed by: NURSE PRACTITIONER

## 2023-03-13 PROCEDURE — 3078F PR MOST RECENT DIASTOLIC BLOOD PRESSURE < 80 MM HG: ICD-10-PCS | Mod: CPTII,,, | Performed by: NURSE PRACTITIONER

## 2023-03-13 PROCEDURE — 1159F MED LIST DOCD IN RCRD: CPT | Mod: CPTII,,, | Performed by: NURSE PRACTITIONER

## 2023-03-13 PROCEDURE — 1159F PR MEDICATION LIST DOCUMENTED IN MEDICAL RECORD: ICD-10-PCS | Mod: CPTII,,, | Performed by: NURSE PRACTITIONER

## 2023-03-13 PROCEDURE — 3052F HG A1C>EQUAL 8.0%<EQUAL 9.0%: CPT | Mod: CPTII,,, | Performed by: NURSE PRACTITIONER

## 2023-03-13 PROCEDURE — 99999 PR PBB SHADOW E&M-EST. PATIENT-LVL IV: CPT | Mod: PBBFAC,,, | Performed by: NURSE PRACTITIONER

## 2023-03-13 PROCEDURE — 3078F DIAST BP <80 MM HG: CPT | Mod: CPTII,,, | Performed by: NURSE PRACTITIONER

## 2023-03-13 PROCEDURE — 3052F PR MOST RECENT HEMOGLOBIN A1C LEVEL 8.0 - < 9.0%: ICD-10-PCS | Mod: CPTII,,, | Performed by: NURSE PRACTITIONER

## 2023-03-13 RX ORDER — SYRING-NEEDL,DISP,INSUL,0.3 ML 29 G X1/2"
296 SYRINGE, EMPTY DISPOSABLE MISCELLANEOUS ONCE
Qty: 1 EACH | Refills: 3 | Status: SHIPPED | OUTPATIENT
Start: 2023-03-13 | End: 2023-03-13

## 2023-03-13 RX ORDER — ISOSORBIDE MONONITRATE 30 MG/1
30 TABLET, EXTENDED RELEASE ORAL DAILY
Qty: 30 TABLET | Refills: 11 | Status: SHIPPED | OUTPATIENT
Start: 2023-03-13 | End: 2023-04-05 | Stop reason: SDUPTHER

## 2023-03-13 NOTE — PROGRESS NOTES
Subjective:    Patient ID:  Delores Fox is a 57 y.o. female patient here for evaluation Follow-up      History of Present Illness:     Ms. Fox is here today for her follow up. She went to the hospital two times recently. First time was for CHF exacerbation/COPD exacerbation. Second time was for CP. No testing done. ECHO done showed some improvement in EF to 40%. She has constipation. She is on Rybelsus. She denies active chest pain today. EKG on 3/6 improved on ST depression then previous. Tropnin essentially negative. Her Potassium was 2.9.        Review of patient's allergies indicates:   Allergen Reactions    Morphine Nausea And Vomiting    Sulfa (sulfonamide antibiotics) Nausea And Vomiting       Past Medical History:   Diagnosis Date    Acid reflux     COPD (chronic obstructive pulmonary disease)     Coronary artery disease     COVID-19     Diabetes mellitus     High cholesterol     Hypertension     Obese body habitus      Past Surgical History:   Procedure Laterality Date    APPENDECTOMY      CATHETERIZATION OF BOTH LEFT AND RIGHT HEART N/A 2020    Procedure: CATHETERIZATION, HEART, BOTH LEFT AND RIGHT;  Surgeon: Doug Kendall MD;  Location: [facility] CATH/EP LAB;  Service: Cardiology;  Laterality: N/A;    CHOLECYSTECTOMY      EXCISION OF LESION OF EYELID Right 2020    Procedure: EXCISION, LESION, EYELID;  Surgeon: Malachi Rodriguez MD;  Location: [facility] OR;  Service: Ophthalmology;  Laterality: Right;  Inclusion Cyst removal right lower lid    HYSTERECTOMY      JOINT REPLACEMENT Left     left hip    MASTOIDECTOMY Right      Social History     Tobacco Use    Smoking status: Former     Packs/day: 0.50     Years: 13.00     Pack years: 6.50     Types: Cigarettes     Quit date: 2022     Years since quittin.4     Passive exposure: Current    Smokeless tobacco: Never   Substance Use Topics    Alcohol use: Not Currently    Drug use: Not Currently        Review of Systems:    As noted in  HPI in addition                   Objective        Vitals:    03/13/23 0800   BP: 128/74   Pulse: 85       LIPIDS - LAST 2   Lab Results   Component Value Date    CHOL 189 09/16/2022    CHOL 183 02/18/2022    HDL 33 (L) 09/16/2022    HDL 36 (L) 02/18/2022    LDLCALC 130.6 09/16/2022    LDLCALC 115.8 02/18/2022    TRIG 127 09/16/2022    TRIG 156 (H) 02/18/2022    CHOLHDL 17.5 (L) 09/16/2022    CHOLHDL 19.7 (L) 02/18/2022       CBC - LAST 2  Lab Results   Component Value Date    WBC 23.92 (H) 03/09/2023    WBC 30.71 (H) 03/08/2023    RBC 4.34 03/09/2023    RBC 4.60 03/08/2023    HGB 13.1 03/09/2023    HGB 13.7 03/08/2023    HCT 39.4 03/09/2023    HCT 42.4 03/08/2023    MCV 91 03/09/2023    MCV 92 03/08/2023    MCH 30.2 03/09/2023    MCH 29.8 03/08/2023    MCHC 33.2 03/09/2023    MCHC 32.3 03/08/2023    RDW 13.0 03/09/2023    RDW 13.1 03/08/2023     03/09/2023     03/08/2023    MPV 10.2 03/09/2023    MPV 10.0 03/08/2023    GRAN 12.6 (H) 03/06/2023    GRAN 71.8 03/06/2023    LYMPH 3.1 03/06/2023    LYMPH 17.6 (L) 03/06/2023    MONO 1.6 (H) 03/06/2023    MONO 8.9 03/06/2023    BASO 0.03 03/06/2023    BASO 0.05 02/27/2023    NRBC 0 03/06/2023    NRBC 0 02/27/2023       CHEMISTRY & LIVER FUNCTION - LAST 2  Lab Results   Component Value Date     (L) 03/09/2023     (L) 03/08/2023    K 2.9 (LL) 03/09/2023    K 3.2 (L) 03/08/2023    CL 85 (L) 03/09/2023    CL 87 (L) 03/08/2023    CO2 28 03/09/2023    CO2 32 (H) 03/08/2023    ANIONGAP 14 03/09/2023    ANIONGAP 13 03/08/2023    BUN 59 (H) 03/09/2023    BUN 58 (H) 03/08/2023    CREATININE 2.1 (H) 03/09/2023    CREATININE 2.0 (H) 03/08/2023     (H) 03/09/2023     (H) 03/08/2023    CALCIUM 7.7 (L) 03/09/2023    CALCIUM 8.5 (L) 03/08/2023    PH 7.461 (H) 02/28/2023    PH 7.476 (H) 02/28/2023    MG 1.6 03/06/2023    MG 2.5 03/02/2023    ALBUMIN 3.7 03/06/2023    ALBUMIN 3.9 02/27/2023    PROT 6.7 03/06/2023    PROT 7.2 02/27/2023    ALKPHOS 111  03/06/2023    ALKPHOS 134 02/27/2023    ALT 22 03/06/2023    ALT 18 02/27/2023    AST 16 03/06/2023    AST 29 02/27/2023    BILITOT 0.7 03/06/2023    BILITOT 1.2 (H) 02/27/2023        CARDIAC PROFILE - LAST 2  Lab Results   Component Value Date     (H) 03/06/2023     (H) 02/27/2023     (H) 02/27/2023    CPK 31 11/21/2020    CPKMB 10.0 (H) 02/27/2023     03/10/2022    TROPONINI <0.030 04/04/2022    TROPONINI <0.030 04/04/2022    TROPONINIHS 21.0 (H) 03/07/2023    TROPONINIHS 24.4 (H) 03/06/2023        COAGULATION - LAST 2  Lab Results   Component Value Date    LABPT 12.7 04/04/2022    LABPT 13.3 12/31/2020    INR 1.1 03/06/2023    INR 1.1 02/27/2023    APTT 34.3 (H) 11/23/2020    APTT 35.9 (H) 11/21/2020       ENDOCRINE & PSA - LAST 2  Lab Results   Component Value Date    HGBA1C 9.0 (H) 03/01/2023    HGBA1C 9.1 (H) 09/27/2022    TSH 3.600 09/16/2022    TSH 0.960 11/21/2020    PROCAL 0.88 (H) 03/08/2023        ECHOCARDIOGRAM RESULTS  Results for orders placed during the hospital encounter of 02/27/23    Echo    Interpretation Summary  · The left ventricle is mildly enlarged with concentric remodeling and mildly decreased systolic function.  · The estimated ejection fraction is 40%.  · Grade I left ventricular diastolic dysfunction.  · There is moderate left ventricular global hypokinesis.  · Normal right ventricular size with normal right ventricular systolic function.  · Mild left atrial enlargement.  · Normal central venous pressure (3 mmHg).  · There is abnormal septal wall motion consistent with left bundle branch block.      CURRENT/PREVIOUS VISIT EKG  Results for orders placed or performed during the hospital encounter of 03/06/23   EKG 12-lead    Collection Time: 03/06/23 11:10 AM    Narrative    Test Reason : R07.9,    Vent. Rate : 084 BPM     Atrial Rate : 084 BPM     P-R Int : 160 ms          QRS Dur : 094 ms      QT Int : 402 ms       P-R-T Axes : 052 -14 076 degrees     QTc Int  : 475 ms    Normal sinus rhythm  Voltage criteria for left ventricular hypertrophy  Abnormal ECG  When compared with ECG of 27-FEB-2023 06:50,  Questionable change in The axis  T wave inversion no longer evident in Inferior leads  T wave inversion less evident in Lateral leads    Referred By: [email or web address]   SELF           Confirmed By:      No valid procedures specified.   Results for orders placed during the hospital encounter of 11/21/20    Nuclear Stress Test    Interpretation Summary    The EKG portion of this study is negative for ischemia.    The patient reported no chest pain during the stress test.    There were no arrhythmias during stress.    ECG Stress Nuclear portion of this study will be reported separately.        PHYSICAL EXAM  CONSTITUTIONAL: Well built, well nourished in no apparent distress  NECK: no carotid bruit, no JVD  LUNGS: CTA  CHEST WALL: no tenderness  HEART: regular rate and rhythm, S1, S2 normal, no murmur, click, rub or gallop   ABDOMEN: soft, non-tender; bowel sounds normal; no masses,  no organomegaly  EXTREMITIES: Extremities normal, no edema, no calf tenderness noted  NEURO: AAO X 3    I HAVE REVIEWED :    The vital signs, nurses notes, and all the pertinent radiology and labs.        Current Outpatient Medications   Medication Instructions    apixaban (ELIQUIS) 5 mg, Oral, 2 times daily    aspirin 81 mg, Oral, Daily    atorvastatin (LIPITOR) 40 mg, Oral, Daily    blood-glucose meter kit To check BG 2 times daily, to use with insurance preferred meter    buPROPion (WELLBUTRIN XL) 150 mg, Oral, Daily    clotrimazole-betamethasone 1-0.05% (LOTRISONE) cream Topical (Top), 2 times daily    fluticasone propion-salmeterol 115-21 mcg/dose (ADVAIR HFA) 115-21 mcg/actuation HFAA inhaler 2 puffs, Inhalation, Every 12 hours, Controller    fluticasone propionate (FLONASE) 50 mcg/actuation nasal spray SHAKE LIQUID AND USE 1 SPRAY(50 MCG) IN EACH NOSTRIL EVERY DAY    fluticasone-umeclidin-vilanter  (TRELEGY ELLIPTA) 200-62.5-25 mcg inhaler 1 puff, Inhalation, Daily    guaiFENesin (MUCINEX) 1,200 mg, Oral, 2 times daily    guaiFENesin-codeine 100-10 mg/5 ml (TUSSI-ORGANIDIN NR)  mg/5 mL syrup 10 mLs, Oral, Every 6 hours PRN    isosorbide mononitrate (IMDUR) 30 mg, Oral, Daily    lancets Misc To check BG 2 times daily, to use with insurance preferred meter    levoFLOXacin (LEVAQUIN) 500 mg, Oral, Daily    losartan (COZAAR) 12.5 mg, Oral, Daily    magnesium citrate solution 296 mLs, Oral, Once    metFORMIN (GLUCOPHAGE-XR) 500 mg, Oral, With breakfast    metoprolol succinate (TOPROL-XL) 50 mg, Oral, Daily    nicotine (polacrilex) (NICORETTE) 2 mg, Oral, As needed (PRN)    pantoprazole (PROTONIX) 40 mg, Oral, 2 times daily    potassium chloride SA (K-DUR,KLOR-CON) 20 MEQ tablet Take 2 tablets (40 mEq total) by mouth once daily for 3 days, THEN 1 tablet (20 mEq total) once daily.    pregabalin (LYRICA) 100 mg, Oral, 3 times daily    RYBELSUS 7 mg, Oral, Daily    torsemide (DEMADEX) 20 mg, Oral, Daily, Cake torsemide 20 mg once daily.  Can take additional dose in afternoon if increased shortness of breath or leg swelling for 2-3 days if needed    TRUE METRIX GLUCOSE TEST STRIP Strp CHECK SUGAR TWICE DAILY    zolpidem (AMBIEN) 10 mg, Oral, Nightly PRN          Assessment & Plan     Chest pain with abnormal stress test  The Prox RCA lesion was 100% stenosed.  The ejection fraction was calculated to be 25%.  There was severe left ventricular systolic dysfunction.     Assessment:  1. RCA  with left to right collaterals  2. LVEDP 12 mmHg  3. LVEF 25%   4. PA pressure 31/16 mmHg with mean pressure of 22 mmHg  5. PCWP 13 mmHg  6. Cardiac output of 4.4 L/min by thermodilution      Recommendations:  1. Optimize medical management of CAD  2. Aggressive risk factor and lifestyle modifications  3. Routine post cath care and monitoring  4. Outpatient follow-up with Cardiology       Above is from CATH 2020    Add Imdur 30  mg daily  Leslie Myoview to evaluate ischemia progression  May need to send to [facility] for High Risk PCI if symptoms continue    Tobacco abuse  Trying to do cessation    Systolic and diastolic CHF, acute on chronic  Patient is identified as having Combined Systolic and Diastolic heart failure that is Chronic. CHF is currently controlled. Latest ECHO performed and demonstrates- Results for orders placed during the hospital encounter of 02/27/23    Echo    Interpretation Summary  · The left ventricle is mildly enlarged with concentric remodeling and mildly decreased systolic function.  · The estimated ejection fraction is 40%.  · Grade I left ventricular diastolic dysfunction.  · There is moderate left ventricular global hypokinesis.  · Normal right ventricular size with normal right ventricular systolic function.  · Mild left atrial enlargement.  · Normal central venous pressure (3 mmHg).  · There is abnormal septal wall motion consistent with left bundle branch block.  . Continue Beta Blocker, ACE/ARB and Furosemide and monitor clinical status closely. Monitor on telemetry. Patient is on CHF pathway.  Monitor strict Is&Os and daily weights.  Place on fluid restriction of 2 L. Continue to stress to patient importance of self efficacy and  on diet for CHF.     Currently Euvolemic    Type 2 diabetes mellitus with hyperglycemia, without long-term current use of insulin, HbA1c 9.1%  Per PCP    Severe obesity with body mass index (BMI) of 35.0 to 39.9 with comorbidity  bmi 35.71  Continue to watch diet  Leslie then if negative can increase activity      Constipation-     Use prn  meds  Increase hydration  Increase movement as tolerated right now      CP    Add Imdur 30 mg daily  Leslie         No follow-ups on file.

## 2023-03-13 NOTE — ASSESSMENT & PLAN NOTE
 The Prox RCA lesion was 100% stenosed.   The ejection fraction was calculated to be 25%.   There was severe left ventricular systolic dysfunction.     Assessment:  1. RCA  with left to right collaterals  2. LVEDP 12 mmHg  3. LVEF 25%   4. PA pressure 31/16 mmHg with mean pressure of 22 mmHg  5. PCWP 13 mmHg  6. Cardiac output of 4.4 L/min by thermodilution      Recommendations:  1. Optimize medical management of CAD  2. Aggressive risk factor and lifestyle modifications  3. Routine post cath care and monitoring  4. Outpatient follow-up with Cardiology       Above is from CATH 2020    Add Imdur 30 mg daily  Leslie Myoview to evaluate ischemia progression  May need to send to [facility] [place] for High Risk PCI if symptoms continue

## 2023-03-13 NOTE — ASSESSMENT & PLAN NOTE
Patient is identified as having Combined Systolic and Diastolic heart failure that is Chronic. CHF is currently controlled. Latest ECHO performed and demonstrates- Results for orders placed during the hospital encounter of 02/27/23    Echo    Interpretation Summary  · The left ventricle is mildly enlarged with concentric remodeling and mildly decreased systolic function.  · The estimated ejection fraction is 40%.  · Grade I left ventricular diastolic dysfunction.  · There is moderate left ventricular global hypokinesis.  · Normal right ventricular size with normal right ventricular systolic function.  · Mild left atrial enlargement.  · Normal central venous pressure (3 mmHg).  · There is abnormal septal wall motion consistent with left bundle branch block.  . Continue Beta Blocker, ACE/ARB and Furosemide and monitor clinical status closely. Monitor on telemetry. Patient is on CHF pathway.  Monitor strict Is&Os and daily weights.  Place on fluid restriction of 2 L. Continue to stress to patient importance of self efficacy and  on diet for CHF.     Currently Euvolemic

## 2023-03-14 ENCOUNTER — PATIENT MESSAGE (OUTPATIENT)
Dept: FAMILY MEDICINE | Facility: CLINIC | Age: 58
End: 2023-03-14
Payer: MEDICAID

## 2023-03-16 ENCOUNTER — TELEPHONE (OUTPATIENT)
Dept: CARDIOLOGY | Facility: CLINIC | Age: 58
End: 2023-03-16
Payer: MEDICAID

## 2023-03-16 ENCOUNTER — LAB VISIT (OUTPATIENT)
Dept: LAB | Facility: HOSPITAL | Age: 58
End: 2023-03-16
Attending: NURSE PRACTITIONER
Payer: MEDICAID

## 2023-03-16 ENCOUNTER — OFFICE VISIT (OUTPATIENT)
Dept: FAMILY MEDICINE | Facility: CLINIC | Age: 58
End: 2023-03-16
Payer: MEDICAID

## 2023-03-16 VITALS
HEART RATE: 79 BPM | WEIGHT: 225.31 LBS | DIASTOLIC BLOOD PRESSURE: 70 MMHG | SYSTOLIC BLOOD PRESSURE: 130 MMHG | TEMPERATURE: 98 F | BODY MASS INDEX: 35.36 KG/M2 | OXYGEN SATURATION: 98 % | HEIGHT: 67 IN

## 2023-03-16 DIAGNOSIS — E11.65 TYPE 2 DIABETES MELLITUS WITH HYPERGLYCEMIA, WITHOUT LONG-TERM CURRENT USE OF INSULIN: ICD-10-CM

## 2023-03-16 DIAGNOSIS — E87.6 LOW SERUM POTASSIUM: ICD-10-CM

## 2023-03-16 DIAGNOSIS — L29.9 ITCHING: ICD-10-CM

## 2023-03-16 DIAGNOSIS — J44.9 CHRONIC OBSTRUCTIVE PULMONARY DISEASE, UNSPECIFIED COPD TYPE: Primary | ICD-10-CM

## 2023-03-16 DIAGNOSIS — R25.2 LEG CRAMPS: ICD-10-CM

## 2023-03-16 LAB
ALBUMIN SERPL BCP-MCNC: 3.5 G/DL (ref 3.5–5.2)
ALP SERPL-CCNC: 153 U/L (ref 55–135)
ALT SERPL W/O P-5'-P-CCNC: 14 U/L (ref 10–44)
ANION GAP SERPL CALC-SCNC: 16 MMOL/L (ref 8–16)
AST SERPL-CCNC: 14 U/L (ref 10–40)
BILIRUB SERPL-MCNC: 0.4 MG/DL (ref 0.1–1)
BUN SERPL-MCNC: 40 MG/DL (ref 6–20)
CALCIUM SERPL-MCNC: 8.9 MG/DL (ref 8.7–10.5)
CHLORIDE SERPL-SCNC: 89 MMOL/L (ref 95–110)
CO2 SERPL-SCNC: 30 MMOL/L (ref 23–29)
CREAT SERPL-MCNC: 2.5 MG/DL (ref 0.5–1.4)
EST. GFR  (NO RACE VARIABLE): 22.2 ML/MIN/1.73 M^2
GLUCOSE SERPL-MCNC: 229 MG/DL (ref 70–110)
MAGNESIUM SERPL-MCNC: 1.8 MG/DL (ref 1.6–2.6)
POTASSIUM SERPL-SCNC: 3.3 MMOL/L (ref 3.5–5.1)
PROT SERPL-MCNC: 6.6 G/DL (ref 6–8.4)
SODIUM SERPL-SCNC: 135 MMOL/L (ref 136–145)

## 2023-03-16 PROCEDURE — 1159F MED LIST DOCD IN RCRD: CPT | Mod: CPTII,,, | Performed by: NURSE PRACTITIONER

## 2023-03-16 PROCEDURE — 1111F DSCHRG MED/CURRENT MED MERGE: CPT | Mod: CPTII,,, | Performed by: NURSE PRACTITIONER

## 2023-03-16 PROCEDURE — 36415 COLL VENOUS BLD VENIPUNCTURE: CPT | Mod: PO | Performed by: NURSE PRACTITIONER

## 2023-03-16 PROCEDURE — 99214 OFFICE O/P EST MOD 30 MIN: CPT | Mod: S$PBB,,, | Performed by: NURSE PRACTITIONER

## 2023-03-16 PROCEDURE — 99213 OFFICE O/P EST LOW 20 MIN: CPT | Mod: PBBFAC,PO | Performed by: NURSE PRACTITIONER

## 2023-03-16 PROCEDURE — 3075F SYST BP GE 130 - 139MM HG: CPT | Mod: CPTII,,, | Performed by: NURSE PRACTITIONER

## 2023-03-16 PROCEDURE — 4010F PR ACE/ARB THEARPY RXD/TAKEN: ICD-10-PCS | Mod: CPTII,,, | Performed by: NURSE PRACTITIONER

## 2023-03-16 PROCEDURE — 3078F DIAST BP <80 MM HG: CPT | Mod: CPTII,,, | Performed by: NURSE PRACTITIONER

## 2023-03-16 PROCEDURE — 4010F ACE/ARB THERAPY RXD/TAKEN: CPT | Mod: CPTII,,, | Performed by: NURSE PRACTITIONER

## 2023-03-16 PROCEDURE — 99999 PR PBB SHADOW E&M-EST. PATIENT-LVL III: CPT | Mod: PBBFAC,,, | Performed by: NURSE PRACTITIONER

## 2023-03-16 PROCEDURE — 83735 ASSAY OF MAGNESIUM: CPT | Performed by: NURSE PRACTITIONER

## 2023-03-16 PROCEDURE — 99999 PR PBB SHADOW E&M-EST. PATIENT-LVL III: ICD-10-PCS | Mod: PBBFAC,,, | Performed by: NURSE PRACTITIONER

## 2023-03-16 PROCEDURE — 3052F HG A1C>EQUAL 8.0%<EQUAL 9.0%: CPT | Mod: CPTII,,, | Performed by: NURSE PRACTITIONER

## 2023-03-16 PROCEDURE — 3052F PR MOST RECENT HEMOGLOBIN A1C LEVEL 8.0 - < 9.0%: ICD-10-PCS | Mod: CPTII,,, | Performed by: NURSE PRACTITIONER

## 2023-03-16 PROCEDURE — 1159F PR MEDICATION LIST DOCUMENTED IN MEDICAL RECORD: ICD-10-PCS | Mod: CPTII,,, | Performed by: NURSE PRACTITIONER

## 2023-03-16 PROCEDURE — 99214 PR OFFICE/OUTPT VISIT, EST, LEVL IV, 30-39 MIN: ICD-10-PCS | Mod: S$PBB,,, | Performed by: NURSE PRACTITIONER

## 2023-03-16 PROCEDURE — 3075F PR MOST RECENT SYSTOLIC BLOOD PRESS GE 130-139MM HG: ICD-10-PCS | Mod: CPTII,,, | Performed by: NURSE PRACTITIONER

## 2023-03-16 PROCEDURE — 3008F BODY MASS INDEX DOCD: CPT | Mod: CPTII,,, | Performed by: NURSE PRACTITIONER

## 2023-03-16 PROCEDURE — 3008F PR BODY MASS INDEX (BMI) DOCUMENTED: ICD-10-PCS | Mod: CPTII,,, | Performed by: NURSE PRACTITIONER

## 2023-03-16 PROCEDURE — 3078F PR MOST RECENT DIASTOLIC BLOOD PRESSURE < 80 MM HG: ICD-10-PCS | Mod: CPTII,,, | Performed by: NURSE PRACTITIONER

## 2023-03-16 PROCEDURE — 1111F PR DISCHARGE MEDS RECONCILED W/ CURRENT OUTPATIENT MED LIST: ICD-10-PCS | Mod: CPTII,,, | Performed by: NURSE PRACTITIONER

## 2023-03-16 PROCEDURE — 80053 COMPREHEN METABOLIC PANEL: CPT | Performed by: NURSE PRACTITIONER

## 2023-03-16 RX ORDER — HYDROXYZINE HYDROCHLORIDE 25 MG/1
25 TABLET, FILM COATED ORAL 3 TIMES DAILY PRN
Qty: 30 TABLET | Refills: 0 | Status: SHIPPED | OUTPATIENT
Start: 2023-03-16 | End: 2023-03-29 | Stop reason: SDUPTHER

## 2023-03-16 RX ORDER — ALBUTEROL SULFATE 1.25 MG/3ML
1.25 SOLUTION RESPIRATORY (INHALATION) EVERY 6 HOURS PRN
Qty: 75 ML | Refills: 1 | Status: SHIPPED | OUTPATIENT
Start: 2023-03-16 | End: 2023-04-05 | Stop reason: SDUPTHER

## 2023-03-16 RX ORDER — NEBULIZER AND COMPRESSOR
EACH MISCELLANEOUS
Qty: 1 EACH | Refills: 0 | Status: SHIPPED | OUTPATIENT
Start: 2023-03-16

## 2023-03-16 RX ORDER — GLIMEPIRIDE 1 MG/1
1 TABLET ORAL
Qty: 90 TABLET | Refills: 3 | Status: SHIPPED | OUTPATIENT
Start: 2023-03-16 | End: 2023-03-29

## 2023-03-16 RX ORDER — ORAL SEMAGLUTIDE 7 MG/1
7 TABLET ORAL DAILY
Qty: 90 TABLET | Refills: 5 | Status: SHIPPED | OUTPATIENT
Start: 2023-03-16 | End: 2023-09-27 | Stop reason: SDUPTHER

## 2023-03-16 NOTE — TELEPHONE ENCOUNTER
----- Message from Cora Myers sent at 3/16/2023 12:49 PM CDT -----  Regarding: return call  Contact: Patient  Type:  Patient Returning Call    Who Called:  Patient   Who Left Message for Patient:    Does the patient know what this is regarding?:    Best Call Back Number:  [phone] (home)     Additional Information:  Patient stated that she missed a call from doctor. Please contact patient to advise. Thanks!

## 2023-03-16 NOTE — TELEPHONE ENCOUNTER
Spoke with pt, let her know test is still scheduled however as of now it doesn't look like it's approved. Pt verbalized understanding.

## 2023-03-16 NOTE — PATIENT INSTRUCTIONS
Dick Estrella,     If you are due for any health screening(s) below please notify me so we can arrange them to be ordered and scheduled to maintain your health. Most healthy patients complete it. Don't lose out on improving your health.     Tests to Keep You Healthy    Mammogram: Met on 4/28/2022  Eye Exam: ORDERED BUT NOT SCHEDULED  Colon Cancer Screening: ORDERED  Last Blood Pressure <= 139/89 (3/16/2023): Yes  Last HbA1c < 8 (03/01/2023): NO         Colon Cancer Screening    Of cancers affecting both men and women, colorectal cancer is the third leading cancer killer in the [place]. But it doesnt have to be. Screening can prevent colorectal cancer or find it at an early stage when treatment often leads to a cure.    A colonoscopy is the preferred test for detecting colon cancer. It is needed only once every 10 years if results are negative. While sedated, a flexible, lighted tube with a tiny camera is inserted into the rectum and advanced through the colon to look for cancers. An alternative screening test that is used at home and returned to the lab may also be used. It detects hidden blood in bowel movements which could indicate cancer in the colon. If results are positive, you will need a colonoscopy to determine if the blood is a sign of cancer. This type of follow up (diagnostic) colonoscopy usually requires additional copays as required by your insurance provider. Please contact your PCP if you have any questions.    Although you are still overdue for this important screening, due to the COVID-19 pandemic, we recommend scheduling it in the near future.       Diabetic Retinal Eye Exam    Diabetes is the #1 cause of blindness in the [place] - early detection before signs or symptoms develop can prevent debilitating blindness.    Once-a-year screening is recommended for all diabetic patients. This exam can prevent and treat diabetes complications in the eye before developing symptoms. This can be done with a  special camera is used to take photographs of the back of your eye without having to dilate them, or you can see an eye doctor for a full dilated exam.    Although you are still overdue for this important screening, due to the COVID-19 pandemic, we recommend scheduling it in the near future.    A1c every 3-6 months, lipid panel every year, microalbumin (urine test) once per year, comprehensive foot exam once per year, dilated eye exam at least once per year, dental exam every 6 months, flu vaccination every year, and pneumonia vaccination(s) as recommended

## 2023-03-16 NOTE — PROGRESS NOTES
This dictation has been generated using Modal Fluency Dictation some phonetic errors may occur. Please contact author for clarification if needed.     Problem List Items Addressed This Visit       Type 2 diabetes mellitus with hyperglycemia, without long-term current use of insulin, HbA1c 9.1% (Chronic)    Relevant Medications    semaglutide (RYBELSUS) 7 mg tablet    glimepiride (AMARYL) 1 MG tablet     Other Visit Diagnoses       Chronic obstructive pulmonary disease, unspecified COPD type    -  Primary    Relevant Medications    albuterol (ACCUNEB) 1.25 mg/3 mL Nebu    Other Relevant Orders    NEBULIZER FOR HOME USE    Low serum potassium        Relevant Orders    Comprehensive Metabolic Panel    Magnesium    Itching        Leg cramps        Relevant Orders    Magnesium            Orders Placed This Encounter    NEBULIZER FOR HOME USE    Comprehensive Metabolic Panel    Magnesium    albuterol (ACCUNEB) 1.25 mg/3 mL Nebu    semaglutide (RYBELSUS) 7 mg tablet    glimepiride (AMARYL) 1 MG tablet    hydrOXYzine HCL (ATARAX) 25 MG tablet    nebulizer and compressor Ebony       Hospital follow-up COPD exacerbation with intubation.  Nebulizer for home use.    Low potassium check CMP.  With leg cramps check magnesium.    Diabetes uncontrolled add glimepiride refill some glue diet oral follow-up in 3 weeks    Follow up in about 3 weeks (around 4/6/2023).    ________________________________________________________________  ________________________________________________________________      Chief Complaint   Patient presents with    Hospital Follow Up     History of present illness  This 57 y.o. presents today for complaint of hospital follow-up patient has COPD.  She had an exacerbation with chest pain.  She was intubated at time of admit.  Patient reports this was her 1st intubation.  It did motivated her to quit smoking.  She does have follow-up with cardiologist for outpatient stress testing.  No chest pain med visit.     Patient does have diabetes.  Blood sugar has not been controlled A1c this month was 9.  Discussed addition of glimepiride.  She will continue the Rybelsus oral.   HPI:   ED Note  Patient presents emergency department with reported chest pain onset since released from the hospital on Thursday patient was admitted intubated for COPD exacerbation with hypercapnia patient reports that she was told she had a heart attack at that time but see no evidence of this on her chart states that they made no changes in her medications he was apparently intubated overnight and then extubated states since discharge she is had midsternal chest pain that occasionally radiates to the right parasternal area she reports no significant shortness of breath she does report a cough with productive only of clear sputum she denies any fever chills patient was mildly hypoxic in the emergency department with oxygen saturation of 89% she is placed on 2 L in the ER with improvement to 98%     3/6/2023  Pt has had 4-6/10 substernal pain since her discharge on Thursday. Ms Fox feels that she has something that she cannot cough up. Her heart doctor is Dr Kendall. She is experiencing orthopnea and PND        * No surgery found *       Hospital Course:   HPI: ED Note  Patient presents emergency department with reported chest pain onset since released from the hospital on Thursday patient was admitted intubated for COPD exacerbation with hypercapnia patient reports that she was told she had a heart attack at that time but see no evidence of this on her chart states that they made no changes in her medications he was apparently intubated overnight and then extubated states since discharge she is had midsternal chest pain that occasionally radiates to the right parasternal area she reports no significant shortness of breath she does report a cough with productive only of clear sputum she denies any fever chills patient was mildly hypoxic in the  emergency department with oxygen saturation of 89% she is placed on 2 L in the ER with improvement to 98%     Hospital course     Patient was admitted with chest pain.  It was pleuritic type chest pain and exacerbated with deep breathing and also having chest wall tenderness.  CT angiogram was not able to do because of renal function and the nuclear lung scan was done and negative and low possibility for PE.    Patient had no apparent pneumonia in the chest x-ray but started antibiotics since admission.  Admission WBC is high and later continued to rise and infection workup was done but negative leukocytosis, negative lactic acid and minimally elevated procalcitonin which could be from renal failure.  Patient had no fever and UA possible infection and continue antibiotics and no signs of infection whatsoever and discharged in stable condition with Levaquin to complete 7 days.  Patient is also adamant to get discharge and she said she will follow up with her primary care doctor and repeat CBC ordered  to follow up with PCP and referred.  Cardiology recommend only medical management for her chest pain.            Past Medical History:   Diagnosis Date    Acid reflux     COPD (chronic obstructive pulmonary disease)     Coronary artery disease     COVID-19     Diabetes mellitus     High cholesterol     Hypertension     Obese body habitus        Past Surgical History:   Procedure Laterality Date    APPENDECTOMY      CATHETERIZATION OF BOTH LEFT AND RIGHT HEART N/A 11/23/2020    Procedure: CATHETERIZATION, HEART, BOTH LEFT AND RIGHT;  Surgeon: Doug Kendall MD;  Location: [facility] CATH/EP LAB;  Service: Cardiology;  Laterality: N/A;    CHOLECYSTECTOMY      EXCISION OF LESION OF EYELID Right 12/11/2020    Procedure: EXCISION, LESION, EYELID;  Surgeon: Malachi Rodriguez MD;  Location: [facility] OR;  Service: Ophthalmology;  Laterality: Right;  Inclusion Cyst removal right lower lid    HYSTERECTOMY      JOINT REPLACEMENT Left      left hip    MASTOIDECTOMY Right        History reviewed. No pertinent family history.    Social History     Socioeconomic History    Marital status: Single   Tobacco Use    Smoking status: Former     Packs/day: 0.50     Years: 13.00     Pack years: 6.50     Types: Cigarettes     Quit date: 2022     Years since quittin.5     Passive exposure: Current    Smokeless tobacco: Never   Substance and Sexual Activity    Alcohol use: Not Currently    Drug use: Not Currently    Sexual activity: Not Currently     Social Determinants of Health     Financial Resource Strain: Low Risk     Difficulty of Paying Living Expenses: Not hard at all   Food Insecurity: No Food Insecurity    Worried About Running Out of Food in the Last Year: Never true    Ran Out of Food in the Last Year: Never true   Transportation Needs: No Transportation Needs    Lack of Transportation (Medical): No    Lack of Transportation (Non-Medical): No   Physical Activity: Inactive    Days of Exercise per Week: 0 days    Minutes of Exercise per Session: 0 min   Stress: No Stress Concern Present    Feeling of Stress : Not at all   Social Connections: Socially Isolated    Frequency of Communication with Friends and Family: More than three times a week    Frequency of Social Gatherings with Friends and Family: More than three times a week    Attends Hindu Services: Never    Active Member of Clubs or Organizations: No    Attends Club or Organization Meetings: Never    Marital Status: Never    Housing Stability: Low Risk     Unable to Pay for Housing in the Last Year: No    Number of Places Lived in the Last Year: 1    Unstable Housing in the Last Year: No       Current Outpatient Medications   Medication Sig Dispense Refill    apixaban (ELIQUIS) 5 mg Tab Take 1 tablet (5 mg total) by mouth 2 (two) times daily. 60 tablet 0    aspirin 81 MG Chew Take 1 tablet (81 mg total) by mouth once daily. 30 tablet 0    atorvastatin (LIPITOR) 40 MG tablet  Take 1 tablet (40 mg total) by mouth once daily. 90 tablet 1    blood-glucose meter kit To check BG 2 times daily, to use with insurance preferred meter 1 each 0    buPROPion (WELLBUTRIN XL) 150 MG TB24 tablet Take 1 tablet (150 mg total) by mouth once daily. 30 tablet 2    fluticasone propion-salmeterol 115-21 mcg/dose (ADVAIR HFA) 115-21 mcg/actuation HFAA inhaler Inhale 2 puffs into the lungs every 12 (twelve) hours. Controller 12 g 0    fluticasone propionate (FLONASE) 50 mcg/actuation nasal spray SHAKE LIQUID AND USE 1 SPRAY(50 MCG) IN EACH NOSTRIL EVERY DAY 16 g 0    fluticasone-umeclidin-vilanter (TRELEGY ELLIPTA) 200-62.5-25 mcg inhaler Inhale 1 puff into the lungs once daily.      guaiFENesin (MUCINEX) 600 mg 12 hr tablet Take 1,200 mg by mouth 2 (two) times daily.      guaiFENesin-codeine 100-10 mg/5 ml (TUSSI-ORGANIDIN NR)  mg/5 mL syrup Take 10 mLs by mouth every 6 (six) hours as needed for Cough or Congestion. 200 mL 0    isosorbide mononitrate (IMDUR) 30 MG 24 hr tablet Take 1 tablet (30 mg total) by mouth once daily. 30 tablet 11    lancets Misc To check BG 2 times daily, to use with insurance preferred meter 200 each 3    levoFLOXacin (LEVAQUIN) 500 MG tablet Take 1 tablet (500 mg total) by mouth once daily. 3 tablet 0    losartan (COZAAR) 25 MG tablet Take 0.5 tablets (12.5 mg total) by mouth once daily. (Patient taking differently: Take 25 mg by mouth once daily.) 15 tablet 0    metoprolol succinate (TOPROL-XL) 50 MG 24 hr tablet Take 1 tablet (50 mg total) by mouth once daily. 30 tablet 0    pantoprazole (PROTONIX) 40 MG tablet Take 1 tablet (40 mg total) by mouth 2 (two) times daily. 180 tablet 3    potassium chloride SA (K-DUR,KLOR-CON) 20 MEQ tablet Take 2 tablets (40 mEq total) by mouth once daily for 3 days, THEN 1 tablet (20 mEq total) once daily. 66 tablet 0    pregabalin (LYRICA) 100 MG capsule Take 1 capsule (100 mg total) by mouth 3 (three) times daily. 270 capsule 1    torsemide  (DEMADEX) 20 MG Tab Take 1 tablet (20 mg total) by mouth once daily. Cake torsemide 20 mg once daily.  Can take additional dose in afternoon if increased shortness of breath or leg swelling for 2-3 days if needed 90 tablet 0    TRUE METRIX GLUCOSE TEST STRIP Strp CHECK SUGAR TWICE DAILY 200 strip 3    zolpidem (AMBIEN) 10 mg Tab Take 1 tablet (10 mg total) by mouth nightly as needed (insomnia). 90 tablet 1    albuterol (ACCUNEB) 1.25 mg/3 mL Nebu Take 3 mLs (1.25 mg total) by nebulization every 6 (six) hours as needed (wheezing). Rescue 75 mL 1    glimepiride (AMARYL) 1 MG tablet Take 1 tablet (1 mg total) by mouth before breakfast. 90 tablet 3    hydrOXYzine HCL (ATARAX) 25 MG tablet Take 1 tablet (25 mg total) by mouth 3 (three) times daily as needed for Itching. 30 tablet 0    nebulizer and compressor Ebony Nebulizer machine for home use. DX COPD. 1 each 0    semaglutide (RYBELSUS) 7 mg tablet Take 1 tablet (7 mg total) by mouth once daily. 90 tablet 5     No current facility-administered medications for this visit.     Facility-Administered Medications Ordered in Other Visits   Medication Dose Route Frequency Provider Last Rate Last Admin    electrolyte-S (ISOLYTE)   Intravenous Continuous Madi Herr MD   New Bag at 12/11/20 1041    lidocaine (PF) 10 mg/ml (1%) injection 10 mg  1 mL Intradermal Once Madi Herr MD        sodium chloride 0.9% flush 3 mL  3 mL Intravenous Q8H Madi Herr MD           Review of patient's allergies indicates:   Allergen Reactions    Morphine Nausea And Vomiting    Sulfa (sulfonamide antibiotics) Nausea And Vomiting       Physical examination  Vitals Reviewed\  Vitals:    03/16/23 0852   BP: 130/70   Pulse: 79   Temp: 97.8 °F (36.6 °C)     Body mass index is 35.29 kg/m². Weight: 102.2 kg (225 lb 5 oz)    Gen. Well-dressed well-nourished   Skin warm dry and intact.  No rashes noted.  HEENT.  TM intact bilateral with normal light reflex.  No mastoid  tenderness during percussion.  Nares patent bilateral.  Pharynx is unremarkable.  No maxillary or frontal sinus tenderness when percussed.    Neck is supple without adenopathy  Chest.  Respirations are even unlabored.  Expiratory wheeze noted left chest.  Cardiac regular rate and rhythm.  No chest wall adenopathy noted.  Neuro. Awake alert oriented x4.  Normal judgment and cognition noted.  Extremities no clubbing cyanosis or edema noted.     Call or return to clinic prn if these symptoms worsen or fail to improve as anticipated.

## 2023-03-16 NOTE — TELEPHONE ENCOUNTER
----- Message from Cora Myers sent at 3/16/2023 10:18 AM CDT -----  Regarding: advice  Contact: Patient  Type: Needs Medical Advice  Who Called:  Patient   Symptoms (please be specific):    How long has patient had these symptoms:    Pharmacy name and phone #:    Best Call Back Number: [phone] (home)     Additional Information: Patient stated that she would like to get a verification on appointment coming up on Monday for stress test. Please contact patient to advise. Thanks!

## 2023-03-17 DIAGNOSIS — N18.4 CKD (CHRONIC KIDNEY DISEASE), STAGE IV: Primary | ICD-10-CM

## 2023-03-21 ENCOUNTER — TELEPHONE (OUTPATIENT)
Dept: SMOKING CESSATION | Facility: CLINIC | Age: 58
End: 2023-03-21
Payer: MEDICAID

## 2023-03-22 ENCOUNTER — PATIENT OUTREACH (OUTPATIENT)
Dept: ADMINISTRATIVE | Facility: HOSPITAL | Age: 58
End: 2023-03-22
Payer: MEDICAID

## 2023-03-24 ENCOUNTER — PATIENT OUTREACH (OUTPATIENT)
Dept: ADMINISTRATIVE | Facility: HOSPITAL | Age: 58
End: 2023-03-24
Payer: MEDICAID

## 2023-03-24 DIAGNOSIS — E11.9 DIABETES MELLITUS WITHOUT COMPLICATION: Primary | ICD-10-CM

## 2023-03-29 ENCOUNTER — OFFICE VISIT (OUTPATIENT)
Dept: FAMILY MEDICINE | Facility: CLINIC | Age: 58
End: 2023-03-29
Payer: MEDICAID

## 2023-03-29 ENCOUNTER — LAB VISIT (OUTPATIENT)
Dept: LAB | Facility: HOSPITAL | Age: 58
End: 2023-03-29
Attending: STUDENT IN AN ORGANIZED HEALTH CARE EDUCATION/TRAINING PROGRAM
Payer: MEDICAID

## 2023-03-29 VITALS
RESPIRATION RATE: 16 BRPM | HEART RATE: 104 BPM | TEMPERATURE: 99 F | HEIGHT: 67 IN | DIASTOLIC BLOOD PRESSURE: 58 MMHG | SYSTOLIC BLOOD PRESSURE: 114 MMHG | BODY MASS INDEX: 36.34 KG/M2 | OXYGEN SATURATION: 94 % | WEIGHT: 231.5 LBS

## 2023-03-29 DIAGNOSIS — R53.83 FATIGUE, UNSPECIFIED TYPE: ICD-10-CM

## 2023-03-29 DIAGNOSIS — Z87.891 HISTORY OF NICOTINE DEPENDENCE: ICD-10-CM

## 2023-03-29 DIAGNOSIS — L29.9 ITCHING: ICD-10-CM

## 2023-03-29 DIAGNOSIS — E66.01 SEVERE OBESITY WITH BODY MASS INDEX (BMI) OF 35.0 TO 39.9 WITH COMORBIDITY: ICD-10-CM

## 2023-03-29 DIAGNOSIS — E11.59 HYPERTENSION ASSOCIATED WITH DIABETES: ICD-10-CM

## 2023-03-29 DIAGNOSIS — E78.5 HYPERLIPIDEMIA ASSOCIATED WITH TYPE 2 DIABETES MELLITUS: ICD-10-CM

## 2023-03-29 DIAGNOSIS — J44.9 CHRONIC OBSTRUCTIVE PULMONARY DISEASE, UNSPECIFIED COPD TYPE: ICD-10-CM

## 2023-03-29 DIAGNOSIS — I15.2 HYPERTENSION ASSOCIATED WITH DIABETES: ICD-10-CM

## 2023-03-29 DIAGNOSIS — R79.9 ABNORMAL FINDING OF BLOOD CHEMISTRY, UNSPECIFIED: ICD-10-CM

## 2023-03-29 DIAGNOSIS — E11.65 TYPE 2 DIABETES MELLITUS WITH HYPERGLYCEMIA, WITHOUT LONG-TERM CURRENT USE OF INSULIN: Chronic | ICD-10-CM

## 2023-03-29 DIAGNOSIS — Z12.11 COLON CANCER SCREENING: ICD-10-CM

## 2023-03-29 DIAGNOSIS — E11.69 HYPERLIPIDEMIA ASSOCIATED WITH TYPE 2 DIABETES MELLITUS: ICD-10-CM

## 2023-03-29 DIAGNOSIS — K21.9 GASTROESOPHAGEAL REFLUX DISEASE, UNSPECIFIED WHETHER ESOPHAGITIS PRESENT: ICD-10-CM

## 2023-03-29 DIAGNOSIS — Z00.00 HEALTHCARE MAINTENANCE: Primary | ICD-10-CM

## 2023-03-29 LAB
BASOPHILS # BLD AUTO: 0.05 K/UL (ref 0–0.2)
BASOPHILS NFR BLD: 0.4 % (ref 0–1.9)
DIFFERENTIAL METHOD: ABNORMAL
EOSINOPHIL # BLD AUTO: 0.2 K/UL (ref 0–0.5)
EOSINOPHIL NFR BLD: 1.5 % (ref 0–8)
ERYTHROCYTE [DISTWIDTH] IN BLOOD BY AUTOMATED COUNT: 12.9 % (ref 11.5–14.5)
HCT VFR BLD AUTO: 41.5 % (ref 37–48.5)
HGB BLD-MCNC: 13.1 G/DL (ref 12–16)
IMM GRANULOCYTES # BLD AUTO: 0.03 K/UL (ref 0–0.04)
IMM GRANULOCYTES NFR BLD AUTO: 0.2 % (ref 0–0.5)
LYMPHOCYTES # BLD AUTO: 4.2 K/UL (ref 1–4.8)
LYMPHOCYTES NFR BLD: 33.6 % (ref 18–48)
MCH RBC QN AUTO: 29.1 PG (ref 27–31)
MCHC RBC AUTO-ENTMCNC: 31.6 G/DL (ref 32–36)
MCV RBC AUTO: 92 FL (ref 82–98)
MONOCYTES # BLD AUTO: 1.1 K/UL (ref 0.3–1)
MONOCYTES NFR BLD: 9 % (ref 4–15)
NEUTROPHILS # BLD AUTO: 6.9 K/UL (ref 1.8–7.7)
NEUTROPHILS NFR BLD: 55.3 % (ref 38–73)
NRBC BLD-RTO: 0 /100 WBC
PLATELET # BLD AUTO: 361 K/UL (ref 150–450)
PMV BLD AUTO: 9.4 FL (ref 9.2–12.9)
RBC # BLD AUTO: 4.5 M/UL (ref 4–5.4)
WBC # BLD AUTO: 12.48 K/UL (ref 3.9–12.7)

## 2023-03-29 PROCEDURE — 3052F PR MOST RECENT HEMOGLOBIN A1C LEVEL 8.0 - < 9.0%: ICD-10-PCS | Mod: CPTII,,, | Performed by: STUDENT IN AN ORGANIZED HEALTH CARE EDUCATION/TRAINING PROGRAM

## 2023-03-29 PROCEDURE — 80061 LIPID PANEL: CPT | Performed by: STUDENT IN AN ORGANIZED HEALTH CARE EDUCATION/TRAINING PROGRAM

## 2023-03-29 PROCEDURE — 1159F MED LIST DOCD IN RCRD: CPT | Mod: CPTII,,, | Performed by: STUDENT IN AN ORGANIZED HEALTH CARE EDUCATION/TRAINING PROGRAM

## 2023-03-29 PROCEDURE — 99999 PR PBB SHADOW E&M-EST. PATIENT-LVL IV: ICD-10-PCS | Mod: PBBFAC,,, | Performed by: STUDENT IN AN ORGANIZED HEALTH CARE EDUCATION/TRAINING PROGRAM

## 2023-03-29 PROCEDURE — 4010F PR ACE/ARB THEARPY RXD/TAKEN: ICD-10-PCS | Mod: CPTII,,, | Performed by: STUDENT IN AN ORGANIZED HEALTH CARE EDUCATION/TRAINING PROGRAM

## 2023-03-29 PROCEDURE — 4010F ACE/ARB THERAPY RXD/TAKEN: CPT | Mod: CPTII,,, | Performed by: STUDENT IN AN ORGANIZED HEALTH CARE EDUCATION/TRAINING PROGRAM

## 2023-03-29 PROCEDURE — 3078F PR MOST RECENT DIASTOLIC BLOOD PRESSURE < 80 MM HG: ICD-10-PCS | Mod: CPTII,,, | Performed by: STUDENT IN AN ORGANIZED HEALTH CARE EDUCATION/TRAINING PROGRAM

## 2023-03-29 PROCEDURE — 36415 COLL VENOUS BLD VENIPUNCTURE: CPT | Mod: PO | Performed by: STUDENT IN AN ORGANIZED HEALTH CARE EDUCATION/TRAINING PROGRAM

## 2023-03-29 PROCEDURE — 3074F SYST BP LT 130 MM HG: CPT | Mod: CPTII,,, | Performed by: STUDENT IN AN ORGANIZED HEALTH CARE EDUCATION/TRAINING PROGRAM

## 2023-03-29 PROCEDURE — 1159F PR MEDICATION LIST DOCUMENTED IN MEDICAL RECORD: ICD-10-PCS | Mod: CPTII,,, | Performed by: STUDENT IN AN ORGANIZED HEALTH CARE EDUCATION/TRAINING PROGRAM

## 2023-03-29 PROCEDURE — 3008F BODY MASS INDEX DOCD: CPT | Mod: CPTII,,, | Performed by: STUDENT IN AN ORGANIZED HEALTH CARE EDUCATION/TRAINING PROGRAM

## 2023-03-29 PROCEDURE — 1111F DSCHRG MED/CURRENT MED MERGE: CPT | Mod: CPTII,,, | Performed by: STUDENT IN AN ORGANIZED HEALTH CARE EDUCATION/TRAINING PROGRAM

## 2023-03-29 PROCEDURE — 3008F PR BODY MASS INDEX (BMI) DOCUMENTED: ICD-10-PCS | Mod: CPTII,,, | Performed by: STUDENT IN AN ORGANIZED HEALTH CARE EDUCATION/TRAINING PROGRAM

## 2023-03-29 PROCEDURE — 3074F PR MOST RECENT SYSTOLIC BLOOD PRESSURE < 130 MM HG: ICD-10-PCS | Mod: CPTII,,, | Performed by: STUDENT IN AN ORGANIZED HEALTH CARE EDUCATION/TRAINING PROGRAM

## 2023-03-29 PROCEDURE — 99999 PR PBB SHADOW E&M-EST. PATIENT-LVL IV: CPT | Mod: PBBFAC,,, | Performed by: STUDENT IN AN ORGANIZED HEALTH CARE EDUCATION/TRAINING PROGRAM

## 2023-03-29 PROCEDURE — 3078F DIAST BP <80 MM HG: CPT | Mod: CPTII,,, | Performed by: STUDENT IN AN ORGANIZED HEALTH CARE EDUCATION/TRAINING PROGRAM

## 2023-03-29 PROCEDURE — 99214 OFFICE O/P EST MOD 30 MIN: CPT | Mod: PBBFAC,PO | Performed by: STUDENT IN AN ORGANIZED HEALTH CARE EDUCATION/TRAINING PROGRAM

## 2023-03-29 PROCEDURE — 3052F HG A1C>EQUAL 8.0%<EQUAL 9.0%: CPT | Mod: CPTII,,, | Performed by: STUDENT IN AN ORGANIZED HEALTH CARE EDUCATION/TRAINING PROGRAM

## 2023-03-29 PROCEDURE — 1111F PR DISCHARGE MEDS RECONCILED W/ CURRENT OUTPATIENT MED LIST: ICD-10-PCS | Mod: CPTII,,, | Performed by: STUDENT IN AN ORGANIZED HEALTH CARE EDUCATION/TRAINING PROGRAM

## 2023-03-29 PROCEDURE — 83735 ASSAY OF MAGNESIUM: CPT | Performed by: STUDENT IN AN ORGANIZED HEALTH CARE EDUCATION/TRAINING PROGRAM

## 2023-03-29 PROCEDURE — 85025 COMPLETE CBC W/AUTO DIFF WBC: CPT | Performed by: STUDENT IN AN ORGANIZED HEALTH CARE EDUCATION/TRAINING PROGRAM

## 2023-03-29 PROCEDURE — 80053 COMPREHEN METABOLIC PANEL: CPT | Performed by: STUDENT IN AN ORGANIZED HEALTH CARE EDUCATION/TRAINING PROGRAM

## 2023-03-29 PROCEDURE — 83036 HEMOGLOBIN GLYCOSYLATED A1C: CPT | Performed by: STUDENT IN AN ORGANIZED HEALTH CARE EDUCATION/TRAINING PROGRAM

## 2023-03-29 PROCEDURE — 99215 PR OFFICE/OUTPT VISIT, EST, LEVL V, 40-54 MIN: ICD-10-PCS | Mod: S$PBB,,, | Performed by: STUDENT IN AN ORGANIZED HEALTH CARE EDUCATION/TRAINING PROGRAM

## 2023-03-29 PROCEDURE — 99215 OFFICE O/P EST HI 40 MIN: CPT | Mod: S$PBB,,, | Performed by: STUDENT IN AN ORGANIZED HEALTH CARE EDUCATION/TRAINING PROGRAM

## 2023-03-29 RX ORDER — HYDROXYZINE HYDROCHLORIDE 25 MG/1
25 TABLET, FILM COATED ORAL 3 TIMES DAILY PRN
Qty: 90 TABLET | Refills: 2 | Status: SHIPPED | OUTPATIENT
Start: 2023-03-29 | End: 2023-07-07 | Stop reason: SDUPTHER

## 2023-03-29 RX ORDER — PREGABALIN 100 MG/1
100 CAPSULE ORAL 3 TIMES DAILY
Qty: 270 CAPSULE | Refills: 1 | Status: SHIPPED | OUTPATIENT
Start: 2023-03-29 | End: 2023-07-08 | Stop reason: SDUPTHER

## 2023-03-29 RX ORDER — LOSARTAN POTASSIUM 25 MG/1
25 TABLET ORAL DAILY
Qty: 90 TABLET | Refills: 1 | Status: SHIPPED | OUTPATIENT
Start: 2023-03-29 | End: 2023-08-01 | Stop reason: SDUPTHER

## 2023-03-29 RX ORDER — PANTOPRAZOLE SODIUM 40 MG/1
40 TABLET, DELAYED RELEASE ORAL 2 TIMES DAILY
Qty: 180 TABLET | Refills: 3 | Status: SHIPPED | OUTPATIENT
Start: 2023-03-29 | End: 2023-07-07 | Stop reason: SDUPTHER

## 2023-03-29 RX ORDER — GLIMEPIRIDE 2 MG/1
2 TABLET ORAL
Qty: 90 TABLET | Refills: 3 | Status: SHIPPED | OUTPATIENT
Start: 2023-03-29 | End: 2023-06-19

## 2023-03-29 RX ORDER — TORSEMIDE 20 MG/1
20 TABLET ORAL DAILY
Qty: 90 TABLET | Refills: 1 | Status: SHIPPED | OUTPATIENT
Start: 2023-03-29 | End: 2023-08-01 | Stop reason: SDUPTHER

## 2023-03-29 RX ORDER — ATORVASTATIN CALCIUM 40 MG/1
40 TABLET, FILM COATED ORAL DAILY
Qty: 90 TABLET | Refills: 1 | Status: SHIPPED | OUTPATIENT
Start: 2023-03-29 | End: 2023-04-17 | Stop reason: SDUPTHER

## 2023-03-29 RX ORDER — BUPROPION HYDROCHLORIDE 150 MG/1
150 TABLET ORAL DAILY
Qty: 90 TABLET | Refills: 3 | Status: SHIPPED | OUTPATIENT
Start: 2023-03-29 | End: 2023-08-06 | Stop reason: SDUPTHER

## 2023-03-29 NOTE — PROGRESS NOTES
[facility]    Subjective:    The HPI and pertinent ROS is included in the Diagnostic Impression Remarks section at the end of the note. Please see below for further details. Chief complaint is at end of note.     Delores is a pleasant intelligent patient who is here for evaluation.     Modified Medications    Modified Medication Previous Medication    APIXABAN (ELIQUIS) 5 MG TAB apixaban (ELIQUIS) 5 mg Tab       Take 1 tablet (5 mg total) by mouth 2 (two) times daily.    Take 1 tablet (5 mg total) by mouth 2 (two) times daily.    ATORVASTATIN (LIPITOR) 40 MG TABLET atorvastatin (LIPITOR) 40 MG tablet       Take 1 tablet (40 mg total) by mouth once daily.    Take 1 tablet (40 mg total) by mouth once daily.    BUPROPION (WELLBUTRIN XL) 150 MG TB24 TABLET buPROPion (WELLBUTRIN XL) 150 MG TB24 tablet       Take 1 tablet (150 mg total) by mouth once daily.    Take 1 tablet (150 mg total) by mouth once daily.    HYDROXYZINE HCL (ATARAX) 25 MG TABLET hydrOXYzine HCL (ATARAX) 25 MG tablet       Take 1 tablet (25 mg total) by mouth 3 (three) times daily as needed for Itching.    Take 1 tablet (25 mg total) by mouth 3 (three) times daily as needed for Itching.    LOSARTAN (COZAAR) 25 MG TABLET losartan (COZAAR) 25 MG tablet       Take 1 tablet (25 mg total) by mouth once daily.    Take 0.5 tablets (12.5 mg total) by mouth once daily.    PANTOPRAZOLE (PROTONIX) 40 MG TABLET pantoprazole (PROTONIX) 40 MG tablet       Take 1 tablet (40 mg total) by mouth 2 (two) times daily.    Take 1 tablet (40 mg total) by mouth 2 (two) times daily.    PREGABALIN (LYRICA) 100 MG CAPSULE pregabalin (LYRICA) 100 MG capsule       Take 1 capsule (100 mg total) by mouth 3 (three) times daily.    Take 1 capsule (100 mg total) by mouth 3 (three) times daily.    TORSEMIDE (DEMADEX) 20 MG TAB torsemide (DEMADEX) 20 MG Tab       Take 1 tablet (20 mg total) by mouth once daily. Cake torsemide 20 mg once daily.  Can take additional  dose in afternoon if increased shortness of breath or leg swelling for 2-3 days if needed    Take 1 tablet (20 mg total) by mouth once daily. Cake torsemide 20 mg once daily.  Can take additional dose in afternoon if increased shortness of breath or leg swelling for 2-3 days if needed       Data reviewed 274}  Previous medical records reviewed and summarized in plan section at end of note.      If you are due for any health screening(s) below please notify me so we can arrange them to be ordered and scheduled. Most healthy patients at your age complete them, but you are free to accept or refuse. If you can't do it, I'll definitely understand. If you can, I'd certainly appreciate it!     Tests to Keep You Healthy    Mammogram: Met on 4/28/2022  Eye Exam: Met on 11/18/2022  Colon Cancer Screening: ORDERED  Last Blood Pressure <= 139/89 (3/29/2023): Yes  Last HbA1c < 8 (03/01/2023): NO      The following portions of the patient's history were reviewed and updated as appropriate: allergies, current medications, past family history, past medical history, past social history, past surgical history and problem list.    She  has a past medical history of Acid reflux, COPD (chronic obstructive pulmonary disease), Coronary artery disease, COVID-19, Diabetes mellitus, High cholesterol, Hypertension, and Obese body habitus.  She  has a past surgical history that includes Catheterization of both left and right heart (N/A, 11/23/2020); Appendectomy; Joint replacement (Left); Mastoidectomy (Right); Excision of lesion of eyelid (Right, 12/11/2020); Cholecystectomy; and Hysterectomy.    She  reports that she quit smoking about 6 months ago. Her smoking use included cigarettes. She has a 6.50 pack-year smoking history. She has been exposed to tobacco smoke. She has never used smokeless tobacco. She reports that she does not currently use alcohol. She reports that she does not currently use drugs.  She family history is not on  "file.    Review of patient's allergies indicates:   Allergen Reactions    Morphine Nausea And Vomiting    Sulfa (sulfonamide antibiotics) Nausea And Vomiting       Tobacco Use: Medium Risk    Smoking Tobacco Use: Former    Smokeless Tobacco Use: Never    Passive Exposure: Past     Physical Examination  General appearance: alert, cooperative, no distress  Neck: no thyromegaly, no neck stiffness  Lungs: clear to auscultation, no wheezes, rales or rhonchi, symmetric air entry  Heart: normal rate, regular rhythm, normal S1, S2, no murmurs, rubs, clicks or gallops  Abdomen: soft, nontender, nondistended, no rigidity, rebound, or guarding.   Back: no point tenderness over spine  Extremities: peripheral pulses normal, no unilateral leg swelling or calf tenderness   Neurological:alert, oriented, normal speech, no new focal findings or movement disorder noted from baseline    BP Readings from Last 3 Encounters:   03/29/23 (!) 114/58   03/16/23 130/70   03/13/23 128/74     Wt Readings from Last 3 Encounters:   03/29/23 105 kg (231 lb 7.7 oz)   03/16/23 102.2 kg (225 lb 5 oz)   03/13/23 103.4 kg (228 lb)     BP (!) 114/58 (BP Location: Right arm, Patient Position: Sitting, BP Method: Large (Manual))   Pulse 104   Temp 99 °F (37.2 °C) (Oral)   Resp 16   Ht 5' 7" (1.702 m)   Wt 105 kg (231 lb 7.7 oz)   SpO2 (!) 94%   BMI 36.26 kg/m²    274}  Laboratory: I have reviewed old labs below:    274}    Lab Results   Component Value Date    WBC 23.92 (H) 03/09/2023    HGB 13.1 03/09/2023    HCT 39.4 03/09/2023    MCV 91 03/09/2023     03/09/2023     (L) 03/16/2023    K 3.3 (L) 03/16/2023    CL 89 (L) 03/16/2023    CALCIUM 8.9 03/16/2023    PHOS 3.6 03/02/2023    CO2 30 (H) 03/16/2023     (H) 03/16/2023    BUN 40 (H) 03/16/2023    CREATININE 2.5 (H) 03/16/2023    ANIONGAP 16 03/16/2023    PROT 6.6 03/16/2023    ALBUMIN 3.5 03/16/2023    BILITOT 0.4 03/16/2023    ALKPHOS 153 (H) 03/16/2023    ALT 14 03/16/2023 " "   AST 14 03/16/2023    INR 1.1 03/06/2023    CHOL 189 09/16/2022    TRIG 127 09/16/2022    HDL 33 (L) 09/16/2022    LDLCALC 130.6 09/16/2022    TSH 3.600 09/16/2022    HGBA1C 9.0 (H) 03/01/2023     Lab reviewed by me: Particular labs of significance that I will monitor, workup, or treat to improve are mentioned below in diagnostic impression remarks.    Imaging/EKG: I have reviewed the pertinent results and my findings are noted in remarks.  274}    CC:   Chief Complaint   Patient presents with    Hospital Follow Up    Follow-up    Hypertension    Hyperlipidemia    Diabetes    Anxiety    Insomnia        274}    Assessment/Plan  Delores Fox is a 57 y.o. female who presents to clinic with:  1. Healthcare maintenance    2. Type 2 diabetes mellitus with hyperglycemia, without long-term current use of insulin, HbA1c 9.1%    3. Hyperlipidemia associated with type 2 diabetes mellitus    4. Hypertension associated with diabetes    5. Chronic obstructive pulmonary disease, unspecified COPD type    6. Colon cancer screening    7. Abnormal finding of blood chemistry, unspecified    8. Itching    9. Severe obesity with body mass index (BMI) of 35.0 to 39.9 with comorbidity    10. History of nicotine dependence    11. Fatigue, unspecified type    12. Gastroesophageal reflux disease, unspecified whether esophagitis present       274}  Diagnostic Impression Remarks + HPI     Documentation entered by me for this encounter may have been done in part using speech-recognition technology. Although I have made an effort to ensure accuracy, "sound like" errors may exist and should be interpreted in context.     Diabetes-needs improvement cont glp1 add on jaridance which will also help chf. Increasaae amryl rec heatlh diet recheck a1c 3 months   HTN-stable cont current meds  CHF-=stable cont currnet meds add jardiacne f/u cards  History nicotine dep-stopped smoking since hosptial smoked for over 20 years 1ppd discussed lung screening " and she agreed will get ct    Itching use antihistamine prn no rash no fever chills wt loss    Fatigue-check lytes was low on potassium    COPD-stable continue current inhalers no recent flares    CAD-stable cont asa statin no chest pain monitor f/u cards told she needs stress test but not convered /fu cards    Severe obesity with comorbidity-Hypertension to which the severe obesity is a contributing factor. This is consistent with the definition of severe obesity with comorbidity. The patient's severe obesity was monitored, evaluated, addressed and/or treated. Diet and exercise recommended see counseling section for further info.     Fatigue f/u blood work multifactorial    Insomnia-on ab in the middle night has been bethi Ambien with no side effects patient reports she had a sleep study in the past does not want to see a sleep specialist.    Leukocytosis-was given antibiotics fever chills cough dysuria or infection appreciated.      Health maintenance-recommend to send in mail-in kit for colon cancer screening  Altogether 42 minutes of total time spent on the encounter, which includes face to face time and non-face to face time preparing to see the patient (eg, review of tests), Obtaining and/or reviewing separately obtained history, Documenting clinical information in the electronic or other health record, Independently interpreting results (not separately reported) and communicating results to the patient/family/caregiver, or Care coordination (not separately reported). I also counseled her on common and the most usual side effect of prescribed medications. Risk and benefits of the medication were also discussed. I reviewed previous notes to better coordinate patient's care. She test results and lifestyle changes were also discussed. All questions were answered, and patient voiced understanding.     This is the extent of this pleasant patient's concerns at this present time. She did not feel chest pain upon  exertion, dyspnea, nausea, vomiting, diaphoresis, or syncope. No pleuritic chest pain, unilateral leg swelling, calf tenderness, or calf pain. Negative for unintentional weight loss night sweats and fevers. Delores will return to clinic in a few months for further workup and reassessment or sooner as needed. She was instructed to call the clinic or go to the emergency department or urgent care immediately if her symptoms do not improve, worsens, or if any new symptoms develop. As we discussed that symptoms could worsen over the next 24 hours she was advised that if any increased swelling, pain, or numbness arise to go immediately to the ED. Patient knows to call any time if an emergency arises. Shared decision making occurred and she verbalized understanding in agreement with this plan. I discussed imaging findings, diagnosis, possibilities, treatment options, medications, risks, and benefits. She had many questions regarding the options and long-term effects. All questions were answered. She expressed understanding after counseling regarding the diagnosis and recommendations. She was capable and demonstrated competence with understanding of these options. Shared decision making was performed resulting in her choosing the current treatment plan. Patient handout was given with instructions and recommendations. Advised the patient that if they become pregnant to alert us immediately to assess for medication changes. I also discussed the importance of close follow up to discuss labs, change or modify her medications if needed, monitor side effects, and further evaluation of medical problems.     Additional workup planned: see labs ordered below.    See below for labs and meds ordered with associated diagnosis      1. Healthcare maintenance    2. Type 2 diabetes mellitus with hyperglycemia, without long-term current use of insulin, HbA1c 9.1%  - Hemoglobin A1C; Future  - empagliflozin (JARDIANCE) 25 mg tablet; Take 1  tablet (25 mg total) by mouth once daily.  Dispense: 90 tablet; Refill: 3  - Hemoglobin A1C; Future  - glimepiride (AMARYL) 2 MG tablet; Take 1 tablet (2 mg total) by mouth before breakfast.  Dispense: 90 tablet; Refill: 3    3. Hyperlipidemia associated with type 2 diabetes mellitus  - Lipid Panel; Future    4. Hypertension associated with diabetes  - Comprehensive Metabolic Panel; Future  - Magnesium; Future    5. Chronic obstructive pulmonary disease, unspecified COPD type    6. Colon cancer screening  - Cologuard Screening (Multitarget Stool DNA); Future  - Cologuard Screening (Multitarget Stool DNA)    7. Abnormal finding of blood chemistry, unspecified  - CBC Auto Differential; Future    8. Itching  - hydrOXYzine HCL (ATARAX) 25 MG tablet; Take 1 tablet (25 mg total) by mouth 3 (three) times daily as needed for Itching.  Dispense: 90 tablet; Refill: 2    9. Severe obesity with body mass index (BMI) of 35.0 to 39.9 with comorbidity    10. History of nicotine dependence  - buPROPion (WELLBUTRIN XL) 150 MG TB24 tablet; Take 1 tablet (150 mg total) by mouth once daily.  Dispense: 90 tablet; Refill: 3  - CT Chest Lung Screening Low Dose; Future    11. Fatigue, unspecified type  - buPROPion (WELLBUTRIN XL) 150 MG TB24 tablet; Take 1 tablet (150 mg total) by mouth once daily.  Dispense: 90 tablet; Refill: 3    12. Gastroesophageal reflux disease, unspecified whether esophagitis present  - pantoprazole (PROTONIX) 40 MG tablet; Take 1 tablet (40 mg total) by mouth 2 (two) times daily.  Dispense: 180 tablet; Refill: 3      Tomy Rios MD   274}    If you are due for any health screening(s) below please notify me so we can arrange them to be ordered and scheduled. Most healthy patients at your age complete them, but you are free to accept or refuse.     If you can't do it, I'll definitely understand. If you can, I'd certainly appreciate it!   Tests to Keep You Healthy    Mammogram: Met on 4/28/2022  Eye Exam: Met on  11/18/2022  Colon Cancer Screening: ORDERED  Last Blood Pressure <= 139/89 (3/29/2023): Yes  Last HbA1c < 8 (03/01/2023): NO

## 2023-03-30 ENCOUNTER — PATIENT MESSAGE (OUTPATIENT)
Dept: FAMILY MEDICINE | Facility: CLINIC | Age: 58
End: 2023-03-30
Payer: MEDICAID

## 2023-03-30 DIAGNOSIS — E83.42 HYPOMAGNESEMIA: Primary | ICD-10-CM

## 2023-03-30 LAB
ALBUMIN SERPL BCP-MCNC: 3.2 G/DL (ref 3.5–5.2)
ALP SERPL-CCNC: 153 U/L (ref 55–135)
ALT SERPL W/O P-5'-P-CCNC: 11 U/L (ref 10–44)
ANION GAP SERPL CALC-SCNC: 9 MMOL/L (ref 8–16)
AST SERPL-CCNC: 16 U/L (ref 10–40)
BILIRUB SERPL-MCNC: 0.2 MG/DL (ref 0.1–1)
BUN SERPL-MCNC: 26 MG/DL (ref 6–20)
CALCIUM SERPL-MCNC: 8.6 MG/DL (ref 8.7–10.5)
CHLORIDE SERPL-SCNC: 95 MMOL/L (ref 95–110)
CHOLEST SERPL-MCNC: 176 MG/DL (ref 120–199)
CHOLEST/HDLC SERPL: 4.3 {RATIO} (ref 2–5)
CO2 SERPL-SCNC: 32 MMOL/L (ref 23–29)
CREAT SERPL-MCNC: 1.7 MG/DL (ref 0.5–1.4)
EST. GFR  (NO RACE VARIABLE): 35.2 ML/MIN/1.73 M^2
ESTIMATED AVG GLUCOSE: 217 MG/DL (ref 68–131)
GLUCOSE SERPL-MCNC: 224 MG/DL (ref 70–110)
HBA1C MFR BLD: 9.2 % (ref 4–5.6)
HDLC SERPL-MCNC: 41 MG/DL (ref 40–75)
HDLC SERPL: 23.3 % (ref 20–50)
LDLC SERPL CALC-MCNC: 100 MG/DL (ref 63–159)
MAGNESIUM SERPL-MCNC: 1.6 MG/DL (ref 1.6–2.6)
NONHDLC SERPL-MCNC: 135 MG/DL
POTASSIUM SERPL-SCNC: 3.8 MMOL/L (ref 3.5–5.1)
PROT SERPL-MCNC: 6.6 G/DL (ref 6–8.4)
SODIUM SERPL-SCNC: 136 MMOL/L (ref 136–145)
TRIGL SERPL-MCNC: 175 MG/DL (ref 30–150)

## 2023-03-30 RX ORDER — LANOLIN ALCOHOL/MO/W.PET/CERES
400 CREAM (GRAM) TOPICAL DAILY
Qty: 90 TABLET | Refills: 3 | Status: SHIPPED | OUTPATIENT
Start: 2023-03-30 | End: 2023-08-06 | Stop reason: SDUPTHER

## 2023-03-31 ENCOUNTER — OFFICE VISIT (OUTPATIENT)
Dept: CARDIOLOGY | Facility: CLINIC | Age: 58
End: 2023-03-31
Payer: MEDICAID

## 2023-03-31 VITALS
SYSTOLIC BLOOD PRESSURE: 110 MMHG | WEIGHT: 236 LBS | HEART RATE: 89 BPM | DIASTOLIC BLOOD PRESSURE: 60 MMHG | OXYGEN SATURATION: 93 % | BODY MASS INDEX: 37.04 KG/M2 | HEIGHT: 67 IN

## 2023-03-31 DIAGNOSIS — E66.01 SEVERE OBESITY WITH BODY MASS INDEX (BMI) OF 35.0 TO 39.9 WITH COMORBIDITY: ICD-10-CM

## 2023-03-31 DIAGNOSIS — R09.89 RIGHT CAROTID BRUIT: ICD-10-CM

## 2023-03-31 DIAGNOSIS — J44.1 COPD EXACERBATION: Chronic | ICD-10-CM

## 2023-03-31 DIAGNOSIS — E78.2 MIXED HYPERLIPIDEMIA: ICD-10-CM

## 2023-03-31 DIAGNOSIS — E11.65 TYPE 2 DIABETES MELLITUS WITH HYPERGLYCEMIA, WITHOUT LONG-TERM CURRENT USE OF INSULIN: Chronic | ICD-10-CM

## 2023-03-31 DIAGNOSIS — R07.9 CHEST PAIN, UNSPECIFIED TYPE: Primary | ICD-10-CM

## 2023-03-31 DIAGNOSIS — I51.9 LV DYSFUNCTION: Chronic | ICD-10-CM

## 2023-03-31 DIAGNOSIS — I15.2 HYPERTENSION ASSOCIATED WITH DIABETES: ICD-10-CM

## 2023-03-31 DIAGNOSIS — E11.59 HYPERTENSION ASSOCIATED WITH DIABETES: ICD-10-CM

## 2023-03-31 PROCEDURE — 3008F PR BODY MASS INDEX (BMI) DOCUMENTED: ICD-10-PCS | Mod: CPTII,,, | Performed by: NURSE PRACTITIONER

## 2023-03-31 PROCEDURE — 4010F ACE/ARB THERAPY RXD/TAKEN: CPT | Mod: CPTII,,, | Performed by: NURSE PRACTITIONER

## 2023-03-31 PROCEDURE — 1111F DSCHRG MED/CURRENT MED MERGE: CPT | Mod: CPTII,,, | Performed by: NURSE PRACTITIONER

## 2023-03-31 PROCEDURE — 3078F PR MOST RECENT DIASTOLIC BLOOD PRESSURE < 80 MM HG: ICD-10-PCS | Mod: CPTII,,, | Performed by: NURSE PRACTITIONER

## 2023-03-31 PROCEDURE — 1159F PR MEDICATION LIST DOCUMENTED IN MEDICAL RECORD: ICD-10-PCS | Mod: CPTII,,, | Performed by: NURSE PRACTITIONER

## 2023-03-31 PROCEDURE — 3074F PR MOST RECENT SYSTOLIC BLOOD PRESSURE < 130 MM HG: ICD-10-PCS | Mod: CPTII,,, | Performed by: NURSE PRACTITIONER

## 2023-03-31 PROCEDURE — 3046F HEMOGLOBIN A1C LEVEL >9.0%: CPT | Mod: CPTII,,, | Performed by: NURSE PRACTITIONER

## 2023-03-31 PROCEDURE — 99214 PR OFFICE/OUTPT VISIT, EST, LEVL IV, 30-39 MIN: ICD-10-PCS | Mod: S$PBB,,, | Performed by: NURSE PRACTITIONER

## 2023-03-31 PROCEDURE — 3078F DIAST BP <80 MM HG: CPT | Mod: CPTII,,, | Performed by: NURSE PRACTITIONER

## 2023-03-31 PROCEDURE — 4010F PR ACE/ARB THEARPY RXD/TAKEN: ICD-10-PCS | Mod: CPTII,,, | Performed by: NURSE PRACTITIONER

## 2023-03-31 PROCEDURE — 1111F PR DISCHARGE MEDS RECONCILED W/ CURRENT OUTPATIENT MED LIST: ICD-10-PCS | Mod: CPTII,,, | Performed by: NURSE PRACTITIONER

## 2023-03-31 PROCEDURE — 3046F PR MOST RECENT HEMOGLOBIN A1C LEVEL > 9.0%: ICD-10-PCS | Mod: CPTII,,, | Performed by: NURSE PRACTITIONER

## 2023-03-31 PROCEDURE — 99999 PR PBB SHADOW E&M-EST. PATIENT-LVL V: ICD-10-PCS | Mod: PBBFAC,,, | Performed by: NURSE PRACTITIONER

## 2023-03-31 PROCEDURE — 1159F MED LIST DOCD IN RCRD: CPT | Mod: CPTII,,, | Performed by: NURSE PRACTITIONER

## 2023-03-31 PROCEDURE — 99215 OFFICE O/P EST HI 40 MIN: CPT | Mod: PBBFAC,PN | Performed by: NURSE PRACTITIONER

## 2023-03-31 PROCEDURE — 99214 OFFICE O/P EST MOD 30 MIN: CPT | Mod: S$PBB,,, | Performed by: NURSE PRACTITIONER

## 2023-03-31 PROCEDURE — 3074F SYST BP LT 130 MM HG: CPT | Mod: CPTII,,, | Performed by: NURSE PRACTITIONER

## 2023-03-31 PROCEDURE — 99999 PR PBB SHADOW E&M-EST. PATIENT-LVL V: CPT | Mod: PBBFAC,,, | Performed by: NURSE PRACTITIONER

## 2023-03-31 PROCEDURE — 3008F BODY MASS INDEX DOCD: CPT | Mod: CPTII,,, | Performed by: NURSE PRACTITIONER

## 2023-03-31 NOTE — ASSESSMENT & PLAN NOTE
Recommend low-fat low-cholesterol diet and regular exercise.  Goal for LDL is less than 70.  Continue atorvastatin 40 mg p.o. daily.

## 2023-03-31 NOTE — ASSESSMENT & PLAN NOTE
Improved on Imdur. Continue the same.   Unfortunately her insurance denied a nuclear stress test.  We will continue to treat her conservatively since she is currently asymptomatic.  However at some point she may need a nuclear stress test and/or possibly an angiogram.  Would not recommend coronary CTA due to renal function.

## 2023-03-31 NOTE — PROGRESS NOTES
Subjective:    Patient ID:  Delores Fox is a 57 y.o. female   Chief Complaint   Patient presents with    Follow-up       HPI:  Patient presents today for follow-up appointment.  She is feeling better overall. Her breathing is improved and she denies chest pain. Patient has been doing well and taking medications as ordered.  Denies any falls or head injuries.  Denies any blood in the stool or in the urine.      Review of patient's allergies indicates:   Allergen Reactions    Morphine Nausea And Vomiting    Sulfa (sulfonamide antibiotics) Nausea And Vomiting       Past Medical History:   Diagnosis Date    Acid reflux     COPD (chronic obstructive pulmonary disease)     Intubation 3/2023    Coronary artery disease     COVID-19     Diabetes mellitus     High cholesterol     Hypertension     Obese body habitus      Past Surgical History:   Procedure Laterality Date    APPENDECTOMY      CATHETERIZATION OF BOTH LEFT AND RIGHT HEART N/A 2020    Procedure: CATHETERIZATION, HEART, BOTH LEFT AND RIGHT;  Surgeon: Doug Kendall MD;  Location: [facility] CATH/EP LAB;  Service: Cardiology;  Laterality: N/A;    CHOLECYSTECTOMY      EXCISION OF LESION OF EYELID Right 2020    Procedure: EXCISION, LESION, EYELID;  Surgeon: Malcahi Rodriguez MD;  Location: [facility] OR;  Service: Ophthalmology;  Laterality: Right;  Inclusion Cyst removal right lower lid    HYSTERECTOMY      JOINT REPLACEMENT Left     left hip    MASTOIDECTOMY Right      Social History     Tobacco Use    Smoking status: Former     Packs/day: 0.50     Years: 13.00     Pack years: 6.50     Types: Cigarettes     Quit date: 2022     Years since quittin.5     Passive exposure: Past    Smokeless tobacco: Never   Substance Use Topics    Alcohol use: Not Currently    Drug use: Not Currently     History reviewed. No pertinent family history.     Review of Systems:   Constitution: Negative for diaphoresis and fever.   HEENT: Negative for nosebleeds.     Cardiovascular: Negative for chest pain       + mild dyspnea on exertion       No leg swelling        No palpitations  Respiratory: Negative for shortness of breath and wheezing.    Hematologic/Lymphatic: Negative for bleeding problem. Does not bruise/bleed easily.   Skin: Negative for color change and rash.   Musculoskeletal: + hip and back pain, difficulty ambulating on elevation  Gastrointestinal: Negative for hematemesis and hematochezia.   Genitourinary: Negative for hematuria.   Neurological: Negative for dizziness and light-headedness.   Psychiatric/Behavioral: Negative for altered mental status and memory loss.          Objective:        Vitals:    03/31/23 0904   BP: 110/60   Pulse: 89       Lab Results   Component Value Date    WBC 12.48 03/29/2023    HGB 13.1 03/29/2023    HCT 41.5 03/29/2023     03/29/2023    CHOL 176 03/29/2023    TRIG 175 (H) 03/29/2023    HDL 41 03/29/2023    ALT 11 03/29/2023    AST 16 03/29/2023     03/29/2023    K 3.8 03/29/2023    CL 95 03/29/2023    CREATININE 1.7 (H) 03/29/2023    BUN 26 (H) 03/29/2023    CO2 32 (H) 03/29/2023    TSH 3.600 09/16/2022    INR 1.1 03/06/2023    HGBA1C 9.2 (H) 03/29/2023        ECHOCARDIOGRAM RESULTS  Results for orders placed during the hospital encounter of 02/27/23    Echo    Interpretation Summary  · The left ventricle is mildly enlarged with concentric remodeling and mildly decreased systolic function.  · The estimated ejection fraction is 40%.  · Grade I left ventricular diastolic dysfunction.  · There is moderate left ventricular global hypokinesis.  · Normal right ventricular size with normal right ventricular systolic function.  · Mild left atrial enlargement.  · Normal central venous pressure (3 mmHg).  · There is abnormal septal wall motion consistent with left bundle branch block.        CURRENT/PREVIOUS VISIT EKG  Results for orders placed or performed during the hospital encounter of 03/06/23   EKG 12-lead    Collection  Time: 03/06/23 11:10 AM    Narrative    Test Reason : R07.9,    Vent. Rate : 084 BPM     Atrial Rate : 084 BPM     P-R Int : 160 ms          QRS Dur : 094 ms      QT Int : 402 ms       P-R-T Axes : 052 -14 076 degrees     QTc Int : 475 ms    Normal sinus rhythm  Voltage criteria for left ventricular hypertrophy  Nonspecific ST and T wave abnormality  Abnormal ECG  When compared with ECG of 27-FEB-2023 06:50,  Questionable change in The axis  T wave inversion no longer evident in Inferior leads  T wave inversion less evident in Lateral leads  Confirmed by Russ Kendall MD ([id number]) on 3/14/2023 10:53:34 AM    Referred By: [email or web address]   SELF           Confirmed By:Russ Kendall MD     No valid procedures specified.   Results for orders placed during the hospital encounter of 11/21/20    Nuclear Stress Test    Interpretation Summary    The EKG portion of this study is negative for ischemia.    The patient reported no chest pain during the stress test.    There were no arrhythmias during stress.    ECG Stress Nuclear portion of this study will be reported separately.      Physical Exam:  CONSTITUTIONAL: No fever, no chills  HEENT: Normocephalic, atraumatic,pupils reactive to light                 NECK:  No JVD, + right carotid bruit  CVS: S1S2+, RRR  LUNGS: Clear  ABDOMEN: Soft, NT, BS+  EXTREMITIES: No cyanosis, edema  : No england catheter  NEURO: AAO X 3  PSY: Normal affect      Medication List with Changes/Refills   Current Medications    ALBUTEROL (ACCUNEB) 1.25 MG/3 ML NEBU    Take 3 mLs (1.25 mg total) by nebulization every 6 (six) hours as needed (wheezing). Rescue    APIXABAN (ELIQUIS) 5 MG TAB    Take 1 tablet (5 mg total) by mouth 2 (two) times daily.    ASPIRIN 81 MG CHEW    Take 1 tablet (81 mg total) by mouth once daily.    ATORVASTATIN (LIPITOR) 40 MG TABLET    Take 1 tablet (40 mg total) by mouth once daily.    BLOOD-GLUCOSE METER KIT    To check BG 2 times daily, to use with insurance preferred meter     BUPROPION (WELLBUTRIN XL) 150 MG TB24 TABLET    Take 1 tablet (150 mg total) by mouth once daily.    EMPAGLIFLOZIN (JARDIANCE) 25 MG TABLET    Take 1 tablet (25 mg total) by mouth once daily.    FLUTICASONE PROPION-SALMETEROL 115-21 MCG/DOSE (ADVAIR HFA) 115-21 MCG/ACTUATION HFAA INHALER    Inhale 2 puffs into the lungs every 12 (twelve) hours. Controller    FLUTICASONE PROPIONATE (FLONASE) 50 MCG/ACTUATION NASAL SPRAY    SHAKE LIQUID AND USE 1 SPRAY(50 MCG) IN EACH NOSTRIL EVERY DAY    FLUTICASONE-UMECLIDIN-VILANTER (TRELEGY ELLIPTA) 200-62.5-25 MCG INHALER    Inhale 1 puff into the lungs once daily.    GLIMEPIRIDE (AMARYL) 2 MG TABLET    Take 1 tablet (2 mg total) by mouth before breakfast.    GUAIFENESIN (MUCINEX) 600 MG 12 HR TABLET    Take 1,200 mg by mouth 2 (two) times daily.    HYDROXYZINE HCL (ATARAX) 25 MG TABLET    Take 1 tablet (25 mg total) by mouth 3 (three) times daily as needed for Itching.    ISOSORBIDE MONONITRATE (IMDUR) 30 MG 24 HR TABLET    Take 1 tablet (30 mg total) by mouth once daily.    LANCETS MISC    To check BG 2 times daily, to use with insurance preferred meter    LEVOFLOXACIN (LEVAQUIN) 500 MG TABLET    Take 1 tablet (500 mg total) by mouth once daily.    LOSARTAN (COZAAR) 25 MG TABLET    Take 1 tablet (25 mg total) by mouth once daily.    MAGNESIUM OXIDE (MAG-OX) 400 MG (241.3 MG MAGNESIUM) TABLET    Take 1 tablet (400 mg total) by mouth once daily.    METOPROLOL SUCCINATE (TOPROL-XL) 50 MG 24 HR TABLET    Take 1 tablet (50 mg total) by mouth once daily.    NEBULIZER AND COMPRESSOR REGI    Nebulizer machine for home use. DX COPD.    PANTOPRAZOLE (PROTONIX) 40 MG TABLET    Take 1 tablet (40 mg total) by mouth 2 (two) times daily.    POTASSIUM CHLORIDE SA (K-DUR,KLOR-CON) 20 MEQ TABLET    Take 2 tablets (40 mEq total) by mouth once daily for 3 days, THEN 1 tablet (20 mEq total) once daily.    PREGABALIN (LYRICA) 100 MG CAPSULE    Take 1 capsule (100 mg total) by mouth 3 (three) times  daily.    SEMAGLUTIDE (RYBELSUS) 7 MG TABLET    Take 1 tablet (7 mg total) by mouth once daily.    TORSEMIDE (DEMADEX) 20 MG TAB    Take 1 tablet (20 mg total) by mouth once daily. Cake torsemide 20 mg once daily.  Can take additional dose in afternoon if increased shortness of breath or leg swelling for 2-3 days if needed    TRUE METRIX GLUCOSE TEST STRIP STRP    CHECK SUGAR TWICE DAILY    ZOLPIDEM (AMBIEN) 10 MG TAB    Take 1 tablet (10 mg total) by mouth nightly as needed (insomnia).             Assessment:       1. Chest pain, unspecified type    2. LV dysfunction    3. COPD    4. Mixed hyperlipidemia    5. Hypertension associated with diabetes    6. Type 2 diabetes mellitus with hyperglycemia, without long-term current use of insulin, HbA1c 9.1%    7. Severe obesity with body mass index (BMI) of 35.0 to 39.9 with comorbidity    8. Right carotid bruit         Plan:     Problem List Items Addressed This Visit          Unprioritized    Type 2 diabetes mellitus with hyperglycemia, without long-term current use of insulin, HbA1c 9.1% (Chronic)    Current Assessment & Plan     Followed by PCP.         COPD (Chronic)    Current Assessment & Plan     Followed by PCP.         LV dysfunction (Chronic)    Chest pain with abnormal stress test - Primary    Current Assessment & Plan     Improved on Imdur. Continue the same.   Unfortunately her insurance denied a nuclear stress test.  We will continue to treat her conservatively since she is currently asymptomatic.  However at some point she may need a nuclear stress test and/or possibly an angiogram.  Would not recommend coronary CTA due to renal function.         Hypertension associated with diabetes    Current Assessment & Plan     Blood pressure stable on current regimen.  Continue the same.         HLD (hyperlipidemia)    Current Assessment & Plan     Recommend low-fat low-cholesterol diet and regular exercise.  Goal for LDL is less than 70.  Continue atorvastatin 40 mg  p.o. daily.         Severe obesity with body mass index (BMI) of 35.0 to 39.9 with comorbidity    Current Assessment & Plan     Recommend low-fat low-cholesterol diet and regular exercise.  Recommend weight loss.          Other Visit Diagnoses       Right carotid bruit        Relevant Orders    US Carotid Bilateral            Follow up in about 3 months (around 6/30/2023).

## 2023-04-05 DIAGNOSIS — G47.00 INSOMNIA, UNSPECIFIED TYPE: Primary | ICD-10-CM

## 2023-04-05 DIAGNOSIS — K21.9 GASTROESOPHAGEAL REFLUX DISEASE, UNSPECIFIED WHETHER ESOPHAGITIS PRESENT: ICD-10-CM

## 2023-04-05 RX ORDER — LOSARTAN POTASSIUM 25 MG/1
25 TABLET ORAL DAILY
Qty: 90 TABLET | Refills: 1 | Status: CANCELLED | OUTPATIENT
Start: 2023-04-05 | End: 2023-05-05

## 2023-04-05 RX ORDER — PANTOPRAZOLE SODIUM 40 MG/1
40 TABLET, DELAYED RELEASE ORAL 2 TIMES DAILY
Qty: 180 TABLET | Refills: 3 | Status: CANCELLED | OUTPATIENT
Start: 2023-04-05 | End: 2024-04-04

## 2023-04-05 RX ORDER — ATORVASTATIN CALCIUM 40 MG/1
40 TABLET, FILM COATED ORAL DAILY
Qty: 90 TABLET | Refills: 1 | Status: CANCELLED | OUTPATIENT
Start: 2023-04-05 | End: 2024-04-04

## 2023-04-05 RX ORDER — PREGABALIN 100 MG/1
100 CAPSULE ORAL 3 TIMES DAILY
Qty: 270 CAPSULE | Refills: 1 | Status: CANCELLED | OUTPATIENT
Start: 2023-04-05 | End: 2023-07-04

## 2023-04-05 NOTE — TELEPHONE ENCOUNTER
No new care gaps identified.  [facility] Embedded Care Gaps. Reference number: [id number]. 4/05/2023   3:41:11 PM CDT

## 2023-04-06 RX ORDER — ZOLPIDEM TARTRATE 10 MG/1
10 TABLET ORAL NIGHTLY PRN
Qty: 90 TABLET | Refills: 1 | Status: SHIPPED | OUTPATIENT
Start: 2023-04-06 | End: 2023-07-07 | Stop reason: SDUPTHER

## 2023-04-11 ENCOUNTER — HOSPITAL ENCOUNTER (OUTPATIENT)
Dept: RADIOLOGY | Facility: HOSPITAL | Age: 58
Discharge: HOME | End: 2023-04-11
Attending: NURSE PRACTITIONER
Payer: MEDICAID

## 2023-04-11 ENCOUNTER — TELEPHONE (OUTPATIENT)
Dept: FAMILY MEDICINE | Facility: CLINIC | Age: 58
End: 2023-04-11
Payer: MEDICAID

## 2023-04-11 ENCOUNTER — PATIENT MESSAGE (OUTPATIENT)
Dept: ADMINISTRATIVE | Facility: HOSPITAL | Age: 58
End: 2023-04-11
Payer: MEDICAID

## 2023-04-11 DIAGNOSIS — R09.89 RIGHT CAROTID BRUIT: ICD-10-CM

## 2023-04-11 PROCEDURE — 93880 EXTRACRANIAL BILAT STUDY: CPT | Mod: TC,PO

## 2023-04-11 NOTE — TELEPHONE ENCOUNTER
----- Message from Jesse Thomas sent at 4/11/2023  8:33 AM CDT -----  Regarding: Advice  Contact: Patient  Type:  Needs Medical Advice    Who Called: Patient  Symptoms (please be specific): Need new medication order   How long has patient had these symptoms:    Pharmacy name and phone #:    Would the patient rather a call back or a response via [facility]? call  Best Call Back Number: [phone]  Additional Information: Please call patient to advise.

## 2023-04-11 NOTE — TELEPHONE ENCOUNTER
Called and spoke with pt. Pt was inquiring about her Eliquis. Informed pt that is was sent on 3/29. Called and spoke with pt pharmacy. Pharmacy states they will work on filling pt medications. Called and informed pt. Pt verbalized understanding.

## 2023-04-13 ENCOUNTER — TELEPHONE (OUTPATIENT)
Dept: FAMILY MEDICINE | Facility: CLINIC | Age: 58
End: 2023-04-13
Payer: MEDICAID

## 2023-04-13 NOTE — TELEPHONE ENCOUNTER
----- Message from Vera Osorio sent at 4/13/2023  1:19 PM CDT -----  Contact: pt  Type: Needs Medical Advice  Who Called:  pt     Best Call Back Number: [phone]    Additional Information: pt states she needs provider to override her procedure tomorrow because medicaid denied it she states. Please advise.

## 2023-04-13 NOTE — TELEPHONE ENCOUNTER
Called spoke with pt. Pt was inquiring about how much her insurance is going to pay for her procedure tomorrow. Pt given the number to billing. Pt verbalized understanding.

## 2023-04-13 NOTE — TELEPHONE ENCOUNTER
Pt states she needs provider to override her procedure tomorrow because medicaid denied it. Please advise.

## 2023-04-13 NOTE — TELEPHONE ENCOUNTER
----- Message from Cora Myers sent at 4/13/2023  9:52 AM CDT -----  Regarding: advice  Contact: Patient  Type: Needs Medical Advice  Who Called:  Patient   Symptoms (please be specific):    How long has patient had these symptoms:    Pharmacy name and phone #:    Best Call Back Number: [phone] (home)     Additional Information: Patient stated that she would like to speak with someone concerning her procedure on tomorrow. Please contact patient to advise. Thanks!

## 2023-04-17 DIAGNOSIS — I77.9 CAROTID ARTERY DISEASE, UNSPECIFIED LATERALITY, UNSPECIFIED TYPE: Primary | ICD-10-CM

## 2023-04-17 RX ORDER — ATORVASTATIN CALCIUM 80 MG/1
80 TABLET, FILM COATED ORAL DAILY
Qty: 90 TABLET | Refills: 1 | Status: SHIPPED | OUTPATIENT
Start: 2023-04-17 | End: 2023-08-06 | Stop reason: SDUPTHER

## 2023-04-20 ENCOUNTER — TELEPHONE (OUTPATIENT)
Dept: FAMILY MEDICINE | Facility: CLINIC | Age: 58
End: 2023-04-20
Payer: MEDICAID

## 2023-04-20 NOTE — TELEPHONE ENCOUNTER
Pt smoked for over 20 years and stopped 1 year ago. Discussed the risks and benefits of lung cancer screening she agreed recommend to get lung ct. Order placed.

## 2023-05-18 ENCOUNTER — TELEPHONE (OUTPATIENT)
Dept: CARDIOLOGY | Facility: CLINIC | Age: 58
End: 2023-05-18
Payer: MEDICAID

## 2023-05-18 DIAGNOSIS — R07.9 CHEST PAIN, UNSPECIFIED TYPE: Primary | ICD-10-CM

## 2023-05-18 DIAGNOSIS — I51.9 LV DYSFUNCTION: ICD-10-CM

## 2023-05-19 ENCOUNTER — TELEPHONE (OUTPATIENT)
Dept: FAMILY MEDICINE | Facility: CLINIC | Age: 58
End: 2023-05-19
Payer: MEDICAID

## 2023-05-19 NOTE — TELEPHONE ENCOUNTER
Awaiting approval from insurance for pt scheduled CT. Is this medically urgent or can we reschedule until approved by insurance? Please advise.

## 2023-05-23 NOTE — TELEPHONE ENCOUNTER
Handled in another encounter.   Non urgent per provider. Appt rescheduled as requested. Pt verbalized understanding.

## 2023-06-05 ENCOUNTER — TELEPHONE (OUTPATIENT)
Dept: SMOKING CESSATION | Facility: CLINIC | Age: 58
End: 2023-06-05
Payer: MEDICAID

## 2023-06-08 ENCOUNTER — TELEPHONE (OUTPATIENT)
Dept: FAMILY MEDICINE | Facility: CLINIC | Age: 58
End: 2023-06-08
Payer: MEDICAID

## 2023-06-08 NOTE — TELEPHONE ENCOUNTER
----- Message from Tomy Rios MD sent at 6/1/2023  3:10 PM CDT -----  Regarding: Please call insurance it CT of the chest approved  It looks like the insurance company said this any needed us to tell but she did not get a CT scan of the chest approved during the time frame the noted in 2022    Case pending, in-basket sent. Per insurance, someone from clinical staff needs to call to inform them that previously approved CT was not performed between 3/22/22 to 6/22/22

## 2023-06-16 ENCOUNTER — TELEPHONE (OUTPATIENT)
Dept: FAMILY MEDICINE | Facility: CLINIC | Age: 58
End: 2023-06-16
Payer: MEDICAID

## 2023-06-16 DIAGNOSIS — E11.65 TYPE 2 DIABETES MELLITUS WITH HYPERGLYCEMIA, WITHOUT LONG-TERM CURRENT USE OF INSULIN: Primary | Chronic | ICD-10-CM

## 2023-06-16 NOTE — TELEPHONE ENCOUNTER
Jorge prescribed Glimepiride 1MG, take once a day with breakfast, medication is not covered, pharmacy and insurance offer Glipizide ER .please send to pharmacy if you think medication can be change with SIG , MG,

## 2023-06-19 RX ORDER — GLIPIZIDE 10 MG/1
10 TABLET, FILM COATED, EXTENDED RELEASE ORAL
Qty: 90 TABLET | Refills: 3 | Status: SHIPPED | OUTPATIENT
Start: 2023-06-19 | End: 2023-08-06 | Stop reason: SDUPTHER

## 2023-07-07 DIAGNOSIS — K21.9 GASTROESOPHAGEAL REFLUX DISEASE, UNSPECIFIED WHETHER ESOPHAGITIS PRESENT: ICD-10-CM

## 2023-07-07 DIAGNOSIS — G47.00 INSOMNIA, UNSPECIFIED TYPE: ICD-10-CM

## 2023-07-07 DIAGNOSIS — L29.9 ITCHING: ICD-10-CM

## 2023-07-07 RX ORDER — ZOLPIDEM TARTRATE 10 MG/1
10 TABLET ORAL NIGHTLY PRN
Qty: 90 TABLET | Refills: 1 | Status: SHIPPED | OUTPATIENT
Start: 2023-07-07 | End: 2023-08-01 | Stop reason: SDUPTHER

## 2023-07-07 RX ORDER — HYDROXYZINE HYDROCHLORIDE 25 MG/1
25 TABLET, FILM COATED ORAL 3 TIMES DAILY PRN
Qty: 90 TABLET | Refills: 2 | Status: SHIPPED | OUTPATIENT
Start: 2023-07-07 | End: 2023-08-01 | Stop reason: SDUPTHER

## 2023-07-07 RX ORDER — PANTOPRAZOLE SODIUM 40 MG/1
40 TABLET, DELAYED RELEASE ORAL 2 TIMES DAILY
Qty: 180 TABLET | Refills: 3 | Status: SHIPPED | OUTPATIENT
Start: 2023-07-07 | End: 2023-07-08 | Stop reason: SDUPTHER

## 2023-07-07 NOTE — TELEPHONE ENCOUNTER
Care Due:                  Date            Visit Type   Department     Provider  --------------------------------------------------------------------------------                                EP -                              PRIMARY      [facility]  Last Visit: 03-      CARE ([facility])   MEDICINE       Tomy Rios  Next Visit: None Scheduled  None         None Found                                                            Last  Test          Frequency    Reason                     Performed    Due Date  --------------------------------------------------------------------------------    HBA1C.......  6 months...  empagliflozin, glipiZIDE.  03- 09-    [facility] Embedded Care Due Messages. Reference number: [id number].   7/07/2023 9:52:31 AM CDT

## 2023-07-08 DIAGNOSIS — K21.9 GASTROESOPHAGEAL REFLUX DISEASE, UNSPECIFIED WHETHER ESOPHAGITIS PRESENT: ICD-10-CM

## 2023-07-09 RX ORDER — PREGABALIN 100 MG/1
100 CAPSULE ORAL 3 TIMES DAILY
Qty: 270 CAPSULE | Refills: 1 | Status: SHIPPED | OUTPATIENT
Start: 2023-07-09 | End: 2023-10-31 | Stop reason: SDUPTHER

## 2023-07-09 RX ORDER — PANTOPRAZOLE SODIUM 40 MG/1
40 TABLET, DELAYED RELEASE ORAL 2 TIMES DAILY
Qty: 180 TABLET | Refills: 3 | Status: SHIPPED | OUTPATIENT
Start: 2023-07-09 | End: 2023-08-01 | Stop reason: SDUPTHER

## 2023-07-09 NOTE — TELEPHONE ENCOUNTER
No care due was identified.  [facility] Embedded Care Due Messages. Reference number: [id number].   7/08/2023 9:41:37 PM CDT

## 2023-07-13 DIAGNOSIS — K21.9 GASTROESOPHAGEAL REFLUX DISEASE, UNSPECIFIED WHETHER ESOPHAGITIS PRESENT: ICD-10-CM

## 2023-07-13 RX ORDER — PANTOPRAZOLE SODIUM 40 MG/1
40 TABLET, DELAYED RELEASE ORAL 2 TIMES DAILY
Qty: 180 TABLET | Refills: 3 | OUTPATIENT
Start: 2023-07-13 | End: 2024-07-12

## 2023-07-13 NOTE — TELEPHONE ENCOUNTER
Refill Decision Note   Delores Fox  is requesting a refill authorization.  Brief Assessment and Rationale for Refill:  Quick Discontinue     Medication Therapy Plan:       Medication Reconciliation Completed: No   Comments:   pantoprazole (PROTONIX) 40 MG tablet 180 tablet 3 7/9/2023 7/8/2024 No   Sig - Route: Take 1 tablet (40 mg total) by mouth 2 (two) times daily. - Oral   Sent to pharmacy as: pantoprazole (PROTONIX) 40 MG tablet   Class: Normal   Order: [id number]   Date/Time Signed: 7/9/2023 19:59       E-Prescribing Status: Receipt confirmed by pharmacy (7/9/2023  8:00 PM CDT)     Ordering Encounter Report    Associated Reports   View Encounter       No Care Gaps recommended.     Note composed:1:01 PM 07/13/2023

## 2023-07-13 NOTE — TELEPHONE ENCOUNTER
No care due was identified.  [facility] Embedded Care Due Messages. Reference number: [id number].   7/13/2023 10:51:40 AM CDT

## 2023-08-01 DIAGNOSIS — L29.9 ITCHING: ICD-10-CM

## 2023-08-01 DIAGNOSIS — J44.9 CHRONIC OBSTRUCTIVE PULMONARY DISEASE, UNSPECIFIED COPD TYPE: ICD-10-CM

## 2023-08-01 DIAGNOSIS — K21.9 GASTROESOPHAGEAL REFLUX DISEASE, UNSPECIFIED WHETHER ESOPHAGITIS PRESENT: ICD-10-CM

## 2023-08-01 DIAGNOSIS — G47.00 INSOMNIA, UNSPECIFIED TYPE: ICD-10-CM

## 2023-08-01 NOTE — TELEPHONE ENCOUNTER
No care due was identified.  [facility] Embedded Care Due Messages. Reference number: [id number].   8/01/2023 9:29:24 AM CDT

## 2023-08-03 RX ORDER — ALBUTEROL SULFATE 1.25 MG/3ML
1.25 SOLUTION RESPIRATORY (INHALATION) EVERY 6 HOURS PRN
Qty: 75 ML | Refills: 1 | Status: SHIPPED | OUTPATIENT
Start: 2023-08-03 | End: 2023-09-27 | Stop reason: SDUPTHER

## 2023-08-04 RX ORDER — ISOSORBIDE MONONITRATE 30 MG/1
30 TABLET, EXTENDED RELEASE ORAL DAILY
Qty: 90 TABLET | Refills: 0 | Status: SHIPPED | OUTPATIENT
Start: 2023-08-04 | End: 2023-09-27 | Stop reason: SDUPTHER

## 2023-08-06 DIAGNOSIS — E11.65 TYPE 2 DIABETES MELLITUS WITH HYPERGLYCEMIA, WITHOUT LONG-TERM CURRENT USE OF INSULIN: Chronic | ICD-10-CM

## 2023-08-06 DIAGNOSIS — E11.22 TYPE 2 DIABETES MELLITUS WITH STAGE 3B CHRONIC KIDNEY DISEASE, WITH LONG-TERM CURRENT USE OF INSULIN: ICD-10-CM

## 2023-08-06 DIAGNOSIS — Z87.891 HISTORY OF NICOTINE DEPENDENCE: ICD-10-CM

## 2023-08-06 DIAGNOSIS — Z79.4 TYPE 2 DIABETES MELLITUS WITH STAGE 3B CHRONIC KIDNEY DISEASE, WITH LONG-TERM CURRENT USE OF INSULIN: ICD-10-CM

## 2023-08-06 DIAGNOSIS — K21.9 GASTROESOPHAGEAL REFLUX DISEASE, UNSPECIFIED WHETHER ESOPHAGITIS PRESENT: ICD-10-CM

## 2023-08-06 DIAGNOSIS — N18.32 TYPE 2 DIABETES MELLITUS WITH STAGE 3B CHRONIC KIDNEY DISEASE, WITH LONG-TERM CURRENT USE OF INSULIN: ICD-10-CM

## 2023-08-06 DIAGNOSIS — R53.83 FATIGUE, UNSPECIFIED TYPE: ICD-10-CM

## 2023-08-06 DIAGNOSIS — E83.42 HYPOMAGNESEMIA: ICD-10-CM

## 2023-08-06 RX ORDER — LOSARTAN POTASSIUM 25 MG/1
25 TABLET ORAL DAILY
Qty: 90 TABLET | Refills: 1 | OUTPATIENT
Start: 2023-08-06

## 2023-08-06 RX ORDER — BUPROPION HYDROCHLORIDE 150 MG/1
150 TABLET ORAL DAILY
Qty: 90 TABLET | Refills: 2 | Status: SHIPPED | OUTPATIENT
Start: 2023-08-06 | End: 2023-12-30 | Stop reason: SDUPTHER

## 2023-08-06 RX ORDER — PANTOPRAZOLE SODIUM 40 MG/1
40 TABLET, DELAYED RELEASE ORAL 2 TIMES DAILY
Qty: 180 TABLET | Refills: 3 | OUTPATIENT
Start: 2023-08-06 | End: 2024-08-05

## 2023-08-06 RX ORDER — TORSEMIDE 20 MG/1
20 TABLET ORAL DAILY
Qty: 90 TABLET | Refills: 1 | OUTPATIENT
Start: 2023-08-06

## 2023-08-06 RX ORDER — LANCETS
1 EACH MISCELLANEOUS 2 TIMES DAILY
Qty: 200 EACH | Refills: 3 | Status: SHIPPED | OUTPATIENT
Start: 2023-08-06

## 2023-08-06 NOTE — TELEPHONE ENCOUNTER
No care due was identified.  [facility] Embedded Care Due Messages. Reference number: [id number].   8/06/2023 11:36:10 AM CDT

## 2023-08-07 RX ORDER — ZOLPIDEM TARTRATE 10 MG/1
10 TABLET ORAL NIGHTLY PRN
Qty: 90 TABLET | Refills: 1 | Status: SHIPPED | OUTPATIENT
Start: 2023-08-07 | End: 2023-09-01 | Stop reason: SDUPTHER

## 2023-08-07 RX ORDER — LANOLIN ALCOHOL/MO/W.PET/CERES
400 CREAM (GRAM) TOPICAL DAILY
Qty: 90 TABLET | Refills: 3 | Status: SHIPPED | OUTPATIENT
Start: 2023-08-07 | End: 2023-09-20 | Stop reason: ALTCHOICE

## 2023-08-07 RX ORDER — LOSARTAN POTASSIUM 25 MG/1
25 TABLET ORAL DAILY
Qty: 90 TABLET | Refills: 2 | Status: SHIPPED | OUTPATIENT
Start: 2023-08-07 | End: 2023-12-30 | Stop reason: SDUPTHER

## 2023-08-07 RX ORDER — GLIPIZIDE 10 MG/1
10 TABLET, FILM COATED, EXTENDED RELEASE ORAL
Qty: 90 TABLET | Refills: 3 | Status: SHIPPED | OUTPATIENT
Start: 2023-08-07 | End: 2024-02-26 | Stop reason: SDUPTHER

## 2023-08-07 RX ORDER — HYDROXYZINE HYDROCHLORIDE 25 MG/1
25 TABLET, FILM COATED ORAL 3 TIMES DAILY PRN
Qty: 90 TABLET | Refills: 2 | Status: SHIPPED | OUTPATIENT
Start: 2023-08-07 | End: 2023-09-01 | Stop reason: SDUPTHER

## 2023-08-07 RX ORDER — TORSEMIDE 20 MG/1
20 TABLET ORAL DAILY
Qty: 90 TABLET | Refills: 3 | Status: SHIPPED | OUTPATIENT
Start: 2023-08-07 | End: 2023-12-30 | Stop reason: SDUPTHER

## 2023-08-07 RX ORDER — PANTOPRAZOLE SODIUM 40 MG/1
40 TABLET, DELAYED RELEASE ORAL 2 TIMES DAILY
Qty: 180 TABLET | Refills: 3 | Status: SHIPPED | OUTPATIENT
Start: 2023-08-07 | End: 2023-09-01 | Stop reason: SDUPTHER

## 2023-08-07 NOTE — TELEPHONE ENCOUNTER
Refill Routing Note   Medication(s) are not appropriate for processing by [facility] for the following reason(s):      Medication outside of protocol: Mag-Ox  Required labs abnormal: Glipizide, Jardiance  New or recently adjusted medication: Glipizide    [facility] action(s):  Defer  Route  Quick Discontinue  Approve Care Due:  None identified     Medication Therapy Plan: Protonix, Torsemide, Losartan all duplicates.        Appointments  past 12m or future 3m with PCP    Date Provider   Last Visit   3/29/2023 Tomy Rios MD   Next Visit   Visit date not found Tomy Rios MD   ED visits in past 90 days: 0        Note composed:9:34 PM 08/06/2023

## 2023-08-07 NOTE — TELEPHONE ENCOUNTER
Refill Routing Note   Medication(s) are not appropriate for processing by [facility] for the following reason(s):      Medication outside of protocol: Protonix, Ambien, Atarax       - Protonix: PPI dose outside of [facility] protocol       Required labs abnormal: Demadex, Cozaar    [facility] action(s):  Defer  Route Care Due:  None identified     Medication Therapy Plan: PPI dose outside of [facility] protocol        Appointments  past 12m or future 3m with PCP    Date Provider   Last Visit   3/29/2023 Tomy Rios MD   Next Visit   8/6/2023 Tomy Rios MD   ED visits in past 90 days: 0        Note composed:9:37 PM 08/06/2023

## 2023-08-15 RX ORDER — ATORVASTATIN CALCIUM 80 MG/1
80 TABLET, FILM COATED ORAL DAILY
Qty: 90 TABLET | Refills: 1 | Status: SHIPPED | OUTPATIENT
Start: 2023-08-15 | End: 2023-09-27 | Stop reason: SDUPTHER

## 2023-08-23 ENCOUNTER — PATIENT MESSAGE (OUTPATIENT)
Dept: FAMILY MEDICINE | Facility: CLINIC | Age: 58
End: 2023-08-23
Payer: MEDICAID

## 2023-09-01 DIAGNOSIS — L29.9 ITCHING: ICD-10-CM

## 2023-09-01 DIAGNOSIS — K21.9 GASTROESOPHAGEAL REFLUX DISEASE, UNSPECIFIED WHETHER ESOPHAGITIS PRESENT: ICD-10-CM

## 2023-09-01 DIAGNOSIS — G47.00 INSOMNIA, UNSPECIFIED TYPE: ICD-10-CM

## 2023-09-02 NOTE — TELEPHONE ENCOUNTER
No care due was identified.  [facility] Embedded Care Due Messages. Reference number: [id number].   9/01/2023 10:42:27 PM CDT

## 2023-09-05 RX ORDER — ZOLPIDEM TARTRATE 10 MG/1
10 TABLET ORAL NIGHTLY PRN
Qty: 90 TABLET | Refills: 1 | Status: SHIPPED | OUTPATIENT
Start: 2023-09-05 | End: 2023-12-30 | Stop reason: SDUPTHER

## 2023-09-05 RX ORDER — PANTOPRAZOLE SODIUM 40 MG/1
40 TABLET, DELAYED RELEASE ORAL 2 TIMES DAILY
Qty: 180 TABLET | Refills: 3 | Status: SHIPPED | OUTPATIENT
Start: 2023-09-05 | End: 2023-09-20 | Stop reason: SDUPTHER

## 2023-09-05 RX ORDER — HYDROXYZINE HYDROCHLORIDE 25 MG/1
25 TABLET, FILM COATED ORAL 3 TIMES DAILY PRN
Qty: 90 TABLET | Refills: 2 | Status: SHIPPED | OUTPATIENT
Start: 2023-09-05 | End: 2023-10-24 | Stop reason: SDUPTHER

## 2023-09-20 ENCOUNTER — OFFICE VISIT (OUTPATIENT)
Dept: FAMILY MEDICINE | Facility: CLINIC | Age: 58
End: 2023-09-20
Payer: MEDICAID

## 2023-09-20 VITALS
SYSTOLIC BLOOD PRESSURE: 130 MMHG | TEMPERATURE: 99 F | DIASTOLIC BLOOD PRESSURE: 78 MMHG | WEIGHT: 231.06 LBS | OXYGEN SATURATION: 96 % | BODY MASS INDEX: 36.27 KG/M2 | HEIGHT: 67 IN | HEART RATE: 111 BPM

## 2023-09-20 DIAGNOSIS — E11.69 HYPERLIPIDEMIA ASSOCIATED WITH TYPE 2 DIABETES MELLITUS: ICD-10-CM

## 2023-09-20 DIAGNOSIS — J44.9 CHRONIC OBSTRUCTIVE PULMONARY DISEASE, UNSPECIFIED COPD TYPE: Chronic | ICD-10-CM

## 2023-09-20 DIAGNOSIS — E11.59 HYPERTENSION ASSOCIATED WITH DIABETES: ICD-10-CM

## 2023-09-20 DIAGNOSIS — E78.5 HYPERLIPIDEMIA ASSOCIATED WITH TYPE 2 DIABETES MELLITUS: ICD-10-CM

## 2023-09-20 DIAGNOSIS — E11.65 TYPE 2 DIABETES MELLITUS WITH HYPERGLYCEMIA, WITHOUT LONG-TERM CURRENT USE OF INSULIN: Primary | ICD-10-CM

## 2023-09-20 DIAGNOSIS — I15.2 HYPERTENSION ASSOCIATED WITH DIABETES: ICD-10-CM

## 2023-09-20 DIAGNOSIS — K21.9 GASTROESOPHAGEAL REFLUX DISEASE, UNSPECIFIED WHETHER ESOPHAGITIS PRESENT: ICD-10-CM

## 2023-09-20 PROCEDURE — 99214 OFFICE O/P EST MOD 30 MIN: CPT | Mod: S$PBB,,, | Performed by: NURSE PRACTITIONER

## 2023-09-20 PROCEDURE — 3008F BODY MASS INDEX DOCD: CPT | Mod: CPTII,,, | Performed by: NURSE PRACTITIONER

## 2023-09-20 PROCEDURE — 3008F PR BODY MASS INDEX (BMI) DOCUMENTED: ICD-10-PCS | Mod: CPTII,,, | Performed by: NURSE PRACTITIONER

## 2023-09-20 PROCEDURE — 99214 PR OFFICE/OUTPT VISIT, EST, LEVL IV, 30-39 MIN: ICD-10-PCS | Mod: S$PBB,,, | Performed by: NURSE PRACTITIONER

## 2023-09-20 PROCEDURE — 3078F PR MOST RECENT DIASTOLIC BLOOD PRESSURE < 80 MM HG: ICD-10-PCS | Mod: CPTII,,, | Performed by: NURSE PRACTITIONER

## 2023-09-20 PROCEDURE — 4010F ACE/ARB THERAPY RXD/TAKEN: CPT | Mod: CPTII,,, | Performed by: NURSE PRACTITIONER

## 2023-09-20 PROCEDURE — 3078F DIAST BP <80 MM HG: CPT | Mod: CPTII,,, | Performed by: NURSE PRACTITIONER

## 2023-09-20 PROCEDURE — 99999 PR PBB SHADOW E&M-EST. PATIENT-LVL III: ICD-10-PCS | Mod: PBBFAC,,, | Performed by: NURSE PRACTITIONER

## 2023-09-20 PROCEDURE — 99999 PR PBB SHADOW E&M-EST. PATIENT-LVL III: CPT | Mod: PBBFAC,,, | Performed by: NURSE PRACTITIONER

## 2023-09-20 PROCEDURE — 3046F HEMOGLOBIN A1C LEVEL >9.0%: CPT | Mod: CPTII,,, | Performed by: NURSE PRACTITIONER

## 2023-09-20 PROCEDURE — 3046F PR MOST RECENT HEMOGLOBIN A1C LEVEL > 9.0%: ICD-10-PCS | Mod: CPTII,,, | Performed by: NURSE PRACTITIONER

## 2023-09-20 PROCEDURE — 99213 OFFICE O/P EST LOW 20 MIN: CPT | Mod: PBBFAC,PO | Performed by: NURSE PRACTITIONER

## 2023-09-20 PROCEDURE — 3075F SYST BP GE 130 - 139MM HG: CPT | Mod: CPTII,,, | Performed by: NURSE PRACTITIONER

## 2023-09-20 PROCEDURE — 3075F PR MOST RECENT SYSTOLIC BLOOD PRESS GE 130-139MM HG: ICD-10-PCS | Mod: CPTII,,, | Performed by: NURSE PRACTITIONER

## 2023-09-20 PROCEDURE — 4010F PR ACE/ARB THEARPY RXD/TAKEN: ICD-10-PCS | Mod: CPTII,,, | Performed by: NURSE PRACTITIONER

## 2023-09-20 RX ORDER — PANTOPRAZOLE SODIUM 40 MG/1
40 TABLET, DELAYED RELEASE ORAL 2 TIMES DAILY
Qty: 180 TABLET | Refills: 3 | Status: SHIPPED | OUTPATIENT
Start: 2023-09-20 | End: 2023-09-27 | Stop reason: SDUPTHER

## 2023-09-20 RX ORDER — POTASSIUM CHLORIDE 20 MEQ/1
1 TABLET, EXTENDED RELEASE ORAL EVERY MORNING
COMMUNITY
End: 2023-09-20 | Stop reason: ALTCHOICE

## 2023-09-20 RX ORDER — FLUTICASONE PROPIONATE AND SALMETEROL XINAFOATE 115; 21 UG/1; UG/1
2 AEROSOL, METERED RESPIRATORY (INHALATION) EVERY 12 HOURS
Qty: 12 G | Refills: 0 | Status: SHIPPED | OUTPATIENT
Start: 2023-09-20 | End: 2023-09-27 | Stop reason: SDUPTHER

## 2023-09-20 RX ORDER — APIXABAN 5 MG/1
5 TABLET, FILM COATED ORAL 2 TIMES DAILY
COMMUNITY
Start: 2023-08-14

## 2023-09-20 RX ORDER — METOPROLOL SUCCINATE 50 MG/1
50 TABLET, EXTENDED RELEASE ORAL DAILY
Qty: 30 TABLET | Refills: 0 | Status: SHIPPED | OUTPATIENT
Start: 2023-09-20 | End: 2023-12-30 | Stop reason: SDUPTHER

## 2023-09-20 RX ORDER — GLIMEPIRIDE 2 MG/1
2 TABLET ORAL
COMMUNITY
Start: 2023-09-05

## 2023-09-20 NOTE — PROGRESS NOTES
This dictation has been generated using Modal Fluency Dictation some phonetic errors may occur. Please contact author for clarification if needed.     Problem List Items Addressed This Visit       Type 2 diabetes mellitus with hyperglycemia, without long-term current use of insulin, HbA1c 9.1% - Primary (Chronic)    Relevant Medications    glimepiride (AMARYL) 2 MG tablet    Other Relevant Orders    CBC Auto Differential    Comprehensive Metabolic Panel    Hemoglobin A1C    Lipid Panel    TSH    Hypertension associated with diabetes    Relevant Medications    glimepiride (AMARYL) 2 MG tablet    Other Relevant Orders    Comprehensive Metabolic Panel    Hyperlipidemia associated with type 2 diabetes mellitus    Relevant Medications    glimepiride (AMARYL) 2 MG tablet    Other Relevant Orders    Lipid Panel     Other Visit Diagnoses       Chronic obstructive pulmonary disease, unspecified COPD type  (Chronic)       Relevant Medications    fluticasone propion-salmeterol 115-21 mcg/dose (ADVAIR HFA) 115-21 mcg/actuation HFAA inhaler    Gastroesophageal reflux disease, unspecified whether esophagitis present        Relevant Medications    pantoprazole (PROTONIX) 40 MG tablet            Orders Placed This Encounter    CBC Auto Differential    Comprehensive Metabolic Panel    Hemoglobin A1C    Lipid Panel    TSH    fluticasone propion-salmeterol 115-21 mcg/dose (ADVAIR HFA) 115-21 mcg/actuation HFAA inhaler    metoprolol succinate (TOPROL-XL) 50 MG 24 hr tablet    pantoprazole (PROTONIX) 40 MG tablet     Diabetes due for reassessment.  Refill meds as above.    Hypertension blood pressure controlled continue current therapy.    Hyperlipidemia due for reassessment.    I will review results and address accordingly   COPD stable and control refill Advair   GERD refill Protonix     Follow up in about 3 months (around  12/20/2023).    ________________________________________________________________  ________________________________________________________________      Chief Complaint   Patient presents with    Follow-up     History of present illness  This 57 y.o. presents today for complaint of since lov evaluation at hospital for abdominal pain and vomiting.  Symptoms resolved.  She has diabetes hypertension hyperlipidemia COPD and GERD.  She needs refill of meds.  This is her six-month follow-up.  She did not follow-up after evaluation at hospital with us.  She has managed to have continued success with smoking cessation.  Discharge summary reviewed.   Chief Complaint  Nausea and vomiting.     History of Present Illness  57-year-old female with history of insulin-dependent diabetes, poorly controlled, CAD, COPD, HLD, HTN, obesity presented to the ER with nausea and vomiting that started yesterday. She states she had a aching mid epigastric pain that was intermittent and tended to be worse with vomiting but better after emesis. She states she has not been around any sick contacts no new medications she did not eat any raw or undercooked food yesterday and nothing out of the ordinary. She did not take any medication for the nausea and vomiting. Blood sugars have been running anywhere from 100-300. She checks her blood sugars twice a day. She states her glucose was in the 120s yesterday. She was given IV Dilaudid, Zofran, 1 L normal saline in the ER. She was also given 7 units of regular insulin as her glucose was 318, she had an elevated anion gap but no acidosis. She was noted to have a elevated troponin, EKG with sinus tachycardia and PACs with ST and T wave abnormality and prolonged QTc  She reports she is feeling a bit better now, she does not endorse any abdominal pain. She did not have any dysuria, frequency, diarrhea, fever, chills, shortness of breath or chest pain.  Past Medical and Surgical History  Social History  She  reports that she quit smoking about 6 months ago. Her smoking use included cigarettes. She smoked an average of .5 packs per day. She has never used smokeless tobacco. She reports that she does not currently use alcohol. She reports that she does not currently use drugs.     Past Medical History:   Diagnosis Date    Acid reflux     COPD (chronic obstructive pulmonary disease)     Intubation 3/2023    Coronary artery disease     COVID-19     Diabetes mellitus     High cholesterol     Hypertension     Obese body habitus        Past Surgical History:   Procedure Laterality Date    APPENDECTOMY      CATHETERIZATION OF BOTH LEFT AND RIGHT HEART N/A 2020    Procedure: CATHETERIZATION, HEART, BOTH LEFT AND RIGHT;  Surgeon: Doug Kendall MD;  Location: [facility] CATH/EP LAB;  Service: Cardiology;  Laterality: N/A;    CHOLECYSTECTOMY      EXCISION OF LESION OF EYELID Right 2020    Procedure: EXCISION, LESION, EYELID;  Surgeon: Malachi Rodriguez MD;  Location: [facility] OR;  Service: Ophthalmology;  Laterality: Right;  Inclusion Cyst removal right lower lid    HYSTERECTOMY      JOINT REPLACEMENT Left     left hip    MASTOIDECTOMY Right        History reviewed. No pertinent family history.    Social History     Socioeconomic History    Marital status: Single   Tobacco Use    Smoking status: Former     Current packs/day: 0.00     Average packs/day: 0.5 packs/day for 13.0 years (6.5 ttl pk-yrs)     Types: Cigarettes     Start date: 2009     Quit date: 2022     Years since quittin.0     Passive exposure: Past    Smokeless tobacco: Never   Substance and Sexual Activity    Alcohol use: Not Currently    Drug use: Not Currently    Sexual activity: Not Currently     Social Determinants of Health     Financial Resource Strain: Low Risk  (3/25/2023)    Overall Financial Resource Strain (CARDIA)     Difficulty of Paying Living Expenses: Not hard at all   Food Insecurity: Food Insecurity Present (3/25/2023)    Hunger  Vital Sign     Worried About Running Out of Food in the Last Year: Sometimes true     Ran Out of Food in the Last Year: Never true   Transportation Needs: Unmet Transportation Needs (3/25/2023)    PRAPARE - Transportation     Lack of Transportation (Medical): Yes     Lack of Transportation (Non-Medical): No   Physical Activity: Unknown (3/25/2023)    Exercise Vital Sign     Days of Exercise per Week: Patient refused   Stress: Unknown (3/25/2023)    [place] [employer] - Occupational Stress Questionnaire     Feeling of Stress : Patient refused   Social Connections: Unknown (3/25/2023)    Social Connection and Isolation Panel [NHANES]     Frequency of Communication with Friends and Family: More than three times a week     Frequency of Social Gatherings with Friends and Family: Never     Active Member of Clubs or Organizations: No     Attends Club or Organization Meetings: Never     Marital Status: Never    Recent Concern: Social Connections - Socially Isolated (3/25/2023)    Social Connection and Isolation Panel [NHANES]     Frequency of Communication with Friends and Family: More than three times a week     Frequency of Social Gatherings with Friends and Family: Never     Attends Jewish Services: Never     Active Member of Clubs or Organizations: No     Attends Club or Organization Meetings: Never     Marital Status: Never    Housing Stability: Low Risk  (3/25/2023)    Housing Stability Vital Sign     Unable to Pay for Housing in the Last Year: No     Number of Places Lived in the Last Year: 0     Unstable Housing in the Last Year: No       Current Outpatient Medications   Medication Sig Dispense Refill    albuterol (ACCUNEB) 1.25 mg/3 mL Nebu Take 3 mLs (1.25 mg total) by nebulization every 6 (six) hours as needed (wheezing). Rescue 75 mL 1    aspirin 81 MG Chew Take 1 tablet (81 mg total) by mouth once daily. 30 tablet 0    atorvastatin (LIPITOR) 80 MG tablet Take 1 tablet (80  mg total) by mouth once daily. 90 tablet 1    blood sugar diagnostic (TRUE METRIX GLUCOSE TEST STRIP) Strp 1 strip by In Vitro route 2 (two) times a day. 200 strip 3    buPROPion (WELLBUTRIN XL) 150 MG TB24 tablet Take 1 tablet (150 mg total) by mouth once daily. 90 tablet 2    ELIQUIS 5 mg Tab Take 5 mg by mouth 2 (two) times daily.      empagliflozin (JARDIANCE) 25 mg tablet Take 1 tablet (25 mg total) by mouth once daily. 90 tablet 3    fluticasone-umeclidin-vilanter (TRELEGY ELLIPTA) 200-62.5-25 mcg inhaler Inhale 1 puff into the lungs once daily.      glimepiride (AMARYL) 2 MG tablet Take 2 mg by mouth.      glipiZIDE (GLUCOTROL) 10 MG TR24 Take 1 tablet (10 mg total) by mouth daily with breakfast. 90 tablet 3    hydrOXYzine HCL (ATARAX) 25 MG tablet Take 1 tablet (25 mg total) by mouth 3 (three) times daily as needed for Itching. 90 tablet 2    isosorbide mononitrate (IMDUR) 30 MG 24 hr tablet Take 1 tablet (30 mg total) by mouth once daily. 90 tablet 0    lancets Misc 1 Lancet by Subdermal route 2 (two) times a day. to use with insurance preferred meter 200 each 3    losartan (COZAAR) 25 MG tablet Take 1 tablet (25 mg total) by mouth once daily. 90 tablet 2    nebulizer and compressor Ebony Nebulizer machine for home use. DX COPD. 1 each 0    pregabalin (LYRICA) 100 MG capsule Take 1 capsule (100 mg total) by mouth 3 (three) times daily. 270 capsule 1    semaglutide (RYBELSUS) 7 mg tablet Take 1 tablet (7 mg total) by mouth once daily. 90 tablet 5    torsemide (DEMADEX) 20 MG Tab Take 1 tablet (20 mg total) by mouth once daily. Cake torsemide 20 mg once daily.  Can take additional dose in afternoon if increased shortness of breath or leg swelling for 2-3 days if needed 90 tablet 3    zolpidem (AMBIEN) 10 mg Tab Take 1 tablet (10 mg total) by mouth nightly as needed (insomnia). 90 tablet 1    blood-glucose meter kit To check BG 2 times daily, to use with insurance preferred meter 1 each 0    fluticasone  propion-salmeterol 115-21 mcg/dose (ADVAIR HFA) 115-21 mcg/actuation HFAA inhaler Inhale 2 puffs into the lungs every 12 (twelve) hours. Controller 12 g 0    metoprolol succinate (TOPROL-XL) 50 MG 24 hr tablet Take 1 tablet (50 mg total) by mouth once daily. 30 tablet 0    pantoprazole (PROTONIX) 40 MG tablet Take 1 tablet (40 mg total) by mouth 2 (two) times daily. 180 tablet 3     No current facility-administered medications for this visit.     Facility-Administered Medications Ordered in Other Visits   Medication Dose Route Frequency Provider Last Rate Last Admin    electrolyte-S (ISOLYTE)   Intravenous Continuous Madi Herr MD   New Bag at 12/11/20 1041    lidocaine (PF) 10 mg/ml (1%) injection 10 mg  1 mL Intradermal Once Madi Herr MD        sodium chloride 0.9% flush 3 mL  3 mL Intravenous Q8H Madi Herr MD           Review of patient's allergies indicates:   Allergen Reactions    Morphine Nausea And Vomiting    Sulfa (sulfonamide antibiotics) Nausea And Vomiting       Physical examination  Vitals Reviewed\  Vitals:    09/20/23 0946   BP: 130/78   Pulse: (!) 111   Temp: 98.5 °F (36.9 °C)     Body mass index is 36.19 kg/m².   Gen. Well-dressed well-nourished   Skin warm dry and intact.  No rashes noted.  Neck is supple without adenopathy  Chest.  Respirations are even unlabored.  Lungs are clear to auscultation.  Cardiac regular rate and rhythm.  No chest wall adenopathy noted.  Neuro. Awake alert oriented x4.  Normal judgment and cognition noted.  Extremities no clubbing cyanosis or edema noted.     Call or return to clinic prn if these symptoms worsen or fail to improve as anticipated.      Answers submitted by the patient for this visit:  Diabetes Questionnaire (Submitted on 9/13/2023)  Chief Complaint: Diabetes problem  Diabetes type: type 2  MedicAlert ID: No  blurred vision: Yes  chest pain: No  fatigue: No  foot paresthesias: Yes  foot ulcerations: No  polydipsia:  Yes  polyphagia: Yes  polyuria: No  visual change: Yes  weakness: No  weight loss: No  Symptom course: worsening  confusion: No  dizziness: No  headaches: No  hunger: Yes  mood changes: No  nervous/anxious: No  pallor: No  seizures: No  sleepiness: No  speech difficulty: No  sweats: No  tremors: No  blackouts: No  hospitalization: No  nocturnal hypoglycemia: No  required assistance: No  required glucagon: No  CVA: Yes  heart disease: Yes  nephropathy: No  peripheral neuropathy: Yes  PVD: No  retinopathy: No  autonomic neuropathy: Yes  CAD risks: dyslipidemia, family history, hypertension, obesity  Current treatments: none  Treatment compliance: all of the time  Dose schedule: pre-breakfast, at bedtime  Given by: patient  Injection sites: arms  Home blood tests: 1-2 x per week  Home urines: 3+ x per week  Weight trend: increasing steadily  Current diet: low fiber  Exercise: never  Dietitian visit: No  Eye exam current: Yes  Sees podiatrist: Yes

## 2023-09-27 ENCOUNTER — LAB VISIT (OUTPATIENT)
Dept: LAB | Facility: HOSPITAL | Age: 58
End: 2023-09-27
Attending: NURSE PRACTITIONER
Payer: MEDICAID

## 2023-09-27 DIAGNOSIS — E11.65 TYPE 2 DIABETES MELLITUS WITH HYPERGLYCEMIA, WITHOUT LONG-TERM CURRENT USE OF INSULIN: ICD-10-CM

## 2023-09-27 DIAGNOSIS — E11.69 HYPERLIPIDEMIA ASSOCIATED WITH TYPE 2 DIABETES MELLITUS: ICD-10-CM

## 2023-09-27 DIAGNOSIS — E11.59 HYPERTENSION ASSOCIATED WITH DIABETES: ICD-10-CM

## 2023-09-27 DIAGNOSIS — E78.5 HYPERLIPIDEMIA ASSOCIATED WITH TYPE 2 DIABETES MELLITUS: ICD-10-CM

## 2023-09-27 DIAGNOSIS — K21.9 GASTROESOPHAGEAL REFLUX DISEASE, UNSPECIFIED WHETHER ESOPHAGITIS PRESENT: ICD-10-CM

## 2023-09-27 DIAGNOSIS — J44.9 CHRONIC OBSTRUCTIVE PULMONARY DISEASE, UNSPECIFIED COPD TYPE: Chronic | ICD-10-CM

## 2023-09-27 DIAGNOSIS — I15.2 HYPERTENSION ASSOCIATED WITH DIABETES: ICD-10-CM

## 2023-09-27 LAB
ALBUMIN SERPL BCP-MCNC: 3.5 G/DL (ref 3.5–5.2)
ALP SERPL-CCNC: 159 U/L (ref 55–135)
ALT SERPL W/O P-5'-P-CCNC: 12 U/L (ref 10–44)
ANION GAP SERPL CALC-SCNC: 8 MMOL/L (ref 8–16)
AST SERPL-CCNC: 13 U/L (ref 10–40)
BASOPHILS # BLD AUTO: 0.04 K/UL (ref 0–0.2)
BASOPHILS NFR BLD: 0.4 % (ref 0–1.9)
BILIRUB SERPL-MCNC: 0.6 MG/DL (ref 0.1–1)
BUN SERPL-MCNC: 12 MG/DL (ref 6–20)
CALCIUM SERPL-MCNC: 8.8 MG/DL (ref 8.7–10.5)
CHLORIDE SERPL-SCNC: 103 MMOL/L (ref 95–110)
CHOLEST SERPL-MCNC: 229 MG/DL (ref 120–199)
CHOLEST/HDLC SERPL: 6.5 {RATIO} (ref 2–5)
CO2 SERPL-SCNC: 25 MMOL/L (ref 23–29)
CREAT SERPL-MCNC: 1.2 MG/DL (ref 0.5–1.4)
DIFFERENTIAL METHOD: NORMAL
EOSINOPHIL # BLD AUTO: 0.1 K/UL (ref 0–0.5)
EOSINOPHIL NFR BLD: 1.1 % (ref 0–8)
ERYTHROCYTE [DISTWIDTH] IN BLOOD BY AUTOMATED COUNT: 12.6 % (ref 11.5–14.5)
EST. GFR  (NO RACE VARIABLE): 53.5 ML/MIN/1.73 M^2
ESTIMATED AVG GLUCOSE: 266 MG/DL (ref 68–131)
GLUCOSE SERPL-MCNC: 235 MG/DL (ref 70–110)
HBA1C MFR BLD: 10.9 % (ref 4–5.6)
HCT VFR BLD AUTO: 47.4 % (ref 37–48.5)
HDLC SERPL-MCNC: 35 MG/DL (ref 40–75)
HDLC SERPL: 15.3 % (ref 20–50)
HGB BLD-MCNC: 15.8 G/DL (ref 12–16)
IMM GRANULOCYTES # BLD AUTO: 0.03 K/UL (ref 0–0.04)
IMM GRANULOCYTES NFR BLD AUTO: 0.3 % (ref 0–0.5)
LDLC SERPL CALC-MCNC: 158.8 MG/DL (ref 63–159)
LYMPHOCYTES # BLD AUTO: 3.8 K/UL (ref 1–4.8)
LYMPHOCYTES NFR BLD: 37.5 % (ref 18–48)
MCH RBC QN AUTO: 29.3 PG (ref 27–31)
MCHC RBC AUTO-ENTMCNC: 33.3 G/DL (ref 32–36)
MCV RBC AUTO: 88 FL (ref 82–98)
MONOCYTES # BLD AUTO: 0.9 K/UL (ref 0.3–1)
MONOCYTES NFR BLD: 8.5 % (ref 4–15)
NEUTROPHILS # BLD AUTO: 5.3 K/UL (ref 1.8–7.7)
NEUTROPHILS NFR BLD: 52.2 % (ref 38–73)
NONHDLC SERPL-MCNC: 194 MG/DL
NRBC BLD-RTO: 0 /100 WBC
PLATELET # BLD AUTO: 292 K/UL (ref 150–450)
PMV BLD AUTO: 9.8 FL (ref 9.2–12.9)
POTASSIUM SERPL-SCNC: 4 MMOL/L (ref 3.5–5.1)
PROT SERPL-MCNC: 6.2 G/DL (ref 6–8.4)
RBC # BLD AUTO: 5.39 M/UL (ref 4–5.4)
SODIUM SERPL-SCNC: 136 MMOL/L (ref 136–145)
TRIGL SERPL-MCNC: 176 MG/DL (ref 30–150)
TSH SERPL DL<=0.005 MIU/L-ACNC: 1.83 UIU/ML (ref 0.4–4)
WBC # BLD AUTO: 10.18 K/UL (ref 3.9–12.7)

## 2023-09-27 PROCEDURE — 85025 COMPLETE CBC W/AUTO DIFF WBC: CPT | Performed by: NURSE PRACTITIONER

## 2023-09-27 PROCEDURE — 80053 COMPREHEN METABOLIC PANEL: CPT | Performed by: NURSE PRACTITIONER

## 2023-09-27 PROCEDURE — 80061 LIPID PANEL: CPT | Performed by: NURSE PRACTITIONER

## 2023-09-27 PROCEDURE — 36415 COLL VENOUS BLD VENIPUNCTURE: CPT | Mod: PO | Performed by: NURSE PRACTITIONER

## 2023-09-27 PROCEDURE — 83036 HEMOGLOBIN GLYCOSYLATED A1C: CPT | Performed by: NURSE PRACTITIONER

## 2023-09-27 PROCEDURE — 84443 ASSAY THYROID STIM HORMONE: CPT | Performed by: NURSE PRACTITIONER

## 2023-09-29 RX ORDER — ALBUTEROL SULFATE 1.25 MG/3ML
1.25 SOLUTION RESPIRATORY (INHALATION) EVERY 6 HOURS PRN
Qty: 75 ML | Refills: 1 | Status: SHIPPED | OUTPATIENT
Start: 2023-09-29 | End: 2023-12-30 | Stop reason: SDUPTHER

## 2023-09-29 RX ORDER — FLUTICASONE PROPIONATE AND SALMETEROL XINAFOATE 115; 21 UG/1; UG/1
2 AEROSOL, METERED RESPIRATORY (INHALATION) EVERY 12 HOURS
Qty: 12 G | Refills: 0 | Status: SHIPPED | OUTPATIENT
Start: 2023-09-29 | End: 2023-10-31 | Stop reason: SDUPTHER

## 2023-09-29 RX ORDER — ORAL SEMAGLUTIDE 7 MG/1
7 TABLET ORAL DAILY
Qty: 90 TABLET | Refills: 5 | Status: SHIPPED | OUTPATIENT
Start: 2023-09-29 | End: 2023-10-18 | Stop reason: SDUPTHER

## 2023-09-29 RX ORDER — PANTOPRAZOLE SODIUM 40 MG/1
40 TABLET, DELAYED RELEASE ORAL 2 TIMES DAILY
Qty: 180 TABLET | Refills: 3 | Status: SHIPPED | OUTPATIENT
Start: 2023-09-29 | End: 2023-10-18 | Stop reason: SDUPTHER

## 2023-10-04 RX ORDER — ATORVASTATIN CALCIUM 80 MG/1
80 TABLET, FILM COATED ORAL DAILY
Qty: 90 TABLET | Refills: 1 | Status: SHIPPED | OUTPATIENT
Start: 2023-10-04 | End: 2023-12-30 | Stop reason: SDUPTHER

## 2023-10-18 DIAGNOSIS — K21.9 GASTROESOPHAGEAL REFLUX DISEASE, UNSPECIFIED WHETHER ESOPHAGITIS PRESENT: ICD-10-CM

## 2023-10-18 DIAGNOSIS — E11.65 TYPE 2 DIABETES MELLITUS WITH HYPERGLYCEMIA, WITHOUT LONG-TERM CURRENT USE OF INSULIN: ICD-10-CM

## 2023-10-18 RX ORDER — ORAL SEMAGLUTIDE 7 MG/1
7 TABLET ORAL DAILY
Qty: 90 TABLET | Refills: 0 | Status: SHIPPED | OUTPATIENT
Start: 2023-10-18 | End: 2023-12-30 | Stop reason: SDUPTHER

## 2023-10-18 RX ORDER — ISOSORBIDE MONONITRATE 30 MG/1
30 TABLET, EXTENDED RELEASE ORAL DAILY
Qty: 90 TABLET | Refills: 3 | Status: SHIPPED | OUTPATIENT
Start: 2023-10-18 | End: 2023-12-30 | Stop reason: SDUPTHER

## 2023-10-18 RX ORDER — PANTOPRAZOLE SODIUM 40 MG/1
40 TABLET, DELAYED RELEASE ORAL 2 TIMES DAILY
Qty: 180 TABLET | Refills: 0 | Status: SHIPPED | OUTPATIENT
Start: 2023-10-18 | End: 2023-12-30 | Stop reason: SDUPTHER

## 2023-10-24 DIAGNOSIS — L29.9 ITCHING: ICD-10-CM

## 2023-10-24 RX ORDER — HYDROXYZINE HYDROCHLORIDE 25 MG/1
25 TABLET, FILM COATED ORAL 3 TIMES DAILY PRN
Qty: 180 TABLET | Refills: 5 | Status: SHIPPED | OUTPATIENT
Start: 2023-10-24 | End: 2023-11-29 | Stop reason: SDUPTHER

## 2023-10-31 DIAGNOSIS — J44.9 CHRONIC OBSTRUCTIVE PULMONARY DISEASE, UNSPECIFIED COPD TYPE: Chronic | ICD-10-CM

## 2023-10-31 RX ORDER — PREGABALIN 100 MG/1
100 CAPSULE ORAL 3 TIMES DAILY
Qty: 270 CAPSULE | Refills: 1 | Status: SHIPPED | OUTPATIENT
Start: 2023-10-31 | End: 2023-11-29 | Stop reason: SDUPTHER

## 2023-10-31 NOTE — TELEPHONE ENCOUNTER
No care due was identified.  [facility] Embedded Care Due Messages. Reference number: [id number].   10/31/2023 10:59:17 AM CDT

## 2023-11-01 RX ORDER — FLUTICASONE PROPIONATE AND SALMETEROL XINAFOATE 115; 21 UG/1; UG/1
2 AEROSOL, METERED RESPIRATORY (INHALATION) EVERY 12 HOURS
Qty: 12 G | Refills: 0 | Status: SHIPPED | OUTPATIENT
Start: 2023-11-01 | End: 2023-12-01

## 2023-11-29 DIAGNOSIS — L29.9 ITCHING: ICD-10-CM

## 2023-11-30 RX ORDER — HYDROXYZINE HYDROCHLORIDE 25 MG/1
25 TABLET, FILM COATED ORAL 3 TIMES DAILY PRN
Qty: 180 TABLET | Refills: 5 | Status: SHIPPED | OUTPATIENT
Start: 2023-11-30 | End: 2024-02-26 | Stop reason: SDUPTHER

## 2023-11-30 RX ORDER — PREGABALIN 100 MG/1
100 CAPSULE ORAL 3 TIMES DAILY
Qty: 270 CAPSULE | Refills: 0 | Status: SHIPPED | OUTPATIENT
Start: 2023-11-30 | End: 2023-12-04 | Stop reason: SDUPTHER

## 2023-11-30 NOTE — TELEPHONE ENCOUNTER
No care due was identified.  Health [facility] Embedded Care Due Messages. Reference number: [id number].   11/29/2023 7:00:28 PM CST

## 2023-12-05 RX ORDER — PREGABALIN 100 MG/1
100 CAPSULE ORAL 3 TIMES DAILY
Qty: 270 CAPSULE | Refills: 0 | Status: SHIPPED | OUTPATIENT
Start: 2023-12-05 | End: 2023-12-30 | Stop reason: SDUPTHER

## 2023-12-05 RX ORDER — PREGABALIN 100 MG/1
100 CAPSULE ORAL 3 TIMES DAILY
Qty: 270 CAPSULE | Refills: 0 | Status: SHIPPED | OUTPATIENT
Start: 2023-12-05 | End: 2023-12-05 | Stop reason: SDUPTHER

## 2023-12-18 ENCOUNTER — PATIENT MESSAGE (OUTPATIENT)
Dept: ADMINISTRATIVE | Facility: HOSPITAL | Age: 58
End: 2023-12-18
Payer: MEDICAID

## 2023-12-30 DIAGNOSIS — E11.65 TYPE 2 DIABETES MELLITUS WITH HYPERGLYCEMIA, WITHOUT LONG-TERM CURRENT USE OF INSULIN: Chronic | ICD-10-CM

## 2023-12-30 DIAGNOSIS — E11.65 TYPE 2 DIABETES MELLITUS WITH HYPERGLYCEMIA, WITHOUT LONG-TERM CURRENT USE OF INSULIN: ICD-10-CM

## 2023-12-30 DIAGNOSIS — G47.00 INSOMNIA, UNSPECIFIED TYPE: ICD-10-CM

## 2023-12-30 DIAGNOSIS — K21.9 GASTROESOPHAGEAL REFLUX DISEASE, UNSPECIFIED WHETHER ESOPHAGITIS PRESENT: ICD-10-CM

## 2023-12-30 DIAGNOSIS — R53.83 FATIGUE, UNSPECIFIED TYPE: ICD-10-CM

## 2023-12-30 DIAGNOSIS — Z87.891 HISTORY OF NICOTINE DEPENDENCE: ICD-10-CM

## 2023-12-30 DIAGNOSIS — J44.9 CHRONIC OBSTRUCTIVE PULMONARY DISEASE, UNSPECIFIED COPD TYPE: Chronic | ICD-10-CM

## 2023-12-30 NOTE — TELEPHONE ENCOUNTER
Care Due:                  Date            Visit Type   Department     Provider  --------------------------------------------------------------------------------                                EP -                              PRIMARY      [facility]  Last Visit: 03-      CARE ([facility])   MEDICINE       Tomy Rios  Next Visit: None Scheduled  None         None Found                                                            Last  Test          Frequency    Reason                     Performed    Due Date  --------------------------------------------------------------------------------    HBA1C.......  6 months...  empagliflozin, glipiZIDE.  09- 03-    [facility] Embedded Care Due Messages. Reference number: [id number].   12/30/2023 12:35:23 AM CST

## 2024-01-01 RX ORDER — GLIPIZIDE 10 MG/1
10 TABLET, FILM COATED, EXTENDED RELEASE ORAL
Qty: 90 TABLET | Refills: 3 | Status: CANCELLED | OUTPATIENT
Start: 2024-01-01 | End: 2024-12-31

## 2024-01-02 ENCOUNTER — TELEPHONE (OUTPATIENT)
Dept: FAMILY MEDICINE | Facility: CLINIC | Age: 59
End: 2024-01-02
Payer: MEDICAID

## 2024-01-02 RX ORDER — TORSEMIDE 20 MG/1
20 TABLET ORAL DAILY
Qty: 90 TABLET | Refills: 3 | Status: SHIPPED | OUTPATIENT
Start: 2024-01-02 | End: 2024-04-01

## 2024-01-02 RX ORDER — METOPROLOL SUCCINATE 50 MG/1
50 TABLET, EXTENDED RELEASE ORAL DAILY
Qty: 30 TABLET | Refills: 0 | Status: SHIPPED | OUTPATIENT
Start: 2024-01-02 | End: 2024-02-26 | Stop reason: SDUPTHER

## 2024-01-02 RX ORDER — PREGABALIN 100 MG/1
100 CAPSULE ORAL 3 TIMES DAILY
Qty: 270 CAPSULE | Refills: 1 | Status: SHIPPED | OUTPATIENT
Start: 2024-01-02 | End: 2024-02-26 | Stop reason: SDUPTHER

## 2024-01-02 RX ORDER — ZOLPIDEM TARTRATE 10 MG/1
10 TABLET ORAL NIGHTLY PRN
Qty: 90 TABLET | Refills: 1 | Status: SHIPPED | OUTPATIENT
Start: 2024-01-02

## 2024-01-02 RX ORDER — ALBUTEROL SULFATE 1.25 MG/3ML
1.25 SOLUTION RESPIRATORY (INHALATION) EVERY 6 HOURS PRN
Qty: 75 ML | Refills: 1 | Status: SHIPPED | OUTPATIENT
Start: 2024-01-02 | End: 2024-02-26 | Stop reason: SDUPTHER

## 2024-01-02 RX ORDER — PANTOPRAZOLE SODIUM 40 MG/1
40 TABLET, DELAYED RELEASE ORAL 2 TIMES DAILY
Qty: 180 TABLET | Refills: 0 | Status: SHIPPED | OUTPATIENT
Start: 2024-01-02 | End: 2024-02-26 | Stop reason: SDUPTHER

## 2024-01-02 RX ORDER — ORAL SEMAGLUTIDE 7 MG/1
7 TABLET ORAL DAILY
Qty: 90 TABLET | Refills: 0 | Status: SHIPPED | OUTPATIENT
Start: 2024-01-02 | End: 2024-02-26 | Stop reason: SDUPTHER

## 2024-01-02 RX ORDER — BUPROPION HYDROCHLORIDE 150 MG/1
150 TABLET ORAL DAILY
Qty: 90 TABLET | Refills: 2 | Status: SHIPPED | OUTPATIENT
Start: 2024-01-02 | End: 2024-02-26 | Stop reason: SDUPTHER

## 2024-01-02 RX ORDER — LOSARTAN POTASSIUM 25 MG/1
25 TABLET ORAL DAILY
Qty: 90 TABLET | Refills: 2 | Status: SHIPPED | OUTPATIENT
Start: 2024-01-02 | End: 2024-02-26 | Stop reason: SDUPTHER

## 2024-01-02 NOTE — TELEPHONE ENCOUNTER
----- Message from Tomy Rios MD sent at 1/1/2024  8:59 AM CST -----  Regarding: eye exam and mammo  Please call the pt to see if she has had an eeye exam if she has please let me know so I can contact [facility] health team to get records thx. IF she needs a ref for eye photo order can place. Also see if she needs mammo scheduled thx

## 2024-01-05 RX ORDER — ISOSORBIDE MONONITRATE 30 MG/1
30 TABLET, EXTENDED RELEASE ORAL DAILY
Qty: 90 TABLET | Refills: 3 | Status: SHIPPED | OUTPATIENT
Start: 2024-01-05 | End: 2025-01-04

## 2024-01-05 RX ORDER — ATORVASTATIN CALCIUM 80 MG/1
80 TABLET, FILM COATED ORAL DAILY
Qty: 90 TABLET | Refills: 3 | Status: SHIPPED | OUTPATIENT
Start: 2024-01-05 | End: 2025-01-04

## 2024-02-26 DIAGNOSIS — K21.9 GASTROESOPHAGEAL REFLUX DISEASE, UNSPECIFIED WHETHER ESOPHAGITIS PRESENT: ICD-10-CM

## 2024-02-26 DIAGNOSIS — R53.83 FATIGUE, UNSPECIFIED TYPE: ICD-10-CM

## 2024-02-26 DIAGNOSIS — L29.9 ITCHING: ICD-10-CM

## 2024-02-26 DIAGNOSIS — E11.65 TYPE 2 DIABETES MELLITUS WITH HYPERGLYCEMIA, WITHOUT LONG-TERM CURRENT USE OF INSULIN: ICD-10-CM

## 2024-02-26 DIAGNOSIS — E11.65 TYPE 2 DIABETES MELLITUS WITH HYPERGLYCEMIA, WITHOUT LONG-TERM CURRENT USE OF INSULIN: Chronic | ICD-10-CM

## 2024-02-26 DIAGNOSIS — Z87.891 HISTORY OF NICOTINE DEPENDENCE: ICD-10-CM

## 2024-02-26 DIAGNOSIS — J44.9 CHRONIC OBSTRUCTIVE PULMONARY DISEASE, UNSPECIFIED COPD TYPE: Chronic | ICD-10-CM

## 2024-02-26 RX ORDER — ALBUTEROL SULFATE 1.25 MG/3ML
1.25 SOLUTION RESPIRATORY (INHALATION) EVERY 6 HOURS PRN
Qty: 75 ML | Refills: 0 | Status: SHIPPED | OUTPATIENT
Start: 2024-02-26 | End: 2024-04-11

## 2024-02-26 RX ORDER — LOSARTAN POTASSIUM 25 MG/1
25 TABLET ORAL DAILY
Qty: 90 TABLET | Refills: 0 | Status: SHIPPED | OUTPATIENT
Start: 2024-02-26

## 2024-02-26 RX ORDER — PANTOPRAZOLE SODIUM 40 MG/1
40 TABLET, DELAYED RELEASE ORAL 2 TIMES DAILY
Qty: 180 TABLET | Refills: 0 | Status: SHIPPED | OUTPATIENT
Start: 2024-02-26 | End: 2024-04-04 | Stop reason: SDUPTHER

## 2024-02-26 RX ORDER — BUPROPION HYDROCHLORIDE 150 MG/1
150 TABLET ORAL DAILY
Qty: 90 TABLET | Refills: 0 | Status: SHIPPED | OUTPATIENT
Start: 2024-02-26

## 2024-02-26 RX ORDER — GLIPIZIDE 10 MG/1
10 TABLET, FILM COATED, EXTENDED RELEASE ORAL
Qty: 90 TABLET | Refills: 0 | Status: SHIPPED | OUTPATIENT
Start: 2024-02-26

## 2024-02-26 RX ORDER — HYDROXYZINE HYDROCHLORIDE 25 MG/1
25 TABLET, FILM COATED ORAL 3 TIMES DAILY PRN
Qty: 180 TABLET | Refills: 0 | Status: SHIPPED | OUTPATIENT
Start: 2024-02-26

## 2024-02-26 RX ORDER — ORAL SEMAGLUTIDE 7 MG/1
7 TABLET ORAL DAILY
Qty: 90 TABLET | Refills: 0 | Status: SHIPPED | OUTPATIENT
Start: 2024-02-26

## 2024-02-26 RX ORDER — METOPROLOL SUCCINATE 50 MG/1
50 TABLET, EXTENDED RELEASE ORAL DAILY
Qty: 30 TABLET | Refills: 0 | Status: SHIPPED | OUTPATIENT
Start: 2024-02-26 | End: 2024-05-01

## 2024-02-26 RX ORDER — PREGABALIN 100 MG/1
100 CAPSULE ORAL 3 TIMES DAILY
Qty: 270 CAPSULE | Refills: 1 | Status: SHIPPED | OUTPATIENT
Start: 2024-02-26

## 2024-02-26 NOTE — TELEPHONE ENCOUNTER
Refill Routing Note   Medication(s) are not appropriate for processing by [facility] for the following reason(s):        New or recently adjusted medication: Less than 90 days under the signature of responsible provider (Metoprolol, AccuNeb, Rybelsus)   Outside of protocol: Lyrica, Protonix (total daily dose outside of [facility] protocol)    [facility] action(s):  Approve  Defer  Route        Medication Therapy Plan: Renal fx reviewed    Pharmacist review requested: Yes   Extended chart review required: Yes     Appointments  past 12m or future 3m with PCP    Date Provider   Last Visit   3/29/2023 Tomy Rios MD   Next Visit   Visit date not found Tomy Rios MD   ED visits in past 90 days: 0        Note composed:1:50 PM 02/26/2024

## 2024-02-26 NOTE — TELEPHONE ENCOUNTER
No care due was identified.  Health [facility] Embedded Care Due Messages. Reference number: [id number].   2/26/2024 10:13:10 AM CST

## 2024-02-26 NOTE — TELEPHONE ENCOUNTER
Refill Routing Note   Medication(s) are not appropriate for processing by [facility] for the following reason(s):        Required labs abnormal: Glipizide (eGFR)  New or recently adjusted medication: Less than 90 days under the signature of responsible provider (Metoprolol, AccuNeb, Rybelsus)  Outside of protocol:Lyrica, Pantoprazole (dosage outside [facility] protocol)  Drug-disease interaction     [facility] action(s):  Defer  Route             Pharmacist review requested: Yes     Appointments  past 12m or future 3m with PCP    Date Provider   Last Visit   3/29/2023 Tomy Rios MD   Next Visit   Visit date not found Tomy Rios MD   ED visits in past 90 days: 0        Note composed:12:43 PM 02/26/2024

## 2024-02-28 DIAGNOSIS — Z12.31 OTHER SCREENING MAMMOGRAM: ICD-10-CM

## 2024-03-26 ENCOUNTER — PATIENT MESSAGE (OUTPATIENT)
Dept: ADMINISTRATIVE | Facility: HOSPITAL | Age: 59
End: 2024-03-26
Payer: MEDICAID

## 2024-04-04 ENCOUNTER — PATIENT MESSAGE (OUTPATIENT)
Dept: ADMINISTRATIVE | Facility: HOSPITAL | Age: 59
End: 2024-04-04
Payer: MEDICAID

## 2024-04-04 DIAGNOSIS — K21.9 GASTROESOPHAGEAL REFLUX DISEASE, UNSPECIFIED WHETHER ESOPHAGITIS PRESENT: ICD-10-CM

## 2024-04-04 DIAGNOSIS — G47.00 INSOMNIA, UNSPECIFIED TYPE: ICD-10-CM

## 2024-04-04 RX ORDER — ZOLPIDEM TARTRATE 10 MG/1
10 TABLET ORAL NIGHTLY PRN
Qty: 90 TABLET | Refills: 1 | Status: CANCELLED | OUTPATIENT
Start: 2024-04-04

## 2024-04-04 RX ORDER — PANTOPRAZOLE SODIUM 40 MG/1
40 TABLET, DELAYED RELEASE ORAL 2 TIMES DAILY
Qty: 180 TABLET | Refills: 3 | Status: SHIPPED | OUTPATIENT
Start: 2024-04-04

## 2024-04-04 NOTE — TELEPHONE ENCOUNTER
Care Due:                  Date            Visit Type   Department     Provider  --------------------------------------------------------------------------------                                EP -                              PRIMARY      [facility]  Last Visit: 03-      CARE ([facility])   MEDICINE       oTmy Rios  Next Visit: None Scheduled  None         None Found                                                            Last  Test          Frequency    Reason                     Performed    Due Date  --------------------------------------------------------------------------------    Office Visit  15 months..  buPROPion, empagliflozin,   03- 06-                             glipiZIDE, losartan,                             metoprolol, pantoprazole,                             semaglutide, torsemide...    [facility] Embedded Care Due Messages. Reference number: [id number].   4/04/2024 12:22:54 PM CDT

## 2024-04-05 ENCOUNTER — TELEPHONE (OUTPATIENT)
Dept: FAMILY MEDICINE | Facility: CLINIC | Age: 59
End: 2024-04-05
Payer: MEDICAID

## 2024-04-05 DIAGNOSIS — K21.9 GASTROESOPHAGEAL REFLUX DISEASE, UNSPECIFIED WHETHER ESOPHAGITIS PRESENT: ICD-10-CM

## 2024-04-09 DIAGNOSIS — J44.9 CHRONIC OBSTRUCTIVE PULMONARY DISEASE, UNSPECIFIED COPD TYPE: Chronic | ICD-10-CM

## 2024-04-09 NOTE — TELEPHONE ENCOUNTER
No care due was identified.  Health [facility] Embedded Care Due Messages. Reference number: [id number].   4/09/2024 1:44:07 PM CDT

## 2024-04-11 RX ORDER — ALBUTEROL SULFATE 1.25 MG/3ML
SOLUTION RESPIRATORY (INHALATION)
Qty: 75 ML | Refills: 9 | Status: SHIPPED | OUTPATIENT
Start: 2024-04-11

## 2024-04-11 NOTE — TELEPHONE ENCOUNTER
Refill Routing Note   Medication(s) are not appropriate for processing by [facility] for the following reason(s):        Required vitals outdated    [facility] action(s):  Defer             Appointments  past 12m or future 3m with PCP    Date Provider   Last Visit   3/29/2023 Tomy Rios MD   Next Visit   Visit date not found Tomy Rios MD   ED visits in past 90 days: 0        Note composed:11:08 PM 04/10/2024

## 2024-04-30 NOTE — TELEPHONE ENCOUNTER
No care due was identified.  Health [facility] Embedded Care Due Messages. Reference number: [id number].   4/30/2024 2:22:35 PM CDT

## 2024-05-01 RX ORDER — METOPROLOL SUCCINATE 50 MG/1
TABLET, EXTENDED RELEASE ORAL
Qty: 90 TABLET | Refills: 0 | Status: SHIPPED | OUTPATIENT
Start: 2024-05-01

## 2024-05-01 NOTE — TELEPHONE ENCOUNTER
Delores Fox  is requesting a refill authorization.  Brief Assessment and Rationale for Refill:  Approve     Medication Therapy Plan:         Comments:     Note composed:7:31 AM 05/01/2024

## 2024-05-08 ENCOUNTER — PATIENT MESSAGE (OUTPATIENT)
Dept: FAMILY MEDICINE | Facility: CLINIC | Age: 59
End: 2024-05-08
Payer: MEDICAID

## 2024-05-22 DIAGNOSIS — E11.9 TYPE 2 DIABETES MELLITUS WITHOUT COMPLICATION: ICD-10-CM

## 2024-05-29 DIAGNOSIS — E11.9 TYPE 2 DIABETES MELLITUS WITHOUT COMPLICATION: ICD-10-CM

## 2024-07-12 DIAGNOSIS — G47.00 INSOMNIA, UNSPECIFIED TYPE: ICD-10-CM

## 2024-07-12 DIAGNOSIS — R25.2 LEG CRAMPS: Primary | ICD-10-CM

## 2024-07-12 RX ORDER — TORSEMIDE 20 MG/1
20 TABLET ORAL DAILY
Qty: 90 TABLET | Refills: 3 | Status: SHIPPED | OUTPATIENT
Start: 2024-07-12 | End: 2025-07-07

## 2024-07-12 RX ORDER — ZOLPIDEM TARTRATE 10 MG/1
10 TABLET ORAL NIGHTLY PRN
Qty: 90 TABLET | Refills: 1 | OUTPATIENT
Start: 2024-07-12

## 2024-07-12 RX ORDER — PREGABALIN 100 MG/1
100 CAPSULE ORAL 3 TIMES DAILY
Qty: 270 CAPSULE | Refills: 1 | Status: CANCELLED | OUTPATIENT
Start: 2024-07-12

## 2024-07-12 RX ORDER — ZOLPIDEM TARTRATE 10 MG/1
10 TABLET ORAL NIGHTLY PRN
Qty: 90 TABLET | Refills: 1 | Status: SHIPPED | OUTPATIENT
Start: 2024-07-12

## 2024-07-12 RX ORDER — PREGABALIN 100 MG/1
100 CAPSULE ORAL 3 TIMES DAILY
Qty: 270 CAPSULE | Refills: 1 | Status: SHIPPED | OUTPATIENT
Start: 2024-07-12

## 2024-07-12 NOTE — TELEPHONE ENCOUNTER
No care due was identified.  [facility] Embedded Care Due Messages. Reference number: [id number].   7/12/2024 8:27:23 AM CDT

## 2024-07-12 NOTE — TELEPHONE ENCOUNTER
Care Due:                  Date            Visit Type   Department     Provider  --------------------------------------------------------------------------------                                EP -                              PRIMARY      [facility]  Last Visit: 03-      CARE ([facility])   MEDICINE       Tomy Rios  Next Visit: None Scheduled  None         None Found                                                            Last  Test          Frequency    Reason                     Performed    Due Date  --------------------------------------------------------------------------------    Office Visit  15 months..  buPROPion, empagliflozin,   03- 06-                             glipiZIDE, losartan,                             pantoprazole,                             semaglutide, torsemide...    CMP.........  12 months..  empagliflozin, glipiZIDE,   09- 09-                             losartan, torsemide......    HBA1C.......  6 months...  empagliflozin, glipiZIDE,   09- 03-                             semaglutide..............    Health [facility] Embedded Care Due Messages. Reference number: [id number].   7/12/2024 8:23:44 AM CDT

## 2024-07-12 NOTE — TELEPHONE ENCOUNTER
----- Message from Sana Miller sent at 7/12/2024  1:25 PM CDT -----  Regarding: appt request  E-visit  Contact: pt  Type: Appointment Request    Caller is requesting an appointment.      Name of Caller:pt    When is the first available appointment? none    Pt is asking what the evisit for today is all about.  She does not need the appt

## 2024-07-12 NOTE — TELEPHONE ENCOUNTER
No care due was identified.  [facility] Embedded Care Due Messages. Reference number: [id number].   7/12/2024 8:26:47 AM CDT

## 2024-07-21 ENCOUNTER — HOSPITAL ENCOUNTER (EMERGENCY)
Facility: HOSPITAL | Age: 59
Discharge: HOME | End: 2024-07-21
Attending: FAMILY MEDICINE
Payer: MEDICAID

## 2024-07-21 VITALS
RESPIRATION RATE: 18 BRPM | HEART RATE: 102 BPM | TEMPERATURE: 99 F | WEIGHT: 230 LBS | DIASTOLIC BLOOD PRESSURE: 67 MMHG | SYSTOLIC BLOOD PRESSURE: 128 MMHG | OXYGEN SATURATION: 96 % | BODY MASS INDEX: 36.02 KG/M2

## 2024-07-21 DIAGNOSIS — M54.41 CHRONIC LOW BACK PAIN WITH BILATERAL SCIATICA, UNSPECIFIED BACK PAIN LATERALITY: Primary | ICD-10-CM

## 2024-07-21 DIAGNOSIS — M54.42 CHRONIC LOW BACK PAIN WITH BILATERAL SCIATICA, UNSPECIFIED BACK PAIN LATERALITY: Primary | ICD-10-CM

## 2024-07-21 DIAGNOSIS — G89.29 CHRONIC LOW BACK PAIN WITH BILATERAL SCIATICA, UNSPECIFIED BACK PAIN LATERALITY: Primary | ICD-10-CM

## 2024-07-21 PROCEDURE — 63600175 PHARM REV CODE 636 W HCPCS: Performed by: NURSE PRACTITIONER

## 2024-07-21 PROCEDURE — 96372 THER/PROPH/DIAG INJ SC/IM: CPT | Performed by: NURSE PRACTITIONER

## 2024-07-21 PROCEDURE — 99284 EMERGENCY DEPT VISIT MOD MDM: CPT | Mod: 25

## 2024-07-21 RX ORDER — ONDANSETRON 4 MG/1
4 TABLET, ORALLY DISINTEGRATING ORAL EVERY 8 HOURS PRN
Qty: 30 TABLET | Refills: 0 | Status: SHIPPED | OUTPATIENT
Start: 2024-07-21

## 2024-07-21 RX ORDER — HYDROCODONE BITARTRATE AND ACETAMINOPHEN 5; 325 MG/1; MG/1
1 TABLET ORAL EVERY 6 HOURS PRN
Qty: 12 TABLET | Refills: 0 | Status: SHIPPED | OUTPATIENT
Start: 2024-07-21

## 2024-07-21 RX ORDER — MORPHINE SULFATE 4 MG/ML
4 INJECTION, SOLUTION INTRAMUSCULAR; INTRAVENOUS
Status: DISCONTINUED | OUTPATIENT
Start: 2024-07-21 | End: 2024-07-21

## 2024-07-21 RX ORDER — HYDROMORPHONE HYDROCHLORIDE 1 MG/ML
0.5 INJECTION, SOLUTION INTRAMUSCULAR; INTRAVENOUS; SUBCUTANEOUS
Status: COMPLETED | OUTPATIENT
Start: 2024-07-21 | End: 2024-07-21

## 2024-07-21 RX ORDER — ONDANSETRON HYDROCHLORIDE 2 MG/ML
4 INJECTION, SOLUTION INTRAVENOUS
Status: DISCONTINUED | OUTPATIENT
Start: 2024-07-21 | End: 2024-07-21 | Stop reason: HOSPADM

## 2024-07-21 RX ADMIN — HYDROMORPHONE HYDROCHLORIDE 0.5 MG: 1 INJECTION, SOLUTION INTRAMUSCULAR; INTRAVENOUS; SUBCUTANEOUS at 03:07

## 2024-07-22 NOTE — ED PROVIDER NOTES
Encounter Date: 7/21/2024       History     Chief Complaint   Patient presents with    Back Pain     Pt woke with pain to her back and legs and states it is difficult to walk.  Pt denies recent injury.     The history is provided by the patient. No  was used.   Back Pain   This is a chronic problem. The problem occurs constantly. The problem has been unchanged. The pain is associated with no known injury. The pain is present in the lumbar spine. The quality of the pain is described as shooting and aching. The pain radiates to the right leg and left leg. The pain is at a severity of 4/10. The symptoms are aggravated by bending, twisting and certain positions. Pertinent negatives include no chest pain, no fever, no numbness, no weight loss, no headaches, no abdominal pain, no abdominal swelling, no bowel incontinence, no perianal numbness, no bladder incontinence, no dysuria, no pelvic pain, no paresthesias, no paresis, no tingling and no weakness.     Review of patient's allergies indicates:   Allergen Reactions    Morphine Nausea And Vomiting    Sulfa (sulfonamide antibiotics) Nausea And Vomiting     Past Medical History:   Diagnosis Date    Acid reflux     COPD (chronic obstructive pulmonary disease)     Intubation 3/2023    Coronary artery disease     COVID-19     Diabetes mellitus     High cholesterol     Hypertension     Obese body habitus      Past Surgical History:   Procedure Laterality Date    APPENDECTOMY      CATHETERIZATION OF BOTH LEFT AND RIGHT HEART N/A 11/23/2020    Procedure: CATHETERIZATION, HEART, BOTH LEFT AND RIGHT;  Surgeon: Doug Kendall MD;  Location: [facility] CATH/EP LAB;  Service: Cardiology;  Laterality: N/A;    CHOLECYSTECTOMY      EXCISION OF LESION OF EYELID Right 12/11/2020    Procedure: EXCISION, LESION, EYELID;  Surgeon: Malachi Rodriguez MD;  Location: [facility] OR;  Service: Ophthalmology;  Laterality: Right;  Inclusion Cyst removal right lower lid    HYSTERECTOMY       JOINT REPLACEMENT Left     left hip    MASTOIDECTOMY Right      No family history on file.  Social History     Tobacco Use    Smoking status: Former     Current packs/day: 0.00     Average packs/day: 0.5 packs/day for 13.0 years (6.5 ttl pk-yrs)     Types: Cigarettes     Start date: 2009     Quit date: 2022     Years since quittin.8     Passive exposure: Past    Smokeless tobacco: Never   Substance Use Topics    Alcohol use: Not Currently    Drug use: Not Currently     Review of Systems   Constitutional:  Negative for fever and weight loss.   HENT:  Negative for sore throat.    Respiratory:  Negative for shortness of breath.    Cardiovascular:  Negative for chest pain.   Gastrointestinal:  Negative for abdominal pain, bowel incontinence, nausea and vomiting.   Genitourinary:  Negative for bladder incontinence, dysuria and pelvic pain.   Musculoskeletal:  Positive for back pain.   Skin:  Negative for rash.   Neurological:  Negative for tingling, weakness, numbness, headaches and paresthesias.   Hematological:  Does not bruise/bleed easily.       Physical Exam     Initial Vitals [24 1343]   BP Pulse Resp Temp SpO2   128/67 102 18 98.5 °F (36.9 °C) 96 %      MAP       --         Physical Exam    Nursing note and vitals reviewed.  Constitutional: She appears well-developed and well-nourished. She is not diaphoretic. No distress.   HENT:   Head: Normocephalic and atraumatic.   Eyes: Right eye exhibits no discharge. Left eye exhibits no discharge.   Neck: Neck supple.   Normal range of motion.  Cardiovascular:  Normal rate.           Pulmonary/Chest: No respiratory distress.   Abdominal: She exhibits no distension.   Musculoskeletal:         General: Normal range of motion.      Cervical back: Normal range of motion and neck supple.     Neurological: She is alert and oriented to person, place, and time. She has normal strength.   Skin: Skin is warm and dry.   Psychiatric: She has a normal mood and  affect. Her behavior is normal. Thought content normal.         ED Course   Procedures  Labs Reviewed - No data to display       Imaging Results              X-Ray Lumbar Spine Ap And Lateral (Final result)  Result time 07/21/24 14:36:29      Final result by Darvin Hoover MD (Timothy) (07/21/24 14:36:29)                   Impression:      Moderate degenerative changes.      Electronically signed by: Darvin Hoover MD  Date:    07/21/2024  Time:    14:36               Narrative:    EXAMINATION:  XR LUMBAR SPINE AP AND LATERAL    CLINICAL HISTORY:  ,back pain;    TECHNIQUE:  Standard lumbar spine x-ray.  Three views    COMPARISON:  None    FINDINGS:  No fractures or dislocations.  Multilevel endplate spurring.  Moderate disc space narrowing with vacuum disc at L2-3 and L3-4.  Mild to moderate disc space narrowing at L4-5.    Bilateral facet arthropathy.                                       Medications   HYDROmorphone injection 0.5 mg (0.5 mg Intramuscular Given 7/21/24 1522)     Medical Decision Making  Amount and/or Complexity of Data Reviewed  Radiology: ordered.    Risk  Prescription drug management.                                      Clinical Impression:  Final diagnoses:  [M54.41, G89.29, M54.42] Chronic low back pain with bilateral sciatica, unspecified back pain laterality (Primary)          ED Disposition Condition    Discharge Stable          ED Prescriptions       Medication Sig Dispense Start Date End Date Auth. Provider    HYDROcodone-acetaminophen (NORCO) 5-325 mg per tablet Take 1 tablet by mouth every 6 (six) hours as needed for Pain. 12 tablet 7/21/2024 -- Mychal White NP    ondansetron (ZOFRAN-ODT) 4 MG TbDL Take 1 tablet (4 mg total) by mouth every 8 (eight) hours as needed. 30 tablet 7/21/2024 -- Mychal White NP          Follow-up Information       Follow up With Specialties Details Why Contact Info    pcp of choice   As needed     Clinic, [facility]   As needed  [address]  [facility] [address]  [phone]               Mychal White, NP  07/21/24 2214

## 2024-07-29 ENCOUNTER — TELEPHONE (OUTPATIENT)
Dept: FAMILY MEDICINE | Facility: CLINIC | Age: 59
End: 2024-07-29
Payer: MEDICAID

## 2024-07-29 NOTE — TELEPHONE ENCOUNTER
----- Message from Boris Barron sent at 7/29/2024  2:23 PM CDT -----  Contact: self  ..Type:  Sooner Apoointment Request    Caller is requesting a sooner appointment.  Caller declined first available appointment listed below.  Caller will not accept being placed on the waitlist and is requesting a message be sent to doctor.  Name of Caller: .Delores Ruddy  When is the first available appointment? 7/30  Symptoms:annual   Would the patient rather a call back or a response via [facility]? Call back   Best Call Back Number:.[phone]   Additional Information: pt states she is needing to reschedule

## 2024-08-04 ENCOUNTER — HOSPITAL ENCOUNTER (INPATIENT)
Facility: HOSPITAL | Age: 59
LOS: 4 days | Discharge: HOME | DRG: 392 | End: 2024-08-08
Attending: EMERGENCY MEDICINE | Admitting: INTERNAL MEDICINE
Payer: MEDICAID

## 2024-08-04 DIAGNOSIS — K52.9 GASTROENTERITIS: Primary | ICD-10-CM

## 2024-08-04 DIAGNOSIS — R07.9 CHEST PAIN: ICD-10-CM

## 2024-08-04 DIAGNOSIS — R11.10 INTRACTABLE VOMITING: ICD-10-CM

## 2024-08-04 LAB
ALBUMIN SERPL BCP-MCNC: 4.2 G/DL (ref 3.5–5.2)
ALP SERPL-CCNC: 150 U/L (ref 55–135)
ALT SERPL W/O P-5'-P-CCNC: 9 U/L (ref 10–44)
ANION GAP SERPL CALC-SCNC: 12 MMOL/L (ref 8–16)
AST SERPL-CCNC: 15 U/L (ref 10–40)
BACTERIA #/AREA URNS HPF: NORMAL /HPF
BASOPHILS # BLD AUTO: 0.03 K/UL (ref 0–0.2)
BASOPHILS NFR BLD: 0.3 % (ref 0–1.9)
BILIRUB SERPL-MCNC: 1.2 MG/DL (ref 0.1–1)
BILIRUB UR QL STRIP: NEGATIVE
BUN SERPL-MCNC: 21 MG/DL (ref 6–20)
CALCIUM SERPL-MCNC: 9.4 MG/DL (ref 8.7–10.5)
CHLORIDE SERPL-SCNC: 107 MMOL/L (ref 95–110)
CLARITY UR: CLEAR
CO2 SERPL-SCNC: 16 MMOL/L (ref 23–29)
COLOR UR: YELLOW
CREAT SERPL-MCNC: 1.6 MG/DL (ref 0.5–1.4)
DIFFERENTIAL METHOD BLD: ABNORMAL
EOSINOPHIL # BLD AUTO: 0 K/UL (ref 0–0.5)
EOSINOPHIL NFR BLD: 0.3 % (ref 0–8)
ERYTHROCYTE [DISTWIDTH] IN BLOOD BY AUTOMATED COUNT: 17 % (ref 11.5–14.5)
EST. GFR  (NO RACE VARIABLE): 38 ML/MIN/1.73 M^2
GLUCOSE SERPL-MCNC: 208 MG/DL (ref 70–110)
GLUCOSE UR QL STRIP: ABNORMAL
HCT VFR BLD AUTO: 53 % (ref 37–48.5)
HGB BLD-MCNC: 17 G/DL (ref 12–16)
HGB UR QL STRIP: NEGATIVE
HYALINE CASTS #/AREA URNS LPF: 0 /LPF
IMM GRANULOCYTES # BLD AUTO: 0.02 K/UL (ref 0–0.04)
IMM GRANULOCYTES NFR BLD AUTO: 0.2 % (ref 0–0.5)
KETONES UR QL STRIP: NEGATIVE
LEUKOCYTE ESTERASE UR QL STRIP: NEGATIVE
LIPASE SERPL-CCNC: 14 U/L (ref 4–60)
LYMPHOCYTES # BLD AUTO: 1.7 K/UL (ref 1–4.8)
LYMPHOCYTES NFR BLD: 18.8 % (ref 18–48)
MCH RBC QN AUTO: 27.4 PG (ref 27–31)
MCHC RBC AUTO-ENTMCNC: 32.1 G/DL (ref 32–36)
MCV RBC AUTO: 86 FL (ref 82–98)
MICROSCOPIC COMMENT: NORMAL
MONOCYTES # BLD AUTO: 0.9 K/UL (ref 0.3–1)
MONOCYTES NFR BLD: 9.7 % (ref 4–15)
NEUTROPHILS # BLD AUTO: 6.5 K/UL (ref 1.8–7.7)
NEUTROPHILS NFR BLD: 70.7 % (ref 38–73)
NITRITE UR QL STRIP: NEGATIVE
NRBC BLD-RTO: 0 /100 WBC
PH UR STRIP: 6 [PH] (ref 5–8)
PLATELET # BLD AUTO: 310 K/UL (ref 150–450)
PMV BLD AUTO: 9.7 FL (ref 9.2–12.9)
POTASSIUM SERPL-SCNC: 4.5 MMOL/L (ref 3.5–5.1)
PROT SERPL-MCNC: 7.7 G/DL (ref 6–8.4)
PROT UR QL STRIP: ABNORMAL
RBC # BLD AUTO: 6.2 M/UL (ref 4–5.4)
RBC #/AREA URNS HPF: 0 /HPF (ref 0–4)
SODIUM SERPL-SCNC: 135 MMOL/L (ref 136–145)
SP GR UR STRIP: 1.02 (ref 1–1.03)
URN SPEC COLLECT METH UR: ABNORMAL
UROBILINOGEN UR STRIP-ACNC: NEGATIVE EU/DL
WBC # BLD AUTO: 9.25 K/UL (ref 3.9–12.7)
WBC #/AREA URNS HPF: 0 /HPF (ref 0–5)
YEAST URNS QL MICRO: NORMAL

## 2024-08-04 PROCEDURE — 99285 EMERGENCY DEPT VISIT HI MDM: CPT | Mod: 25

## 2024-08-04 PROCEDURE — 81000 URINALYSIS NONAUTO W/SCOPE: CPT | Performed by: EMERGENCY MEDICINE

## 2024-08-04 PROCEDURE — 80053 COMPREHEN METABOLIC PANEL: CPT | Performed by: NURSE PRACTITIONER

## 2024-08-04 PROCEDURE — 25000003 PHARM REV CODE 250: Performed by: EMERGENCY MEDICINE

## 2024-08-04 PROCEDURE — 63600175 PHARM REV CODE 636 W HCPCS: Performed by: EMERGENCY MEDICINE

## 2024-08-04 PROCEDURE — 11000001 HC ACUTE MED/SURG PRIVATE ROOM

## 2024-08-04 PROCEDURE — 25000003 PHARM REV CODE 250: Performed by: NURSE PRACTITIONER

## 2024-08-04 PROCEDURE — 83690 ASSAY OF LIPASE: CPT | Performed by: NURSE PRACTITIONER

## 2024-08-04 PROCEDURE — 96375 TX/PRO/DX INJ NEW DRUG ADDON: CPT

## 2024-08-04 PROCEDURE — 85025 COMPLETE CBC W/AUTO DIFF WBC: CPT | Performed by: NURSE PRACTITIONER

## 2024-08-04 PROCEDURE — 96365 THER/PROPH/DIAG IV INF INIT: CPT

## 2024-08-04 PROCEDURE — 63600175 PHARM REV CODE 636 W HCPCS: Performed by: NURSE PRACTITIONER

## 2024-08-04 PROCEDURE — 96361 HYDRATE IV INFUSION ADD-ON: CPT

## 2024-08-04 RX ORDER — FENTANYL CITRATE 50 UG/ML
50 INJECTION, SOLUTION INTRAMUSCULAR; INTRAVENOUS
Status: COMPLETED | OUTPATIENT
Start: 2024-08-04 | End: 2024-08-04

## 2024-08-04 RX ORDER — KETOROLAC TROMETHAMINE 30 MG/ML
15 INJECTION, SOLUTION INTRAMUSCULAR; INTRAVENOUS
Status: COMPLETED | OUTPATIENT
Start: 2024-08-04 | End: 2024-08-04

## 2024-08-04 RX ORDER — SODIUM CHLORIDE 9 MG/ML
1000 INJECTION, SOLUTION INTRAVENOUS
Status: COMPLETED | OUTPATIENT
Start: 2024-08-04 | End: 2024-08-04

## 2024-08-04 RX ORDER — ONDANSETRON HYDROCHLORIDE 2 MG/ML
4 INJECTION, SOLUTION INTRAVENOUS
Status: COMPLETED | OUTPATIENT
Start: 2024-08-04 | End: 2024-08-04

## 2024-08-04 RX ADMIN — KETOROLAC TROMETHAMINE 15 MG: 30 INJECTION, SOLUTION INTRAMUSCULAR at 09:08

## 2024-08-04 RX ADMIN — PROMETHAZINE HYDROCHLORIDE 12.5 MG: 25 INJECTION INTRAMUSCULAR; INTRAVENOUS at 09:08

## 2024-08-04 RX ADMIN — FENTANYL CITRATE 50 MCG: 50 INJECTION INTRAMUSCULAR; INTRAVENOUS at 10:08

## 2024-08-04 RX ADMIN — ONDANSETRON 4 MG: 2 INJECTION INTRAMUSCULAR; INTRAVENOUS at 11:08

## 2024-08-04 RX ADMIN — SODIUM CHLORIDE 1000 ML: 9 INJECTION, SOLUTION INTRAVENOUS at 09:08

## 2024-08-04 RX ADMIN — SODIUM CHLORIDE 1000 ML: 9 INJECTION, SOLUTION INTRAVENOUS at 06:08

## 2024-08-04 RX ADMIN — ONDANSETRON 4 MG: 2 INJECTION INTRAMUSCULAR; INTRAVENOUS at 06:08

## 2024-08-04 NOTE — FIRST PROVIDER EVALUATION
Medical screening examination initiated.  I have conducted a focused provider triage encounter, findings are as follows:    Brief history of present illness:  Patient presents to ER for abdominal pain, onset today.  Associated symptoms include nausea, vomiting, diarrhea.    There were no vitals filed for this visit.    Pertinent physical exam:  No acute distress.  +mild tachycardia.    Brief workup plan:  Workup.    Preliminary workup initiated; this workup will be continued and followed by the physician or advanced practice provider that is assigned to the patient when roomed.  
(E4) spontaneous

## 2024-08-05 PROBLEM — N18.2 ACUTE RENAL FAILURE SUPERIMPOSED ON STAGE 2 CHRONIC KIDNEY DISEASE: Status: ACTIVE | Noted: 2024-08-05

## 2024-08-05 PROBLEM — I50.42 CHRONIC COMBINED SYSTOLIC AND DIASTOLIC HEART FAILURE: Status: ACTIVE | Noted: 2024-08-05

## 2024-08-05 PROBLEM — K52.9 GASTROENTERITIS: Status: ACTIVE | Noted: 2024-08-05

## 2024-08-05 PROBLEM — E11.9 DIABETES MELLITUS TYPE 2, NONINSULIN DEPENDENT: Status: ACTIVE | Noted: 2024-08-05

## 2024-08-05 PROBLEM — R91.8 ABNORMAL LUNG FIELD: Status: ACTIVE | Noted: 2024-08-05

## 2024-08-05 PROBLEM — N17.9 ACUTE RENAL FAILURE SUPERIMPOSED ON STAGE 2 CHRONIC KIDNEY DISEASE: Status: ACTIVE | Noted: 2024-08-05

## 2024-08-05 PROBLEM — R74.8 ELEVATED LIVER ENZYMES: Status: ACTIVE | Noted: 2024-08-05

## 2024-08-05 LAB
ALBUMIN SERPL BCP-MCNC: 4.1 G/DL (ref 3.5–5.2)
ALP SERPL-CCNC: 141 U/L (ref 55–135)
ALT SERPL W/O P-5'-P-CCNC: 9 U/L (ref 10–44)
AMPHET+METHAMPHET UR QL: NEGATIVE
ANION GAP SERPL CALC-SCNC: 14 MMOL/L (ref 8–16)
AST SERPL-CCNC: 14 U/L (ref 10–40)
BARBITURATES UR QL SCN>200 NG/ML: NEGATIVE
BASOPHILS # BLD AUTO: 0.02 K/UL (ref 0–0.2)
BASOPHILS NFR BLD: 0.2 % (ref 0–1.9)
BENZODIAZ UR QL SCN>200 NG/ML: NEGATIVE
BILIRUB SERPL-MCNC: 1 MG/DL (ref 0.1–1)
BNP SERPL-MCNC: 2127 PG/ML (ref 0–99)
BUN SERPL-MCNC: 20 MG/DL (ref 6–20)
BZE UR QL SCN: NEGATIVE
CALCIUM SERPL-MCNC: 8.9 MG/DL (ref 8.7–10.5)
CANNABINOIDS UR QL SCN: NEGATIVE
CHLORIDE SERPL-SCNC: 109 MMOL/L (ref 95–110)
CO2 SERPL-SCNC: 14 MMOL/L (ref 23–29)
CREAT SERPL-MCNC: 1.4 MG/DL (ref 0.5–1.4)
CREAT UR-MCNC: 110.2 MG/DL (ref 15–325)
DIFFERENTIAL METHOD BLD: ABNORMAL
EOSINOPHIL # BLD AUTO: 0 K/UL (ref 0–0.5)
EOSINOPHIL NFR BLD: 0 % (ref 0–8)
ERYTHROCYTE [DISTWIDTH] IN BLOOD BY AUTOMATED COUNT: 16.3 % (ref 11.5–14.5)
EST. GFR  (NO RACE VARIABLE): 44 ML/MIN/1.73 M^2
ESTIMATED AVG GLUCOSE: 200 MG/DL (ref 68–131)
GGT SERPL-CCNC: 43 U/L (ref 8–55)
GLUCOSE SERPL-MCNC: 227 MG/DL (ref 70–110)
HBA1C MFR BLD: 8.6 % (ref 4–5.6)
HCT VFR BLD AUTO: 51.5 % (ref 37–48.5)
HGB BLD-MCNC: 16.1 G/DL (ref 12–16)
IMM GRANULOCYTES # BLD AUTO: 0.03 K/UL (ref 0–0.04)
IMM GRANULOCYTES NFR BLD AUTO: 0.3 % (ref 0–0.5)
INFLUENZA A, MOLECULAR: NEGATIVE
INFLUENZA B, MOLECULAR: NEGATIVE
LACTATE SERPL-SCNC: 0.8 MMOL/L (ref 0.5–2.2)
LACTATE SERPL-SCNC: 1 MMOL/L (ref 0.5–2.2)
LACTATE SERPL-SCNC: 1.2 MMOL/L (ref 0.5–2.2)
LYMPHOCYTES # BLD AUTO: 0.8 K/UL (ref 1–4.8)
LYMPHOCYTES NFR BLD: 8.6 % (ref 18–48)
MCH RBC QN AUTO: 27.6 PG (ref 27–31)
MCHC RBC AUTO-ENTMCNC: 31.3 G/DL (ref 32–36)
MCV RBC AUTO: 88 FL (ref 82–98)
METHADONE UR QL SCN>300 NG/ML: NEGATIVE
MONOCYTES # BLD AUTO: 0.3 K/UL (ref 0.3–1)
MONOCYTES NFR BLD: 3.6 % (ref 4–15)
NEUTROPHILS # BLD AUTO: 8.3 K/UL (ref 1.8–7.7)
NEUTROPHILS NFR BLD: 87.3 % (ref 38–73)
NRBC BLD-RTO: 0 /100 WBC
OPIATES UR QL SCN: NEGATIVE
PCP UR QL SCN>25 NG/ML: NEGATIVE
PLATELET # BLD AUTO: 237 K/UL (ref 150–450)
PMV BLD AUTO: 9.4 FL (ref 9.2–12.9)
POCT GLUCOSE: 165 MG/DL (ref 70–110)
POCT GLUCOSE: 205 MG/DL (ref 70–110)
POCT GLUCOSE: 206 MG/DL (ref 70–110)
POCT GLUCOSE: 216 MG/DL (ref 70–110)
POTASSIUM SERPL-SCNC: 4.9 MMOL/L (ref 3.5–5.1)
PROCALCITONIN SERPL IA-MCNC: 0.05 NG/ML
PROT SERPL-MCNC: 7.4 G/DL (ref 6–8.4)
RBC # BLD AUTO: 5.84 M/UL (ref 4–5.4)
SARS-COV-2 RDRP RESP QL NAA+PROBE: NEGATIVE
SODIUM SERPL-SCNC: 137 MMOL/L (ref 136–145)
SPECIMEN SOURCE: NORMAL
TOXICOLOGY INFORMATION: NORMAL
TROPONIN I SERPL DL<=0.01 NG/ML-MCNC: 0.01 NG/ML (ref 0–0.03)
TROPONIN I SERPL DL<=0.01 NG/ML-MCNC: 0.01 NG/ML (ref 0–0.03)
WBC # BLD AUTO: 9.46 K/UL (ref 3.9–12.7)

## 2024-08-05 PROCEDURE — 63600175 PHARM REV CODE 636 W HCPCS: Performed by: PHYSICIAN ASSISTANT

## 2024-08-05 PROCEDURE — 25000003 PHARM REV CODE 250: Performed by: INTERNAL MEDICINE

## 2024-08-05 PROCEDURE — 85025 COMPLETE CBC W/AUTO DIFF WBC: CPT | Performed by: INTERNAL MEDICINE

## 2024-08-05 PROCEDURE — 36415 COLL VENOUS BLD VENIPUNCTURE: CPT | Performed by: INTERNAL MEDICINE

## 2024-08-05 PROCEDURE — 63600175 PHARM REV CODE 636 W HCPCS: Performed by: INTERNAL MEDICINE

## 2024-08-05 PROCEDURE — 84484 ASSAY OF TROPONIN QUANT: CPT | Mod: 91 | Performed by: PHYSICIAN ASSISTANT

## 2024-08-05 PROCEDURE — 94640 AIRWAY INHALATION TREATMENT: CPT | Mod: XB

## 2024-08-05 PROCEDURE — 21400001 HC TELEMETRY ROOM

## 2024-08-05 PROCEDURE — 83880 ASSAY OF NATRIURETIC PEPTIDE: CPT | Performed by: INTERNAL MEDICINE

## 2024-08-05 PROCEDURE — 83605 ASSAY OF LACTIC ACID: CPT | Performed by: INTERNAL MEDICINE

## 2024-08-05 PROCEDURE — 80307 DRUG TEST PRSMV CHEM ANLYZR: CPT | Performed by: EMERGENCY MEDICINE

## 2024-08-05 PROCEDURE — 80053 COMPREHEN METABOLIC PANEL: CPT | Performed by: INTERNAL MEDICINE

## 2024-08-05 PROCEDURE — 25000242 PHARM REV CODE 250 ALT 637 W/ HCPCS: Performed by: INTERNAL MEDICINE

## 2024-08-05 PROCEDURE — 84484 ASSAY OF TROPONIN QUANT: CPT | Performed by: INTERNAL MEDICINE

## 2024-08-05 PROCEDURE — 96372 THER/PROPH/DIAG INJ SC/IM: CPT | Performed by: INTERNAL MEDICINE

## 2024-08-05 PROCEDURE — G0378 HOSPITAL OBSERVATION PER HR: HCPCS

## 2024-08-05 PROCEDURE — U0002 COVID-19 LAB TEST NON-CDC: HCPCS | Performed by: INTERNAL MEDICINE

## 2024-08-05 PROCEDURE — 27000221 HC OXYGEN, UP TO 24 HOURS

## 2024-08-05 PROCEDURE — 94761 N-INVAS EAR/PLS OXIMETRY MLT: CPT

## 2024-08-05 PROCEDURE — 83036 HEMOGLOBIN GLYCOSYLATED A1C: CPT | Performed by: INTERNAL MEDICINE

## 2024-08-05 PROCEDURE — 99900035 HC TECH TIME PER 15 MIN (STAT)

## 2024-08-05 PROCEDURE — 87502 INFLUENZA DNA AMP PROBE: CPT | Performed by: INTERNAL MEDICINE

## 2024-08-05 PROCEDURE — 83605 ASSAY OF LACTIC ACID: CPT | Mod: 91 | Performed by: PHYSICIAN ASSISTANT

## 2024-08-05 PROCEDURE — 84145 PROCALCITONIN (PCT): CPT | Performed by: INTERNAL MEDICINE

## 2024-08-05 PROCEDURE — 82977 ASSAY OF GGT: CPT | Performed by: INTERNAL MEDICINE

## 2024-08-05 PROCEDURE — 94799 UNLISTED PULMONARY SVC/PX: CPT

## 2024-08-05 RX ORDER — ACETAMINOPHEN 325 MG/1
650 TABLET ORAL EVERY 6 HOURS PRN
Status: DISCONTINUED | OUTPATIENT
Start: 2024-08-05 | End: 2024-08-08 | Stop reason: HOSPADM

## 2024-08-05 RX ORDER — BUDESONIDE 0.5 MG/2ML
0.5 INHALANT ORAL EVERY 12 HOURS
Status: DISCONTINUED | OUTPATIENT
Start: 2024-08-05 | End: 2024-08-08 | Stop reason: HOSPADM

## 2024-08-05 RX ORDER — ARFORMOTEROL TARTRATE 15 UG/2ML
15 SOLUTION RESPIRATORY (INHALATION) 2 TIMES DAILY
Status: DISCONTINUED | OUTPATIENT
Start: 2024-08-05 | End: 2024-08-08 | Stop reason: HOSPADM

## 2024-08-05 RX ORDER — FUROSEMIDE 10 MG/ML
40 INJECTION INTRAMUSCULAR; INTRAVENOUS ONCE
Status: COMPLETED | OUTPATIENT
Start: 2024-08-05 | End: 2024-08-05

## 2024-08-05 RX ORDER — METOPROLOL SUCCINATE 50 MG/1
50 TABLET, EXTENDED RELEASE ORAL DAILY
Status: DISCONTINUED | OUTPATIENT
Start: 2024-08-05 | End: 2024-08-08 | Stop reason: HOSPADM

## 2024-08-05 RX ORDER — HYDROCODONE BITARTRATE AND ACETAMINOPHEN 5; 325 MG/1; MG/1
1 TABLET ORAL EVERY 6 HOURS PRN
Status: DISCONTINUED | OUTPATIENT
Start: 2024-08-05 | End: 2024-08-08 | Stop reason: HOSPADM

## 2024-08-05 RX ORDER — PROCHLORPERAZINE EDISYLATE 5 MG/ML
5 INJECTION INTRAMUSCULAR; INTRAVENOUS EVERY 6 HOURS PRN
Status: DISCONTINUED | OUTPATIENT
Start: 2024-08-05 | End: 2024-08-08 | Stop reason: HOSPADM

## 2024-08-05 RX ORDER — NAPROXEN SODIUM 220 MG/1
81 TABLET, FILM COATED ORAL DAILY
Status: DISCONTINUED | OUTPATIENT
Start: 2024-08-05 | End: 2024-08-08 | Stop reason: HOSPADM

## 2024-08-05 RX ORDER — INSULIN GLARGINE 100 [IU]/ML
8 INJECTION, SOLUTION SUBCUTANEOUS DAILY
Status: DISCONTINUED | OUTPATIENT
Start: 2024-08-05 | End: 2024-08-08 | Stop reason: HOSPADM

## 2024-08-05 RX ORDER — ATORVASTATIN CALCIUM 40 MG/1
80 TABLET, FILM COATED ORAL DAILY
Status: DISCONTINUED | OUTPATIENT
Start: 2024-08-05 | End: 2024-08-08 | Stop reason: HOSPADM

## 2024-08-05 RX ORDER — GLUCAGON 1 MG
1 KIT INJECTION
Status: DISCONTINUED | OUTPATIENT
Start: 2024-08-05 | End: 2024-08-08 | Stop reason: HOSPADM

## 2024-08-05 RX ORDER — NALOXONE HCL 0.4 MG/ML
0.02 VIAL (ML) INJECTION
Status: DISCONTINUED | OUTPATIENT
Start: 2024-08-05 | End: 2024-08-08 | Stop reason: HOSPADM

## 2024-08-05 RX ORDER — INSULIN ASPART 100 [IU]/ML
0-10 INJECTION, SOLUTION INTRAVENOUS; SUBCUTANEOUS
Status: DISCONTINUED | OUTPATIENT
Start: 2024-08-05 | End: 2024-08-08 | Stop reason: HOSPADM

## 2024-08-05 RX ORDER — PANTOPRAZOLE SODIUM 40 MG/1
40 TABLET, DELAYED RELEASE ORAL 2 TIMES DAILY
Status: DISCONTINUED | OUTPATIENT
Start: 2024-08-05 | End: 2024-08-08 | Stop reason: HOSPADM

## 2024-08-05 RX ORDER — IBUPROFEN 200 MG
24 TABLET ORAL
Status: DISCONTINUED | OUTPATIENT
Start: 2024-08-05 | End: 2024-08-08 | Stop reason: HOSPADM

## 2024-08-05 RX ORDER — SODIUM CHLORIDE 0.9 % (FLUSH) 0.9 %
10 SYRINGE (ML) INJECTION EVERY 12 HOURS PRN
Status: DISCONTINUED | OUTPATIENT
Start: 2024-08-05 | End: 2024-08-08 | Stop reason: HOSPADM

## 2024-08-05 RX ORDER — SODIUM CHLORIDE 9 MG/ML
INJECTION, SOLUTION INTRAVENOUS
Status: DISCONTINUED | OUTPATIENT
Start: 2024-08-05 | End: 2024-08-08 | Stop reason: HOSPADM

## 2024-08-05 RX ORDER — PREGABALIN 100 MG/1
100 CAPSULE ORAL 3 TIMES DAILY
Status: DISCONTINUED | OUTPATIENT
Start: 2024-08-05 | End: 2024-08-08 | Stop reason: HOSPADM

## 2024-08-05 RX ORDER — HYDRALAZINE HYDROCHLORIDE 20 MG/ML
10 INJECTION INTRAMUSCULAR; INTRAVENOUS EVERY 6 HOURS PRN
Status: DISCONTINUED | OUTPATIENT
Start: 2024-08-05 | End: 2024-08-08 | Stop reason: HOSPADM

## 2024-08-05 RX ORDER — PROMETHAZINE HYDROCHLORIDE 25 MG/ML
12.5 INJECTION, SOLUTION INTRAMUSCULAR; INTRAVENOUS EVERY 6 HOURS PRN
Status: DISCONTINUED | OUTPATIENT
Start: 2024-08-05 | End: 2024-08-05

## 2024-08-05 RX ORDER — ONDANSETRON HYDROCHLORIDE 2 MG/ML
4 INJECTION, SOLUTION INTRAVENOUS EVERY 6 HOURS PRN
Status: DISCONTINUED | OUTPATIENT
Start: 2024-08-05 | End: 2024-08-08 | Stop reason: HOSPADM

## 2024-08-05 RX ORDER — IBUPROFEN 200 MG
16 TABLET ORAL
Status: DISCONTINUED | OUTPATIENT
Start: 2024-08-05 | End: 2024-08-08 | Stop reason: HOSPADM

## 2024-08-05 RX ORDER — IPRATROPIUM BROMIDE 0.5 MG/2.5ML
0.5 SOLUTION RESPIRATORY (INHALATION)
Status: DISCONTINUED | OUTPATIENT
Start: 2024-08-06 | End: 2024-08-08 | Stop reason: HOSPADM

## 2024-08-05 RX ORDER — TORSEMIDE 10 MG/1
20 TABLET ORAL DAILY
Status: DISCONTINUED | OUTPATIENT
Start: 2024-08-05 | End: 2024-08-06

## 2024-08-05 RX ORDER — DIPHENHYDRAMINE HYDROCHLORIDE 50 MG/ML
12.5 INJECTION INTRAMUSCULAR; INTRAVENOUS EVERY 6 HOURS PRN
Status: DISCONTINUED | OUTPATIENT
Start: 2024-08-05 | End: 2024-08-05

## 2024-08-05 RX ORDER — BUPROPION HYDROCHLORIDE 150 MG/1
150 TABLET ORAL DAILY
Status: DISCONTINUED | OUTPATIENT
Start: 2024-08-05 | End: 2024-08-08 | Stop reason: HOSPADM

## 2024-08-05 RX ORDER — IPRATROPIUM BROMIDE 0.5 MG/2.5ML
0.5 SOLUTION RESPIRATORY (INHALATION) EVERY 6 HOURS
Status: DISCONTINUED | OUTPATIENT
Start: 2024-08-05 | End: 2024-08-05

## 2024-08-05 RX ORDER — IPRATROPIUM BROMIDE AND ALBUTEROL SULFATE 2.5; .5 MG/3ML; MG/3ML
3 SOLUTION RESPIRATORY (INHALATION) EVERY 6 HOURS PRN
Status: DISCONTINUED | OUTPATIENT
Start: 2024-08-05 | End: 2024-08-08 | Stop reason: HOSPADM

## 2024-08-05 RX ADMIN — APIXABAN 5 MG: 2.5 TABLET, FILM COATED ORAL at 09:08

## 2024-08-05 RX ADMIN — ONDANSETRON 4 MG: 2 INJECTION INTRAMUSCULAR; INTRAVENOUS at 04:08

## 2024-08-05 RX ADMIN — FUROSEMIDE 40 MG: 10 INJECTION, SOLUTION INTRAMUSCULAR; INTRAVENOUS at 01:08

## 2024-08-05 RX ADMIN — ARFORMOTEROL TARTRATE 15 MCG: 15 SOLUTION RESPIRATORY (INHALATION) at 07:08

## 2024-08-05 RX ADMIN — PREGABALIN 100 MG: 75 CAPSULE ORAL at 09:08

## 2024-08-05 RX ADMIN — BUDESONIDE INHALATION 0.5 MG: 0.5 SUSPENSION RESPIRATORY (INHALATION) at 07:08

## 2024-08-05 RX ADMIN — HYDROCODONE BITARTRATE AND ACETAMINOPHEN 1 TABLET: 5; 325 TABLET ORAL at 03:08

## 2024-08-05 RX ADMIN — DOXYCYCLINE 100 MG: 100 INJECTION, POWDER, LYOPHILIZED, FOR SOLUTION INTRAVENOUS at 03:08

## 2024-08-05 RX ADMIN — IPRATROPIUM BROMIDE 0.5 MG: 0.5 SOLUTION RESPIRATORY (INHALATION) at 07:08

## 2024-08-05 RX ADMIN — ATORVASTATIN CALCIUM 80 MG: 40 TABLET, FILM COATED ORAL at 09:08

## 2024-08-05 RX ADMIN — INSULIN ASPART 4 UNITS: 100 INJECTION, SOLUTION INTRAVENOUS; SUBCUTANEOUS at 11:08

## 2024-08-05 RX ADMIN — DOXYCYCLINE 100 MG: 100 INJECTION, POWDER, LYOPHILIZED, FOR SOLUTION INTRAVENOUS at 04:08

## 2024-08-05 RX ADMIN — IPRATROPIUM BROMIDE 0.5 MG: 0.5 SOLUTION RESPIRATORY (INHALATION) at 01:08

## 2024-08-05 RX ADMIN — ASPIRIN 81 MG CHEWABLE TABLET 81 MG: 81 TABLET CHEWABLE at 09:08

## 2024-08-05 RX ADMIN — TORSEMIDE 20 MG: 20 TABLET ORAL at 09:08

## 2024-08-05 RX ADMIN — METOPROLOL SUCCINATE 50 MG: 50 TABLET, EXTENDED RELEASE ORAL at 09:08

## 2024-08-05 RX ADMIN — SODIUM CHLORIDE: 9 INJECTION, SOLUTION INTRAVENOUS at 03:08

## 2024-08-05 RX ADMIN — BUPROPION HYDROCHLORIDE 150 MG: 150 TABLET, EXTENDED RELEASE ORAL at 09:08

## 2024-08-05 RX ADMIN — PREGABALIN 100 MG: 75 CAPSULE ORAL at 03:08

## 2024-08-05 RX ADMIN — BUDESONIDE INHALATION 0.5 MG: 0.5 SUSPENSION RESPIRATORY (INHALATION) at 08:08

## 2024-08-05 RX ADMIN — ARFORMOTEROL TARTRATE 15 MCG: 15 SOLUTION RESPIRATORY (INHALATION) at 08:08

## 2024-08-05 RX ADMIN — PANTOPRAZOLE SODIUM 40 MG: 40 TABLET, DELAYED RELEASE ORAL at 09:08

## 2024-08-05 RX ADMIN — CEFTRIAXONE SODIUM 2 G: 2 INJECTION, POWDER, FOR SOLUTION INTRAMUSCULAR; INTRAVENOUS at 03:08

## 2024-08-05 RX ADMIN — HYDROCODONE BITARTRATE AND ACETAMINOPHEN 1 TABLET: 5; 325 TABLET ORAL at 09:08

## 2024-08-05 RX ADMIN — INSULIN GLARGINE 8 UNITS: 100 INJECTION, SOLUTION SUBCUTANEOUS at 09:08

## 2024-08-05 RX ADMIN — HYDRALAZINE HYDROCHLORIDE 10 MG: 20 INJECTION, SOLUTION INTRAMUSCULAR; INTRAVENOUS at 11:08

## 2024-08-05 RX ADMIN — IPRATROPIUM BROMIDE 0.5 MG: 0.5 SOLUTION RESPIRATORY (INHALATION) at 08:08

## 2024-08-05 NOTE — ASSESSMENT & PLAN NOTE
STEPHEN is likely due to pre-renal azotemia due to dehydration. Baseline creatinine is  1.1 . Most recent creatinine and eGFR are listed below.  Recent Labs     08/04/24  1819   CREATININE 1.6*   EGFRNORACEVR 38*      Plan  - STEPHEN is  pending reassessment  -CT scan of abdomen and pelvis reviewed  -urinalysis negative, white blood cell count normal, afebrile  - Avoid nephrotoxins and renally dose meds for GFR listed above  - Monitor urine output, serial BMP, and adjust therapy as needed  - patient received 1 L normal saline IV fluid bolus ordered by the ER physician  -no further IV fluids in the setting of chronic combined CHF with EF less than 15%, we will actually resume patient's home torsemide 20 mg p.o. daily  -check a.m. labs  -consider nephrology consult

## 2024-08-05 NOTE — CONSULTS
[facility] - Emergency Dept.  Cardiology  Consult Note    Patient Name: Delores Fox  MRN: [id number]  Admission Date: 8/4/2024  Hospital Length of Stay: 0 days  Code Status: Full Code   Attending Provider: Keshawn West MD   Consulting Provider: Helen Kaur PA-C  Primary Care Physician: Tomy Rios MD  Principal Problem:Gastroenteritis    Patient information was obtained from patient, past medical records, and ER records.     Inpatient consult to Cardiology  Consult performed by: Helen Kaur PA-C  Consult ordered by: Latasha Espana MD        Subjective:     Chief Complaint:  Abd pain    HPI:   Ms. Leon is a 58 year old female patient whose current medical conditions include DM type II, chronic combined CHF (EF 15%), history of CVA, PAF (on Eliquis), tobacco abuse, COPD, depression with anxiety, HTN, hyperlipidemia, CAD, obesity, and duodenitis with reported ulcer, and CKD who presented to [facility] ED this AM due to epigastric/generalized abdominal pain that onset yesterday AM. Associated symptoms included intractable nausea, vomiting, and multiple episodes of diarrhea. She denied any associated fever, chills, ruth CP, SOB, palpitations, near syncope, or syncope. Initial workup in ED revealed creatinine of 1.6, normal pro-niki, and BNP > 2,000. CT of abd/pelvis without revealed no evidence of bowel obstruction and patient was subsequently admitted for further evaluation and treatment. Cardiology consulted to assist with management. Patient seen and examined today in ED. Lying flat in bed, on supplemental O2. Still complains of abdominal pain, mild tenderness on exam. Denies CP/SOB. She claims compliance with her medications as OP. Recently admitted to [facility] from 7/13/24-7/14/24 due to decompensated CHF. Chart reviewed. LA x 2 WNL. Troponin x 1 negative, will repeat. CT of chest without contrast revealed stable coarsened reticular opacities in lower lobes and right middle lobe, possibly  infectious in nature, probable reactive mediastinal lymphadenopathy, 4 mm RUL pulmonary nodule.     Review of records show patient had prior LHC in 2020 which showed EF of 25%,  RCA with left to right collaterals.          Past Medical History:   Diagnosis Date    Acid reflux     COPD (chronic obstructive pulmonary disease)     Intubation 3/2023    Coronary artery disease     COVID-19     Diabetes mellitus     High cholesterol     Hypertension     Obese body habitus        Past Surgical History:   Procedure Laterality Date    APPENDECTOMY      CATHETERIZATION OF BOTH LEFT AND RIGHT HEART N/A 11/23/2020    Procedure: CATHETERIZATION, HEART, BOTH LEFT AND RIGHT;  Surgeon: Doug Kendall MD;  Location: [facility] CATH/EP LAB;  Service: Cardiology;  Laterality: N/A;    CHOLECYSTECTOMY      EXCISION OF LESION OF EYELID Right 12/11/2020    Procedure: EXCISION, LESION, EYELID;  Surgeon: Malachi Rodriguez MD;  [facility];  Service: Ophthalmology;  Laterality: Right;  Inclusion Cyst removal right lower lid    HYSTERECTOMY      JOINT REPLACEMENT Left     left hip    MASTOIDECTOMY Right        Review of patient's allergies indicates:   Allergen Reactions    Morphine Nausea And Vomiting    Sulfa (sulfonamide antibiotics) Nausea And Vomiting       Current Facility-Administered Medications on File Prior to Encounter   Medication    electrolyte-S (ISOLYTE)    lidocaine (PF) 10 mg/ml (1%) injection 10 mg    sodium chloride 0.9% flush 3 mL    [DISCONTINUED] GENERIC EXTERNAL MEDICATION    [DISCONTINUED] GENERIC EXTERNAL MEDICATION     Current Outpatient Medications on File Prior to Encounter   Medication Sig    albuterol (ACCUNEB) 1.25 mg/3 mL Nebu USE 1 VIAL IN NEBULIZER EVERY 6 HOURS AS NEEDED FOR WHEEZING( RESCUE)    aspirin 81 MG Chew Take 1 tablet (81 mg total) by mouth once daily.    atorvastatin (LIPITOR) 80 MG tablet Take 1 tablet (80 mg total) by mouth once daily.    blood sugar diagnostic (TRUE METRIX GLUCOSE TEST  STRIP) Strp 1 strip by In Vitro route 2 (two) times a day.    blood-glucose meter kit To check BG 2 times daily, to use with insurance preferred meter    buPROPion (WELLBUTRIN XL) 150 MG TB24 tablet Take 1 tablet (150 mg total) by mouth once daily.    ELIQUIS 5 mg Tab Take 5 mg by mouth 2 (two) times daily.    empagliflozin (JARDIANCE) 25 mg tablet Take 1 tablet (25 mg total) by mouth once daily.    fluticasone propion-salmeterol 115-21 mcg/dose (ADVAIR HFA) 115-21 mcg/actuation HFAA inhaler Inhale 2 puffs into the lungs every 12 (twelve) hours. Controller    fluticasone-umeclidin-vilanter (TRELEGY ELLIPTA) 200-62.5-25 mcg inhaler Inhale 1 puff into the lungs once daily.    glimepiride (AMARYL) 2 MG tablet Take 2 mg by mouth.    glipiZIDE (GLUCOTROL) 10 MG TR24 Take 1 tablet (10 mg total) by mouth daily with breakfast.    HYDROcodone-acetaminophen (NORCO) 5-325 mg per tablet Take 1 tablet by mouth every 6 (six) hours as needed for Pain.    hydrOXYzine HCL (ATARAX) 25 MG tablet Take 1 tablet (25 mg total) by mouth 3 (three) times daily as needed for Itching.    isosorbide mononitrate (IMDUR) 30 MG 24 hr tablet Take 1 tablet (30 mg total) by mouth once daily.    lancets Misc 1 Lancet by Subdermal route 2 (two) times a day. to use with insurance preferred meter    losartan (COZAAR) 25 MG tablet Take 1 tablet (25 mg total) by mouth once daily.    metoprolol succinate (TOPROL-XL) 50 MG 24 hr tablet TAKE 1 TABLET(50 MG) BY MOUTH DAILY    nebulizer and compressor Ebony Nebulizer machine for home use. DX COPD.    ondansetron (ZOFRAN-ODT) 4 MG TbDL Take 1 tablet (4 mg total) by mouth every 8 (eight) hours as needed.    pantoprazole (PROTONIX) 40 MG tablet Take 1 tablet (40 mg total) by mouth 2 (two) times daily.    pregabalin (LYRICA) 100 MG capsule Take 1 capsule (100 mg total) by mouth 3 (three) times daily.    semaglutide (RYBELSUS) 7 mg tablet Take 1 tablet (7 mg total) by mouth once daily.    torsemide (DEMADEX) 20 MG  Tab Take 1 tablet (20 mg total) by mouth once daily. Take torsemide 20 mg once daily.  Can take additional dose in afternoon if increased shortness of breath or leg swelling for 2-3 days if needed    zolpidem (AMBIEN) 10 mg Tab Take 1 tablet (10 mg total) by mouth nightly as needed (insomnia).     Family History    None       Tobacco Use    Smoking status: Every Day     Current packs/day: 0.00     Average packs/day: 0.5 packs/day for 13.0 years (6.5 ttl pk-yrs)     Types: Cigarettes     Start date: 2009     Last attempt to quit: 2022     Years since quittin.8     Passive exposure: Past    Smokeless tobacco: Never   Substance and Sexual Activity    Alcohol use: Not Currently    Drug use: Not Currently    Sexual activity: Not Currently     Review of Systems   Constitutional: Positive for malaise/fatigue.   HENT: Negative.     Eyes: Negative.    Cardiovascular: Negative.    Respiratory: Negative.     Endocrine: Negative.    Hematologic/Lymphatic: Negative.    Skin: Negative.    Musculoskeletal:  Positive for arthritis and joint pain.   Gastrointestinal:  Positive for abdominal pain, diarrhea, nausea and vomiting.   Genitourinary: Negative.    Neurological: Negative.    Psychiatric/Behavioral: Negative.     Allergic/Immunologic: Negative.      Objective:     Vital Signs (Most Recent):  Temp: 97.7 °F (36.5 °C) (24)  Pulse: 99 (24 0947)  Resp: (!) 24 (24)  BP: (!) 144/67 (24 0947)  SpO2: (!) 89 % (24) Vital Signs (24h Range):  Temp:  [97.7 °F (36.5 °C)-98.6 °F (37 °C)] 97.7 °F (36.5 °C)  Pulse:  [] 99  Resp:  [16-24] 24  SpO2:  [89 %-98 %] 89 %  BP: (142-168)/(67-99) 144/67     Weight: 108.9 kg (240 lb)  Body mass index is 37.67 kg/m².    SpO2: (!) 89 %         Intake/Output Summary (Last 24 hours) at 2024 1013  Last data filed at 2024 0526  Gross per 24 hour   Intake 1249 ml   Output --   Net 1249 ml       Lines/Drains/Airways       Peripheral  Intravenous Line  Duration                  Peripheral IV - Single Lumen 08/05/24 0112 20 G Right Wrist <1 day                     Physical Exam  Vitals and nursing note reviewed.   Constitutional:       Appearance: She is ill-appearing.      Comments: On supplemental o2   HENT:      Head: Normocephalic and atraumatic.   Eyes:      Pupils: Pupils are equal, round, and reactive to light.   Cardiovascular:      Rate and Rhythm: Normal rate and regular rhythm.      Heart sounds: S1 normal and S2 normal. No murmur heard.  Pulmonary:      Comments: Coarse BS  Abdominal:      Palpations: Abdomen is soft.      Tenderness: There is abdominal tenderness (generalized/epigastric).   Musculoskeletal:      Right lower leg: No edema.      Left lower leg: No edema.   Skin:     General: Skin is warm and dry.   Neurological:      General: No focal deficit present.      Mental Status: She is oriented to person, place, and time.   Psychiatric:         Mood and Affect: Mood normal.         Behavior: Behavior normal.          Significant Labs: CMP   Recent Labs   Lab 08/04/24 1819 08/05/24  0343   * 137   K 4.5 4.9    109   CO2 16* 14*   * 227*   BUN 21* 20   CREATININE 1.6* 1.4   CALCIUM 9.4 8.9   PROT 7.7 7.4   ALBUMIN 4.2 4.1   BILITOT 1.2* 1.0   ALKPHOS 150* 141*   AST 15 14   ALT 9* 9*   ANIONGAP 12 14   , CBC   Recent Labs   Lab 08/04/24 1819 08/05/24  0343   WBC 9.25 9.46   HGB 17.0* 16.1*   HCT 53.0* 51.5*    237   , Troponin   Recent Labs   Lab 08/05/24  0343   TROPONINI 0.012   , and All pertinent lab results from the last 24 hours have been reviewed.    Significant Imaging: Echocardiogram: Transthoracic echo (TTE) complete ([facility] Only):   Results for orders placed or performed during the hospital encounter of 02/27/23   Echo   Result Value Ref Range    BSA 2.32 m2    TDI SEPTAL 0.05 m/s    LV LATERAL E/E' RATIO 12.33 m/s    LV SEPTAL E/E' RATIO 14.80 m/s    IVC diameter 1.85 cm    Left Ventricular  Outflow Tract Mean Velocity 0.55 cm/s    Left Ventricular Outflow Tract Mean Gradient 1.00 mmHg    TDI LATERAL 0.06 m/s    LVIDd 4.95 3.5 - 6.0 cm    IVS 1.08 0.6 - 1.1 cm    Posterior Wall 1.13 (A) 0.6 - 1.1 cm    LVIDs 3.58 (A) 2.1 - 4.0 cm    FS 28 28 - 44 %    LV mass 205.08 g    TAPSE 1.42 cm    RV S' 0.01 cm/s    Left Ventricle Relative Wall Thickness 0.46 cm    E/A ratio 0.86     Mean e' 0.06 m/s    E wave deceleration time 150.00 msec    LVOT diameter 2.00 cm    LVOT area 3.1 cm2    LVOT peak steven 0.83 m/s    LVOT peak VTI 15.20 cm    LVOT stroke volume 47.73 cm3    E/E' ratio 13.45 m/s    MV Peak E Steven 0.74 m/s    MV Peak A Steven 0.86 m/s    LV Systolic Volume 115.00 mL    LV Systolic Volume Index 51.8 mL/m2    LV Diastolic Volume 193.00 mL    LV Diastolic Volume Index 86.94 mL/m2    LV Mass Index 92 g/m2    Right Atrial Pressure (from IVC) 3 mmHg    EF 40 %    Narrative    · The left ventricle is mildly enlarged with concentric remodeling and   mildly decreased systolic function.  · The estimated ejection fraction is 40%.  · Grade I left ventricular diastolic dysfunction.  · There is moderate left ventricular global hypokinesis.  · Normal right ventricular size with normal right ventricular systolic   function.  · Mild left atrial enlargement.  · Normal central venous pressure (3 mmHg).  · There is abnormal septal wall motion consistent with left bundle branch   block.       and EKG: Reviewed  Assessment and Plan:   Patient who presents with abd pain and intractable nausea/vomiting. Known severe cardiomyopathy, clinically stable on exam. Low threshold to initiate IV diuresis. Repeat troponin and LA later today. Workup of abd pain as per hospital medicine.     * Gastroenteritis  -Mgmt as per hospital medicine  -CT of abd/pelvis reviewed  -Still complains of ongoing abd pain    Chronic combined systolic and diastolic heart failure  -Known severe cardiomyopathy with EF of 15%  -Recent hospitalization in July due  to decompensation  -Stable volume wise on exam, BNP elevated  -IV diuresis as tolerated, not initiated this AM given ongoing abd pain, nausea/vomiting  -LA WNL  -Troponin negative  -Continue BB as tolerated      Acute renal failure superimposed on stage 2 chronic kidney disease  -Improved since admission  -Monitor  -Resume ARB as tolerated    Severe obesity with body mass index (BMI) of 35.0 to 39.9 with comorbidity  -Weight loss    Hyperlipidemia associated with type 2 diabetes mellitus  -Statin    CAD (coronary artery disease)  -ASA, statin, BB  -Will need ischemic evaluation as OP    COPD  -Mgmt as per hospital medicine    Hypertension associated with diabetes  -Continue home CV meds/regimen as tolerated        VTE Risk Mitigation (From admission, onward)           Ordered     apixaban tablet 5 mg  2 times daily         08/05/24 0301     IP VTE HIGH RISK PATIENT  Once         08/05/24 0301     Place sequential compression device  Until discontinued         08/05/24 0301     Reason for No Pharmacological VTE Prophylaxis  Once        Question:  Reasons:  Answer:  Already adequately anticoagulated on oral Anticoagulants    08/05/24 0301                    Thank you for your consult. I will follow-up with patient. Please contact us if you have any additional questions.    Helen Kaur PA-C  Cardiology   [facility].

## 2024-08-05 NOTE — ED PROVIDER NOTES
SCRIBE #1 NOTE: I, Me-Haroldo Martins, am scribing for, and in the presence of, Efrem Fernandez Jr., MD. I have scribed the entire note.       History     Chief Complaint   Patient presents with    Abdominal Pain     Pt c/o midline abd pain with n/v/d x 4 episodes starting today, denies cough/fever/body aches, +smoker, denies CP/SOB     Review of patient's allergies indicates:   Allergen Reactions    Morphine Nausea And Vomiting    Sulfa (sulfonamide antibiotics) Nausea And Vomiting         History of Present Illness     HPI    8/4/2024, 9:11 PM  History obtained from the patient      History of Present Illness: Delores Fox is a 58 y.o. female patient with a PMHx of acid reflux, COPD, CAD, DM, hypercholesterolemia, and HTN who presents to the Emergency Department for evaluation of generalized abdominal pain which onset today. Pt has been feeling unwell since yesterday and has not eaten anything since yesterday's breakfast. Symptoms are constant and moderate in severity. No mitigating or exacerbating factors reported. Associated sxs include nausea, 4 episodes of vomiting today, diarrhea, decreased appetite, and generalized weakness. Patient denies any hematemesis, dysuria, fever, chills, CP, SOB, cough, and all other sxs at this time. No prior Tx reported. No further complaints or concerns at this time.       Arrival mode: Personal vehicle    PCP: Tomy Rios MD        Past Medical History:  Past Medical History:   Diagnosis Date    Acid reflux     COPD (chronic obstructive pulmonary disease)     Intubation 3/2023    Coronary artery disease     COVID-19     Diabetes mellitus     High cholesterol     Hypertension     Obese body habitus        Past Surgical History:  Past Surgical History:   Procedure Laterality Date    APPENDECTOMY      CATHETERIZATION OF BOTH LEFT AND RIGHT HEART N/A 11/23/2020    Procedure: CATHETERIZATION, HEART, BOTH LEFT AND RIGHT;  Surgeon: Doug Kendall MD;  Location: [facility] CATH/EP  LAB;  Service: Cardiology;  Laterality: N/A;    CHOLECYSTECTOMY      EXCISION OF LESION OF EYELID Right 2020    Procedure: EXCISION, LESION, EYELID;  Surgeon: Malachi Rodriguez MD;  Location: [facility] OR;  Service: Ophthalmology;  Laterality: Right;  Inclusion Cyst removal right lower lid    HYSTERECTOMY      JOINT REPLACEMENT Left     left hip    MASTOIDECTOMY Right          Family History:  No family history on file.    Social History:  Social History     Tobacco Use    Smoking status: Every Day     Current packs/day: 0.00     Average packs/day: 0.5 packs/day for 13.0 years (6.5 ttl pk-yrs)     Types: Cigarettes     Start date: 2009     Last attempt to quit: 2022     Years since quittin.9     Passive exposure: Past    Smokeless tobacco: Never   Substance and Sexual Activity    Alcohol use: Not Currently    Drug use: Not Currently    Sexual activity: Not Currently        Review of Systems     Review of Systems   Constitutional:  Positive for appetite change (decreased). Negative for chills and fever.   HENT:  Negative for sore throat.    Respiratory:  Negative for cough and shortness of breath.    Cardiovascular:  Negative for chest pain.   Gastrointestinal:  Positive for abdominal pain (generalized), diarrhea, nausea and vomiting (4 episodes today).        (-) hematemesis   Genitourinary:  Negative for dysuria.   Musculoskeletal:  Negative for back pain.   Skin:  Negative for rash.   Neurological:  Positive for weakness (generalized).   Hematological:  Does not bruise/bleed easily.   All other systems reviewed and are negative.       Physical Exam     Initial Vitals [24 1724]   BP Pulse Resp Temp SpO2   (!) 142/77 109 20 98.6 °F (37 °C) (!) 94 %      MAP       --          Physical Exam  Nursing Notes and Vital Signs Reviewed.  Constitutional: Patient is in mild distress. Well-developed and well-nourished.  Head: Atraumatic. Normocephalic.  Eyes: PERRL. EOM intact. Conjunctivae are not  pale. No scleral icterus.  ENT: Mucous membranes are moist.   Neck: Supple. Full ROM. No lymphadenopathy.  Cardiovascular: Regular rate. Regular rhythm. No murmurs, rubs, or gallops. Distal pulses are 2+ and symmetric.  Pulmonary/Chest: No respiratory distress. Clear to auscultation bilaterally. No wheezing or rales.  Abdominal: Soft and non-distended. Generalized abdominal tenderness.  No rebound, guarding, or rigidity.   Genitourinary: No CVA tenderness  Musculoskeletal: Moves all extremities. No obvious deformities. No edema. No calf tenderness.  Skin: Warm and dry.  Neurological:  Alert, awake, and appropriate. Normal speech. No acute focal neurological deficits are appreciated.  Psychiatric: Normal affect. Good eye contact. Appropriate in content.     ED Course   Procedures  ED Vital Signs:  Vitals:    08/06/24 0400 08/06/24 0404 08/06/24 0408 08/06/24 0411   BP:  (!) 88/53 (!) 92/52 (!) 92/52   Pulse: (!) 54 (!) 59 61    Resp:  16     Temp:  98 °F (36.7 °C)     TempSrc:  Oral     SpO2:  97% 97%    Weight:       Height:        08/06/24 0750 08/06/24 0803 08/06/24 0853 08/06/24 1128   BP:  (!) 101/58  (!) 117/56   Pulse: 67 71 (!) 53 78   Resp: 18 18  18   Temp:  97.5 °F (36.4 °C)  97.4 °F (36.3 °C)   TempSrc:       SpO2: 96% 98%  (!) 93%   Weight:       Height:        08/06/24 1402 08/06/24 1446 08/06/24 1522 08/06/24 1937   BP:   (!) 97/53    Pulse: 65 62 76 71   Resp: 17  17 18   Temp:   98.4 °F (36.9 °C)    TempSrc:       SpO2:   (!) 92% (!) 93%   Weight:       Height:        08/06/24 2000 08/06/24 2006 08/06/24 2340   BP:  (!) 99/52 (!) 116/56   Pulse: 70 73 81   Resp:  18 20   Temp:  98.5 °F (36.9 °C) 98.4 °F (36.9 °C)   TempSrc:      SpO2:  95% (!) 94%   Weight:      Height:          Abnormal Lab Results:  Labs Reviewed   CBC W/ AUTO DIFFERENTIAL - Abnormal       Result Value    WBC 9.25      RBC 6.20 (*)     Hemoglobin 17.0 (*)     Hematocrit 53.0 (*)     MCV 86      MCH 27.4      MCHC 32.1      RDW 17.0  (*)     Platelets 310      MPV 9.7      Immature Granulocytes 0.2      Gran # (ANC) 6.5      Immature Grans (Abs) 0.02      Lymph # 1.7      Mono # 0.9      Eos # 0.0      Baso # 0.03      nRBC 0      Gran % 70.7      Lymph % 18.8      Mono % 9.7      Eosinophil % 0.3      Basophil % 0.3      Differential Method Automated     COMPREHENSIVE METABOLIC PANEL - Abnormal    Sodium 135 (*)     Potassium 4.5      Chloride 107      CO2 16 (*)     Glucose 208 (*)     BUN 21 (*)     Creatinine 1.6 (*)     Calcium 9.4      Total Protein 7.7      Albumin 4.2      Total Bilirubin 1.2 (*)     Alkaline Phosphatase 150 (*)     AST 15      ALT 9 (*)     eGFR 38 (*)     Anion Gap 12     URINALYSIS, REFLEX TO URINE CULTURE - Abnormal    Specimen UA Urine, Clean Catch      Color, UA Yellow      Appearance, UA Clear      pH, UA 6.0      Specific Gravity, UA 1.025      Protein, UA 1+ (*)     Glucose, UA 4+ (*)     Ketones, UA Negative      Bilirubin (UA) Negative      Occult Blood UA Negative      Nitrite, UA Negative      Urobilinogen, UA Negative      Leukocytes, UA Negative      Narrative:     Specimen Source->Urine   CBC W/ AUTO DIFFERENTIAL - Abnormal    WBC 9.46      RBC 5.84 (*)     Hemoglobin 16.1 (*)     Hematocrit 51.5 (*)     MCV 88      MCH 27.6      MCHC 31.3 (*)     RDW 16.3 (*)     Platelets 237      MPV 9.4      Immature Granulocytes 0.3      Gran # (ANC) 8.3 (*)     Immature Grans (Abs) 0.03      Lymph # 0.8 (*)     Mono # 0.3      Eos # 0.0      Baso # 0.02      nRBC 0      Gran % 87.3 (*)     Lymph % 8.6 (*)     Mono % 3.6 (*)     Eosinophil % 0.0      Basophil % 0.2      Differential Method Automated     COMPREHENSIVE METABOLIC PANEL - Abnormal    Sodium 137      Potassium 4.9      Chloride 109      CO2 14 (*)     Glucose 227 (*)     BUN 20      Creatinine 1.4      Calcium 8.9      Total Protein 7.4      Albumin 4.1      Total Bilirubin 1.0      Alkaline Phosphatase 141 (*)     AST 14      ALT 9 (*)     eGFR 44 (*)      Anion Gap 14     HEMOGLOBIN A1C - Abnormal    Hemoglobin A1C 8.6 (*)     Estimated Avg Glucose 200 (*)    B-TYPE NATRIURETIC PEPTIDE - Abnormal    BNP 2,127 (*)    POCT GLUCOSE - Abnormal    POCT Glucose 206 (*)    POCT GLUCOSE - Abnormal    POCT Glucose 216 (*)    POCT GLUCOSE - Abnormal    POCT Glucose 205 (*)    INFLUENZA A & B BY MOLECULAR    Influenza A, Molecular Negative      Influenza B, Molecular Negative      Flu A & B Source Nasal swab     CULTURE, STOOL   LIPASE    Lipase 14     DRUG SCREEN PANEL, URINE EMERGENCY   URINALYSIS MICROSCOPIC    RBC, UA 0      WBC, UA 0      Bacteria None      Yeast, UA None      Hyaline Casts, UA 0      Microscopic Comment SEE COMMENT      Narrative:     Specimen Source->Urine   DRUG SCREEN PANEL, URINE EMERGENCY    Benzodiazepines Negative      Methadone metabolites Negative      Cocaine (Metab.) Negative      Opiate Scrn, Ur Negative      Barbiturate Screen, Ur Negative      Amphetamine Screen, Ur Negative      THC Negative      Phencyclidine Negative      Creatinine, Urine 110.2      Toxicology Information SEE COMMENT      Narrative:     Specimen Source->Urine   SARS-COV-2 RNA AMPLIFICATION, QUAL    SARS-CoV-2 RNA, Amplification, Qual Negative     LACTIC ACID, PLASMA    Lactate (Lactic Acid) 1.0     LACTIC ACID, PLASMA    Lactate (Lactic Acid) 0.8     PROCALCITONIN    Procalcitonin 0.05     TROPONIN I    Troponin I 0.012     GAMMA GT    GGT 43     LACTIC ACID, PLASMA    Lactate (Lactic Acid) 1.2     TROPONIN I    Troponin I 0.012     WBC, STOOL   POCT GLUCOSE MONITORING CONTINUOUS        All Lab Results:  Results for orders placed or performed during the hospital encounter of 08/04/24   Influenza A & B by Molecular    Specimen: Nasal Swab   Result Value Ref Range    Influenza A, Molecular Negative Negative    Influenza B, Molecular Negative Negative    Flu A & B Source Nasal swab    CBC auto differential   Result Value Ref Range    WBC 9.25 3.90 - 12.70 K/uL    RBC  6.20 (H) 4.00 - 5.40 M/uL    Hemoglobin 17.0 (H) 12.0 - 16.0 g/dL    Hematocrit 53.0 (H) 37.0 - 48.5 %    MCV 86 82 - 98 fL    MCH 27.4 27.0 - 31.0 pg    MCHC 32.1 32.0 - 36.0 g/dL    RDW 17.0 (H) 11.5 - 14.5 %    Platelets 310 150 - 450 K/uL    MPV 9.7 9.2 - 12.9 fL    Immature Granulocytes 0.2 0.0 - 0.5 %    Gran # (ANC) 6.5 1.8 - 7.7 K/uL    Immature Grans (Abs) 0.02 0.00 - 0.04 K/uL    Lymph # 1.7 1.0 - 4.8 K/uL    Mono # 0.9 0.3 - 1.0 K/uL    Eos # 0.0 0.0 - 0.5 K/uL    Baso # 0.03 0.00 - 0.20 K/uL    nRBC 0 0 /100 WBC    Gran % 70.7 38.0 - 73.0 %    Lymph % 18.8 18.0 - 48.0 %    Mono % 9.7 4.0 - 15.0 %    Eosinophil % 0.3 0.0 - 8.0 %    Basophil % 0.3 0.0 - 1.9 %    Differential Method Automated    Comprehensive metabolic panel   Result Value Ref Range    Sodium 135 (L) 136 - 145 mmol/L    Potassium 4.5 3.5 - 5.1 mmol/L    Chloride 107 95 - 110 mmol/L    CO2 16 (L) 23 - 29 mmol/L    Glucose 208 (H) 70 - 110 mg/dL    BUN 21 (H) 6 - 20 mg/dL    Creatinine 1.6 (H) 0.5 - 1.4 mg/dL    Calcium 9.4 8.7 - 10.5 mg/dL    Total Protein 7.7 6.0 - 8.4 g/dL    Albumin 4.2 3.5 - 5.2 g/dL    Total Bilirubin 1.2 (H) 0.1 - 1.0 mg/dL    Alkaline Phosphatase 150 (H) 55 - 135 U/L    AST 15 10 - 40 U/L    ALT 9 (L) 10 - 44 U/L    eGFR 38 (A) >60 mL/min/1.73 m^2    Anion Gap 12 8 - 16 mmol/L   Lipase   Result Value Ref Range    Lipase 14 4 - 60 U/L   Urinalysis, Reflex to Urine Culture Urine, Clean Catch    Specimen: Urine   Result Value Ref Range    Specimen UA Urine, Clean Catch     Color, UA Yellow Yellow, Straw, Nichelle    Appearance, UA Clear Clear    pH, UA 6.0 5.0 - 8.0    Specific Gravity, UA 1.025 1.005 - 1.030    Protein, UA 1+ (A) Negative    Glucose, UA 4+ (A) Negative    Ketones, UA Negative Negative    Bilirubin (UA) Negative Negative    Occult Blood UA Negative Negative    Nitrite, UA Negative Negative    Urobilinogen, UA Negative <2.0 EU/dL    Leukocytes, UA Negative Negative   Urinalysis Microscopic   Result Value Ref  Range    RBC, UA 0 0 - 4 /hpf    WBC, UA 0 0 - 5 /hpf    Bacteria None None-Occ /hpf    Yeast, UA None None    Hyaline Casts, UA 0 0-1/lpf /lpf    Microscopic Comment SEE COMMENT    Drug screen panel, in-house   Result Value Ref Range    Benzodiazepines Negative Negative    Methadone metabolites Negative Negative    Cocaine (Metab.) Negative Negative    Opiate Scrn, Ur Negative Negative    Barbiturate Screen, Ur Negative Negative    Amphetamine Screen, Ur Negative Negative    THC Negative Negative    Phencyclidine Negative Negative    Creatinine, Urine 110.2 15.0 - 325.0 mg/dL    Toxicology Information SEE COMMENT    CBC Auto Differential   Result Value Ref Range    WBC 9.46 3.90 - 12.70 K/uL    RBC 5.84 (H) 4.00 - 5.40 M/uL    Hemoglobin 16.1 (H) 12.0 - 16.0 g/dL    Hematocrit 51.5 (H) 37.0 - 48.5 %    MCV 88 82 - 98 fL    MCH 27.6 27.0 - 31.0 pg    MCHC 31.3 (L) 32.0 - 36.0 g/dL    RDW 16.3 (H) 11.5 - 14.5 %    Platelets 237 150 - 450 K/uL    MPV 9.4 9.2 - 12.9 fL    Immature Granulocytes 0.3 0.0 - 0.5 %    Gran # (ANC) 8.3 (H) 1.8 - 7.7 K/uL    Immature Grans (Abs) 0.03 0.00 - 0.04 K/uL    Lymph # 0.8 (L) 1.0 - 4.8 K/uL    Mono # 0.3 0.3 - 1.0 K/uL    Eos # 0.0 0.0 - 0.5 K/uL    Baso # 0.02 0.00 - 0.20 K/uL    nRBC 0 0 /100 WBC    Gran % 87.3 (H) 38.0 - 73.0 %    Lymph % 8.6 (L) 18.0 - 48.0 %    Mono % 3.6 (L) 4.0 - 15.0 %    Eosinophil % 0.0 0.0 - 8.0 %    Basophil % 0.2 0.0 - 1.9 %    Differential Method Automated    Comprehensive metabolic panel   Result Value Ref Range    Sodium 137 136 - 145 mmol/L    Potassium 4.9 3.5 - 5.1 mmol/L    Chloride 109 95 - 110 mmol/L    CO2 14 (L) 23 - 29 mmol/L    Glucose 227 (H) 70 - 110 mg/dL    BUN 20 6 - 20 mg/dL    Creatinine 1.4 0.5 - 1.4 mg/dL    Calcium 8.9 8.7 - 10.5 mg/dL    Total Protein 7.4 6.0 - 8.4 g/dL    Albumin 4.1 3.5 - 5.2 g/dL    Total Bilirubin 1.0 0.1 - 1.0 mg/dL    Alkaline Phosphatase 141 (H) 55 - 135 U/L    AST 14 10 - 40 U/L    ALT 9 (L) 10 - 44 U/L     eGFR 44 (A) >60 mL/min/1.73 m^2    Anion Gap 14 8 - 16 mmol/L   COVID-19 Rapid Screening   Result Value Ref Range    SARS-CoV-2 RNA, Amplification, Qual Negative Negative   Hemoglobin A1c   Result Value Ref Range    Hemoglobin A1C 8.6 (H) 4.0 - 5.6 %    Estimated Avg Glucose 200 (H) 68 - 131 mg/dL   Lactic Acid, Plasma   Result Value Ref Range    Lactate (Lactic Acid) 1.0 0.5 - 2.2 mmol/L   Lactic Acid, Plasma   Result Value Ref Range    Lactate (Lactic Acid) 0.8 0.5 - 2.2 mmol/L   Procalcitonin   Result Value Ref Range    Procalcitonin 0.05 <0.25 ng/mL   Brain natriuretic peptide   Result Value Ref Range    BNP 2,127 (H) 0 - 99 pg/mL   Troponin I   Result Value Ref Range    Troponin I 0.012 0.000 - 0.026 ng/mL   Gamma GT   Result Value Ref Range    GGT 43 8 - 55 U/L   Lactic Acid, Plasma   Result Value Ref Range    Lactate (Lactic Acid) 1.2 0.5 - 2.2 mmol/L   Troponin I   Result Value Ref Range    Troponin I 0.012 0.000 - 0.026 ng/mL   CBC auto differential   Result Value Ref Range    WBC 9.45 3.90 - 12.70 K/uL    RBC 5.59 (H) 4.00 - 5.40 M/uL    Hemoglobin 15.0 12.0 - 16.0 g/dL    Hematocrit 49.2 (H) 37.0 - 48.5 %    MCV 88 82 - 98 fL    MCH 26.8 (L) 27.0 - 31.0 pg    MCHC 30.5 (L) 32.0 - 36.0 g/dL    RDW 16.1 (H) 11.5 - 14.5 %    Platelets 274 150 - 450 K/uL    MPV 9.6 9.2 - 12.9 fL    Immature Granulocytes 0.2 0.0 - 0.5 %    Gran # (ANC) 6.0 1.8 - 7.7 K/uL    Immature Grans (Abs) 0.02 0.00 - 0.04 K/uL    Lymph # 2.4 1.0 - 4.8 K/uL    Mono # 1.0 0.3 - 1.0 K/uL    Eos # 0.1 0.0 - 0.5 K/uL    Baso # 0.03 0.00 - 0.20 K/uL    nRBC 0 0 /100 WBC    Gran % 63.0 38.0 - 73.0 %    Lymph % 25.4 18.0 - 48.0 %    Mono % 10.6 4.0 - 15.0 %    Eosinophil % 0.5 0.0 - 8.0 %    Basophil % 0.3 0.0 - 1.9 %    Differential Method Automated    Comprehensive metabolic panel   Result Value Ref Range    Sodium 134 (L) 136 - 145 mmol/L    Potassium 4.0 3.5 - 5.1 mmol/L    Chloride 99 95 - 110 mmol/L    CO2 21 (L) 23 - 29 mmol/L    Glucose  140 (H) 70 - 110 mg/dL    BUN 25 (H) 6 - 20 mg/dL    Creatinine 1.8 (H) 0.5 - 1.4 mg/dL    Calcium 8.8 8.7 - 10.5 mg/dL    Total Protein 6.3 6.0 - 8.4 g/dL    Albumin 3.7 3.5 - 5.2 g/dL    Total Bilirubin 0.7 0.1 - 1.0 mg/dL    Alkaline Phosphatase 123 55 - 135 U/L    AST 13 10 - 40 U/L    ALT 7 (L) 10 - 44 U/L    eGFR 33 (A) >60 mL/min/1.73 m^2    Anion Gap 14 8 - 16 mmol/L   Lactic Acid, Plasma   Result Value Ref Range    Lactate (Lactic Acid) 1.0 0.5 - 2.2 mmol/L   POCT Glucose, Hand-Held Device   Result Value Ref Range    POC Glucose 114 (A) 70 - 110 MG/DL   POCT glucose   Result Value Ref Range    POCT Glucose 206 (H) 70 - 110 mg/dL   POCT glucose   Result Value Ref Range    POCT Glucose 216 (H) 70 - 110 mg/dL   POCT glucose   Result Value Ref Range    POCT Glucose 205 (H) 70 - 110 mg/dL   POCT glucose   Result Value Ref Range    POCT Glucose 165 (H) 70 - 110 mg/dL   POCT glucose   Result Value Ref Range    POCT Glucose 147 (H) 70 - 110 mg/dL   POCT glucose   Result Value Ref Range    POCT Glucose 132 (H) 70 - 110 mg/dL   POCT glucose   Result Value Ref Range    POCT Glucose 135 (H) 70 - 110 mg/dL   POCT glucose   Result Value Ref Range    POCT Glucose 143 (H) 70 - 110 mg/dL         Imaging Results:  Imaging Results              X-Ray Chest AP Portable (Final result)  Result time 08/05/24 13:07:43      Final result by Braden Alexis MD (08/05/24 13:07:43)                   Impression:      Bilateral perihilar and lower lobe ground-glass interstitial infiltrates suggesting pulmonary edema.      Electronically signed by: Braden Alexis MD  Date:    08/05/2024  Time:    13:07               Narrative:    EXAMINATION:  XR CHEST AP PORTABLE    CLINICAL HISTORY:  Acute shortness of breath    TECHNIQUE:  AP view of the chest was performed.    COMPARISON:  03/06/2023    FINDINGS:  Mild cardiomegaly.  Bilateral perihilar and lower lobe ground-glass and interstitial infiltrates.  No alveolar consolidations.  No  pneumothorax.                                       CT Chest Without Contrast (Final result)  Result time 08/05/24 08:44:10      Final result by Mu Joiner Jr., MD (08/05/24 08:44:10)                   Impression:      1.  No acute chest findings.  Stable coarsened reticular opacities within the subpleural space of particularly of the lower lobes and right middle lobe possibly post infectious in nature.  2.  Indeterminate likely reactive mediastinal lymphadenopathy 3.  4 mm right upper lobe pulmonary nodule.  Recommend continued follow-up according to Fleischner criteria.    For a ground glass nodule <6 mm, Fleischner Society 2017 guidelines recommend no routine follow up. However, suspicious features could warrant follow up with non-contrast chest CT at 2 years and 4 years after discovery.    All CT scans at this facility are performed  using dose modulation techniques as appropriate to performed exam including the following:  automated exposure control; adjustment of mA and/or kV according to the patients size (this includes techniques or standardized protocols for targeted exams where dose is matched to indication/reason for exam: i.e. extremities or head);  iterative reconstruction technique.      Electronically signed by: Mu Joiner Jr., MD  Date:    08/05/2024  Time:    08:44               Narrative:    EXAMINATION:  CT CHEST WITHOUT CONTRAST    CLINICAL HISTORY:  abnormal lung field seen on CT abdomen;    TECHNIQUE:  CT scan of the chest was obtained without administration of contrast.  Coronal and sagittal reconstructions were performed on these images.    COMPARISON:  CT scan of the abdomen and pelvis on 08/04/2024..  03/25/2021 CT abdomen and pelvis.    FINDINGS:  Chronic reticular subpleural opacities particularly in the right middle lobe and lateral aspect of the right lower lobe.  Scarring within the lingula similar to prior remote exam.  No pneumothorax.  4 mm right upper lobe pulmonary  nodule on image 137 series 4.  The heart is normal in size.  No pericardial effusion.  Moderately calcified coronary arteries.  No pleural effusion or pneumothorax.  Indeterminate likely reactive 1.9 x 1.1 cm precarinal lymph node.  There are multiple other smaller subcentimeter lymph nodes none appear pathologically enlarged on this non contrasted CT scan The airway is clear.  Osseous structures are intact.  Old healed bilateral rib fractures.  Thyroid gland is normal.   Visualized abdominal organs are within normal limits.                                       CT Abdomen Pelvis  Without Contrast (Final result)  Result time 08/04/24 22:35:59      Final result by Yaw Rouse MD (08/04/24 22:35:59)                   Impression:      As above    All CT scans   are performed using dose optimization techniques including the following: automated exposure control; adjustment of the mA and/or kV; use of iterative reconstruction technique.  Dose modulation was employed for ALARA by means of: Automated exposure control; adjustment of the mA and/or kV according to patient size (this includes techniques or standardized protocols for targeted exams where dose is matched to indication/reason for exam; i.e. extremities or head); and/or use of iterative reconstructive technique.      Electronically signed by: Yaw Rouse  Date:    08/04/2024  Time:    22:35               Narrative:    EXAMINATION:  CT ABDOMEN PELVIS WITHOUT CONTRAST    CLINICAL HISTORY:  Abdominal abscess/infection suspected;    TECHNIQUE:  Low dose axial images, sagittal and coronal reformations were obtained from the lung bases to the pubic symphysis.    COMPARISON:  None    FINDINGS:  Mild reticular opacities in the right middle lobe.  Mild left basilar scarring.  Mild cardiomegaly.  Hepatomegaly.  Status post cholecystectomy.  Atherosclerotic changes of the aorta iliac vasculature.  Left hip prosthesis.  Focus of gas in the right ventricle likely related  to instrumentation.  Spleen is not enlarged.  Punctate nonobstructing left renal calculi.  No hydronephrosis.  No adrenal masses.  No pancreatic mass.  No bowel obstruction or free fluid.  Slight colonic diverticulosis.  Bladder is grossly unremarkable.  Status post hysterectomy.  No vertebral body compression deformity.    No free fluid or abscess                                        The Emergency Provider reviewed the vital signs and test results, which are outlined above.     ED Discussion     11:27 PM: Called to patient bedside. I walked in and saw patient sticking a finger in her mouth. I advised patient not to stick finger in throat to induce vomiting.    12:58 AM: Pt had another episode of vomiting. She states that she did not induce it by sticking a finger in her mouth. Will discuss with HM and administer antiemetics.     1:39 AM: Discussed case with Dr. Latasha Espana MD ([facility]). Dr. Espana agrees with current care and management of pt and accepts admission.   Admitting Service: [facility]  Admitting Physician: Dr. Espana  Admit to: Obs Tele    1:40 AM: Re-evaluated pt. I have discussed test results, shared treatment plan, and the need for admission with patient and family at bedside. Pt and family express understanding at this time and agree with all information. All questions answered. Pt and family have no further questions or concerns at this time. Pt is ready for admit.       Medical Decision Making  Amount and/or Complexity of Data Reviewed  Labs: ordered. Decision-making details documented in ED Course.  Radiology: ordered and independent interpretation performed. Decision-making details documented in ED Course.    Risk  OTC drugs.  Prescription drug management.  Parenteral controlled substances.  Decision regarding hospitalization.  Risk Details: OTC drugs, prescription drugs and controlled substances considered.  Due to patient's symptoms improving and pain controlled pain  medications ordered appropriately.  Differential diagnosis;  Gastroenteritis, Bowel obstruction, Colitis, Diverticulitis, Cholecystitis, Appendicitis, Perforated bowel, Herniation, Infectious etiology, UTI, Pyelonephritis,  Biliary obstruction, kidney stone                   ED Medication(s):  Medications   aspirin chewable tablet 81 mg (81 mg Oral Given 8/6/24 0858)   atorvastatin tablet 80 mg (80 mg Oral Given 8/6/24 0858)   buPROPion TB24 tablet 150 mg (150 mg Oral Given 8/6/24 0858)   apixaban tablet 5 mg (5 mg Oral Given 8/6/24 2106)   HYDROcodone-acetaminophen 5-325 mg per tablet 1 tablet (1 tablet Oral Given 8/5/24 2145)   metoprolol succinate (TOPROL-XL) 24 hr tablet 50 mg (0 mg Oral Hold 8/6/24 0858)   pantoprazole EC tablet 40 mg (40 mg Oral Given 8/6/24 2106)   pregabalin capsule 100 mg (100 mg Oral Given 8/6/24 2106)   sodium chloride 0.9% flush 10 mL (has no administration in time range)   naloxone 0.4 mg/mL injection 0.02 mg (has no administration in time range)   glucose chewable tablet 16 g (has no administration in time range)   glucose chewable tablet 24 g (has no administration in time range)   glucagon (human recombinant) injection 1 mg (has no administration in time range)   cefTRIAXone (ROCEPHIN) 2 g in D5W 100 mL IVPB (MB+) (0 g Intravenous Stopped 8/6/24 0411)   doxycycline 100 mg in D5W 100 mL IVPB (MB+) (0 mg Intravenous Stopped 8/6/24 1544)   insulin aspart U-100 pen 0-10 Units (4 Units Subcutaneous Given 8/5/24 1154)   insulin glargine U-100 (Lantus) pen 8 Units (8 Units Subcutaneous Given 8/6/24 0859)   albuterol-ipratropium 2.5 mg-0.5 mg/3 mL nebulizer solution 3 mL (has no administration in time range)   acetaminophen tablet 650 mg (has no administration in time range)   dextrose 10% bolus 125 mL 125 mL (has no administration in time range)   dextrose 10% bolus 250 mL 250 mL (has no administration in time range)   hydrALAZINE injection 10 mg (10 mg Intravenous Given 8/5/24 1151)    budesonide nebulizer solution 0.5 mg (0.5 mg Nebulization Given 8/6/24 1937)   arformoteroL nebulizer solution 15 mcg (15 mcg Nebulization Given 8/6/24 1937)   0.9%  NaCl infusion ( Intravenous New Bag 8/6/24 1444)   ondansetron injection 4 mg (4 mg Intravenous Given 8/5/24 1604)   prochlorperazine injection Soln 5 mg (has no administration in time range)   ipratropium 0.02 % nebulizer solution 0.5 mg (0.5 mg Nebulization Given 8/6/24 1937)   0.9%  NaCl infusion (0 mLs Intravenous Stopped 8/4/24 2051)   ondansetron injection 4 mg (4 mg Intravenous Given 8/4/24 1826)   sodium chloride 0.9% bolus 1,000 mL 1,000 mL (0 mLs Intravenous Stopped 8/4/24 2320)   ketorolac injection 15 mg (15 mg Intravenous Given 8/4/24 2116)   promethazine (PHENERGAN) 12.5 mg in 0.9% NaCl 50 mL IVPB (0 mg Intravenous Stopped 8/4/24 2201)   fentaNYL 50 mcg/mL injection 50 mcg (50 mcg Intravenous Given 8/4/24 2242)   ondansetron injection 4 mg (4 mg Intravenous Given 8/4/24 2330)   furosemide injection 40 mg (40 mg Intravenous Given 8/5/24 1359)   melatonin tablet 6 mg (6 mg Oral Given 8/6/24 2118)       Current Discharge Medication List                  Scribe Attestation:   Scribe #1: I performed the above scribed service and the documentation accurately describes the services I performed. I attest to the accuracy of the note.     Attending:   Physician Attestation Statement for Scribe #1: I, Efrem Fernandez Jr., MD, personally performed the services described in this documentation, as scribed by Bhavani Martins, in my presence, and it is both accurate and complete.           Clinical Impression       ICD-10-CM ICD-9-CM   1. Intractable vomiting  R11.10 536.2   2. Chest pain  R07.9 786.50       Disposition:   Disposition: Placed in Observation  Condition: Efrem Paz Jr., MD  08/07/24 0135

## 2024-08-05 NOTE — ASSESSMENT & PLAN NOTE
Patient with known CAD which is controlled Will continue ASA and Statin and monitor for S/Sx of angina/ACS. Continue to monitor on telemetry.  Check troponin level.  Cardiac monitoring.

## 2024-08-05 NOTE — ASSESSMENT & PLAN NOTE
-CT scan of chest with mild reticular opacities of right middle lobe  -patient denies chest pain, shortness for breath, cough, fevers, chills; she does have generalized abdominal pain with intractable nausea, vomiting, and diarrhea  -white blood cell count 9.25, afebrile, pulse ox 94% on room air and currently 97% on 2 L nasal cannula  -check lactic acid level, procalcitonin level, BNP, influenza a/B, and COVID-19  -empiric IV Rocephin and IV doxycycline at least temporarily until further evaluation obtained  -O2 supplementation, incentive spirometry, continuous pulse oximetry monitoring, p.rshoshana Herrera

## 2024-08-05 NOTE — ASSESSMENT & PLAN NOTE
"Patient's FSGs are uncontrolled due to hyperglycemia on current medication regimen.  Last A1c reviewed-   Lab Results   Component Value Date    HGBA1C 10.9 (H) 09/27/2023     Most recent fingerstick glucose reviewed- No results for input(s): "POCTGLUCOSE" in the last 24 hours.  Current correctional scale  Medium  Maintain anti-hyperglycemic dose as follows-   Antihyperglycemics (From admission, onward)      Start     Stop Route Frequency Ordered    08/05/24 0900  insulin glargine U-100 (Lantus) pen 8 Units         -- SubQ Daily 08/05/24 0301    08/05/24 0357  insulin aspart U-100 pen 0-10 Units         -- SubQ Before meals & nightly PRN 08/05/24 0301          Hold Oral hypoglycemics while patient is in the hospital.  -check A1c  -start Lantus 8 units subQ daily and medium dose sliding scale insulin due to significant hyperglycemia  -clear liquid diet given intractable nausea with vomiting  "

## 2024-08-05 NOTE — HPI
58-year-old white woman with history of and try to/duodenitis with reported ulcer, non-insulin-dependent diabetes mellitus type 2, chronic combined systolic and diastolic CHF with cardiomyopathy (echocardiogram 07/13/2024 with EF less than 15% and severe grade 3 diastolic dysfunction), tobacco abuse, COPD, depression with anxiety, hypertension, hyperlipidemia, coronary artery disease, community-acquired pneumonia, obesity, and chronic kidney disease stage 2 who presented to the emergency department with complaint of abdominal pain since yesterday morning.  Abdominal pain is generalized in nature, 10/10 on the pain scale, associated with intractable nausea and vomiting, and associated with diarrhea.  Patient denies any associated fevers, chills, myalgias, chest pain, shortness for breath, or cough.  Patient denies any difficulty with urinating and denies any dysuria or hematuria.    Patient was recently hospitalized 7/13-07/14/2024 for acute on chronic combined systolic and diastolic CHF exacerbation for which she received IV Lasix diuresis.

## 2024-08-05 NOTE — ASSESSMENT & PLAN NOTE
-continue Toprol-XL and torsemide as per usual home regimen  -hold Cozaar in the setting of acute kidney injury  -p.r.n. IV hydralazine  -diabetic cardiac diet

## 2024-08-05 NOTE — ASSESSMENT & PLAN NOTE
"Generalized abdominal pain with intractable nausea and vomiting, and diarrhea since yesterday morning  -white blood cell count 9.25, LFTs with alk phos 150 and total bilirubin 1.2, lipase 14, UDS negative, urinalysis negative, afebrile  -CT scan of abdomen and pelvis with Mild reticular opacities in the right middle lobe.  Mild left basilar scarring.  Mild cardiomegaly.  Hepatomegaly.  Status post cholecystectomy.  Atherosclerotic changes of the aorta iliac vasculature.  Left hip prosthesis.  Focus of gas in the right ventricle likely related to instrumentation.  Spleen is not enlarged.  Punctate nonobstructing left renal calculi.  No hydronephrosis.  No adrenal masses.  No pancreatic mass.  No bowel obstruction or free fluid.  Slight colonic diverticulosis.  Bladder is grossly unremarkable.  Status post hysterectomy.  No vertebral body compression deformity."  -check lactic acid level, procalcitonin level, GGT, influenza a/B, and COVID-19  -check stool studies to include stool culture, fecal white blood cell, and C diff  -patient received a 1 L normal saline IV fluid bolus in the emergency department and we will not give any additional IV fluids in the setting of chronic combined CHF with EF of less than 15%  -clear liquid diet  -p.r.n. pain/nausea control    "

## 2024-08-05 NOTE — HPI
Ms. Leon is a 58 year old female patient whose current medical conditions include DM type II, chronic combined CHF (EF 15%), history of CVA, PAF (on Eliquis), tobacco abuse, COPD, depression with anxiety, HTN, hyperlipidemia, CAD, obesity, and duodenitis with reported ulcer, and CKD who presented to [facility] ED this AM due to epigastric/generalized abdominal pain that onset yesterday AM. Associated symptoms included intractable nausea, vomiting, and multiple episodes of diarrhea. She denied any associated fever, chills, ruth CP, SOB, palpitations, near syncope, or syncope. Initial workup in ED revealed creatinine of 1.6, normal pro-niki, and BNP > 2,000. CT of abd/pelvis without revealed no evidence of bowel obstruction and patient was subsequently admitted for further evaluation and treatment. Cardiology consulted to assist with management. Patient seen and examined today in ED. Lying flat in bed, on supplemental O2. Still complains of abdominal pain, mild tenderness on exam. Denies CP/SOB. She claims compliance with her medications as OP. Recently admitted to [facility] from 7/13/24-7/14/24 due to decompensated CHF. Chart reviewed. LA x 2 WNL. Troponin x 1 negative, will repeat. CT of chest without contrast revealed stable coarsened reticular opacities in lower lobes and right middle lobe, possibly infectious in nature, probable reactive mediastinal lymphadenopathy, 4 mm RUL pulmonary nodule.     Review of records show patient had prior LHC in 2020 which showed EF of 25%,  RCA with left to right collaterals.

## 2024-08-05 NOTE — PLAN OF CARE
[facility] - Emergency Dept.  Initial Discharge Assessment       Primary Care Provider: Tomy Rios MD    Admission Diagnosis: Intractable vomiting [R11.10]    Admission Date: 8/4/2024  Expected Discharge Date:     Transition of Care Barriers: (P) None    Payor: MEDICAID / Plan: [place] [employer] [place] / Product Type: Managed Medicaid /     Extended Emergency Contact Information  Primary Emergency Contact: Vita Ramirez  Address: [address] [place] [address] [facility]  Home Phone: [phone]  Work Phone: [phone]  Mobile Phone: [phone]  Relation: Sister  Preferred language: English   needed? No    Discharge Plan A: (P) Home         [id number] - [place], [place] - [address] [facility]  [address]  [place] [address]  Phone: [phone] Fax: [phone]      Initial Assessment (most recent)       Adult Discharge Assessment - 08/05/24 0910          Discharge Assessment    Assessment Type Discharge Planning Assessment     Confirmed/corrected address, phone number and insurance Yes     Confirmed Demographics Correct on Facesheet     Source of Information patient     When was your last doctors appointment? 09/20/23 (P)      Does patient/caregiver understand observation status Yes (P)      Reason For Admission Intractable Vomitting (P)      People in Home alone (P)      Facility Arrived From: Home (P)      Do you expect to return to your current living situation? Yes (P)      Do you have help at home or someone to help you manage your care at home? No (P)      Prior to hospitilization cognitive status: Alert/Oriented (P)      Current cognitive status: Alert/Oriented (P)      Walking or Climbing Stairs Difficulty no (P)      Dressing/Bathing Difficulty no (P)      Home Layout Able to live on 1st floor (P)      Equipment Currently Used at Home none (P)      Readmission within 30 days? No (P)      Do you  currently have service(s) that help you manage your care at home? No (P)      Do you have prescription coverage? Yes (P)      Coverage MEDICAID - [facility] CONNECT (P)      Do you have any problems affording any of your prescribed medications? No (P)      Who is going to help you get home at discharge? NA (P)      How do you get to doctors appointments? family or friend will provide (P)      Are you on dialysis? No (P)      Do you take coumadin? No (P)      Discharge Plan A Home (P)      DME Needed Upon Discharge  none (P)      Discharge Plan discussed with: Patient (P)      Transition of Care Barriers None (P)         Physical Activity    On average, how many days per week do you engage in moderate to strenuous exercise (like a brisk walk)? 0 days (P)      On average, how many minutes do you engage in exercise at this level? 0 min (P)         Financial Resource Strain    How hard is it for you to pay for the very basics like food, housing, medical care, and heating? Not hard at all (P)         Housing Stability    In the last 12 months, was there a time when you were not able to pay the mortgage or rent on time? No (P)      At any time in the past 12 months, were you homeless or living in a shelter (including now)? No (P)         Transportation Needs    Has the lack of transportation kept you from medical appointments, meetings, work or from getting things needed for daily living? No (P)         Food Insecurity    Within the past 12 months, you worried that your food would run out before you got the money to buy more. Never true (P)      Within the past 12 months, the food you bought just didn't last and you didn't have money to get more. Never true (P)         Stress    Do you feel stress - tense, restless, nervous, or anxious, or unable to sleep at night because your mind is troubled all the time - these days? Not at all (P)         Social Isolation    How often do you feel lonely or isolated from those around  you?  Never (P)         Alcohol Use    Q1: How often do you have a drink containing alcohol? Never (P)      Q2: How many drinks containing alcohol do you have on a typical day when you are drinking? Patient does not drink (P)      Q3: How often do you have six or more drinks on one occasion? Never (P)         Utilities    In the past 12 months has the electric, gas, oil, or water company threatened to shut off services in your home? No (P)         Health Literacy    How often do you need to have someone help you when you read instructions, pamphlets, or other written material from your doctor or pharmacy? Never (P)                    Sw met with patient at bedside to complete initial assessment. Patient states all information besides the address was correct on the facesheet. Sw updated address in patient's chart.  Patient lives alone. Patient states does not use any equipment at home. Patient is not on any dialysis, or receive any services through the Coumadin clinic. Patient reports not being on any bloodthinnner medications. Patient does not receive any other outside services. Patient reports getting help from her friend to get her to and from medical appointments.  Patient reports no current medications. When asked about the SDOH patient denies having any current barriers. No needs at this time.

## 2024-08-05 NOTE — H&P
[facility] - Emergency Dept.  [facility]  History & Physical    Patient Name: Delores Fox  MRN: [id number]  Patient Class: OP- Observation  Admission Date: 8/4/2024  Attending Physician:  Latasha Espana MD  Primary Care Provider: Tomy Rios MD         Patient information was obtained from patient, ER records, and ER physician .     Subjective:     Principal Problem:Gastroenteritis    Chief Complaint:   Chief Complaint   Patient presents with    Abdominal Pain     Pt c/o midline abd pain with n/v/d x 4 episodes starting today, denies cough/fever/body aches, +smoker, denies CP/SOB        HPI: 58-year-old white woman with history of and try to/duodenitis with reported ulcer, non-insulin-dependent diabetes mellitus type 2, chronic combined systolic and diastolic CHF with cardiomyopathy (echocardiogram 07/13/2024 with EF less than 15% and severe grade 3 diastolic dysfunction), tobacco abuse, COPD, depression with anxiety, hypertension, hyperlipidemia, coronary artery disease, community-acquired pneumonia, obesity, and chronic kidney disease stage 2 who presented to the emergency department with complaint of abdominal pain since yesterday morning.  Abdominal pain is generalized in nature, 10/10 on the pain scale, associated with intractable nausea and vomiting, and associated with diarrhea.  Patient denies any associated fevers, chills, myalgias, chest pain, shortness for breath, or cough.  Patient denies any difficulty with urinating and denies any dysuria or hematuria.    Patient was recently hospitalized 7/13-07/14/2024 for acute on chronic combined systolic and diastolic CHF exacerbation for which she received IV Lasix diuresis.    Past Medical History:   Diagnosis Date    Acid reflux     COPD (chronic obstructive pulmonary disease)     Intubation 3/2023    Coronary artery disease     COVID-19     Diabetes mellitus     High cholesterol     Hypertension     Obese body habitus        Past Surgical  History:   Procedure Laterality Date    APPENDECTOMY      CATHETERIZATION OF BOTH LEFT AND RIGHT HEART N/A 11/23/2020    Procedure: CATHETERIZATION, HEART, BOTH LEFT AND RIGHT;  Surgeon: Doug Kendall MD;  Location: [facility] CATH/EP LAB;  Service: Cardiology;  Laterality: N/A;    CHOLECYSTECTOMY      EXCISION OF LESION OF EYELID Right 12/11/2020    Procedure: EXCISION, LESION, EYELID;  Surgeon: Malachi Rodriguez MD;  Location: [facility] OR;  Service: Ophthalmology;  Laterality: Right;  Inclusion Cyst removal right lower lid    HYSTERECTOMY      JOINT REPLACEMENT Left     left hip    MASTOIDECTOMY Right        Review of patient's allergies indicates:   Allergen Reactions    Morphine Nausea And Vomiting    Sulfa (sulfonamide antibiotics) Nausea And Vomiting       Current Facility-Administered Medications on File Prior to Encounter   Medication    electrolyte-S (ISOLYTE)    lidocaine (PF) 10 mg/ml (1%) injection 10 mg    sodium chloride 0.9% flush 3 mL    [DISCONTINUED] GENERIC EXTERNAL MEDICATION    [DISCONTINUED] GENERIC EXTERNAL MEDICATION     Current Outpatient Medications on File Prior to Encounter   Medication Sig    albuterol (ACCUNEB) 1.25 mg/3 mL Nebu USE 1 VIAL IN NEBULIZER EVERY 6 HOURS AS NEEDED FOR WHEEZING( RESCUE)    aspirin 81 MG Chew Take 1 tablet (81 mg total) by mouth once daily.    atorvastatin (LIPITOR) 80 MG tablet Take 1 tablet (80 mg total) by mouth once daily.    blood sugar diagnostic (TRUE METRIX GLUCOSE TEST STRIP) Strp 1 strip by In Vitro route 2 (two) times a day.    blood-glucose meter kit To check BG 2 times daily, to use with insurance preferred meter    buPROPion (WELLBUTRIN XL) 150 MG TB24 tablet Take 1 tablet (150 mg total) by mouth once daily.    ELIQUIS 5 mg Tab Take 5 mg by mouth 2 (two) times daily.    empagliflozin (JARDIANCE) 25 mg tablet Take 1 tablet (25 mg total) by mouth once daily.    fluticasone propion-salmeterol 115-21 mcg/dose (ADVAIR HFA) 115-21 mcg/actuation HFAA  inhaler Inhale 2 puffs into the lungs every 12 (twelve) hours. Controller    fluticasone-umeclidin-vilanter (TRELEGY ELLIPTA) 200-62.5-25 mcg inhaler Inhale 1 puff into the lungs once daily.    glimepiride (AMARYL) 2 MG tablet Take 2 mg by mouth.    glipiZIDE (GLUCOTROL) 10 MG TR24 Take 1 tablet (10 mg total) by mouth daily with breakfast.    HYDROcodone-acetaminophen (NORCO) 5-325 mg per tablet Take 1 tablet by mouth every 6 (six) hours as needed for Pain.    hydrOXYzine HCL (ATARAX) 25 MG tablet Take 1 tablet (25 mg total) by mouth 3 (three) times daily as needed for Itching.    isosorbide mononitrate (IMDUR) 30 MG 24 hr tablet Take 1 tablet (30 mg total) by mouth once daily.    lancets Misc 1 Lancet by Subdermal route 2 (two) times a day. to use with insurance preferred meter    losartan (COZAAR) 25 MG tablet Take 1 tablet (25 mg total) by mouth once daily.    metoprolol succinate (TOPROL-XL) 50 MG 24 hr tablet TAKE 1 TABLET(50 MG) BY MOUTH DAILY    nebulizer and compressor Ebony Nebulizer machine for home use. DX COPD.    ondansetron (ZOFRAN-ODT) 4 MG TbDL Take 1 tablet (4 mg total) by mouth every 8 (eight) hours as needed.    pantoprazole (PROTONIX) 40 MG tablet Take 1 tablet (40 mg total) by mouth 2 (two) times daily.    pregabalin (LYRICA) 100 MG capsule Take 1 capsule (100 mg total) by mouth 3 (three) times daily.    semaglutide (RYBELSUS) 7 mg tablet Take 1 tablet (7 mg total) by mouth once daily.    torsemide (DEMADEX) 20 MG Tab Take 1 tablet (20 mg total) by mouth once daily. Take torsemide 20 mg once daily.  Can take additional dose in afternoon if increased shortness of breath or leg swelling for 2-3 days if needed    zolpidem (AMBIEN) 10 mg Tab Take 1 tablet (10 mg total) by mouth nightly as needed (insomnia).     Family History    None       Tobacco Use    Smoking status: Every Day     Current packs/day: 0.00     Average packs/day: 0.5 packs/day for 13.0 years (6.5 ttl pk-yrs)     Types: Cigarettes      Start date: 2009     Last attempt to quit: 2022     Years since quittin.8     Passive exposure: Past    Smokeless tobacco: Never   Substance and Sexual Activity    Alcohol use: Not Currently    Drug use: Not Currently    Sexual activity: Not Currently     Review of Systems   Constitutional:  Negative for chills and fever.   Respiratory:  Negative for cough and shortness of breath.    Cardiovascular:  Negative for chest pain and leg swelling.   Gastrointestinal:  Positive for abdominal pain, diarrhea, nausea and vomiting. Negative for blood in stool.   Genitourinary:  Negative for dysuria and hematuria.   All other systems reviewed and are negative.    Objective:     Vital Signs (Most Recent):  Temp: 98.6 °F (37 °C) (24 1724)  Pulse: 90 (24 0235)  Resp: 18 (24 023)  BP: (!) 159/85 (24 0235)  SpO2: 96 % (24 023) Vital Signs (24h Range):  Temp:  [98.6 °F (37 °C)] 98.6 °F (37 °C)  Pulse:  [] 90  Resp:  [18-21] 18  SpO2:  [94 %-97 %] 96 %  BP: (142-159)/(77-99) 159/85     Weight: 108.9 kg (240 lb)  Body mass index is 37.67 kg/m².     Physical Exam  Vitals reviewed.   Constitutional:       General: She is not in acute distress.     Appearance: She is ill-appearing. She is not diaphoretic.   HENT:      Nose: Nose normal.   Eyes:      Conjunctiva/sclera: Conjunctivae normal.   Cardiovascular:      Rate and Rhythm: Normal rate.   Pulmonary:      Effort: Pulmonary effort is normal. No respiratory distress.      Breath sounds: No wheezing.   Abdominal:      General: There is no distension.      Tenderness: There is no abdominal tenderness. There is no guarding or rebound.   Musculoskeletal:         General: No swelling.   Skin:     General: Skin is warm and dry.   Neurological:      Mental Status: She is alert and oriented to person, place, and time.   Psychiatric:         Mood and Affect: Mood normal.         Behavior: Behavior normal.                Significant Labs: All  pertinent labs within the past 24 hours have been reviewed.  Recent Lab Results         08/05/24  0027   08/04/24  2320   08/04/24  1819        Benzodiazepines Negative           Methadone metabolites Negative           Phencyclidine Negative           Albumin     4.2       ALP     150       ALT     9       Amphetamines, Urine Negative           Anion Gap     12       Appearance, UA   Clear         AST     15       Bacteria, UA   None         Barbituates, Urine Negative           Baso #     0.03       Basophil %     0.3       Bilirubin (UA)   Negative         BILIRUBIN TOTAL     1.2  Comment: For infants and newborns, interpretation of results should be based  on gestational age, weight and in agreement with clinical  observations.    Premature Infant recommended reference ranges:  Up to 24 hours.............<8.0 mg/dL  Up to 48 hours............<12.0 mg/dL  3-5 days..................<15.0 mg/dL  6-29 days.................<15.0 mg/dL         BUN     21       Calcium     9.4       Chloride     107       CO2     16       Cocaine, Urine Negative           Color, UA   Yellow         Creatinine     1.6       Urine Creatinine 110.2           Differential Method     Automated       eGFR     38       Eos #     0.0       Eos %     0.3       Glucose     208       Glucose, UA   4+         Gran # (ANC)     6.5       Gran %     70.7       Hematocrit     53.0       Hemoglobin     17.0       Hyaline Casts, UA   0         Immature Grans (Abs)     0.02  Comment: Mild elevation in immature granulocytes is non specific and   can be seen in a variety of conditions including stress response,   acute inflammation, trauma and pregnancy. Correlation with other   laboratory and clinical findings is essential.         Immature Granulocytes     0.2       Ketones, UA   Negative         Leukocyte Esterase, UA   Negative         Lipase     14       Lymph #     1.7       Lymph %     18.8       MCH     27.4       MCHC     32.1       MCV     86        Microscopic Comment   SEE COMMENT  Comment: Other formed elements not mentioned in the report are not   present in the microscopic examination.            Mono #     0.9       Mono %     9.7       MPV     9.7       NITRITE UA   Negative         nRBC     0       Blood, UA   Negative         Opiates, Urine Negative           pH, UA   6.0         Platelet Count     310       Potassium     4.5       PROTEIN TOTAL     7.7       Protein, UA   1+  Comment: Recommend a 24 hour urine protein or a urine   protein/creatinine ratio if globulin induced proteinuria is  clinically suspected.           RBC     6.20       RBC, UA   0         RDW     17.0       Sodium     135       Spec Grav UA   1.025         Specimen UA   Urine, Clean Catch         Marijuana (THC) Metabolite Negative           Toxicology Information SEE COMMENT  Comment: This screen includes the following classes of drugs at the listed   cut-off:    Benzodiazepines 200 ng/ml  Methadone 300 ng/ml  Cocaine metabolite 300 ng/ml  Opiates 300 ng/ml  Barbiturates 200 ng/ml  Amphetamines 1000 ng/ml  Marijuana metabs (THC) 50 ng/ml  Phencyclidine (PCP) 25 ng/ml    This is a screening test. If results do not correlate with clinical   presentation, then a confirmatory send out test is advised.     This report is intended for use in clinical monitoring and management   of   patients. It is not intended for use in employment related drug   testing.             UROBILINOGEN UA   Negative         WBC, UA   0         WBC     9.25       Yeast, UA   None                 Significant Imaging: I have reviewed all pertinent imaging results/findings within the past 24 hours.  CT Abdomen Pelvis  Without Contrast   Final Result      As above      All CT scans   are performed using dose optimization techniques including the following: automated exposure control; adjustment of the mA and/or kV; use of iterative reconstruction technique.  Dose modulation was employed for JEAN by means  "of: Automated exposure control; adjustment of the mA and/or kV according to patient size (this includes techniques or standardized protocols for targeted exams where dose is matched to indication/reason for exam; i.e. extremities or head); and/or use of iterative reconstructive technique.         Electronically signed by: Yaw Rouse   Date:    08/04/2024   Time:    22:35      CT Chest Without Contrast    (Results Pending)      Assessment/Plan:     * Gastroenteritis  Generalized abdominal pain with intractable nausea and vomiting, and diarrhea since yesterday morning  -white blood cell count 9.25, LFTs with alk phos 150 and total bilirubin 1.2, lipase 14, UDS negative, urinalysis negative, afebrile  -CT scan of abdomen and pelvis with Mild reticular opacities in the right middle lobe.  Mild left basilar scarring.  Mild cardiomegaly.  Hepatomegaly.  Status post cholecystectomy.  Atherosclerotic changes of the aorta iliac vasculature.  Left hip prosthesis.  Focus of gas in the right ventricle likely related to instrumentation.  Spleen is not enlarged.  Punctate nonobstructing left renal calculi.  No hydronephrosis.  No adrenal masses.  No pancreatic mass.  No bowel obstruction or free fluid.  Slight colonic diverticulosis.  Bladder is grossly unremarkable.  Status post hysterectomy.  No vertebral body compression deformity."  -check lactic acid level, procalcitonin level, GGT, influenza a/B, and COVID-19  -check stool studies to include stool culture, fecal white blood cell, and C diff  -patient received a 1 L normal saline IV fluid bolus in the emergency department and we will not give any additional IV fluids in the setting of chronic combined CHF with EF of less than 15%  -clear liquid diet  -p.r.n. pain/nausea control      Acute renal failure superimposed on stage 2 chronic kidney disease  STEPHEN is likely due to pre-renal azotemia due to dehydration. Baseline creatinine is  1.1 . Most recent creatinine and eGFR " are listed below.  Recent Labs     08/04/24  1819   CREATININE 1.6*   EGFRNORACEVR 38*      Plan  - STEPHEN is  pending reassessment  -CT scan of abdomen and pelvis reviewed  -urinalysis negative, white blood cell count normal, afebrile  - Avoid nephrotoxins and renally dose meds for GFR listed above  - Monitor urine output, serial BMP, and adjust therapy as needed  - patient received 1 L normal saline IV fluid bolus ordered by the ER physician  -no further IV fluids in the setting of chronic combined CHF with EF less than 15%, we will actually resume patient's home torsemide 20 mg p.o. daily  -check a.m. labs  -consider nephrology consult    Elevated liver enzymes  Elevated liver enzymes likely related to gastroenteritis symptoms versus obesity with possible underlying fatty liver  -alk phos 150, total bilirubin 1.2 and remaining LFTs unremarkable, lipase 14 (alk phos and total bilirubin only minimally elevated)  -CT scan of abdomen and pelvis reviewed  -check GGT  -check a.m. labs      Abnormal lung field  -CT scan of chest with mild reticular opacities of right middle lobe  -patient denies chest pain, shortness for breath, cough, fevers, chills; she does have generalized abdominal pain with intractable nausea, vomiting, and diarrhea  -white blood cell count 9.25, afebrile, pulse ox 94% on room air and currently 97% on 2 L nasal cannula  -check lactic acid level, procalcitonin level, BNP, influenza a/B, and COVID-19  -empiric IV Rocephin and IV doxycycline at least temporarily until further evaluation obtained  -O2 supplementation, incentive spirometry, continuous pulse oximetry monitoring, p.r.nArleen Herrera      Chronic combined systolic and diastolic heart failure  -CT scan of abdomen and pelvis with mild cardiomegaly and questionable focus of gas of right ventricle (unclear etiology)  -previous echocardiogram 07/13/2024 with EF less than 15%, severe grade 3 diastolic dysfunction, global hypokinesis, moderate tricuspid  "valve regurgitation, and small pericardial effusion  -stat CT scan of chest  -check BNP and troponin  -questionable repeat echo, defer to Cardiology  -strict Is&Os and daily weights  -cardiac monitoring  -cardiology consult for a.m.      Diabetes mellitus type 2, noninsulin dependent  Patient's FSGs are uncontrolled due to hyperglycemia on current medication regimen.  Last A1c reviewed-   Lab Results   Component Value Date    HGBA1C 10.9 (H) 09/27/2023     Most recent fingerstick glucose reviewed- No results for input(s): "POCTGLUCOSE" in the last 24 hours.  Current correctional scale  Medium  Maintain anti-hyperglycemic dose as follows-   Antihyperglycemics (From admission, onward)      Start     Stop Route Frequency Ordered    08/05/24 0900  insulin glargine U-100 (Lantus) pen 8 Units         -- SubQ Daily 08/05/24 0301    08/05/24 0357  insulin aspart U-100 pen 0-10 Units         -- SubQ Before meals & nightly PRN 08/05/24 0301          Hold Oral hypoglycemics while patient is in the hospital.  -check A1c  -start Lantus 8 units subQ daily and medium dose sliding scale insulin due to significant hyperglycemia  -clear liquid diet given intractable nausea with vomiting    Severe obesity with body mass index (BMI) of 35.0 to 39.9 with comorbidity  Body mass index is 37.67 kg/m². Morbid obesity complicates all aspects of disease management from diagnostic modalities to treatment. Weight loss encouraged and health benefits explained to patient.         Hyperlipidemia associated with type 2 diabetes mellitus  -continue Lipitor      CAD (coronary artery disease)  Patient with known CAD which is controlled Will continue ASA and Statin and monitor for S/Sx of angina/ACS. Continue to monitor on telemetry.  Check troponin level.  Cardiac monitoring.    COPD  Patient's COPD is controlled currently.  Patient is currently off COPD Pathway. Continue scheduled inhalers (Trelegy Ellipta to be replaced with a equivalent on " formulary by pharmacist) and p.r.n. Javier.  Continuous pulse oximetry monitoring, O2 supplementation, incentive spirometry.  monitor respiratory status closely.     Hypertension associated with diabetes  -continue Toprol-XL and torsemide as per usual home regimen  -hold Cozaar in the setting of acute kidney injury  -p.r.n. IV hydralazine  -diabetic cardiac diet        VTE Risk Mitigation (From admission, onward)           Ordered     apixaban tablet 5 mg  2 times daily         08/05/24 0301     IP VTE HIGH RISK PATIENT  Once         08/05/24 0301     Place sequential compression device  Until discontinued         08/05/24 0301     Reason for No Pharmacological VTE Prophylaxis  Once        Question:  Reasons:  Answer:  Already adequately anticoagulated on oral Anticoagulants    08/05/24 0301                       On 08/05/2024, patient should be placed in hospital observation services under my care.             Latasha Espana MD  Department of Hospital Medicine  [facility] - Emergency Dept.

## 2024-08-05 NOTE — ASSESSMENT & PLAN NOTE
-CT scan of abdomen and pelvis with mild cardiomegaly and questionable focus of gas of right ventricle (unclear etiology)  -previous echocardiogram 07/13/2024 with EF less than 15%, severe grade 3 diastolic dysfunction, global hypokinesis, moderate tricuspid valve regurgitation, and small pericardial effusion  -stat CT scan of chest  -check BNP and troponin  -questionable repeat echo, defer to Cardiology  -strict Is&Os and daily weights  -cardiac monitoring  -cardiology consult for a.m.

## 2024-08-05 NOTE — SUBJECTIVE & OBJECTIVE
Past Medical History:   Diagnosis Date    Acid reflux     COPD (chronic obstructive pulmonary disease)     Intubation 3/2023    Coronary artery disease     COVID-19     Diabetes mellitus     High cholesterol     Hypertension     Obese body habitus        Past Surgical History:   Procedure Laterality Date    APPENDECTOMY      CATHETERIZATION OF BOTH LEFT AND RIGHT HEART N/A 11/23/2020    Procedure: CATHETERIZATION, HEART, BOTH LEFT AND RIGHT;  Surgeon: Doug Kendall MD;  Location: [facility] CATH/EP LAB;  Service: Cardiology;  Laterality: N/A;    CHOLECYSTECTOMY      EXCISION OF LESION OF EYELID Right 12/11/2020    Procedure: EXCISION, LESION, EYELID;  Surgeon: Malachi Rodriguez MD;  Location: [facility] OR;  Service: Ophthalmology;  Laterality: Right;  Inclusion Cyst removal right lower lid    HYSTERECTOMY      JOINT REPLACEMENT Left     left hip    MASTOIDECTOMY Right        Review of patient's allergies indicates:   Allergen Reactions    Morphine Nausea And Vomiting    Sulfa (sulfonamide antibiotics) Nausea And Vomiting       Current Facility-Administered Medications on File Prior to Encounter   Medication    electrolyte-S (ISOLYTE)    lidocaine (PF) 10 mg/ml (1%) injection 10 mg    sodium chloride 0.9% flush 3 mL    [DISCONTINUED] GENERIC EXTERNAL MEDICATION    [DISCONTINUED] GENERIC EXTERNAL MEDICATION     Current Outpatient Medications on File Prior to Encounter   Medication Sig    albuterol (ACCUNEB) 1.25 mg/3 mL Nebu USE 1 VIAL IN NEBULIZER EVERY 6 HOURS AS NEEDED FOR WHEEZING( RESCUE)    aspirin 81 MG Chew Take 1 tablet (81 mg total) by mouth once daily.    atorvastatin (LIPITOR) 80 MG tablet Take 1 tablet (80 mg total) by mouth once daily.    blood sugar diagnostic (TRUE METRIX GLUCOSE TEST STRIP) Strp 1 strip by In Vitro route 2 (two) times a day.    blood-glucose meter kit To check BG 2 times daily, to use with insurance preferred meter    buPROPion (WELLBUTRIN XL) 150 MG TB24 tablet Take 1 tablet (150 mg  total) by mouth once daily.    ELIQUIS 5 mg Tab Take 5 mg by mouth 2 (two) times daily.    empagliflozin (JARDIANCE) 25 mg tablet Take 1 tablet (25 mg total) by mouth once daily.    fluticasone propion-salmeterol 115-21 mcg/dose (ADVAIR HFA) 115-21 mcg/actuation HFAA inhaler Inhale 2 puffs into the lungs every 12 (twelve) hours. Controller    fluticasone-umeclidin-vilanter (TRELEGY ELLIPTA) 200-62.5-25 mcg inhaler Inhale 1 puff into the lungs once daily.    glimepiride (AMARYL) 2 MG tablet Take 2 mg by mouth.    glipiZIDE (GLUCOTROL) 10 MG TR24 Take 1 tablet (10 mg total) by mouth daily with breakfast.    HYDROcodone-acetaminophen (NORCO) 5-325 mg per tablet Take 1 tablet by mouth every 6 (six) hours as needed for Pain.    hydrOXYzine HCL (ATARAX) 25 MG tablet Take 1 tablet (25 mg total) by mouth 3 (three) times daily as needed for Itching.    isosorbide mononitrate (IMDUR) 30 MG 24 hr tablet Take 1 tablet (30 mg total) by mouth once daily.    lancets Misc 1 Lancet by Subdermal route 2 (two) times a day. to use with insurance preferred meter    losartan (COZAAR) 25 MG tablet Take 1 tablet (25 mg total) by mouth once daily.    metoprolol succinate (TOPROL-XL) 50 MG 24 hr tablet TAKE 1 TABLET(50 MG) BY MOUTH DAILY    nebulizer and compressor Ebony Nebulizer machine for home use. DX COPD.    ondansetron (ZOFRAN-ODT) 4 MG TbDL Take 1 tablet (4 mg total) by mouth every 8 (eight) hours as needed.    pantoprazole (PROTONIX) 40 MG tablet Take 1 tablet (40 mg total) by mouth 2 (two) times daily.    pregabalin (LYRICA) 100 MG capsule Take 1 capsule (100 mg total) by mouth 3 (three) times daily.    semaglutide (RYBELSUS) 7 mg tablet Take 1 tablet (7 mg total) by mouth once daily.    torsemide (DEMADEX) 20 MG Tab Take 1 tablet (20 mg total) by mouth once daily. Take torsemide 20 mg once daily.  Can take additional dose in afternoon if increased shortness of breath or leg swelling for 2-3 days if needed    zolpidem (AMBIEN) 10 mg  Tab Take 1 tablet (10 mg total) by mouth nightly as needed (insomnia).     Family History    None       Tobacco Use    Smoking status: Every Day     Current packs/day: 0.00     Average packs/day: 0.5 packs/day for 13.0 years (6.5 ttl pk-yrs)     Types: Cigarettes     Start date: 2009     Last attempt to quit: 2022     Years since quittin.8     Passive exposure: Past    Smokeless tobacco: Never   Substance and Sexual Activity    Alcohol use: Not Currently    Drug use: Not Currently    Sexual activity: Not Currently     Review of Systems   Constitutional: Positive for malaise/fatigue.   HENT: Negative.     Eyes: Negative.    Cardiovascular: Negative.    Respiratory: Negative.     Endocrine: Negative.    Hematologic/Lymphatic: Negative.    Skin: Negative.    Musculoskeletal:  Positive for arthritis and joint pain.   Gastrointestinal:  Positive for abdominal pain, diarrhea, nausea and vomiting.   Genitourinary: Negative.    Neurological: Negative.    Psychiatric/Behavioral: Negative.     Allergic/Immunologic: Negative.      Objective:     Vital Signs (Most Recent):  Temp: 97.7 °F (36.5 °C) (24)  Pulse: 99 (24)  Resp: (!) 24 (24)  BP: (!) 144/67 (24)  SpO2: (!) 89 % (24) Vital Signs (24h Range):  Temp:  [97.7 °F (36.5 °C)-98.6 °F (37 °C)] 97.7 °F (36.5 °C)  Pulse:  [] 99  Resp:  [16-24] 24  SpO2:  [89 %-98 %] 89 %  BP: (142-168)/(67-99) 144/67     Weight: 108.9 kg (240 lb)  Body mass index is 37.67 kg/m².    SpO2: (!) 89 %         Intake/Output Summary (Last 24 hours) at 2024 1013  Last data filed at 2024 0526  Gross per 24 hour   Intake 1249 ml   Output --   Net 1249 ml       Lines/Drains/Airways       Peripheral Intravenous Line  Duration                  Peripheral IV - Single Lumen 24 0112 20 G Right Wrist <1 day                     Physical Exam  Vitals and nursing note reviewed.   Constitutional:       Appearance: She is  ill-appearing.      Comments: On supplemental o2   HENT:      Head: Normocephalic and atraumatic.   Eyes:      Pupils: Pupils are equal, round, and reactive to light.   Cardiovascular:      Rate and Rhythm: Normal rate and regular rhythm.      Heart sounds: S1 normal and S2 normal. No murmur heard.  Pulmonary:      Comments: Coarse BS  Abdominal:      Palpations: Abdomen is soft.      Tenderness: There is abdominal tenderness (generalized/epigastric).   Musculoskeletal:      Right lower leg: No edema.      Left lower leg: No edema.   Skin:     General: Skin is warm and dry.   Neurological:      General: No focal deficit present.      Mental Status: She is oriented to person, place, and time.   Psychiatric:         Mood and Affect: Mood normal.         Behavior: Behavior normal.          Significant Labs: CMP   Recent Labs   Lab 08/04/24  1819 08/05/24  0343   * 137   K 4.5 4.9    109   CO2 16* 14*   * 227*   BUN 21* 20   CREATININE 1.6* 1.4   CALCIUM 9.4 8.9   PROT 7.7 7.4   ALBUMIN 4.2 4.1   BILITOT 1.2* 1.0   ALKPHOS 150* 141*   AST 15 14   ALT 9* 9*   ANIONGAP 12 14   , CBC   Recent Labs   Lab 08/04/24  1819 08/05/24  0343   WBC 9.25 9.46   HGB 17.0* 16.1*   HCT 53.0* 51.5*    237   , Troponin   Recent Labs   Lab 08/05/24  0343   TROPONINI 0.012   , and All pertinent lab results from the last 24 hours have been reviewed.    Significant Imaging: Echocardiogram: Transthoracic echo (TTE) complete (Cupid Only):   Results for orders placed or performed during the hospital encounter of 02/27/23   Echo   Result Value Ref Range    BSA 2.32 m2    TDI SEPTAL 0.05 m/s    LV LATERAL E/E' RATIO 12.33 m/s    LV SEPTAL E/E' RATIO 14.80 m/s    IVC diameter 1.85 cm    Left Ventricular Outflow Tract Mean Velocity 0.55 cm/s    Left Ventricular Outflow Tract Mean Gradient 1.00 mmHg    TDI LATERAL 0.06 m/s    LVIDd 4.95 3.5 - 6.0 cm    IVS 1.08 0.6 - 1.1 cm    Posterior Wall 1.13 (A) 0.6 - 1.1 cm    LVIDs  3.58 (A) 2.1 - 4.0 cm    FS 28 28 - 44 %    LV mass 205.08 g    TAPSE 1.42 cm    RV S' 0.01 cm/s    Left Ventricle Relative Wall Thickness 0.46 cm    E/A ratio 0.86     Mean e' 0.06 m/s    E wave deceleration time 150.00 msec    LVOT diameter 2.00 cm    LVOT area 3.1 cm2    LVOT peak steven 0.83 m/s    LVOT peak VTI 15.20 cm    LVOT stroke volume 47.73 cm3    E/E' ratio 13.45 m/s    MV Peak E Steven 0.74 m/s    MV Peak A Steven 0.86 m/s    LV Systolic Volume 115.00 mL    LV Systolic Volume Index 51.8 mL/m2    LV Diastolic Volume 193.00 mL    LV Diastolic Volume Index 86.94 mL/m2    LV Mass Index 92 g/m2    Right Atrial Pressure (from IVC) 3 mmHg    EF 40 %    Narrative    · The left ventricle is mildly enlarged with concentric remodeling and   mildly decreased systolic function.  · The estimated ejection fraction is 40%.  · Grade I left ventricular diastolic dysfunction.  · There is moderate left ventricular global hypokinesis.  · Normal right ventricular size with normal right ventricular systolic   function.  · Mild left atrial enlargement.  · Normal central venous pressure (3 mmHg).  · There is abnormal septal wall motion consistent with left bundle branch   block.       and EKG: Reviewed

## 2024-08-05 NOTE — PHARMACY MED REC
"Admission Medication History     The home medication history was taken by Alejo Contreras.    You may go to "Admission" then "Reconcile Home Medications" tabs to review and/or act upon these items.     The home medication list has been updated by the Pharmacy department.   Please read ALL comments highlighted in yellow.   Please address this information as you see fit.    Feel free to contact us if you have any questions or require assistance.      Medications listed below were obtained from: Patient/family and Analytic software- [employer]  (Not in a hospital admission)      Alejo Contreras  [id number]    Current Outpatient Medications on File Prior to Encounter   Medication Sig Dispense Refill Last Dose    albuterol (ACCUNEB) 1.25 mg/3 mL Nebu USE 1 VIAL IN NEBULIZER EVERY 6 HOURS AS NEEDED FOR WHEEZING( RESCUE) 75 mL 9 Past Month    apixaban (ELIQUIS) 5 mg Tab Take 5 mg by mouth 2 (two) times daily.   Past Month    aspirin 81 MG Chew Take 1 tablet (81 mg total) by mouth once daily. 30 tablet 0 Past Month    atorvastatin (LIPITOR) 80 MG tablet Take 1 tablet (80 mg total) by mouth once daily. 90 tablet 3 Past Month    blood sugar diagnostic (TRUE METRIX GLUCOSE TEST STRIP) Strp 1 strip by In Vitro route 2 (two) times a day. 200 strip 3 Past Month    buPROPion (WELLBUTRIN XL) 150 MG TB24 tablet Take 1 tablet (150 mg total) by mouth once daily. 90 tablet 0 Past Month    empagliflozin (JARDIANCE) 25 mg tablet Take 1 tablet (25 mg total) by mouth once daily. 90 tablet 0 Past Month    glimepiride (AMARYL) 2 MG tablet Take 2 mg by mouth daily with breakfast.   Past Month    HYDROcodone-acetaminophen (NORCO) 5-325 mg per tablet Take 1 tablet by mouth every 6 (six) hours as needed for Pain. 12 tablet 0 8/4/2024    hydrOXYzine HCL (ATARAX) 25 MG tablet Take 1 tablet (25 mg total) by mouth 3 (three) times daily as needed for Itching. 180 tablet 0 Past Month    isosorbide mononitrate (IMDUR) 30 MG 24 hr tablet Take 1 tablet " (30 mg total) by mouth once daily. 90 tablet 3 Past Month    losartan (COZAAR) 25 MG tablet Take 1 tablet (25 mg total) by mouth once daily. 90 tablet 0 Past Month    metoprolol succinate (TOPROL-XL) 50 MG 24 hr tablet TAKE 1 TABLET(50 MG) BY MOUTH DAILY 90 tablet 0 Past Month    pantoprazole (PROTONIX) 40 MG tablet Take 1 tablet (40 mg total) by mouth 2 (two) times daily. 180 tablet 3 Past Month    pregabalin (LYRICA) 100 MG capsule Take 1 capsule (100 mg total) by mouth 3 (three) times daily. 270 capsule 1 Past Month    semaglutide (RYBELSUS) 7 mg tablet Take 1 tablet (7 mg total) by mouth once daily. 90 tablet 0 Past Month    torsemide (DEMADEX) 20 MG Tab Take 1 tablet (20 mg total) by mouth once daily. Take torsemide 20 mg once daily.  Can take additional dose in afternoon if increased shortness of breath or leg swelling for 2-3 days if needed 90 tablet 3 Past Month    zolpidem (AMBIEN) 10 mg Tab Take 1 tablet (10 mg total) by mouth nightly as needed (insomnia). 90 tablet 1 8/4/2024    blood-glucose meter kit To check BG 2 times daily, to use with insurance preferred meter 1 each 0     fluticasone propion-salmeterol 115-21 mcg/dose (ADVAIR HFA) 115-21 mcg/actuation HFAA inhaler Inhale 2 puffs into the lungs every 12 (twelve) hours. Controller 12 g 0     fluticasone-umeclidin-vilanter (TRELEGY ELLIPTA) 200-62.5-25 mcg inhaler Inhale 1 puff into the lungs once daily.       glipiZIDE (GLUCOTROL) 10 MG TR24 Take 1 tablet (10 mg total) by mouth daily with breakfast. 90 tablet 0     lancets Misc 1 Lancet by Subdermal route 2 (two) times a day. to use with insurance preferred meter 200 each 3     nebulizer and compressor Ebony Nebulizer machine for home use. DX COPD. 1 each 0     ondansetron (ZOFRAN-ODT) 4 MG TbDL Take 1 tablet (4 mg total) by mouth every 8 (eight) hours as needed. 30 tablet 0                        .

## 2024-08-05 NOTE — SUBJECTIVE & OBJECTIVE
Past Medical History:   Diagnosis Date    Acid reflux     COPD (chronic obstructive pulmonary disease)     Intubation 3/2023    Coronary artery disease     COVID-19     Diabetes mellitus     High cholesterol     Hypertension     Obese body habitus        Past Surgical History:   Procedure Laterality Date    APPENDECTOMY      CATHETERIZATION OF BOTH LEFT AND RIGHT HEART N/A 11/23/2020    Procedure: CATHETERIZATION, HEART, BOTH LEFT AND RIGHT;  Surgeon: Doug Kendall MD;  Location: [facility] CATH/EP LAB;  Service: Cardiology;  Laterality: N/A;    CHOLECYSTECTOMY      EXCISION OF LESION OF EYELID Right 12/11/2020    Procedure: EXCISION, LESION, EYELID;  Surgeon: Malachi Rodriguez MD;  Location: [facility] OR;  Service: Ophthalmology;  Laterality: Right;  Inclusion Cyst removal right lower lid    HYSTERECTOMY      JOINT REPLACEMENT Left     left hip    MASTOIDECTOMY Right        Review of patient's allergies indicates:   Allergen Reactions    Morphine Nausea And Vomiting    Sulfa (sulfonamide antibiotics) Nausea And Vomiting       Current Facility-Administered Medications on File Prior to Encounter   Medication    electrolyte-S (ISOLYTE)    lidocaine (PF) 10 mg/ml (1%) injection 10 mg    sodium chloride 0.9% flush 3 mL    [DISCONTINUED] GENERIC EXTERNAL MEDICATION    [DISCONTINUED] GENERIC EXTERNAL MEDICATION     Current Outpatient Medications on File Prior to Encounter   Medication Sig    albuterol (ACCUNEB) 1.25 mg/3 mL Nebu USE 1 VIAL IN NEBULIZER EVERY 6 HOURS AS NEEDED FOR WHEEZING( RESCUE)    aspirin 81 MG Chew Take 1 tablet (81 mg total) by mouth once daily.    atorvastatin (LIPITOR) 80 MG tablet Take 1 tablet (80 mg total) by mouth once daily.    blood sugar diagnostic (TRUE METRIX GLUCOSE TEST STRIP) Strp 1 strip by In Vitro route 2 (two) times a day.    blood-glucose meter kit To check BG 2 times daily, to use with insurance preferred meter    buPROPion (WELLBUTRIN XL) 150 MG TB24 tablet Take 1 tablet (150 mg  total) by mouth once daily.    ELIQUIS 5 mg Tab Take 5 mg by mouth 2 (two) times daily.    empagliflozin (JARDIANCE) 25 mg tablet Take 1 tablet (25 mg total) by mouth once daily.    fluticasone propion-salmeterol 115-21 mcg/dose (ADVAIR HFA) 115-21 mcg/actuation HFAA inhaler Inhale 2 puffs into the lungs every 12 (twelve) hours. Controller    fluticasone-umeclidin-vilanter (TRELEGY ELLIPTA) 200-62.5-25 mcg inhaler Inhale 1 puff into the lungs once daily.    glimepiride (AMARYL) 2 MG tablet Take 2 mg by mouth.    glipiZIDE (GLUCOTROL) 10 MG TR24 Take 1 tablet (10 mg total) by mouth daily with breakfast.    HYDROcodone-acetaminophen (NORCO) 5-325 mg per tablet Take 1 tablet by mouth every 6 (six) hours as needed for Pain.    hydrOXYzine HCL (ATARAX) 25 MG tablet Take 1 tablet (25 mg total) by mouth 3 (three) times daily as needed for Itching.    isosorbide mononitrate (IMDUR) 30 MG 24 hr tablet Take 1 tablet (30 mg total) by mouth once daily.    lancets Misc 1 Lancet by Subdermal route 2 (two) times a day. to use with insurance preferred meter    losartan (COZAAR) 25 MG tablet Take 1 tablet (25 mg total) by mouth once daily.    metoprolol succinate (TOPROL-XL) 50 MG 24 hr tablet TAKE 1 TABLET(50 MG) BY MOUTH DAILY    nebulizer and compressor Ebony Nebulizer machine for home use. DX COPD.    ondansetron (ZOFRAN-ODT) 4 MG TbDL Take 1 tablet (4 mg total) by mouth every 8 (eight) hours as needed.    pantoprazole (PROTONIX) 40 MG tablet Take 1 tablet (40 mg total) by mouth 2 (two) times daily.    pregabalin (LYRICA) 100 MG capsule Take 1 capsule (100 mg total) by mouth 3 (three) times daily.    semaglutide (RYBELSUS) 7 mg tablet Take 1 tablet (7 mg total) by mouth once daily.    torsemide (DEMADEX) 20 MG Tab Take 1 tablet (20 mg total) by mouth once daily. Take torsemide 20 mg once daily.  Can take additional dose in afternoon if increased shortness of breath or leg swelling for 2-3 days if needed    zolpidem (AMBIEN) 10 mg  Tab Take 1 tablet (10 mg total) by mouth nightly as needed (insomnia).     Family History    None       Tobacco Use    Smoking status: Every Day     Current packs/day: 0.00     Average packs/day: 0.5 packs/day for 13.0 years (6.5 ttl pk-yrs)     Types: Cigarettes     Start date: 2009     Last attempt to quit: 2022     Years since quittin.8     Passive exposure: Past    Smokeless tobacco: Never   Substance and Sexual Activity    Alcohol use: Not Currently    Drug use: Not Currently    Sexual activity: Not Currently     Review of Systems   Constitutional:  Negative for chills and fever.   Respiratory:  Negative for cough and shortness of breath.    Cardiovascular:  Negative for chest pain and leg swelling.   Gastrointestinal:  Positive for abdominal pain, diarrhea, nausea and vomiting. Negative for blood in stool.   Genitourinary:  Negative for dysuria and hematuria.   All other systems reviewed and are negative.    Objective:     Vital Signs (Most Recent):  Temp: 98.6 °F (37 °C) (24 1724)  Pulse: 90 (24 0235)  Resp: 18 (24 0235)  BP: (!) 159/85 (24 0235)  SpO2: 96 % (24 0235) Vital Signs (24h Range):  Temp:  [98.6 °F (37 °C)] 98.6 °F (37 °C)  Pulse:  [] 90  Resp:  [18-21] 18  SpO2:  [94 %-97 %] 96 %  BP: (142-159)/(77-99) 159/85     Weight: 108.9 kg (240 lb)  Body mass index is 37.67 kg/m².     Physical Exam  Vitals reviewed.   Constitutional:       General: She is not in acute distress.     Appearance: She is ill-appearing. She is not diaphoretic.   HENT:      Nose: Nose normal.   Eyes:      Conjunctiva/sclera: Conjunctivae normal.   Cardiovascular:      Rate and Rhythm: Normal rate.   Pulmonary:      Effort: Pulmonary effort is normal. No respiratory distress.      Breath sounds: No wheezing.   Abdominal:      General: There is no distension.      Tenderness: There is no abdominal tenderness. There is no guarding or rebound.   Musculoskeletal:         General: No  swelling.   Skin:     General: Skin is warm and dry.   Neurological:      Mental Status: She is alert and oriented to person, place, and time.   Psychiatric:         Mood and Affect: Mood normal.         Behavior: Behavior normal.                Significant Labs: All pertinent labs within the past 24 hours have been reviewed.  Recent Lab Results         08/05/24  0027   08/04/24  2320   08/04/24  1819        Benzodiazepines Negative           Methadone metabolites Negative           Phencyclidine Negative           Albumin     4.2       ALP     150       ALT     9       Amphetamines, Urine Negative           Anion Gap     12       Appearance, UA   Clear         AST     15       Bacteria, UA   None         Barbituates, Urine Negative           Baso #     0.03       Basophil %     0.3       Bilirubin (UA)   Negative         BILIRUBIN TOTAL     1.2  Comment: For infants and newborns, interpretation of results should be based  on gestational age, weight and in agreement with clinical  observations.    Premature Infant recommended reference ranges:  Up to 24 hours.............<8.0 mg/dL  Up to 48 hours............<12.0 mg/dL  3-5 days..................<15.0 mg/dL  6-29 days.................<15.0 mg/dL         BUN     21       Calcium     9.4       Chloride     107       CO2     16       Cocaine, Urine Negative           Color, UA   Yellow         Creatinine     1.6       Urine Creatinine 110.2           Differential Method     Automated       eGFR     38       Eos #     0.0       Eos %     0.3       Glucose     208       Glucose, UA   4+         Gran # (ANC)     6.5       Gran %     70.7       Hematocrit     53.0       Hemoglobin     17.0       Hyaline Casts, UA   0         Immature Grans (Abs)     0.02  Comment: Mild elevation in immature granulocytes is non specific and   can be seen in a variety of conditions including stress response,   acute inflammation, trauma and pregnancy. Correlation with other   laboratory and  clinical findings is essential.         Immature Granulocytes     0.2       Ketones, UA   Negative         Leukocyte Esterase, UA   Negative         Lipase     14       Lymph #     1.7       Lymph %     18.8       MCH     27.4       MCHC     32.1       MCV     86       Microscopic Comment   SEE COMMENT  Comment: Other formed elements not mentioned in the report are not   present in the microscopic examination.            Mono #     0.9       Mono %     9.7       MPV     9.7       NITRITE UA   Negative         nRBC     0       Blood, UA   Negative         Opiates, Urine Negative           pH, UA   6.0         Platelet Count     310       Potassium     4.5       PROTEIN TOTAL     7.7       Protein, UA   1+  Comment: Recommend a 24 hour urine protein or a urine   protein/creatinine ratio if globulin induced proteinuria is  clinically suspected.           RBC     6.20       RBC, UA   0         RDW     17.0       Sodium     135       Spec Grav UA   1.025         Specimen UA   Urine, Clean Catch         Marijuana (THC) Metabolite Negative           Toxicology Information SEE COMMENT  Comment: This screen includes the following classes of drugs at the listed   cut-off:    Benzodiazepines 200 ng/ml  Methadone 300 ng/ml  Cocaine metabolite 300 ng/ml  Opiates 300 ng/ml  Barbiturates 200 ng/ml  Amphetamines 1000 ng/ml  Marijuana metabs (THC) 50 ng/ml  Phencyclidine (PCP) 25 ng/ml    This is a screening test. If results do not correlate with clinical   presentation, then a confirmatory send out test is advised.     This report is intended for use in clinical monitoring and management   of   patients. It is not intended for use in employment related drug   testing.             UROBILINOGEN UA   Negative         WBC, UA   0         WBC     9.25       Yeast, UA   None                 Significant Imaging: I have reviewed all pertinent imaging results/findings within the past 24 hours.  CT Abdomen Pelvis  Without Contrast   Final  Result      As above      All CT scans   are performed using dose optimization techniques including the following: automated exposure control; adjustment of the mA and/or kV; use of iterative reconstruction technique.  Dose modulation was employed for ALARA by means of: Automated exposure control; adjustment of the mA and/or kV according to patient size (this includes techniques or standardized protocols for targeted exams where dose is matched to indication/reason for exam; i.e. extremities or head); and/or use of iterative reconstructive technique.         Electronically signed by: Yaw Rouse   Date:    08/04/2024   Time:    22:35      CT Chest Without Contrast    (Results Pending)

## 2024-08-05 NOTE — NURSING
POC reviewed with pt. Pt verbalizes understanding of POC. No questions at this time.  AAOx3. NADN.  NSR on cardiac monitor.  IVPB abx doxycycline administered during the shift.   PRN Zofran x1 & hydrocodone 1x. Patient verbalized both relief in pain and nausea.  Pt remains free of falls.  No complaints at this time.  Safety measures in place. Will continue to monitor.  Informed pt to call for assistance before getting up. Pt verbalizes understanding.  Hourly rounding and chart check complete.

## 2024-08-05 NOTE — ASSESSMENT & PLAN NOTE
Patient's COPD is controlled currently.  Patient is currently off COPD Pathway. Continue scheduled inhalers (Trelegy Ellipta to be replaced with a equivalent on formulary by pharmacist) and p.r.n. DuoNebs.  Continuous pulse oximetry monitoring, O2 supplementation, incentive spirometry.  monitor respiratory status closely.

## 2024-08-05 NOTE — ASSESSMENT & PLAN NOTE
-Known severe cardiomyopathy with EF of 15%  -Recent hospitalization in July due to decompensation  -Stable volume wise on exam, BNP elevated  -IV diuresis as tolerated, not initiated this AM given ongoing abd pain, nausea/vomiting  -LA WNL  -Troponin negative  -Continue BB as tolerated

## 2024-08-05 NOTE — ASSESSMENT & PLAN NOTE
Elevated liver enzymes likely related to gastroenteritis symptoms versus obesity with possible underlying fatty liver  -alk phos 150, total bilirubin 1.2 and remaining LFTs unremarkable, lipase 14 (alk phos and total bilirubin only minimally elevated)  -CT scan of abdomen and pelvis reviewed  -check GGT  -check a.m. labs

## 2024-08-06 LAB
ALBUMIN SERPL BCP-MCNC: 3.7 G/DL (ref 3.5–5.2)
ALP SERPL-CCNC: 123 U/L (ref 55–135)
ALT SERPL W/O P-5'-P-CCNC: 7 U/L (ref 10–44)
ANION GAP SERPL CALC-SCNC: 14 MMOL/L (ref 8–16)
AST SERPL-CCNC: 13 U/L (ref 10–40)
BASOPHILS # BLD AUTO: 0.03 K/UL (ref 0–0.2)
BASOPHILS NFR BLD: 0.3 % (ref 0–1.9)
BILIRUB SERPL-MCNC: 0.7 MG/DL (ref 0.1–1)
BUN SERPL-MCNC: 25 MG/DL (ref 6–20)
CALCIUM SERPL-MCNC: 8.8 MG/DL (ref 8.7–10.5)
CHLORIDE SERPL-SCNC: 99 MMOL/L (ref 95–110)
CO2 SERPL-SCNC: 21 MMOL/L (ref 23–29)
CREAT SERPL-MCNC: 1.8 MG/DL (ref 0.5–1.4)
DIFFERENTIAL METHOD BLD: ABNORMAL
EOSINOPHIL # BLD AUTO: 0.1 K/UL (ref 0–0.5)
EOSINOPHIL NFR BLD: 0.5 % (ref 0–8)
ERYTHROCYTE [DISTWIDTH] IN BLOOD BY AUTOMATED COUNT: 16.1 % (ref 11.5–14.5)
EST. GFR  (NO RACE VARIABLE): 33 ML/MIN/1.73 M^2
GLUCOSE SERPL-MCNC: 114 MG/DL (ref 70–110)
GLUCOSE SERPL-MCNC: 140 MG/DL (ref 70–110)
HCT VFR BLD AUTO: 49.2 % (ref 37–48.5)
HGB BLD-MCNC: 15 G/DL (ref 12–16)
IMM GRANULOCYTES # BLD AUTO: 0.02 K/UL (ref 0–0.04)
IMM GRANULOCYTES NFR BLD AUTO: 0.2 % (ref 0–0.5)
LACTATE SERPL-SCNC: 1 MMOL/L (ref 0.5–2.2)
LYMPHOCYTES # BLD AUTO: 2.4 K/UL (ref 1–4.8)
LYMPHOCYTES NFR BLD: 25.4 % (ref 18–48)
MCH RBC QN AUTO: 26.8 PG (ref 27–31)
MCHC RBC AUTO-ENTMCNC: 30.5 G/DL (ref 32–36)
MCV RBC AUTO: 88 FL (ref 82–98)
MONOCYTES # BLD AUTO: 1 K/UL (ref 0.3–1)
MONOCYTES NFR BLD: 10.6 % (ref 4–15)
NEUTROPHILS # BLD AUTO: 6 K/UL (ref 1.8–7.7)
NEUTROPHILS NFR BLD: 63 % (ref 38–73)
NRBC BLD-RTO: 0 /100 WBC
OHS QRS DURATION: 102 MS
OHS QTC CALCULATION: 492 MS
PLATELET # BLD AUTO: 274 K/UL (ref 150–450)
PMV BLD AUTO: 9.6 FL (ref 9.2–12.9)
POCT GLUCOSE: 132 MG/DL (ref 70–110)
POCT GLUCOSE: 135 MG/DL (ref 70–110)
POCT GLUCOSE: 143 MG/DL (ref 70–110)
POCT GLUCOSE: 147 MG/DL (ref 70–110)
POTASSIUM SERPL-SCNC: 4 MMOL/L (ref 3.5–5.1)
PROT SERPL-MCNC: 6.3 G/DL (ref 6–8.4)
RBC # BLD AUTO: 5.59 M/UL (ref 4–5.4)
SODIUM SERPL-SCNC: 134 MMOL/L (ref 136–145)
WBC # BLD AUTO: 9.45 K/UL (ref 3.9–12.7)

## 2024-08-06 PROCEDURE — 94799 UNLISTED PULMONARY SVC/PX: CPT

## 2024-08-06 PROCEDURE — 85025 COMPLETE CBC W/AUTO DIFF WBC: CPT | Performed by: PHYSICIAN ASSISTANT

## 2024-08-06 PROCEDURE — 21400001 HC TELEMETRY ROOM

## 2024-08-06 PROCEDURE — 80053 COMPREHEN METABOLIC PANEL: CPT | Performed by: PHYSICIAN ASSISTANT

## 2024-08-06 PROCEDURE — 99232 SBSQ HOSP IP/OBS MODERATE 35: CPT | Mod: ,,, | Performed by: PHYSICIAN ASSISTANT

## 2024-08-06 PROCEDURE — 94640 AIRWAY INHALATION TREATMENT: CPT | Mod: XB

## 2024-08-06 PROCEDURE — 25000242 PHARM REV CODE 250 ALT 637 W/ HCPCS: Performed by: INTERNAL MEDICINE

## 2024-08-06 PROCEDURE — 94761 N-INVAS EAR/PLS OXIMETRY MLT: CPT

## 2024-08-06 PROCEDURE — 99900035 HC TECH TIME PER 15 MIN (STAT)

## 2024-08-06 PROCEDURE — 25000003 PHARM REV CODE 250: Performed by: NURSE PRACTITIONER

## 2024-08-06 PROCEDURE — 63600175 PHARM REV CODE 636 W HCPCS: Performed by: INTERNAL MEDICINE

## 2024-08-06 PROCEDURE — 27000221 HC OXYGEN, UP TO 24 HOURS

## 2024-08-06 PROCEDURE — 25000003 PHARM REV CODE 250: Performed by: INTERNAL MEDICINE

## 2024-08-06 PROCEDURE — 83605 ASSAY OF LACTIC ACID: CPT | Performed by: PHYSICIAN ASSISTANT

## 2024-08-06 RX ORDER — TALC
6 POWDER (GRAM) TOPICAL ONCE
Status: COMPLETED | OUTPATIENT
Start: 2024-08-06 | End: 2024-08-06

## 2024-08-06 RX ADMIN — Medication 6 MG: at 09:08

## 2024-08-06 RX ADMIN — SODIUM CHLORIDE: 9 INJECTION, SOLUTION INTRAVENOUS at 02:08

## 2024-08-06 RX ADMIN — PREGABALIN 100 MG: 75 CAPSULE ORAL at 09:08

## 2024-08-06 RX ADMIN — INSULIN GLARGINE 8 UNITS: 100 INJECTION, SOLUTION SUBCUTANEOUS at 08:08

## 2024-08-06 RX ADMIN — DOXYCYCLINE 100 MG: 100 INJECTION, POWDER, LYOPHILIZED, FOR SOLUTION INTRAVENOUS at 03:08

## 2024-08-06 RX ADMIN — APIXABAN 5 MG: 2.5 TABLET, FILM COATED ORAL at 09:08

## 2024-08-06 RX ADMIN — ASPIRIN 81 MG CHEWABLE TABLET 81 MG: 81 TABLET CHEWABLE at 08:08

## 2024-08-06 RX ADMIN — BUDESONIDE INHALATION 0.5 MG: 0.5 SUSPENSION RESPIRATORY (INHALATION) at 07:08

## 2024-08-06 RX ADMIN — PREGABALIN 100 MG: 75 CAPSULE ORAL at 08:08

## 2024-08-06 RX ADMIN — APIXABAN 5 MG: 2.5 TABLET, FILM COATED ORAL at 08:08

## 2024-08-06 RX ADMIN — ARFORMOTEROL TARTRATE 15 MCG: 15 SOLUTION RESPIRATORY (INHALATION) at 07:08

## 2024-08-06 RX ADMIN — PREGABALIN 100 MG: 75 CAPSULE ORAL at 02:08

## 2024-08-06 RX ADMIN — IPRATROPIUM BROMIDE 0.5 MG: 0.5 SOLUTION RESPIRATORY (INHALATION) at 07:08

## 2024-08-06 RX ADMIN — BUPROPION HYDROCHLORIDE 150 MG: 150 TABLET, EXTENDED RELEASE ORAL at 08:08

## 2024-08-06 RX ADMIN — ATORVASTATIN CALCIUM 80 MG: 40 TABLET, FILM COATED ORAL at 08:08

## 2024-08-06 RX ADMIN — IPRATROPIUM BROMIDE 0.5 MG: 0.5 SOLUTION RESPIRATORY (INHALATION) at 02:08

## 2024-08-06 RX ADMIN — CEFTRIAXONE SODIUM 2 G: 2 INJECTION, POWDER, FOR SOLUTION INTRAMUSCULAR; INTRAVENOUS at 03:08

## 2024-08-06 RX ADMIN — PANTOPRAZOLE SODIUM 40 MG: 40 TABLET, DELAYED RELEASE ORAL at 08:08

## 2024-08-06 RX ADMIN — PANTOPRAZOLE SODIUM 40 MG: 40 TABLET, DELAYED RELEASE ORAL at 09:08

## 2024-08-06 RX ADMIN — DOXYCYCLINE 100 MG: 100 INJECTION, POWDER, LYOPHILIZED, FOR SOLUTION INTRAVENOUS at 02:08

## 2024-08-06 NOTE — PLAN OF CARE
Importance of following the clear liquid diet reviewed with the patient x2. Patient ambulated to the bedside commode. Reviewed care plan and goals with the patient. 3L O2 nasal cannula necessity discussed with the patient as well. VU. Pending stool sample.

## 2024-08-06 NOTE — CONSULTS
Diabetes Education Consult    Screened pt for diabetes education. Consult placed for elevated A1C. The medical records were reviewed.    Current admission diagnosis: Gastroenteritis        Labs:    Hemoglobin A1C   Date Value Ref Range Status   08/05/2024 8.6 (H) 4.0 - 5.6 % Final     Comment:     ADA Screening Guidelines:  5.7-6.4%  Consistent with prediabetes  >or=6.5%  Consistent with diabetes    High levels of fetal hemoglobin interfere with the HbA1C  assay. Heterozygous hemoglobin variants (HbS, HgC, etc)do  not significantly interfere with this assay.   However, presence of multiple variants may affect accuracy.           Glucose   Date Value Ref Range Status   08/06/2024 140 (H) 70 - 110 mg/dL Final     POC Glucose   Date Value Ref Range Status   08/06/2024 114 (A) 70 - 110 MG/DL Final     Comment:     Unable to scan         Met with pt for diabetes management practices. Reviewed current A1C and target. Reviewed disease process and medical complications associated with uncontrolled diabetes. Encouraged lifestyle management to include weight loss, increased activity, and appropriate diet as a part of diabetes management.    Current home medications include:   glimepiride (AMARYL) 2 MG tablet Take 2 mg by mouth.     glipiZIDE (GLUCOTROL) 10 MG TR24 Take 1 tablet (10 mg total) by mouth daily with breakfast.     empagliflozin (JARDIANCE) 25 mg tablet Take 1 tablet (25 mg total) by mouth once daily.     semaglutide (RYBELSUS) 7 mg tablet Take 1 tablet (7 mg total) by mouth once daily.   Pt reports taking medications as prescribed.    Pt reports not having a glucometer. She had one, but no longer has it and insurance won't cover a new glucometer per pt. Left glucometer and supplies at BS. Provided log with recommended testing times and expected results. Discussed S/S of hypoglycemia. Reviewed appropriate treatment options. Reviewed S/S of hyperglycemia. Discussed when to test for ketones: BS>250, as well as S/S of  DKA. Reviewed ketone results and when to seek medical help.    While discussing ADA diet, pt reports drinking a 12 pack of cokes per day. Encouraged pt to drink diet cokes instead, or decrease amount of cokes per day. Discussed carbohydrate containing foods, 30-60 grams carbohydrate choice meal plan using pictures; serving sizes, Plate Method, label reading, and Calorie Johnnie Ta. Recommended lean protein and healthy fat with meals and snacks. Encouraged to avoid obvious sources of sugar.    Reviewed the diabetes care checklist and the importance of regular follow up with physician. Left diabetes education packet for reference.     Recommendations:   Outpatient diabetes education referral. Pt declined at this time.       Misti Pearl RN  Diabetes Education

## 2024-08-06 NOTE — SUBJECTIVE & OBJECTIVE
Review of Systems   Constitutional: Positive for malaise/fatigue.   Eyes:  Negative for blurred vision and discharge.   Cardiovascular: Negative.    Respiratory:  Negative for cough, hemoptysis, shortness of breath, snoring, sputum production and wheezing.    Endocrine: Negative.    Hematologic/Lymphatic: Negative.    Skin: Negative.  Negative for rash.   Musculoskeletal:  Positive for arthritis and joint pain.   Gastrointestinal:  Negative for heartburn, melena and nausea.   Genitourinary: Negative.    Neurological: Negative.    Psychiatric/Behavioral: Negative.     Allergic/Immunologic: Negative.      Objective:     Vital Signs (Most Recent):  Temp: 97.5 °F (36.4 °C) (08/06/24 0803)  Pulse: (!) 53 (08/06/24 0853)  Resp: 18 (08/06/24 0803)  BP: (!) 101/58 (08/06/24 0803)  SpO2: 98 % (08/06/24 0803) Vital Signs (24h Range):  Temp:  [97.4 °F (36.3 °C)-98.4 °F (36.9 °C)] 97.5 °F (36.4 °C)  Pulse:  [] 53  Resp:  [14-24] 18  SpO2:  [91 %-100 %] 98 %  BP: ()/(52-81) 101/58     Weight: 109 kg (240 lb 4.8 oz)  Body mass index is 39.99 kg/m².     SpO2: 98 %         Intake/Output Summary (Last 24 hours) at 8/6/2024 0927  Last data filed at 8/6/2024 0424  Gross per 24 hour   Intake 876.41 ml   Output 1800 ml   Net -923.59 ml       Lines/Drains/Airways       Peripheral Intravenous Line  Duration                  Peripheral IV - Single Lumen 08/05/24 0112 20 G Right Wrist 1 day                       Physical Exam  Vitals and nursing note reviewed.   Constitutional:       General: She is not in acute distress.     Appearance: She is well-developed. She is not diaphoretic.      Comments: On supplemental O2   HENT:      Head: Normocephalic and atraumatic.   Eyes:      General:         Right eye: No discharge.         Left eye: No discharge.      Pupils: Pupils are equal, round, and reactive to light.   Cardiovascular:      Rate and Rhythm: Normal rate and regular rhythm.      Heart sounds: Normal heart sounds, S1  normal and S2 normal. No murmur heard.  Pulmonary:      Effort: Pulmonary effort is normal. No respiratory distress.      Comments: Coarse BS, no overt rales  Abdominal:      General: There is no distension.   Musculoskeletal:      Right lower leg: No edema.      Left lower leg: No edema.   Skin:     General: Skin is warm and dry.      Findings: No erythema.   Neurological:      Mental Status: She is alert and oriented to person, place, and time.   Psychiatric:         Mood and Affect: Mood normal.         Behavior: Behavior normal.            Significant Labs: CMP   Recent Labs   Lab 08/04/24  1819 08/05/24  0343 08/06/24  0608   * 137 134*   K 4.5 4.9 4.0    109 99   CO2 16* 14* 21*   * 227* 140*   BUN 21* 20 25*   CREATININE 1.6* 1.4 1.8*   CALCIUM 9.4 8.9 8.8   PROT 7.7 7.4 6.3   ALBUMIN 4.2 4.1 3.7   BILITOT 1.2* 1.0 0.7   ALKPHOS 150* 141* 123   AST 15 14 13   ALT 9* 9* 7*   ANIONGAP 12 14 14   , CBC   Recent Labs   Lab 08/04/24  1819 08/05/24  0343 08/06/24  0608   WBC 9.25 9.46 9.45   HGB 17.0* 16.1* 15.0   HCT 53.0* 51.5* 49.2*    237 274   , Troponin   Recent Labs   Lab 08/05/24  0343 08/05/24  1042   TROPONINI 0.012 0.012   , and All pertinent lab results from the last 24 hours have been reviewed.    Significant Imaging: Echocardiogram: Transthoracic echo (TTE) complete ([facility] Only):   Results for orders placed or performed during the hospital encounter of 02/27/23   Echo   Result Value Ref Range    BSA 2.32 m2    TDI SEPTAL 0.05 m/s    LV LATERAL E/E' RATIO 12.33 m/s    LV SEPTAL E/E' RATIO 14.80 m/s    IVC diameter 1.85 cm    Left Ventricular Outflow Tract Mean Velocity 0.55 cm/s    Left Ventricular Outflow Tract Mean Gradient 1.00 mmHg    TDI LATERAL 0.06 m/s    LVIDd 4.95 3.5 - 6.0 cm    IVS 1.08 0.6 - 1.1 cm    Posterior Wall 1.13 (A) 0.6 - 1.1 cm    LVIDs 3.58 (A) 2.1 - 4.0 cm    FS 28 28 - 44 %    LV mass 205.08 g    TAPSE 1.42 cm    RV S' 0.01 cm/s    Left Ventricle  Relative Wall Thickness 0.46 cm    E/A ratio 0.86     Mean e' 0.06 m/s    E wave deceleration time 150.00 msec    LVOT diameter 2.00 cm    LVOT area 3.1 cm2    LVOT peak steven 0.83 m/s    LVOT peak VTI 15.20 cm    LVOT stroke volume 47.73 cm3    E/E' ratio 13.45 m/s    MV Peak E Steven 0.74 m/s    MV Peak A Steven 0.86 m/s    LV Systolic Volume 115.00 mL    LV Systolic Volume Index 51.8 mL/m2    LV Diastolic Volume 193.00 mL    LV Diastolic Volume Index 86.94 mL/m2    LV Mass Index 92 g/m2    Right Atrial Pressure (from IVC) 3 mmHg    EF 40 %    Narrative    · The left ventricle is mildly enlarged with concentric remodeling and   mildly decreased systolic function.  · The estimated ejection fraction is 40%.  · Grade I left ventricular diastolic dysfunction.  · There is moderate left ventricular global hypokinesis.  · Normal right ventricular size with normal right ventricular systolic   function.  · Mild left atrial enlargement.  · Normal central venous pressure (3 mmHg).  · There is abnormal septal wall motion consistent with left bundle branch   block.       and EKG: Reviewed

## 2024-08-06 NOTE — PROGRESS NOTES
[facility] Medicine  Progress Note    Patient Name: Delores Fox  MRN: [id number]  Patient Class: IP- Inpatient   Admission Date: 8/4/2024  Length of Stay: 0 days  Attending Physician: Keshawn West MD  Primary Care Provider: Tomy Rios MD        Subjective:     Principal Problem:Gastroenteritis        HPI:  58-year-old white woman with history of and try to/duodenitis with reported ulcer, non-insulin-dependent diabetes mellitus type 2, chronic combined systolic and diastolic CHF with cardiomyopathy (echocardiogram 07/13/2024 with EF less than 15% and severe grade 3 diastolic dysfunction), tobacco abuse, COPD, depression with anxiety, hypertension, hyperlipidemia, coronary artery disease, community-acquired pneumonia, obesity, and chronic kidney disease stage 2 who presented to the emergency department with complaint of abdominal pain since yesterday morning.  Abdominal pain is generalized in nature, 10/10 on the pain scale, associated with intractable nausea and vomiting, and associated with diarrhea.  Patient denies any associated fevers, chills, myalgias, chest pain, shortness for breath, or cough.  Patient denies any difficulty with urinating and denies any dysuria or hematuria.    Patient was recently hospitalized 7/13-07/14/2024 for acute on chronic combined systolic and diastolic CHF exacerbation for which she received IV Lasix diuresis.    Overview/Hospital Course:  No notes on file    Interval History: f/u CHF BP marginal patient feeling better.     Review of Systems  Objective:     Vital Signs (Most Recent):  Temp: 98.4 °F (36.9 °C) (08/06/24 1522)  Pulse: 76 (08/06/24 1522)  Resp: 17 (08/06/24 1522)  BP: (!) 97/53 (08/06/24 1522)  SpO2: (!) 92 % (08/06/24 1522) Vital Signs (24h Range):  Temp:  [97.4 °F (36.3 °C)-98.4 °F (36.9 °C)] 98.4 °F (36.9 °C)  Pulse:  [53-78] 76  Resp:  [14-18] 17  SpO2:  [92 %-100 %] 92 %  BP: ()/(52-58) 97/53     Weight: 109 kg  (240 lb 4.8 oz)  Body mass index is 39.99 kg/m².    Intake/Output Summary (Last 24 hours) at 8/6/2024 1810  Last data filed at 8/6/2024 0424  Gross per 24 hour   Intake 776.41 ml   Output 1000 ml   Net -223.59 ml         Physical Exam  HENT:      Head: Normocephalic and atraumatic.   Cardiovascular:      Rate and Rhythm: Normal rate and regular rhythm.      Heart sounds: No murmur heard.  Pulmonary:      Effort: Pulmonary effort is normal. No respiratory distress.      Breath sounds: Decreased breath sounds present. No wheezing.   Abdominal:      General: Bowel sounds are normal. There is no distension.      Palpations: Abdomen is soft.      Tenderness: There is no abdominal tenderness.   Musculoskeletal:         General: No swelling.   Skin:     General: Skin is warm and dry.   Neurological:      Mental Status: She is alert and oriented to person, place, and time. Mental status is at baseline.             Significant Labs: All pertinent labs within the past 24 hours have been reviewed.  Recent Lab Results  (Last 5 results in the past 24 hours)        08/06/24  1724   08/06/24  1211   08/06/24  0624   08/06/24  0608   08/06/24  0001        Albumin       3.7         ALP       123         ALT       7         Anion Gap       14         AST       13         Baso #       0.03         Basophil %       0.3         BILIRUBIN TOTAL       0.7  Comment: For infants and newborns, interpretation of results should be based  on gestational age, weight and in agreement with clinical  observations.    Premature Infant recommended reference ranges:  Up to 24 hours.............<8.0 mg/dL  Up to 48 hours............<12.0 mg/dL  3-5 days..................<15.0 mg/dL  6-29 days.................<15.0 mg/dL           BUN       25         Calcium       8.8         Chloride       99         CO2       21         Creatinine       1.8         Differential Method       Automated         eGFR       33         Eos #       0.1         Eos %        0.5         Glucose       140         Gran # (ANC)       6.0         Gran %       63.0         Hematocrit       49.2         Hemoglobin       15.0         Immature Grans (Abs)       0.02  Comment: Mild elevation in immature granulocytes is non specific and   can be seen in a variety of conditions including stress response,   acute inflammation, trauma and pregnancy. Correlation with other   laboratory and clinical findings is essential.           Immature Granulocytes       0.2         Lactic Acid Level       1.0  Comment: Falsely low lactic acid results can be found in samples   containing >=13.0 mg/dL total bilirubin and/or >=3.5 mg/dL   direct bilirubin.           Lymph #       2.4         Lymph %       25.4         MCH       26.8         MCHC       30.5         MCV       88         Mono #       1.0         Mono %       10.6         MPV       9.6         nRBC       0         Platelet Count       274         POC Glucose         114  Comment: Unable to scan       POCT Glucose 135   132   147           Potassium       4.0         PROTEIN TOTAL       6.3         RBC       5.59         RDW       16.1         Sodium       134         WBC       9.45                                Significant Imaging: I have reviewed all pertinent imaging results/findings within the past 24 hours.    Assessment/Plan:      * Gastroenteritis  -resolved  -advanced diet, monitor tolerance      Abnormal lung field  -CT scan of chest with mild reticular opacities of right middle lobe  -prn Vitaly  -outpatient pulm       Chronic combined systolic and diastolic heart failure  -cardiology following  -continue current medications      Diabetes mellitus type 2, noninsulin dependent  Patient's FSGs are controlled on current medication regimen.  Last A1c reviewed-   Lab Results   Component Value Date    HGBA1C 8.6 (H) 08/05/2024     Most recent fingerstick glucose reviewed-   Recent Labs   Lab 08/06/24  0624 08/06/24  1211 08/06/24  1724    POCTGLUCOSE 147* 132* 135*     Current correctional scale  Medium  Maintain anti-hyperglycemic dose as follows-   Antihyperglycemics (From admission, onward)      Start     Stop Route Frequency Ordered    08/05/24 0900  insulin glargine U-100 (Lantus) pen 8 Units         -- SubQ Daily 08/05/24 0301    08/05/24 0357  insulin aspart U-100 pen 0-10 Units         -- SubQ Before meals & nightly PRN 08/05/24 0301          Hold Oral hypoglycemics while patient is in the hospital.  -check A1c  -start Lantus 8 units subQ daily and medium dose sliding scale insulin due to significant hyperglycemia  -clear liquid diet given intractable nausea with vomiting    Elevated liver enzymes  -resolved    Acute renal failure superimposed on stage 2 chronic kidney disease  STEPHEN is likely due to multiple factors Baseline creatinine is  1.1 . Most recent creatinine and eGFR are listed below.  Recent Labs     08/04/24  1819 08/05/24  0343 08/06/24  0608   CREATININE 1.6* 1.4 1.8*   EGFRNORACEVR 38* 44* 33*       -hold diuretics  -monitor  -avoid nephrotoxins    Severe obesity with body mass index (BMI) of 35.0 to 39.9 with comorbidity  Body mass index is 39.99 kg/m². Morbid obesity complicates all aspects of disease management from diagnostic modalities to treatment. Weight loss encouraged and health benefits explained to patient.         Hyperlipidemia associated with type 2 diabetes mellitus  -continue Lipitor      CAD (coronary artery disease)  Patient with known CAD which is controlled Will continue ASA and Statin and monitor for S/Sx of angina/ACS. Continue to monitor on telemetry.  Cardiac monitoring.    COPD  Patient's COPD is controlled currently.  Patient is currently off COPD Pathway. Continue scheduled inhalers (Trelegy Ellipta to be replaced with a equivalent on formulary by pharmacist) and p.r.n. DuoNebs.  Continuous pulse oximetry monitoring, O2 supplementation, incentive spirometry.  monitor respiratory status closely.      Primary hypertension  -continue Toprol-XL  -may need to hold if BP remains marginal  -monitor blood pressure        VTE Risk Mitigation (From admission, onward)           Ordered     apixaban tablet 5 mg  2 times daily         08/05/24 0301     IP VTE HIGH RISK PATIENT  Once         08/05/24 0301     Place sequential compression device  Until discontinued         08/05/24 0301     Reason for No Pharmacological VTE Prophylaxis  Once        Question:  Reasons:  Answer:  Already adequately anticoagulated on oral Anticoagulants    08/05/24 0301                    Discharge Planning   KEAGAN:      Code Status: Full Code   Is the patient medically ready for discharge?:     Reason for patient still in hospital (select all that apply): Patient trending condition, Laboratory test, Treatment, and Consult recommendations  Discharge Plan A: Home                  Keshawn West MD  Department of Hospital Medicine   [facility] - Telemetry ([facility])

## 2024-08-06 NOTE — ASSESSMENT & PLAN NOTE
-Mgmt as per hospital medicine  -CT of abd/pelvis reviewed  -Still complains of ongoing abd pain    8/6/24  -Abd pain improved, mgmt as per hospital medicine

## 2024-08-06 NOTE — HOSPITAL COURSE
8/6/24-Patient seen and examined today, resting in bed. Feeling better. States epigastric/abd pain has resolved. Denies SOB. Still on supplemental O2. Labs reviewed. LA remains normal. Creatinine bumped to 1.8, diuretics held. BP soft.    8/7/24-Patient seen and examined today, sitting up in bedside chair. Feeling better. Still has epigastric pain but was able to tolerate diet. Denies SOB/CHF symptoms. Creatinine 1.9. BP still marginal but ok overall.    8/8/24-Patient seen and examined today, sitting up in bedside chair. Tearful, complains of lower back pain. CT pending. CV wise, remains stable. No SOB. Creatinine improved to 1.6, can resume oral diuretic tmw.

## 2024-08-06 NOTE — PLAN OF CARE
Problem: Adult Inpatient Plan of Care  Goal: Plan of Care Review  Outcome: Progressing     Problem: Adult Inpatient Plan of Care  Goal: Patient-Specific Goal (Individualized)  Outcome: Progressing     Problem: Adult Inpatient Plan of Care  Goal: Absence of Hospital-Acquired Illness or Injury  Outcome: Progressing     Problem: Adult Inpatient Plan of Care  Goal: Optimal Comfort and Wellbeing  Outcome: Progressing     Problem: Diabetes Comorbidity  Goal: Blood Glucose Level Within Targeted Range  Outcome: Progressing     Problem: Acute Kidney Injury/Impairment  Goal: Improved Oral Intake  Outcome: Progressing

## 2024-08-06 NOTE — SUBJECTIVE & OBJECTIVE
Interval History: f/u CHF BP marginal patient feeling better.     Review of Systems  Objective:     Vital Signs (Most Recent):  Temp: 98.4 °F (36.9 °C) (08/06/24 1522)  Pulse: 76 (08/06/24 1522)  Resp: 17 (08/06/24 1522)  BP: (!) 97/53 (08/06/24 1522)  SpO2: (!) 92 % (08/06/24 1522) Vital Signs (24h Range):  Temp:  [97.4 °F (36.3 °C)-98.4 °F (36.9 °C)] 98.4 °F (36.9 °C)  Pulse:  [53-78] 76  Resp:  [14-18] 17  SpO2:  [92 %-100 %] 92 %  BP: ()/(52-58) 97/53     Weight: 109 kg (240 lb 4.8 oz)  Body mass index is 39.99 kg/m².    Intake/Output Summary (Last 24 hours) at 8/6/2024 1810  Last data filed at 8/6/2024 0424  Gross per 24 hour   Intake 776.41 ml   Output 1000 ml   Net -223.59 ml         Physical Exam  HENT:      Head: Normocephalic and atraumatic.   Cardiovascular:      Rate and Rhythm: Normal rate and regular rhythm.      Heart sounds: No murmur heard.  Pulmonary:      Effort: Pulmonary effort is normal. No respiratory distress.      Breath sounds: Decreased breath sounds present. No wheezing.   Abdominal:      General: Bowel sounds are normal. There is no distension.      Palpations: Abdomen is soft.      Tenderness: There is no abdominal tenderness.   Musculoskeletal:         General: No swelling.   Skin:     General: Skin is warm and dry.   Neurological:      Mental Status: She is alert and oriented to person, place, and time. Mental status is at baseline.             Significant Labs: All pertinent labs within the past 24 hours have been reviewed.  Recent Lab Results  (Last 5 results in the past 24 hours)        08/06/24  1724   08/06/24  1211   08/06/24  0624   08/06/24  0608   08/06/24  0001        Albumin       3.7         ALP       123         ALT       7         Anion Gap       14         AST       13         Baso #       0.03         Basophil %       0.3         BILIRUBIN TOTAL       0.7  Comment: For infants and newborns, interpretation of results should be based  on gestational age, weight and  in agreement with clinical  observations.    Premature Infant recommended reference ranges:  Up to 24 hours.............<8.0 mg/dL  Up to 48 hours............<12.0 mg/dL  3-5 days..................<15.0 mg/dL  6-29 days.................<15.0 mg/dL           BUN       25         Calcium       8.8         Chloride       99         CO2       21         Creatinine       1.8         Differential Method       Automated         eGFR       33         Eos #       0.1         Eos %       0.5         Glucose       140         Gran # (ANC)       6.0         Gran %       63.0         Hematocrit       49.2         Hemoglobin       15.0         Immature Grans (Abs)       0.02  Comment: Mild elevation in immature granulocytes is non specific and   can be seen in a variety of conditions including stress response,   acute inflammation, trauma and pregnancy. Correlation with other   laboratory and clinical findings is essential.           Immature Granulocytes       0.2         Lactic Acid Level       1.0  Comment: Falsely low lactic acid results can be found in samples   containing >=13.0 mg/dL total bilirubin and/or >=3.5 mg/dL   direct bilirubin.           Lymph #       2.4         Lymph %       25.4         MCH       26.8         MCHC       30.5         MCV       88         Mono #       1.0         Mono %       10.6         MPV       9.6         nRBC       0         Platelet Count       274         POC Glucose         114  Comment: Unable to scan       POCT Glucose 135   132   147           Potassium       4.0         PROTEIN TOTAL       6.3         RBC       5.59         RDW       16.1         Sodium       134         WBC       9.45                                Significant Imaging: I have reviewed all pertinent imaging results/findings within the past 24 hours.

## 2024-08-06 NOTE — ASSESSMENT & PLAN NOTE
-Known severe cardiomyopathy with EF of 15%  -Recent hospitalization in July due to decompensation  -Stable volume wise on exam, BNP elevated  -IV diuresis as tolerated, not initiated this AM given ongoing abd pain, nausea/vomiting  -LA WNL  -Troponin negative  -Continue BB as tolerated    8/6/24  -Given dose of IV Lasix yesterday secondary to fluid bolus given in ED and CXR findings  -Creatinine bumped to 1.8 this AM and BP soft, diuretics held  -Continue BB as tolerated  -Resume ARB once creatinine improved  -Needs ischemic workup as OP once abd pain/GI issues resolved

## 2024-08-06 NOTE — PROGRESS NOTES
[facility] - Tele[facility] ([facility])  Cardiology  Progress Note    Patient Name: Delores Fox  MRN: [id number]  Admission Date: 8/4/2024  Hospital Length of Stay: 0 days  Code Status: Full Code   Attending Physician: Keshawn West MD   Primary Care Physician: Tomy Rios MD  Expected Discharge Date:   Principal Problem:Gastroenteritis    Subjective:   HPI:  Ms. Leon is a 58 year old female patient whose current medical conditions include DM type II, chronic combined CHF (EF 15%), history of CVA, PAF (on Eliquis), tobacco abuse, COPD, depression with anxiety, HTN, hyperlipidemia, CAD, obesity, and duodenitis with reported ulcer, and CKD who presented to [facility] ED this AM due to epigastric/generalized abdominal pain that onset yesterday AM. Associated symptoms included intractable nausea, vomiting, and multiple episodes of diarrhea. She denied any associated fever, chills, ruth CP, SOB, palpitations, near syncope, or syncope. Initial workup in ED revealed creatinine of 1.6, normal pro-niki, and BNP > 2,000. CT of abd/pelvis without revealed no evidence of bowel obstruction and patient was subsequently admitted for further evaluation and treatment. Cardiology consulted to assist with management. Patient seen and examined today in ED. Lying flat in bed, on supplemental O2. Still complains of abdominal pain, mild tenderness on exam. Denies CP/SOB. She claims compliance with her medications as OP. Recently admitted to [facility] from 7/13/24-7/14/24 due to decompensated CHF. Chart reviewed. LA x 2 WNL. Troponin x 1 negative, will repeat. CT of chest without contrast revealed stable coarsened reticular opacities in lower lobes and right middle lobe, possibly infectious in nature, probable reactive mediastinal lymphadenopathy, 4 mm RUL pulmonary nodule.      Review of records show patient had prior LHC in 2020 which showed EF of 25%,  RCA with left to right collaterals.        Hospital Course:   8/6/24-Patient  seen and examined today, resting in bed. Feeling better. States epigastric/abd pain has resolved. Denies SOB. Still on supplemental O2. Labs reviewed. LA remains normal. Creatinine bumped to 1.8, diuretics held. BP soft.        Review of Systems   Constitutional: Positive for malaise/fatigue.   Eyes:  Negative for blurred vision and discharge.   Cardiovascular: Negative.    Respiratory:  Negative for cough, hemoptysis, shortness of breath, snoring, sputum production and wheezing.    Endocrine: Negative.    Hematologic/Lymphatic: Negative.    Skin: Negative.  Negative for rash.   Musculoskeletal:  Positive for arthritis and joint pain.   Gastrointestinal:  Negative for heartburn, melena and nausea.   Genitourinary: Negative.    Neurological: Negative.    Psychiatric/Behavioral: Negative.     Allergic/Immunologic: Negative.      Objective:     Vital Signs (Most Recent):  Temp: 97.5 °F (36.4 °C) (08/06/24 0803)  Pulse: (!) 53 (08/06/24 0853)  Resp: 18 (08/06/24 0803)  BP: (!) 101/58 (08/06/24 0803)  SpO2: 98 % (08/06/24 0803) Vital Signs (24h Range):  Temp:  [97.4 °F (36.3 °C)-98.4 °F (36.9 °C)] 97.5 °F (36.4 °C)  Pulse:  [] 53  Resp:  [14-24] 18  SpO2:  [91 %-100 %] 98 %  BP: ()/(52-81) 101/58     Weight: 109 kg (240 lb 4.8 oz)  Body mass index is 39.99 kg/m².     SpO2: 98 %         Intake/Output Summary (Last 24 hours) at 8/6/2024 0924  Last data filed at 8/6/2024 0424  Gross per 24 hour   Intake 876.41 ml   Output 1800 ml   Net -923.59 ml       Lines/Drains/Airways       Peripheral Intravenous Line  Duration                  Peripheral IV - Single Lumen 08/05/24 0112 20 G Right Wrist 1 day                       Physical Exam  Vitals and nursing note reviewed.   Constitutional:       General: She is not in acute distress.     Appearance: She is well-developed. She is not diaphoretic.      Comments: On supplemental O2   HENT:      Head: Normocephalic and atraumatic.   Eyes:      General:         Right eye:  No discharge.         Left eye: No discharge.      Pupils: Pupils are equal, round, and reactive to light.   Cardiovascular:      Rate and Rhythm: Normal rate and regular rhythm.      Heart sounds: Normal heart sounds, S1 normal and S2 normal. No murmur heard.  Pulmonary:      Effort: Pulmonary effort is normal. No respiratory distress.      Comments: Coarse BS, no overt rales  Abdominal:      General: There is no distension.   Musculoskeletal:      Right lower leg: No edema.      Left lower leg: No edema.   Skin:     General: Skin is warm and dry.      Findings: No erythema.   Neurological:      Mental Status: She is alert and oriented to person, place, and time.   Psychiatric:         Mood and Affect: Mood normal.         Behavior: Behavior normal.            Significant Labs: CMP   Recent Labs   Lab 08/04/24 1819 08/05/24  0343 08/06/24  0608   * 137 134*   K 4.5 4.9 4.0    109 99   CO2 16* 14* 21*   * 227* 140*   BUN 21* 20 25*   CREATININE 1.6* 1.4 1.8*   CALCIUM 9.4 8.9 8.8   PROT 7.7 7.4 6.3   ALBUMIN 4.2 4.1 3.7   BILITOT 1.2* 1.0 0.7   ALKPHOS 150* 141* 123   AST 15 14 13   ALT 9* 9* 7*   ANIONGAP 12 14 14   , CBC   Recent Labs   Lab 08/04/24 1819 08/05/24  0343 08/06/24  0608   WBC 9.25 9.46 9.45   HGB 17.0* 16.1* 15.0   HCT 53.0* 51.5* 49.2*    237 274   , Troponin   Recent Labs   Lab 08/05/24  0343 08/05/24  1042   TROPONINI 0.012 0.012   , and All pertinent lab results from the last 24 hours have been reviewed.    Significant Imaging: Echocardiogram: Transthoracic echo (TTE) complete ([facility] Only):   Results for orders placed or performed during the hospital encounter of 02/27/23   Echo   Result Value Ref Range    BSA 2.32 m2    TDI SEPTAL 0.05 m/s    LV LATERAL E/E' RATIO 12.33 m/s    LV SEPTAL E/E' RATIO 14.80 m/s    IVC diameter 1.85 cm    Left Ventricular Outflow Tract Mean Velocity 0.55 cm/s    Left Ventricular Outflow Tract Mean Gradient 1.00 mmHg    TDI LATERAL 0.06 m/s     LVIDd 4.95 3.5 - 6.0 cm    IVS 1.08 0.6 - 1.1 cm    Posterior Wall 1.13 (A) 0.6 - 1.1 cm    LVIDs 3.58 (A) 2.1 - 4.0 cm    FS 28 28 - 44 %    LV mass 205.08 g    TAPSE 1.42 cm    RV S' 0.01 cm/s    Left Ventricle Relative Wall Thickness 0.46 cm    E/A ratio 0.86     Mean e' 0.06 m/s    E wave deceleration time 150.00 msec    LVOT diameter 2.00 cm    LVOT area 3.1 cm2    LVOT peak steven 0.83 m/s    LVOT peak VTI 15.20 cm    LVOT stroke volume 47.73 cm3    E/E' ratio 13.45 m/s    MV Peak E Steven 0.74 m/s    MV Peak A Steven 0.86 m/s    LV Systolic Volume 115.00 mL    LV Systolic Volume Index 51.8 mL/m2    LV Diastolic Volume 193.00 mL    LV Diastolic Volume Index 86.94 mL/m2    LV Mass Index 92 g/m2    Right Atrial Pressure (from IVC) 3 mmHg    EF 40 %    Narrative    · The left ventricle is mildly enlarged with concentric remodeling and   mildly decreased systolic function.  · The estimated ejection fraction is 40%.  · Grade I left ventricular diastolic dysfunction.  · There is moderate left ventricular global hypokinesis.  · Normal right ventricular size with normal right ventricular systolic   function.  · Mild left atrial enlargement.  · Normal central venous pressure (3 mmHg).  · There is abnormal septal wall motion consistent with left bundle branch   block.       and EKG: Reviewed  Assessment and Plan:   Patient who presents with gastroenteritis/respiratory infection/known severe CMPY/CHF. Abd pain improved this AM. Creatinine bumped and BP soft, diuretics held. Continue GDMT for CHF as tolerated. Needs ischemic workup as OP.    * Gastroenteritis  -Mgmt as per hospital medicine  -CT of abd/pelvis reviewed  -Still complains of ongoing abd pain    8/6/24  -Abd pain improved, mgmt as per hospital medicine    Abnormal lung field  -ON abx    Chronic combined systolic and diastolic heart failure  -Known severe cardiomyopathy with EF of 15%  -Recent hospitalization in July due to decompensation  -Stable volume wise on  exam, BNP elevated  -IV diuresis as tolerated, not initiated this AM given ongoing abd pain, nausea/vomiting  -LA WNL  -Troponin negative  -Continue BB as tolerated    8/6/24  -Given dose of IV Lasix yesterday secondary to fluid bolus given in ED and CXR findings  -Creatinine bumped to 1.8 this AM and BP soft, diuretics held  -Continue BB as tolerated  -Resume ARB once creatinine improved  -Needs ischemic workup as OP once abd pain/GI issues resolved    Acute renal failure superimposed on stage 2 chronic kidney disease  -Improved since admission  -Monitor  -Resume ARB as tolerated    8/6/24  -Creatinine bumped to 1.8 and BP marginal  -Diuretics held  -Continue BB as tolerated  -Resume ARB once creatinine improves    Severe obesity with body mass index (BMI) of 35.0 to 39.9 with comorbidity  -Weight loss    Hyperlipidemia associated with type 2 diabetes mellitus  -Statin    CAD (coronary artery disease)  -ASA, statin, BB  -Will need ischemic evaluation as OP    COPD  -Mgmt as per hospital medicine    Hypertension associated with diabetes  -Continue home CV meds/regimen as tolerated        VTE Risk Mitigation (From admission, onward)           Ordered     apixaban tablet 5 mg  2 times daily         08/05/24 0301     IP VTE HIGH RISK PATIENT  Once         08/05/24 0301     Place sequential compression device  Until discontinued         08/05/24 0301     Reason for No Pharmacological VTE Prophylaxis  Once        Question:  Reasons:  Answer:  Already adequately anticoagulated on oral Anticoagulants    08/05/24 0301                    Helen Kaur PA-C  Cardiology  [facility] - Telemetry ([facility])

## 2024-08-06 NOTE — PLAN OF CARE
POC reviewed with patient/family with treatment ongoing, verbal understanding received. Reinforcements needed.

## 2024-08-06 NOTE — ASSESSMENT & PLAN NOTE
Patient with known CAD which is controlled Will continue ASA and Statin and monitor for S/Sx of angina/ACS. Continue to monitor on telemetry.  Cardiac monitoring.

## 2024-08-06 NOTE — PLAN OF CARE
Anisocoria, left pupil 6mm, right pupil 3mm, delayed reaction, very little accomodation and pupils are fixed. Patient c/o 9/10 epigastric pain each time I assessed her pain level..   Importance of following the clear liquid diet reviewed with the patient x2. Patient ambulated to the bedside commode. Reviewed care plan and goals with the patient. 3L O2 nasal cannula necessity discussed with the patient as well. VU. Pending stool sample.

## 2024-08-06 NOTE — ASSESSMENT & PLAN NOTE
Patient's FSGs are controlled on current medication regimen.  Last A1c reviewed-   Lab Results   Component Value Date    HGBA1C 8.6 (H) 08/05/2024     Most recent fingerstick glucose reviewed-   Recent Labs   Lab 08/06/24  0624 08/06/24  1211 08/06/24  1724   POCTGLUCOSE 147* 132* 135*     Current correctional scale  Medium  Maintain anti-hyperglycemic dose as follows-   Antihyperglycemics (From admission, onward)      Start     Stop Route Frequency Ordered    08/05/24 0900  insulin glargine U-100 (Lantus) pen 8 Units         -- SubQ Daily 08/05/24 0301    08/05/24 0357  insulin aspart U-100 pen 0-10 Units         -- SubQ Before meals & nightly PRN 08/05/24 0301          Hold Oral hypoglycemics while patient is in the hospital.  -check A1c  -start Lantus 8 units subQ daily and medium dose sliding scale insulin due to significant hyperglycemia  -clear liquid diet given intractable nausea with vomiting

## 2024-08-06 NOTE — ASSESSMENT & PLAN NOTE
STEPHEN is likely due to multiple factors Baseline creatinine is  1.1 . Most recent creatinine and eGFR are listed below.  Recent Labs     08/04/24  1819 08/05/24  0343 08/06/24  0608   CREATININE 1.6* 1.4 1.8*   EGFRNORACEVR 38* 44* 33*       -hold diuretics  -monitor  -avoid nephrotoxins

## 2024-08-06 NOTE — ASSESSMENT & PLAN NOTE
-Improved since admission  -Monitor  -Resume ARB as tolerated    8/6/24  -Creatinine bumped to 1.8 and BP marginal  -Diuretics held  -Continue BB as tolerated  -Resume ARB once creatinine improves

## 2024-08-07 LAB
ALBUMIN SERPL BCP-MCNC: 3.7 G/DL (ref 3.5–5.2)
ALP SERPL-CCNC: 118 U/L (ref 55–135)
ALT SERPL W/O P-5'-P-CCNC: 6 U/L (ref 10–44)
ANION GAP SERPL CALC-SCNC: 12 MMOL/L (ref 8–16)
AST SERPL-CCNC: 11 U/L (ref 10–40)
BASOPHILS # BLD AUTO: 0.04 K/UL (ref 0–0.2)
BASOPHILS NFR BLD: 0.5 % (ref 0–1.9)
BILIRUB SERPL-MCNC: 0.5 MG/DL (ref 0.1–1)
BUN SERPL-MCNC: 38 MG/DL (ref 6–20)
CALCIUM SERPL-MCNC: 8.7 MG/DL (ref 8.7–10.5)
CHLORIDE SERPL-SCNC: 103 MMOL/L (ref 95–110)
CO2 SERPL-SCNC: 24 MMOL/L (ref 23–29)
CREAT SERPL-MCNC: 1.9 MG/DL (ref 0.5–1.4)
DIFFERENTIAL METHOD BLD: ABNORMAL
EOSINOPHIL # BLD AUTO: 0 K/UL (ref 0–0.5)
EOSINOPHIL NFR BLD: 0.5 % (ref 0–8)
ERYTHROCYTE [DISTWIDTH] IN BLOOD BY AUTOMATED COUNT: 16.1 % (ref 11.5–14.5)
EST. GFR  (NO RACE VARIABLE): 31 ML/MIN/1.73 M^2
GLUCOSE SERPL-MCNC: 138 MG/DL (ref 70–110)
HCT VFR BLD AUTO: 49.9 % (ref 37–48.5)
HGB BLD-MCNC: 15.3 G/DL (ref 12–16)
IMM GRANULOCYTES # BLD AUTO: 0.02 K/UL (ref 0–0.04)
IMM GRANULOCYTES NFR BLD AUTO: 0.2 % (ref 0–0.5)
LYMPHOCYTES # BLD AUTO: 2.4 K/UL (ref 1–4.8)
LYMPHOCYTES NFR BLD: 28.3 % (ref 18–48)
MAGNESIUM SERPL-MCNC: 1.8 MG/DL (ref 1.6–2.6)
MCH RBC QN AUTO: 26.9 PG (ref 27–31)
MCHC RBC AUTO-ENTMCNC: 30.7 G/DL (ref 32–36)
MCV RBC AUTO: 88 FL (ref 82–98)
MONOCYTES # BLD AUTO: 1.1 K/UL (ref 0.3–1)
MONOCYTES NFR BLD: 12.6 % (ref 4–15)
NEUTROPHILS # BLD AUTO: 4.9 K/UL (ref 1.8–7.7)
NEUTROPHILS NFR BLD: 57.9 % (ref 38–73)
NRBC BLD-RTO: 0 /100 WBC
PLATELET # BLD AUTO: 278 K/UL (ref 150–450)
PMV BLD AUTO: 9.8 FL (ref 9.2–12.9)
POCT GLUCOSE: 137 MG/DL (ref 70–110)
POCT GLUCOSE: 177 MG/DL (ref 70–110)
POCT GLUCOSE: 213 MG/DL (ref 70–110)
POCT GLUCOSE: 266 MG/DL (ref 70–110)
POTASSIUM SERPL-SCNC: 4.5 MMOL/L (ref 3.5–5.1)
PROT SERPL-MCNC: 6.7 G/DL (ref 6–8.4)
RBC # BLD AUTO: 5.69 M/UL (ref 4–5.4)
SODIUM SERPL-SCNC: 139 MMOL/L (ref 136–145)
WBC # BLD AUTO: 8.44 K/UL (ref 3.9–12.7)

## 2024-08-07 PROCEDURE — 97110 THERAPEUTIC EXERCISES: CPT

## 2024-08-07 PROCEDURE — 99232 SBSQ HOSP IP/OBS MODERATE 35: CPT | Mod: ,,, | Performed by: PHYSICIAN ASSISTANT

## 2024-08-07 PROCEDURE — 85025 COMPLETE CBC W/AUTO DIFF WBC: CPT | Performed by: PHYSICIAN ASSISTANT

## 2024-08-07 PROCEDURE — 25000242 PHARM REV CODE 250 ALT 637 W/ HCPCS: Performed by: INTERNAL MEDICINE

## 2024-08-07 PROCEDURE — 80053 COMPREHEN METABOLIC PANEL: CPT | Performed by: PHYSICIAN ASSISTANT

## 2024-08-07 PROCEDURE — 99900035 HC TECH TIME PER 15 MIN (STAT)

## 2024-08-07 PROCEDURE — 94761 N-INVAS EAR/PLS OXIMETRY MLT: CPT

## 2024-08-07 PROCEDURE — 25000003 PHARM REV CODE 250: Performed by: INTERNAL MEDICINE

## 2024-08-07 PROCEDURE — 36415 COLL VENOUS BLD VENIPUNCTURE: CPT | Performed by: PHYSICIAN ASSISTANT

## 2024-08-07 PROCEDURE — 83735 ASSAY OF MAGNESIUM: CPT | Performed by: PHYSICIAN ASSISTANT

## 2024-08-07 PROCEDURE — 21400001 HC TELEMETRY ROOM

## 2024-08-07 PROCEDURE — 94640 AIRWAY INHALATION TREATMENT: CPT

## 2024-08-07 PROCEDURE — 63600175 PHARM REV CODE 636 W HCPCS: Performed by: INTERNAL MEDICINE

## 2024-08-07 PROCEDURE — 97161 PT EVAL LOW COMPLEX 20 MIN: CPT

## 2024-08-07 RX ADMIN — PREGABALIN 100 MG: 75 CAPSULE ORAL at 03:08

## 2024-08-07 RX ADMIN — PANTOPRAZOLE SODIUM 40 MG: 40 TABLET, DELAYED RELEASE ORAL at 08:08

## 2024-08-07 RX ADMIN — APIXABAN 5 MG: 2.5 TABLET, FILM COATED ORAL at 08:08

## 2024-08-07 RX ADMIN — DOXYCYCLINE 100 MG: 100 INJECTION, POWDER, LYOPHILIZED, FOR SOLUTION INTRAVENOUS at 03:08

## 2024-08-07 RX ADMIN — HYDROCODONE BITARTRATE AND ACETAMINOPHEN 1 TABLET: 5; 325 TABLET ORAL at 02:08

## 2024-08-07 RX ADMIN — CEFTRIAXONE SODIUM 2 G: 2 INJECTION, POWDER, FOR SOLUTION INTRAMUSCULAR; INTRAVENOUS at 03:08

## 2024-08-07 RX ADMIN — IPRATROPIUM BROMIDE 0.5 MG: 0.5 SOLUTION RESPIRATORY (INHALATION) at 07:08

## 2024-08-07 RX ADMIN — BUDESONIDE INHALATION 0.5 MG: 0.5 SUSPENSION RESPIRATORY (INHALATION) at 07:08

## 2024-08-07 RX ADMIN — INSULIN ASPART 4 UNITS: 100 INJECTION, SOLUTION INTRAVENOUS; SUBCUTANEOUS at 11:08

## 2024-08-07 RX ADMIN — IPRATROPIUM BROMIDE 0.5 MG: 0.5 SOLUTION RESPIRATORY (INHALATION) at 01:08

## 2024-08-07 RX ADMIN — PREGABALIN 100 MG: 75 CAPSULE ORAL at 08:08

## 2024-08-07 RX ADMIN — INSULIN GLARGINE 8 UNITS: 100 INJECTION, SOLUTION SUBCUTANEOUS at 08:08

## 2024-08-07 RX ADMIN — ATORVASTATIN CALCIUM 80 MG: 40 TABLET, FILM COATED ORAL at 08:08

## 2024-08-07 RX ADMIN — INSULIN ASPART 3 UNITS: 100 INJECTION, SOLUTION INTRAVENOUS; SUBCUTANEOUS at 08:08

## 2024-08-07 RX ADMIN — ARFORMOTEROL TARTRATE 15 MCG: 15 SOLUTION RESPIRATORY (INHALATION) at 07:08

## 2024-08-07 RX ADMIN — HYDROCODONE BITARTRATE AND ACETAMINOPHEN 1 TABLET: 5; 325 TABLET ORAL at 09:08

## 2024-08-07 RX ADMIN — BUPROPION HYDROCHLORIDE 150 MG: 150 TABLET, EXTENDED RELEASE ORAL at 08:08

## 2024-08-07 RX ADMIN — ASPIRIN 81 MG CHEWABLE TABLET 81 MG: 81 TABLET CHEWABLE at 08:08

## 2024-08-07 NOTE — PT/OT/SLP EVAL
Physical Therapy Evaluation    Patient Name:  Delores Fox   MRN:  [id number]    Recommendations:     Discharge Recommendations: Low Intensity Therapy   Discharge Equipment Recommendations: none   Barriers to discharge: None    Assessment:     Delores Fox is a 58 y.o. female admitted with a medical diagnosis of Gastroenteritis.  She presents with the following impairments/functional limitations: weakness, impaired endurance, pain, impaired balance, gait instability, impaired functional mobility, decreased lower extremity function, decreased ROM .    Rehab Prognosis: Good; patient would benefit from acute skilled PT services to address these deficits and reach maximum level of function.    Recent Surgery: * No surgery found *      Plan:     During this hospitalization, patient to be seen 3 x/week to address the identified rehab impairments via gait training, therapeutic activities, therapeutic exercises and progress toward the following goals:    Plan of Care Expires:  08/21/24    Subjective     Chief Complaint: CHRONIC BACK PAIN   Patient/Family Comments/goals: DEC PAIN   Pain/Comfort:  Pain Rating 1: 7/10  Location 1: back (CHRONIC BACK PAIN)  Pain Rating Post-Intervention 1: 7/10    Patients cultural, spiritual, Yazidi conflicts given the current situation:      Living Environment:  PT LIVES AT HOME WITH FRIEND IN A ONE STORY HOME WITH 4 STEPS AND RAILING TO ENTER HOME   Prior to admission, patients level of function was MOD I WITH RW AND DRIVES AND DOESN'T WORK.  Equipment used at home: shower chair, walker, rolling.  DME owned (not currently used): none.  Upon discharge, patient will have assistance from FRIEND .    Objective:     Communicated with NURSE SAENZ AND [facility] CHART REVIEW  prior to session.  Patient found supine with peripheral IV, telemetry  upon PT entry to room.    General Precautions: Standard, fall  Orthopedic Precautions:N/A   Braces: N/A  Respiratory Status: Room air    Exams:  Postural  "Exam:  Patient presented with the following abnormalities:    -       Rounded shoulders  -       Forward head  RLE ROM: WFL  RLE Strength: WFL  LLE ROM: WFL  LLE Strength: WFL    Functional Mobility:  Bed Mobility:     Rolling Right: stand by assistance  Supine to Sit: stand by assistance  Transfers:     Sit to Stand:  stand by assistance with rolling walker  Bed to Chair: stand by assistance with  rolling walker  using  Stand Pivot  Gait: PT GT TRAINED X 32' WITH  RW AND SBA WITH CUES FOR RW SAFETY.       AM-PAC 6 CLICK MOBILITY  Total Score:21       Treatment & Education:  GT. BELT AND  SOCKS DONNED PRIOR TO OOB MOBILITY.  PT STOOD AND COMPLETED T/F TO CHAIR WITH RW AND SBA. PT CONT WITH GT TRAINING TRIAL AND RETURNED TO RM T/F TO CHAIR FOR OOB TOLERANCE. PT COMPLETED 10 REPS OF AP, TKE AND MIP FOR LE ROM AND STRENGTHENING. PT EDUCATED ON "CALL DON'T FALL", ENCOURAGED TO CALL FOR ASSISTANCE WITH ALL NEEDS FOR OOB MOBILITY.      Patient left up in chair with call button in reach and chair alarm on.    GOALS:   Multidisciplinary Problems       Physical Therapy Goals          Problem: Physical Therapy    Goal Priority Disciplines Outcome Goal Variances Interventions   Physical Therapy Goal     PT, PT/OT      Description: LT24  1. PT WILL COMPLETE BED MOBILITY IND  2. PT WILL STAND T/F TO CHAIR WITH RW MOD I  3. PT WILL GT TRAIN X 250' WITH RW AND MOD I TO PROGRESS GT.  4. PT WILL INC AMPAC SCORE BY 2 POINTS TO PROGRESS GROSS FUNC MOBILITY.                          History:     Past Medical History:   Diagnosis Date    Acid reflux     COPD (chronic obstructive pulmonary disease)     Intubation 3/2023    Coronary artery disease     COVID-19     Diabetes mellitus     High cholesterol     Hypertension     Obese body habitus        Past Surgical History:   Procedure Laterality Date    APPENDECTOMY      CATHETERIZATION OF BOTH LEFT AND RIGHT HEART N/A 2020    Procedure: CATHETERIZATION, HEART, BOTH LEFT " AND RIGHT;  Surgeon: Doug Kendall MD;  Location: [facility] CATH/EP LAB;  Service: Cardiology;  Laterality: N/A;    CHOLECYSTECTOMY      EXCISION OF LESION OF EYELID Right 12/11/2020    Procedure: EXCISION, LESION, EYELID;  Surgeon: Malachi Rodriguez MD;  Location: [facility] OR;  Service: Ophthalmology;  Laterality: Right;  Inclusion Cyst removal right lower lid    HYSTERECTOMY      JOINT REPLACEMENT Left     left hip    MASTOIDECTOMY Right        Time Tracking:     PT Received On: 08/07/24  PT Start Time: 0715     PT Stop Time: 0740  PT Total Time (min): 25 min     Billable Minutes: Evaluation 15 and Therapeutic Exercise 10      08/07/2024

## 2024-08-07 NOTE — SUBJECTIVE & OBJECTIVE
Review of Systems   Constitutional: Positive for malaise/fatigue.   HENT: Negative.     Eyes: Negative.    Cardiovascular: Negative.    Respiratory: Negative.     Endocrine: Negative.    Hematologic/Lymphatic: Negative.    Skin: Negative.    Gastrointestinal:  Positive for abdominal pain (epigastric).   Genitourinary: Negative.    Neurological: Negative.    Psychiatric/Behavioral: Negative.     Allergic/Immunologic: Negative.      Objective:     Vital Signs (Most Recent):  Temp: 97.7 °F (36.5 °C) (08/07/24 0748)  Pulse: 80 (08/07/24 0834)  Resp: 18 (08/07/24 0748)  BP: (!) 109/53 (08/07/24 0748)  SpO2: 100 % (08/07/24 0748) Vital Signs (24h Range):  Temp:  [97.6 °F (36.4 °C)-98.5 °F (36.9 °C)] 97.7 °F (36.5 °C)  Pulse:  [62-81] 80  Resp:  [16-20] 18  SpO2:  [92 %-100 %] 100 %  BP: ()/(52-62) 109/53     Weight: 90.1 kg (198 lb 10.2 oz)  Body mass index is 33.05 kg/m².     SpO2: 100 %       No intake or output data in the 24 hours ending 08/07/24 1131    Lines/Drains/Airways       Peripheral Intravenous Line  Duration                  Peripheral IV - Single Lumen 08/05/24 0112 20 G Right Wrist 2 days                       Physical Exam  Vitals and nursing note reviewed.   Constitutional:       General: She is not in acute distress.     Appearance: Normal appearance. She is well-developed. She is not diaphoretic.   HENT:      Head: Normocephalic and atraumatic.   Eyes:      General:         Right eye: No discharge.         Left eye: No discharge.      Pupils: Pupils are equal, round, and reactive to light.   Cardiovascular:      Rate and Rhythm: Normal rate and regular rhythm.      Heart sounds: Normal heart sounds, S1 normal and S2 normal. No murmur heard.  Pulmonary:      Effort: Pulmonary effort is normal. No respiratory distress.      Breath sounds: Normal breath sounds.   Abdominal:      General: There is no distension.   Musculoskeletal:      Right lower leg: No edema.      Left lower leg: No edema.  "  Skin:     General: Skin is warm and dry.      Findings: No erythema.   Neurological:      General: No focal deficit present.      Mental Status: She is alert and oriented to person, place, and time.   Psychiatric:         Mood and Affect: Mood normal.         Behavior: Behavior normal.            Significant Labs: CMP   Recent Labs   Lab 08/06/24  0608 08/07/24  0757   * 139   K 4.0 4.5   CL 99 103   CO2 21* 24   * 138*   BUN 25* 38*   CREATININE 1.8* 1.9*   CALCIUM 8.8 8.7   PROT 6.3 6.7   ALBUMIN 3.7 3.7   BILITOT 0.7 0.5   ALKPHOS 123 118   AST 13 11   ALT 7* 6*   ANIONGAP 14 12   , CBC   Recent Labs   Lab 08/06/24  0608 08/07/24  0757   WBC 9.45 8.44   HGB 15.0 15.3   HCT 49.2* 49.9*    278   , Troponin No results for input(s): "TROPONINI" in the last 48 hours., and All pertinent lab results from the last 24 hours have been reviewed.    Significant Imaging: Echocardiogram: Transthoracic echo (TTE) complete ([facility] Only):   Results for orders placed or performed during the hospital encounter of 02/27/23   Echo   Result Value Ref Range    BSA 2.32 m2    TDI SEPTAL 0.05 m/s    LV LATERAL E/E' RATIO 12.33 m/s    LV SEPTAL E/E' RATIO 14.80 m/s    IVC diameter 1.85 cm    Left Ventricular Outflow Tract Mean Velocity 0.55 cm/s    Left Ventricular Outflow Tract Mean Gradient 1.00 mmHg    TDI LATERAL 0.06 m/s    LVIDd 4.95 3.5 - 6.0 cm    IVS 1.08 0.6 - 1.1 cm    Posterior Wall 1.13 (A) 0.6 - 1.1 cm    LVIDs 3.58 (A) 2.1 - 4.0 cm    FS 28 28 - 44 %    LV mass 205.08 g    TAPSE 1.42 cm    RV S' 0.01 cm/s    Left Ventricle Relative Wall Thickness 0.46 cm    E/A ratio 0.86     Mean e' 0.06 m/s    E wave deceleration time 150.00 msec    LVOT diameter 2.00 cm    LVOT area 3.1 cm2    LVOT peak steven 0.83 m/s    LVOT peak VTI 15.20 cm    LVOT stroke volume 47.73 cm3    E/E' ratio 13.45 m/s    MV Peak E Steven 0.74 m/s    MV Peak A Steven 0.86 m/s    LV Systolic Volume 115.00 mL    LV Systolic Volume Index 51.8 mL/m2 "    LV Diastolic Volume 193.00 mL    LV Diastolic Volume Index 86.94 mL/m2    LV Mass Index 92 g/m2    Right Atrial Pressure (from IVC) 3 mmHg    EF 40 %    Narrative    · The left ventricle is mildly enlarged with concentric remodeling and   mildly decreased systolic function.  · The estimated ejection fraction is 40%.  · Grade I left ventricular diastolic dysfunction.  · There is moderate left ventricular global hypokinesis.  · Normal right ventricular size with normal right ventricular systolic   function.  · Mild left atrial enlargement.  · Normal central venous pressure (3 mmHg).  · There is abnormal septal wall motion consistent with left bundle branch   block.       and EKG: REviewed

## 2024-08-07 NOTE — PROGRESS NOTES
[facility] - Telemetry ([facility])  Cardiology  Progress Note    Patient Name: Delores Fox  MRN: [id number]  Admission Date: 8/4/2024  Hospital Length of Stay: 1 days  Code Status: Full Code   Attending Physician: Keshawn West MD   Primary Care Physician: Tomy Rios MD  Expected Discharge Date:   Principal Problem:Gastroenteritis    Subjective:   HPI:  Ms. Leon is a 58 year old female patient whose current medical conditions include DM type II, chronic combined CHF (EF 15%), history of CVA, PAF (on Eliquis), tobacco abuse, COPD, depression with anxiety, HTN, hyperlipidemia, CAD, obesity, and duodenitis with reported ulcer, and CKD who presented to [facility] ED this AM due to epigastric/generalized abdominal pain that onset yesterday AM. Associated symptoms included intractable nausea, vomiting, and multiple episodes of diarrhea. She denied any associated fever, chills, ruth CP, SOB, palpitations, near syncope, or syncope. Initial workup in ED revealed creatinine of 1.6, normal pro-niki, and BNP > 2,000. CT of abd/pelvis without revealed no evidence of bowel obstruction and patient was subsequently admitted for further evaluation and treatment. Cardiology consulted to assist with management. Patient seen and examined today in ED. Lying flat in bed, on supplemental O2. Still complains of abdominal pain, mild tenderness on exam. Denies CP/SOB. She claims compliance with her medications as OP. Recently admitted to [facility] from 7/13/24-7/14/24 due to decompensated CHF. Chart reviewed. LA x 2 WNL. Troponin x 1 negative, will repeat. CT of chest without contrast revealed stable coarsened reticular opacities in lower lobes and right middle lobe, possibly infectious in nature, probable reactive mediastinal lymphadenopathy, 4 mm RUL pulmonary nodule.      Review of records show patient had prior LHC in 2020 which showed EF of 25%,  RCA with left to right collaterals.        Hospital Course:   8/6/24-Patient  seen and examined today, resting in bed. Feeling better. States epigastric/abd pain has resolved. Denies SOB. Still on supplemental O2. Labs reviewed. LA remains normal. Creatinine bumped to 1.8, diuretics held. BP soft.    8/7/24-Patient seen and examined today, sitting up in bedside chair. Feeling better. Still has epigastric pain but was able to tolerate diet. Denies SOB/CHF symptoms. Creatinine 1.9. BP still marginal but ok overall.        Review of Systems   Constitutional: Positive for malaise/fatigue.   HENT: Negative.     Eyes: Negative.    Cardiovascular: Negative.    Respiratory: Negative.     Endocrine: Negative.    Hematologic/Lymphatic: Negative.    Skin: Negative.    Gastrointestinal:  Positive for abdominal pain (epigastric).   Genitourinary: Negative.    Neurological: Negative.    Psychiatric/Behavioral: Negative.     Allergic/Immunologic: Negative.      Objective:     Vital Signs (Most Recent):  Temp: 97.7 °F (36.5 °C) (08/07/24 0748)  Pulse: 80 (08/07/24 0834)  Resp: 18 (08/07/24 0748)  BP: (!) 109/53 (08/07/24 0748)  SpO2: 100 % (08/07/24 0748) Vital Signs (24h Range):  Temp:  [97.6 °F (36.4 °C)-98.5 °F (36.9 °C)] 97.7 °F (36.5 °C)  Pulse:  [62-81] 80  Resp:  [16-20] 18  SpO2:  [92 %-100 %] 100 %  BP: ()/(52-62) 109/53     Weight: 90.1 kg (198 lb 10.2 oz)  Body mass index is 33.05 kg/m².     SpO2: 100 %       No intake or output data in the 24 hours ending 08/07/24 1131    Lines/Drains/Airways       Peripheral Intravenous Line  Duration                  Peripheral IV - Single Lumen 08/05/24 0112 20 G Right Wrist 2 days                       Physical Exam  Vitals and nursing note reviewed.   Constitutional:       General: She is not in acute distress.     Appearance: Normal appearance. She is well-developed. She is not diaphoretic.   HENT:      Head: Normocephalic and atraumatic.   Eyes:      General:         Right eye: No discharge.         Left eye: No discharge.      Pupils: Pupils are  "equal, round, and reactive to light.   Cardiovascular:      Rate and Rhythm: Normal rate and regular rhythm.      Heart sounds: Normal heart sounds, S1 normal and S2 normal. No murmur heard.  Pulmonary:      Effort: Pulmonary effort is normal. No respiratory distress.      Breath sounds: Normal breath sounds.   Abdominal:      General: There is no distension.   Musculoskeletal:      Right lower leg: No edema.      Left lower leg: No edema.   Skin:     General: Skin is warm and dry.      Findings: No erythema.   Neurological:      General: No focal deficit present.      Mental Status: She is alert and oriented to person, place, and time.   Psychiatric:         Mood and Affect: Mood normal.         Behavior: Behavior normal.            Significant Labs: CMP   Recent Labs   Lab 08/06/24  0608 08/07/24  0757   * 139   K 4.0 4.5   CL 99 103   CO2 21* 24   * 138*   BUN 25* 38*   CREATININE 1.8* 1.9*   CALCIUM 8.8 8.7   PROT 6.3 6.7   ALBUMIN 3.7 3.7   BILITOT 0.7 0.5   ALKPHOS 123 118   AST 13 11   ALT 7* 6*   ANIONGAP 14 12   , CBC   Recent Labs   Lab 08/06/24  0608 08/07/24  0757   WBC 9.45 8.44   HGB 15.0 15.3   HCT 49.2* 49.9*    278   , Troponin No results for input(s): "TROPONINI" in the last 48 hours., and All pertinent lab results from the last 24 hours have been reviewed.    Significant Imaging: Echocardiogram: Transthoracic echo (TTE) complete ([facility] Only):   Results for orders placed or performed during the hospital encounter of 02/27/23   Echo   Result Value Ref Range    BSA 2.32 m2    TDI SEPTAL 0.05 m/s    LV LATERAL E/E' RATIO 12.33 m/s    LV SEPTAL E/E' RATIO 14.80 m/s    IVC diameter 1.85 cm    Left Ventricular Outflow Tract Mean Velocity 0.55 cm/s    Left Ventricular Outflow Tract Mean Gradient 1.00 mmHg    TDI LATERAL 0.06 m/s    LVIDd 4.95 3.5 - 6.0 cm    IVS 1.08 0.6 - 1.1 cm    Posterior Wall 1.13 (A) 0.6 - 1.1 cm    LVIDs 3.58 (A) 2.1 - 4.0 cm    FS 28 28 - 44 %    LV mass 205.08 " g    TAPSE 1.42 cm    RV S' 0.01 cm/s    Left Ventricle Relative Wall Thickness 0.46 cm    E/A ratio 0.86     Mean e' 0.06 m/s    E wave deceleration time 150.00 msec    LVOT diameter 2.00 cm    LVOT area 3.1 cm2    LVOT peak steven 0.83 m/s    LVOT peak VTI 15.20 cm    LVOT stroke volume 47.73 cm3    E/E' ratio 13.45 m/s    MV Peak E Steven 0.74 m/s    MV Peak A Steven 0.86 m/s    LV Systolic Volume 115.00 mL    LV Systolic Volume Index 51.8 mL/m2    LV Diastolic Volume 193.00 mL    LV Diastolic Volume Index 86.94 mL/m2    LV Mass Index 92 g/m2    Right Atrial Pressure (from IVC) 3 mmHg    EF 40 %    Narrative    · The left ventricle is mildly enlarged with concentric remodeling and   mildly decreased systolic function.  · The estimated ejection fraction is 40%.  · Grade I left ventricular diastolic dysfunction.  · There is moderate left ventricular global hypokinesis.  · Normal right ventricular size with normal right ventricular systolic   function.  · Mild left atrial enlargement.  · Normal central venous pressure (3 mmHg).  · There is abnormal septal wall motion consistent with left bundle branch   block.       and EKG: REviewed  Assessment and Plan:   Patient who presents with abd pain/gastroenteritis. Known severe CMPY, clinically appears compensated. Resume diuretics/ARB as tolerated.  LA has been normal multiple times since admission. Still has some epigastric pain.    * Gastroenteritis  -Mgmt as per hospital medicine  -CT of abd/pelvis reviewed  -Still complains of ongoing abd pain    8/6/24  -Abd pain improved, mgmt as per hospital medicine    Abnormal lung field  -ON abx    Chronic combined systolic and diastolic heart failure  -Known severe cardiomyopathy with EF of 15%  -Recent hospitalization in July due to decompensation  -Stable volume wise on exam, BNP elevated  -IV diuresis as tolerated, not initiated this AM given ongoing abd pain, nausea/vomiting  -LA WNL  -Troponin negative  -Continue BB as  tolerated    8/6/24  -Given dose of IV Lasix yesterday secondary to fluid bolus given in ED and CXR findings  -Creatinine bumped to 1.8 this AM and BP soft, diuretics held  -Continue BB as tolerated  -Resume ARB once creatinine improved  -Needs ischemic workup as OP once abd pain/GI issues resolved    8/7/24  -Clinically compensated  -Resume ARB and po diuretic as tolerated    Acute renal failure superimposed on stage 2 chronic kidney disease  -Improved since admission  -Monitor  -Resume ARB as tolerated    8/6/24  -Creatinine bumped to 1.8 and BP marginal  -Diuretics held  -Continue BB as tolerated  -Resume ARB once creatinine improves    8/7/24  -Creatinine 1.9, monitor  -Resume ARB/diuretic as tolerated  -No clinical signs/symptoms of fluid overload    Severe obesity with body mass index (BMI) of 35.0 to 39.9 with comorbidity  -Weight loss    Hyperlipidemia associated with type 2 diabetes mellitus  -Statin    CAD (coronary artery disease)  -ASA, statin, BB  -Will need ischemic evaluation as OP    COPD  -Mgmt as per hospital medicine    Primary hypertension  -Continue home CV meds/regimen as tolerated        VTE Risk Mitigation (From admission, onward)           Ordered     apixaban tablet 5 mg  2 times daily         08/05/24 0301     IP VTE HIGH RISK PATIENT  Once         08/05/24 0301     Place sequential compression device  Until discontinued         08/05/24 0301     Reason for No Pharmacological VTE Prophylaxis  Once        Question:  Reasons:  Answer:  Already adequately anticoagulated on oral Anticoagulants    08/05/24 0301                    Helen Kaur PA-C  Cardiology  [facility] - Telemetry ([facility])

## 2024-08-07 NOTE — ASSESSMENT & PLAN NOTE
-Known severe cardiomyopathy with EF of 15%  -Recent hospitalization in July due to decompensation  -Stable volume wise on exam, BNP elevated  -IV diuresis as tolerated, not initiated this AM given ongoing abd pain, nausea/vomiting  -LA WNL  -Troponin negative  -Continue BB as tolerated    8/6/24  -Given dose of IV Lasix yesterday secondary to fluid bolus given in ED and CXR findings  -Creatinine bumped to 1.8 this AM and BP soft, diuretics held  -Continue BB as tolerated  -Resume ARB once creatinine improved  -Needs ischemic workup as OP once abd pain/GI issues resolved    8/7/24  -Clinically compensated  -Resume ARB and po diuretic as tolerated

## 2024-08-07 NOTE — PLAN OF CARE
Marguerite Masterson  is scheduled for lab on 3/27/21. Orders need to be extended in the system. Thank you. Patient is progressing towards the desired outcomes.. expected to meet care plan goals by discharge/transition of care.    Problem: Adult Inpatient Plan of Care  Goal: Plan of Care Review  Outcome: Progressing  Goal: Patient-Specific Goal (Individualized)  Outcome: Progressing  Goal: Absence of Hospital-Acquired Illness or Injury  Outcome: Progressing  Goal: Optimal Comfort and Wellbeing  Outcome: Progressing  Goal: Readiness for Transition of Care  Outcome: Progressing     Problem: Diabetes Comorbidity  Goal: Blood Glucose Level Within Targeted Range  Outcome: Progressing     Problem: Acute Kidney Injury/Impairment  Goal: Fluid and Electrolyte Balance  Outcome: Progressing  Goal: Improved Oral Intake  Outcome: Progressing  Goal: Effective Renal Function  Outcome: Progressing     Problem: Pneumonia  Goal: Fluid Balance  Outcome: Progressing  Goal: Resolution of Infection Signs and Symptoms  Outcome: Progressing  Goal: Effective Oxygenation and Ventilation  Outcome: Progressing     Problem: Infection  Goal: Absence of Infection Signs and Symptoms  Outcome: Progressing     Problem: Skin Injury Risk Increased  Goal: Skin Health and Integrity  Outcome: Progressing     Problem: Fall Injury Risk  Goal: Absence of Fall and Fall-Related Injury  Outcome: Progressing

## 2024-08-07 NOTE — ASSESSMENT & PLAN NOTE
-Improved since admission  -Monitor  -Resume ARB as tolerated    8/6/24  -Creatinine bumped to 1.8 and BP marginal  -Diuretics held  -Continue BB as tolerated  -Resume ARB once creatinine improves    8/7/24  -Creatinine 1.9, monitor  -Resume ARB/diuretic as tolerated  -No clinical signs/symptoms of fluid overload

## 2024-08-07 NOTE — PLAN OF CARE
POC reviewed with patient/family with treatments ongoing, verbal understanding received. Reinforcements needed.

## 2024-08-08 ENCOUNTER — DOCUMENTATION ONLY (OUTPATIENT)
Dept: CARDIOLOGY | Facility: CLINIC | Age: 59
End: 2024-08-08
Payer: MEDICAID

## 2024-08-08 ENCOUNTER — TELEPHONE (OUTPATIENT)
Dept: CARDIOLOGY | Facility: HOSPITAL | Age: 59
End: 2024-08-08
Payer: MEDICAID

## 2024-08-08 VITALS
BODY MASS INDEX: 33.79 KG/M2 | TEMPERATURE: 99 F | DIASTOLIC BLOOD PRESSURE: 59 MMHG | OXYGEN SATURATION: 96 % | RESPIRATION RATE: 18 BRPM | HEART RATE: 64 BPM | HEIGHT: 65 IN | WEIGHT: 202.81 LBS | SYSTOLIC BLOOD PRESSURE: 126 MMHG

## 2024-08-08 LAB
ALBUMIN SERPL BCP-MCNC: 3.8 G/DL (ref 3.5–5.2)
ALP SERPL-CCNC: 117 U/L (ref 55–135)
ALT SERPL W/O P-5'-P-CCNC: 5 U/L (ref 10–44)
ANION GAP SERPL CALC-SCNC: 9 MMOL/L (ref 8–16)
AST SERPL-CCNC: 11 U/L (ref 10–40)
BASOPHILS # BLD AUTO: 0.03 K/UL (ref 0–0.2)
BASOPHILS NFR BLD: 0.4 % (ref 0–1.9)
BILIRUB SERPL-MCNC: 0.4 MG/DL (ref 0.1–1)
BUN SERPL-MCNC: 31 MG/DL (ref 6–20)
CALCIUM SERPL-MCNC: 8.7 MG/DL (ref 8.7–10.5)
CHLORIDE SERPL-SCNC: 105 MMOL/L (ref 95–110)
CO2 SERPL-SCNC: 25 MMOL/L (ref 23–29)
CREAT SERPL-MCNC: 1.6 MG/DL (ref 0.5–1.4)
DIFFERENTIAL METHOD BLD: ABNORMAL
EOSINOPHIL # BLD AUTO: 0 K/UL (ref 0–0.5)
EOSINOPHIL NFR BLD: 0.5 % (ref 0–8)
ERYTHROCYTE [DISTWIDTH] IN BLOOD BY AUTOMATED COUNT: 15.9 % (ref 11.5–14.5)
EST. GFR  (NO RACE VARIABLE): 38 ML/MIN/1.73 M^2
GLUCOSE SERPL-MCNC: 115 MG/DL (ref 70–110)
HCT VFR BLD AUTO: 48.9 % (ref 37–48.5)
HGB BLD-MCNC: 15.3 G/DL (ref 12–16)
IMM GRANULOCYTES # BLD AUTO: 0.03 K/UL (ref 0–0.04)
IMM GRANULOCYTES NFR BLD AUTO: 0.4 % (ref 0–0.5)
LYMPHOCYTES # BLD AUTO: 2.4 K/UL (ref 1–4.8)
LYMPHOCYTES NFR BLD: 29.3 % (ref 18–48)
MCH RBC QN AUTO: 27.3 PG (ref 27–31)
MCHC RBC AUTO-ENTMCNC: 31.3 G/DL (ref 32–36)
MCV RBC AUTO: 87 FL (ref 82–98)
MONOCYTES # BLD AUTO: 1.2 K/UL (ref 0.3–1)
MONOCYTES NFR BLD: 14.5 % (ref 4–15)
NEUTROPHILS # BLD AUTO: 4.5 K/UL (ref 1.8–7.7)
NEUTROPHILS NFR BLD: 54.9 % (ref 38–73)
NRBC BLD-RTO: 0 /100 WBC
PLATELET # BLD AUTO: 251 K/UL (ref 150–450)
PMV BLD AUTO: 9.1 FL (ref 9.2–12.9)
POCT GLUCOSE: 196 MG/DL (ref 70–110)
POCT GLUCOSE: 210 MG/DL (ref 70–110)
POTASSIUM SERPL-SCNC: 4.5 MMOL/L (ref 3.5–5.1)
PROT SERPL-MCNC: 6.5 G/DL (ref 6–8.4)
RBC # BLD AUTO: 5.6 M/UL (ref 4–5.4)
SODIUM SERPL-SCNC: 139 MMOL/L (ref 136–145)
WBC # BLD AUTO: 8.09 K/UL (ref 3.9–12.7)

## 2024-08-08 PROCEDURE — 99232 SBSQ HOSP IP/OBS MODERATE 35: CPT | Mod: ,,, | Performed by: PHYSICIAN ASSISTANT

## 2024-08-08 PROCEDURE — 36415 COLL VENOUS BLD VENIPUNCTURE: CPT | Performed by: PHYSICIAN ASSISTANT

## 2024-08-08 PROCEDURE — 63600175 PHARM REV CODE 636 W HCPCS: Performed by: INTERNAL MEDICINE

## 2024-08-08 PROCEDURE — 99900035 HC TECH TIME PER 15 MIN (STAT)

## 2024-08-08 PROCEDURE — 80053 COMPREHEN METABOLIC PANEL: CPT | Performed by: PHYSICIAN ASSISTANT

## 2024-08-08 PROCEDURE — 97530 THERAPEUTIC ACTIVITIES: CPT | Mod: CQ

## 2024-08-08 PROCEDURE — 25000242 PHARM REV CODE 250 ALT 637 W/ HCPCS: Performed by: INTERNAL MEDICINE

## 2024-08-08 PROCEDURE — 97116 GAIT TRAINING THERAPY: CPT | Mod: CQ

## 2024-08-08 PROCEDURE — 94761 N-INVAS EAR/PLS OXIMETRY MLT: CPT

## 2024-08-08 PROCEDURE — 25000003 PHARM REV CODE 250: Performed by: INTERNAL MEDICINE

## 2024-08-08 PROCEDURE — 94640 AIRWAY INHALATION TREATMENT: CPT

## 2024-08-08 PROCEDURE — 85025 COMPLETE CBC W/AUTO DIFF WBC: CPT | Performed by: PHYSICIAN ASSISTANT

## 2024-08-08 RX ORDER — HYDROCODONE BITARTRATE AND ACETAMINOPHEN 10; 325 MG/1; MG/1
1 TABLET ORAL EVERY 6 HOURS PRN
Qty: 28 TABLET | Refills: 0 | Status: SHIPPED | OUTPATIENT
Start: 2024-08-08

## 2024-08-08 RX ADMIN — DOXYCYCLINE 100 MG: 100 INJECTION, POWDER, LYOPHILIZED, FOR SOLUTION INTRAVENOUS at 03:08

## 2024-08-08 RX ADMIN — ATORVASTATIN CALCIUM 80 MG: 40 TABLET, FILM COATED ORAL at 08:08

## 2024-08-08 RX ADMIN — CEFTRIAXONE SODIUM 2 G: 2 INJECTION, POWDER, FOR SOLUTION INTRAMUSCULAR; INTRAVENOUS at 03:08

## 2024-08-08 RX ADMIN — BUDESONIDE INHALATION 0.5 MG: 0.5 SUSPENSION RESPIRATORY (INHALATION) at 07:08

## 2024-08-08 RX ADMIN — BUPROPION HYDROCHLORIDE 150 MG: 150 TABLET, EXTENDED RELEASE ORAL at 08:08

## 2024-08-08 RX ADMIN — ARFORMOTEROL TARTRATE 15 MCG: 15 SOLUTION RESPIRATORY (INHALATION) at 07:08

## 2024-08-08 RX ADMIN — IPRATROPIUM BROMIDE 0.5 MG: 0.5 SOLUTION RESPIRATORY (INHALATION) at 01:08

## 2024-08-08 RX ADMIN — PREGABALIN 100 MG: 75 CAPSULE ORAL at 08:08

## 2024-08-08 RX ADMIN — ONDANSETRON 4 MG: 2 INJECTION INTRAMUSCULAR; INTRAVENOUS at 12:08

## 2024-08-08 RX ADMIN — IPRATROPIUM BROMIDE 0.5 MG: 0.5 SOLUTION RESPIRATORY (INHALATION) at 07:08

## 2024-08-08 RX ADMIN — INSULIN ASPART 4 UNITS: 100 INJECTION, SOLUTION INTRAVENOUS; SUBCUTANEOUS at 06:08

## 2024-08-08 RX ADMIN — PREGABALIN 100 MG: 75 CAPSULE ORAL at 02:08

## 2024-08-08 RX ADMIN — APIXABAN 5 MG: 2.5 TABLET, FILM COATED ORAL at 08:08

## 2024-08-08 RX ADMIN — PANTOPRAZOLE SODIUM 40 MG: 40 TABLET, DELAYED RELEASE ORAL at 08:08

## 2024-08-08 RX ADMIN — HYDROCODONE BITARTRATE AND ACETAMINOPHEN 1 TABLET: 5; 325 TABLET ORAL at 09:08

## 2024-08-08 RX ADMIN — METOPROLOL SUCCINATE 50 MG: 50 TABLET, EXTENDED RELEASE ORAL at 08:08

## 2024-08-08 RX ADMIN — ASPIRIN 81 MG CHEWABLE TABLET 81 MG: 81 TABLET CHEWABLE at 08:08

## 2024-08-08 RX ADMIN — INSULIN GLARGINE 8 UNITS: 100 INJECTION, SOLUTION SUBCUTANEOUS at 08:08

## 2024-08-08 NOTE — SUBJECTIVE & OBJECTIVE
Review of Systems   Constitutional: Positive for malaise/fatigue.   HENT: Negative.     Eyes: Negative.    Cardiovascular: Negative.    Respiratory: Negative.     Endocrine: Negative.    Hematologic/Lymphatic: Negative.    Skin: Negative.    Musculoskeletal:  Positive for back pain.   Gastrointestinal: Negative.    Genitourinary: Negative.    Neurological: Negative.    Psychiatric/Behavioral:  The patient is nervous/anxious.    Allergic/Immunologic: Negative.      Objective:     Vital Signs (Most Recent):  Temp: 98.1 °F (36.7 °C) (08/08/24 0731)  Pulse: 77 (08/08/24 0839)  Resp: 18 (08/08/24 0946)  BP: 118/61 (08/08/24 0731)  SpO2: 100 % (08/08/24 0731) Vital Signs (24h Range):  Temp:  [97.5 °F (36.4 °C)-98.3 °F (36.8 °C)] 98.1 °F (36.7 °C)  Pulse:  [65-78] 77  Resp:  [15-20] 18  SpO2:  [93 %-100 %] 100 %  BP: (101-148)/(53-73) 118/61     Weight: 92 kg (202 lb 13.2 oz)  Body mass index is 33.75 kg/m².     SpO2: 100 %         Intake/Output Summary (Last 24 hours) at 8/8/2024 1048  Last data filed at 8/8/2024 1009  Gross per 24 hour   Intake --   Output 1 ml   Net -1 ml       Lines/Drains/Airways       Peripheral Intravenous Line  Duration                  Peripheral IV - Single Lumen 08/07/24 1717 20 G No Posterior;Right Forearm <1 day                       Physical Exam  Vitals and nursing note reviewed.   Constitutional:       General: She is not in acute distress.     Appearance: Normal appearance. She is well-developed. She is obese. She is not diaphoretic.   HENT:      Head: Normocephalic and atraumatic.   Eyes:      General:         Right eye: No discharge.         Left eye: No discharge.      Pupils: Pupils are equal, round, and reactive to light.   Cardiovascular:      Rate and Rhythm: Normal rate and regular rhythm.      Heart sounds: Normal heart sounds, S1 normal and S2 normal. No murmur heard.  Pulmonary:      Effort: Pulmonary effort is normal. No respiratory distress.      Breath sounds: Rhonchi  "present. No rales.   Abdominal:      General: There is no distension.   Musculoskeletal:      Right lower leg: No edema.      Left lower leg: No edema.   Skin:     General: Skin is warm and dry.      Findings: No erythema.   Neurological:      Mental Status: She is alert and oriented to person, place, and time.   Psychiatric:         Behavior: Behavior normal.      Comments: Tearful            Significant Labs: CMP   Recent Labs   Lab 08/07/24  0757 08/08/24  0817    139   K 4.5 4.5    105   CO2 24 25   * 115*   BUN 38* 31*   CREATININE 1.9* 1.6*   CALCIUM 8.7 8.7   PROT 6.7 6.5   ALBUMIN 3.7 3.8   BILITOT 0.5 0.4   ALKPHOS 118 117   AST 11 11   ALT 6* 5*   ANIONGAP 12 9   , CBC   Recent Labs   Lab 08/07/24  0757 08/08/24  0817   WBC 8.44 8.09   HGB 15.3 15.3   HCT 49.9* 48.9*    251   , Troponin No results for input(s): "TROPONINI" in the last 48 hours., and All pertinent lab results from the last 24 hours have been reviewed.    Significant Imaging: Echocardiogram: Transthoracic echo (TTE) complete ([facility] Only):   Results for orders placed or performed during the hospital encounter of 02/27/23   Echo   Result Value Ref Range    BSA 2.32 m2    TDI SEPTAL 0.05 m/s    LV LATERAL E/E' RATIO 12.33 m/s    LV SEPTAL E/E' RATIO 14.80 m/s    IVC diameter 1.85 cm    Left Ventricular Outflow Tract Mean Velocity 0.55 cm/s    Left Ventricular Outflow Tract Mean Gradient 1.00 mmHg    TDI LATERAL 0.06 m/s    LVIDd 4.95 3.5 - 6.0 cm    IVS 1.08 0.6 - 1.1 cm    Posterior Wall 1.13 (A) 0.6 - 1.1 cm    LVIDs 3.58 (A) 2.1 - 4.0 cm    FS 28 28 - 44 %    LV mass 205.08 g    TAPSE 1.42 cm    RV S' 0.01 cm/s    Left Ventricle Relative Wall Thickness 0.46 cm    E/A ratio 0.86     Mean e' 0.06 m/s    E wave deceleration time 150.00 msec    LVOT diameter 2.00 cm    LVOT area 3.1 cm2    LVOT peak steven 0.83 m/s    LVOT peak VTI 15.20 cm    LVOT stroke volume 47.73 cm3    E/E' ratio 13.45 m/s    MV Peak E Steven 0.74 m/s "    MV Peak A Steven 0.86 m/s    LV Systolic Volume 115.00 mL    LV Systolic Volume Index 51.8 mL/m2    LV Diastolic Volume 193.00 mL    LV Diastolic Volume Index 86.94 mL/m2    LV Mass Index 92 g/m2    Right Atrial Pressure (from IVC) 3 mmHg    EF 40 %    Narrative    · The left ventricle is mildly enlarged with concentric remodeling and   mildly decreased systolic function.  · The estimated ejection fraction is 40%.  · Grade I left ventricular diastolic dysfunction.  · There is moderate left ventricular global hypokinesis.  · Normal right ventricular size with normal right ventricular systolic   function.  · Mild left atrial enlargement.  · Normal central venous pressure (3 mmHg).  · There is abnormal septal wall motion consistent with left bundle branch   block.       and EKG: Reviewed

## 2024-08-08 NOTE — ASSESSMENT & PLAN NOTE
Patient's FSGs are controlled on current medication regimen.  Last A1c reviewed-   Lab Results   Component Value Date    HGBA1C 8.6 (H) 08/05/2024     Most recent fingerstick glucose reviewed-   Recent Labs   Lab 08/06/24  2110 08/07/24  0621 08/07/24  1115 08/07/24  1643   POCTGLUCOSE 143* 177* 213* 137*       Current correctional scale  Medium  Maintain anti-hyperglycemic dose as follows-   Antihyperglycemics (From admission, onward)    Start     Stop Route Frequency Ordered    08/05/24 0900  insulin glargine U-100 (Lantus) pen 8 Units         -- SubQ Daily 08/05/24 0301    08/05/24 0357  insulin aspart U-100 pen 0-10 Units         -- SubQ Before meals & nightly PRN 08/05/24 0301        Hold Oral hypoglycemics while patient is in the hospital.  -check A1c  -start Lantus 8 units subQ daily and medium dose sliding scale insulin due to significant hyperglycemia  -clear liquid diet given intractable nausea with vomiting

## 2024-08-08 NOTE — ASSESSMENT & PLAN NOTE
STEPHEN is likely due to multiple factors Baseline creatinine is  1.1 . Most recent creatinine and eGFR are listed below.  Recent Labs     08/05/24  0343 08/06/24  0608 08/07/24  0757   CREATININE 1.4 1.8* 1.9*   EGFRNORACEVR 44* 33* 31*       -hold diuretics  -monitor  -avoid nephrotoxins

## 2024-08-08 NOTE — PT/OT/SLP PROGRESS
"Physical Therapy  Treatment    Delores Fox   MRN: [id number]   Admitting Diagnosis: Gastroenteritis    PT Received On: 08/08/24  PT Start Time: 0905     PT Stop Time: 0930    PT Total Time (min): 25 min       Billable Minutes:  Gait Training 10 and Therapeutic Activity 15    Treatment Type: Treatment  PT/PTA: PTA     Number of PTA visits since last PT visit: 1       General Precautions: Standard, fall  Orthopedic Precautions: N/A  Braces: N/A  Respiratory Status: Room air         Subjective:  Communicated with patient's nurse, Rupali, and completed Epic chart review prior to session.  Patient agreed to PT session despite complaints of severe back pain.   "I'm trying not to take any pain medicine."    Pain/Comfort  Pain Rating 1: 10/10 (PATIENT TEARFUL; INITIALLY REFUSING MEDS BUT EVENTUALLY AGREEABLE)  Location 1: back  Pain Addressed 1: Other (see comments), Nurse notified (ACTIVITY PACING)  Pain Rating Post-Intervention 1: 10/10    Objective:   Patient found with: peripheral IV, telemetry    Patient found sitting EOB. States she had just [place] [facility].    STS from EOB > RW x2 trials: CGA    40ft w/ RW CGA (distance limited by pain severity)    Stand pivot T/F to EOB w/ RW: CGA    Stand pivot T/F from EOB > chair w/ RW: CGA    Reviewed AROM TE to BLE including: hip flex/ext, knee flex/ext, ankle PF/DF  To be completed a minimum of 10 reps for each LE in order to promote return of function, strength and ROM.     Educated patient on importance of increased tolerance to upright position and direct impact on CV endurance and strength. Patient encouraged to sit up in chair/ EOB, for a minimum of 2 consecutive hours, 3x per day. Encouraged patient to perform AROM TE to BLE throughout the day within all available planes of motion. Re enforced importance of utilizing call light to meet needs in room and not attempt to get up without staff assistance. Patient verbalized understanding and agreed to comply. "     Offered patient heat or ice packs but she declined at this time.       AM-PAC 6 CLICK MOBILITY  How much help from another person does this patient currently need?   1 = Unable, Total/Dependent Assistance  2 = A lot, Maximum/Moderate Assistance  3 = A little, Minimum/Contact Guard/Supervision  4 = None, Modified [place]/Independent    Turning over in bed (including adjusting bedclothes, sheets and blankets)?: 3  Sitting down on and standing up from a chair with arms (e.g., wheelchair, bedside commode, etc.): 3  Moving from lying on back to sitting on the side of the bed?: 3  Moving to and from a bed to a chair (including a wheelchair)?: 3  Need to walk in hospital room?: 3  Climbing 3-5 steps with a railing?: 1 (NT)  Basic Mobility Total Score: 16    AM-PAC Raw Score CMS G-Code Modifier Level of Impairment Assistance   6 % Total / Unable   7 - 9 CM 80 - 100% Maximal Assist   10 - 14 CL 60 - 80% Moderate Assist   15 - 19 CK 40 - 60% Moderate Assist   20 - 22 CJ 20 - 40% Minimal Assist   23 CI 1-20% SBA / CGA   24 CH 0% Independent/ Mod I     Patient left up in chair with call button in reach, chair alarm on, and nurse notified.    Assessment:  Delores Fox is a 58 y.o. female with a medical diagnosis of Gastroenteritis and presents with overall decline in functional mobility. Patient would continue to benefit from skilled PT to address functional limitations listed below in order to return to PLOF/decrease caregiver burden.     Rehab identified problem list/impairments: weakness, impaired endurance, impaired functional mobility, gait instability, impaired balance, pain, decreased safety awareness    Rehab potential is fair.    Activity tolerance: Fair    Discharge recommendations: Low Intensity Therapy      Barriers to discharge:      Equipment recommendations: none     GOALS:   Multidisciplinary Problems       Physical Therapy Goals          Problem: Physical Therapy    Goal Priority Disciplines  Outcome Goal Variances Interventions   Physical Therapy Goal     PT, PT/OT      Description: LT24  1. PT WILL COMPLETE BED MOBILITY IND  2. PT WILL STAND T/F TO CHAIR WITH RW MOD I  3. PT WILL GT TRAIN X 250' WITH RW AND MOD I TO PROGRESS GT.  4. PT WILL INC AMPAC SCORE BY 2 POINTS TO PROGRESS GROSS FUNC MOBILITY.                          PLAN:    Patient to be seen 3 x/week to address the above listed problems via gait training, therapeutic activities, therapeutic exercises  Plan of Care expires: 24  Plan of Care reviewed with: patient         2024

## 2024-08-08 NOTE — ASSESSMENT & PLAN NOTE
-Known severe cardiomyopathy with EF of 15%  -Recent hospitalization in July due to decompensation  -Stable volume wise on exam, BNP elevated  -IV diuresis as tolerated, not initiated this AM given ongoing abd pain, nausea/vomiting  -LA WNL  -Troponin negative  -Continue BB as tolerated    8/6/24  -Given dose of IV Lasix yesterday secondary to fluid bolus given in ED and CXR findings  -Creatinine bumped to 1.8 this AM and BP soft, diuretics held  -Continue BB as tolerated  -Resume ARB once creatinine improved  -Needs ischemic workup as OP once abd pain/GI issues resolved    8/7/24  -Clinically compensated  -Resume ARB and po diuretic as tolerated    8/8/24  -Stable, creatinine improving, resume po diuretic tmw

## 2024-08-08 NOTE — ASSESSMENT & PLAN NOTE
-Improved since admission  -Monitor  -Resume ARB as tolerated    8/6/24  -Creatinine bumped to 1.8 and BP marginal  -Diuretics held  -Continue BB as tolerated  -Resume ARB once creatinine improves    8/7/24  -Creatinine 1.9, monitor  -Resume ARB/diuretic as tolerated  -No clinical signs/symptoms of fluid overload    8/8/24  -Creatinine improved to 1.6, resume diuretic tmw  -ARB as tolerated

## 2024-08-08 NOTE — SUBJECTIVE & OBJECTIVE
Interval History: F/u CHF  no acute issues overnight. Breathing improved. Walked with therapy    Review of Systems  Objective:     Vital Signs (Most Recent):  Temp: 98.3 °F (36.8 °C) (08/07/24 1553)  Pulse: 72 (08/07/24 1553)  Resp: 18 (08/07/24 1553)  BP: (!) 102/58 (08/07/24 1553)  SpO2: 96 % (08/07/24 1553) Vital Signs (24h Range):  Temp:  [97.6 °F (36.4 °C)-98.5 °F (36.9 °C)] 98.3 °F (36.8 °C)  Pulse:  [64-81] 72  Resp:  [15-20] 18  SpO2:  [93 %-100 %] 96 %  BP: ()/(52-62) 102/58     Weight: 90.1 kg (198 lb 10.2 oz)  Body mass index is 33.05 kg/m².  No intake or output data in the 24 hours ending 08/07/24 1907      Physical Exam  HENT:      Head: Normocephalic and atraumatic.   Cardiovascular:      Rate and Rhythm: Normal rate and regular rhythm.      Heart sounds: No murmur heard.  Pulmonary:      Effort: Pulmonary effort is normal. No respiratory distress.      Breath sounds: Normal breath sounds. No wheezing.   Abdominal:      General: Bowel sounds are normal. There is no distension.      Palpations: Abdomen is soft.      Tenderness: There is no abdominal tenderness.   Musculoskeletal:         General: No swelling.   Skin:     General: Skin is warm and dry.   Neurological:      Mental Status: She is alert and oriented to person, place, and time. Mental status is at baseline.             Significant Labs: All pertinent labs within the past 24 hours have been reviewed.  Recent Lab Results  (Last 5 results in the past 24 hours)        08/07/24  1643   08/07/24  1115   08/07/24  0757   08/07/24  0621   08/06/24  2110        Albumin     3.7           ALP     118           ALT     6           Anion Gap     12           AST     11           Baso #     0.04           Basophil %     0.5           BILIRUBIN TOTAL     0.5  Comment: For infants and newborns, interpretation of results should be based  on gestational age, weight and in agreement with clinical  observations.    Premature Infant recommended reference  ranges:  Up to 24 hours.............<8.0 mg/dL  Up to 48 hours............<12.0 mg/dL  3-5 days..................<15.0 mg/dL  6-29 days.................<15.0 mg/dL             BUN     38           Calcium     8.7           Chloride     103           CO2     24           Creatinine     1.9           Differential Method     Automated           eGFR     31           Eos #     0.0           Eos %     0.5           Glucose     138           Gran # (ANC)     4.9           Gran %     57.9           Hematocrit     49.9           Hemoglobin     15.3           Immature Grans (Abs)     0.02  Comment: Mild elevation in immature granulocytes is non specific and   can be seen in a variety of conditions including stress response,   acute inflammation, trauma and pregnancy. Correlation with other   laboratory and clinical findings is essential.             Immature Granulocytes     0.2           Lymph #     2.4           Lymph %     28.3           Magnesium      1.8           MCH     26.9           MCHC     30.7           MCV     88           Mono #     1.1           Mono %     12.6           MPV     9.8           nRBC     0           Platelet Count     278           POCT Glucose 137   213     177   143       Potassium     4.5           PROTEIN TOTAL     6.7           RBC     5.69           RDW     16.1           Sodium     139           WBC     8.44                                  Significant Imaging: I have reviewed all pertinent imaging results/findings within the past 24 hours.

## 2024-08-08 NOTE — ASSESSMENT & PLAN NOTE
-Mgmt as per hospital medicine  -CT of abd/pelvis reviewed  -Still complains of ongoing abd pain    8/6/24  -Abd pain improved, mgmt as per hospital medicine    8/8/24  -Resolved

## 2024-08-08 NOTE — PLAN OF CARE
[facility] - Tele[facility] (Hospital)  Discharge Final Note    Primary Care Provider: Tomy Rios MD    Expected Discharge Date: 8/8/2024    Final Discharge Note (most recent)       Final Note - 08/08/24 1611          Final Note    Assessment Type Final Discharge Note     Anticipated Discharge Disposition Home or Self Care     Hospital Resources/Appts/Education Provided Appointments scheduled and added to AVS        Post-Acute Status    Coverage [facility] Medicaid     Discharge Delays None known at this time                     Important Message from Medicare             Contact Info       Tomy Rios MD   Specialty: Family Medicine   Relationship: PCP - General    [facility] [address]   Phone: [phone]       Next Steps: Follow up          DC Disposition: home with family  Family Notified: Patient at bedside  Transportation: personal transportation    Patient had no equipment or placement needs from .     Patient has annual with MEGHANN Camargo on 8/13/24 at 9:30 am.

## 2024-08-08 NOTE — PROGRESS NOTES
[facility] - Tele[facility] ([facility])  Cardiology  Progress Note    Patient Name: Delores Fox  MRN: [id number]  Admission Date: 8/4/2024  Hospital Length of Stay: 2 days  Code Status: Full Code   Attending Physician: Keshawn West MD   Primary Care Physician: Tomy Rios MD  Expected Discharge Date:   Principal Problem:Gastroenteritis    Subjective:   HPI:  Ms. Leon is a 58 year old female patient whose current medical conditions include DM type II, chronic combined CHF (EF 15%), history of CVA, PAF (on Eliquis), tobacco abuse, COPD, depression with anxiety, HTN, hyperlipidemia, CAD, obesity, and duodenitis with reported ulcer, and CKD who presented to [facility] ED this AM due to epigastric/generalized abdominal pain that onset yesterday AM. Associated symptoms included intractable nausea, vomiting, and multiple episodes of diarrhea. She denied any associated fever, chills, ruth CP, SOB, palpitations, near syncope, or syncope. Initial workup in ED revealed creatinine of 1.6, normal pro-niki, and BNP > 2,000. CT of abd/pelvis without revealed no evidence of bowel obstruction and patient was subsequently admitted for further evaluation and treatment. Cardiology consulted to assist with management. Patient seen and examined today in ED. Lying flat in bed, on supplemental O2. Still complains of abdominal pain, mild tenderness on exam. Denies CP/SOB. She claims compliance with her medications as OP. Recently admitted to [facility] from 7/13/24-7/14/24 due to decompensated CHF. Chart reviewed. LA x 2 WNL. Troponin x 1 negative, will repeat. CT of chest without contrast revealed stable coarsened reticular opacities in lower lobes and right middle lobe, possibly infectious in nature, probable reactive mediastinal lymphadenopathy, 4 mm RUL pulmonary nodule.      Review of records show patient had prior LHC in 2020 which showed EF of 25%,  RCA with left to right collaterals.     Hospital Course:   8/6/24-Patient seen  and examined today, resting in bed. Feeling better. States epigastric/abd pain has resolved. Denies SOB. Still on supplemental O2. Labs reviewed. LA remains normal. Creatinine bumped to 1.8, diuretics held. BP soft.    8/7/24-Patient seen and examined today, sitting up in bedside chair. Feeling better. Still has epigastric pain but was able to tolerate diet. Denies SOB/CHF symptoms. Creatinine 1.9. BP still marginal but ok overall.    8/8/24-Patient seen and examined today, sitting up in bedside chair. Tearful, complains of lower back pain. CT pending. CV wise, remains stable. No SOB. Creatinine improved to 1.6, can resume oral diuretic tmw.         Review of Systems   Constitutional: Positive for malaise/fatigue.   HENT: Negative.     Eyes: Negative.    Cardiovascular: Negative.    Respiratory: Negative.     Endocrine: Negative.    Hematologic/Lymphatic: Negative.    Skin: Negative.    Musculoskeletal:  Positive for back pain.   Gastrointestinal: Negative.    Genitourinary: Negative.    Neurological: Negative.    Psychiatric/Behavioral:  The patient is nervous/anxious.    Allergic/Immunologic: Negative.      Objective:     Vital Signs (Most Recent):  Temp: 98.1 °F (36.7 °C) (08/08/24 0731)  Pulse: 77 (08/08/24 0839)  Resp: 18 (08/08/24 0946)  BP: 118/61 (08/08/24 0731)  SpO2: 100 % (08/08/24 0731) Vital Signs (24h Range):  Temp:  [97.5 °F (36.4 °C)-98.3 °F (36.8 °C)] 98.1 °F (36.7 °C)  Pulse:  [65-78] 77  Resp:  [15-20] 18  SpO2:  [93 %-100 %] 100 %  BP: (101-148)/(53-73) 118/61     Weight: 92 kg (202 lb 13.2 oz)  Body mass index is 33.75 kg/m².     SpO2: 100 %         Intake/Output Summary (Last 24 hours) at 8/8/2024 1048  Last data filed at 8/8/2024 1009  Gross per 24 hour   Intake --   Output 1 ml   Net -1 ml       Lines/Drains/Airways       Peripheral Intravenous Line  Duration                  Peripheral IV - Single Lumen 08/07/24 1717 20 G No Posterior;Right Forearm <1 day                       Physical  "Exam  Vitals and nursing note reviewed.   Constitutional:       General: She is not in acute distress.     Appearance: Normal appearance. She is well-developed. She is obese. She is not diaphoretic.   HENT:      Head: Normocephalic and atraumatic.   Eyes:      General:         Right eye: No discharge.         Left eye: No discharge.      Pupils: Pupils are equal, round, and reactive to light.   Cardiovascular:      Rate and Rhythm: Normal rate and regular rhythm.      Heart sounds: Normal heart sounds, S1 normal and S2 normal. No murmur heard.  Pulmonary:      Effort: Pulmonary effort is normal. No respiratory distress.      Breath sounds: Rhonchi present. No rales.   Abdominal:      General: There is no distension.   Musculoskeletal:      Right lower leg: No edema.      Left lower leg: No edema.   Skin:     General: Skin is warm and dry.      Findings: No erythema.   Neurological:      Mental Status: She is alert and oriented to person, place, and time.   Psychiatric:         Behavior: Behavior normal.      Comments: Tearful            Significant Labs: CMP   Recent Labs   Lab 08/07/24  0757 08/08/24  0817    139   K 4.5 4.5    105   CO2 24 25   * 115*   BUN 38* 31*   CREATININE 1.9* 1.6*   CALCIUM 8.7 8.7   PROT 6.7 6.5   ALBUMIN 3.7 3.8   BILITOT 0.5 0.4   ALKPHOS 118 117   AST 11 11   ALT 6* 5*   ANIONGAP 12 9   , CBC   Recent Labs   Lab 08/07/24  0757 08/08/24  0817   WBC 8.44 8.09   HGB 15.3 15.3   HCT 49.9* 48.9*    251   , Troponin No results for input(s): "TROPONINI" in the last 48 hours., and All pertinent lab results from the last 24 hours have been reviewed.    Significant Imaging: Echocardiogram: Transthoracic echo (TTE) complete ([facility] Only):   Results for orders placed or performed during the hospital encounter of 02/27/23   Echo   Result Value Ref Range    BSA 2.32 m2    TDI SEPTAL 0.05 m/s    LV LATERAL E/E' RATIO 12.33 m/s    LV SEPTAL E/E' RATIO 14.80 m/s    IVC diameter " 1.85 cm    Left Ventricular Outflow Tract Mean Velocity 0.55 cm/s    Left Ventricular Outflow Tract Mean Gradient 1.00 mmHg    TDI LATERAL 0.06 m/s    LVIDd 4.95 3.5 - 6.0 cm    IVS 1.08 0.6 - 1.1 cm    Posterior Wall 1.13 (A) 0.6 - 1.1 cm    LVIDs 3.58 (A) 2.1 - 4.0 cm    FS 28 28 - 44 %    LV mass 205.08 g    TAPSE 1.42 cm    RV S' 0.01 cm/s    Left Ventricle Relative Wall Thickness 0.46 cm    E/A ratio 0.86     Mean e' 0.06 m/s    E wave deceleration time 150.00 msec    LVOT diameter 2.00 cm    LVOT area 3.1 cm2    LVOT peak steven 0.83 m/s    LVOT peak VTI 15.20 cm    LVOT stroke volume 47.73 cm3    E/E' ratio 13.45 m/s    MV Peak E Steven 0.74 m/s    MV Peak A Steven 0.86 m/s    LV Systolic Volume 115.00 mL    LV Systolic Volume Index 51.8 mL/m2    LV Diastolic Volume 193.00 mL    LV Diastolic Volume Index 86.94 mL/m2    LV Mass Index 92 g/m2    Right Atrial Pressure (from IVC) 3 mmHg    EF 40 %    Narrative    · The left ventricle is mildly enlarged with concentric remodeling and   mildly decreased systolic function.  · The estimated ejection fraction is 40%.  · Grade I left ventricular diastolic dysfunction.  · There is moderate left ventricular global hypokinesis.  · Normal right ventricular size with normal right ventricular systolic   function.  · Mild left atrial enlargement.  · Normal central venous pressure (3 mmHg).  · There is abnormal septal wall motion consistent with left bundle branch   block.       and EKG: Reviewed  Assessment and Plan:   Patient stable CV wise. CHF compensated. Renal function improved. Can resume po diuretics tmw. Back pain workup as per hospital medicine.    * Gastroenteritis  -Mgmt as per hospital medicine  -CT of abd/pelvis reviewed  -Still complains of ongoing abd pain    8/6/24  -Abd pain improved, mgmt as per hospital medicine    8/8/24  -Resolved    Abnormal lung field  -ON abx    Chronic combined systolic and diastolic heart failure  -Known severe cardiomyopathy with EF of  15%  -Recent hospitalization in July due to decompensation  -Stable volume wise on exam, BNP elevated  -IV diuresis as tolerated, not initiated this AM given ongoing abd pain, nausea/vomiting  -LA WNL  -Troponin negative  -Continue BB as tolerated    8/6/24  -Given dose of IV Lasix yesterday secondary to fluid bolus given in ED and CXR findings  -Creatinine bumped to 1.8 this AM and BP soft, diuretics held  -Continue BB as tolerated  -Resume ARB once creatinine improved  -Needs ischemic workup as OP once abd pain/GI issues resolved    8/7/24  -Clinically compensated  -Resume ARB and po diuretic as tolerated    8/8/24  -Stable, creatinine improving, resume po diuretic tmw    Acute renal failure superimposed on stage 2 chronic kidney disease  -Improved since admission  -Monitor  -Resume ARB as tolerated    8/6/24  -Creatinine bumped to 1.8 and BP marginal  -Diuretics held  -Continue BB as tolerated  -Resume ARB once creatinine improves    8/7/24  -Creatinine 1.9, monitor  -Resume ARB/diuretic as tolerated  -No clinical signs/symptoms of fluid overload    8/8/24  -Creatinine improved to 1.6, resume diuretic tmw  -ARB as tolerated    Severe obesity with body mass index (BMI) of 35.0 to 39.9 with comorbidity  -Weight loss    Hyperlipidemia associated with type 2 diabetes mellitus  -Statin    CAD (coronary artery disease)  -ASA, statin, BB  -Will need ischemic evaluation as OP    COPD  -Mgmt as per hospital medicine    Primary hypertension  -Continue home CV meds/regimen as tolerated        VTE Risk Mitigation (From admission, onward)           Ordered     apixaban tablet 5 mg  2 times daily         08/05/24 0301     IP VTE HIGH RISK PATIENT  Once         08/05/24 0301     Place sequential compression device  Until discontinued         08/05/24 0301     Reason for No Pharmacological VTE Prophylaxis  Once        Question:  Reasons:  Answer:  Already adequately anticoagulated on oral Anticoagulants    08/05/24 0301                     Helen Kaur PA-C  Cardiology  [facility] - Tele[facility] ([facility])

## 2024-08-08 NOTE — PLAN OF CARE
POC discharge home self care. All discharge instructions, education and Rxs giving to patient, verbal understanding received. Discharge medication delivered by [facility] to bedside. PIV remove with cath tip intact. Patient awaiting ride home, will be discharge via w/c by staff.

## 2024-08-08 NOTE — ASSESSMENT & PLAN NOTE
Body mass index is 33.05 kg/m². Morbid obesity complicates all aspects of disease management from diagnostic modalities to treatment. Weight loss encouraged and health benefits explained to patient.

## 2024-08-08 NOTE — PLAN OF CARE
Problem: Adult Inpatient Plan of Care  Goal: Plan of Care Review  Outcome: Progressing  Goal: Optimal Comfort and Wellbeing  Outcome: Progressing     Problem: Diabetes Comorbidity  Goal: Blood Glucose Level Within Targeted Range  Outcome: Progressing     Problem: Acute Kidney Injury/Impairment  Goal: Improved Oral Intake  Outcome: Progressing  Goal: Effective Renal Function  Outcome: Progressing     Problem: Pneumonia  Goal: Effective Oxygenation and Ventilation  Outcome: Progressing     Problem: Infection  Goal: Absence of Infection Signs and Symptoms  Outcome: Progressing     Problem: Skin Injury Risk Increased  Goal: Skin Health and Integrity  Outcome: Progressing

## 2024-08-10 NOTE — HOSPITAL COURSE
The patient presented with shortness of breath and abdominal pain. She was noted to be hypoxic and was placed on supplemental oxygen. Cardiology was consulted and recommended IV diuresis. Her abdominal pain resolved. She was tolerating oral intake without difficulty. She diuresed well. She was weaned to room air. Her blood pressure was marginal after diuresis and her creatinine bumped. Medications were adjusted. Her creatinine improved. She participated with therapy and was safe for discharge home. Patient seen and examined, stable for discharge.

## 2024-08-10 NOTE — DISCHARGE SUMMARY
[facility] Medicine  Discharge Summary      Patient Name: Delores Fox  MRN: [id number]  [facility]: [id number]  Patient Class: IP- Inpatient  Admission Date: 8/4/2024  Hospital Length of Stay: 2 days  Discharge Date and Time: 8/8/2024  5:33 PM  Attending Physician: Gabi att. providers found   Discharging Provider: Keshawn West MD  Primary Care Provider: Tomy Rios MD    Primary Care Team: Networked reference to record PCT     HPI:   58-year-old white woman with history of and try to/duodenitis with reported ulcer, non-insulin-dependent diabetes mellitus type 2, chronic combined systolic and diastolic CHF with cardiomyopathy (echocardiogram 07/13/2024 with EF less than 15% and severe grade 3 diastolic dysfunction), tobacco abuse, COPD, depression with anxiety, hypertension, hyperlipidemia, coronary artery disease, community-acquired pneumonia, obesity, and chronic kidney disease stage 2 who presented to the emergency department with complaint of abdominal pain since yesterday morning.  Abdominal pain is generalized in nature, 10/10 on the pain scale, associated with intractable nausea and vomiting, and associated with diarrhea.  Patient denies any associated fevers, chills, myalgias, chest pain, shortness for breath, or cough.  Patient denies any difficulty with urinating and denies any dysuria or hematuria.    Patient was recently hospitalized 7/13-07/14/2024 for acute on chronic combined systolic and diastolic CHF exacerbation for which she received IV Lasix diuresis.    * No surgery found *      Hospital Course:   The patient presented with shortness of breath and abdominal pain. She was noted to be hypoxic and was placed on supplemental oxygen. Cardiology was consulted and recommended IV diuresis. Her abdominal pain resolved. She was tolerating oral intake without difficulty. She diuresed well. She was weaned to room air. Her blood pressure was marginal after diuresis and her  creatinine bumped. Medications were adjusted. Her creatinine improved. She participated with therapy and was safe for discharge home. Patient seen and examined, stable for discharge.     Goals of Care Treatment Preferences:  Code Status: Full Code      SDOH Screening:  The patient was screened for utility difficulties, food insecurity, transport difficulties, housing insecurity, and interpersonal safety and there were no concerns identified this admission.     Consults:   Consults (From admission, onward)          Status Ordering Provider     Inpatient consult to Diabetes educator  Once        Provider:  (Not yet assigned)    Completed MAURICIO JAMES     Inpatient consult to Cardiology  Once        Provider:  Helen Kaur PA-C    Completed CLAUDINE GONZALEZ                Final Active Diagnoses:    Diagnosis Date Noted POA    PRINCIPAL PROBLEM:  Gastroenteritis [K52.9] 08/05/2024 Yes    Acute renal failure superimposed on stage 2 chronic kidney disease [N17.9, N18.2] 08/05/2024 Yes    Elevated liver enzymes [R74.8] 08/05/2024 Yes    Diabetes mellitus type 2, noninsulin dependent [E11.9] 08/05/2024 Yes    Chronic combined systolic and diastolic heart failure [I50.42] 08/05/2024 Yes    Abnormal lung field [R91.8] 08/05/2024 Yes    Severe obesity with body mass index (BMI) of 35.0 to 39.9 with comorbidity [E66.01] 09/27/2022 Yes    Hyperlipidemia associated with type 2 diabetes mellitus [E11.69, E78.5] 03/24/2022 Yes    CAD (coronary artery disease) [I25.10] 12/10/2020 Yes    COPD [J44.1] 11/22/2020 Yes     Chronic    Primary hypertension [I10] 11/21/2020 Yes      Problems Resolved During this Admission:       Discharged Condition: stable    Disposition: Home or Self Care    Follow Up:   Follow-up Information       Tomy Rios MD Follow up.    Specialty: Family Medicine  Contact information:  [address]  [place] [address] [phone]                           Patient Instructions:      Diet  diabetic     Activity as tolerated       Significant Diagnostic Studies: Radiology:   Imaging Results              X-Ray Chest AP Portable (Final result)  Result time 08/05/24 13:07:43      Final result by Braden Alexis MD (08/05/24 13:07:43)                   Impression:      Bilateral perihilar and lower lobe ground-glass interstitial infiltrates suggesting pulmonary edema.      Electronically signed by: Braden Alexis MD  Date:    08/05/2024  Time:    13:07               Narrative:    EXAMINATION:  XR CHEST AP PORTABLE    CLINICAL HISTORY:  Acute shortness of breath    TECHNIQUE:  AP view of the chest was performed.    COMPARISON:  03/06/2023    FINDINGS:  Mild cardiomegaly.  Bilateral perihilar and lower lobe ground-glass and interstitial infiltrates.  No alveolar consolidations.  No pneumothorax.                                       CT Chest Without Contrast (Final result)  Result time 08/05/24 08:44:10      Final result by Mu Joiner Jr., MD (08/05/24 08:44:10)                   Impression:      1.  No acute chest findings.  Stable coarsened reticular opacities within the subpleural space of particularly of the lower lobes and right middle lobe possibly post infectious in nature.  2.  Indeterminate likely reactive mediastinal lymphadenopathy 3.  4 mm right upper lobe pulmonary nodule.  Recommend continued follow-up according to Fleischner criteria.    For a ground glass nodule <6 mm, Fleischner Society 2017 guidelines recommend no routine follow up. However, suspicious features could warrant follow up with non-contrast chest CT at 2 years and 4 years after discovery.    All CT scans at this facility are performed  using dose modulation techniques as appropriate to performed exam including the following:  automated exposure control; adjustment of mA and/or kV according to the patients size (this includes techniques or standardized protocols for targeted exams where dose is matched to indication/reason for  exam: i.e. extremities or head);  iterative reconstruction technique.      Electronically signed by: Mu Joiner Jr., MD  Date:    08/05/2024  Time:    08:44               Narrative:    EXAMINATION:  CT CHEST WITHOUT CONTRAST    CLINICAL HISTORY:  abnormal lung field seen on CT abdomen;    TECHNIQUE:  CT scan of the chest was obtained without administration of contrast.  Coronal and sagittal reconstructions were performed on these images.    COMPARISON:  CT scan of the abdomen and pelvis on 08/04/2024..  03/25/2021 CT abdomen and pelvis.    FINDINGS:  Chronic reticular subpleural opacities particularly in the right middle lobe and lateral aspect of the right lower lobe.  Scarring within the lingula similar to prior remote exam.  No pneumothorax.  4 mm right upper lobe pulmonary nodule on image 137 series 4.  The heart is normal in size.  No pericardial effusion.  Moderately calcified coronary arteries.  No pleural effusion or pneumothorax.  Indeterminate likely reactive 1.9 x 1.1 cm precarinal lymph node.  There are multiple other smaller subcentimeter lymph nodes none appear pathologically enlarged on this non contrasted CT scan The airway is clear.  Osseous structures are intact.  Old healed bilateral rib fractures.  Thyroid gland is normal.   Visualized abdominal organs are within normal limits.                                       CT Abdomen Pelvis  Without Contrast (Final result)  Result time 08/04/24 22:35:59      Final result by Yaw Rouse MD (08/04/24 22:35:59)                   Impression:      As above    All CT scans   are performed using dose optimization techniques including the following: automated exposure control; adjustment of the mA and/or kV; use of iterative reconstruction technique.  Dose modulation was employed for ALARA by means of: Automated exposure control; adjustment of the mA and/or kV according to patient size (this includes techniques or standardized protocols for targeted exams  where dose is matched to indication/reason for exam; i.e. extremities or head); and/or use of iterative reconstructive technique.      Electronically signed by: Yaw Rouse  Date:    08/04/2024  Time:    22:35               Narrative:    EXAMINATION:  CT ABDOMEN PELVIS WITHOUT CONTRAST    CLINICAL HISTORY:  Abdominal abscess/infection suspected;    TECHNIQUE:  Low dose axial images, sagittal and coronal reformations were obtained from the lung bases to the pubic symphysis.    COMPARISON:  None    FINDINGS:  Mild reticular opacities in the right middle lobe.  Mild left basilar scarring.  Mild cardiomegaly.  Hepatomegaly.  Status post cholecystectomy.  Atherosclerotic changes of the aorta iliac vasculature.  Left hip prosthesis.  Focus of gas in the right ventricle likely related to instrumentation.  Spleen is not enlarged.  Punctate nonobstructing left renal calculi.  No hydronephrosis.  No adrenal masses.  No pancreatic mass.  No bowel obstruction or free fluid.  Slight colonic diverticulosis.  Bladder is grossly unremarkable.  Status post hysterectomy.  No vertebral body compression deformity.    No free fluid or abscess                                        Pending Diagnostic Studies:       None           Medications:  Reconciled Home Medications:      Medication List        START taking these medications      HYDROcodone-acetaminophen  mg per tablet  Commonly known as: NORCO  Take 1 tablet by mouth every 6 (six) hours as needed for Pain.  Replaces: HYDROcodone-acetaminophen 5-325 mg per tablet            CONTINUE taking these medications      albuterol 1.25 mg/3 mL Nebu  Commonly known as: ACCUNEB  USE 1 VIAL IN NEBULIZER EVERY 6 HOURS AS NEEDED FOR WHEEZING( RESCUE)     apixaban 5 mg Tab  Commonly known as: ELIQUIS  Take 5 mg by mouth 2 (two) times daily.     aspirin 81 MG Chew  Take 1 tablet (81 mg total) by mouth once daily.     atorvastatin 80 MG tablet  Commonly known as: LIPITOR  Take 1 tablet (80  mg total) by mouth once daily.     blood sugar diagnostic Strp  Commonly known as: TRUE METRIX GLUCOSE TEST STRIP  1 strip by In Vitro route 2 (two) times a day.     blood-glucose meter kit  To check BG 2 times daily, to use with insurance preferred meter     buPROPion 150 MG TB24 tablet  Commonly known as: WELLBUTRIN XL  Take 1 tablet (150 mg total) by mouth once daily.     empagliflozin 25 mg tablet  Commonly known as: JARDIANCE  Take 1 tablet (25 mg total) by mouth once daily.     fluticasone-umeclidin-vilanter 200-62.5-25 mcg inhaler  Commonly known as: TRELEGY ELLIPTA  Inhale 1 puff into the lungs once daily.     glimepiride 2 MG tablet  Commonly known as: AMARYL  Take 2 mg by mouth daily with breakfast.     glipiZIDE 10 MG Tr24  Commonly known as: GLUCOTROL  Take 1 tablet (10 mg total) by mouth daily with breakfast.     hydrOXYzine HCL 25 MG tablet  Commonly known as: ATARAX  Take 1 tablet (25 mg total) by mouth 3 (three) times daily as needed for Itching.     isosorbide mononitrate 30 MG 24 hr tablet  Commonly known as: IMDUR  Take 1 tablet (30 mg total) by mouth once daily.     lancets Misc  1 Lancet by Subdermal route 2 (two) times a day. to use with insurance preferred meter     losartan 25 MG tablet  Commonly known as: COZAAR  Take 1 tablet (25 mg total) by mouth once daily.     metoprolol succinate 50 MG 24 hr tablet  Commonly known as: TOPROL-XL  TAKE 1 TABLET(50 MG) BY MOUTH DAILY     nebulizer and compressor Ebony  Nebulizer machine for home use. DX COPD.     ondansetron 4 MG Tbdl  Commonly known as: ZOFRAN-ODT  Take 1 tablet (4 mg total) by mouth every 8 (eight) hours as needed.     pantoprazole 40 MG tablet  Commonly known as: PROTONIX  Take 1 tablet (40 mg total) by mouth 2 (two) times daily.     pregabalin 100 MG capsule  Commonly known as: LYRICA  Take 1 capsule (100 mg total) by mouth 3 (three) times daily.     RYBELSUS 7 mg tablet  Generic drug: semaglutide  Take 1 tablet (7 mg total) by mouth  once daily.     torsemide 20 MG Tab  Commonly known as: DEMADEX  Take 1 tablet (20 mg total) by mouth once daily. Take torsemide 20 mg once daily.  Can take additional dose in afternoon if increased shortness of breath or leg swelling for 2-3 days if needed     zolpidem 10 mg Tab  Commonly known as: AMBIEN  Take 1 tablet (10 mg total) by mouth nightly as needed (insomnia).            STOP taking these medications      HYDROcodone-acetaminophen 5-325 mg per tablet  Commonly known as: NORCO  Replaced by: HYDROcodone-acetaminophen  mg per tablet              Indwelling Lines/Drains at time of discharge:   Lines/Drains/Airways       None                   Time spent on the discharge of patient: 31 minutes         Keshawn West MD  Department of Hospital Medicine  [facility] Telemetry ([facility])

## 2024-08-12 ENCOUNTER — TELEPHONE (OUTPATIENT)
Dept: CARDIOLOGY | Facility: CLINIC | Age: 59
End: 2024-08-12
Payer: MEDICAID

## 2024-08-12 NOTE — TELEPHONE ENCOUNTER
[facility] nurse called. Pt for 48hr hospital discharge follow up. No answer. Lvm for her to return my call.

## 2024-08-13 ENCOUNTER — TELEPHONE (OUTPATIENT)
Dept: CARDIOLOGY | Facility: CLINIC | Age: 59
End: 2024-08-13
Payer: MEDICAID

## 2024-08-13 NOTE — TELEPHONE ENCOUNTER
Attempting to reach pt for f/u since her discharge from the hospital. No answer. Voice message left

## 2024-08-14 ENCOUNTER — TELEPHONE (OUTPATIENT)
Dept: CARDIOLOGY | Facility: CLINIC | Age: 59
End: 2024-08-14
Payer: MEDICAID

## 2024-08-14 ENCOUNTER — DOCUMENTATION ONLY (OUTPATIENT)
Dept: CARDIOLOGY | Facility: CLINIC | Age: 59
End: 2024-08-14
Payer: MEDICAID

## 2024-09-05 ENCOUNTER — PATIENT MESSAGE (OUTPATIENT)
Dept: FAMILY MEDICINE | Facility: CLINIC | Age: 59
End: 2024-09-05
Payer: MEDICAID

## 2024-11-06 ENCOUNTER — PATIENT MESSAGE (OUTPATIENT)
Dept: FAMILY MEDICINE | Facility: CLINIC | Age: 59
End: 2024-11-06
Payer: MEDICAID

## 2025-01-02 ENCOUNTER — PATIENT MESSAGE (OUTPATIENT)
Dept: ADMINISTRATIVE | Facility: HOSPITAL | Age: 60
End: 2025-01-02
Payer: MEDICAID

## 2025-01-02 ENCOUNTER — PATIENT OUTREACH (OUTPATIENT)
Dept: ADMINISTRATIVE | Facility: HOSPITAL | Age: 60
End: 2025-01-02
Payer: MEDICAID

## 2025-01-02 NOTE — PROGRESS NOTES
Uncontrolled BP REPORT  BP Readings from Last 3 Encounters:   11/22/24 127/60   08/08/24 (!) 126/59   07/21/24 128/67       Non-compliant report chart audits for HYPERTENSION MANAGEMENT     Outreach to patient in reference to hypertension management       BLOOD PRESSURE FOLLOW UP NEEDED WITH A CARE TEAM MEMBER    ACTIVE PORTAL MESSAGE OR LETTER SENT

## 2025-01-07 ENCOUNTER — PATIENT MESSAGE (OUTPATIENT)
Dept: ADMINISTRATIVE | Facility: HOSPITAL | Age: 60
End: 2025-01-07
Payer: MEDICAID

## 2025-01-07 ENCOUNTER — PATIENT OUTREACH (OUTPATIENT)
Dept: ADMINISTRATIVE | Facility: HOSPITAL | Age: 60
End: 2025-01-07
Payer: MEDICAID

## 2025-03-13 ENCOUNTER — PATIENT MESSAGE (OUTPATIENT)
Dept: FAMILY MEDICINE | Facility: CLINIC | Age: 60
End: 2025-03-13
Payer: MEDICAID

## 2025-08-20 ENCOUNTER — PATIENT MESSAGE (OUTPATIENT)
Dept: ADMINISTRATIVE | Facility: HOSPITAL | Age: 60
End: 2025-08-20
Payer: MEDICAID

## (undated) DEVICE — CORD BIPOLAR 12 FOOT

## (undated) DEVICE — CORD BIPOLAR ELECTROSURGICAL

## (undated) DEVICE — GUIDEWIRE DOUBLE ENDED .035 DIA. 150CML

## (undated) DEVICE — SHEATH INTRODUCER SLENDER 6FX10CM

## (undated) DEVICE — Device

## (undated) DEVICE — DRESSING EYE OVAL LF

## (undated) DEVICE — SHEATH PINNACLE 6FRX10CM W/GUIDEWIRE

## (undated) DEVICE — NDL HYPO A BEVEL 30X1/2

## (undated) DEVICE — SYR 3CC LUER LOC

## (undated) DEVICE — SHEATH PINNACLE 5FRX10CM W/GUIDEWIRE

## (undated) DEVICE — CATHETER EXPO MLTPK 5FX100-110CM

## (undated) DEVICE — GLOVE SURG ULTRA TOUCH 8.5